# Patient Record
Sex: FEMALE | Race: WHITE | NOT HISPANIC OR LATINO | Employment: OTHER | URBAN - METROPOLITAN AREA
[De-identification: names, ages, dates, MRNs, and addresses within clinical notes are randomized per-mention and may not be internally consistent; named-entity substitution may affect disease eponyms.]

---

## 2017-01-03 DIAGNOSIS — E03.9 ACQUIRED HYPOTHYROIDISM: Primary | ICD-10-CM

## 2017-01-03 RX ORDER — LEVOTHYROXINE SODIUM 150 UG/1
150 TABLET ORAL EVERY OTHER DAY
Qty: 45 TABLET | Refills: 3 | Status: SHIPPED | OUTPATIENT
Start: 2017-01-03 | End: 2017-11-27

## 2017-01-03 RX ORDER — LEVOTHYROXINE SODIUM 175 UG/1
175 TABLET ORAL EVERY OTHER DAY
Qty: 45 TABLET | Refills: 3 | Status: SHIPPED | OUTPATIENT
Start: 2017-01-03 | End: 2017-11-27

## 2017-01-03 NOTE — TELEPHONE ENCOUNTER
Routing refill request to provider for review/approval because:  -recent dose change    Routing to PCP.    Dr. Collado-Please review and advise/sign if agree.    Thank you!  ERIBERTO GlynnN, RN

## 2017-01-03 NOTE — TELEPHONE ENCOUNTER
I checked her labs in December, no dose change then.  She alternates the 150mcg dose and the 175mcg dose.  I think we are ok to go ahead with refills.  I don't think her dose has been changed recently, let me know if you find otherwise.   Thanks.

## 2017-01-03 NOTE — TELEPHONE ENCOUNTER
LEVOTHYROXINE 150 MG     Last Written Prescription Date: 10-21-16  Last Quantity: 45, # refills: 0  Last Office Visit with [facility] or [facility] prescribing provider: 12-1-16   Next 5 appointments (look out 90 days)     Jan 04, 2017  4:00 PM   Return Visit with Roberto Carlos Mercado [facility] ([facility])    [address] [address]   [phone]            Jan 18, 2017  4:00 PM   Return Visit with Roberto Carlos Mercado [facility] ([facility])    [address] [address] [phone]            Jan 25, 2017  4:00 PM   Return Visit with Roberto Carlos Mercado [facility] ([facility])    [address] [address]   [phone]                   TSH   Date Value Ref Range Status   12/01/2016 3.72 0.40 - 4.00 mU/L Final

## 2017-01-04 ENCOUNTER — TELEPHONE (OUTPATIENT)
Dept: PALLIATIVE MEDICINE | Facility: CLINIC | Age: 57
End: 2017-01-04

## 2017-01-04 ENCOUNTER — OFFICE VISIT (OUTPATIENT)
Dept: PSYCHOLOGY | Facility: CLINIC | Age: 57
End: 2017-01-04
Payer: COMMERCIAL

## 2017-01-04 ENCOUNTER — OFFICE VISIT (OUTPATIENT)
Dept: PALLIATIVE MEDICINE | Facility: CLINIC | Age: 57
End: 2017-01-04
Payer: COMMERCIAL

## 2017-01-04 VITALS — DIASTOLIC BLOOD PRESSURE: 62 MMHG | SYSTOLIC BLOOD PRESSURE: 118 MMHG | HEART RATE: 72 BPM

## 2017-01-04 DIAGNOSIS — F33.1 MAJOR DEPRESSIVE DISORDER, RECURRENT EPISODE, MODERATE (H): Primary | ICD-10-CM

## 2017-01-04 DIAGNOSIS — M50.30 DDD (DEGENERATIVE DISC DISEASE), CERVICAL: ICD-10-CM

## 2017-01-04 DIAGNOSIS — F43.10 PTSD (POST-TRAUMATIC STRESS DISORDER): ICD-10-CM

## 2017-01-04 DIAGNOSIS — Z63.0 PARTNER RELATIONSHIP PROBLEMS: ICD-10-CM

## 2017-01-04 DIAGNOSIS — G89.4 CHRONIC PAIN SYNDROME: Primary | ICD-10-CM

## 2017-01-04 PROCEDURE — 90847 FAMILY PSYTX W/PT 50 MIN: CPT | Performed by: MARRIAGE & FAMILY THERAPIST

## 2017-01-04 PROCEDURE — 99205 OFFICE O/P NEW HI 60 MIN: CPT | Performed by: PHYSICAL MEDICINE & REHABILITATION

## 2017-01-04 PROCEDURE — 90785 PSYTX COMPLEX INTERACTIVE: CPT | Performed by: MARRIAGE & FAMILY THERAPIST

## 2017-01-04 RX ORDER — BUPRENORPHINE 5 UG/H
1 PATCH TRANSDERMAL
Qty: 4 PATCH | Refills: 0 | Status: SHIPPED | OUTPATIENT
Start: 2017-01-04 | End: 2017-04-05

## 2017-01-04 SDOH — SOCIAL STABILITY - SOCIAL INSECURITY: PROBLEMS IN RELATIONSHIP WITH SPOUSE OR PARTNER: Z63.0

## 2017-01-04 ASSESSMENT — PAIN SCALES - GENERAL: PAINLEVEL: SEVERE PAIN (6)
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
## 2017-01-04 NOTE — NURSING NOTE
"Chief Complaint   Patient presents with     Consult     pain management evaluation       Initial /62 mmHg  Pulse 72  Breastfeeding? No Estimated body mass index is 32.47 kg/(m^2) as calculated from the following:    Height as of 12/23/16: 1.753 m (5' 9\").    Weight as of 12/23/16: 99.791 kg (220 lb).  BP completed using cuff size: pauline Elizabeth CMA   [facility]    "

## 2017-01-04 NOTE — TELEPHONE ENCOUNTER
Received fax from [facility]  that states RX for Butrans 5mcg  Patch  is not covered under patient's insurance    Insurance Name: [facility]/ [employer]  Phone#- [phone]  ID#- [id number]  BIN: [id number]  PCN: [id number]  [facility]

## 2017-01-04 NOTE — PATIENT INSTRUCTIONS
1. Physical Therapy: Schedule an evaluation with Renee. Consider TENS unit.   2. Clinical Health Psychologist to address issues of relaxation, behavioral change, coping style, and other factors important to improvement: Schedule an evaluation with Dr. Humphries  3. Self Care Recommendations: continue guided imagery  4. Medication Management:   1. Butrans 5 mcg patch changed weekly.  2. Will consider lyrica.  3. Will get a UDS and opioid agreement at the next appointment.   5. Further procedures recommended: could consider cervical epidural steroid injection   6. Follow up: with Dr. Reyes in clinic in 1 month    Nurse Triage line:  [phone]   Call this number with any questions or concerns. You may leave a detailed message anytime. Calls are typically returned Monday through Friday between 8 AM and 4:30 PM. We usually get back to you within 2 business days depending on the issue/request.       Medication refills:    For non-narcotic medications, call your pharmacy directly to request a refill. The pharmacy will contact the [facility] Management Center for authorization. Please allow 3-4 days for these refills to be processed.     For narcotic refills, call the nurse triage line or send a [employer] message. Please contact us 7-10 days before your refill is due. The message MUST include the name of the specific medication(s) requested and how you would like to receive the prescription(s). The options are as follows:    Pain Clinic staff can mail the prescription to your pharmacy. Please tell us the name of the pharmacy.    You may pick the prescription up at the Pain Clinic (tell us the location) or during a clinic visit with your pain provider    Pain Clinic staff can deliver the prescription to the [facility] pharmacy in the clinic building. Please tell us the location.      Scheduling number: [phone].  Call this number to schedule or change appointments.    We believe regular attendance is key to your success in our  program.    Any time you are unable to keep your appointment we ask that you call us at least 24 hours in advance to let us know. This will allow us to offer the appointment time to another patient.

## 2017-01-04 NOTE — MR AVS SNAPSHOT
After Visit Summary   1/4/2017    Ilsa Yusuf    MRN: [id number]           Patient Information     Date Of Birth          1960        Visit Information        Provider Department      1/4/2017 9:00 AM Nahed Reyes DO [facility]        Today's Diagnoses     Chronic pain syndrome    -  1       Care Instructions    1. Physical Therapy: Schedule an evaluation with Renee. Consider TENS unit.   2. Clinical Health Psychologist to address issues of relaxation, behavioral change, coping style, and other factors important to improvement: Schedule an evaluation with Dr. Humphries  3. Self Care Recommendations: continue guided imagery  4. Medication Management:   1. Butrans 5 mcg patch changed weekly.  2. Will consider lyrica.  3. Will get a UDS and opioid agreement at the next appointment.   5. Further procedures recommended: could consider cervical epidural steroid injection   6. Follow up: with Dr. Reyes in clinic in 1 month    Nurse Triage line:  [phone]   Call this number with any questions or concerns. You may leave a detailed message anytime. Calls are typically returned Monday through Friday between 8 AM and 4:30 PM. We usually get back to you within 2 business days depending on the issue/request.       Medication refills:    For non-narcotic medications, call your pharmacy directly to request a refill. The pharmacy will contact the Pain Management Center for authorization. Please allow 3-4 days for these refills to be processed.     For narcotic refills, call the nurse triage line or send a [employer] message. Please contact us 7-10 days before your refill is due. The message MUST include the name of the specific medication(s) requested and how you would like to receive the prescription(s). The options are as follows:    Pain Clinic staff can mail the prescription to your pharmacy. Please tell us the name of the pharmacy.    You may pick the prescription up at the Pain  Clinic (tell us the location) or during a clinic visit with your pain provider    Pain Clinic staff can deliver the prescription to the [facility] pharmacy in the clinic building. Please tell us the location.      Scheduling number: [phone].  Call this number to schedule or change appointments.    We believe regular attendance is key to your success in our program.    Any time you are unable to keep your appointment we ask that you call us at least 24 hours in advance to let us know. This will allow us to offer the appointment time to another patient.             Follow-ups after your visit        Additional Services     PAIN PHD EVAL/TREAT/FOLLOW UP           PAIN PT EVAL AND TREAT                 Your next 10 appointments already scheduled     Jan 04, 2017  4:00 PM   Return Visit with Roberto Carlos Mercado [facility] ([facility]    [address] [address]   [phone]            Jan 18, 2017  4:00 PM   Return Visit with Roberto Carlos Mercado [facility] ([facility]    [address] [address]   [phone]            Jan 25, 2017  4:00 PM   Return Visit with Roberto Carlos Mercado [facility] ([facility]    [address] [address]   [phone]              Who to contact     If you have questions or need follow up information about today's clinic visit or your schedule please contact [facility] directly at [phone].  Normal or non-critical lab and imaging results will be communicated to you by MyChart, letter or phone within 4 business days after the clinic has received the results. If you do not hear from us within 7 days, please contact the clinic through MyChart or phone. If you have a critical or abnormal lab result, we will notify you by phone as soon as possible.  Submit refill requests through [employer] or call your pharmacy and they will  forward the refill request to us. Please allow 3 business days for your refill to be completed.          Additional Information About Your Visit        [employer] Information     [employer] gives you secure access to your electronic health record. If you see a primary care provider, you can also send messages to your care team and make appointments. If you have questions, please call your primary care clinic.  If you do not have a primary care provider, please call [phone] and they will assist you.        Care EveryWhere ID     This is your Care EveryWhere ID. This could be used by other organizations to access your [facility] medical records  [id number]        Your Vitals Were     Pulse Breastfeeding?                72 No           Blood Pressure from Last 3 Encounters:   01/04/17 118/62   12/21/16 130/77   12/01/16 101/68    Weight from Last 3 Encounters:   12/23/16 99.791 kg (220 lb)   12/21/16 99.338 kg (219 lb)   12/01/16 98.431 kg (217 lb)              We Performed the Following     PAIN PHD EVAL/TREAT/FOLLOW UP     PAIN PT EVAL AND TREAT          Today's Medication Changes          These changes are accurate as of: 1/4/17 10:06 AM.  If you have any questions, ask your nurse or doctor.               Start taking these medicines.        Dose/Directions    buprenorphine 5 MCG/HR WK patch   Commonly known as:  BUTRANS   Used for:  Chronic pain syndrome   Started by:  Nahed Reyes DO        Dose:  1 patch   Place 1 patch onto the skin every 7 days   Quantity:  4 patch   Refills:  0            Where to get your medicines      Some of these will need a paper prescription and others can be bought over the counter.  Ask your nurse if you have questions.     Bring a paper prescription for each of these medications    - buprenorphine 5 MCG/HR WK patch             Primary Care Provider Office Phone # Fax #    Catrachita Collado [phone]       [place] [address]  [place] [address]        Thank you!     Thank you for choosing [facility]  for your care. Our goal is always to provide you with excellent care. Hearing back from our patients is one way we can continue to improve our services. Please take a few minutes to complete the written survey that you may receive in the mail after your visit with us. Thank you!             Your Updated Medication List - Protect others around you: Learn how to safely use, store and throw away your medicines at www.disposemymeds.org.          This list is accurate as of: 1/4/17 10:06 AM.  Always use your most recent med list.                   Brand Name Dispense Instructions for use    buprenorphine 5 MCG/HR WK patch    BUTRANS    4 patch    Place 1 patch onto the skin every 7 days       cholecalciferol 5000 UNITS Caps capsule    vitamin D3    100 capsule    Take 1 capsule (5,000 Units) by mouth daily Take 1 per day       doxepin 10 MG capsule    SINEquan    60 capsule    Take 1-2 caps at bedtime       DULoxetine 60 MG EC capsule    CYMBALTA    60 capsule    Take 2 capsules (120 mg) by mouth daily       FIBER PO      Two capsules in the morning and two capsules at night       fish oil-omega-3 fatty acids 1000 MG capsule      Take 2 g by mouth daily Takes 1 in am and 1 at bedtime       fluticasone 50 MCG/ACT spray    FLONASE    16 g    Spray 1-2 sprays into both nostrils daily       * HYDROcodone-acetaminophen 7.5-325 MG per tablet    NORCO     TK 1 T PO Q 8 H       * HYDROcodone-acetaminophen 5-325 MG per tablet    NORCO    50 tablet    Take 1-2 tablets by mouth every 6 hours as needed for moderate to severe pain maximum 8 tablet(s) per day       * levothyroxine 150 MCG tablet    SYNTHROID/LEVOTHROID    45 tablet    Take 1 tablet (150 mcg) by mouth every other day       * levothyroxine 175 MCG tablet    SYNTHROID/LEVOTHROID    45 tablet    Take 1 tablet (175 mcg) by mouth every other day       * LORazepam 0.5 MG tablet    ATIVAN     30 tablet    Take 1 tablet (0.5 mg) by mouth nightly as needed for anxiety 30 tablets to last 30 days.       * LORazepam 0.5 MG tablet    ATIVAN    6 tablet    Take 2 po 90 minutes prior to MRI and may repeat x 1 30 minutes prior to MRI       lovastatin 20 MG tablet    MEVACOR    90 tablet    Take 1 tablet (20 mg) by mouth At Bedtime       Multi-vitamin Tabs tablet      Take 1 tablet by mouth daily       NONFORMULARY      Take 20 mg by mouth 2 times daily D-amphetamine Salt combo       OLANZapine 2.5 MG tablet    zyPREXA    30 tablet    Take 1 tablet (2.5 mg) by mouth At Bedtime       omeprazole 40 MG capsule    priLOSEC    90 capsule    Take 1 capsule (40 mg) by mouth daily Take 30-60 minutes before a meal.       ondansetron 4 MG tablet    ZOFRAN    18 tablet    Take 1 tablet (4 mg) by mouth every 6 hours as needed for nausea       prazosin 5 MG capsule    MINIPRESS    30 capsule    Take 1 capsule (5 mg) by mouth At Bedtime Take 1 capsule at bedtime       tiZANidine 4 MG tablet    ZANAFLEX    60 tablet    Take 1-2 tablets (4-8 mg) by mouth 3 times daily as needed for muscle spasms       traMADol 50 MG tablet    ULTRAM    20 tablet    Take 1-2 tablets ( mg) by mouth every 6 hours as needed for pain       zolpidem 5 MG tablet    AMBIEN    60 tablet    Take 1-2 tablets (5-10 mg) by mouth nightly as needed for sleep       * Notice:  This list has 6 medication(s) that are the same as other medications prescribed for you. Read the directions carefully, and ask your doctor or other care provider to review them with you.

## 2017-01-04 NOTE — PROGRESS NOTES
[facility] Consultation    Date of visit: 1/4/2017    Primary Care Provider: Dr. Catrachita Collado    Consulting Physician: Dr. Catrachita Collado       Reason for consultation: comprehensive evaluation and management  cervical radiculopathy  Red flag -depression    History of Present Illness: Ilsa Yusuf is a 56 year old female with history of neck pain, who presents for initial evaluation. The patient reports sudden onset in Aug 2016, when she just woke up with the pain. She describes herself as being active - walks and rides bike 5 miles 3 times per week. She has lost 12 pounds since August. She was taking ibuprofen, which kept her pain fairly well managed, but she can no longer take ibuprofen due to kidney issues (Cr. 1.06). She had left arm numbness but that has resolved after a couple months. She denies any weakness in that arm. Has associated headaches about 4 times per week, which also started in August. She does have a history of migraines during her 30's and 40's but they resolved. The headache she has now is different and it travels up the back of her head and creates spasms in her head. Saw neurosurgery at the [facility] and then saw Kim Crenshaw in neurosurgery. Underwent a cervical epidural steroid injection at [facility] and had a bad experience with this injection. She then did not pursue the other recommendation to have an EMG.   Pain location: (1) left-sided neck pain and now also on the right for the past 1 week, (2) bilateral knee pain, right worse than left. History of left mensicus surgery in 2012 and has a torn meniscus in her right knee.   Quality and timing of pain: sharp, stabbing, miserable, exhausing, penetrating, worse and at night  Pain rating: intensity ranges from 4/10 to 8/10, and Averages 6/10 on a 0-10 scale.  Aggravating factors include: housework -laundry, vacuuming, cooking, turning her head  Relieving factors include:  physical therapy, traction  Denies red flags including: bowel or bladder symptoms, fever, chills, saddle anesthesia, profound motor loss, history of cancer, history of immune compromise, weight loss.     Has a history of depression and PTSD diagnosed in 2011. Does individual therapy weekly. Sees Roberto Carlos for couples therapy.    Current pain treatments include:   norco 5-325 mg #1/day Pain medications are being prescribed by Dr. Herrera for her knee.  Tizanidine 8 mg qhs  Ativan 2-3 times per week  Doxepin 10-20 mg qhs  cymbalta 120 mg daily  zyprexa 2.5 mg qhs    Previous pain treatments included:  Ilsa Yusuf has not been seen at a pain clinic in the past.    Medications:    NSAIDs: ibuprofen -some relief, medrol dose юлия -helped while taking it.   Anticonvulsants: gabapentin -horrible nightmares    Opioids: tramadol -provided incomplete relief  PT: [facility] completed in Oct 2016 -helped  Psychology: guided imagery  Acupuncture: no  Chiropractic care: no  TENS Unit: no  Injections: cervical epidural steroid injection 9/20/16 -flared up her pain for about 3 days and did not get significant relief  Surgery: no cervical surgery, left knee meniscus surgery in 2012    Diagnostic Tests:  Cervical MRI completed on 8/27/16 showed:  Findings:  The cervical vertebrae appear normally aligned.  There is disc height  narrowing at C5-C6 with degeneration of the disc and sclerosis of  adjacent endplates.  There is normal signal within and normal contour  of the cervical spinal cord. No significant abnormal bone marrow  change. The findings on a level by level basis are as follows:     C2-3: No spinal canal or neural foraminal narrowing.     C3-4:  No spinal canal or neural foraminal narrowing.     C4-5:  No spinal canal or neural foraminal narrowing.     C5-6:  Posterior disc bulge with uncovertebral joint hypertrophy and  osteophyte formations. Superimposing left central disc herniation with  elevation of the  posterior longitudinal ligament Mild canal narrowing.  Herniated disc effaces the left anterolateral subarachnoid space and  possibly abutting the left C6 in the left portion of the spinal canal.  Mild canal narrowing. Bilateral mild to moderate foraminal narrowing  more significant on the left.     C6-7:  Disc bulge with mild uncovertebral hypertrophy. No significant  spinal canal or neural foraminal narrowing.     C7-T1:  No spinal canal or neural foraminal narrowing.      No abnormality of the paraspinous soft tissues.                                                                       Impression:       1. Disc bulge with uncovertebral joint hypertrophy and osteophyte  formations at C5-6. Superimposing left central disc herniation with  elevation of the posterior longitudinal ligament.Herniated disc  effaces the left anterolateral subarachnoid space and possibly  abutting the left C6 in the left portion of the spinal canal. Mild  canal narrowing. Bilateral mild to moderate foraminal narrowing more  significant on the left.  2. Disc bulge at C6-7 with no significant compression.    Review of [place] Prescription Monitoring Program (): Today I have also reviewed the patient's history of controlled substance use, as provided by [place] licensed pharmacies and prescriber dispensers.     Review of Pain Questionnaire: Please see the [facility] [place] health questionnaire, which the patient completed and reviewed with me in detail.    Review of Electronic Chart: Today I have also reviewed available medical information in the patient's medical record at [facility] including relevant provider notes, laboratory work, and imaging.     Past Medical History:  Past Medical History   Diagnosis Date     Seizure (H) 3/2011     One seizure, treated with keppra.     Hypothyroid      Moderate major depression (H)      abilify, cymbalta, seroquel, and Dr Antonio cowart     PTSD (post-traumatic  stress disorder)      Hyperlipidemia LDL goal < 130      mevacor     Depressive disorder, not elsewhere classified 08/28/12     DC 10/05/12-[facility]     Seizure disorder (H) 1/18/2012     Arthritis 2012     both knees; hands     Depressive disorder        Past Surgical History:  Past Surgical History   Procedure Laterality Date     C partial excision thyroid,unilat  1991     rt, negative path then, treated with synthroid.     Ureteral reimplantation       Renal artery surgery  child     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     Tonsillectomy       Cholecystectomy       Hernia repair       Abdomen surgery       Colonoscopy  2012     Orthopedic surgery  left knee       Medications:  Current Outpatient Prescriptions   Medication Sig Dispense Refill     levothyroxine (SYNTHROID/LEVOTHROID) 150 MCG tablet Take 1 tablet (150 mcg) by mouth every other day 45 tablet 3     levothyroxine (SYNTHROID/LEVOTHROID) 175 MCG tablet Take 1 tablet (175 mcg) by mouth every other day 45 tablet 3     HYDROcodone-acetaminophen (NORCO) 7.5-325 MG per tablet TK 1 T PO Q 8 H  0     HYDROcodone-acetaminophen (NORCO) 5-325 MG per tablet Take 1-2 tablets by mouth every 6 hours as needed for moderate to severe pain maximum 8 tablet(s) per day 50 tablet 0     LORazepam (ATIVAN) 0.5 MG tablet Take 2 po 90 minutes prior to MRI and may repeat x 1 30 minutes prior to MRI 6 tablet 0     zolpidem (AMBIEN) 5 MG tablet Take 1-2 tablets (5-10 mg) by mouth nightly as needed for sleep 60 tablet 0     lovastatin (MEVACOR) 20 MG tablet Take 1 tablet (20 mg) by mouth At Bedtime 90 tablet 3     doxepin (SINEQUAN) 10 MG capsule Take 1-2 caps at bedtime 60 capsule 11     DULoxetine (CYMBALTA) 60 MG capsule Take 2 capsules (120 mg) by mouth daily 60 capsule 5     prazosin (MINIPRESS) 5 MG capsule Take 1 capsule (5 mg) by mouth At Bedtime Take 1 capsule at bedtime 30 capsule 5     FIBER PO Two capsules in the morning and two capsules at night        "LORazepam (ATIVAN) 0.5 MG tablet Take 1 tablet (0.5 mg) by mouth nightly as needed for anxiety 30 tablets to last 30 days. 30 tablet 2     ondansetron (ZOFRAN) 4 MG tablet Take 1 tablet (4 mg) by mouth every 6 hours as needed for nausea 18 tablet 5     tiZANidine (ZANAFLEX) 4 MG tablet Take 1-2 tablets (4-8 mg) by mouth 3 times daily as needed for muscle spasms 60 tablet 5     OLANZapine (ZYPREXA) 2.5 MG tablet Take 1 tablet (2.5 mg) by mouth At Bedtime 30 tablet 5     fluticasone (FLONASE) 50 MCG/ACT nasal spray Spray 1-2 sprays into both nostrils daily 16 g 11     NONFORMULARY Take 20 mg by mouth 2 times daily D-amphetamine Salt combo       cholecalciferol (VITAMIN D3) 5000 UNITS CAPS capsule Take 1 capsule (5,000 Units) by mouth daily Take 1 per day 100 capsule 2     omeprazole (PRILOSEC) 40 MG capsule Take 1 capsule (40 mg) by mouth daily Take 30-60 minutes before a meal. 90 capsule 3     multivitamin, therapeutic with minerals (MULTI-VITAMIN) TABS Take 1 tablet by mouth daily       fish oil-omega-3 fatty acids (OMEGA 3) 1000 MG capsule Take 2 g by mouth daily Takes 1 in am and 1 at bedtime       traMADol (ULTRAM) 50 MG tablet Take 1-2 tablets ( mg) by mouth every 6 hours as needed for pain 20 tablet 0       Allergies:     Allergies   Allergen Reactions     Gabapentin Other (See Comments)     Patient states she gets \"horrific nightmares\" with this medication     Dilaudid [Hydromorphone Hcl]      Made her feel like her skin was crawling     Compazine [Prochlorperazine] Other (See Comments)     \"Makes my skin crawl, I was going nuts.\"       Social History:  Social History     Social History     Marital Status:      Spouse Name: N/A     Number of Children: N/A     Years of Education: N/A     Occupational History     Not on file.     Social History Main Topics     Smoking status: Former Smoker -- 0.30 packs/day     Quit date: 02/17/2015     Smokeless tobacco: Current User     Alcohol Use: 0.0 oz/week "      Comment: 1-2 glass of wine with dinner 1-2 times per week     Drug Use: No     Sexual Activity:     Partners: Male     Birth Control/ Protection: None     Other Topics Concern     Parent/Sibling W/ Cabg, Mi Or Angioplasty Before 65f 55m? No     Social History Narrative     History of chemical dependency treatment: no. Denies any illegal drugs. Drinks alcohol very sparingly. Occasionally will smoke a cigarette.   Home situation: lives with their family -signigicant other/domestic partner -good relationship.   Work situation: last worked in , worked as a nurse education and lactation consultant. On disability since  for depression.    Family history:  Family History   Problem Relation Age of Onset     C.A.D. Father 58      at 58, heart disease     MENTAL ILLNESS Father      Alzheimer Disease Mother      total care now     Depression Mother      Anxiety Disorder Mother      DIABETES Sister      adult onset     Thyroid Disease Sister      DIABETES Sister      Coronary Artery Disease Father      Hyperlipidemia Father      Thyroid Disease Sister        Review of Systems:   General: negative for fever or chills, weight loss  Eyes and Head: negative for headache, dizziness, vision changes  Ears/Nose/Throat: negative for nose bleeds, hearing loss, sinus infection, earache, and ringing in ears  Immune system: negative for immune deficiency, infections  Skin: negative for itching, rash  Hematologic: negative for anemia, bleeding problems  Respiratory: negative for cough, shortness of breath  Cardiovascular: negative for leg swelling, high blood pressure, chest pain  Gastrointestinal: negative for nausea or vomiting, constipation, diarrhea  Endocrine: negative for diabetes, osteoporosis  Musculoskeletal: negative except as discussed in the HPI above  Urologic: negative for urinary urgency, incontinence  Neurologic: negative for seizure, tremor  Mental health:Negative for: suicidal thoughts Positive for:  depression anxiety    Physical Exam:  Filed Vitals:    01/04/17 0907   BP: 118/62   Pulse: 72     Exam:  Constitutional: healthy, alert and no distress  Head: normocephalic. Atraumatic.   Eyes: no redness or jaundice noted   ENT: oropharnx normal.  MMM.  Neck supple.    Cardiovascular: RRR no m/g/r   Respiratory: clear   Gastrointestinal: soft, non-tender, normoactive bowel sounds   : deferred  Skin: no suspicious lesions or rashes  Psychiatric: mentation appears normal and affect normal/bright    Musculoskeletal exam:  Gait/Station/Posture: normal  Cervical spine: normal range of motion with some discomfort at end range    Neurologic exam:  Motor:  5/5 UE strength  Reflexes:     Biceps:     R:  2/4 L: 2/4   Brachioradialis   R:  2/4 L: 2/4  Sensory:  (upper and lower extremities):   Light touch: normal    Allodynia: absent    Hyperalgesia: absent    Screening tools:  DIRE Score for ongoing opioid management is calculated as follows:    Diagnosis = 1    Intractability = 2    Risk: Psych = 2  Chem Hlth = 2  Reliability = 2  Social = 2    Efficacy = 2    Total DIRE Score = 13 (14 or higher predicts good candidate for ongoing opioid management; 13 or lower predicts poor candidate for opioid management)     Assessment:  1. Neck pain, worse on the left, started in Aug 2016, with associated headaches. Disc bulge at C5-C6 and C6-C7. Bilateral foraminal narrowing at C5-C6.  2. Bilateral knee pain, right worse than left. History of left mensicus surgery in 2012 and has a torn meniscus in her right knee  3. Depression and PTSD -weekly counseling    Plan:  Diagnosis reviewed, treatment option addressed, and risk/benefits discussed.  Self-care instructions given.  I am recommending a multidisciplinary treatment plan to help this patient better manage her pain.      1. Physical Therapy: Schedule an evaluation with Renee. Consider TENS unit.   2. Clinical Health Psychologist to address issues of relaxation, behavioral change,  coping style, and other factors important to improvement: Schedule an evaluation with Dr. Humphries  3. Self Care Recommendations: continue guided imagery  4. Medication Management:   1. Butrans 5 mcg patch changed weekly.  2. Will consider lyrica.  3. Will get a UDS and opioid agreement at the next appointment.   5. Further procedures recommended: could consider cervical epidural steroid injection   6. Follow up: with Dr. Reyes in clinic in 1 month    Total time spent was 60 minutes, and more than 50% of face to face time was spent in counseling and/or coordination of care regarding principles of multidisciplinary care, medication management, and treatment options as discussed above.      Nahed Reyes DO  [facility]  [facility]

## 2017-01-05 NOTE — TELEPHONE ENCOUNTER
PA faxed via Cover My Meds. Waiting for approval.   Kira Elizabeth, [facility]

## 2017-01-06 ENCOUNTER — RADIANT APPOINTMENT (OUTPATIENT)
Dept: GENERAL RADIOLOGY | Facility: CLINIC | Age: 57
End: 2017-01-06
Attending: FAMILY MEDICINE
Payer: COMMERCIAL

## 2017-01-06 ENCOUNTER — OFFICE VISIT (OUTPATIENT)
Dept: FAMILY MEDICINE | Facility: CLINIC | Age: 57
End: 2017-01-06
Payer: COMMERCIAL

## 2017-01-06 ENCOUNTER — HOSPITAL ENCOUNTER (EMERGENCY)
Facility: CLINIC | Age: 57
Discharge: HOME | End: 2017-01-06
Attending: EMERGENCY MEDICINE | Admitting: EMERGENCY MEDICINE
Payer: MEDICARE

## 2017-01-06 VITALS
TEMPERATURE: 101.1 F | RESPIRATION RATE: 18 BRPM | SYSTOLIC BLOOD PRESSURE: 108 MMHG | HEART RATE: 96 BPM | OXYGEN SATURATION: 95 % | BODY MASS INDEX: 32.33 KG/M2 | DIASTOLIC BLOOD PRESSURE: 68 MMHG | WEIGHT: 219 LBS

## 2017-01-06 VITALS
SYSTOLIC BLOOD PRESSURE: 113 MMHG | OXYGEN SATURATION: 91 % | DIASTOLIC BLOOD PRESSURE: 56 MMHG | RESPIRATION RATE: 18 BRPM | TEMPERATURE: 100.3 F

## 2017-01-06 DIAGNOSIS — J18.9 COMMUNITY ACQUIRED PNEUMONIA: ICD-10-CM

## 2017-01-06 DIAGNOSIS — R50.9 FEVER, UNSPECIFIED: ICD-10-CM

## 2017-01-06 DIAGNOSIS — R65.10 SIRS (SYSTEMIC INFLAMMATORY RESPONSE SYNDROME) (H): ICD-10-CM

## 2017-01-06 DIAGNOSIS — R05.9 COUGH: ICD-10-CM

## 2017-01-06 DIAGNOSIS — R06.2 WHEEZING: ICD-10-CM

## 2017-01-06 DIAGNOSIS — J18.9 PNEUMONIA OF LEFT LOWER LOBE DUE TO INFECTIOUS ORGANISM: Primary | ICD-10-CM

## 2017-01-06 LAB
ANION GAP SERPL CALCULATED.3IONS-SCNC: 6 MMOL/L (ref 3–14)
BUN SERPL-MCNC: 9 MG/DL (ref 7–30)
CALCIUM SERPL-MCNC: 9.2 MG/DL (ref 8.5–10.1)
CHLORIDE SERPL-SCNC: 104 MMOL/L (ref 94–109)
CO2 BLDCOV-SCNC: 25 MMOL/L (ref 21–28)
CO2 SERPL-SCNC: 28 MMOL/L (ref 20–32)
CREAT SERPL-MCNC: 1.11 MG/DL (ref 0.52–1.04)
ERYTHROCYTE [DISTWIDTH] IN BLOOD BY AUTOMATED COUNT: 14.8 % (ref 10–15)
FLUAV+FLUBV AG SPEC QL: NORMAL
FLUAV+FLUBV AG SPEC QL: NORMAL
GFR SERPL CREATININE-BSD FRML MDRD: 50 ML/MIN/1.7M2
GLUCOSE SERPL-MCNC: 120 MG/DL (ref 70–99)
HCT VFR BLD AUTO: 42.1 % (ref 35–47)
HGB BLD-MCNC: 13.9 G/DL (ref 11.7–15.7)
INTERPRETATION ECG - MUSE: NORMAL
LACTATE BLD-SCNC: 0.6 MMOL/L (ref 0.7–2.1)
MCH RBC QN AUTO: 28.7 PG (ref 26.5–33)
MCHC RBC AUTO-ENTMCNC: 33 G/DL (ref 31.5–36.5)
MCV RBC AUTO: 87 FL (ref 78–100)
PCO2 BLDV: 39 MM HG (ref 40–50)
PH BLDV: 7.41 PH (ref 7.32–7.43)
PLATELET # BLD AUTO: 213 10E9/L (ref 150–450)
PO2 BLDV: 31 MM HG (ref 25–47)
POTASSIUM SERPL-SCNC: 4 MMOL/L (ref 3.4–5.3)
RBC # BLD AUTO: 4.85 10E12/L (ref 3.8–5.2)
SAO2 % BLDV FROM PO2: 61 %
SODIUM SERPL-SCNC: 138 MMOL/L (ref 133–144)
SPECIMEN SOURCE: NORMAL
WBC # BLD AUTO: 18.6 10E9/L (ref 4–11)

## 2017-01-06 PROCEDURE — 82803 BLOOD GASES ANY COMBINATION: CPT

## 2017-01-06 PROCEDURE — 87040 BLOOD CULTURE FOR BACTERIA: CPT | Mod: 90 | Performed by: FAMILY MEDICINE

## 2017-01-06 PROCEDURE — 96372 THER/PROPH/DIAG INJ SC/IM: CPT | Performed by: FAMILY MEDICINE

## 2017-01-06 PROCEDURE — 25000125 ZZHC RX 250: Performed by: EMERGENCY MEDICINE

## 2017-01-06 PROCEDURE — 40000275 ZZH STATISTIC RCP TIME EA 10 MIN

## 2017-01-06 PROCEDURE — 99000 SPECIMEN HANDLING OFFICE-LAB: CPT | Performed by: FAMILY MEDICINE

## 2017-01-06 PROCEDURE — 99215 OFFICE O/P EST HI 40 MIN: CPT | Mod: 25 | Performed by: FAMILY MEDICINE

## 2017-01-06 PROCEDURE — 25000132 ZZH RX MED GY IP 250 OP 250 PS 637: Mod: GY | Performed by: EMERGENCY MEDICINE

## 2017-01-06 PROCEDURE — 94640 AIRWAY INHALATION TREATMENT: CPT | Performed by: FAMILY MEDICINE

## 2017-01-06 PROCEDURE — 94640 AIRWAY INHALATION TREATMENT: CPT

## 2017-01-06 PROCEDURE — 36415 COLL VENOUS BLD VENIPUNCTURE: CPT | Performed by: FAMILY MEDICINE

## 2017-01-06 PROCEDURE — 93005 ELECTROCARDIOGRAM TRACING: CPT

## 2017-01-06 PROCEDURE — 99284 EMERGENCY DEPT VISIT MOD MDM: CPT | Mod: 25

## 2017-01-06 PROCEDURE — 80048 BASIC METABOLIC PNL TOTAL CA: CPT | Performed by: FAMILY MEDICINE

## 2017-01-06 PROCEDURE — 25000128 H RX IP 250 OP 636: Performed by: EMERGENCY MEDICINE

## 2017-01-06 PROCEDURE — 87804 INFLUENZA ASSAY W/OPTIC: CPT | Performed by: FAMILY MEDICINE

## 2017-01-06 PROCEDURE — 71020 XR CHEST 2 VW: CPT

## 2017-01-06 PROCEDURE — 96366 THER/PROPH/DIAG IV INF ADDON: CPT

## 2017-01-06 PROCEDURE — 85027 COMPLETE CBC AUTOMATED: CPT | Performed by: FAMILY MEDICINE

## 2017-01-06 PROCEDURE — A9270 NON-COVERED ITEM OR SERVICE: HCPCS | Mod: GY

## 2017-01-06 PROCEDURE — 25000132 ZZH RX MED GY IP 250 OP 250 PS 637: Mod: GY

## 2017-01-06 PROCEDURE — A9270 NON-COVERED ITEM OR SERVICE: HCPCS | Mod: GY | Performed by: EMERGENCY MEDICINE

## 2017-01-06 PROCEDURE — 83605 ASSAY OF LACTIC ACID: CPT

## 2017-01-06 PROCEDURE — 96365 THER/PROPH/DIAG IV INF INIT: CPT

## 2017-01-06 RX ORDER — IPRATROPIUM BROMIDE AND ALBUTEROL SULFATE 2.5; .5 MG/3ML; MG/3ML
1 SOLUTION RESPIRATORY (INHALATION) EVERY 6 HOURS PRN
Qty: 30 VIAL | Refills: 1 | Status: SHIPPED | OUTPATIENT
Start: 2017-01-06 | End: 2017-05-01

## 2017-01-06 RX ORDER — ACETAMINOPHEN 325 MG/1
650 TABLET ORAL EVERY 4 HOURS PRN
Status: DISCONTINUED | OUTPATIENT
Start: 2017-01-06 | End: 2017-01-06 | Stop reason: HOSPADM

## 2017-01-06 RX ORDER — CEFDINIR 300 MG/1
600 CAPSULE ORAL DAILY
Qty: 18 CAPSULE | Refills: 0 | Status: SHIPPED | OUTPATIENT
Start: 2017-01-06 | End: 2017-01-15

## 2017-01-06 RX ORDER — CEFTRIAXONE 1 G/1
1 INJECTION, POWDER, FOR SOLUTION INTRAMUSCULAR; INTRAVENOUS ONCE
Status: COMPLETED | OUTPATIENT
Start: 2017-01-06 | End: 2017-01-06

## 2017-01-06 RX ORDER — ALBUTEROL SULFATE 90 UG/1
2 AEROSOL, METERED RESPIRATORY (INHALATION) EVERY 6 HOURS PRN
Qty: 1 INHALER | Refills: 1 | Status: SHIPPED | OUTPATIENT
Start: 2017-01-06 | End: 2017-05-01

## 2017-01-06 RX ORDER — IPRATROPIUM BROMIDE AND ALBUTEROL SULFATE 2.5; .5 MG/3ML; MG/3ML
3 SOLUTION RESPIRATORY (INHALATION) ONCE
Status: COMPLETED | OUTPATIENT
Start: 2017-01-06 | End: 2017-01-06

## 2017-01-06 RX ORDER — GUAIFENESIN/DEXTROMETHORPHAN 100-10MG/5
5-10 SYRUP ORAL 4 TIMES DAILY PRN
Qty: 560 ML | Refills: 0 | Status: SHIPPED | OUTPATIENT
Start: 2017-01-06 | End: 2017-04-05

## 2017-01-06 RX ORDER — BENZONATATE 200 MG/1
200 CAPSULE ORAL 3 TIMES DAILY PRN
Qty: 21 CAPSULE | Refills: 0 | Status: SHIPPED | OUTPATIENT
Start: 2017-01-06 | End: 2017-01-09

## 2017-01-06 RX ORDER — DOXYCYCLINE 100 MG/1
100 CAPSULE ORAL 2 TIMES DAILY
Qty: 20 CAPSULE | Refills: 0 | Status: SHIPPED | OUTPATIENT
Start: 2017-01-06 | End: 2017-01-06

## 2017-01-06 RX ORDER — AZITHROMYCIN 250 MG/1
500 TABLET, FILM COATED ORAL ONCE
Status: COMPLETED | OUTPATIENT
Start: 2017-01-06 | End: 2017-01-06

## 2017-01-06 RX ORDER — CEFTRIAXONE 1 G/1
1000 INJECTION, POWDER, FOR SOLUTION INTRAMUSCULAR; INTRAVENOUS ONCE
Qty: 10 ML | Refills: 0 | OUTPATIENT
Start: 2017-01-06 | End: 2017-01-06

## 2017-01-06 RX ORDER — BENZONATATE 100 MG/1
200 CAPSULE ORAL ONCE
Status: COMPLETED | OUTPATIENT
Start: 2017-01-06 | End: 2017-01-06

## 2017-01-06 RX ORDER — KETOROLAC TROMETHAMINE 15 MG/ML
15 INJECTION, SOLUTION INTRAMUSCULAR; INTRAVENOUS ONCE
Status: DISCONTINUED | OUTPATIENT
Start: 2017-01-06 | End: 2017-01-06 | Stop reason: HOSPADM

## 2017-01-06 RX ORDER — ACETAMINOPHEN 325 MG/1
TABLET ORAL
Status: COMPLETED
Start: 2017-01-06 | End: 2017-01-06

## 2017-01-06 RX ORDER — AZITHROMYCIN 250 MG/1
250 TABLET, FILM COATED ORAL DAILY
Qty: 4 TABLET | Refills: 0 | Status: SHIPPED | OUTPATIENT
Start: 2017-01-07 | End: 2017-01-09

## 2017-01-06 RX ADMIN — CEFTRIAXONE 1 G: 1 INJECTION, POWDER, FOR SOLUTION INTRAMUSCULAR; INTRAVENOUS at 13:52

## 2017-01-06 RX ADMIN — SODIUM CHLORIDE 1000 ML: 9 INJECTION, SOLUTION INTRAVENOUS at 13:52

## 2017-01-06 RX ADMIN — ACETAMINOPHEN 650 MG: 325 TABLET ORAL at 14:09

## 2017-01-06 RX ADMIN — ACETAMINOPHEN 650 MG: 325 TABLET, FILM COATED ORAL at 14:09

## 2017-01-06 RX ADMIN — AZITHROMYCIN 500 MG: 250 TABLET, FILM COATED ORAL at 13:52

## 2017-01-06 RX ADMIN — BENZONATATE 200 MG: 100 CAPSULE ORAL at 14:02

## 2017-01-06 RX ADMIN — IPRATROPIUM BROMIDE AND ALBUTEROL SULFATE 3 ML: .5; 3 SOLUTION RESPIRATORY (INHALATION) at 13:56

## 2017-01-06 ASSESSMENT — ENCOUNTER SYMPTOMS
FEVER: 1
DIARRHEA: 0
VOMITING: 0
COUGH: 1
COUGH: 1
NAUSEA: 1
WHEEZING: 1
HEADACHES: 1
SHORTNESS OF BREATH: 1
SPUTUM PRODUCTION: 1
FEVER: 1
NAUSEA: 1
MYALGIAS: 1
CHILLS: 1
VOMITING: 1
SORE THROAT: 1
HEADACHES: 1
DIZZINESS: 1
HEMOPTYSIS: 0
ABDOMINAL PAIN: 0

## 2017-01-06 NOTE — PATIENT INSTRUCTIONS
-blood work today to check on kidney function, blood counts, and blood cultures  -we are giving you a dose of Rocephin to get you started on antibiotics  -start doxycycline today, take twice daily with food.    -if any worsening symptoms of cough, shortness of breath, dizziness, or any chest pain, go to the ER.   -I want you to come in to clinic for a recheck early next week  -albuterol helps with wheezing and chest tightness, take 2 puffs every 4-6 hours as needed  -cough syrup and tessalon perles as needed.  -stay really well hydrated, rest

## 2017-01-06 NOTE — DISCHARGE INSTRUCTIONS
Please make an appointment to follow up with your primary care provider in 2-3 days if not improving.    Discharge Instructions  Bronchitis, Pneumonia, Bronchospasm    You were seen today for a chest infection or inflammation. If your doctor decided this was due to a bacterial infection, you may need an antibiotic. Sometimes these are caused by a virus, and then an antibiotic will not help.     Return to the Emergency Department if:    Your breathing gets much worse.    You are very weak, or feel much more ill.    You develop new symptoms, such as chest pain.    You cough up blood.    You are vomiting enough that you can t keep fluids or your medicine down.    What can I do to help myself?    Fill any prescriptions the doctor gave you and take them right away--especially antibiotics. Be sure to finish the whole antibiotic prescription.    You may be given a prescription for an inhaler, which can help loosen tight air passages.  Use this as needed, but not more often than directed. Inhalers work much better when used with a spacer.     You may be given a prescription for a steroid to reduce inflammation. Used long-term, these can have many serious side effects, but for short courses these do not happen. You may notice restlessness or increased appetite.        You may use non-prescription cough or cold medicines. Cough medicines may help, but don t make the cough go away completely.     Avoid smoke, because this can make your symptoms worse. If you smoke, this may be a good time to quit! Consider using nicotine lozenges, gum, or patches to reduce cravings.     If you have a fever, Tylenol  (acetaminophen), Motrin  (ibuprofen), or Advil  (ibuprofen) may help bring fever down and may help you feel more comfortable. Be sure to read and follow the package directions, and ask your doctor if you have questions.    Be sure to get your flu shot each year.  The pneumonia shot can help prevent pneumonia.  Probiotics: If you  "have been given an antibiotic, you may want to also take a probiotic pill or eat yogurt with live cultures. Probiotics have \"good bacteria\" to help your intestines stay healthy. Studies have shown that probiotics help prevent diarrhea and other intestine problems (including C. diff infection) when you take antibiotics. You can buy these without a prescription in the pharmacy section of the store.     If your doctor has told you to follow-up at your clinic, be sure to call right away and go to your appointment.  If there is any problem with keeping your appointment, call your doctor or return to the Emergency Department.    If you were given a prescription for medicine here today, be sure to read all of the information (including the package insert) that comes with your prescription.  This will include important information about the medicine, its side effects, and any warnings that you need to know about.  The pharmacist who fills the prescription can provide more information and answer questions you may have about the medicine.  If you have questions or concerns that the pharmacist cannot address, please call or return to the Emergency Department.     Opioid Medication Information    Pain medications are among the most commonly prescribed medicines, so we are including this information for all our patients. If you did not receive pain medication or get a prescription for pain medicine, you can ignore it.     You may have been given a prescription for an opioid (narcotic) pain medicine and/or have received a pain medicine while here in the Emergency Department. These medicines can make you drowsy or impaired. You must not drive, operate dangerous equipment, or engage in any other dangerous activities while taking these medications. If you drive while taking these medications, you could be arrested for DUI, or driving under the influence. Do not drink any alcohol while you are taking these medications.     Opioid " pain medications can cause addiction. If you have a history of chemical dependency of any type, you are at a higher risk of becoming addicted to pain medications.  Only take these prescribed medications to treat your pain when all other options have been tried. Take it for as short a time and as few doses as possible. Store your pain pills in a secure place, as they are frequently stolen and provide a dangerous opportunity for children or visitors in your house to start abusing these powerful medications. We will not replace any lost or stolen medicine.  As soon as your pain is better, you should flush all your remaining medication.     Many prescription pain medications contain Tylenol  (acetaminophen), including Vicodin , Tylenol #3 , Norco , Lortab , and Percocet .  You should not take any extra pills of Tylenol  if you are using these prescription medications or you can get very sick.  Do not ever take more than 3000 mg of acetaminophen in any 24 hour period.    All opioids tend to cause constipation. Drink plenty of water and eat foods that have a lot of fiber, such as fruits, vegetables, prune juice, apple juice and high fiber cereal.  Take a laxative if you don t move your bowels at least every other day. Miralax , Milk of Magnesia, Colace , or Senna  can be used to keep you regular.      Remember that you can always come back to the Emergency Department if you are not able to see your regular doctor in the amount of time listed above, if you get any new symptoms, or if there is anything that worries you.

## 2017-01-06 NOTE — PROGRESS NOTES
"HPI CC:  57 yo F presents with acute fever and cough.    RESPIRATORY SYMPTOMS      Duration: x3 days     Description  Cough, wheezing, sore throat, headache, and fever 102    Severity: severe    Accompanying signs and symptoms: coughing out yellow phlegm      History (predisposing factors):  none    Precipitating or alleviating factors: None    Therapies tried and outcome:  None      Started three days ago, severe cough, productive of scant amts of yellow sputum, which is getting worse, with shortness of breath, feels like there is fluid in her chest.  No h/o asthma or other pulmonary disease.  She also has had some sore throat, ear pain last night, nasal congestion.  Some nausea this morning when she tried to eat, no vomiting or diarrhea.  No rash.  She is achy everywhere.  She has had persistent high fevers.  She gets dizzy with walking.      Review of Systems   Constitutional: Positive for fever, chills and malaise/fatigue.   HENT: Positive for congestion, ear pain and sore throat. Negative for ear discharge.    Respiratory: Positive for cough, sputum production, shortness of breath and wheezing. Negative for hemoptysis.    Cardiovascular: Negative for chest pain.   Gastrointestinal: Positive for nausea. Negative for vomiting, abdominal pain and diarrhea.   Musculoskeletal: Positive for myalgias and joint pain.   Skin: Negative for rash.   Neurological: Positive for dizziness and headaches.       Allergies   Allergen Reactions     Gabapentin Other (See Comments)     Patient states she gets \"horrific nightmares\" with this medication     Dilaudid [Hydromorphone Hcl]      Made her feel like her skin was crawling     Compazine [Prochlorperazine] Other (See Comments)     \"Makes my skin crawl, I was going nuts.\"     Current Outpatient Prescriptions   Medication     ipratropium - albuterol 0.5 mg/2.5 mg/3 mL (DUONEB) 0.5-2.5 (3) MG/3ML neb solution     cefTRIAXone (ROCEPHIN) 1 GM vial     doxycycline (VIBRAMYCIN) 100 MG " capsule     albuterol (PROAIR HFA/PROVENTIL HFA/VENTOLIN HFA) 108 (90 BASE) MCG/ACT Inhaler     guaiFENesin-dextromethorphan (ROBITUSSIN DM) 100-10 MG/5ML syrup     benzonatate (TESSALON) 200 MG capsule     levothyroxine (SYNTHROID/LEVOTHROID) 150 MCG tablet     levothyroxine (SYNTHROID/LEVOTHROID) 175 MCG tablet     HYDROcodone-acetaminophen (NORCO) 7.5-325 MG per tablet     HYDROcodone-acetaminophen (NORCO) 5-325 MG per tablet     LORazepam (ATIVAN) 0.5 MG tablet     traMADol (ULTRAM) 50 MG tablet     zolpidem (AMBIEN) 5 MG tablet     lovastatin (MEVACOR) 20 MG tablet     doxepin (SINEQUAN) 10 MG capsule     DULoxetine (CYMBALTA) 60 MG capsule     prazosin (MINIPRESS) 5 MG capsule     FIBER PO     LORazepam (ATIVAN) 0.5 MG tablet     ondansetron (ZOFRAN) 4 MG tablet     tiZANidine (ZANAFLEX) 4 MG tablet     OLANZapine (ZYPREXA) 2.5 MG tablet     fluticasone (FLONASE) 50 MCG/ACT nasal spray     NONFORMULARY     cholecalciferol (VITAMIN D3) 5000 UNITS CAPS capsule     omeprazole (PRILOSEC) 40 MG capsule     multivitamin, therapeutic with minerals (MULTI-VITAMIN) TABS     fish oil-omega-3 fatty acids (OMEGA 3) 1000 MG capsule     buprenorphine (BUTRANS) 5 MCG/HR WK patch     No current facility-administered medications for this visit.     Active Ambulatory Problems     Diagnosis Date Noted     Major depressive disorder, recurrent episode, moderate (H) 01/18/2012     PTSD (post-traumatic stress disorder) 01/18/2012     Acquired hypothyroidism 01/18/2012     Hyperlipidemia LDL goal <130 01/30/2012     CKD (chronic kidney disease) stage 3, GFR 30-59 ml/min 03/09/2015     Esophageal reflux 04/13/2015     Primary insomnia 06/02/2015     Obesity 06/21/2015     Attention-deficit hyperactivity disorder, predominantly hyperactive type 04/19/2016     Neck pain 09/29/2016     Cervical radiculopathy 10/28/2016     Partner relationship problems 12/05/2016     Primary osteoarthritis of right knee 12/26/2016     Peripheral tear of  medial meniscus of right knee, unspecified whether old or current tear, initial encounter 12/26/2016     Resolved Ambulatory Problems     Diagnosis Date Noted     Seizure disorder (H) 01/18/2012     Nausea, vomiting and diarrhea 04/13/2016     Norovirus 04/14/2016     Viral gastroenteritis 04/14/2016     Past Medical History   Diagnosis Date     Seizure (H) 3/2011     Hypothyroid      Moderate major depression (H)      Hyperlipidemia LDL goal < 130      Depressive disorder, not elsewhere classified 08/28/12     Arthritis 2012     Depressive disorder        Physical Exam   Constitutional:   Ill-appearing   HENT:   Head: Normocephalic and atraumatic.   Right Ear: External ear normal.   Left Ear: External ear normal.   Nose: Nose normal.   Mouth/Throat: No oropharyngeal exudate.   Clear rhinorrhea    Dry mucus membranes     Eyes: Conjunctivae are normal. Pupils are equal, round, and reactive to light. Right eye exhibits no discharge. Left eye exhibits no discharge. No scleral icterus.   Neck: Normal range of motion. Neck supple. No thyromegaly present.   Cardiovascular: Regular rhythm and normal heart sounds.  Exam reveals no gallop and no friction rub.    No murmur heard.  Mildly tachycardic   Pulmonary/Chest: Effort normal. No respiratory distress.   Persistent cough  Decreased breath sounds left lower and middle lung fields  Scant wheezing bilaterally  Poor air flow throughout   Abdominal: Soft. Bowel sounds are normal. There is no tenderness.   Musculoskeletal: She exhibits no edema.   Lymphadenopathy:     She has cervical adenopathy.   Neurological: She is alert.   Skin: Skin is warm and dry. She is not diaphoretic. There is pallor.   Vitals reviewed.    /68 mmHg  Pulse 96  Temp(Src) 101.1  F (38.4  C) (Tympanic)  Resp 18  Wt 219 lb (99.338 kg)  SpO2 95%  Breastfeeding? No    Rapid flu: neg  CXR: left PNA to my view    A/P  Pneumonia: Her initial O2 sats were 91% on RA, 90% with ambulation, and these  imprved to 95% after a duoneb.  I was initially going to try and treat her outpatient and gave rocephin x1 after obtaining blood cultures and other labs and prescribing doxycycline, albuterol and cough medications.  However, she was moderately to severely ill-appearing, and I am concerned for SIRS with fever, mild tachycardia, WBC 18.6 and left-sided pneumonia.  After discussing these findings with the patient, I did advise she go to the ER for IVF and further evaluation for possible admission.  I called [facility] ER where the patient plans to go and discussed the case with the attending provider.

## 2017-01-06 NOTE — NURSING NOTE
Filed Vitals:    01/06/17 0839 01/06/17 0916 01/06/17 0936   BP: 108/68     Pulse: 96     Temp: 101.1  F (38.4  C)     TempSrc: Tympanic     Resp: 18     Weight: 219 lb (99.338 kg)     SpO2: 91% 90% 95%         Ger Granado MA

## 2017-01-06 NOTE — NURSING NOTE
The following medication was given:     MEDICATION: Rocephin 1g and Lidocaine 2.1 mL  ROUTE: IM  SITE: RUQ - Gluteus  DOSE: 1g  LOT #: [id number]  :  [employer]   EXPIRATION DATE:  5/2017  NDC#: 31926-458-91  Verified by Yamilet SANDERS CMA    Lidocaine 1%:  NDC: 8583-3609-11  LOT: [id number]  EX: 6/1/2018    Ger Granado MA

## 2017-01-06 NOTE — MR AVS SNAPSHOT
After Visit Summary   1/6/2017    Ilsa Yusuf    MRN: [id number]           Patient Information     Date Of Birth          1960        Visit Information        Provider Department      1/6/2017 8:40 AM Catrachita Collado MD [facility]        Today's Diagnoses     Wheezing    -  1     Fever, unspecified         Cough         Pneumonia of left lower lobe due to infectious organism           Care Instructions    -blood work today to check on kidney function, blood counts, and blood cultures  -we are giving you a dose of Rocephin to get you started on antibiotics  -start doxycycline today, take twice daily with food.    -if any worsening symptoms of cough, shortness of breath, dizziness, or any chest pain, go to the ER.   -I want you to come in to clinic for a recheck early next week  -albuterol helps with wheezing and chest tightness, take 2 puffs every 4-6 hours as needed  -cough syrup and tessalon perles as needed.  -stay really well hydrated, rest        Follow-ups after your visit        Your next 10 appointments already scheduled     Jan 18, 2017  4:00 PM   Return Visit with Roberto Carlos Mercado [facility]    [id number] [place] [id number] [phone]            Jan 25, 2017  4:00 PM   [place] Visit [place] Merc[facility]    [id number] [place] [id number] [phone]              Future tests that were ordered for you today     Open Future Orders        Priority Expected Expires Ordered    INHALATION/NEBULIZER TREATMENT, INITIAL Routine  2/20/2017 1/6/2017            Who to contact     If you have questions or need follow up information about today's clinic visit or your schedule please contact [facility] directly at [phone].  Normal or non-critical lab and imaging results will be communicated to you by Sergio  letter or phone within 4 business days after the clinic has received the results. If you do not hear from us within 7 days, please contact the clinic through [employer] or phone. If you have a critical or abnormal lab result, we will notify you by phone as soon as possible.  Submit refill requests through [employer] or call your pharmacy and they will forward the refill request to us. Please allow 3 business days for your refill to be completed.          Additional Information About Your Visit        [employer] Information     [employer] gives you secure access to your electronic health record. If you see a primary care provider, you can also send messages to your care team and make appointments. If you have questions, please call your primary care clinic.  If you do not have a primary care provider, please call [phone] and they will assist you.        Care EveryWhere ID     This is your Care EveryWhere ID. This could be used by other organizations to access your [facility] medical records  [id number]        Your Vitals Were     Pulse Temperature Respirations Pulse Oximetry Breastfeeding?       96 101.1  F (38.4  C) (Tympanic) 18 95% No        Blood Pressure from Last 3 Encounters:   01/06/17 108/68   01/04/17 118/62   12/21/16 130/77    Weight from Last 3 Encounters:   01/06/17 219 lb (99.338 kg)   12/23/16 220 lb (99.791 kg)   12/21/16 219 lb (99.338 kg)              We Performed the Following     ALBUTEROL/IPRATROPIUM 3ML NEB     Basic metabolic panel  (Ca, Cl, CO2, Creat, Gluc, K, Na, BUN)     Blood culture     CBC with platelets     Influenza A/B antigen          Today's Medication Changes          These changes are accurate as of: 1/6/17 10:07 AM.  If you have any questions, ask your nurse or doctor.               Start taking these medicines.        Dose/Directions    albuterol 108 (90 BASE) MCG/ACT Inhaler   Commonly known as:  PROAIR HFA/PROVENTIL HFA/VENTOLIN HFA   Used for:  Wheezing, Pneumonia of left lower  lobe due to infectious organism   Started by:  Catrachita Collado MD        Dose:  2 puff   Inhale 2 puffs into the lungs every 6 hours as needed for shortness of breath / dyspnea or wheezing   Quantity:  1 Inhaler   Refills:  1       cefTRIAXone 1 GM vial   Commonly known as:  ROCEPHIN   Used for:  Pneumonia of left lower lobe due to infectious organism   Started by:  Catrachita Collado MD        Dose:  1000 mg   Inject 1 g (1,000 mg) into the muscle once for 1 dose   Quantity:  10 mL   Refills:  0       doxycycline 100 MG capsule   Commonly known as:  VIBRAMYCIN   Used for:  Pneumonia of left lower lobe due to infectious organism   Started by:  Catrachita Collado MD        Dose:  100 mg   Take 1 capsule (100 mg) by mouth 2 times daily   Quantity:  20 capsule   Refills:  0       ipratropium - albuterol 0.5 mg/2.5 mg/3 mL 0.5-2.5 (3) MG/3ML neb solution   Commonly known as:  DUONEB   Used for:  Wheezing, Fever, unspecified, Cough, Pneumonia of left lower lobe due to infectious organism   Started by:  Catrachita Collado MD        Dose:  1 vial   Take 1 vial (3 mLs) by nebulization every 6 hours as needed for shortness of breath / dyspnea or wheezing   Quantity:  30 vial   Refills:  1            Where to get your medicines      These medications were sent to [facility] - [place], [place] - [address]  [address], [place] [address]     Phone:  [phone]    - albuterol 108 (90 BASE) MCG/ACT Inhaler  - doxycycline 100 MG capsule  - ipratropium - albuterol 0.5 mg/2.5 mg/3 mL 0.5-2.5 (3) MG/3ML neb solution      Some of these will need a paper prescription and others can be bought over the counter.  Ask your nurse if you have questions.     You don't need a prescription for these medications    - cefTRIAXone 1 GM vial             Primary Care Provider Office Phone # Fax #    Catrachita Collado [phone]       [facility] [address] [place]  [address]        Thank you!     Thank you for choosing [facility]  for your care. Our goal is always to provide you with excellent care. Hearing back from our patients is one way we can continue to improve our services. Please take a few minutes to complete the written survey that you may receive in the mail after your visit with us. Thank you!             Your Updated Medication List - Protect others around you: Learn how to safely use, store and throw away your medicines at www.disposemymeds.org.          This list is accurate as of: 1/6/17 10:07 AM.  Always use your most recent med list.                   Brand Name Dispense Instructions for use    albuterol 108 (90 BASE) MCG/ACT Inhaler    PROAIR HFA/PROVENTIL HFA/VENTOLIN HFA    1 Inhaler    Inhale 2 puffs into the lungs every 6 hours as needed for shortness of breath / dyspnea or wheezing       buprenorphine 5 MCG/HR WK patch    BUTRANS    4 patch    Place 1 patch onto the skin every 7 days       cefTRIAXone 1 GM vial    ROCEPHIN    10 mL    Inject 1 g (1,000 mg) into the muscle once for 1 dose       cholecalciferol 5000 UNITS Caps capsule    vitamin D3    100 capsule    Take 1 capsule (5,000 Units) by mouth daily Take 1 per day       doxepin 10 MG capsule    SINEquan    60 capsule    Take 1-2 caps at bedtime       doxycycline 100 MG capsule    VIBRAMYCIN    20 capsule    Take 1 capsule (100 mg) by mouth 2 times daily       DULoxetine 60 MG EC capsule    CYMBALTA    60 capsule    Take 2 capsules (120 mg) by mouth daily       FIBER PO      Two capsules in the morning and two capsules at night       fish oil-omega-3 fatty acids 1000 MG capsule      Take 2 g by mouth daily Takes 1 in am and 1 at bedtime       fluticasone 50 MCG/ACT spray    FLONASE    16 g    Spray 1-2 sprays into both nostrils daily       * HYDROcodone-acetaminophen 7.5-325 MG per tablet    NORCO     TK 1 T PO Q 8 H       * HYDROcodone-acetaminophen 5-325 MG per tablet    NORCO     50 tablet    Take 1-2 tablets by mouth every 6 hours as needed for moderate to severe pain maximum 8 tablet(s) per day       ipratropium - albuterol 0.5 mg/2.5 mg/3 mL 0.5-2.5 (3) MG/3ML neb solution    DUONEB    30 vial    Take 1 vial (3 mLs) by nebulization every 6 hours as needed for shortness of breath / dyspnea or wheezing       * levothyroxine 150 MCG tablet    SYNTHROID/LEVOTHROID    45 tablet    Take 1 tablet (150 mcg) by mouth every other day       * levothyroxine 175 MCG tablet    SYNTHROID/LEVOTHROID    45 tablet    Take 1 tablet (175 mcg) by mouth every other day       * LORazepam 0.5 MG tablet    ATIVAN    30 tablet    Take 1 tablet (0.5 mg) by mouth nightly as needed for anxiety 30 tablets to last 30 days.       * LORazepam 0.5 MG tablet    ATIVAN    6 tablet    Take 2 po 90 minutes prior to MRI and may repeat x 1 30 minutes prior to MRI       lovastatin 20 MG tablet    MEVACOR    90 tablet    Take 1 tablet (20 mg) by mouth At Bedtime       Multi-vitamin Tabs tablet      Take 1 tablet by mouth daily       NONFORMULARY      Take 20 mg by mouth 2 times daily D-amphetamine Salt combo       OLANZapine 2.5 MG tablet    zyPREXA    30 tablet    Take 1 tablet (2.5 mg) by mouth At Bedtime       omeprazole 40 MG capsule    priLOSEC    90 capsule    Take 1 capsule (40 mg) by mouth daily Take 30-60 minutes before a meal.       ondansetron 4 MG tablet    ZOFRAN    18 tablet    Take 1 tablet (4 mg) by mouth every 6 hours as needed for nausea       prazosin 5 MG capsule    MINIPRESS    30 capsule    Take 1 capsule (5 mg) by mouth At Bedtime Take 1 capsule at bedtime       tiZANidine 4 MG tablet    ZANAFLEX    60 tablet    Take 1-2 tablets (4-8 mg) by mouth 3 times daily as needed for muscle spasms       traMADol 50 MG tablet    ULTRAM    20 tablet    Take 1-2 tablets ( mg) by mouth every 6 hours as needed for pain       zolpidem 5 MG tablet    AMBIEN    60 tablet    Take 1-2 tablets (5-10 mg) by mouth  nightly as needed for sleep       * Notice:  This list has 6 medication(s) that are the same as other medications prescribed for you. Read the directions carefully, and ask your doctor or other care provider to review them with you.

## 2017-01-06 NOTE — NURSING NOTE
"Chief Complaint   Patient presents with     URI       Initial /68 mmHg  Pulse 96  Temp(Src) 101.1  F (38.4  C) (Tympanic)  Resp 18  Wt 219 lb (99.338 kg)  SpO2 91%  Breastfeeding? No Estimated body mass index is 32.33 kg/(m^2) as calculated from the following:    Height as of 12/23/16: 5' 9\" (1.753 m).    Weight as of this encounter: 219 lb (99.338 kg).  BP completed using cuff size: emilee Granado MA    "

## 2017-01-06 NOTE — ED PROVIDER NOTES
History     Chief Complaint:  Fever and Cough    HPI   Ilsa Yusuf is a 56 year old female who presents to the emergency department today for evaluation of fever and cough. The patient presents from clinic where she had an x-ray showing pneumonia. The patient does not recall being around anyone with similar symptoms. She was given Rocephin at clinic earlier today but otherwise is not on any antibiotics. The patient has a headache, nausea, and vomiting. She has no recent travel.     Allergies:  Gabapentin  Dilaudid  Compazine     Medications:    Albuterol  Rocephin  Vibramycin  Tessalon  Robitussin  Butrans  Levothyroxine  Norco  Ativan  Ultram  Ambien  Mevacor  Sinequan  Cymbalta  Minipress  Ativan  Zofran  Zanaflex  Zyprexa  Flonase  Prilosec     Past Medical History:    Seizure (H)   Hypothyroid   Moderate major depression (H)   PTSD (post-traumatic stress disorder)   Hyperlipidemia LDL goal < 130   Depressive disorder, not elsewhere classified   Seizure disorder (H)   Arthritis   Depressive disorder     Past Surgical History:    Partial excision thyroid right  Ureteral reimplantation  Renal artery surgery  Tonsillectomy  Cholecystectomy  Hernia repair  Colonoscopy   Left Knee surgery    Family History:    Father-CAD, mental illness, hyperlipidemia  Mother-alzheimer disease, depression, anxiety disorder  Sister-diabetes, thyroid disease     Social History:  Smoking Status: former smoker  Alcohol Use: positive   Marital Status:   [4]    Review of Systems   Constitutional: Positive for fever.   Respiratory: Positive for cough.    Gastrointestinal: Positive for nausea and vomiting.   Neurological: Positive for headaches.   All other systems reviewed and are negative.    Physical Exam   Vitals:  Patient Vitals for the past 24 hrs:   BP Temp Temp src Heart Rate Resp SpO2   01/06/17 1540 - - - 76 - 96 %   01/06/17 1539 103/56 mmHg - - 81 - 94 %   01/06/17 1531 - - - 79 - 95 %   01/06/17 1530 - - - 73  - 93 %   01/06/17 1515 120/53 mmHg - - 85 - 99 %   01/06/17 1434 - - - 80 - 96 %   01/06/17 1433 - - - 79 - 97 %   01/06/17 1432 - - - 77 - 96 %   01/06/17 1431 - - - 78 - 95 %   01/06/17 1430 - - - 80 - 95 %   01/06/17 1402 - - - - - 95 %   01/06/17 1356 - - - - - 93 %   01/06/17 1355 - - - - - 94 %   01/06/17 1348 - - - - - 94 %   01/06/17 1341 - 100.3  F (37.9  C) Oral - - 92 %   01/06/17 1340 125/68 mmHg - - 87 18 93 %   01/06/17 1334 125/68 mmHg - - - - -         Physical Exam   HENT:   Right Ear: External ear normal.   Left Ear: External ear normal.   Nose: Nose normal.   Mouth/Throat: No oropharyngeal exudate.   Eyes: Conjunctivae and lids are normal.   Neck: Neck supple. No tracheal deviation present.   Cardiovascular: Regular rhythm and intact distal pulses.    Pulmonary/Chest:   Mild tachypnea, rhonchi LLL, frequent harsh cough.  Speaking full sentences, no significant accessory muscle use   Abdominal: Soft. There is no tenderness. There is no rebound and no guarding.   Musculoskeletal:   No peripheral edema   Neurological:   MAEE, no gross focal motor or sensory deficit   Skin: Skin is warm and dry. She is not diaphoretic.   Psychiatric: She has a normal mood and affect.   Nursing note and vitals reviewed.        Emergency Department Course     Laboratory:  Laboratory findings were communicated with the patient who voiced understanding of the findings.    ISTAT gases lactate natacha POCT: pH: 7.41, PCO2: 39 (L), PO2: 31, Bicarbonate: 25, Oxyhgb: 61, Lactic Acid: 0.6 (L)     Interventions:  1352 Zithromax 500 mg oral  1352 NS 1000 ml IV   1352 Rocephin 1 g IV  1356 Duoneb 3 ml nebulization  1402 Tessalon 200 mg oral  1409 Tylenol 650 mg oral      Emergency Department Course:  Nursing notes and vitals reviewed.  I performed an exam of the patient as documented above.   IV was inserted and blood was drawn for laboratory testing, results above.   I discussed the treatment plan with the patient. They expressed  understanding of this plan and consented to discharge. They will be discharged home with instructions for care and follow up. In addition, the patient will return to the emergency department if their symptoms persist, worsen, if new symptoms arise or if there is any concern.  All questions were answered.   I personally reviewed the laboratory results with the patient and answered all related questions prior to discharge.    Impression & Plan      Medical Decision Making:  Ilsa Yusuf is a 56 year old female here from clinic with respiratory tract infections symptoms. Found to have left lower lobe pneumonia. CURB 65 score is zero. PSI score puts her at risk class two which is low risk. I think she can be safely treated as an outpatient. She received one gram of Rocephin here, given her body weight here we will give her another gram of Rocephin and Azithromycin added to this. Continue antibiotics at home. No significant change with broncho dilator. She is comfortable and agreeable with the plan of discharge home. Understands with new or worsening symptoms she needs to return to the ED. There is no high risk for multiple resistant drug pneumonia including no recent mechanical ventilation, bronchoscopy,  MRSA, homeless shelter, travel, or chronic lung disease.     Diagnosis:    ICD-10-CM    1. Community acquired pneumonia J18.9          Disposition:   Discharge     Discharge Medications:  New Prescriptions    No medications on file     Scribe Disclosure:  I, Osito Reilly, am serving as a scribe at 1:38 PM on 1/6/2017 to document services personally performed by Herman Turpin, *, based on my observations and the provider's statements to me.   1/6/2017   [facility] EMERGENCY DEPARTMENT        Herman Turpin MD  01/06/17 2031

## 2017-01-06 NOTE — ED AVS SNAPSHOT
[facility] Emergency Department    [address] [place] [address]    Phone:  [phone]    Fax:  [phone]                                       Ilsa Yusuf   MRN: [id number]    Department:  [facility] Emergency Department   Date of Visit:  1/6/2017           Patient Information     Date Of Birth          1960        Your diagnoses for this visit were:     Community acquired pneumonia        You were seen by Herman Turpin MD.        Discharge Instructions       Please make an appointment to follow up with your primary care provider in 2-3 days if not improving.    Discharge Instructions  Bronchitis, Pneumonia, Bronchospasm    You were seen today for a chest infection or inflammation. If your doctor decided this was due to a bacterial infection, you may need an antibiotic. Sometimes these are caused by a virus, and then an antibiotic will not help.     Return to the Emergency Department if:    Your breathing gets much worse.    You are very weak, or feel much more ill.    You develop new symptoms, such as chest pain.    You cough up blood.    You are vomiting enough that you can t keep fluids or your medicine down.    What can I do to help myself?    Fill any prescriptions the doctor gave you and take them right away--especially antibiotics. Be sure to finish the whole antibiotic prescription.    You may be given a prescription for an inhaler, which can help loosen tight air passages.  Use this as needed, but not more often than directed. Inhalers work much better when used with a spacer.     You may be given a prescription for a steroid to reduce inflammation. Used long-term, these can have many serious side effects, but for short courses these do not happen. You may notice restlessness or increased appetite.        You may use non-prescription cough or cold medicines. Cough medicines may help, but don t make the cough go away completely.  "    Avoid smoke, because this can make your symptoms worse. If you smoke, this may be a good time to quit! Consider using nicotine lozenges, gum, or patches to reduce cravings.     If you have a fever, Tylenol  (acetaminophen), Motrin  (ibuprofen), or Advil  (ibuprofen) may help bring fever down and may help you feel more comfortable. Be sure to read and follow the package directions, and ask your doctor if you have questions.    Be sure to get your flu shot each year.  The pneumonia shot can help prevent pneumonia.  Probiotics: If you have been given an antibiotic, you may want to also take a probiotic pill or eat yogurt with live cultures. Probiotics have \"good bacteria\" to help your intestines stay healthy. Studies have shown that probiotics help prevent diarrhea and other intestine problems (including C. diff infection) when you take antibiotics. You can buy these without a prescription in the pharmacy section of the store.     If your doctor has told you to follow-up at your clinic, be sure to call right away and go to your appointment.  If there is any problem with keeping your appointment, call your doctor or return to the Emergency Department.    If you were given a prescription for medicine here today, be sure to read all of the information (including the package insert) that comes with your prescription.  This will include important information about the medicine, its side effects, and any warnings that you need to know about.  The pharmacist who fills the prescription can provide more information and answer questions you may have about the medicine.  If you have questions or concerns that the pharmacist cannot address, please call or return to the Emergency Department.     Opioid Medication Information    Pain medications are among the most commonly prescribed medicines, so we are including this information for all our patients. If you did not receive pain medication or get a prescription for pain " medicine, you can ignore it.     You may have been given a prescription for an opioid (narcotic) pain medicine and/or have received a pain medicine while here in the Emergency Department. These medicines can make you drowsy or impaired. You must not drive, operate dangerous equipment, or engage in any other dangerous activities while taking these medications. If you drive while taking these medications, you could be arrested for DUI, or driving under the influence. Do not drink any alcohol while you are taking these medications.     Opioid pain medications can cause addiction. If you have a history of chemical dependency of any type, you are at a higher risk of becoming addicted to pain medications.  Only take these prescribed medications to treat your pain when all other options have been tried. Take it for as short a time and as few doses as possible. Store your pain pills in a secure place, as they are frequently stolen and provide a dangerous opportunity for children or visitors in your house to start abusing these powerful medications. We will not replace any lost or stolen medicine.  As soon as your pain is better, you should flush all your remaining medication.     Many prescription pain medications contain Tylenol  (acetaminophen), including Vicodin , Tylenol #3 , Norco , Lortab , and Percocet .  You should not take any extra pills of Tylenol  if you are using these prescription medications or you can get very sick.  Do not ever take more than 3000 mg of acetaminophen in any 24 hour period.    All opioids tend to cause constipation. Drink plenty of water and eat foods that have a lot of fiber, such as fruits, vegetables, prune juice, apple juice and high fiber cereal.  Take a laxative if you don t move your bowels at least every other day. Miralax , Milk of Magnesia, Colace , or Senna  can be used to keep you regular.      Remember that you can always come back to the Emergency Department if you are not able  to see your regular doctor in the amount of time listed above, if you get any new symptoms, or if there is anything that worries you.            Future Appointments        Provider Department Dept Phone Center    1/18/2017 4:00 PM Roberto Carlos Mercado [facility] [phone] [facility]    1/25/2017 4:00 PM Roberto Carlos Mercado [facility] [phone] [facility]      24 Hour Appointment Hotline       To make an appointment at any [place] clinic, call [phone] ([phone]). If you don't have a family doctor or clinic, we will help you find one. [place] clinics are conveniently located to serve the needs of you and your family.             Review of your medicines      START taking        Dose / Directions Last dose taken    azithromycin 250 MG tablet   Commonly known as:  ZITHROMAX   Dose:  250 mg   Quantity:  4 tablet   Start taking on:  1/7/2017        Take 1 tablet (250 mg) by mouth daily for 4 days   Refills:  0        cefdinir 300 MG capsule   Commonly known as:  OMNICEF   Dose:  600 mg   Quantity:  18 capsule        Take 2 capsules (600 mg) by mouth daily for 9 days   Refills:  0          Our records show that you are taking the medicines listed below. If these are incorrect, please call your family doctor or clinic.        Dose / Directions Last dose taken    albuterol 108 (90 BASE) MCG/ACT Inhaler   Commonly known as:  PROAIR HFA/PROVENTIL HFA/VENTOLIN HFA   Dose:  2 puff   Quantity:  1 Inhaler        Inhale 2 puffs into the lungs every 6 hours as needed for shortness of breath / dyspnea or wheezing   Refills:  1        benzonatate 200 MG capsule   Commonly known as:  TESSALON   Dose:  200 mg   Quantity:  21 capsule        Take 1 capsule (200 mg) by mouth 3 times daily as needed for cough   Refills:  0        buprenorphine 5 MCG/HR WK patch   Commonly known as:  BUTRANS   Dose:  1 patch   Quantity:  4 patch        Place 1 patch onto the skin every  7 days   Refills:  0        cefTRIAXone 1 GM vial   Commonly known as:  ROCEPHIN   Dose:  1000 mg   Quantity:  10 mL        Inject 1 g (1,000 mg) into the muscle once for 1 dose   Refills:  0        cholecalciferol 5000 UNITS Caps capsule   Commonly known as:  vitamin D3   Dose:  5000 Units   Quantity:  100 capsule        Take 1 capsule (5,000 Units) by mouth daily Take 1 per day   Refills:  2        doxepin 10 MG capsule   Commonly known as:  SINEquan   Quantity:  60 capsule        Take 1-2 caps at bedtime   Refills:  11        DULoxetine 60 MG EC capsule   Commonly known as:  CYMBALTA   Dose:  120 mg   Quantity:  60 capsule        Take 2 capsules (120 mg) by mouth daily   Refills:  5        FIBER PO        Two capsules in the morning and two capsules at night   Refills:  0        fish oil-omega-3 fatty acids 1000 MG capsule   Dose:  2 g        Take 2 g by mouth daily Takes 1 in am and 1 at bedtime   Refills:  0        fluticasone 50 MCG/ACT spray   Commonly known as:  FLONASE   Dose:  1-2 spray   Quantity:  16 g        Spray 1-2 sprays into both nostrils daily   Refills:  11        guaiFENesin-dextromethorphan 100-10 MG/5ML syrup   Commonly known as:  ROBITUSSIN DM   Dose:  5-10 mL   Quantity:  560 mL        Take 5-10 mLs by mouth 4 times daily as needed for cough   Refills:  0        * HYDROcodone-acetaminophen 7.5-325 MG per tablet   Commonly known as:  NORCO        TK 1 T PO Q 8 H   Refills:  0        * HYDROcodone-acetaminophen 5-325 MG per tablet   Commonly known as:  NORCO   Dose:  1-2 tablet   Quantity:  50 tablet        Take 1-2 tablets by mouth every 6 hours as needed for moderate to severe pain maximum 8 tablet(s) per day   Refills:  0        ipratropium - albuterol 0.5 mg/2.5 mg/3 mL 0.5-2.5 (3) MG/3ML neb solution   Commonly known as:  DUONEB   Dose:  1 vial   Quantity:  30 vial        Take 1 vial (3 mLs) by nebulization every 6 hours as needed for shortness of breath / dyspnea or wheezing   Refills:   1        * levothyroxine 150 MCG tablet   Commonly known as:  SYNTHROID/LEVOTHROID   Dose:  150 mcg   Quantity:  45 tablet        Take 1 tablet (150 mcg) by mouth every other day   Refills:  3        * levothyroxine 175 MCG tablet   Commonly known as:  SYNTHROID/LEVOTHROID   Dose:  175 mcg   Quantity:  45 tablet        Take 1 tablet (175 mcg) by mouth every other day   Refills:  3        * LORazepam 0.5 MG tablet   Commonly known as:  ATIVAN   Dose:  0.5 mg   Quantity:  30 tablet        Take 1 tablet (0.5 mg) by mouth nightly as needed for anxiety 30 tablets to last 30 days.   Refills:  2        * LORazepam 0.5 MG tablet   Commonly known as:  ATIVAN   Quantity:  6 tablet        Take 2 po 90 minutes prior to MRI and may repeat x 1 30 minutes prior to MRI   Refills:  0        lovastatin 20 MG tablet   Commonly known as:  MEVACOR   Dose:  20 mg   Quantity:  90 tablet        Take 1 tablet (20 mg) by mouth At Bedtime   Refills:  3        Multi-vitamin Tabs tablet   Dose:  1 tablet        Take 1 tablet by mouth daily   Refills:  0        NONFORMULARY   Dose:  20 mg        Take 20 mg by mouth 2 times daily D-amphetamine Salt combo   Refills:  0        OLANZapine 2.5 MG tablet   Commonly known as:  zyPREXA   Dose:  2.5 mg   Quantity:  30 tablet        Take 1 tablet (2.5 mg) by mouth At Bedtime   Refills:  5        omeprazole 40 MG capsule   Commonly known as:  priLOSEC   Dose:  40 mg   Quantity:  90 capsule        Take 1 capsule (40 mg) by mouth daily Take 30-60 minutes before a meal.   Refills:  3        ondansetron 4 MG tablet   Commonly known as:  ZOFRAN   Dose:  4 mg   Quantity:  18 tablet        Take 1 tablet (4 mg) by mouth every 6 hours as needed for nausea   Refills:  5        prazosin 5 MG capsule   Commonly known as:  MINIPRESS   Dose:  5 mg   Quantity:  30 capsule        Take 1 capsule (5 mg) by mouth At Bedtime Take 1 capsule at bedtime   Refills:  5        tiZANidine 4 MG tablet   Commonly known as:  ZANAFLEX    Dose:  4-8 mg   Quantity:  60 tablet        Take 1-2 tablets (4-8 mg) by mouth 3 times daily as needed for muscle spasms   Refills:  5        traMADol 50 MG tablet   Commonly known as:  ULTRAM   Dose:   mg   Quantity:  20 tablet        Take 1-2 tablets ( mg) by mouth every 6 hours as needed for pain   Refills:  0        zolpidem 5 MG tablet   Commonly known as:  AMBIEN   Dose:  5-10 mg   Quantity:  60 tablet        Take 1-2 tablets (5-10 mg) by mouth nightly as needed for sleep   Refills:  0        * Notice:  This list has 6 medication(s) that are the same as other medications prescribed for you. Read the directions carefully, and ask your doctor or other care provider to review them with you.      STOP taking        Dose Reason for stopping Comments    doxycycline 100 MG capsule   Commonly known as:  VIBRAMYCIN                      Prescriptions were sent or printed at these locations (2 Prescriptions)                   Other Prescriptions                Printed at Department/Unit printer (2 of 2)         cefdinir (OMNICEF) 300 MG capsule               azithromycin (ZITHROMAX) 250 MG tablet                Procedures and tests performed during your visit     EKG 12 lead    ISTAT CG4 gases lactate natacha nursing POCT    ISTAT gases lactate natacha POCT      Orders Needing Specimen Collection     None      Pending Results     Date and Time Order Name Status Description    1/6/2017 0936 BLOOD CULTURE Preliminary             Pending Culture Results     Date and Time Order Name Status Description    1/6/2017 0936 BLOOD CULTURE Preliminary        Test Results from your hospital stay           1/6/2017  2:05 PM - Interface, [place] Results      Component Results     Component Value Ref Range & Units Status    Ph Venous 7.41 7.32 - 7.43 pH Final    PCO2 Venous 39 (L) 40 - 50 mm Hg Final    PO2 Venous 31 25 - 47 mm Hg Final    Bicarbonate Venous 25 21 - 28 mmol/L Final    O2 Sat Venous 61 % Final    Lactic Acid 0.6  (L) 0.7 - 2.1 mmol/L Final                Clinical Quality Measure: Blood Pressure Screening     Your blood pressure was checked while you were in the emergency department today. The last reading we obtained was  BP: 103/56 mmHg . Please read the guidelines below about what these numbers mean and what you should do about them.  If your systolic blood pressure (the top number) is less than 120 and your diastolic blood pressure (the bottom number) is less than 80, then your blood pressure is normal. There is nothing more that you need to do about it.  If your systolic blood pressure (the top number) is 120-139 or your diastolic blood pressure (the bottom number) is 80-89, your blood pressure may be higher than it should be. You should have your blood pressure rechecked within a year by a primary care provider.  If your systolic blood pressure (the top number) is 140 or greater or your diastolic blood pressure (the bottom number) is 90 or greater, you may have high blood pressure. High blood pressure is treatable, but if left untreated over time it can put you at risk for heart attack, stroke, or kidney failure. You should have your blood pressure rechecked by a primary care provider within the next 4 weeks.  If your provider in the emergency department today gave you specific instructions to follow-up with your doctor or provider even sooner than that, you should follow that instruction and not wait for up to 4 weeks for your follow-up visit.        Thank you for choosing [facility]       Thank you for choosing [facility] for your care. Our goal is always to provide you with excellent care. Hearing back from our patients is one way we can continue to improve our services. Please take a few minutes to complete the written survey that you may receive in the mail after you visit with us. Thank you!        [facility] Information     [facility] gives you secure access to your electronic health record. If you see a primary care  provider, you can also send messages to your care team and make appointments. If you have questions, please call your primary care clinic.  If you do not have a primary care provider, please call [phone] and they will assist you.        Care EveryWhere ID     This is your Care EveryWhere ID. This could be used by other organizations to access your [facility] medical records  [id number]        After Visit Summary       This is your record. Keep this with you and show to your community pharmacist(s) and doctor(s) at your next visit.

## 2017-01-06 NOTE — ED AVS SNAPSHOT
[facility] Emergency Department    [address]    [facility] [address]    Phone:  [phone]    Fax:  [phone]                                       Ilsa Yusuf   MRN: [id number]    Department:  [facility] Emergency Department   Date of Visit:  1/6/2017           After Visit Summary Signature Page     I have received my discharge instructions, and my questions have been answered. I have discussed any challenges I see with this plan with the nurse or doctor.    ..........................................................................................................................................  Patient/Patient Representative Signature      ..........................................................................................................................................  Patient Representative Print Name and Relationship to Patient    ..................................................               ................................................  Date                                            Time    ..........................................................................................................................................  Reviewed by Signature/Title    ...................................................              ..............................................  Date                                                            Time

## 2017-01-06 NOTE — NURSING NOTE
The following nebulizer treatment was given:     MEDICATION: Duoneb  : Vetr  LOT #: [id number]  EXPIRATION DATE:  7/1/2018  NDC # 1507-9474-94      Ger Granado MA

## 2017-01-06 NOTE — ED NOTES
Fever and cough for 3 days. Also c/o dizziness and weakness. Sent here from clinic. axb given at clinic  And blood drawn. Including one culture sent here for low o2

## 2017-01-08 ENCOUNTER — MYC MEDICAL ADVICE (OUTPATIENT)
Dept: FAMILY MEDICINE | Facility: CLINIC | Age: 57
End: 2017-01-08

## 2017-01-08 ENCOUNTER — TELEPHONE (OUTPATIENT)
Dept: PALLIATIVE MEDICINE | Facility: CLINIC | Age: 57
End: 2017-01-08

## 2017-01-08 ENCOUNTER — TELEPHONE (OUTPATIENT)
Dept: NURSING | Facility: CLINIC | Age: 57
End: 2017-01-08

## 2017-01-08 DIAGNOSIS — R05.9 COUGH: Primary | ICD-10-CM

## 2017-01-08 NOTE — TELEPHONE ENCOUNTER
"Patient paged on-call provider 01/08/17 ~09:00 am.    Reports that she was seen by her primary care provider and started on Antibiotics for presumed community acquired pneumonia. She had asked her provider about a cough medicine (for example, containing codeine), and she states that her provider would only allow her to take Robitussin DM. She had also reported use of Delsym over-the-counter. Patient states that her cough is particularly bad. She hasn't been able to sleep much. She states that her neck pain is made worse by her frequent cough. She is requesting prescription for different medicine to help with her cough. Chart review indicates that she was seen in the ED on 01/06/17. Per provider note: \"left lower lobe pneumonia. CURB 65 score is zero. PSI score puts her at risk class two which is low risk. I think she can be safely treated as an outpatient. She received one gram of Rocephin here, given her body weight here we will give her another gram of Rocephin and Azithromycin added to this. Continue antibiotics at home. No significant change with broncho dilator. She is comfortable and agreeable with the plan of discharge home. Understands with new or worsening symptoms she needs to return to the ED.\" Later nursing entry comments: \"Declined pain meds as pt can not take nsaids.\"    From a pain perspective, patient was seen for initial consultation by Dr. Nahed Reyes on 01/04/17. Dr. Reyes had submitted [employer] for prior-authorization. The patient has not been drug screened and is not currently contracted through the pain clinic for prescribing. Her last prescription for Norco 5-325 mg #50 tabs was filled by Dr. Alex Herrera on 12/23/16.     We discussed the fact that I would not be able to prescribe anything for cough, since this is not my area of expertise. It is ultimately at the discretion of her primary care clinic to choose to prescribe a cough medication that they feel is appropriate for her. I do not feel " "comfortable authorizing changes to her opioid regimen at this time (especially for treatment of pneumonia, and since Dr. Reyes is not currently prescribing her opioid medications). Of note, cough suppressants should be used cautiously with opioid medications, given risk of over-sedation/respiratory depression. She was encouraged to reach out to her primary care clinic for additional recommendations in treating her pneumonia symptoms. We explored other symptom support measures for her neck pain, including use of Tylenol, topical agents for the neck (such as menthol-containing product or Salonpas patch), and use of heat/ice. She notes that she is unable to take NSAIDs due to renal impairment. Per her ED visit on 01/06/17, she had otherwise been instructed to return to the ED if new/worsening symptoms.     The patient expressed understanding, but expressed frustration with on-call provider (and \"the system\"). Listened to patient's concerns and expressed sympathy for her current illness. Patient was assured that message would be forwarded on to her provider. Patient disconnected call.     CC: Dr. Nahed woo; please provide any additional input as appropriate  CC: PPC NURSE    Dominique Amaya MD  [facility]      "

## 2017-01-09 NOTE — TELEPHONE ENCOUNTER
PA denied.    Reason given:      Patient must try and fail both of these formulary medications: Embeda and Nucynta ER or specific medical reasons why the alternative medications are not appropriate to treat patient's medical conditions.    Please review and advise.    Kira Elizabeth, [facility]

## 2017-01-09 NOTE — TELEPHONE ENCOUNTER
Call Type: Triage Call    Presenting Problem: Patient calling with c/o that she was diagnosed  with pneumonia, is on antiobioticsx2, says she just realized that  her PCP may have wanted her to change to to a different oral  antibiotic, she just saw the my Chart message. Her temperature is  still around 102 degrees. She is wondering is that is unusual. I  explained that it can take that long for the amedications to be  effective, but she should definitely follow up with her PCP in the  clinic tomorrow and continue to take Tylenol for fever.  Triage Note:  Guideline Title: Information Only Call; No Symptom Triage (Adult)  Recommended Disposition: Call Provider When Office is Open  Original Inclination: Wanted to speak with a nurse  Override Disposition:  Intended Action: See /Make Appt  Physician Contacted: No  Requesting information and provider is best resource; no triage required. ?  YES  Requesting regular office appointment ? NO  Sign(s) or symptom(s) associated with a diagnosed condition or with a new illness  ? NO  Requesting information about provider, services or community resources ? NO  Call back to complete assessment/clarification of information from prior caller to  complete triage ? NO  Physician Instructions:  Care Advice:

## 2017-01-09 NOTE — TELEPHONE ENCOUNTER
Thank you, information noted.  I agree with the recs/plan provided by the on-call provider and have nothing to add.     Nahed Reyes,   [facility]

## 2017-01-09 NOTE — TELEPHONE ENCOUNTER
The patient was called multiple times.  I spoke with her this evening. She wants a cough syrup with codeine, which I had previously advised against due to risk of sedation and respiratory suppression.  Her last fever was this morning.  She is taking doxycycline and Omnicef for antibiotics, tolerating well.  Cough is exacerbating neck pain.        -would appreciate triage calling the patient in the morning (Tuesday) and checking in again as to whether she is having any further fevers.  Let me know (I will check in from home).  If she is still wanting a cough syrup, I can do this, though not sure if pharmacy will allow me to call it in or if it will need to wait till I get back Wednesday, or whether a DOD would be willing to write.  I would STRONGLY caution the patient that she should not use other narcotics at the same time, as codeine is a narcotic that is metabolized in the body into morphine.  It could also interact with benzodiazepines (ambien and ativan are on her list).  I reviewed that she has run out of norco (this was prescribed for her acute right knee pain), is not taking tramadol; butrans was not covered by insurance, and she has not yet started Nucynta.  Therefore, per the patient, she is not currently taking ANY narcotics.      Alternatively: insurance would not cover tessalon perles for her; would doing a PA work/be quick?

## 2017-01-09 NOTE — TELEPHONE ENCOUNTER
Morphine is not appropriate for this patient.   I will prescribe nucynta 50 mg every 6 hours as needed, max #3/day.     Nahed Reyes DO  [facility]

## 2017-01-10 DIAGNOSIS — F51.01 PRIMARY INSOMNIA: Primary | ICD-10-CM

## 2017-01-10 RX ORDER — CODEINE PHOSPHATE AND GUAIFENESIN 10; 100 MG/5ML; MG/5ML
1-2 SOLUTION ORAL EVERY 6 HOURS PRN
Qty: 120 ML | Refills: 0
Start: 2017-01-10 | End: 2017-04-05

## 2017-01-10 ASSESSMENT — PATIENT HEALTH QUESTIONNAIRE - PHQ9: SUM OF ALL RESPONSES TO PHQ QUESTIONS 1-9: 16

## 2017-01-10 NOTE — TELEPHONE ENCOUNTER
Yes, she is to be taking both omnicef and doxycycline.  Not sure why doxy went off the list.      I called in the cough syrup to her [facility].    Thanks.

## 2017-01-10 NOTE — TELEPHONE ENCOUNTER
Controlled Substance Refill Request for zolpidem (AMBIEN) 5 MG tablet  Problem List Complete:  No     PROVIDER TO CONSIDER COMPLETION OF PROBLEM LIST AND OVERVIEW/CONTROLLED SUBSTANCE AGREEMENT    Last Written Prescription Date:  12/12/2016  Last Fill Quantity: 60,   # refills: 0    [facility] with [facility] [place] provider: 1/6/2016    Future Office visit:   Next 5 appointments (look out 90 days)     Jan 18, 2017  4:00 PM   Return Visit with [facility] Carlos Mercado [facility]    [address] [facility] [address]   [phone]            Jan 25, 2017  4:00 PM   Return Visit with Roberto Carlos Mercado [facility]    [address] [facility] [address] [phone]                  Controlled substance agreement on file: No.     Processing:  may be called or faxed to pharmacy of choice   checked in past 6 months?  No, route to RN Chronic pain not on [place] list [place]  review not required    PLEASE BE AWARE PATIENT WAS JUST PRESCRIBED ROBITUSSIN AC - SEE TE 1/8/2016 - WRITER ADVISED NOT TO TAKE THIS MEDICATION WITH ROBITUSSIN AC - THIS WAS REVIEWED WITH PATIENT AND SHE VERBALIZED SHE WOULD NOT    Thank you,  Valeriy Mcginnis RN

## 2017-01-10 NOTE — TELEPHONE ENCOUNTER
Left message to call back and ask to speak with an available triage nurse.    MyChart response to patient as per below.  ERIBERTO GlynnN, RN

## 2017-01-10 NOTE — TELEPHONE ENCOUNTER
"Ilsa Yusuf is a 56 year old female who calls with     NURSING ASSESSMENT:  Description:  Patient given a return call per nurse left message - was diagnosed with pneumonia and is currently being treated with antibiotic doxycycline/omnicef - PCP requested nurse follow up with patient today on current status  Onset/duration:   1/3/2016  Precip. factors:  unknown  Associated symptoms:    1. Afebrile  2. Dizziness  3. Coughing    -having \"spasms\" -  When speaking   -somewhat productive cough - mucous grey/blood   -does feel more SOB with activity States \"at rest it seems OK - a little labored\"   -is able to sleep at night but does have some episodes of coughing  4. Overall:  Patient feels the same - denies worsening of symptoms  5. Tolerating antibiotics well - denies nausea, vomiting, diarrhea   -doxycycline   -omnicef    Last exam/Treatment:  1/6/2016  Allergies:   Allergies   Allergen Reactions     Gabapentin Other (See Comments)     Patient states she gets \"horrific nightmares\" with this medication     Dilaudid [Hydromorphone Hcl]      Made her feel like her skin was crawling     Compazine [Prochlorperazine] Other (See Comments)     \"Makes my skin crawl, I was going nuts.\"       MEDICATIONS:   Taking medication(s) as prescribed? Yes  Any medication side effects? No significant side effects    Any barriers to taking medication(s) as prescribed?  No  Medication(s) improving/managing symptoms?  Unknown per patient states symptoms persistent  Medication reconciliation completed: No      NURSING PLAN: Routed to provider Yes and Nursing advice to patient reviewed provider offering of medications - patient would like to try Robitussin AC - reviewed not to take any other narcotics with this medication due to sedation concerns - reviewed please continue to monitor symptoms return to clinic for evaluation for fever, SOB, or worsening symptoms overall - to ED for severe SOB/weakness  - patient verbalized " understanding - no further questions at this time   Reviewed home care fluids, steam, humidity, warm liquids, avoid dairy    RECOMMENDED DISPOSITION:  Home care advice - see above  Will comply with recommendation: Yes  If further questions/concerns or if symptoms do not improve, worsen or new symptoms develop, call your PCP or [facility] as soon as possible.      Guideline used:  Telephone Triage Protocols for Nurses, Fifth Edition, Edel Mcginnis RN

## 2017-01-10 NOTE — TELEPHONE ENCOUNTER
Gave return call to patient - discussed refill and to continue on current treatment plan - reviewed mixing narcotics or benzo's is not advised    Patient verbalized understanding - no further questions at this time    Closing encounter - no further actions needed at this time    Valeriy Mcginnis RN

## 2017-01-10 NOTE — TELEPHONE ENCOUNTER
Routing to provider - Tobias/LAUREN - please review and advise as appropriate  1. Writer notes patient states she is taking two antibiotics    -omnicef   -doxycycline  2. Doxycycline has been discontinued from patient medication list  3. Do you want patient to continue taking both of these antibiotics at this time?  4. No notes in ED visit 1/6/2016 documenting medications prescribed or antibiotic plan directions  5. Patient is requesting Robitussin AC      Thank you,  Valeriy Mcginnis RN

## 2017-01-11 ENCOUNTER — CARE COORDINATION (OUTPATIENT)
Dept: CARE COORDINATION | Facility: CLINIC | Age: 57
End: 2017-01-11

## 2017-01-11 RX ORDER — ZOLPIDEM TARTRATE 5 MG/1
5-10 TABLET ORAL
Qty: 60 TABLET | Refills: 0 | Status: SHIPPED | OUTPATIENT
Start: 2017-01-11 | End: 2017-04-05 | Stop reason: ALTCHOICE

## 2017-01-11 NOTE — PROGRESS NOTES
"Clinic Care Coordination Contact  OUTREACH    Referral Information:  Referral Source: Self-patient/Caregiver  Reason for Contact: insurance questions  Care Conference: No     Universal Utilization:   ED Visits in last year: 2  Hospital visits in last year: 1  Last PCP appointment: 01/06/16     Concerns: no  Multiple Providers or Specialists: yes    Clinical Concerns:  Current Medical Concerns: Patient currently has pneumonia.     Current Behavioral Concerns: Patient was exhibiting symptoms of anxiety about her insurance coverage.      Psychosocial:  Current living arrangement:: Not Assessed  Financial/Insurance: Patient called  stating she is concerned about her supplemental insurance coverage. Patient states she went online to pay her BCBS supplement, but that it showed that it was termed 1/1/7. Patient contacted [employer] and was told that she missed payments in May and October-pt verified in her bank account that she did indeed miss those payments. She was told by a representative that there is a 60 day sky period, but that has passed and that she would hear a determination within 72 hours. Patient states that the last representative she spoke to was \"gruff and rude\" and that she doesn't want \"to just wait for 72 hours and then be told it's terminated.\"  asked if pt received any communication when she missed her payment and pt states she did not.   Patient requesting contact information for an ombudsman or someone who can assist her.  recommended she contact the senior linkage line or that she can submit a consumer request to the  of [place].        Resources and Interventions:  Current Resources:  (not assessed);      Advanced Care Plans/Directives on file:: No  Referrals Placed: Senior Linkage Line, Community Resources     Patient/Caregiver understanding: Patient seemed willing and able to follow through on resources and will contact  if she is not " able to obtain supplementary insurance.   Frequency of Care Coordination: prn        Plan:   1. Pt to utilize resources given  2. Pt to continue to communicate with BCBS  3.  to f/u in 7-10 days    HARRIS Hamlin  Social Work Care Coordinator  [facility]  Ph: [phone]

## 2017-01-12 ENCOUNTER — TELEPHONE (OUTPATIENT)
Dept: PALLIATIVE MEDICINE | Facility: CLINIC | Age: 57
End: 2017-01-12

## 2017-01-12 DIAGNOSIS — G89.4 CHRONIC PAIN SYNDROME: Primary | ICD-10-CM

## 2017-01-12 LAB
BACTERIA SPEC CULT: NO GROWTH
MICRO REPORT STATUS: NORMAL
SPECIMEN SOURCE: NORMAL

## 2017-01-12 NOTE — TELEPHONE ENCOUNTER
Received fax from [facility]  that states RX for Nucynta is not covered under patient's insurance    Insurance Name: [employer]  NAME: Madelin  : 1960  Key: [id number]    Prior Authorization started via Cover My Meds.  Waiting for response.     Kira Elizabeth, [facility]

## 2017-01-12 NOTE — TELEPHONE ENCOUNTER
Ilsa calling Nurse line # at 11:55 am to say that it is going to be a process to get med filled again and she is asking if Dr. Reyes would prescribe some thing else in the meantime. She said Hydrocodone has worked for her in the past.  Audra selby rn

## 2017-01-13 RX ORDER — PREGABALIN 25 MG/1
25 CAPSULE ORAL 3 TIMES DAILY
Qty: 90 CAPSULE | Refills: 0 | Status: SHIPPED | OUTPATIENT
Start: 2017-01-13 | End: 2017-04-05 | Stop reason: ALTCHOICE

## 2017-01-13 NOTE — TELEPHONE ENCOUNTER
Called patient to confirm that she does not want to start Lyrica. Patient states that she would like to start the Nucynta as planned by Dr. Reyes. I have not received confirmation from patient's insurance for approval for Nucynta. I advised patient to keep receipts. Lyrica Rx was destroyed.   Kira Elizabeth, [facility]

## 2017-01-13 NOTE — TELEPHONE ENCOUNTER
1. Called to relay the message regarding lyrica and patient reported she'd been contacted by her supplemental insurance who has approved the Nucynta.    2. Deanne reports the out of pocket cost is 282$/month. She is going to go ahead and pick it up. Let her know I would relay cost to Dr. Reyes    3. Follow up appointment scheduled 2/6/17      Spoke with Dr. Reyes in person about above information so she is aware.    Kira Horton, MSN, RN-BC  Care Coordinator  [facility]

## 2017-01-13 NOTE — TELEPHONE ENCOUNTER
I do not recommend opioids for this type of pain syndrome. I will prescribe lyrica, which she should start at 25 mg bid for 1 week, then increase to 25 mg tid.    Nahde Reyes DO  [facility]

## 2017-01-18 ENCOUNTER — OFFICE VISIT (OUTPATIENT)
Dept: PSYCHOLOGY | Facility: CLINIC | Age: 57
End: 2017-01-18
Payer: COMMERCIAL

## 2017-01-18 ENCOUNTER — CARE COORDINATION (OUTPATIENT)
Dept: CARE COORDINATION | Facility: CLINIC | Age: 57
End: 2017-01-18

## 2017-01-18 DIAGNOSIS — F33.1 MAJOR DEPRESSIVE DISORDER, RECURRENT EPISODE, MODERATE (H): Primary | ICD-10-CM

## 2017-01-18 DIAGNOSIS — F43.10 PTSD (POST-TRAUMATIC STRESS DISORDER): ICD-10-CM

## 2017-01-18 DIAGNOSIS — Z63.0 PARTNER RELATIONSHIP PROBLEMS: ICD-10-CM

## 2017-01-18 PROCEDURE — 90834 PSYTX W PT 45 MINUTES: CPT | Performed by: MARRIAGE & FAMILY THERAPIST

## 2017-01-18 SDOH — SOCIAL STABILITY - SOCIAL INSECURITY: PROBLEMS IN RELATIONSHIP WITH SPOUSE OR PARTNER: Z63.0

## 2017-01-18 NOTE — PROGRESS NOTES
Clinic Care Coordination Contact  OUTREACH    Referral Information:  Referral Source: Self-patient/Caregiver  Reason for Contact: follow up on insurance issues  Care Conference: No     Universal Utilization:   ED Visits in last year: 2  Hospital visits in last year: 1  Last PCP appointment: 01/06/16  Missed Appointments: 1  Concerns: no  Multiple Providers or Specialists: yes    Medication Management:  Patient reports she is having difficulty affording her nucynta ($282 with insurance). She has [facility] Medicare Rx Plan. Gave information for  Rx assistance program and senior linkage line.      Psychosocial:  Current living arrangement:: Not Assessed  Financial/Insurance: Patient states that the 's office connected her with a  to assist with her issues with BCBS. She does not need further assistance with this at this time.     Resources and Interventions:  Referrals Placed: Senior Linkage Line, Community Resources, Prescription Assistance Programs         Plan:   1. Pt to work with   2. Pt to utilize medication resources  3.  to f/u in 2-3 weeks    HARRIS Hamlin  Social Work Care Coordinator  [facility]  Ph: [phone]
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
## 2017-01-19 NOTE — TELEPHONE ENCOUNTER
Received PA approval for Nucynta from [employer].     Authorization Number: none given    Called patient and informed of approval.     Will send approval letter to be scanned into patient's chart for future reference.     Kira Elizabeth [facility]

## 2017-01-24 DIAGNOSIS — K21.9 ESOPHAGEAL REFLUX: Primary | ICD-10-CM

## 2017-01-24 NOTE — TELEPHONE ENCOUNTER
OMEPRAZOLE 40 MG      Last Written Prescription Date: 1-15-16  Last Fill Quantity: 90,  # refills: 3   Last Office Visit with G, P or [facility] prescribing provider: 1-6-17                                         Next 5 appointments (look out 90 days)     Jan 25, 2017  4:00 PM   Return Visit with Roberto Carlos Mercado [facility]    [address] [facility] [address]   [phone]            Jan 30, 2017  4:00 PM   Return Visit with Roberto Carlos Mercado [facility] ([facility])    [address] [facility] [address]   [phone]            Feb 06, 2017 12:00 PM   Return Visit with [facility])    [address] [address] [address]   [phone]            Feb 15, 2017  4:00 PM   Return Visit with Roberto Carlos Mercado [facility] ([facility])    [address] [facility] [address]   [phone]

## 2017-01-25 ENCOUNTER — TELEPHONE (OUTPATIENT)
Dept: PSYCHOLOGY | Facility: CLINIC | Age: 57
End: 2017-01-25

## 2017-01-25 RX ORDER — OMEPRAZOLE 40 MG/1
40 CAPSULE, DELAYED RELEASE ORAL DAILY
Qty: 90 CAPSULE | Refills: 2 | Status: SHIPPED | OUTPATIENT
Start: 2017-01-25 | End: 2017-11-27

## 2017-01-29 DIAGNOSIS — M54.12 CERVICAL RADICULOPATHY: Primary | ICD-10-CM

## 2017-01-30 NOTE — TELEPHONE ENCOUNTER
TIZANIDINE 4 MG TABLETS     PT. IS REQUESTING A 90 DAY SUPPLY.         Last Written Prescription Date: 9/23/2016  Last Fill Quantity: 60,  # refills: 5   Last Office Visit with [facility], [facility] or [facility] prescribing provider: 1/6/2017                                         Next 5 appointments (look out 90 days)     Jan 30, 2017  4:00 PM   Return Visit with Roberto Carlos Mercado [facility] ([facility])    [address] [facility] [id number] [phone]            Feb 06, 2017 12:00 PM   Return Visit with Nahed Reyes DO   [facility] ([facility])    [address] [address] [address]   [phone]            Feb 15, 2017  4:00 PM   [facility]    [address] [facility] [id number] [phone]

## 2017-01-30 NOTE — TELEPHONE ENCOUNTER
Routing refill request to provider for review/approval because:  Drug not on the [facility] refill protocol     Thank you,  Valeriy Mcginnis RN

## 2017-01-31 DIAGNOSIS — F43.10 PTSD (POST-TRAUMATIC STRESS DISORDER): Primary | ICD-10-CM

## 2017-01-31 RX ORDER — LORAZEPAM 0.5 MG/1
0.5 TABLET ORAL
Qty: 30 TABLET | Refills: 2 | Status: CANCELLED | OUTPATIENT
Start: 2017-01-31

## 2017-01-31 NOTE — TELEPHONE ENCOUNTER
Controlled Substance Refill Request for LORAZEPAM 0.5 MG  Problem List Complete:  No     PROVIDER TO CONSIDER COMPLETION OF PROBLEM LIST AND OVERVIEW/CONTROLLED SUBSTANCE AGREEMENT    Last Written Prescription Date:  10-19-16  Last Fill Quantity: 30,   # refills: 2    Last Office Visit with [facility] primary care provider: 1-18-17    Future Office visit:   Next 5 appointments (look out 90 days)     Feb 06, 2017 12:00 PM   Return Visit with Nahed Reyes DO   [facility] ([facility])    [address] [address] [address] [phone]            Feb 15, 2017  4:00 PM   Return Visit with RAMU Fagan   [facility])    [address] [facility] [address] [phone]                  Controlled substance agreement on file: No.     Processing:  ?   checked in past 6 months?  No, route to RN

## 2017-02-01 NOTE — TELEPHONE ENCOUNTER
I have not prescribed this for her before.  It says to take it at night, but she has ambien.  I am not prescribing both.  Please clarify with patient how/when she uses it, and does she have a psychiatrist?    Thanks.

## 2017-02-01 NOTE — TELEPHONE ENCOUNTER
Routing refill request to provider for review/approval because:  Drug not on the [facility] refill protocol

## 2017-02-02 DIAGNOSIS — F43.10 PTSD (POST-TRAUMATIC STRESS DISORDER): Primary | ICD-10-CM

## 2017-02-02 RX ORDER — LORAZEPAM 0.5 MG/1
0.5 TABLET ORAL
Qty: 30 TABLET | Refills: 2 | Status: CANCELLED | OUTPATIENT
Start: 2017-02-02

## 2017-02-02 NOTE — TELEPHONE ENCOUNTER
Controlled Substance Refill Request for LORAZEPAM 0.5 MG  Problem List Complete:  No     PROVIDER TO CONSIDER COMPLETION OF PROBLEM LIST AND OVERVIEW/CONTROLLED SUBSTANCE AGREEMENT    Last Written Prescription Date:  10-19-16  Last Fill Quantity: 30,   # refills: 2    Last Office Visit with [facility] primary care provider: 1-6-17    Future Office visit:   Next 5 appointments (look out 90 days)     Feb 06, 2017 12:00 PM   Return Visit with Nahed Reyes DO   [facility] ([facility])    [address] [address] [address] [phone]            Feb 15, 2017  4:00 PM   Return Visit with RAMU Fagan   [facility] ([facility])    [address] [facility] [address] [phone]                  Controlled substance agreement on file: No.     Processing:  ?   checked in past 6 months?  No, route to RN

## 2017-02-03 NOTE — TELEPHONE ENCOUNTER
This request was sent to me earlier this week, and I sent it back with some questions for the patient.  Please let me know if you have questions.  Thanks

## 2017-02-06 ENCOUNTER — OFFICE VISIT (OUTPATIENT)
Dept: PALLIATIVE MEDICINE | Facility: CLINIC | Age: 57
End: 2017-02-06
Payer: COMMERCIAL

## 2017-02-06 VITALS
BODY MASS INDEX: 32.33 KG/M2 | WEIGHT: 219 LBS | HEART RATE: 95 BPM | OXYGEN SATURATION: 99 % | SYSTOLIC BLOOD PRESSURE: 122 MMHG | DIASTOLIC BLOOD PRESSURE: 75 MMHG

## 2017-02-06 DIAGNOSIS — Z79.891 LONG-TERM CURRENT USE OF OPIATE ANALGESIC: ICD-10-CM

## 2017-02-06 DIAGNOSIS — G89.4 CHRONIC PAIN SYNDROME: Primary | ICD-10-CM

## 2017-02-06 DIAGNOSIS — G89.4 CHRONIC PAIN SYNDROME: ICD-10-CM

## 2017-02-06 DIAGNOSIS — Z79.891 LONG-TERM CURRENT USE OF OPIATE ANALGESIC: Primary | ICD-10-CM

## 2017-02-06 PROCEDURE — 99214 OFFICE O/P EST MOD 30 MIN: CPT | Performed by: PHYSICAL MEDICINE & REHABILITATION

## 2017-02-06 PROCEDURE — 99000 SPECIMEN HANDLING OFFICE-LAB: CPT | Performed by: PHYSICAL MEDICINE & REHABILITATION

## 2017-02-06 PROCEDURE — 80307 DRUG TEST PRSMV CHEM ANLYZR: CPT | Mod: 90 | Performed by: PHYSICAL MEDICINE & REHABILITATION

## 2017-02-06 ASSESSMENT — PAIN SCALES - GENERAL: PAINLEVEL: MODERATE PAIN (5)

## 2017-02-06 NOTE — PROGRESS NOTES
[facility]    Date of visit: 2/6/2017    Chief complaint:   Chief Complaint   Patient presents with     Pain         Interval history:  Ilsa Yusuf is a 56 year old female last seen by me on 1/4/17.    Since her last visit, Ilsa Yusuf reports:    -Her Butrans patch will not to be approved by her insurance till she has failed both Nucynta and Embeda. Patient was prescribed Nucynta 50 mg, which was reportedly approved by her supplemental prescription plan and she is taking it. Patient reports that she feels 'great' since starting Nucynta about 1.5 to 2 weeks ago. She feels that her pain is significantly less, because of which she has been more active and has lost weight too due to excercise. She has not noticed any side-effects with the medication.    -She had an episode of pneumonia last month which was treated with antibiotics and has resolved now.    -Her name 'dropped out' of her insurance system in January 2017 which she is working on getting fixed. Because of the insurance problem patient states she hasn't scheduled appointments with Dr. Humphries or Renee but states that she will schedule as soon her insurance problem gets fixed. She has not been able to see her therapist since January due to the insurance fallout but has appointments scheduled every week and go back as soon as insurance coverage resumes.    -Lyrica is not covered by her insurance reportedly.    -She reports continuing home guided imagery therapy using a CD she has and home massage. She is also trying to pace her physical activities on her own to help with pain.    -She is not interested in cervical ANNIE at this point due to the significant benefit with Nucynta.      Pain scores:  Pain intensity on average is 6 on a scale of 0-10.     Current pain treatments:   Nucynta 50 mg, 2-3 tablets/day, max #3/day, beneficial  Tizanidine 8 mg qhs  Ativan 2-3 times per week  Doxepin 10-20 mg  qhs  cymbalta 120 mg daily  zyprexa 2.5 mg qhs    Side Effects: no side effect    Past pain treatments:  Ilsa Yusuf has not been seen at a pain clinic in the past.     Medications:                NSAIDs: ibuprofen -some relief, medrol dose юлия -helped while taking it.              Anticonvulsants: gabapentin -horrible nightmares                Opioids: tramadol -provided incomplete relief; norco 5-325 mg- beneficial  PT: [facility] completed in Oct 2016 -helped  Psychology: guided imagery  Acupuncture: no  Chiropractic care: no  TENS Unit: no  Injections: cervical epidural steroid injection 9/20/16 -flared up her pain for about 3 days and did not get significant relief  Surgery: no cervical surgery, left knee meniscus surgery in 2012    Medications:  Current Outpatient Prescriptions   Medication Sig Dispense Refill     tiZANidine (ZANAFLEX) 4 MG tablet Take 1-2 tablets (4-8 mg) by mouth 3 times daily as needed for muscle spasms 60 tablet 5     omeprazole (PRILOSEC) 40 MG capsule Take 1 capsule (40 mg) by mouth daily Take 30-60 minutes before a meal. 90 capsule 2     pregabalin (LYRICA) 25 MG capsule Take 1 capsule (25 mg) by mouth 3 times daily 90 capsule 0     zolpidem (AMBIEN) 5 MG tablet Take 1-2 tablets (5-10 mg) by mouth nightly as needed for sleep 60 tablet 0     guaiFENesin-codeine (ROBITUSSIN AC) 100-10 MG/5ML SOLN solution Take 5-10 mLs by mouth every 6 hours as needed for cough 120 mL 0     tapentadol (NUCYNTA) 50 MG TABS tablet Take 1 tablet (50 mg) by mouth every 6 hours as needed for moderate to severe pain maximum 3 tablet(s) per day 90 tablet 0     ipratropium - albuterol 0.5 mg/2.5 mg/3 mL (DUONEB) 0.5-2.5 (3) MG/3ML neb solution Take 1 vial (3 mLs) by nebulization every 6 hours as needed for shortness of breath / dyspnea or wheezing 30 vial 1     albuterol (PROAIR HFA/PROVENTIL HFA/VENTOLIN HFA) 108 (90 BASE) MCG/ACT Inhaler Inhale 2 puffs into the lungs every 6 hours as needed  for shortness of breath / dyspnea or wheezing 1 Inhaler 1     guaiFENesin-dextromethorphan (ROBITUSSIN DM) 100-10 MG/5ML syrup Take 5-10 mLs by mouth 4 times daily as needed for cough 560 mL 0     buprenorphine (BUTRANS) 5 MCG/HR WK patch Place 1 patch onto the skin every 7 days 4 patch 0     levothyroxine (SYNTHROID/LEVOTHROID) 150 MCG tablet Take 1 tablet (150 mcg) by mouth every other day 45 tablet 3     levothyroxine (SYNTHROID/LEVOTHROID) 175 MCG tablet Take 1 tablet (175 mcg) by mouth every other day 45 tablet 3     HYDROcodone-acetaminophen (NORCO) 7.5-325 MG per tablet TK 1 T PO Q 8 H  0     HYDROcodone-acetaminophen (NORCO) 5-325 MG per tablet Take 1-2 tablets by mouth every 6 hours as needed for moderate to severe pain maximum 8 tablet(s) per day 50 tablet 0     LORazepam (ATIVAN) 0.5 MG tablet Take 2 po 90 minutes prior to MRI and may repeat x 1 30 minutes prior to MRI 6 tablet 0     traMADol (ULTRAM) 50 MG tablet Take 1-2 tablets ( mg) by mouth every 6 hours as needed for pain 20 tablet 0     lovastatin (MEVACOR) 20 MG tablet Take 1 tablet (20 mg) by mouth At Bedtime 90 tablet 3     doxepin (SINEQUAN) 10 MG capsule Take 1-2 caps at bedtime 60 capsule 11     DULoxetine (CYMBALTA) 60 MG capsule Take 2 capsules (120 mg) by mouth daily 60 capsule 5     prazosin (MINIPRESS) 5 MG capsule Take 1 capsule (5 mg) by mouth At Bedtime Take 1 capsule at bedtime 30 capsule 5     FIBER PO Two capsules in the morning and two capsules at night       LORazepam (ATIVAN) 0.5 MG tablet Take 1 tablet (0.5 mg) by mouth nightly as needed for anxiety 30 tablets to last 30 days. 30 tablet 2     ondansetron (ZOFRAN) 4 MG tablet Take 1 tablet (4 mg) by mouth every 6 hours as needed for nausea 18 tablet 5     OLANZapine (ZYPREXA) 2.5 MG tablet Take 1 tablet (2.5 mg) by mouth At Bedtime 30 tablet 5     fluticasone (FLONASE) 50 MCG/ACT nasal spray Spray 1-2 sprays into both nostrils daily 16 g 11     NONFORMULARY Take 20 mg by  mouth 2 times daily D-amphetamine Salt combo       cholecalciferol (VITAMIN D3) 5000 UNITS CAPS capsule Take 1 capsule (5,000 Units) by mouth daily Take 1 per day 100 capsule 2     multivitamin, therapeutic with minerals (MULTI-VITAMIN) TABS Take 1 tablet by mouth daily       fish oil-omega-3 fatty acids (OMEGA 3) 1000 MG capsule Take 2 g by mouth daily Takes 1 in am and 1 at bedtime         Medical History: any changes in medical history since they were last seen? No    Review of Systems:  The 14 system ROS was reviewed from the intake questionnaire, and is positive for: headache, fatigue, cough, shortness of breath, thyroid disease, joint pain, arthritis, neck pain  Any bowel or bladder problems: no  Mood: stable    Physical Exam:  Blood pressure 122/75, pulse 95, weight 99.338 kg (219 lb), SpO2 99 %, not currently breastfeeding.  General: no acute distress  Gait: Normal  MSK exam: Cervical spine normal range of motion with some discomfort at end range    Imaging:  Cervical MRI completed on 8/27/16 showed:  Findings:  The cervical vertebrae appear normally aligned.  There is disc height  narrowing at C5-C6 with degeneration of the disc and sclerosis of  adjacent endplates.  There is normal signal within and normal contour  of the cervical spinal cord. No significant abnormal bone marrow  change. The findings on a level by level basis are as follows:     C2-3: No spinal canal or neural foraminal narrowing.     C3-4:  No spinal canal or neural foraminal narrowing.     C4-5:  No spinal canal or neural foraminal narrowing.     C5-6:  Posterior disc bulge with uncovertebral joint hypertrophy and  osteophyte formations. Superimposing left central disc herniation with  elevation of the posterior longitudinal ligament Mild canal narrowing.  Herniated disc effaces the left anterolateral subarachnoid space and  possibly abutting the left C6 in the left portion of the spinal canal.  Mild canal narrowing. Bilateral mild to  moderate foraminal narrowing  more significant on the left.     C6-7:  Disc bulge with mild uncovertebral hypertrophy. No significant  spinal canal or neural foraminal narrowing.     C7-T1:  No spinal canal or neural foraminal narrowing.      No abnormality of the paraspinous soft tissues.                                                                       Impression:       1. Disc bulge with uncovertebral joint hypertrophy and osteophyte  formations at C5-6. Superimposing left central disc herniation with  elevation of the posterior longitudinal ligament.Herniated disc  effaces the left anterolateral subarachnoid space and possibly  abutting the left C6 in the left portion of the spinal canal. Mild  canal narrowing. Bilateral mild to moderate foraminal narrowing more  significant on the left.  2. Disc bulge at C6-7 with no significant compression.    [place] Board of Pharmacy Data Base Reviewed: YES; as expected, no concern for abuse or misuse    Assessment:   1. Neck pain, worse on the left, started in Aug 2016, with associated headaches. Disc bulge at C5-C6 and C6-C7. Bilateral foraminal narrowing at C5-C6.  2. Bilateral knee pain, right worse than left. History of left mensicus surgery in 2012 and has a torn meniscus in her right knee  3. Depression and PTSD -weekly counseling    Plan:  1. Physical Therapy: Schedule an evaluation with Noe insurance issues have resolved   and consider TENS unit.   2. Clinical Health Psychologist to address issues of relaxation, behavioral change, coping style, and other factors important to improvement: Schedule an evaluation with Dr. Humphries after insurance issues resolved.  3. Self Care Recommendations: continue guided imagery  4. Medication Management:    1. Continue nucynta 50 mg max #3/day. Call one week prior to needing a refill.  2. Will consider/reappeal lyrica.  3. Will get a UDS and opioid agreement today.   5. Further procedures recommended: could consider  cervical epidural steroid injection    6. Resume follow-up with your psychotherapist when insurance issues resolve.  7. Follow up: with Dr. Reyes in clinic in 1 month      Kasi Waterman MD  Pain Fellow  [facility]    I was present with the resident during the history and exam.  I discussed the case with the resident and agree with the findings as documented in the assessment and plan.    Total time spent was 30 minutes, and more than 50% of face to face time was spent in counseling and/or coordination of care regarding the above assessment and plan.    Nahed Reyes DO  [facility]  [facility]

## 2017-02-06 NOTE — TELEPHONE ENCOUNTER
Pt LM at 1511:    States that she was in to see Dr. Reyes today. States that she has #18 tabs of Nucynta 50 mg tabs left. Pt would like to  the Rx on Friday 2/10. Pt states that she has 6.5 days left of medication. Please call with any questions.   -----  Will route to MA pool for assistance with gathering opioid refill information.    ERIBERTO AmezcuaN, RN-BC  Patient Care Supervisor/Care Coordinator  [facility]

## 2017-02-06 NOTE — NURSING NOTE
Obtained urine specimen.  Tracking number [id number], temp 96 degrees.  Specimen sent to the lab.    Kira Elizabeth [facility]

## 2017-02-06 NOTE — MR AVS SNAPSHOT
After Visit Summary   2/6/2017    lIsa Yusuf    MRN: [id number]           Patient Information     Date Of Birth          1960        Visit Information        Provider Department      2/6/2017 12:00 PM Nahed Reyes DO [facility]        Care Instructions    1. Physical Therapy: Schedule an evaluation with Renee. Consider TENS unit.   -once insurance issues have resolved  2. Clinical Health Psychologist to address issues of relaxation, behavioral change, coping style, and other factors important to improvement: Schedule an evaluation with Dr. Humphries  3. Self Care Recommendations: continue guided imagery  4. Medication Management:    1. Continue nucynta 50 mg max #3/day. Call one week prior to needing a refill.  2. Will consider lyrica.  3. Will get a UDS and opioid agreement today.   5. Further procedures recommended: could consider cervical epidural steroid injection    6. Follow up: with Dr. Reyes in clinic in 1 month    Nurse Triage line:  [phone]   Call this number with any questions or concerns. You may leave a detailed message anytime. Calls are typically returned Monday through Friday between 8 AM and 4:30 PM. We usually get back to you within 2 business days depending on the issue/request.       Medication refills:    For non-narcotic medications, call your pharmacy directly to request a refill. The pharmacy will contact the Pain Management Center for authorization. Please allow 3-4 days for these refills to be processed.     For narcotic refills, call the nurse triage line or send a [employer] message. Please contact us 7-10 days before your refill is due. The message MUST include the name of the specific medication(s) requested and how you would like to receive the prescription(s). The options are as follows:    Pain Clinic staff can mail the prescription to your pharmacy. Please tell us the name of the pharmacy.    You may pick the prescription up at the  Pain Clinic (tell us the location) or during a clinic visit with your pain provider    Pain Clinic staff can deliver the prescription to the [facility] pharmacy in the clinic building. Please tell us the location.      Scheduling number: [phone].  Call this number to schedule or change appointments.    We believe regular attendance is key to your success in our program.    Any time you are unable to keep your appointment we ask that you call us at least 24 hours in advance to let us know. This will allow us to offer the appointment time to another patient.           Follow-ups after your visit        Your next 10 appointments already scheduled     Feb 15, 2017  4:00 PM   Return Visit with RAMU Fagan   [facility] ([facility])    [address] [facility] [address] [phone]              Who to contact     If you have questions or need follow up information about today's clinic visit or your schedule please contact [facility] directly at [phone].  Normal or non-critical lab and imaging results will be communicated to you by VertiFlexhart, letter or phone within 4 business days after the clinic has received the results. If you do not hear from us within 7 days, please contact the clinic through zePASSt or phone. If you have a critical or abnormal lab result, we will notify you by phone as soon as possible.  Submit refill requests through [employer] or call your pharmacy and they will forward the refill request to us. Please allow 3 business days for your refill to be completed.          Additional Information About Your Visit        [employer] Information     [employer] gives you secure access to your electronic health record. If you see a primary care provider, you can also send messages to your care team and make appointments. If you have questions, please call your primary care clinic.  If you do not have a primary care provider, please call [phone]  and they will assist you.        Care EveryWhere ID     This is your Care EveryWhere ID. This could be used by other organizations to access your [place] medical records  [id number]        Your Vitals Were     Pulse Pulse Oximetry Breastfeeding?             95 99% No          Blood Pressure from Last 3 Encounters:   02/06/17 122/75   01/06/17 113/56   01/06/17 108/68    Weight from Last 3 Encounters:   02/06/17 99.338 kg (219 lb)   01/06/17 99.338 kg (219 lb)   12/23/16 99.791 kg (220 lb)              Today, you had the following     No orders found for display       Primary Care Provider Office Phone # Fax #    Catrachita Collado [phone]       [facility] [address]  [place] [address]        Thank you!     Thank you for choosing [facility]  for your care. Our goal is always to provide you with excellent care. Hearing back from our patients is one way we can continue to improve our services. Please take a few minutes to complete the written survey that you may receive in the mail after your visit with us. Thank you!             Your Updated Medication List - Protect others around you: Learn how to safely use, store and throw away your medicines at www.disposemymeds.org.          This list is accurate as of: 2/6/17 12:36 PM.  Always use your most recent med list.                   Brand Name Dispense Instructions for use    albuterol 108 (90 BASE) MCG/ACT Inhaler    PROAIR HFA/PROVENTIL HFA/VENTOLIN HFA    1 Inhaler    Inhale 2 puffs into the lungs every 6 hours as needed for shortness of breath / dyspnea or wheezing       buprenorphine 5 MCG/HR WK patch    BUTRANS    4 patch    Place 1 patch onto the skin every 7 days       cholecalciferol 5000 UNITS Caps capsule    vitamin D3    100 capsule    Take 1 capsule (5,000 Units) by mouth daily Take 1 per day       doxepin 10 MG capsule    SINEquan    60 capsule    Take 1-2 caps at bedtime       DULoxetine 60  MG EC capsule    CYMBALTA    60 capsule    Take 2 capsules (120 mg) by mouth daily       FIBER PO      Two capsules in the morning and two capsules at night       fish oil-omega-3 fatty acids 1000 MG capsule      Take 2 g by mouth daily Takes 1 in am and 1 at bedtime       fluticasone 50 MCG/ACT spray    FLONASE    16 g    Spray 1-2 sprays into both nostrils daily       guaiFENesin-codeine 100-10 MG/5ML Soln solution    ROBITUSSIN AC    120 mL    Take 5-10 mLs by mouth every 6 hours as needed for cough       guaiFENesin-dextromethorphan 100-10 MG/5ML syrup    ROBITUSSIN DM    560 mL    Take 5-10 mLs by mouth 4 times daily as needed for cough       * HYDROcodone-acetaminophen 7.5-325 MG per tablet    NORCO     TK 1 T PO Q 8 H       * HYDROcodone-acetaminophen 5-325 MG per tablet    NORCO    50 tablet    Take 1-2 tablets by mouth every 6 hours as needed for moderate to severe pain maximum 8 tablet(s) per day       ipratropium - albuterol 0.5 mg/2.5 mg/3 mL 0.5-2.5 (3) MG/3ML neb solution    DUONEB    30 vial    Take 1 vial (3 mLs) by nebulization every 6 hours as needed for shortness of breath / dyspnea or wheezing       * levothyroxine 150 MCG tablet    SYNTHROID/LEVOTHROID    45 tablet    Take 1 tablet (150 mcg) by mouth every other day       * levothyroxine 175 MCG tablet    SYNTHROID/LEVOTHROID    45 tablet    Take 1 tablet (175 mcg) by mouth every other day       * LORazepam 0.5 MG tablet    ATIVAN    30 tablet    Take 1 tablet (0.5 mg) by mouth nightly as needed for anxiety 30 tablets to last 30 days.       * LORazepam 0.5 MG tablet    ATIVAN    6 tablet    Take 2 po 90 minutes prior to MRI and may repeat x 1 30 minutes prior to MRI       lovastatin 20 MG tablet    MEVACOR    90 tablet    Take 1 tablet (20 mg) by mouth At Bedtime       Multi-vitamin Tabs tablet      Take 1 tablet by mouth daily       NONFORMULARY      Take 20 mg by mouth 2 times daily D-amphetamine Salt combo       OLANZapine 2.5 MG tablet     zyPREXA    30 tablet    Take 1 tablet (2.5 mg) by mouth At Bedtime       omeprazole 40 MG capsule    priLOSEC    90 capsule    Take 1 capsule (40 mg) by mouth daily Take 30-60 minutes before a meal.       ondansetron 4 MG tablet    ZOFRAN    18 tablet    Take 1 tablet (4 mg) by mouth every 6 hours as needed for nausea       prazosin 5 MG capsule    MINIPRESS    30 capsule    Take 1 capsule (5 mg) by mouth At Bedtime Take 1 capsule at bedtime       pregabalin 25 MG capsule    LYRICA    90 capsule    Take 1 capsule (25 mg) by mouth 3 times daily       tapentadol 50 MG Tabs tablet    NUCYNTA    90 tablet    Take 1 tablet (50 mg) by mouth every 6 hours as needed for moderate to severe pain maximum 3 tablet(s) per day       tiZANidine 4 MG tablet    ZANAFLEX    60 tablet    Take 1-2 tablets (4-8 mg) by mouth 3 times daily as needed for muscle spasms       traMADol 50 MG tablet    ULTRAM    20 tablet    Take 1-2 tablets ( mg) by mouth every 6 hours as needed for pain       zolpidem 5 MG tablet    AMBIEN    60 tablet    Take 1-2 tablets (5-10 mg) by mouth nightly as needed for sleep       * Notice:  This list has 6 medication(s) that are the same as other medications prescribed for you. Read the directions carefully, and ask your doctor or other care provider to review them with you.

## 2017-02-06 NOTE — NURSING NOTE
Patient here to complete opioid contract. Contract read and reviewed with patient. Sje expressed understanding. Contract signed and witnessed and placed on Dr. Reyes' desk for final signature. Copy given to patient.    Kira Horton, MSN, RN-BC  Care Coordinator  [facility] [place]

## 2017-02-06 NOTE — TELEPHONE ENCOUNTER
My chart message sent to pt last week. Attempted to call pt today, but phone went to busy signal.   Sent another my cht msg.  Solange Kelley RN

## 2017-02-06 NOTE — Clinical Note
[facility] [place]    02/06/2017    Patient: Ilsa Yusuf  YOB: 1960  Medical Record Number: [id number]                                                                  Controlled Substance Agreement  I understand that my care provider has prescribed controlled substances (narcotics, tranquilizers, and/or stimulants) to help manage my condition(s).  I am taking this medicine to help me function or work.  I know that this is strong medicine.  It could have serious side effects and even cause a dependency on the drug.  If I stop these medicines suddenly, I could have severe withdrawal symptoms.    The risks, benefits, and side effects of these medication(s) were explained to me.  I agree that:  1. I will take part in other treatments as advised by my provider.  This may be psychiatry or counseling, physical therapy, behavioral therapy, group treatment, or a referral to a pain clinic.  I will reduce or stop my medicine when my provider tells me to do so.   2. I will take my medicines as prescribed.  I will not change the dose or schedule unless my provider tells me to.  There will be no refills if I  run out early.   I may be contacted at any time without warning and asked to complete a drug test or pill count.   3. I will keep all my appointments at the clinic.  If I miss appointments or fail to follow instructions, my provider may stop my medicine.  4. I will not ask other providers to prescribe controlled substances. And I will not accept controlled substances from other people. If I need another prescribed controlled substance for a new reason, I will notify my provider within one business day.  5. If I enroll in the [place] Medical Marijuana program, I will tell my provider.  I will also sign an agreement to share my medical records with my provider.  6. I will use one pharmacy to fill all of my controlled substance prescriptions.  If my prescription is mailed to my  pharmacy, it may take 5 to 7 days for my medicine to be ready.  7. I understand that my provider, clinic care team, and pharmacy can track controlled substance prescriptions from other providers through a central database (prescription monitoring program).  8. I will bring in my list of medications (or my medicine bottles) each time I come to the clinic.  REV- 04/2016                                                                                                                                            Page 1 of 2      [facility]    02/06/2017    Patient: Ilsa Yusuf  YOB: 1960  Medical Record Number: [id number]    9. Refills of controlled substances will be made only during office hours.  It is up to me to make sure that I do not run out of my medicines on weekends or holidays.    10. I am responsible for my prescriptions.  If the medicine is lost or stolen, it will not be replaced.   I also agree not to share these medicines with anyone.  11. I agree to not use ANY illegal or recreational drugs.  This includes marijuana, cocaine, bath salts or other drugs.  I agree not to use alcohol unless my provider says I may.  I agree to give urine samples whenever asked.  If I fail to give a urine sample, the provider may stop my medicine.     12. I will tell my nurse or provider right away if I become pregnant or have a new medical problem treated outside of [facility].  13. I understand that this medicine can affect my thinking and judgment.  It may be unsafe for me to drive, use machinery and do dangerous tasks.  I will not do any of these things until I know how the medicine affects me.  If my dose changes, I will wait to see how it affects me.  I will contact my provider if I have concerns about medicine side effects.  I understand that if I do not follow any of the conditions above, my prescriptions or treatment may be stopped.    I agree that my provider,  clinic care team, and pharmacy may work with any city, state or federal law enforcement agency that investigates the misuse, sale, or other diversion of my controlled medicine. I will allow my provider to discuss my care with or share a copy of this agreement with any other treating provider, pharmacy or emergency room where I receive care.  I agree to give up (waive) any right of privacy or confidentiality with respect to these authorizations.   I have read this agreement and have asked questions about anything I did not understand.   ___________________________________    ___________________________  Patient Signature                                                           Date and Time  ___________________________________     ____________________________  Witness                                                                            Date and Time  ___________________________________  Nahed Reyes DO  REV-  04/2016                                                                                                                                                                 Page 2 of 2  Opioid Pain Medicines (also known as Narcotics)  What You Need to Know      What are opioids?   Opioids are pain medicines that must be prescribed by a doctor. Examples are:     morphine (MS Contin, Cecelia)    oxycodone (Oxycontin)    oxycodone and acetaminophen (Percocet)    hydrocodone and acetaminophen (Vicodin, Norco)     fentanyl patch (Duragesic)     hydromorphone (Dilaudid)     methadone     What do opioids do well?   Opioids are best for short-term pain after a surgery or injury. They also work well for cancer pain. Unlike other pain medicines, they do not cause liver or kidney failure or ulcers. They may help some people with long-lasting (chronic) pain.     What do opioids NOT do well?   Opioids never get rid of pain entirely, and they do not work well for most patients with chronic pain. Opioids do not reduce  swelling, one of the causes of pain. They also don t work well for nerve pain.     Side effects  Talk to your doctor before you start or decide to keep taking one of these medicines. Side effects include:    Lowers your breathing rate enough that it could cause death    Death due to taking more than the prescribed dose    Serious lifelong opioid use      Dependence is not the same as addiction. Addiction is when people keep using a substance that harms their body, their mind or their relations with others. If you have a history of drug or alcohol abuse, taking opioids can cause a relapse.  Over time, opioids don t work as well. Most people will need higher and higher doses. The higher the dose, the more serious the side effects. We don t know the long-term effects of opioids.   People who have used opioids for a long time have a lower quality of life, worse depression, higher levels of pain and more visits to doctors.  Overdose from prescription drugs is the second leading cause of death in the [place] The risk of overdose rises when opioids are taken with other drugs such as:    Medicines used for anxiety and panic attacks (such as lorazepam, alprazolam, clonazepam    Other sedatives    Alcohol    Illegal drugs such as heroin  Never share your opioids with others. Be sure to store opioids in a secure place, locked if possible.Young children can easily swallow them and overdose.     Are there other ways to manage pain?  Ways to help reduce pain:    Exercise every day.    Treat health problems that may be causing pain.    Treat mental health problems like depression and anxiety.     Worse depression symptoms; Less pleasure in things you usually enjoy    Feeling tired or sluggish    Slower thoughts or cloudy thinking    Being more sensitive to pain over time; Pain is harder to control.    Trouble sleeping or restless sleep    Changes in hormone levels (for example, less testosterone).     Changes in sex drive or ability  to have sex    Long lasting nausea and constipation    Trouble breathing while asleep; This is worse with lung problems like COPD or sleep apnea.    Unsafe driving    Getting sick more often    Itching    Feeling dizzy    Dry mouth    Sweating    Trouble emptying the bladder (peeing). This is worse if you have an enlarged prostate or get urinary tract infections (UTIs).    What else should I know about opioids?  When someone takes opioids for too long or too often, they become dependent. This means that if you stop or reduce the medicine too quickly, you will have withdrawal symptoms.          Practice good sleep habits.  Try to go to bed and get up at the same time every day.    Stop smoking.  Tobacco use can make pain worse.    Do things that you enjoy.    Find a way to work through pain without drugs.  Try deep breathing, meditation, visual imagery and aromatherapy.    Ask your doctor to help you create a plan to manage your pain.

## 2017-02-06 NOTE — PATIENT INSTRUCTIONS
1. Physical Therapy: Schedule an evaluation with Renee. Consider TENS unit.   -once insurance issues have resolved  2. Clinical Health Psychologist to address issues of relaxation, behavioral change, coping style, and other factors important to improvement: Schedule an evaluation with Dr. Humphries  3. Self Care Recommendations: continue guided imagery  4. Medication Management:    1. Continue nucynta 50 mg max #3/day. Call one week prior to needing a refill.  2. Will consider lyrica.  3. Will get a UDS and opioid agreement today.   5. Further procedures recommended: could consider cervical epidural steroid injection    6. Follow up: with Dr. Reyes in clinic in 1 month    Nurse Triage line:  [phone]   Call this number with any questions or concerns. You may leave a detailed message anytime. Calls are typically returned Monday through Friday between 8 AM and 4:30 PM. We usually get back to you within 2 business days depending on the issue/request.       Medication refills:    For non-narcotic medications, call your pharmacy directly to request a refill. The pharmacy will contact the [facility] for authorization. Please allow 3-4 days for these refills to be processed.     For narcotic refills, call the nurse triage line or send a [employer] message. Please contact us 7-10 days before your refill is due. The message MUST include the name of the specific medication(s) requested and how you would like to receive the prescription(s). The options are as follows:    Pain Clinic staff can mail the prescription to your pharmacy. Please tell us the name of the pharmacy.    You may pick the prescription up at the Pain Clinic (tell us the location) or during a clinic visit with your pain provider    Pain Clinic staff can deliver the prescription to the [facility] pharmacy in the clinic building. Please tell us the location.      Scheduling number: [phone].  Call this number to schedule or change appointments.    We  believe regular attendance is key to your success in our program.    Any time you are unable to keep your appointment we ask that you call us at least 24 hours in advance to let us know. This will allow us to offer the appointment time to another patient.

## 2017-02-07 DIAGNOSIS — F51.01 PRIMARY INSOMNIA: Primary | ICD-10-CM

## 2017-02-07 NOTE — TELEPHONE ENCOUNTER
Rx last written on 1/11/17 for #60 zolpidem so pt should have enough until 2/11 even if using two daily . Also see refill request for lorazepam dated 1/31.    Attempted to reach, unable. Left message  to call back. Need to ask her how much she is using of this medication  .  Elodia Pleitez RN

## 2017-02-07 NOTE — TELEPHONE ENCOUNTER
Spoke with Ilsa and let her know the RX was complete. OK to fill on/after 2/10/17. She is picking up this Friday and I let her know it would be at our 3rd floor  and to bring a photo ID.    Kira Horton, MSN, RN-BC  Care Coordinator  [facility] [place]

## 2017-02-07 NOTE — TELEPHONE ENCOUNTER
Medication refill information reviewed.     Due date for Nucynta is 2/12/17     Prescriptions prepped for review.     Will route to provider.

## 2017-02-07 NOTE — TELEPHONE ENCOUNTER
Controlled Substance Refill Request for ZOLPIDEM 5 MG  Problem List Complete:  No     PROVIDER TO CONSIDER COMPLETION OF PROBLEM LIST AND OVERVIEW/CONTROLLED SUBSTANCE AGREEMENT    Last Written Prescription Date:  1-11-17  Last Fill Quantity: 60,   # refills: 0    Last Office Visit with [facility] primary care provider: 1-13-17    Future Office visit:   Next 5 appointments (look out 90 days)     Feb 15, 2017  4:00 PM   Return Visit with RAMU Fagan   [facility] ([facility]    [address] [address] [address] [phone]            Mar 08, 2017  1:00 PM   Return Visit with Nahed Reyes DO   [facility] ([facility])    [address] [address] [address] [phone]                  Controlled substance agreement on file: No.     Processing:  ?   checked in past 6 months?  No, route to RN

## 2017-02-07 NOTE — TELEPHONE ENCOUNTER
Received call from patient requesting refill(s) of tapentadol (NUCYNTA) 50 MG TABS tablet    Last picked up from pharmacy on 1/13/17    Pt last seen by prescribing provider on 2/6/16  Next appt scheduled for 3/8/16    Last urine drug screen date 2/6/16  Current opioid agreement on file (completed within the last year) No Date of opioid agreement: none on file    Processing (pick one and delete the others):    Consuelo Jason MA  [facility]    Will route to nursing pool for review and preparation of prescription(s).

## 2017-02-08 ENCOUNTER — CARE COORDINATION (OUTPATIENT)
Dept: CARE COORDINATION | Facility: CLINIC | Age: 57
End: 2017-02-08

## 2017-02-08 RX ORDER — ZOLPIDEM TARTRATE 5 MG/1
5-10 TABLET ORAL
Qty: 60 TABLET | Refills: 0 | OUTPATIENT
Start: 2017-02-08

## 2017-02-08 NOTE — PROGRESS NOTES
Clinic Care Coordination Contact  [facility]/Voicemail    Referral Source: Self-patient/Caregiver  Clinical Data: Care Coordinator Outreach  Outreach attempted x 1.  Left message on voicemail with call back information and requested return call.  Plan:  Care Coordinator will try to reach patient again in 3-5 business days.    [facility]  Social Work Care Coordinator  [facility]  Ph: [phone]

## 2017-02-08 NOTE — TELEPHONE ENCOUNTER
Please see previous requests for ambien and lorazepam. I am not comfortable prescribing both.  Along with opiates.

## 2017-02-09 LAB — PAIN DRUG SCR UR W RPTD MEDS: NORMAL

## 2017-02-09 NOTE — TELEPHONE ENCOUNTER
PT is going to pain clinic now and being prescribed Nucynta  I did reach pt by phone. She does not look at MyChart regularly .   Pt notified that Dr Collado will not be refilling ambien at this time    Elodia Pleitez, RN, BSN

## 2017-02-13 ENCOUNTER — TELEPHONE (OUTPATIENT)
Dept: PALLIATIVE MEDICINE | Facility: CLINIC | Age: 57
End: 2017-02-13

## 2017-02-13 NOTE — TELEPHONE ENCOUNTER
Patient contacted by Kira Horton RN. Advised to  #84 per below.   Britney WEIN-RN Care Coordinator  [facility]

## 2017-02-13 NOTE — TELEPHONE ENCOUNTER
Pt LM at 1425:    Pt states that the [facility] pharmacy only has #84 tabs of Nucynta. She is wondering if we would provide a partial supply or what she should do. States that she is out of the medication. Rx ran out on 2/11. She will wait until she hears from us before filling the medication. [phone].   -----  Will route to nurse [place].     ERIBERTO AmezcuaN, RN-BC  Patient Care Supervisor/Care Coordinator  [facility] [place]

## 2017-02-13 NOTE — TELEPHONE ENCOUNTER
Called patient and LM to advise to  the 84 tablets and have the pharmacy contact her once the remaining of her supply arrives. Pharmacy will have to order remaining for her. Called pharmacy they will fill the #84, thus making this a 28 day supply. Med note added to reflect actually quantity available to patient, they will contact patient to advise that she can  medication.     Britney WEIN-RN Care Coordinator  [facility]

## 2017-02-21 NOTE — TELEPHONE ENCOUNTER
Detailed message left on pt's personally identified phone that she must make appt for refills. Please call back and do so , if questions ask for triage nurse    This encounter will be closed    .Elodia Pleitez RN, BSN     .

## 2017-03-08 ENCOUNTER — OFFICE VISIT (OUTPATIENT)
Dept: PALLIATIVE MEDICINE | Facility: CLINIC | Age: 57
End: 2017-03-08
Payer: COMMERCIAL

## 2017-03-08 VITALS
DIASTOLIC BLOOD PRESSURE: 87 MMHG | OXYGEN SATURATION: 98 % | SYSTOLIC BLOOD PRESSURE: 136 MMHG | BODY MASS INDEX: 32.34 KG/M2 | WEIGHT: 219 LBS | HEART RATE: 86 BPM

## 2017-03-08 DIAGNOSIS — G44.229 CHRONIC TENSION-TYPE HEADACHE, NOT INTRACTABLE: Primary | ICD-10-CM

## 2017-03-08 DIAGNOSIS — M54.12 CERVICAL RADICULOPATHY: ICD-10-CM

## 2017-03-08 DIAGNOSIS — G89.4 CHRONIC PAIN SYNDROME: Primary | ICD-10-CM

## 2017-03-08 DIAGNOSIS — G89.4 CHRONIC PAIN SYNDROME: ICD-10-CM

## 2017-03-08 PROCEDURE — 97530 THERAPEUTIC ACTIVITIES: CPT | Mod: GP | Performed by: PHYSICAL THERAPIST

## 2017-03-08 PROCEDURE — G8979 MOBILITY GOAL STATUS: HCPCS | Mod: CJ | Performed by: PHYSICAL THERAPIST

## 2017-03-08 PROCEDURE — 99214 OFFICE O/P EST MOD 30 MIN: CPT | Performed by: PHYSICAL MEDICINE & REHABILITATION

## 2017-03-08 PROCEDURE — G8978 MOBILITY CURRENT STATUS: HCPCS | Mod: CL | Performed by: PHYSICAL THERAPIST

## 2017-03-08 PROCEDURE — 97162 PT EVAL MOD COMPLEX 30 MIN: CPT | Mod: GP | Performed by: PHYSICAL THERAPIST

## 2017-03-08 RX ORDER — TOPIRAMATE 25 MG/1
TABLET, FILM COATED ORAL
Qty: 70 TABLET | Refills: 0 | Status: SHIPPED
Start: 2017-03-08 | End: 2017-04-11 | Stop reason: DRUGHIGH

## 2017-03-08 ASSESSMENT — PAIN SCALES - GENERAL: PAINLEVEL: SEVERE PAIN (7)

## 2017-03-08 NOTE — PROGRESS NOTES
[facility]    Date of visit: 3/8/2017    Chief complaint:   Chief Complaint   Patient presents with     Pain     follow up       Interval history:  Ilsa Yusuf is a 56 year old female last seen by me on 2/6/17.    Since her last visit, Ilsa Yusuf reports:  -Having pain in both arms in her biceps and on the right side into her right thumb. This new pain started about a week ago after lifting 10 lbs of ceramic tile awkwardly. She noticed burning pain immediately after this. Now the pain feels more sharp. She denies any new weakness, numbness, or tingling.   -Aggravated by laying on her right side and staying in any one position for too long. Relieved by changing positions, guided imagery, and pain medication.    -Taking nucynta and tizanidine, which brings the pain down to about 2-3/10. The pain gets up to about 7-8/10.  -She has PT today with Renee. She is also interested in scheduling with Dr. Humphries.   -She reports continuing home guided imagery therapy using a CD she has and home massage. She is also trying to pace her physical activities on her own to help with pain.    THE 4 A's OF OPIOID MAINTENANCE ANALGESIA    Analgesia: bring pain down 4-5 points    Activity: volunteering for [employer]    Adverse effects: none    Adherence to Rx protocol: good      Pain scores:  Pain intensity on average is 6 on a scale of 0-10.     Current pain treatments:   Nucynta 50 mg, 2-3 tablets/day, max #3/day, beneficial  Tizanidine 8 mg qhs  Ativan 2-3 times per week  Doxepin 10-20 mg qhs  cymbalta 120 mg daily  zyprexa 2.5 mg qhs    Side Effects: no side effect    Past pain treatments:  Ilsa Yusuf has not been seen at a pain clinic in the past.     Medications:                NSAIDs: ibuprofen -some relief, medrol dose юлия -helped while taking it.              Anticonvulsants: gabapentin -horrible nightmares                Opioids: tramadol  -provided incomplete relief; norco 5-325 mg- beneficial  PT: [facility] completed in Oct 2016 -helped  Psychology: guided imagery  Acupuncture: no  Chiropractic care: no  TENS Unit: no  Injections: cervical epidural steroid injection 9/20/16 -flared up her pain for about 3 days and did not get significant relief  Surgery: no cervical surgery, left knee meniscus surgery in 2012    Medications:  Current Outpatient Prescriptions   Medication Sig Dispense Refill     tapentadol (NUCYNTA) 50 MG TABS tablet Take 1 tablet (50 mg) by mouth every 6 hours as needed for moderate to severe pain maximum 3 tablet(s) per day. OK to dispense on/after 2/10/17 to start 2/12/17. 90 tablet 0     tiZANidine (ZANAFLEX) 4 MG tablet Take 1-2 tablets (4-8 mg) by mouth 3 times daily as needed for muscle spasms 60 tablet 5     omeprazole (PRILOSEC) 40 MG capsule Take 1 capsule (40 mg) by mouth daily Take 30-60 minutes before a meal. 90 capsule 2     pregabalin (LYRICA) 25 MG capsule Take 1 capsule (25 mg) by mouth 3 times daily 90 capsule 0     zolpidem (AMBIEN) 5 MG tablet Take 1-2 tablets (5-10 mg) by mouth nightly as needed for sleep 60 tablet 0     guaiFENesin-codeine (ROBITUSSIN AC) 100-10 MG/5ML SOLN solution Take 5-10 mLs by mouth every 6 hours as needed for cough 120 mL 0     ipratropium - albuterol 0.5 mg/2.5 mg/3 mL (DUONEB) 0.5-2.5 (3) MG/3ML neb solution Take 1 vial (3 mLs) by nebulization every 6 hours as needed for shortness of breath / dyspnea or wheezing 30 vial 1     albuterol (PROAIR HFA/PROVENTIL HFA/VENTOLIN HFA) 108 (90 BASE) MCG/ACT Inhaler Inhale 2 puffs into the lungs every 6 hours as needed for shortness of breath / dyspnea or wheezing 1 Inhaler 1     guaiFENesin-dextromethorphan (ROBITUSSIN DM) 100-10 MG/5ML syrup Take 5-10 mLs by mouth 4 times daily as needed for cough 560 mL 0     buprenorphine (BUTRANS) 5 MCG/HR WK patch Place 1 patch onto the skin every 7 days 4 patch 0     levothyroxine  (SYNTHROID/LEVOTHROID) 150 MCG tablet Take 1 tablet (150 mcg) by mouth every other day 45 tablet 3     levothyroxine (SYNTHROID/LEVOTHROID) 175 MCG tablet Take 1 tablet (175 mcg) by mouth every other day 45 tablet 3     HYDROcodone-acetaminophen (NORCO) 7.5-325 MG per tablet TK 1 T PO Q 8 H  0     HYDROcodone-acetaminophen (NORCO) 5-325 MG per tablet Take 1-2 tablets by mouth every 6 hours as needed for moderate to severe pain maximum 8 tablet(s) per day 50 tablet 0     LORazepam (ATIVAN) 0.5 MG tablet Take 2 po 90 minutes prior to MRI and may repeat x 1 30 minutes prior to MRI 6 tablet 0     traMADol (ULTRAM) 50 MG tablet Take 1-2 tablets ( mg) by mouth every 6 hours as needed for pain 20 tablet 0     lovastatin (MEVACOR) 20 MG tablet Take 1 tablet (20 mg) by mouth At Bedtime 90 tablet 3     doxepin (SINEQUAN) 10 MG capsule Take 1-2 caps at bedtime 60 capsule 11     DULoxetine (CYMBALTA) 60 MG capsule Take 2 capsules (120 mg) by mouth daily 60 capsule 5     prazosin (MINIPRESS) 5 MG capsule Take 1 capsule (5 mg) by mouth At Bedtime Take 1 capsule at bedtime 30 capsule 5     FIBER PO Two capsules in the morning and two capsules at night       LORazepam (ATIVAN) 0.5 MG tablet Take 1 tablet (0.5 mg) by mouth nightly as needed for anxiety 30 tablets to last 30 days. 30 tablet 2     ondansetron (ZOFRAN) 4 MG tablet Take 1 tablet (4 mg) by mouth every 6 hours as needed for nausea 18 tablet 5     OLANZapine (ZYPREXA) 2.5 MG tablet Take 1 tablet (2.5 mg) by mouth At Bedtime 30 tablet 5     fluticasone (FLONASE) 50 MCG/ACT nasal spray Spray 1-2 sprays into both nostrils daily 16 g 11     NONFORMULARY Take 20 mg by mouth 2 times daily D-amphetamine Salt combo       cholecalciferol (VITAMIN D3) 5000 UNITS CAPS capsule Take 1 capsule (5,000 Units) by mouth daily Take 1 per day 100 capsule 2     multivitamin, therapeutic with minerals (MULTI-VITAMIN) TABS Take 1 tablet by mouth daily       fish oil-omega-3 fatty acids  (OMEGA 3) 1000 MG capsule Take 2 g by mouth daily Takes 1 in am and 1 at bedtime         Medical History: any changes in medical history since they were last seen? No    Review of Systems:  The 14 system ROS was reviewed from the intake questionnaire, and is positive for: headache, thyroid disease, joint pain, arthritis, neck pain  Any bowel or bladder problems: no  Mood: stable    Physical Exam:  Weight 99.3 kg (219 lb), not currently breastfeeding.  General: no acute distress  Gait: Normal  MSK exam: Cervical spine normal range of motion with discomfort at end range, bilateral upper extremity strength and sensation intact    Imaging:  Cervical MRI completed on 8/27/16 showed:  Findings:  The cervical vertebrae appear normally aligned.  There is disc height  narrowing at C5-C6 with degeneration of the disc and sclerosis of  adjacent endplates.  There is normal signal within and normal contour  of the cervical spinal cord. No significant abnormal bone marrow  change. The findings on a level by level basis are as follows:     C2-3: No spinal canal or neural foraminal narrowing.     C3-4:  No spinal canal or neural foraminal narrowing.     C4-5:  No spinal canal or neural foraminal narrowing.     C5-6:  Posterior disc bulge with uncovertebral joint hypertrophy and  osteophyte formations. Superimposing left central disc herniation with  elevation of the posterior longitudinal ligament Mild canal narrowing.  Herniated disc effaces the left anterolateral subarachnoid space and  possibly abutting the left C6 in the left portion of the spinal canal.  Mild canal narrowing. Bilateral mild to moderate foraminal narrowing  more significant on the left.     C6-7:  Disc bulge with mild uncovertebral hypertrophy. No significant  spinal canal or neural foraminal narrowing.     C7-T1:  No spinal canal or neural foraminal narrowing.      No abnormality of the paraspinous soft  tissues.                                                                       Impression:       1. Disc bulge with uncovertebral joint hypertrophy and osteophyte  formations at C5-6. Superimposing left central disc herniation with  elevation of the posterior longitudinal ligament.Herniated disc  effaces the left anterolateral subarachnoid space and possibly  abutting the left C6 in the left portion of the spinal canal. Mild  canal narrowing. Bilateral mild to moderate foraminal narrowing more  significant on the left.  2. Disc bulge at C6-7 with no significant compression.    [place] Board of Pharmacy Data Base Reviewed: YES; as expected, no concern for abuse or misuse    Assessment:   1. Neck pain, worse on the left, started in Aug 2016, with associated headaches. Disc bulge at C5-C6 and C6-C7. Bilateral foraminal narrowing at C5-C6. -flared up after lifting incident  2. Bilateral knee pain, right worse than left. History of left mensicus surgery in 2012 and has a torn meniscus in her right knee  3. Depression and PTSD -weekly counseling    Plan:  1. Physical Therapy: follow up with Renee.    2. Clinical Health Psychologist to address issues of relaxation, behavioral change, coping style, and other factors important to improvement: Schedule an evaluation with Dr. Humphries.  3. Self Care Recommendations: continue guided imagery  4. Medication Management:  UDS and opioid agreement on 2/6/17  1. Continue nucynta 50 mg max #3/day. Call one week prior to needing a refill.  2. Start topiramate 25 mg at bedtime for 1 week, then increase to 25 mg twice a day for a week, then increase to 25 mg in the morning and 50 mg at bedtime, then increase to 50 mg twice a day.   5. Further procedures recommended: could consider cervical epidural steroid injection    6. Follow up: with Dr. Reyes in clinic in 1 month.    Total time spent was 30 minutes, and more than 50% of face to face time was spent in counseling and/or coordination of  care regarding the above assessment and plan.    Nahed Reyes DO  [facility]  [facility]

## 2017-03-08 NOTE — PROGRESS NOTES
PHYSICAL THERAPY MEDICARE INITIAL EVALUATION and PLAN OF CARE    Patient Name: Ilsa Yusuf     : 1960    MRN: [id number]   Pain Management Provider:  Nahed Reyes DO  Diagnosis: Chronic pain syndrome    SUBJECTIVE:    PRESENTATION AND ETIOLOGY    Chief Complaint: Pt presents to PT with primary complaints of neck pain and radiating right arm pain.  Pt reports sudden onset of neck pain in Aug 2016 when she just woke up with pain.  She also reports daily headache pain if not taking medication.  Symptoms have worsened this past week after lifting a heavy box of ceramic tile at work.    Onset / Etiology: Aug 2016 sudden onset    Pattern Since Onset: Worsened this past week, pain spreading to bilateral arms, burning pain in right biceps    Pain is described as sharp, stabbing, miserable, exhausting, penetrating, worse and at night      Frequency: Constant      Intensity: Best 4/10, Worst 8/10;  Current 7/10 ; Average 6/10    Fatigue Level: (scale 0-10)  5-6/10 average    LEVEL OF FUNCTION AT START OF CARE    Walking tolerance: 10 minutes   Sitting tolerance: 15 minutes   Standing tolerance: 15 minutes  Housework tolerance: 15 minutes; pushes through for one hour  Sleep: delayed onset; wakes 2-3x/night  Current Aggrevating Activities / Functional Limitations: housework, laundry, vacuuming, cooking, turing her head      CURRENT / PREVIOUS INTERVENTION(S):     Relieving Activities / Self Care: Physical therapy, traction, guided imagery, changing positions    Previous / Current therapies for current chief complaint: PT: [facility] completed in Oct 2016 helpful    Prior Functional Level: No functional limitations prior to onset of chief complaint.       DEMOGRAPHICS  Employment Status: volunteers at [employer]    Social Support: lives with partner    Pertinent Medical  / Surgical History: Epic Snapshot Reviewed, See provider's note. PMH including depression and PTSD diagnosed in , left  knee meniscus surgery 2012    Patient's goals for physical therapy: next    ===============================================================  OBJECTIVE:  POSTURE:  Observation: Pt appears anxious and guarded although pleasant.  Posture is rigid with limited mobility head and neck    GAIT, LOCOMOTION, and BALANCE:  Gait and Locomotion: slow, antalgic due to knee pain  Balance: deferred    RANGE OF MOTION:   Cervical: AROM WFL all directions; increased right arm pain with left rotation  Lumbar: next  Shoulders: unable to reach right arm overhead due to increased neck and radiating right biceps pain  Hips: next    MUSCLE PERFORMANCE:   Strength: Demonstrates core instability    Flexibility:  next    FUNCTIONAL TESTING/OBSERVATION: demonstrates independent transitional movements with guarding of head and neck    Pain behaviors: None    SPECIAL TEST(S): positive dural tension right upper extremity    ===============================================================  Today's Treatment:  Initial evaluation  Therapeutic Activity:   For 30 minutes including Pt educated on the concept of the nervous system as a hypersensitive and hyperactive alarm system and the role of physical therapy in desensitizing the nerves and reducing pain.  Review of HEP which patient had difficulty remembering all of the exercises.  Instructed in body mechanics to avoid reaching with arms while vacuuming.  Focus next session:  Instruction in TENS, Self cares, HEP  ===============================================================  ASSESSMENT:  Physical Therapy Diagnosis:Impaired Posture and Impaired Muscle Performance    Patient requires PT intervention for the following impairments: Pain, ROM limitations and Deconditioning    Anticipated Goals and Expected Outcomes:   8 weeks  Patient will report the use of 2 self care practices during their day.  Patient will report the participation in 20 minutes of aerobic activity daily and practicing stretching  daily.  Patient will demonstrate the ability to find core strength in neutral posture.  Patient will demonstrate the ability to relax muscle group before stretching.    16 weeks  Patient will be independent with a home exercise program.  Patient will be independent with posture correction.  Patient will report independence with a self care/flare management program.  Patient will demonstrate improved functional strength and endurance as reports by increased tolerance for IADLs and more consistent participation in daily activity.       Rehab potential for achieving goals: good.    ===============================================================  PLAN:   Patient will benefit from skilled physical therapy consisting of: neuromuscular reeducation of: kinesthetic sense and posture for sitting and/or standing activities, education in self care / home management training to include instruction in: symptom control techniques, therapeutic activities to achieve improved functional performance in: daily actvities and therapeutic exercise to develop: strength and endurance, flexibility and core stability    Assessment will be ongoing with changes in treatment as indicated.  Benefits/risks/alternatives to treatment have been reviewed and the patient has been instructed to contact this office if they have any questions or concerns.  This plan of care has been discussed with the patient and the patient is in agreement.     Frequency / Duration:  Patient will be seen for a total of 13 visits; 16 weeks    Total Visit Time: 45  minutes            Renee Cox, PT                                      Date:  3/8/2017    G Code 1/10  Mobility Status  G 8978 Current Status CL 60-7 9% impaired  G 8979 Goal Status CJ 20-3 9% impaired    G Codes established based on subjective and objective measures.    =====================================================  **  Referring Provider Certification: Referring Provider reviewing certifies that  the above treatment / plan of care is required and authorized, and that the patient's plan will be reviewed every thirty (30) days **.   ======================================================     PRESENT:  NA    MULTIDISCIPLINARY PATIENT / FAMILY EDUCATION RECORD  Department:  Physical Therapy    Readiness to Learn: Ability to understand verbal instructions, Ability to understand written instructions, Knowledge of educational needs / treatment plan  Specific Barriers to Learning: None  Referrals: None  Learning Needs: Rehabilitation techniques to improve functional independence Pain management education to improve daily activity tolerance.  Who: Patient  How: Demonstration, Verbal instructions, Written instructions  Response: Appropriate verbal response, Asked questions, Demonstrated ability, Verbalized recall / understanding

## 2017-03-08 NOTE — PATIENT INSTRUCTIONS
1. Physical Therapy: [facility] issues have resolved   and consider TENS unit.   2. Clinical Health Psychologist to address issues of relaxation, behavioral change, coping style, and other factors important to improvement: Schedule an evaluation with Dr. Humphries.  3. Self Care Recommendations: continue guided imagery  4. Medication Management:  UDS and opioid agreement on 2/6/17  1. Continue nucynta 50 mg max #3/day. Call one week prior to needing a refill.  2. Start topiramate 25 mg at bedtime for 1 week, then increase to 25 mg twice a day for a week, then increase to 25 mg in the morning and 50 mg at bedtime, then increase to 50 mg twice a day.   5. Further procedures recommended: could consider cervical epidural steroid injection    6. Follow up: with Dr. Reyes in clinic in 1 month.    ----------------------------------------------------------------  Nurse Triage line:  [phone]   Call this number with any questions or concerns. You may leave a detailed message anytime. Calls are typically returned Monday through Friday between 8 AM and 4:30 PM. We usually get back to you within 2 business days depending on the issue/request.       Medication refills:    For non-narcotic medications, call your pharmacy directly to request a refill. The pharmacy will contact the [facility] for authorization. Please allow 3-4 days for these refills to be processed.     For narcotic refills, call the nurse triage line or send a [employer] message. Please contact us 7-10 days before your refill is due. The message MUST include the name of the specific medication(s) requested and how you would like to receive the prescription(s). The options are as follows:    Pain Clinic staff can mail the prescription to your pharmacy. Please tell us the name of the pharmacy.    You may pick the prescription up at the Pain Clinic (tell us the location) or during a clinic visit with your pain  provider    Pain Clinic staff can deliver the prescription to the [facility] pharmacy in the clinic building. Please tell us the location.      Scheduling number: [phone].  Call this number to schedule or change appointments.    We believe regular attendance is key to your success in our program.    Any time you are unable to keep your appointment we ask that you call us at least 24 hours in advance to let us know. This will allow us to offer the appointment time to another patient.     '

## 2017-03-08 NOTE — MR AVS SNAPSHOT
After Visit Summary   3/8/2017    Ilsa Yusuf    MRN: [id number]           Patient Information     Date Of Birth          1960        Visit Information        Provider Department      3/8/2017 1:45 PM Renee Cox PT [facility]        Today's Diagnoses     Chronic pain syndrome    -  1       Follow-ups after your visit        Your next 10 appointments already scheduled     Apr 05, 2017 11:00 AM CDT   Return Visit with Nahed Reyes DO   [facility] ([facility])    [address] [address] [address]   [phone]              Who to contact     If you have questions or need follow up information about today's clinic visit or your schedule please contact [facility] directly at [phone].  Normal or non-critical lab and imaging results will be communicated to you by MyChart, letter or phone within 4 business days after the clinic has received the results. If you do not hear from us within 7 days, please contact the clinic through MyChart or phone. If you have a critical or abnormal lab result, we will notify you by phone as soon as possible.  Submit refill requests through [employer] or call your pharmacy and they will forward the refill request to us. Please allow 3 business days for your refill to be completed.          Additional Information About Your Visit        MyChart Information     [employer] gives you secure access to your electronic health record. If you see a primary care provider, you can also send messages to your care team and make appointments. If you have questions, please call your primary care clinic.  If you do not have a primary care provider, please call [phone] and they will assist you.        Care EveryWhere ID     This is your Care EveryWhere ID. This could be used by other organizations to access your [facility] medical records  [id number]         Blood Pressure  from Last 3 Encounters:   03/08/17 136/87   02/06/17 122/75   01/06/17 113/56    Weight from Last 3 Encounters:   03/08/17 99.3 kg (219 lb)   02/06/17 99.3 kg (219 lb)   01/06/17 99.3 kg (219 lb)              We Performed the Following     C MOBILITY CURRENT STATUS     C MOBILITY GOAL STATUS     HC PT EVAL, MODERATE COMPLEXITY     THERAPEUTIC ACTIVITIES          Today's Medication Changes          These changes are accurate as of: 3/8/17  3:28 PM.  If you have any questions, ask your nurse or doctor.               Start taking these medicines.        Dose/Directions    topiramate 25 MG tablet   Commonly known as:  TOPAMAX   Used for:  Chronic tension-type headache, not intractable, Chronic pain syndrome   Started by:  Nahed Reyes DO        Take 1 tablet (25 mg) at bedtime for 1 week, then 1 tablet twice daily for 1 week, then 1 tablet in AM and 2 in PM for 1 week, then 2 tablets twice daily.   Quantity:  70 tablet   Refills:  0         These medicines have changed or have updated prescriptions.        Dose/Directions    tapentadol 50 MG Tabs tablet   Commonly known as:  NUCYNTA   This may have changed:  additional instructions   Used for:  Chronic pain syndrome   Changed by:  Nahed Reyes DO        Dose:  50 mg   Start taking on:  3/14/2017   Take 1 tablet (50 mg) by mouth every 6 hours as needed for moderate to severe pain maximum 3 tablet(s) per day. Ok to fill on or after 3/12/17 to start on or after 3/14/17.   Quantity:  90 tablet   Refills:  0            Where to get your medicines      These medications were sent to [facility] [address] [address] [address]     Phone:  [phone]     tiZANidine 4 MG tablet    topiramate 25 MG tablet         Some of these will need a paper prescription and others can be bought over the counter.  Ask your nurse if you have questions.     Bring a paper prescription for each of these medications      tapentadol 50 MG Tabs tablet                Primary Care Provider Office Phone # Fax #    Catrachita Collado [phone]       [facility] [address]  [place] [address]        Thank you!     Thank you for choosing [facility]  for your care. Our goal is always to provide you with excellent care. Hearing back from our patients is one way we can continue to improve our services. Please take a few minutes to complete the written survey that you may receive in the mail after your visit with us. Thank you!             Your Updated Medication List - Protect others around you: Learn how to safely use, store and throw away your medicines at www.disposemymeds.org.          This list is accurate as of: 3/8/17  3:28 PM.  Always use your most recent med list.                   Brand Name Dispense Instructions for use    albuterol 108 (90 BASE) MCG/ACT Inhaler    PROAIR HFA/PROVENTIL HFA/VENTOLIN HFA    1 Inhaler    Inhale 2 puffs into the lungs every 6 hours as needed for shortness of breath / dyspnea or wheezing       buprenorphine 5 MCG/HR WK patch    BUTRANS    4 patch    Place 1 patch onto the skin every 7 days       cholecalciferol 5000 UNITS Caps capsule    vitamin D3    100 capsule    Take 1 capsule (5,000 Units) by mouth daily Take 1 per day       doxepin 10 MG capsule    SINEquan    60 capsule    Take 1-2 caps at bedtime       DULoxetine 60 MG EC capsule    CYMBALTA    60 capsule    Take 2 capsules (120 mg) by mouth daily       FIBER PO      Two capsules in the morning and two capsules at night       fish oil-omega-3 fatty acids 1000 MG capsule      Take 2 g by mouth daily Takes 1 in am and 1 at bedtime       fluticasone 50 MCG/ACT spray    FLONASE    16 g    Spray 1-2 sprays into both nostrils daily       guaiFENesin-codeine 100-10 MG/5ML Soln solution    ROBITUSSIN AC    120 mL    Take 5-10 mLs by mouth every 6 hours as needed for cough        guaiFENesin-dextromethorphan 100-10 MG/5ML syrup    ROBITUSSIN DM    560 mL    Take 5-10 mLs by mouth 4 times daily as needed for cough       * HYDROcodone-acetaminophen 7.5-325 MG per tablet    NORCO     Reported on 3/8/2017       * HYDROcodone-acetaminophen 5-325 MG per tablet    NORCO    50 tablet    Take 1-2 tablets by mouth every 6 hours as needed for moderate to severe pain maximum 8 tablet(s) per day       ipratropium - albuterol 0.5 mg/2.5 mg/3 mL 0.5-2.5 (3) MG/3ML neb solution    DUONEB    30 vial    Take 1 vial (3 mLs) by nebulization every 6 hours as needed for shortness of breath / dyspnea or wheezing       * levothyroxine 150 MCG tablet    SYNTHROID/LEVOTHROID    45 tablet    Take 1 tablet (150 mcg) by mouth every other day       * levothyroxine 175 MCG tablet    SYNTHROID/LEVOTHROID    45 tablet    Take 1 tablet (175 mcg) by mouth every other day       * LORazepam 0.5 MG tablet    ATIVAN    30 tablet    Take 1 tablet (0.5 mg) by mouth nightly as needed for anxiety 30 tablets to last 30 days.       * LORazepam 0.5 MG tablet    ATIVAN    6 tablet    Take 2 po 90 minutes prior to MRI and may repeat x 1 30 minutes prior to MRI       lovastatin 20 MG tablet    MEVACOR    90 tablet    Take 1 tablet (20 mg) by mouth At Bedtime       Multi-vitamin Tabs tablet      Take 1 tablet by mouth daily       NONFORMULARY      Take 20 mg by mouth 2 times daily D-amphetamine Salt combo       OLANZapine 2.5 MG tablet    zyPREXA    30 tablet    Take 1 tablet (2.5 mg) by mouth At Bedtime       omeprazole 40 MG capsule    priLOSEC    90 capsule    Take 1 capsule (40 mg) by mouth daily Take 30-60 minutes before a meal.       ondansetron 4 MG tablet    ZOFRAN    18 tablet    Take 1 tablet (4 mg) by mouth every 6 hours as needed for nausea       prazosin 5 MG capsule    MINIPRESS    30 capsule    Take 1 capsule (5 mg) by mouth At Bedtime Take 1 capsule at bedtime       pregabalin 25 MG capsule    LYRICA    90 capsule    Take 1  capsule (25 mg) by mouth 3 times daily       tapentadol 50 MG Tabs tablet   Start taking on:  3/14/2017    NUCYNTA    90 tablet    Take 1 tablet (50 mg) by mouth every 6 hours as needed for moderate to severe pain maximum 3 tablet(s) per day. Ok to fill on or after 3/12/17 to start on or after 3/14/17.       tiZANidine 4 MG tablet    ZANAFLEX    180 tablet    Take 1-2 tablets (4-8 mg) by mouth 3 times daily as needed for muscle spasms       topiramate 25 MG tablet    TOPAMAX    70 tablet    Take 1 tablet (25 mg) at bedtime for 1 week, then 1 tablet twice daily for 1 week, then 1 tablet in AM and 2 in PM for 1 week, then 2 tablets twice daily.       traMADol 50 MG tablet    ULTRAM    20 tablet    Take 1-2 tablets ( mg) by mouth every 6 hours as needed for pain       zolpidem 5 MG tablet    AMBIEN    60 tablet    Take 1-2 tablets (5-10 mg) by mouth nightly as needed for sleep       * Notice:  This list has 6 medication(s) that are the same as other medications prescribed for you. Read the directions carefully, and ask your doctor or other care provider to review them with you.

## 2017-03-08 NOTE — MR AVS SNAPSHOT
After Visit Summary   3/8/2017    Ilsa Yusuf    MRN: [id number]           Patient Information     Date Of Birth          1960        Visit Information        Provider Department      3/8/2017 1:00 PM Nahed Reyes DO [facility]        Today's Diagnoses     Chronic tension-type headache, not intractable    -  1    Chronic pain syndrome          Care Instructions    1. Physical Therapy: [facility]   and consider TENS unit.   2. Clinical Health Psychologist to address issues of relaxation, behavioral change, coping style, and other factors important to improvement: Schedule an evaluation with Dr. Humphries.  3. Self Care Recommendations: continue guided imagery  4. Medication Management:  UDS and opioid agreement on 2/6/17  1. Continue nucynta 50 mg max #3/day. Call one week prior to needing a refill.  2. Start topiramate 25 mg at bedtime for 1 week, then increase to 25 mg twice a day for a week, then increase to 25 mg in the morning and 50 mg at bedtime, then increase to 50 mg twice a day.   5. Further procedures recommended: could consider cervical epidural steroid injection    6. Follow up: with Dr. Reyes in clinic in 1 month.    ----------------------------------------------------------------  Nurse Triage line:  [phone]   Call this number with any questions or concerns. You may leave a detailed message anytime. Calls are typically returned Monday through Friday between 8 AM and 4:30 PM. We usually get back to you within 2 business days depending on the issue/request.       Medication refills:    For non-narcotic medications, call your pharmacy directly to request a refill. The pharmacy will contact the [facility] for authorization. Please allow 3-4 days for these refills to be processed.     For narcotic refills, call the nurse triage line or send a [employer] message. Please contact us 7-10 days  before your refill is due. The message MUST include the name of the specific medication(s) requested and how you would like to receive the prescription(s). The options are as follows:    Pain Clinic staff can mail the prescription to your pharmacy. Please tell us the name of the pharmacy.    You may pick the prescription up at the Pain Clinic (tell us the location) or during a clinic visit with your pain provider    Pain Clinic staff can deliver the prescription to the [facility] pharmacy in the clinic building. Please tell us the location.      Scheduling number: [phone].  Call this number to schedule or change appointments.    We believe regular attendance is key to your success in our program.    Any time you are unable to keep your appointment we ask that you call us at least 24 hours in advance to let us know. This will allow us to offer the appointment time to another patient.     '        Follow-ups after your visit        Your next 10 appointments already scheduled     Mar 08, 2017  1:45 PM CST   New Visit with Renee Cox PT   [facility] ([facility])    [address] [address]   [phone]              Who to contact     If you have questions or need follow up information about today's clinic visit or your schedule please contact [facility] directly at [phone].  Normal or non-critical lab and imaging results will be communicated to you by MyChart, letter or phone within 4 business days after the clinic has received the results. If you do not hear from us within 7 days, please contact the clinic through MyChart or phone. If you have a critical or abnormal lab result, we will notify you by phone as soon as possible.  Submit refill requests through [employer] or call your pharmacy and they will forward the refill request to us. Please allow 3 business days for your refill to be completed.          Additional  Information About Your Visit        [facility] Information     [employer] gives you secure access to your electronic health record. If you see a primary care provider, you can also send messages to your care team and make appointments. If you have questions, please call your primary care clinic.  If you do not have a primary care provider, please call [phone] and they will assist you.        Care EveryWhere ID     This is your Care EveryWhere ID. This could be used by other organizations to access your [facility] medical records  [id number]        Your Vitals Were     Pulse Pulse Oximetry BMI (Body Mass Index)             86 98% 32.34 kg/m2          Blood Pressure from Last 3 Encounters:   03/08/17 136/87   02/06/17 122/75   01/06/17 113/56    Weight from Last 3 Encounters:   03/08/17 99.3 kg (219 lb)   02/06/17 99.3 kg (219 lb)   01/06/17 99.3 kg (219 lb)              Today, you had the following     No orders found for display         Today's Medication Changes          These changes are accurate as of: 3/8/17  1:31 PM.  If you have any questions, ask your nurse or doctor.               Start taking these medicines.        Dose/Directions    topiramate 25 MG tablet   Commonly known as:  TOPAMAX   Used for:  Chronic tension-type headache, not intractable, Chronic pain syndrome   Started by:  Nahed Reyes, DO        Take 1 tablet (25 mg) at bedtime for 1 week, then 1 tablet twice daily for 1 week, then 1 tablet in AM and 2 in PM for 1 week, then 2 tablets twice daily.   Quantity:  70 tablet   Refills:  0            Where to get your medicines      These medications were sent to [facility] [address] [id number] [facility] [address]     Phone:  [phone]     topiramate 25 MG tablet                Primary Care Provider Office Phone # Fax #    Catrachita Collado [phone]       [facility] [address] [place]  [place] [address]        Thank you!     Thank you for choosing [facility]  for your care. Our goal is always to provide you with excellent care. Hearing back from our patients is one way we can continue to improve our services. Please take a few minutes to complete the written survey that you may receive in the mail after your visit with us. Thank you!             Your Updated Medication List - Protect others around you: Learn how to safely use, store and throw away your medicines at www.disposemymeds.org.          This list is accurate as of: 3/8/17  1:31 PM.  Always use your most recent med list.                   Brand Name Dispense Instructions for use    albuterol 108 (90 BASE) MCG/ACT Inhaler    PROAIR HFA/PROVENTIL HFA/VENTOLIN HFA    1 Inhaler    Inhale 2 puffs into the lungs every 6 hours as needed for shortness of breath / dyspnea or wheezing       buprenorphine 5 MCG/HR WK patch    BUTRANS    4 patch    Place 1 patch onto the skin every 7 days       cholecalciferol 5000 UNITS Caps capsule    vitamin D3    100 capsule    Take 1 capsule (5,000 Units) by mouth daily Take 1 per day       doxepin 10 MG capsule    SINEquan    60 capsule    Take 1-2 caps at bedtime       DULoxetine 60 MG EC capsule    CYMBALTA    60 capsule    Take 2 capsules (120 mg) by mouth daily       FIBER PO      Two capsules in the morning and two capsules at night       fish oil-omega-3 fatty acids 1000 MG capsule      Take 2 g by mouth daily Takes 1 in am and 1 at bedtime       fluticasone 50 MCG/ACT spray    FLONASE    16 g    Spray 1-2 sprays into both nostrils daily       guaiFENesin-codeine 100-10 MG/5ML Soln solution    ROBITUSSIN AC    120 mL    Take 5-10 mLs by mouth every 6 hours as needed for cough       guaiFENesin-dextromethorphan 100-10 MG/5ML syrup    ROBITUSSIN DM    560 mL    Take 5-10 mLs by mouth 4 times daily as needed for cough       * HYDROcodone-acetaminophen 7.5-325 MG per tablet    JEANNE     Reported on  3/8/2017       * HYDROcodone-acetaminophen 5-325 MG per tablet    NORCO    50 tablet    Take 1-2 tablets by mouth every 6 hours as needed for moderate to severe pain maximum 8 tablet(s) per day       ipratropium - albuterol 0.5 mg/2.5 mg/3 mL 0.5-2.5 (3) MG/3ML neb solution    DUONEB    30 vial    Take 1 vial (3 mLs) by nebulization every 6 hours as needed for shortness of breath / dyspnea or wheezing       * levothyroxine 150 MCG tablet    SYNTHROID/LEVOTHROID    45 tablet    Take 1 tablet (150 mcg) by mouth every other day       * levothyroxine 175 MCG tablet    SYNTHROID/LEVOTHROID    45 tablet    Take 1 tablet (175 mcg) by mouth every other day       * LORazepam 0.5 MG tablet    ATIVAN    30 tablet    Take 1 tablet (0.5 mg) by mouth nightly as needed for anxiety 30 tablets to last 30 days.       * LORazepam 0.5 MG tablet    ATIVAN    6 tablet    Take 2 po 90 minutes prior to MRI and may repeat x 1 30 minutes prior to MRI       lovastatin 20 MG tablet    MEVACOR    90 tablet    Take 1 tablet (20 mg) by mouth At Bedtime       Multi-vitamin Tabs tablet      Take 1 tablet by mouth daily       NONFORMULARY      Take 20 mg by mouth 2 times daily D-amphetamine Salt combo       OLANZapine 2.5 MG tablet    zyPREXA    30 tablet    Take 1 tablet (2.5 mg) by mouth At Bedtime       omeprazole 40 MG capsule    priLOSEC    90 capsule    Take 1 capsule (40 mg) by mouth daily Take 30-60 minutes before a meal.       ondansetron 4 MG tablet    ZOFRAN    18 tablet    Take 1 tablet (4 mg) by mouth every 6 hours as needed for nausea       prazosin 5 MG capsule    MINIPRESS    30 capsule    Take 1 capsule (5 mg) by mouth At Bedtime Take 1 capsule at bedtime       pregabalin 25 MG capsule    LYRICA    90 capsule    Take 1 capsule (25 mg) by mouth 3 times daily       tapentadol 50 MG Tabs tablet    NUCYNTA    90 tablet    Take 1 tablet (50 mg) by mouth every 6 hours as needed for moderate to severe pain maximum 3 tablet(s) per day. OK  to dispense on/after 2/10/17 to start 2/12/17.       tiZANidine 4 MG tablet    ZANAFLEX    60 tablet    Take 1-2 tablets (4-8 mg) by mouth 3 times daily as needed for muscle spasms       topiramate 25 MG tablet    TOPAMAX    70 tablet    Take 1 tablet (25 mg) at bedtime for 1 week, then 1 tablet twice daily for 1 week, then 1 tablet in AM and 2 in PM for 1 week, then 2 tablets twice daily.       traMADol 50 MG tablet    ULTRAM    20 tablet    Take 1-2 tablets ( mg) by mouth every 6 hours as needed for pain       zolpidem 5 MG tablet    AMBIEN    60 tablet    Take 1-2 tablets (5-10 mg) by mouth nightly as needed for sleep       * Notice:  This list has 6 medication(s) that are the same as other medications prescribed for you. Read the directions carefully, and ask your doctor or other care provider to review them with you.

## 2017-03-10 ENCOUNTER — APPOINTMENT (OUTPATIENT)
Dept: MRI IMAGING | Facility: CLINIC | Age: 57
End: 2017-03-10
Attending: EMERGENCY MEDICINE
Payer: MEDICARE

## 2017-03-10 ENCOUNTER — HOSPITAL ENCOUNTER (EMERGENCY)
Facility: CLINIC | Age: 57
Discharge: HOME | End: 2017-03-10
Attending: EMERGENCY MEDICINE | Admitting: EMERGENCY MEDICINE
Payer: MEDICARE

## 2017-03-10 ENCOUNTER — TELEPHONE (OUTPATIENT)
Dept: PALLIATIVE MEDICINE | Facility: CLINIC | Age: 57
End: 2017-03-10

## 2017-03-10 VITALS
TEMPERATURE: 98.5 F | OXYGEN SATURATION: 96 % | SYSTOLIC BLOOD PRESSURE: 121 MMHG | DIASTOLIC BLOOD PRESSURE: 83 MMHG | RESPIRATION RATE: 16 BRPM | HEART RATE: 71 BPM

## 2017-03-10 DIAGNOSIS — M54.12 CERVICAL RADICULOPATHY: ICD-10-CM

## 2017-03-10 PROCEDURE — 99284 EMERGENCY DEPT VISIT MOD MDM: CPT | Mod: Z6 | Performed by: EMERGENCY MEDICINE

## 2017-03-10 PROCEDURE — 25000128 H RX IP 250 OP 636: Performed by: EMERGENCY MEDICINE

## 2017-03-10 PROCEDURE — 96375 TX/PRO/DX INJ NEW DRUG ADDON: CPT | Performed by: EMERGENCY MEDICINE

## 2017-03-10 PROCEDURE — 96374 THER/PROPH/DIAG INJ IV PUSH: CPT | Performed by: EMERGENCY MEDICINE

## 2017-03-10 PROCEDURE — 72141 MRI NECK SPINE W/O DYE: CPT

## 2017-03-10 PROCEDURE — 25000125 ZZHC RX 250: Performed by: EMERGENCY MEDICINE

## 2017-03-10 PROCEDURE — 25000132 ZZH RX MED GY IP 250 OP 250 PS 637: Mod: GY | Performed by: EMERGENCY MEDICINE

## 2017-03-10 PROCEDURE — 72146 MRI CHEST SPINE W/O DYE: CPT

## 2017-03-10 PROCEDURE — 99285 EMERGENCY DEPT VISIT HI MDM: CPT | Mod: 25 | Performed by: EMERGENCY MEDICINE

## 2017-03-10 PROCEDURE — 72148 MRI LUMBAR SPINE W/O DYE: CPT

## 2017-03-10 PROCEDURE — A9270 NON-COVERED ITEM OR SERVICE: HCPCS | Mod: GY | Performed by: EMERGENCY MEDICINE

## 2017-03-10 RX ORDER — OXYCODONE HYDROCHLORIDE 5 MG/1
5 TABLET ORAL EVERY 6 HOURS PRN
Qty: 8 TABLET | Refills: 0 | Status: SHIPPED | OUTPATIENT
Start: 2017-03-10 | End: 2017-04-05 | Stop reason: ALTCHOICE

## 2017-03-10 RX ORDER — KETAMINE HYDROCHLORIDE 10 MG/ML
0.2 INJECTION, SOLUTION INTRAMUSCULAR; INTRAVENOUS ONCE
Status: COMPLETED | OUTPATIENT
Start: 2017-03-10 | End: 2017-03-10

## 2017-03-10 RX ORDER — KETOROLAC TROMETHAMINE 30 MG/ML
30 INJECTION, SOLUTION INTRAMUSCULAR; INTRAVENOUS ONCE
Status: DISCONTINUED | OUTPATIENT
Start: 2017-03-10 | End: 2017-03-10

## 2017-03-10 RX ORDER — LORAZEPAM 2 MG/ML
1 INJECTION INTRAMUSCULAR
Status: COMPLETED | OUTPATIENT
Start: 2017-03-10 | End: 2017-03-10

## 2017-03-10 RX ORDER — OXYCODONE HYDROCHLORIDE 5 MG/1
5 TABLET ORAL ONCE
Status: COMPLETED | OUTPATIENT
Start: 2017-03-10 | End: 2017-03-10

## 2017-03-10 RX ORDER — CYCLOBENZAPRINE HCL 10 MG
10 TABLET ORAL ONCE
Status: COMPLETED | OUTPATIENT
Start: 2017-03-10 | End: 2017-03-10

## 2017-03-10 RX ADMIN — Medication 20 MG: at 17:57

## 2017-03-10 RX ADMIN — OXYCODONE HYDROCHLORIDE 5 MG: 5 TABLET ORAL at 21:11

## 2017-03-10 RX ADMIN — LORAZEPAM 1 MG: 2 INJECTION INTRAMUSCULAR; INTRAVENOUS at 18:24

## 2017-03-10 RX ADMIN — CYCLOBENZAPRINE HYDROCHLORIDE 10 MG: 10 TABLET, FILM COATED ORAL at 19:24

## 2017-03-10 ASSESSMENT — ENCOUNTER SYMPTOMS
SHORTNESS OF BREATH: 0
DIFFICULTY URINATING: 0
HEADACHES: 0
VOMITING: 0
NECK STIFFNESS: 0
BACK PAIN: 0
ADENOPATHY: 0
BRUISES/BLEEDS EASILY: 0
LIGHT-HEADEDNESS: 0
AGITATION: 0
FEVER: 0
POLYDIPSIA: 0
NUMBNESS: 0
COLOR CHANGE: 0
CHILLS: 0
NAUSEA: 0
ABDOMINAL PAIN: 0
NECK PAIN: 1
WEAKNESS: 0

## 2017-03-10 NOTE — TELEPHONE ENCOUNTER
"She needs to be seen urgently regarding these symptoms -\"asleep and weaker\". They may order an MRI at that time. She needs to be evaluated given these neurologic symptoms.     Nahed Reyes DO  [facility] [place]    "

## 2017-03-10 NOTE — ED AVS SNAPSHOT
[facility], Emergency Department    [address] [address]    Phone:  [phone]                                       Ilsa Yusuf   MRN: [id number]    Department:  [facility], Emergency Department   Date of Visit:  3/10/2017           Patient Information     Date Of Birth          1960        Your diagnoses for this visit were:     Cervical radiculopathy        You were seen by Soraya Dash MD.        Discharge Instructions           Nonsurgical Treatment of Cervical Spine Problems  Cervical spine problems can often be treated without surgery. Choices include rest, medicines, or injections. Your healthcare provider may also suggest physical therapy or certain exercises. These may all help to relieve your symptoms. These treatments are often successful. But if your symptoms don t subside, talk to your healthcare provider. He or she may tell you that surgery is your best choice.  Relieving your symptoms  Your healthcare provider may recommend:    Medicines. These help to reduce pain and inflammation.    Epidural steroid injections. These are injections into the spinal canal near the spinal cord. They may relieve severe pain and reduce inflammation.    Restricting aggravating activities. This can help to give your cervical spine time to heal.    A soft cervical collar. This can help to support your head while keeping your cervical spine aligned.    Traction. This may help relieve pressure on the irritated nerves.  Restoring mobility and strength  Your healthcare provider may recommend that you work with a physical therapist. This can help you regain mobility and strength in your neck. Physical therapy may last for 4 to 8 weeks. It may include:    Exercises to improve your neck s range of motion and strength.    Evaluation and correction of posture and body movements. This can correct problems that affect your cervical spine.    Heat, massage, and traction to help relieve your  symptoms.  Self-care  You ll take an active role in your own therapy. To protect your neck from further injury:    Follow any exercise program given to you by your healthcare provider or physical therapist.    Practice good posture while sitting, standing, or moving.    Have your workspace evaluated. Rearrange it if needed.    When lying down, support your neck. You can use a special cervical pillow or rolled-up towel.     7650-4830 [employer]. [address] [place] [address], [place] [address]. All rights reserved. This information is not intended as a substitute for professional medical care. Always follow your healthcare professional's instructions.      Please make an appointment to follow up with Your Primary Care Provider or Your Pain Specialist in 3 days if you have any concerns. If your symptoms significantly worsen please come back to the emergency department for evaluation.      Discharge References/Attachments     RADICULOPATHY, CERVICAL (ENGLISH)      Future Appointments        Provider Department Dept Phone Center    4/5/2017 11:00 AM Nahed Reyes DO [facility] [phone]  PAIN MGMT      24 Hour Appointment Hotline       To make an appointment at any [facility], call [phone] ([phone]). If you don't have a family doctor or clinic, we will help you find one. [facility] are conveniently located to serve the needs of you and your family.             Review of your medicines      START taking        Dose / Directions Last dose taken    oxyCODONE 5 MG IR tablet   Commonly known as:  ROXICODONE   Dose:  5 mg   Quantity:  8 tablet        Take 1 tablet (5 mg) by mouth every 6 hours as needed for pain   Refills:  0          Our records show that you are taking the medicines listed below. If these are incorrect, please call your family doctor or clinic.        Dose / Directions Last dose taken    albuterol 108 (90 BASE) MCG/ACT Inhaler   Commonly known as:   PROAIR HFA/PROVENTIL HFA/VENTOLIN HFA   Dose:  2 puff   Quantity:  1 Inhaler        Inhale 2 puffs into the lungs every 6 hours as needed for shortness of breath / dyspnea or wheezing   Refills:  1        buprenorphine 5 MCG/HR WK patch   Commonly known as:  BUTRANS   Dose:  1 patch   Quantity:  4 patch        Place 1 patch onto the skin every 7 days   Refills:  0        cholecalciferol 5000 UNITS Caps capsule   Commonly known as:  vitamin D3   Dose:  5000 Units   Quantity:  100 capsule        Take 1 capsule (5,000 Units) by mouth daily Take 1 per day   Refills:  2        doxepin 10 MG capsule   Commonly known as:  SINEquan   Quantity:  60 capsule        Take 1-2 caps at bedtime   Refills:  11        DULoxetine 60 MG EC capsule   Commonly known as:  CYMBALTA   Dose:  120 mg   Quantity:  60 capsule        Take 2 capsules (120 mg) by mouth daily   Refills:  5        FIBER PO        Two capsules in the morning and two capsules at night   Refills:  0        fish oil-omega-3 fatty acids 1000 MG capsule   Dose:  2 g        Take 2 g by mouth daily Takes 1 in am and 1 at bedtime   Refills:  0        fluticasone 50 MCG/ACT spray   Commonly known as:  FLONASE   Dose:  1-2 spray   Quantity:  16 g        Spray 1-2 sprays into both nostrils daily   Refills:  11        guaiFENesin-codeine 100-10 MG/5ML Soln solution   Commonly known as:  ROBITUSSIN AC   Dose:  1-2 tsp.   Quantity:  120 mL        Take 5-10 mLs by mouth every 6 hours as needed for cough   Refills:  0        guaiFENesin-dextromethorphan 100-10 MG/5ML syrup   Commonly known as:  ROBITUSSIN DM   Dose:  5-10 mL   Quantity:  560 mL        Take 5-10 mLs by mouth 4 times daily as needed for cough   Refills:  0        * HYDROcodone-acetaminophen 7.5-325 MG per tablet   Commonly known as:  NORCO        Reported on 3/8/2017   Refills:  0        * HYDROcodone-acetaminophen 5-325 MG per tablet   Commonly known as:  NORCO   Dose:  1-2 tablet   Quantity:  50 tablet        Take  1-2 tablets by mouth every 6 hours as needed for moderate to severe pain maximum 8 tablet(s) per day   Refills:  0        ipratropium - albuterol 0.5 mg/2.5 mg/3 mL 0.5-2.5 (3) MG/3ML neb solution   Commonly known as:  DUONEB   Dose:  1 vial   Quantity:  30 vial        Take 1 vial (3 mLs) by nebulization every 6 hours as needed for shortness of breath / dyspnea or wheezing   Refills:  1        * levothyroxine 150 MCG tablet   Commonly known as:  SYNTHROID/LEVOTHROID   Dose:  150 mcg   Quantity:  45 tablet        Take 1 tablet (150 mcg) by mouth every other day   Refills:  3        * levothyroxine 175 MCG tablet   Commonly known as:  SYNTHROID/LEVOTHROID   Dose:  175 mcg   Quantity:  45 tablet        Take 1 tablet (175 mcg) by mouth every other day   Refills:  3        * LORazepam 0.5 MG tablet   Commonly known as:  ATIVAN   Dose:  0.5 mg   Quantity:  30 tablet        Take 1 tablet (0.5 mg) by mouth nightly as needed for anxiety 30 tablets to last 30 days.   Refills:  2        * LORazepam 0.5 MG tablet   Commonly known as:  ATIVAN   Quantity:  6 tablet        Take 2 po 90 minutes prior to MRI and may repeat x 1 30 minutes prior to MRI   Refills:  0        lovastatin 20 MG tablet   Commonly known as:  MEVACOR   Dose:  20 mg   Quantity:  90 tablet        Take 1 tablet (20 mg) by mouth At Bedtime   Refills:  3        Multi-vitamin Tabs tablet   Dose:  1 tablet        Take 1 tablet by mouth daily   Refills:  0        NONFORMULARY   Dose:  20 mg        Take 20 mg by mouth 2 times daily D-amphetamine Salt combo   Refills:  0        OLANZapine 2.5 MG tablet   Commonly known as:  zyPREXA   Dose:  2.5 mg   Quantity:  30 tablet        Take 1 tablet (2.5 mg) by mouth At Bedtime   Refills:  5        omeprazole 40 MG capsule   Commonly known as:  priLOSEC   Dose:  40 mg   Quantity:  90 capsule        Take 1 capsule (40 mg) by mouth daily Take 30-60 minutes before a meal.   Refills:  2        ondansetron 4 MG tablet   Commonly  known as:  ZOFRAN   Dose:  4 mg   Quantity:  18 tablet        Take 1 tablet (4 mg) by mouth every 6 hours as needed for nausea   Refills:  5        prazosin 5 MG capsule   Commonly known as:  MINIPRESS   Dose:  5 mg   Quantity:  30 capsule        Take 1 capsule (5 mg) by mouth At Bedtime Take 1 capsule at bedtime   Refills:  5        pregabalin 25 MG capsule   Commonly known as:  LYRICA   Dose:  25 mg   Quantity:  90 capsule        Take 1 capsule (25 mg) by mouth 3 times daily   Refills:  0        tapentadol 50 MG Tabs tablet   Commonly known as:  NUCYNTA   Dose:  50 mg   Quantity:  90 tablet   Start taking on:  3/14/2017        Take 1 tablet (50 mg) by mouth every 6 hours as needed for moderate to severe pain maximum 3 tablet(s) per day. Ok to fill on or after 3/12/17 to start on or after 3/14/17.   Refills:  0        tiZANidine 4 MG tablet   Commonly known as:  ZANAFLEX   Dose:  4-8 mg   Quantity:  180 tablet        Take 1-2 tablets (4-8 mg) by mouth 3 times daily as needed for muscle spasms   Refills:  3        topiramate 25 MG tablet   Commonly known as:  TOPAMAX   Quantity:  70 tablet        Take 1 tablet (25 mg) at bedtime for 1 week, then 1 tablet twice daily for 1 week, then 1 tablet in AM and 2 in PM for 1 week, then 2 tablets twice daily.   Refills:  0        traMADol 50 MG tablet   Commonly known as:  ULTRAM   Dose:   mg   Quantity:  20 tablet        Take 1-2 tablets ( mg) by mouth every 6 hours as needed for pain   Refills:  0        zolpidem 5 MG tablet   Commonly known as:  AMBIEN   Dose:  5-10 mg   Quantity:  60 tablet        Take 1-2 tablets (5-10 mg) by mouth nightly as needed for sleep   Refills:  0        * Notice:  This list has 6 medication(s) that are the same as other medications prescribed for you. Read the directions carefully, and ask your doctor or other care provider to review them with you.            Prescriptions were sent or printed at these locations (1 Prescription)                    Other Prescriptions                Printed at Department/Unit printer (1 of 1)         oxyCODONE (ROXICODONE) 5 MG IR tablet                Procedures and tests performed during your visit     Cervical spine MRI w/o contrast    Lumbar spine MRI w/o contrast    Peripheral IV catheter    Thoracic spine MRI w/o contrast      Orders Needing Specimen Collection     None      Pending Results     No orders found from 3/8/2017 to 3/11/2017.            Pending Culture Results     No orders found from 3/8/2017 to 3/11/2017.            Thank you for choosing [facility]       Thank you for choosing [facility] for your care. Our goal is always to provide you with excellent care. Hearing back from our patients is one way we can continue to improve our services. Please take a few minutes to complete the written survey that you may receive in the mail after you visit with us. Thank you!        [employer] Information     [employer] gives you secure access to your electronic health record. If you see a primary care provider, you can also send messages to your care team and make appointments. If you have questions, please call your primary care clinic.  If you do not have a primary care provider, please call [phone] and they will assist you.        Care EveryWhere ID     This is your Care EveryWhere ID. This could be used by other organizations to access your [facility] medical records  [id number]        After Visit Summary       This is your record. Keep this with you and show to your community pharmacist(s) and doctor(s) at your next visit.

## 2017-03-10 NOTE — ED AVS SNAPSHOT
[facility] [place], Emergency Department    [address] [place] [address]    Phone:  [phone]                                       Ilsa Yusuf   MRN: [id number]    Department:  [facility], Emergency Department   Date of Visit:  3/10/2017           After Visit Summary Signature Page     I have received my discharge instructions, and my questions have been answered. I have discussed any challenges I see with this plan with the nurse or doctor.    ..........................................................................................................................................  Patient/Patient Representative Signature      ..........................................................................................................................................  Patient Representative Print Name and Relationship to Patient    ..................................................               ................................................  Date                                            Time    ..........................................................................................................................................  Reviewed by Signature/Title    ...................................................              ..............................................  Date                                                            Time

## 2017-03-10 NOTE — TELEPHONE ENCOUNTER
"Call came in at 11:29 am today.    \"Having pain throughout the entire right arm and left arm also. Having heaviness in my right leg. I would appreciate a call back as soon as possible. \" Audra selby rn    "

## 2017-03-10 NOTE — TELEPHONE ENCOUNTER
Spoke with Ilsa and relayed Dr. Reyes' message below. She agrees to urgent evaluation at an emergency room. She said she's been using the [facility] and I told her that would be great. Dr. Reyes would then be able to see the results of her visit as well.    Will keep encounter open for review of emergency visit.    Kira Horton, MSN, RN-BC  Care Coordinator  [facility]

## 2017-03-10 NOTE — ED NOTES
"Pt with a H/O Cervical Radiculopathy arrives with an increase in pain in both arms and that they feel like they are \"asleep\" Right leg feels heavy, right hand feels swollen. Right arm grasp is weaker than the left. Right leg is weaker than the left. Pt is A&O x 4    Saw Dr. Reyes on Wednesday and the symptoms were not this bad.  "

## 2017-03-10 NOTE — TELEPHONE ENCOUNTER
"Pt called the  at 1:40 pm very upset that no one had called her back. Transferred to this nurse.  Pt states that she saw Dr. Reyes on Wednesday and was told to call right away if any of her symptoms changed. States that she had right bicep and left arm symptoms on Wednesday and now both arms are more painful and '\"asleep.\"  States that she has pain in front of her neck as well. Pain goes from her neck to her hands.  Right leg feels heavy and her right hand is swollen.State that her right arm feel weaker. Pt states that she has not been doing much activity since Wednesday. Right shoulder pain woke her up in the night which is not typical. Pt states that this is \"very disconcerting\" especially since she hadn't heard back from the clinic.  Pt states that she was told that she has a herniated disc at C5; severely bulging one above or below. Pt stated that Dr. Reyes said that she may need to get an MRI.  Pt states that she thinks Dr. Reyes will be \"livid\" that she was not informed of the changes in her symptoms right away. Pt reported that she is taking Nucynta 50 mg 3/day and just started the topiramate 25 mg at bedtime last night.     Pt asked what she had to do to get this information to Dr. Reyes. I explained that a call to the nurse line or a [employer] message would initiate that communication.  Pt demanded to know how this nurse was going to communicate with Dr. Reyes and wanted assurance that she would be informed ASAP.  Let her know that I would be communicating in whatever means available to get this message to her as soon as possible. Instructed the patient to seek medical care if she was concerned about the changes she was experiencing. Pt became upset and said that she wanted Dr. 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  Let " her know that I would send this information to Dr. Reyes and that someone would get back to her as soon as possible.     Call routed to provider and nurse pool, and messages sent via [employer] as well.     ERIBERTO AmezcuaN, RN-BC  Patient Care Supervisor/Care Coordinator  [facility]

## 2017-03-11 NOTE — DISCHARGE INSTRUCTIONS
Nonsurgical Treatment of Cervical Spine Problems  Cervical spine problems can often be treated without surgery. Choices include rest, medicines, or injections. Your healthcare provider may also suggest physical therapy or certain exercises. These may all help to relieve your symptoms. These treatments are often successful. But if your symptoms don t subside, talk to your healthcare provider. He or she may tell you that surgery is your best choice.  Relieving your symptoms  Your healthcare provider may recommend:    Medicines. These help to reduce pain and inflammation.    Epidural steroid injections. These are injections into the spinal canal near the spinal cord. They may relieve severe pain and reduce inflammation.    Restricting aggravating activities. This can help to give your cervical spine time to heal.    A soft cervical collar. This can help to support your head while keeping your cervical spine aligned.    Traction. This may help relieve pressure on the irritated nerves.  Restoring mobility and strength  Your healthcare provider may recommend that you work with a physical therapist. This can help you regain mobility and strength in your neck. Physical therapy may last for 4 to 8 weeks. It may include:    Exercises to improve your neck s range of motion and strength.    Evaluation and correction of posture and body movements. This can correct problems that affect your cervical spine.    Heat, massage, and traction to help relieve your symptoms.  Self-care  You ll take an active role in your own therapy. To protect your neck from further injury:    Follow any exercise program given to you by your healthcare provider or physical therapist.    Practice good posture while sitting, standing, or moving.    Have your workspace evaluated. Rearrange it if needed.    When lying down, support your neck. You can use a special cervical pillow or rolled-up towel.     [id number] [employer]. [address] [place] [address]  [facility], [place] [address]. All rights reserved. This information is not intended as a substitute for professional medical care. Always follow your healthcare professional's instructions.      Please make an appointment to follow up with Your Primary Care Provider or Your Pain Specialist in 3 days if you have any concerns. If your symptoms significantly worsen please come back to the emergency department for evaluation.

## 2017-03-11 NOTE — ED PROVIDER NOTES
History     Chief Complaint   Patient presents with     Arm Pain     Leg Pain     HPI  Ilsa Yusuf is a 56-year-old woman with a history of a cervical radiculopathy and herniated disc in the cervical spine presenting with sudden onset of midline neck pain radiating down her arm that occurred Iate last night/early this morning while she was sleeping. No trauma. No fevers/chills. She reports heaviness and subjective weakness in the right arm. She also feels that occasionally her right leg might feel slightly heavy. No speech difficulty, visual disturbance, nausea, vomiting, chest pain, shortness of breath, abdominal pain, headache. No history of IV drug use. No urinary retention or bowel incontinence. No difficulty with ambulation. Patient called her pain specialist who advised her to come here for further evaluation.     I have reviewed the Medications, Allergies, Past Medical and Surgical History, and Social History in the Epic system and with the patient.    Review of Systems   Constitutional: Negative for chills and fever.   HENT: Negative for congestion.    Eyes: Negative for visual disturbance.   Respiratory: Negative for shortness of breath.    Cardiovascular: Negative for chest pain.   Gastrointestinal: Negative for abdominal pain, nausea and vomiting.   Endocrine: Negative for polydipsia and polyuria.   Genitourinary: Negative for difficulty urinating.   Musculoskeletal: Positive for neck pain. Negative for back pain and neck stiffness.   Skin: Negative for color change.   Allergic/Immunologic: Negative for immunocompromised state.   Neurological: Negative for weakness, light-headedness, numbness and headaches.        Heaviness in right arm   Hematological: Negative for adenopathy. Does not bruise/bleed easily.   Psychiatric/Behavioral: Negative for agitation and behavioral problems.       Physical Exam   BP: 142/77  Heart Rate: 82  Temp: 98.5  F (36.9  C)  Resp: 16  SpO2: 98 %  Physical Exam    Constitutional: She is oriented to person, place, and time. She appears well-developed and well-nourished. No distress.   HENT:   Head: Normocephalic and atraumatic.   Mouth/Throat: Oropharynx is clear and moist. No oropharyngeal exudate.   Eyes: Conjunctivae and EOM are normal. Pupils are equal, round, and reactive to light. No scleral icterus.   Neck: Normal range of motion and full passive range of motion without pain. Neck supple. Spinous process tenderness and muscular tenderness present. No rigidity. No edema, no erythema and normal range of motion present.   Cardiovascular: Normal rate, normal heart sounds and intact distal pulses.    Pulmonary/Chest: Effort normal and breath sounds normal. No respiratory distress. She has no wheezes. She has no rales.   Abdominal: Soft. Bowel sounds are normal. There is no tenderness.   Musculoskeletal: Normal range of motion. She exhibits no edema or tenderness.   Lymphadenopathy:     She has no cervical adenopathy.   Neurological: She is alert and oriented to person, place, and time. She has normal strength and normal reflexes. She displays normal reflexes. No cranial nerve deficit or sensory deficit. She exhibits normal muscle tone. Coordination and gait normal. GCS eye subscore is 4. GCS verbal subscore is 5. GCS motor subscore is 6.   Reflex Scores:       Patellar reflexes are 2+ on the right side and 2+ on the left side.       Achilles reflexes are 2+ on the right side and 2+ on the left side.  No dysarthria, dysmetria, diplopia, disdiadochokinesia. Strength 5/5 in b/l UEs with  and b/l LEs with dorsi- and plantar-flexion against resistance. Sensation to light touch and 2-point discrimination intact in b/l distal UEs and LEs. CNs II-XII intact. Negative Romberg. Normal gait.   Skin: Skin is warm. No rash noted. She is not diaphoretic.   Psychiatric: She has a normal mood and affect. Her behavior is normal. Judgment and thought content normal.   Nursing note and  vitals reviewed.      ED Course     ED Course     Procedures             Critical Care time:  none               Labs Ordered and Resulted from Time of ED Arrival Up to the Time of Departure from the ED - No data to display    Assessments & Plan (with Medical Decision Making)   56-year-old presenting with neck pain radiating down her right arm. Differential diagnosis: cervical radiculopathy, nerve impingement, herniated disc, spinal cord compression.    After thorough history and physical exam patient appears to be in no acute distress. There are no objective signs of neurologic deficits on the examination. I did review patient s clinical records, specifically her MRI of the cervical spine from August 27th, 2016 and she does have evidence of disc herniation. At this point I will obtain intravenous access and treat her pain with intravenous ketamine. I will also obtain a full spine MRI for further diagnostic evaluation. Patient will be premedicated with intravenous lorazepam prior to MRI due to her history of claustrophobia and anxiety in tight spaces.     I reviewed the patient's MRIs and I read the radiology reports and I discussed them with the radiologist, Dr. Prakash Bales; she does have degenerative changes in the cervical spine with disc bulging and likely compression of C6 nerve root. However, these findings are unchanged in comparison to her last MRI obtained in August 2016. Patient's pain has resolved in the Emergency Department after intravenous ketamine, oral flexeril, and oral oxycodone. She has no neurologic deficits. At this point she is stable for discharge. I will give her a 2 day prescription of oxycodone  And I will have her follow-up with her pain specialist after the weekend. She agrees with this plan and she also agrees to return if her symptoms worsen.     I have reviewed the nursing notes.    I have reviewed the findings, diagnosis, plan and need for follow up with the patient.    Discharge  Medication List as of 3/10/2017  9:47 PM      START taking these medications    Details   oxyCODONE (ROXICODONE) 5 MG IR tablet Take 1 tablet (5 mg) by mouth every 6 hours as needed for pain, Disp-8 tablet, R-0, Local Print             Final diagnoses:   Cervical radiculopathy       3/10/2017   [facility], [place], EMERGENCY DEPARTMENT     Soraya Dash MD  03/11/17 0031

## 2017-03-13 NOTE — TELEPHONE ENCOUNTER
Routing to provider to review as FYI. Patient seen in ED 03/10/17    Excerpt of ED note:  I reviewed the patient's MRIs and I read the radiology reports and I discussed them with the radiologist, Dr. Prakash Bales; she does have degenerative changes in the cervical spine with disc bulging and likely compression of C6 nerve root. However, these findings are unchanged in comparison to her last MRI obtained in August 2016. Patient's pain has resolved in the Emergency Department after intravenous ketamine, oral flexeril, and oral oxycodone. She has no neurologic deficits. At this point she is stable for discharge. I will give her a 2 day prescription of oxycodone And I will have her follow-up with her pain specialist after the weekend. She agrees with this plan and she also agrees to return if her symptoms worsen.     Called patient for update. LM to call triage with update and timeframe to call her back at if there were any further issues.     Britney Gomez  BSN-RN Care Coordinator  [facility]

## 2017-03-14 NOTE — TELEPHONE ENCOUNTER
Call came in at 4:15pm  On 3/13/17.     Pt calling to report that everything is going okay now and thanks for the call.     Pt called back at 4:18 pm to say that she took the RX to [facility] on [address] and they only had #70 caps. Please let me know if you got this message.  Audra selby rn

## 2017-03-14 NOTE — TELEPHONE ENCOUNTER
Called patient. LM, no pt identifiers, but that we got her message are happy she is doing better, advised that she was written a script for #70 (Topamax) and the quantity is #70 do to the titration schedule to walk her up to therapeutic dosing, if tolerates at 2 BID then next script would be for higher quantity. Advised if she has questions regarding message or further needs to call back on triage with time to reach her at.     Britney Gomez  BSN-RN Care Coordinator  [facility]

## 2017-03-14 NOTE — TELEPHONE ENCOUNTER
Thank you, information noted.  Please call back to let her know we received her message.    Nahed Reyes DO  [facility]

## 2017-03-16 NOTE — TELEPHONE ENCOUNTER
Called patient. LM with no patient identifiers to call trachip if she did not receive message below and if she had any questions. Advised that if she does not have questions about message she di dnot need to call.  Will leave call open a few days in case patient does call back with questions.     Britney Gomez  BSN-RN Care Coordinator  [facility]

## 2017-03-17 NOTE — TELEPHONE ENCOUNTER
No further contact from patient, closing.    Kira Horton, MSN, RN-BC  Care Coordinator  [facility]

## 2017-03-20 ENCOUNTER — CARE COORDINATION (OUTPATIENT)
Dept: CARE COORDINATION | Facility: CLINIC | Age: 57
End: 2017-03-20

## 2017-03-20 NOTE — PROGRESS NOTES
spoke with  Billing office. Representative states that balances have already been resubmitted to [facility].    Bell Etienne [facility]  Social Work Care Coordinator  [facility]  Ph: [phone]

## 2017-03-20 NOTE — PROGRESS NOTES
"Clinic Care Coordination Contact  OUTREACH    Referral Information:  Referral Source: Self-patient/Caregiver  Reason for Contact: follow up  Care Conference: No     Clinical Concerns:  Current Medical Concerns: none discussed.    Current Behavioral Concerns:  called patient to follow up on insurance and bills. Patient states that [employer] was reinstated but became tearful on the phone. Upon more conversation patient disclosed she is having a \"life crisis\" and then expanded to say that she thinks her \"domestic partner\" and she are breaking up. Patient states she has been very tearful.  asked if patient has a counselor- states she used to see Teri Rose at the clinic, but that she left for private practice and patient has not seen her for over a month. Patient is unsure if her insurance covers her being seen there and has been unable to call to see and has been unable to call to request [facility] billing resubmit charges to [employer].  offered to assist as patient's depression and stress currently impacting her ability to do it herself.           Psychosocial:  Financial/Insurance: BCBS is reinstated.      Resources and Interventions:  Advanced Care Plans/Directives on file:: No  Referrals Placed: Financial Services     Plan:   1.  to contact billing office  2.  to contact Teri Rose's new practice ([phone]) to clarify if patient's insurance is accepted  3.  to follow up with patient    HARRIS Hamlin  Social Work Care Coordinator  [facility]  Ph: [phone]      "

## 2017-03-21 DIAGNOSIS — F33.1 MAJOR DEPRESSIVE DISORDER, RECURRENT EPISODE, MODERATE (H): ICD-10-CM

## 2017-03-22 DIAGNOSIS — F33.1 MAJOR DEPRESSIVE DISORDER, RECURRENT EPISODE, MODERATE (H): ICD-10-CM

## 2017-03-22 NOTE — PROGRESS NOTES
Clinic Care Coordination Contact  Care Team Conversations     spoke with patient's former counselor, Teri Rose. Did not disclose patient's name or information, just requested information on insurance. States she accepts both medicare and [employer]. Stated patient will call to schedule appointment.    Bell Etienne [facility]  Social Work Care Coordinator  [facility]  Ph: [phone]

## 2017-03-22 NOTE — TELEPHONE ENCOUNTER
**Patient is requesting a 90 day supply.**    Medication Detail      Disp Refills Start End VELVET   OLANZapine (ZYPREXA) 2.5 MG tablet 30 tablet 5 9/13/2016  No   Sig: Take 1 tablet (2.5 mg) by mouth At Bedtime       Last Office Visit with [facility], [facility] or [facility] prescribing provider: 1-13-17    Next 5 appointments (look out 90 days)     Apr 05, 2017 11:00 AM CDT   Return Visit with Nahed Reyes DO   [facility] ([facility])    [address]  [address]  [place] [address]   [phone]                   BP Readings from Last 3 Encounters:   03/10/17 121/83   03/08/17 136/87   02/06/17 122/75     Pulse Readings from Last 2 Encounters:   03/10/17 71   03/08/17 86     Lab Results   Component Value Date     01/06/2017     Lab Results   Component Value Date    WBC 18.6 01/06/2017     Lab Results   Component Value Date    RBC 4.85 01/06/2017     Lab Results   Component Value Date    HGB 13.9 01/06/2017     Lab Results   Component Value Date    HCT 42.1 01/06/2017     No components found for: MCT  Lab Results   Component Value Date    MCV 87 01/06/2017     Lab Results   Component Value Date    MCH 28.7 01/06/2017     Lab Results   Component Value Date    MCHC 33.0 01/06/2017     Lab Results   Component Value Date    RDW 14.8 01/06/2017     Lab Results   Component Value Date     01/06/2017     Lab Results   Component Value Date    CHOL 189 10/19/2016     Lab Results   Component Value Date    HDL 42 10/19/2016     Lab Results   Component Value Date     10/19/2016     Lab Results   Component Value Date    TRIG 163 10/19/2016     Lab Results   Component Value Date    CHOLHDLRATIO 3.4 09/30/2015

## 2017-03-22 NOTE — PROGRESS NOTES
Clinic Care Coordination Contact  [facility]/Voicemail    Referral Source: Self-patient/Caregiver  Clinical Data: Care Coordinator Outreach  Outreach attempted x 1.  Left message on voicemail with call back information and requested return call.  Plan: Care Coordinator sent [employer] message with information as well. Care Coordinator will try to reach patient again in 3-5 business days if [employer] message is not read.    Bell Etienne [facility]  Social Work Care Coordinator  [facility]  Ph: [phone]

## 2017-03-23 RX ORDER — DOXEPIN HYDROCHLORIDE 10 MG/1
CAPSULE ORAL
Qty: 180 CAPSULE | Refills: 8 | Status: SHIPPED | OUTPATIENT
Start: 2017-03-23 | End: 2018-04-02

## 2017-03-23 RX ORDER — DULOXETIN HYDROCHLORIDE 60 MG/1
CAPSULE, DELAYED RELEASE ORAL
Qty: 180 CAPSULE | Refills: 1 | Status: SHIPPED | OUTPATIENT
Start: 2017-03-23 | End: 2018-02-28

## 2017-03-23 NOTE — TELEPHONE ENCOUNTER
Routing refill request to provider for review/approval because:  Labs out of range:  WBC and glucose    Routing to PCP.    Dr. Collado-Please sign if agree.    Thank you!  CHANTE Mclain, [place], RN

## 2017-03-23 NOTE — TELEPHONE ENCOUNTER
Writer unable to authorize 90 day supplies for this medication, defer to provider.      Routing to PCP.    Dr. Collado-Please sign if agree.    Thank you!  ERIBERTO GlynnN, RN          Next 5 appointments (look out 90 days)     Apr 05, 2017 11:00 AM CDT   Return Visit with Nahed Reyes DO   [facility] ([facility])    [address] [address] [address] [phone]                   Last PHQ-9 score on record=   PHQ-9 SCORE 1/9/2017   Total Score MyChart -   Total Score 16

## 2017-03-24 RX ORDER — OLANZAPINE 2.5 MG/1
2.5 TABLET, FILM COATED ORAL AT BEDTIME
Qty: 90 TABLET | Refills: 1 | Status: SHIPPED | OUTPATIENT
Start: 2017-03-24 | End: 2017-11-01

## 2017-03-27 DIAGNOSIS — G89.4 CHRONIC PAIN SYNDROME: ICD-10-CM

## 2017-03-27 NOTE — TELEPHONE ENCOUNTER
"Called patient. She states that she only received #70 of Nucynta when she last picked up. She states that she was told that she would need a new script to  remaining.     Called pharmacy-verified she received 03/13/17, #70. Making this 23 day supply not 30. Per pharmacy they generally carry the amount in stock but that by chance they did not have enough her last 2 fills. They advised that she could drop of script earlier to them and that way they may be able to make sure it is in stock, they get the orders in typically on Thursdays.     Called patient back to advise that we would route the script to provider to review but she could pick these up at her next appointment on 04/05/17. Advised to drop both scripts off that way pharmacy would be able to order the #90 due 04/12 and have it in stock when she was able to  it. Patient asking about SE of dry mouth and medications, advised that some medications do have SE of dry mouth. Suggested that she use a biotin mouth wash or \"magic mouth wash\".     Prepping script for remaining #20 to complete last month script and prepping script for the next 30 days.     Nucynta 50mg, #20, Refill:NO  SIG:maximum 3 tablet(s) per day. OK to fill 04/05/17 and START 04/06/17  Last picked up 03/13/17 for #70 with 03/14/17 start date  Due: 04/06/17 start for 6.5 day supply    Nucynta 50mg, #90, Refill:NO  SIG:Maximum 3 tablet(s) per day. Ok to fill on or after 04/10/17 to start on or after 04/12/17.  Due date 04/12/17    Pt last seen on 03/08/17  Next appt scheduled for 04/05/17    Last urine drug screen 02/06/17  Current opioid agreement on file Yes Date: 02/06/17    Processing (pick one):     at follow up next week 04/05/17    Will facilitate refill.    Britney WEIN-RN Care Coordinator  [facility]                "

## 2017-03-27 NOTE — TELEPHONE ENCOUNTER
Call came in at 11:50 am today.     I would like to talk to someone about Nucynta.  Please call me.  Audra selby rn

## 2017-03-27 NOTE — TELEPHONE ENCOUNTER
Rx x2 placed in lock box  Pain clinic to be given to patient on  4/5/17 appointment with Dr. Reyes. Appointment note added.  Kira Elizabeth, [facility]

## 2017-03-29 ENCOUNTER — CARE COORDINATION (OUTPATIENT)
Dept: CARE COORDINATION | Facility: CLINIC | Age: 57
End: 2017-03-29

## 2017-03-31 NOTE — PROGRESS NOTES
Clinic Care Coordination Contact  [facility]/Voicemail    Referral Source: Self-patient/Caregiver  Clinical Data: Care Coordinator Outreach  Outreach attempted x 2.  Left message on voicemail with call back information and requested return call.  Plan: Care Coordinator sent mychart with info on 3/22. Care Coordinator will try to reach patient again in 3-5 business days.    Bell Etienne [facility]  Social Work Care Coordinator  [facility]  Ph: [phone]

## 2017-04-03 ENCOUNTER — TELEPHONE (OUTPATIENT)
Dept: PSYCHIATRY | Facility: CLINIC | Age: 57
End: 2017-04-03

## 2017-04-03 NOTE — TELEPHONE ENCOUNTER
Reason for call: Order   Order or referral being requested: n/a  Reason for request: t  Date needed: as soon as possible  Has the patient been seen by the PCP for this problem? Not Applicable    Additional comments: Client requesting like a copy of Dr. Hill's vitamin D information and recommendations.  Information can be mailed to her home address on file.    Phone Number Pt can be reached at: Home number on file [phone] (home)  Best Time: anytime   Can we leave a detailed message on this number? YES

## 2017-04-04 NOTE — TELEPHONE ENCOUNTER
Left VM for Ilsa Yusuf explaining RN would send a copy of Dr. Hill's Vitamin D. Handout. Explained Pt should call back if she requires any other information. Pt has not been seen since 2/19/16 and was referred back to PCP at that time.

## 2017-04-05 ENCOUNTER — OFFICE VISIT (OUTPATIENT)
Dept: PALLIATIVE MEDICINE | Facility: CLINIC | Age: 57
End: 2017-04-05
Payer: COMMERCIAL

## 2017-04-05 VITALS — DIASTOLIC BLOOD PRESSURE: 62 MMHG | HEART RATE: 90 BPM | SYSTOLIC BLOOD PRESSURE: 122 MMHG | OXYGEN SATURATION: 98 %

## 2017-04-05 DIAGNOSIS — M54.12 CERVICAL RADICULOPATHY: ICD-10-CM

## 2017-04-05 DIAGNOSIS — G89.4 CHRONIC PAIN SYNDROME: ICD-10-CM

## 2017-04-05 DIAGNOSIS — M54.2 CERVICALGIA: Primary | ICD-10-CM

## 2017-04-05 PROCEDURE — 99214 OFFICE O/P EST MOD 30 MIN: CPT | Performed by: PHYSICAL MEDICINE & REHABILITATION

## 2017-04-05 ASSESSMENT — PAIN SCALES - GENERAL: PAINLEVEL: SEVERE PAIN (6)

## 2017-04-05 NOTE — PROGRESS NOTES
"                          [facility]    Date of visit: 4/5/2017    Chief complaint:   Chief Complaint   Patient presents with     Pain     neck, arms,  legs and feet     Interval history:  Ilsa Yusuf is a 56 year old female last seen by me on 3/8/17.    Since her last visit, Ilsa Yusuf reports:  -Since she increased topiramate to 50 mg twice a day, the arm symptoms have completely resolved. She also continues to take nucynta. She did not take it this morning, because she has to drive to [place] and she notices increased pain. She has noticed floaters for about the last week and wonders if it is a medication side effect. Has not had her eyes checked in a long time.   -Was not able to schedule with Dr. Humphries, because the last month was \"crazy\". She had a death in the family and had an crisis with her partner.   -Saw Renee in pain PT once. The therapy session went very well.   -Will wake up at night with pain in her neck and shoulders. The pain feels like spasms. She wonders if there is something she can take at night to help with this.   -Continues to have neck pain that radiates toward her shoulders but no longer down her arms. The pain is described as burning, tiring, exhausting, penetrating, sharp, stabbing, gnawing, and miserable. The pain is rated 4-8/10. Overall, her pain is better. Aggravated by staying in any one position for too long. Relieved by changing positions, guided imagery, physical therapy and pain medication -nucynta and tizanidine, which brings the pain down to about 2-3/10.   -She reports continuing home guided imagery therapy using a CD she has and home massage. She is also trying to pace her physical activities on her own to help with pain.    THE 4 A's OF OPIOID MAINTENANCE ANALGESIA    Analgesia: bring pain down 4-5 points    Activity: volunteering for [employer]    Adverse effects: none    Adherence to Rx protocol: good      Pain " scores:  Pain intensity on average is 5 (down from 6) on a scale of 0-10.     Current pain treatments:   Nucynta 50 mg, 2-3 tablets/day, max #3/day, beneficial  Tizanidine 4 mg 2 tabs qhs    Ativan -none for 1 month  Doxepin 10-20 mg qhs  cymbalta 120 mg daily  olanzapine 2.5 mg qhs    Side Effects: no side effect    Past pain treatments:  Ilsa Yusuf has not been seen at a pain clinic in the past.     Medications:                NSAIDs: ibuprofen -some relief, medrol dose юлия -helped while taking it.              Anticonvulsants: gabapentin -horrible nightmares                Opioids: tramadol -provided incomplete relief; norco 5-325 mg- beneficial  PT: [facility] completed in Oct 2016 -helped  Psychology: guided imagery  Acupuncture: no  Chiropractic care: no  TENS Unit: no  Injections: cervical epidural steroid injection 9/20/16 -flared up her pain for about 3 days and did not get significant relief  Surgery: no cervical surgery, left knee meniscus surgery in 2012    Medications:  Current Outpatient Prescriptions   Medication Sig Dispense Refill     [START ON 4/12/2017] tapentadol (NUCYNTA) 50 MG TABS tablet Take 1 tablet (50 mg) by mouth every 6 hours as needed for moderate to severe pain Maximum 3 tablet(s) per day. Ok to fill on or after 04/10/17 to start on or after 04/12/17. 90 tablet 0     [START ON 4/6/2017] tapentadol (NUCYNTA) 50 MG TABS tablet Take 1 tablet (50 mg) by mouth every 6 hours as needed for moderate to severe pain maximum 3 tablet(s) per day. OK to fill 04/05/17 and START 04/06/17 20 tablet 0     OLANZapine (ZYPREXA) 2.5 MG tablet Take 1 tablet (2.5 mg) by mouth At Bedtime 90 tablet 1     doxepin (SINEQUAN) 10 MG capsule TAKE ONE TO TWO CAPSULES BY MOUTH AT BEDTIME 180 capsule 8     DULoxetine (CYMBALTA) 60 MG EC capsule TAKE 2 CAPSULES BY MOUTH EVERY  capsule 1     oxyCODONE (ROXICODONE) 5 MG IR tablet Take 1 tablet (5 mg) by mouth every 6 hours as needed for pain  8 tablet 0     topiramate (TOPAMAX) 25 MG tablet Take 1 tablet (25 mg) at bedtime for 1 week, then 1 tablet twice daily for 1 week, then 1 tablet in AM and 2 in PM for 1 week, then 2 tablets twice daily. 70 tablet 0     tiZANidine (ZANAFLEX) 4 MG tablet Take 1-2 tablets (4-8 mg) by mouth 3 times daily as needed for muscle spasms 180 tablet 3     omeprazole (PRILOSEC) 40 MG capsule Take 1 capsule (40 mg) by mouth daily Take 30-60 minutes before a meal. 90 capsule 2     pregabalin (LYRICA) 25 MG capsule Take 1 capsule (25 mg) by mouth 3 times daily 90 capsule 0     zolpidem (AMBIEN) 5 MG tablet Take 1-2 tablets (5-10 mg) by mouth nightly as needed for sleep 60 tablet 0     ipratropium - albuterol 0.5 mg/2.5 mg/3 mL (DUONEB) 0.5-2.5 (3) MG/3ML neb solution Take 1 vial (3 mLs) by nebulization every 6 hours as needed for shortness of breath / dyspnea or wheezing 30 vial 1     albuterol (PROAIR HFA/PROVENTIL HFA/VENTOLIN HFA) 108 (90 BASE) MCG/ACT Inhaler Inhale 2 puffs into the lungs every 6 hours as needed for shortness of breath / dyspnea or wheezing 1 Inhaler 1     levothyroxine (SYNTHROID/LEVOTHROID) 150 MCG tablet Take 1 tablet (150 mcg) by mouth every other day 45 tablet 3     levothyroxine (SYNTHROID/LEVOTHROID) 175 MCG tablet Take 1 tablet (175 mcg) by mouth every other day 45 tablet 3     HYDROcodone-acetaminophen (NORCO) 7.5-325 MG per tablet Reported on 3/8/2017  0     HYDROcodone-acetaminophen (NORCO) 5-325 MG per tablet Take 1-2 tablets by mouth every 6 hours as needed for moderate to severe pain maximum 8 tablet(s) per day 50 tablet 0     LORazepam (ATIVAN) 0.5 MG tablet Take 2 po 90 minutes prior to MRI and may repeat x 1 30 minutes prior to MRI 6 tablet 0     traMADol (ULTRAM) 50 MG tablet Take 1-2 tablets ( mg) by mouth every 6 hours as needed for pain 20 tablet 0     lovastatin (MEVACOR) 20 MG tablet Take 1 tablet (20 mg) by mouth At Bedtime 90 tablet 3     prazosin (MINIPRESS) 5 MG capsule Take 1  capsule (5 mg) by mouth At Bedtime Take 1 capsule at bedtime 30 capsule 5     FIBER PO Two capsules in the morning and two capsules at night       LORazepam (ATIVAN) 0.5 MG tablet Take 1 tablet (0.5 mg) by mouth nightly as needed for anxiety 30 tablets to last 30 days. 30 tablet 2     ondansetron (ZOFRAN) 4 MG tablet Take 1 tablet (4 mg) by mouth every 6 hours as needed for nausea 18 tablet 5     fluticasone (FLONASE) 50 MCG/ACT nasal spray Spray 1-2 sprays into both nostrils daily 16 g 11     NONFORMULARY Take 20 mg by mouth 2 times daily D-amphetamine Salt combo       cholecalciferol (VITAMIN D3) 5000 UNITS CAPS capsule Take 1 capsule (5,000 Units) by mouth daily Take 1 per day 100 capsule 2     multivitamin, therapeutic with minerals (MULTI-VITAMIN) TABS Take 1 tablet by mouth daily       fish oil-omega-3 fatty acids (OMEGA 3) 1000 MG capsule Take 2 g by mouth daily Takes 1 in am and 1 at bedtime         Medical History: any changes in medical history since they were last seen? No    Review of Systems:  The 14 system ROS was reviewed from the intake questionnaire, and is positive for: headache, thyroid disease, joint pain, arthritis, neck pain  Any bowel or bladder problems: no  Mood: stable    Physical Exam:  not currently breastfeeding.  General: no acute distress  Gait: Normal  MSK exam: Cervical spine normal range of motion with discomfort at end range, bilateral upper extremity strength and sensation intact    Imaging:  Cervical MRI completed on 8/27/16 showed:  Findings:  The cervical vertebrae appear normally aligned.  There is disc height  narrowing at C5-C6 with degeneration of the disc and sclerosis of  adjacent endplates.  There is normal signal within and normal contour  of the cervical spinal cord. No significant abnormal bone marrow  change. The findings on a level by level basis are as follows:     C2-3: No spinal canal or neural foraminal narrowing.     C3-4:  No spinal canal or neural foraminal  narrowing.     C4-5:  No spinal canal or neural foraminal narrowing.     C5-6:  Posterior disc bulge with uncovertebral joint hypertrophy and  osteophyte formations. Superimposing left central disc herniation with  elevation of the posterior longitudinal ligament Mild canal narrowing.  Herniated disc effaces the left anterolateral subarachnoid space and  possibly abutting the left C6 in the left portion of the spinal canal.  Mild canal narrowing. Bilateral mild to moderate foraminal narrowing  more significant on the left.     C6-7:  Disc bulge with mild uncovertebral hypertrophy. No significant  spinal canal or neural foraminal narrowing.     C7-T1:  No spinal canal or neural foraminal narrowing.      No abnormality of the paraspinous soft tissues.                                                                       Impression:       1. Disc bulge with uncovertebral joint hypertrophy and osteophyte  formations at C5-6. Superimposing left central disc herniation with  elevation of the posterior longitudinal ligament.Herniated disc  effaces the left anterolateral subarachnoid space and possibly  abutting the left C6 in the left portion of the spinal canal. Mild  canal narrowing. Bilateral mild to moderate foraminal narrowing more  significant on the left.  2. Disc bulge at C6-7 with no significant compression.    [place] Board of Pharmacy Data Base Reviewed: YES; as expected, no concern for abuse or misuse    Assessment:   1. Neck pain, worse on the left, started in Aug 2016, with associated headaches. Disc bulge at C5-C6 and C6-C7. Bilateral foraminal narrowing at C5-C6. -flared up after lifting incident  2. Bilateral knee pain, right worse than left. History of left mensicus surgery in 2012 and has a torn meniscus in her right knee  3. Depression and PTSD -weekly counseling    Plan:  1. Physical Therapy: follow up with Renee.    2. Clinical Health Psychologist to address issues of relaxation, behavioral change,  coping style, and other factors important to improvement: Schedule an evaluation with Dr. Humphries.  3. Self Care Recommendations: continue guided imagery  4. Medication Management:  UDS and opioid agreement on 2/6/17  1. Continue nucynta 50 mg max #3/day. Call one week prior to needing a refill.  2. Continue topiramate 50 mg twice a day. May consider increasing.  3. Increase tizanidine from 2 tabs (8mg) to 3 tabs (12 mg) at bedtime.  5. Further procedures recommended: could consider cervical epidural steroid injection    6. Follow up: with Dr. Reyes in clinic in 1 month.  7. Follow up with eye doctor regarding floaters.    Total time spent was 30 minutes, and more than 50% of face to face time was spent in counseling and/or coordination of care regarding the above assessment and plan.    Nahed Reyes DO  [facility]  [facility]

## 2017-04-05 NOTE — PATIENT INSTRUCTIONS
1. Physical Therapy: follow up with Renee.    2. Clinical Health Psychologist to address issues of relaxation, behavioral change, coping style, and other factors important to improvement: Schedule an evaluation with Dr. Humphries.  3. Self Care Recommendations: continue guided imagery  4. Medication Management:  UDS and opioid agreement on 2/6/17  1. Continue nucynta 50 mg max #3/day. Call one week prior to needing a refill.  2. Continue topiramate 50 mg twice a day. May consider increasing.  3. Increase tizanidine from 2 tabs (8mg) to 3 tabs (12 mg) at bedtime.  5. Further procedures recommended: could consider cervical epidural steroid injection    6. Follow up: with Dr. Reyes in clinic in 1 month.  7. Follow up with eye doctor regarding floaters.    ----------------------------------------------------------------  Nurse Triage line:  [phone]   Call this number with any questions or concerns. You may leave a detailed message anytime. Calls are typically returned Monday through Friday between 8 AM and 4:30 PM. We usually get back to you within 2 business days depending on the issue/request.       Medication refills:    For non-narcotic medications, call your pharmacy directly to request a refill. The pharmacy will contact the [facility] for authorization. Please allow 3-4 days for these refills to be processed.     For narcotic refills, call the nurse triage line or send a [employer] message. Please contact us 7-10 days before your refill is due. The message MUST include the name of the specific medication(s) requested and how you would like to receive the prescription(s). The options are as follows:    Pain Clinic staff can mail the prescription to your pharmacy. Please tell us the name of the pharmacy.    You may pick the prescription up at the Pain Clinic (tell us the location) or during a clinic visit with your pain provider    Pain Clinic staff can deliver the prescription to the [place] pharmacy in  the clinic building. Please tell us the location.      Scheduling number: [phone].  Call this number to schedule or change appointments.    We believe regular attendance is key to your success in our program.    Any time you are unable to keep your appointment we ask that you call us at least 24 hours in advance to let us know. This will allow us to offer the appointment time to another patient.       ----------------------------------------------------------------  Nurse Triage line:  [phone]   Call this number with any questions or concerns. You may leave a detailed message anytime. Calls are typically returned Monday through Friday between 8 AM and 4:30 PM. We usually get back to you within 2 business days depending on the issue/request.       Medication refills:    For non-narcotic medications, call your pharmacy directly to request a refill. The pharmacy will contact the [facility] for authorization. Please allow 3-4 days for these refills to be processed.     For narcotic refills, call the nurse triage line or send a [employer] message. Please contact us 7-10 days before your refill is due. The message MUST include the name of the specific medication(s) requested and how you would like to receive the prescription(s). The options are as follows:    Pain Clinic staff can mail the prescription to your pharmacy. Please tell us the name of the pharmacy.    You may pick the prescription up at the Pain Clinic (tell us the location) or during a clinic visit with your pain provider    Pain Clinic staff can deliver the prescription to the [place] pharmacy in the clinic building. Please tell us the location.      Scheduling number: [phone].  Call this number to schedule or change appointments.    We believe regular attendance is key to your success in our program.    Any time you are unable to keep your appointment we ask that you call us at least 24 hours in advance to let us know. This will  allow us to offer the appointment time to another patient.

## 2017-04-05 NOTE — MR AVS SNAPSHOT
After Visit Summary   4/5/2017    Ilsa Yusuf    MRN: [id number]           Patient Information     Date Of Birth          1960        Visit Information        Provider Department      4/5/2017 11:00 AM Nahed Reyes DO [facility]        Care Instructions    1. Physical Therapy: follow up with Renee.    2. Clinical Health Psychologist to address issues of relaxation, behavioral change, coping style, and other factors important to improvement: Schedule an evaluation with Dr. Humphries.  3. Self Care Recommendations: continue guided imagery  4. Medication Management:  UDS and opioid agreement on 2/6/17  1. Continue nucynta 50 mg max #3/day. Call one week prior to needing a refill.  2. Continue topiramate 50 mg twice a day. May consider increasing.  3. Increase tizanidine from 2 tabs (8mg) to 3 tabs (12 mg) at bedtime.  5. Further procedures recommended: could consider cervical epidural steroid injection    6. Follow up: with Dr. Reyes in clinic in 1 month.  7. Follow up with eye doctor regarding floaters.    ----------------------------------------------------------------  Nurse Triage line:  [phone]   Call this number with any questions or concerns. You may leave a detailed message anytime. Calls are typically returned Monday through Friday between 8 AM and 4:30 PM. We usually get back to you within 2 business days depending on the issue/request.       Medication refills:    For non-narcotic medications, call your pharmacy directly to request a refill. The pharmacy will contact the [facility] for authorization. Please allow 3-4 days for these refills to be processed.     For narcotic refills, call the nurse triage line or send a Raise message. Please contact us 7-10 days before your refill is due. The message MUST include the name of the specific medication(s) requested and how you would like to receive the prescription(s). The options are as  follows:    Pain Clinic staff can mail the prescription to your pharmacy. Please tell us the name of the pharmacy.    You may pick the prescription up at the Pain Clinic (tell us the location) or during a clinic visit with your pain provider    Pain Clinic staff can deliver the prescription to the [facility] pharmacy in the clinic building. Please tell us the location.      Scheduling number: [phone].  Call this number to schedule or change appointments.    We believe regular attendance is key to your success in our program.    Any time you are unable to keep your appointment we ask that you call us at least 24 hours in advance to let us know. This will allow us to offer the appointment time to another patient.       ----------------------------------------------------------------  Nurse Triage line:  [phone]   Call this number with any questions or concerns. You may leave a detailed message anytime. Calls are typically returned Monday through Friday between 8 AM and 4:30 PM. We usually get back to you within 2 business days depending on the issue/request.       Medication refills:    For non-narcotic medications, call your pharmacy directly to request a refill. The pharmacy will contact the [facility] for authorization. Please allow 3-4 days for these refills to be processed.     For narcotic refills, call the nurse triage line or send a Insticator message. Please contact us 7-10 days before your refill is due. The message MUST include the name of the specific medication(s) requested and how you would like to receive the prescription(s). The options are as follows:    Pain Clinic staff can mail the prescription to your pharmacy. Please tell us the name of the pharmacy.    You may pick the prescription up at the Pain Clinic (tell us the location) or during a clinic visit with your pain provider    Pain Clinic staff can deliver the prescription to the [facility] pharmacy in the clinic building. Please  tell us the location.      Scheduling number: [phone].  Call this number to schedule or change appointments.    We believe regular attendance is key to your success in our program.    Any time you are unable to keep your appointment we ask that you call us at least 24 hours in advance to let us know. This will allow us to offer the appointment time to another patient.           Follow-ups after your visit        Who to contact     If you have questions or need follow up information about today's clinic visit or your schedule please contact [facility] directly at [phone].  Normal or non-critical lab and imaging results will be communicated to you by AtlanteTrekhart, letter or phone within 4 business days after the clinic has received the results. If you do not hear from us within 7 days, please contact the clinic through [employer] or phone. If you have a critical or abnormal lab result, we will notify you by phone as soon as possible.  Submit refill requests through Oxynade or call your pharmacy and they will forward the refill request to us. Please allow 3 business days for your refill to be completed.          Additional Information About Your Visit        Atlante[employer]rek[employer]Medbox Information     Oxynade gives you secure access to your electronic health record. If you see a primary care provider, you can also send messages to your care team and make appointments. If you have questions, please call your primary care clinic.  If you do not have a primary care provider, please call [phone] and they will assist you.        Care EveryWhere ID     This is your Care EveryWhere ID. This could be used by other organizations to access your [facility] medical records  [id number]        Your Vitals Were     Pulse Pulse Oximetry                90 98%           Blood Pressure from Last 3 Encounters:   04/05/17 122/62   03/10/17 121/83   03/08/17 136/87    Weight from Last 3 Encounters:   03/08/17 99.3 kg (219 lb)   02/06/17  99.3 kg (219 lb)   01/06/17 99.3 kg (219 lb)              Today, you had the following     No orders found for display         Today's Medication Changes          These changes are accurate as of: 4/5/17 11:30 AM.  If you have any questions, ask your nurse or doctor.               Stop taking these medicines if you haven't already. Please contact your care team if you have questions.     buprenorphine 5 MCG/HR WK patch   Commonly known as:  BUTRANS   Stopped by:  Nahed Reyes DO           HYDROcodone-acetaminophen 5-325 MG per tablet   Commonly known as:  NORCO   Stopped by:  Nahed Reyes, DO           HYDROcodone-acetaminophen 7.5-325 MG per tablet   Commonly known as:  NORCO   Stopped by:  Nahed Reyes DO           oxyCODONE 5 MG IR tablet   Commonly known as:  ROXICODONE   Stopped by:  Nahed Reyes DO           pregabalin 25 MG capsule   Commonly known as:  LYRICA   Stopped by:  Nahed Reyes DO           traMADol 50 MG tablet   Commonly known as:  ULTRAM   Stopped by:  Nahed Reyes DO           zolpidem 5 MG tablet   Commonly known as:  AMBIEN   Stopped by:  Nahed Reyes DO                    Primary Care Provider Office Phone # Fax #    Catrachita Collado [phone]       [address] [place] [address]        Thank you!     Thank you for choosing [facility]  for your care. Our goal is always to provide you with excellent care. Hearing back from our patients is one way we can continue to improve our services. Please take a few minutes to complete the written survey that you may receive in the mail after your visit with us. Thank you!             Your Updated Medication List - Protect others around you: Learn how to safely use, store and throw away your medicines at www.disposemymeds.org.          This list is accurate as of: 4/5/17 11:30 AM.  Always use your most recent med list.                   Brand Name  Dispense Instructions for use    albuterol 108 (90 BASE) MCG/ACT Inhaler    PROAIR HFA/PROVENTIL HFA/VENTOLIN HFA    1 Inhaler    Inhale 2 puffs into the lungs every 6 hours as needed for shortness of breath / dyspnea or wheezing       cholecalciferol 5000 UNITS Caps capsule    vitamin D3    100 capsule    Take 1 capsule (5,000 Units) by mouth daily Take 1 per day       doxepin 10 MG capsule    SINEquan    180 capsule    TAKE ONE TO TWO CAPSULES BY MOUTH AT BEDTIME       DULoxetine 60 MG EC capsule    CYMBALTA    180 capsule    TAKE 2 CAPSULES BY MOUTH EVERY DAY       FIBER PO      Two capsules in the morning and two capsules at night       fish oil-omega-3 fatty acids 1000 MG capsule      Take 2 g by mouth daily Takes 1 in am and 1 at bedtime       fluticasone 50 MCG/ACT spray    FLONASE    16 g    Spray 1-2 sprays into both nostrils daily       ipratropium - albuterol 0.5 mg/2.5 mg/3 mL 0.5-2.5 (3) MG/3ML neb solution    DUONEB    30 vial    Take 1 vial (3 mLs) by nebulization every 6 hours as needed for shortness of breath / dyspnea or wheezing       * levothyroxine 150 MCG tablet    SYNTHROID/LEVOTHROID    45 tablet    Take 1 tablet (150 mcg) by mouth every other day       * levothyroxine 175 MCG tablet    SYNTHROID/LEVOTHROID    45 tablet    Take 1 tablet (175 mcg) by mouth every other day       * LORazepam 0.5 MG tablet    ATIVAN    30 tablet    Take 1 tablet (0.5 mg) by mouth nightly as needed for anxiety 30 tablets to last 30 days.       * LORazepam 0.5 MG tablet    ATIVAN    6 tablet    Take 2 po 90 minutes prior to MRI and may repeat x 1 30 minutes prior to MRI       lovastatin 20 MG tablet    MEVACOR    90 tablet    Take 1 tablet (20 mg) by mouth At Bedtime       Multi-vitamin Tabs tablet      Take 1 tablet by mouth daily       NONFORMULARY      Take 20 mg by mouth 2 times daily D-amphetamine Salt combo       OLANZapine 2.5 MG tablet    zyPREXA    90 tablet    Take 1 tablet (2.5 mg) by mouth At Bedtime        omeprazole 40 MG capsule    priLOSEC    90 capsule    Take 1 capsule (40 mg) by mouth daily Take 30-60 minutes before a meal.       ondansetron 4 MG tablet    ZOFRAN    18 tablet    Take 1 tablet (4 mg) by mouth every 6 hours as needed for nausea       prazosin 5 MG capsule    MINIPRESS    30 capsule    Take 1 capsule (5 mg) by mouth At Bedtime Take 1 capsule at bedtime       * tapentadol 50 MG Tabs tablet   Start taking on:  4/6/2017    NUCYNTA    20 tablet    Take 1 tablet (50 mg) by mouth every 6 hours as needed for moderate to severe pain maximum 3 tablet(s) per day. OK to fill 04/05/17 and START 04/06/17       * tapentadol 50 MG Tabs tablet   Start taking on:  4/12/2017    NUCYNTA    90 tablet    Take 1 tablet (50 mg) by mouth every 6 hours as needed for moderate to severe pain Maximum 3 tablet(s) per day. Ok to fill on or after 04/10/17 to start on or after 04/12/17.       tiZANidine 4 MG tablet    ZANAFLEX    180 tablet    Take 1-2 tablets (4-8 mg) by mouth 3 times daily as needed for muscle spasms       topiramate 25 MG tablet    TOPAMAX    70 tablet    Take 1 tablet (25 mg) at bedtime for 1 week, then 1 tablet twice daily for 1 week, then 1 tablet in AM and 2 in PM for 1 week, then 2 tablets twice daily.       * Notice:  This list has 6 medication(s) that are the same as other medications prescribed for you. Read the directions carefully, and ask your doctor or other care provider to review them with you.

## 2017-04-05 NOTE — NURSING NOTE
"Chief Complaint   Patient presents with     Pain     neck, arms,  legs and feet       Initial /62  Pulse 90  SpO2 98% Estimated body mass index is 32.34 kg/(m^2) as calculated from the following:    Height as of 12/23/16: 1.753 m (5' 9\").    Weight as of 3/8/17: 99.3 kg (219 lb).  Medication Reconciliation: nitza Elizabeth CMA   [facility]    "

## 2017-04-06 NOTE — TELEPHONE ENCOUNTER
Is this just an automated refill from the pharmacy?  This was just refilled for 180 tabs on 3/8 by Dr. Reyes.

## 2017-04-07 ENCOUNTER — CARE COORDINATION (OUTPATIENT)
Dept: CARE COORDINATION | Facility: CLINIC | Age: 57
End: 2017-04-07

## 2017-04-07 NOTE — PROGRESS NOTES
Clinic Care Coordination Contact  [facility]/Voicemail    Referral Source: Self-patient/Caregiver  Clinical Data: Care Coordinator Outreach  Outreach attempted x 3.  Left message on voicemail with call back information and requested return call.  Plan: Care Coordinator will mail out care coordination unable to contact letter with care coordinator contact information. Care Coordinator will try to reach patient again in 7-10 business days.    Bell Etienne [facility]  Social Work Care Coordinator  [facility]  Ph: [phone]

## 2017-04-07 NOTE — LETTER
[facility] [address] [place] [address]    [phone]      April 7, 2017      Ilsa Yusuf  [address] [address]    Dear Ilsa,  I am the Clinic Care Coordinator that works with your primary care provider's clinic. I recently tried to call and was unable to reach you to follow up on our last conversation.    Please contact me at [phone].      Sincerely,     Bell Etienne [facility]  Social Work Care Coordinator  [facility]  Ph: [phone]

## 2017-04-10 ENCOUNTER — TELEPHONE (OUTPATIENT)
Dept: FAMILY MEDICINE | Facility: CLINIC | Age: 57
End: 2017-04-10

## 2017-04-10 DIAGNOSIS — H43.399 FLOATERS IN VISUAL FIELD: Primary | ICD-10-CM

## 2017-04-10 NOTE — TELEPHONE ENCOUNTER
Pt calling requesting a call back from Faby Barajas specifically. Pt states she will want to speak with them. Informed pt of message below, but pt only wanted a call back from Faby.    Gabby Chandler  Patient Representative

## 2017-04-10 NOTE — TELEPHONE ENCOUNTER
She got in with [facility] eye care per the back of her insurance card.  Faby Barajas RN

## 2017-04-10 NOTE — TELEPHONE ENCOUNTER
For about 5 days has been having floaters and 1-2 days ago started for flashes is visual field. 5 days of left temple pressure. I gave her a referral to opthalmology. I called Dr. Montiel office and spoke to him about the symptoms and he said he would get her in today.  Faby Barajas RN

## 2017-04-11 DIAGNOSIS — G89.4 CHRONIC PAIN SYNDROME: Primary | ICD-10-CM

## 2017-04-11 RX ORDER — TOPIRAMATE 50 MG/1
50 TABLET, FILM COATED ORAL 2 TIMES DAILY
Qty: 60 TABLET | Refills: 0 | Status: SHIPPED | OUTPATIENT
Start: 2017-04-11 | End: 2017-05-10

## 2017-04-11 NOTE — TELEPHONE ENCOUNTER
Please check with patient regarding the dose she has tapered to.    Nahed Reyes DO  [facility]

## 2017-04-11 NOTE — TELEPHONE ENCOUNTER
Routing to MA pool to collect refill data,     Britney Gomez  BSN-RN Care Coordinator  [facility]

## 2017-04-11 NOTE — TELEPHONE ENCOUNTER
Called patient. She states that she was up to 2 tablets BID and tolerating this just fine. She is requesting 50mg tablets instead of 25mg.  I advised that if available in this strength that I would have script changed, but she would also want to check her strength as she would only take one tablet BID.  She states that she would like this and Tizanidine filled at [facility] in [facility].     Topiramate 50mg, #60, Refill:No  Due: Now    Tizanadine-03/09/17- refill 3 left at pharmacy, Called [facility] to have this transferred from  to [facility].     Britney Gomez  BSN-RN Care Coordinator  [facility]

## 2017-04-11 NOTE — TELEPHONE ENCOUNTER
4/10 1132pm    Refill for [facility] Topiramate. Send to Pharmacy. [facility]. [phone].     Suzanne Thomas    [facility]

## 2017-04-11 NOTE — TELEPHONE ENCOUNTER
Received fax request from [facility]'s pharmacy requesting refill(s) for Topiramate    Last refilled on 03/08/2017    Pt last seen on 04/05/2017  Next appt scheduled for none    Will facilitate refill.

## 2017-04-12 ENCOUNTER — TELEPHONE (OUTPATIENT)
Dept: PALLIATIVE MEDICINE | Facility: CLINIC | Age: 57
End: 2017-04-12

## 2017-04-12 DIAGNOSIS — M54.12 CERVICAL RADICULOPATHY: ICD-10-CM

## 2017-04-12 DIAGNOSIS — G89.4 CHRONIC PAIN SYNDROME: ICD-10-CM

## 2017-04-21 ENCOUNTER — CARE COORDINATION (OUTPATIENT)
Dept: CARE COORDINATION | Facility: CLINIC | Age: 57
End: 2017-04-21

## 2017-04-21 NOTE — PROGRESS NOTES
Clinic Care Coordination Contact  [facility]/Voicemail    Referral Source: Self-patient/Caregiver  Clinical Data: Care Coordinator Outreach  Outreach attempted x 1.  Left message on voicemail with call back information and requested return call.  Plan: . Care Coordinator will try to reach patient again in 7-10 business days. If no contact at that time, will close to care coordination.     Bell Etienne [facility]  Social Work Care Coordinator  [facility]  Ph: [phone]

## 2017-05-01 ENCOUNTER — OFFICE VISIT (OUTPATIENT)
Dept: PALLIATIVE MEDICINE | Facility: CLINIC | Age: 57
End: 2017-05-01
Payer: MEDICARE

## 2017-05-01 ENCOUNTER — OFFICE VISIT (OUTPATIENT)
Dept: FAMILY MEDICINE | Facility: CLINIC | Age: 57
End: 2017-05-01
Payer: COMMERCIAL

## 2017-05-01 VITALS
HEART RATE: 68 BPM | TEMPERATURE: 98.2 F | OXYGEN SATURATION: 98 % | SYSTOLIC BLOOD PRESSURE: 105 MMHG | BODY MASS INDEX: 31.45 KG/M2 | DIASTOLIC BLOOD PRESSURE: 66 MMHG | WEIGHT: 213 LBS

## 2017-05-01 DIAGNOSIS — G40.909 NONINTRACTABLE EPILEPSY WITHOUT STATUS EPILEPTICUS, UNSPECIFIED EPILEPSY TYPE (H): ICD-10-CM

## 2017-05-01 DIAGNOSIS — F90.0 ATTENTION DEFICIT HYPERACTIVITY DISORDER (ADHD), PREDOMINANTLY INATTENTIVE TYPE: Primary | ICD-10-CM

## 2017-05-01 DIAGNOSIS — F51.01 PRIMARY INSOMNIA: ICD-10-CM

## 2017-05-01 DIAGNOSIS — G89.4 CHRONIC PAIN SYNDROME: Primary | ICD-10-CM

## 2017-05-01 DIAGNOSIS — F33.1 MAJOR DEPRESSIVE DISORDER, RECURRENT EPISODE, MODERATE (H): ICD-10-CM

## 2017-05-01 PROCEDURE — 99213 OFFICE O/P EST LOW 20 MIN: CPT | Performed by: FAMILY MEDICINE

## 2017-05-01 PROCEDURE — 97112 NEUROMUSCULAR REEDUCATION: CPT | Mod: GP | Performed by: PHYSICAL THERAPIST

## 2017-05-01 RX ORDER — DEXTROAMPHETAMINE SACCHARATE, AMPHETAMINE ASPARTATE, DEXTROAMPHETAMINE SULFATE AND AMPHETAMINE SULFATE 5; 5; 5; 5 MG/1; MG/1; MG/1; MG/1
20 TABLET ORAL DAILY
Qty: 30 TABLET | Refills: 0 | Status: SHIPPED | OUTPATIENT
Start: 2017-06-30 | End: 2017-07-30

## 2017-05-01 RX ORDER — DEXTROAMPHETAMINE SACCHARATE, AMPHETAMINE ASPARTATE, DEXTROAMPHETAMINE SULFATE AND AMPHETAMINE SULFATE 5; 5; 5; 5 MG/1; MG/1; MG/1; MG/1
20 TABLET ORAL DAILY
Qty: 30 TABLET | Refills: 0 | Status: SHIPPED | OUTPATIENT
Start: 2017-05-01 | End: 2017-05-31

## 2017-05-01 RX ORDER — DEXTROAMPHETAMINE SACCHARATE, AMPHETAMINE ASPARTATE, DEXTROAMPHETAMINE SULFATE AND AMPHETAMINE SULFATE 5; 5; 5; 5 MG/1; MG/1; MG/1; MG/1
20 TABLET ORAL DAILY
Qty: 30 TABLET | Refills: 0 | Status: SHIPPED | OUTPATIENT
Start: 2017-05-31 | End: 2017-06-30

## 2017-05-01 RX ORDER — PRAZOSIN HYDROCHLORIDE 5 MG/1
5 CAPSULE ORAL AT BEDTIME
Qty: 90 CAPSULE | Refills: 3 | Status: SHIPPED | OUTPATIENT
Start: 2017-05-01 | End: 2018-06-04

## 2017-05-01 ASSESSMENT — ENCOUNTER SYMPTOMS
PALPITATIONS: 0
INSOMNIA: 0
SEIZURES: 0

## 2017-05-01 NOTE — NURSING NOTE
"Chief Complaint   Patient presents with     A.D.H.D       Initial /66 (BP Location: Left arm, Patient Position: Chair, Cuff Size: Adult Regular)  Pulse 68  Temp 98.2  F (36.8  C) (Oral)  Wt 213 lb (96.6 kg)  SpO2 98%  BMI 31.45 kg/m2 Estimated body mass index is 31.45 kg/(m^2) as calculated from the following:    Height as of 12/23/16: 5' 9\" (1.753 m).    Weight as of this encounter: 213 lb (96.6 kg).  Medication Reconciliation: complete       Ger Granado MA      "

## 2017-05-01 NOTE — LETTER
My Depression Action Plan  Name: Ilsa Yusuf   Date of Birth 1960  Date: 5/1/2017    My doctor: Catrachita Collado   My clinic: [address] [address]  [phone]          GREEN    ZONE   Good Control    What it looks like:     Things are going generally well. You have normal up s and down s. You may even feel depressed from time to time, but bad moods usually last less than a day.   What you need to do:  1. Continue to care for yourself (see self care plan)  2. Check your depression survival kit and update it as needed  3. Follow your physician s recommendations including any medication.  4. Do not stop taking medication unless you consult with your physician first.           YELLOW         ZONE Getting Worse    What it looks like:     Depression is starting to interfere with your life.     It may be hard to get out of bed; you may be starting to isolate yourself from others.    Symptoms of depression are starting to last most all day and this has happened for several days.     You may have suicidal thoughts but they are not constant.   What you need to do:     1. Call your care team, your response to treatment will improve if you keep your care team informed of your progress. Yellow periods are signs an adjustment may need to be made.     2. Continue your self-care, even if you have to fake it!    3. Talk to someone in your support network    4. Open up your depression survival kit           RED    ZONE Medical Alert - Get Help    What it looks like:     Depression is seriously interfering with your life.     You may experience these or other symptoms: You can t get out of bed most days, can t work or engage in other necessary activities, you have trouble taking care of basic hygiene, or basic responsibilities, thoughts of suicide or death that will not go away, self-injurious behavior.     What you need to do:  1. Call your care  team and request a same-day appointment. If they are not available (weekends or after hours) call your local crisis line, emergency room or 911.      Electronically signed by: Ger Granado, May 1, 2017    Depression Self Care Plan / Survival Kit    Self-Care for Depression  Here s the deal. Your body and mind are really not as separate as most people think.  What you do and think affects how you feel and how you feel influences what you do and think. This means if you do things that people who feel good do, it will help you feel better.  Sometimes this is all it takes.  There is also a place for medication and therapy depending on how severe your depression is, so be sure to consult with your medical provider and/ or Behavioral Health Consultant if your symptoms are worsening or not improving.     In order to better manage my stress, I will:    Exercise  Get some form of exercise, every day. This will help reduce pain and release endorphins, the  feel good  chemicals in your brain. This is almost as good as taking antidepressants!  This is not the same as joining a gym and then never going! (they count on that by the way ) It can be as simple as just going for a walk or doing some gardening, anything that will get you moving.      Hygiene   Maintain good hygiene (Get out of bed in the morning, Make your bed, Brush your teeth, Take a shower, and Get dressed like you were going to work, even if you are unemployed).  If your clothes don't fit try to get ones that do.    Diet  I will strive to eat foods that are good for me, drink plenty of water, and avoid excessive sugar, caffeine, alcohol, and other mood-altering substances.  Some foods that are helpful in depression are: complex carbohydrates, B vitamins, flaxseed, fish or fish oil, fresh fruits and vegetables.    Psychotherapy  I agree to participate in Individual Therapy (if recommended).    Medication  If prescribed medications, I agree to take them.  Missing doses  can result in serious side effects.  I understand that drinking alcohol, or other illicit drug use, may cause potential side effects.  I will not stop my medication abruptly without first discussing it with my provider.    Staying Connected With Others  I will stay in touch with my friends, family members, and my primary care provider/team.    Use your imagination  Be creative.  We all have a creative side; it doesn t matter if it s oil painting, sand castles, or mud pies! This will also kick up the endorphins.    Witness Beauty  (AKA stop and smell the roses) Take a look outside, even in mid-winter. Notice colors, textures. Watch the squirrels and birds.     Service to others  Be of service to others.  There is always someone else in need.  By helping others we can  get out of ourselves  and remember the really important things.  This also provides opportunities for practicing all the other parts of the program.    Humor  Laugh and be silly!  Adjust your TV habits for less news and crime-drama and more comedy.    Control your stress  Try breathing deep, massage therapy, biofeedback, and meditation. Find time to relax each day.     My support system    Clinic Contact:  Phone number:    Contact 1:  Phone number:    Contact 2:  Phone number:    [employer]/:  Phone number:    Therapist:  Phone number:    Local crisis center:    Phone number:    Other community support:  Phone number:

## 2017-05-01 NOTE — PROGRESS NOTES
Pain Physical Therapy Progress Note    Patient Name: Ilsa Yusuf     YOB: 1960     Medical Record Number: [id number]    Visits: 2/13    Diagnosis: Chronic pain syndrome    Subjective: Patient was las seen for initial evaluation 3/08.  Since then patient reports 95% pain reduction due to medication changes.  Pt reports current pain level 2-3/10 and fatigue level 7-8/10.  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  Reports work tolerance at 3-4 hours.  Pt notes improved tolerance with functional activities since start of care including walking 20', sitting 30', standing 15', house work 40' with pacing, sleep delayed 3-4 nights/week and able to sleep without waking.  Pt states she would like to learn an exercise program to help decrease tightness in her shoulders and neck.  .    Self Care  HEP: next  Walking/Aerobic Activity: next  Posture: next  Breathing/Relaxation: next  Heat/Ice: next  Mini Breaks: next  Pacing: next      Objective Findings:  OBSERVATION: pt appears less anxious and guarded .  Noted muscle tension and impaired mobility throughout head and neck .  Gait is mildly antalgic due to knee pain.  Noted impaired arm swing bilaterally with walking.  Instructed patient in beginning supine kinesthetic body scan and side lying right shoulder glides.  Noted significant myofacial restrictions and muscle guarding right scapular/neck area.      Treatment Interventions:  Neuromuscular Reeducation:   For 45 minutes including instruction in kinesthetic body scan and side lying shoulder glides, 4 cardinal planes  __________________________________________________________________  Instruction on home program: next    Assessment:  Ongoing Functional Limitations Include:  Patient tolerated/responded well to treatment    Intensity Level: 3 (1=low intensity; 5=high intensity)  Demonstrates/Verbalizes Technique: 3 (1= poor technique-difficulty  performing exercises,significant cues required; 5= good technique-performs exercises without cues)  Body Awareness: 3 (1=low awareness; 5=high awareness)  Posture/Stability: 3 (1= poor posture, stability; 5= good posture, stability)  Motivational Level: Cooperative  Response to Teaching: cooperative  Factors that affect learning: None  _______________________________________________________________________  Plan of Care   Continue PT to support reactivation and integration of self regulation pain management skills;  Focus of next session will be on: progress shoulder glides, consider reach and roll, add pacing, mini breaks and body scan     Present:  CLAUDETTE     Total Visit Time:  45 minutes    Therapist: Renee Cox PT             Date: 5/1/2017  G Code 2/10

## 2017-05-01 NOTE — PROGRESS NOTES
"HPI CC: 57 yo F presents for refill of Adderall.     ADHD    Onset: ongoing      Description:   Easily distracted: YES  Short attention span: YES  Trouble following directions: YES   Impulsive behavior: YES   Trouble completing tasks: YES    Accompanying Signs & Symptoms:        Change in sleep pattern: no  Irritability at certain times of the day: no  Socially withdrawn: no  Depression symptoms: YES  Anxiety symptoms: no    History:  Caffeine intake: Small  Loss of appetite: no  Healthy diet: YES  Did you have problems in school or with previous employment: YES  Family history of ADHD: YES- pt's sister   Have you had an evaluation for ADHD in the past: YES  Do you use alcohol or drugs: no    Therapies tried: Adderall (concerta) with total relief        Deanne states she has been taking Adderall for the past 4 years or so.  She states that her psychiatrist in [facility] started her on this, in the context of severe depression.  She states she did also have \"cognitive testing.\"  She had great improvement of her mood as well as ability to concentrate, to read and retain information and to put her thoughts together.  She was prescribed BID dosing, but notes she does not think she needs the afternoon dose usually, which is why she hasn't needed a refill for so long.  She has been without it for about 3 weeks now, and she is struggling with focus, ability to read, etc.  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  She denies any s/e such as anxiety, palpitations, or insomnia in the past with this medication.      Radiculopathy: she started on topiramate-took away the buzzing in her arms.  She is really doing well with her team at the Pain management clinic.      Possible seizure: some years ago, she was shopping in [place] when she started to feel \"ooky,\" then saw spots in her her vision and cuts in vision and lost consciousness; " "follow up EEG normal.    She blames hypoglycemia, though she says that when medics arrived, they checked her blood sugar and it was normal.  However, she has checked on her own and found her blood sugars to be 40-50s in the past (not recently).     Review of Systems   Cardiovascular: Negative for chest pain and palpitations.   Neurological: Negative for seizures.   Psychiatric/Behavioral: The patient does not have insomnia.        Allergies   Allergen Reactions     Gabapentin Other (See Comments)     Patient states she gets \"horrific nightmares\" with this medication     Dilaudid [Hydromorphone Hcl]      Made her feel like her skin was crawling     Compazine [Prochlorperazine] Other (See Comments)     \"Makes my skin crawl, I was going nuts.\"     Current Outpatient Prescriptions   Medication     prazosin (MINIPRESS) 5 MG capsule     amphetamine-dextroamphetamine (ADDERALL) 20 MG per tablet     [START ON 5/31/2017] amphetamine-dextroamphetamine (ADDERALL) 20 MG per tablet     [START ON 6/30/2017] amphetamine-dextroamphetamine (ADDERALL) 20 MG per tablet     topiramate (TOPAMAX) 50 MG tablet     tapentadol (NUCYNTA) 50 MG TABS tablet     tapentadol (NUCYNTA) 50 MG TABS tablet     OLANZapine (ZYPREXA) 2.5 MG tablet     doxepin (SINEQUAN) 10 MG capsule     DULoxetine (CYMBALTA) 60 MG EC capsule     tiZANidine (ZANAFLEX) 4 MG tablet     omeprazole (PRILOSEC) 40 MG capsule     levothyroxine (SYNTHROID/LEVOTHROID) 150 MCG tablet     levothyroxine (SYNTHROID/LEVOTHROID) 175 MCG tablet     LORazepam (ATIVAN) 0.5 MG tablet     lovastatin (MEVACOR) 20 MG tablet     FIBER PO     LORazepam (ATIVAN) 0.5 MG tablet     ondansetron (ZOFRAN) 4 MG tablet     fluticasone (FLONASE) 50 MCG/ACT nasal spray     NONFORMULARY     cholecalciferol (VITAMIN D3) 5000 UNITS CAPS capsule     multivitamin, therapeutic with minerals (MULTI-VITAMIN) TABS     fish oil-omega-3 fatty acids (OMEGA 3) 1000 MG capsule     [DISCONTINUED] prazosin (MINIPRESS) " 5 MG capsule     No current facility-administered medications for this visit.      Active Ambulatory Problems     Diagnosis Date Noted     Major depressive disorder, recurrent episode, moderate (H) 01/18/2012     PTSD (post-traumatic stress disorder) 01/18/2012     Acquired hypothyroidism 01/18/2012     Hyperlipidemia LDL goal <130 01/30/2012     CKD (chronic kidney disease) stage 3, GFR 30-59 ml/min 03/09/2015     Esophageal reflux 04/13/2015     Primary insomnia 06/02/2015     Obesity 06/21/2015     Attention-deficit hyperactivity disorder, predominantly hyperactive type 04/19/2016     Neck pain 09/29/2016     Cervical radiculopathy 10/28/2016     Partner relationship problems 12/05/2016     Primary osteoarthritis of right knee 12/26/2016     Peripheral tear of medial meniscus of right knee, unspecified whether old or current tear, initial encounter 12/26/2016     Resolved Ambulatory Problems     Diagnosis Date Noted     Seizure disorder (H) 01/18/2012     Nausea, vomiting and diarrhea 04/13/2016     Norovirus 04/14/2016     Viral gastroenteritis 04/14/2016     Past Medical History:   Diagnosis Date     Arthritis 2012     Depressive disorder      Depressive disorder, not elsewhere classified 08/28/12     Hyperlipidemia LDL goal < 130      Hypothyroid      Moderate major depression (H)      PTSD (post-traumatic stress disorder)      Seizure (H) 3/2011     Seizure disorder (H) 1/18/2012       Physical Exam   Constitutional: She is well-developed, well-nourished, and in no distress.   Eyes: Conjunctivae and EOM are normal. Pupils are equal, round, and reactive to light. Right eye exhibits no discharge. Left eye exhibits no discharge. No scleral icterus.   Cardiovascular: Normal rate, regular rhythm and normal heart sounds.  Exam reveals no gallop and no friction rub.    No murmur heard.  Pulmonary/Chest: Effort normal and breath sounds normal. No respiratory distress. She has no wheezes. She has no rales.    Neurological: She is alert.   Skin: She is not diaphoretic.   Psychiatric: Affect normal.   Vitals reviewed.      /66 (BP Location: Left arm, Patient Position: Chair, Cuff Size: Adult Regular)  Pulse 68  Temp 98.2  F (36.8  C) (Oral)  Wt 213 lb (96.6 kg)  SpO2 98%  BMI 31.45 kg/m2    A/P  I am not completely clear on whether a diagnosis of ADHD was made, or whether the intent was more to use it for depression and low energy.  She cannot function well without it, and I will refill it for her.  It looks like she was on Nuvigil in the past as well.  She states she is planning for f/u with Dr. Hill for psychiatry.  It sounds like she is doing well with her team at the pain management clinic.  I appreciate the good care she receives with her specialists on the pain management and mental health teams.     ?seizure: evaluated by a neurologist in [place], EEG negative per chart review, but was on keppra as of 2012.  She has not been on keppra and has had no further episodes.  I will resolve this from her problem list.

## 2017-05-01 NOTE — MR AVS SNAPSHOT
After Visit Summary   5/1/2017    Ilsa Yusuf    MRN: [id number]           Patient Information     Date Of Birth          1960        Visit Information        Provider Department      5/1/2017 11:00 AM Catrachita Collado MD [facility]        Today's Diagnoses     Major depressive disorder, recurrent episode, moderate (H)    -  1    Primary insomnia        Attention deficit hyperactivity disorder (ADHD), predominantly inattentive type          Care Instructions    Restarting med, let me know if any side effects          Follow-ups after your visit        Your next 10 appointments already scheduled     May 01, 2017  1:00 PM CDT   New Visit with Renee Cox PT   [facility])    [address]  [address]  [facility] [address]   [phone]            May 03, 2017  1:30 PM CDT   Return Visit with Nahed Reyes DO   [facility] ([facility])    [address]  [address]  [facility] [address]   [phone]              Who to contact     If you have questions or need follow up information about today's clinic visit or your schedule please contact [facility] directly at [phone].  Normal or non-critical lab and imaging results will be communicated to you by MyChart, letter or phone within 4 business days after the clinic has received the results. If you do not hear from us within 7 days, please contact the clinic through [employer] or phone. If you have a critical or abnormal lab result, we will notify you by phone as soon as possible.  Submit refill requests through [employer] or call your pharmacy and they will forward the refill request to us. Please allow 3 business days for your refill to be completed.          Additional Information About Your Visit        [employer] Information     [employer] gives you secure access to your electronic health  record. If you see a primary care provider, you can also send messages to your care team and make appointments. If you have questions, please call your primary care clinic.  If you do not have a primary care provider, please call [phone] and they will assist you.        Care EveryWhere ID     This is your Care EveryWhere ID. This could be used by other organizations to access your [place] medical records  [id number]        Your Vitals Were     Pulse Temperature Pulse Oximetry BMI (Body Mass Index)          68 98.2  F (36.8  C) (Oral) 98% 31.45 kg/m2         Blood Pressure from Last 3 Encounters:   05/01/17 105/66   04/05/17 122/62   03/10/17 121/83    Weight from Last 3 Encounters:   05/01/17 213 lb (96.6 kg)   03/08/17 219 lb (99.3 kg)   02/06/17 219 lb (99.3 kg)              We Performed the Following     DEPRESSION ACTION PLAN (DAP)          Today's Medication Changes          These changes are accurate as of: 5/1/17 11:56 AM.  If you have any questions, ask your nurse or doctor.               Start taking these medicines.        Dose/Directions    * amphetamine-dextroamphetamine 20 MG per tablet   Commonly known as:  ADDERALL   Used for:  Attention deficit hyperactivity disorder (ADHD), predominantly inattentive type   Started by:  Catrachita Collado MD        Dose:  20 mg   Take 1 tablet (20 mg) by mouth daily   Quantity:  30 tablet   Refills:  0       * amphetamine-dextroamphetamine 20 MG per tablet   Commonly known as:  ADDERALL   Used for:  Attention deficit hyperactivity disorder (ADHD), predominantly inattentive type   Started by:  Catrachita Collado MD        Dose:  20 mg   Start taking on:  5/31/2017   Take 1 tablet (20 mg) by mouth daily   Quantity:  30 tablet   Refills:  0       * amphetamine-dextroamphetamine 20 MG per tablet   Commonly known as:  ADDERALL   Used for:  Attention deficit hyperactivity disorder (ADHD), predominantly inattentive type   Started by:  Catrachita Collado MD         Dose:  20 mg   Start taking on:  6/30/2017   Take 1 tablet (20 mg) by mouth daily   Quantity:  30 tablet   Refills:  0       * Notice:  This list has 3 medication(s) that are the same as other medications prescribed for you. Read the directions carefully, and ask your doctor or other care provider to review them with you.      These medicines have changed or have updated prescriptions.        Dose/Directions    * levothyroxine 150 MCG tablet   Commonly known as:  SYNTHROID/LEVOTHROID   This may have changed:  additional instructions   Used for:  Acquired hypothyroidism        Dose:  150 mcg   Take 1 tablet (150 mcg) by mouth every other day   Quantity:  45 tablet   Refills:  3       * levothyroxine 175 MCG tablet   Commonly known as:  SYNTHROID/LEVOTHROID   This may have changed:  additional instructions   Used for:  Acquired hypothyroidism        Dose:  175 mcg   Take 1 tablet (175 mcg) by mouth every other day   Quantity:  45 tablet   Refills:  3       * Notice:  This list has 2 medication(s) that are the same as other medications prescribed for you. Read the directions carefully, and ask your doctor or other care provider to review them with you.      Stop taking these medicines if you haven't already. Please contact your care team if you have questions.     albuterol 108 (90 BASE) MCG/ACT Inhaler   Commonly known as:  PROAIR HFA/PROVENTIL HFA/VENTOLIN HFA   Stopped by:  Catrachita Collado MD           ipratropium - albuterol 0.5 mg/2.5 mg/3 mL 0.5-2.5 (3) MG/3ML neb solution   Commonly known as:  DUONEB   Stopped by:  Catrachita Collado MD                Where to get your medicines      These medications were sent to [facility] [address] [place] [address] & [address] [address]    Hours:  24-hours Phone:  [phone]     prazosin 5 MG capsule         Some of these will need a paper prescription and others can be bought over the  counter.  Ask your nurse if you have questions.     Bring a paper prescription for each of these medications     amphetamine-dextroamphetamine 20 MG per tablet    amphetamine-dextroamphetamine 20 MG per tablet    amphetamine-dextroamphetamine 20 MG per tablet                Primary Care Provider Office Phone # Fax #    Catrachita Collado [phone]       [facility] [address] [place] [address]        Thank you!     Thank you for choosing [facility]  for your care. Our goal is always to provide you with excellent care. Hearing back from our patients is one way we can continue to improve our services. Please take a few minutes to complete the written survey that you may receive in the mail after your visit with us. Thank you!             Your Updated Medication List - Protect others around you: Learn how to safely use, store and throw away your medicines at www.disposemymeds.org.          This list is accurate as of: 5/1/17 11:56 AM.  Always use your most recent med list.                   Brand Name Dispense Instructions for use    * amphetamine-dextroamphetamine 20 MG per tablet    ADDERALL    30 tablet    Take 1 tablet (20 mg) by mouth daily       * amphetamine-dextroamphetamine 20 MG per tablet   Start taking on:  5/31/2017    ADDERALL    30 tablet    Take 1 tablet (20 mg) by mouth daily       * amphetamine-dextroamphetamine 20 MG per tablet   Start taking on:  6/30/2017    ADDERALL    30 tablet    Take 1 tablet (20 mg) by mouth daily       cholecalciferol 5000 UNITS Caps capsule    vitamin D3    100 capsule    Take 1 capsule (5,000 Units) by mouth daily Take 1 per day       doxepin 10 MG capsule    SINEquan    180 capsule    TAKE ONE TO TWO CAPSULES BY MOUTH AT BEDTIME       DULoxetine 60 MG EC capsule    CYMBALTA    180 capsule    TAKE 2 CAPSULES BY MOUTH EVERY DAY       FIBER PO      Two capsules in the morning and two capsules at night       fish  oil-omega-3 fatty acids 1000 MG capsule      Take 2 g by mouth daily Takes 1 in am and 1 at bedtime       fluticasone 50 MCG/ACT spray    FLONASE    16 g    Spray 1-2 sprays into both nostrils daily       * levothyroxine 150 MCG tablet    SYNTHROID/LEVOTHROID    45 tablet    Take 1 tablet (150 mcg) by mouth every other day       * levothyroxine 175 MCG tablet    SYNTHROID/LEVOTHROID    45 tablet    Take 1 tablet (175 mcg) by mouth every other day       * LORazepam 0.5 MG tablet    ATIVAN    30 tablet    Take 1 tablet (0.5 mg) by mouth nightly as needed for anxiety 30 tablets to last 30 days.       * LORazepam 0.5 MG tablet    ATIVAN    6 tablet    Take 2 po 90 minutes prior to MRI and may repeat x 1 30 minutes prior to MRI       lovastatin 20 MG tablet    MEVACOR    90 tablet    Take 1 tablet (20 mg) by mouth At Bedtime       Multi-vitamin Tabs tablet      Take 1 tablet by mouth daily       NONFORMULARY      Take 20 mg by mouth 2 times daily D-amphetamine Salt combo       OLANZapine 2.5 MG tablet    zyPREXA    90 tablet    Take 1 tablet (2.5 mg) by mouth At Bedtime       omeprazole 40 MG capsule    priLOSEC    90 capsule    Take 1 capsule (40 mg) by mouth daily Take 30-60 minutes before a meal.       ondansetron 4 MG tablet    ZOFRAN    18 tablet    Take 1 tablet (4 mg) by mouth every 6 hours as needed for nausea       prazosin 5 MG capsule    MINIPRESS    90 capsule    Take 1 capsule (5 mg) by mouth At Bedtime Take 1 capsule at bedtime       * tapentadol 50 MG Tabs tablet    NUCYNTA    20 tablet    Take 1 tablet (50 mg) by mouth every 6 hours as needed for moderate to severe pain maximum 3 tablet(s) per day. OK to fill 04/05/17 and START 04/06/17       * tapentadol 50 MG Tabs tablet    NUCYNTA    90 tablet    Take 1 tablet (50 mg) by mouth every 6 hours as needed for moderate to severe pain Maximum 3 tablet(s) per day. Ok to fill on or after 04/10/17 to start on or after 04/12/17.       tiZANidine 4 MG tablet     ZANAFLEX    180 tablet    Take 1-2 tablets (4-8 mg) by mouth 3 times daily as needed for muscle spasms       topiramate 50 MG tablet    TOPAMAX    60 tablet    Take 1 tablet (50 mg) by mouth 2 times daily       * Notice:  This list has 9 medication(s) that are the same as other medications prescribed for you. Read the directions carefully, and ask your doctor or other care provider to review them with you.

## 2017-05-01 NOTE — MR AVS SNAPSHOT
After Visit Summary   5/1/2017    Ilsa Yusuf    MRN: [id number]           Patient Information     Date Of Birth          1960        Visit Information        Provider Department      5/1/2017 1:00 PM Renee Cox PT [facility]        Today's Diagnoses     Chronic pain syndrome    -  1       Follow-ups after your visit        Your next 10 appointments already scheduled     May 03, 2017  1:30 PM CDT   Return Visit with Nahed Reyes DO   [facility] ([facility])    [id number] [place] [id number]   [phone]            May 10, 2017  1:45 PM CDT   Return Visit with Renee Cox PT   [facility] ([facility])    [address] [address] [id number] [phone]              Who to contact     If you have questions or need follow up information about today's clinic visit or your schedule please contact [facility] directly at [phone].  Normal or non-critical lab and imaging results will be communicated to you by Ringhart, letter or phone within 4 business days after the clinic has received the results. If you do not hear from us within 7 days, please contact the clinic through SocialGlimpzt or phone. If you have a critical or abnormal lab result, we will notify you by phone as soon as possible.  Submit refill requests through Winning Pitch or call your pharmacy and they will forward the refill request to us. Please allow 3 business days for your refill to be completed.          Additional Information About Your Visit        [employer] Information     Winning Pitch gives you secure access to your electronic health record. If you see a primary care provider, you can also send messages to your care team and make appointments. If you have questions, please call your primary care clinic.  If you do not have a primary care provider, please call  [phone] and they will assist you.        Care EveryWhere ID     This is your Care EveryWhere ID. This could be used by other organizations to access your [facility] medical records  [id number]         Blood Pressure from Last 3 Encounters:   05/01/17 105/66   04/05/17 122/62   03/10/17 121/83    Weight from Last 3 Encounters:   05/01/17 96.6 kg (213 lb)   03/08/17 99.3 kg (219 lb)   02/06/17 99.3 kg (219 lb)              We Performed the Following     NEUROMUSCULAR RE-EDUCATION          Today's Medication Changes          These changes are accurate as of: 5/1/17 11:59 PM.  If you have any questions, ask your nurse or doctor.               Start taking these medicines.        Dose/Directions    * amphetamine-dextroamphetamine 20 MG per tablet   Commonly known as:  ADDERALL   Used for:  Attention deficit hyperactivity disorder (ADHD), predominantly inattentive type   Started by:  Catrachita Collado MD        Dose:  20 mg   Take 1 tablet (20 mg) by mouth daily   Quantity:  30 tablet   Refills:  0       * amphetamine-dextroamphetamine 20 MG per tablet   Commonly known as:  ADDERALL   Used for:  Attention deficit hyperactivity disorder (ADHD), predominantly inattentive type   Started by:  Catrachita Collado MD        Dose:  20 mg   Start taking on:  5/31/2017   Take 1 tablet (20 mg) by mouth daily   Quantity:  30 tablet   Refills:  0       * amphetamine-dextroamphetamine 20 MG per tablet   Commonly known as:  ADDERALL   Used for:  Attention deficit hyperactivity disorder (ADHD), predominantly inattentive type   Started by:  Catrachita Collado MD        Dose:  20 mg   Start taking on:  6/30/2017   Take 1 tablet (20 mg) by mouth daily   Quantity:  30 tablet   Refills:  0       * Notice:  This list has 3 medication(s) that are the same as other medications prescribed for you. Read the directions carefully, and ask your doctor or other care provider to review them with you.      These medicines have changed or have  updated prescriptions.        Dose/Directions    * levothyroxine 150 MCG tablet   Commonly known as:  SYNTHROID/LEVOTHROID   This may have changed:  additional instructions   Used for:  Acquired hypothyroidism        Dose:  150 mcg   Take 1 tablet (150 mcg) by mouth every other day   Quantity:  45 tablet   Refills:  3       * levothyroxine 175 MCG tablet   Commonly known as:  SYNTHROID/LEVOTHROID   This may have changed:  additional instructions   Used for:  Acquired hypothyroidism        Dose:  175 mcg   Take 1 tablet (175 mcg) by mouth every other day   Quantity:  45 tablet   Refills:  3       * Notice:  This list has 2 medication(s) that are the same as other medications prescribed for you. Read the directions carefully, and ask your doctor or other care provider to review them with you.      Stop taking these medicines if you haven't already. Please contact your care team if you have questions.     albuterol 108 (90 BASE) MCG/ACT Inhaler   Commonly known as:  PROAIR HFA/PROVENTIL HFA/VENTOLIN HFA   Stopped by:  Catrachita Collado MD           ipratropium - albuterol 0.5 mg/2.5 mg/3 mL 0.5-2.5 (3) MG/3ML neb solution   Commonly known as:  DUONEB   Stopped by:  Catrachita Collado MD                Where to get your medicines      [facility] [address] [place] [address] & [address] [address]    Hours:  24-hours Phone:  [phone]     prazosin 5 MG capsule         Some of these will need a paper prescription and others can be bought over the counter.  Ask your nurse if you have questions.     Bring a paper prescription for each of these medications     amphetamine-dextroamphetamine 20 MG per tablet    amphetamine-dextroamphetamine 20 MG per tablet    amphetamine-dextroamphetamine 20 MG per tablet                Primary Care Provider Office Phone # Fax #    Catrachita Collado [phone]  [phone]       [facility] [address]  [place] [address]        Thank you!     Thank you for choosing [facility]  for your care. Our goal is always to provide you with excellent care. Hearing back from our patients is one way we can continue to improve our services. Please take a few minutes to complete the written survey that you may receive in the mail after your visit with us. Thank you!             Your Updated Medication List - Protect others around you: Learn how to safely use, store and throw away your medicines at www.disposemymeds.org.          This list is accurate as of: 5/1/17 11:59 PM.  Always use your most recent med list.                   Brand Name Dispense Instructions for use    * amphetamine-dextroamphetamine 20 MG per tablet    ADDERALL    30 tablet    Take 1 tablet (20 mg) by mouth daily       * amphetamine-dextroamphetamine 20 MG per tablet   Start taking on:  5/31/2017    ADDERALL    30 tablet    Take 1 tablet (20 mg) by mouth daily       * amphetamine-dextroamphetamine 20 MG per tablet   Start taking on:  6/30/2017    ADDERALL    30 tablet    Take 1 tablet (20 mg) by mouth daily       cholecalciferol 5000 UNITS Caps capsule    vitamin D3    100 capsule    Take 1 capsule (5,000 Units) by mouth daily Take 1 per day       doxepin 10 MG capsule    SINEquan    180 capsule    TAKE ONE TO TWO CAPSULES BY MOUTH AT BEDTIME       DULoxetine 60 MG EC capsule    CYMBALTA    180 capsule    TAKE 2 CAPSULES BY MOUTH EVERY DAY       FIBER PO      Two capsules in the morning and two capsules at night       fish oil-omega-3 fatty acids 1000 MG capsule      Take 2 g by mouth daily Takes 1 in am and 1 at bedtime       fluticasone 50 MCG/ACT spray    FLONASE    16 g    Spray 1-2 sprays into both nostrils daily       * levothyroxine 150 MCG tablet    SYNTHROID/LEVOTHROID    45 tablet    Take 1 tablet (150 mcg) by mouth every other day       * levothyroxine 175 MCG  tablet    SYNTHROID/LEVOTHROID    45 tablet    Take 1 tablet (175 mcg) by mouth every other day       * LORazepam 0.5 MG tablet    ATIVAN    30 tablet    Take 1 tablet (0.5 mg) by mouth nightly as needed for anxiety 30 tablets to last 30 days.       * LORazepam 0.5 MG tablet    ATIVAN    6 tablet    Take 2 po 90 minutes prior to MRI and may repeat x 1 30 minutes prior to MRI       lovastatin 20 MG tablet    MEVACOR    90 tablet    Take 1 tablet (20 mg) by mouth At Bedtime       Multi-vitamin Tabs tablet      Take 1 tablet by mouth daily       NONFORMULARY      Take 20 mg by mouth 2 times daily D-amphetamine Salt combo       OLANZapine 2.5 MG tablet    zyPREXA    90 tablet    Take 1 tablet (2.5 mg) by mouth At Bedtime       omeprazole 40 MG capsule    priLOSEC    90 capsule    Take 1 capsule (40 mg) by mouth daily Take 30-60 minutes before a meal.       ondansetron 4 MG tablet    ZOFRAN    18 tablet    Take 1 tablet (4 mg) by mouth every 6 hours as needed for nausea       prazosin 5 MG capsule    MINIPRESS    90 capsule    Take 1 capsule (5 mg) by mouth At Bedtime Take 1 capsule at bedtime       * tapentadol 50 MG Tabs tablet    NUCYNTA    20 tablet    Take 1 tablet (50 mg) by mouth every 6 hours as needed for moderate to severe pain maximum 3 tablet(s) per day. OK to fill 04/05/17 and START 04/06/17       * tapentadol 50 MG Tabs tablet    NUCYNTA    90 tablet    Take 1 tablet (50 mg) by mouth every 6 hours as needed for moderate to severe pain Maximum 3 tablet(s) per day. Ok to fill on or after 04/10/17 to start on or after 04/12/17.       tiZANidine 4 MG tablet    ZANAFLEX    180 tablet    Take 1-2 tablets (4-8 mg) by mouth 3 times daily as needed for muscle spasms       topiramate 50 MG tablet    TOPAMAX    60 tablet    Take 1 tablet (50 mg) by mouth 2 times daily       * Notice:  This list has 9 medication(s) that are the same as other medications prescribed for you. Read the directions carefully, and ask your  doctor or other care provider to review them with you.

## 2017-05-03 RX ORDER — PRAZOSIN HYDROCHLORIDE 5 MG/1
CAPSULE ORAL
Qty: 0.1 CAPSULE | Refills: 0 | OUTPATIENT
Start: 2017-05-03

## 2017-05-10 ENCOUNTER — TELEPHONE (OUTPATIENT)
Dept: PALLIATIVE MEDICINE | Facility: CLINIC | Age: 57
End: 2017-05-10

## 2017-05-10 ENCOUNTER — OFFICE VISIT (OUTPATIENT)
Dept: PALLIATIVE MEDICINE | Facility: CLINIC | Age: 57
End: 2017-05-10
Payer: MEDICARE

## 2017-05-10 ENCOUNTER — OFFICE VISIT (OUTPATIENT)
Dept: PALLIATIVE MEDICINE | Facility: CLINIC | Age: 57
End: 2017-05-10
Payer: COMMERCIAL

## 2017-05-10 VITALS
DIASTOLIC BLOOD PRESSURE: 68 MMHG | OXYGEN SATURATION: 95 % | SYSTOLIC BLOOD PRESSURE: 110 MMHG | HEART RATE: 79 BPM | BODY MASS INDEX: 31.6 KG/M2 | WEIGHT: 214 LBS

## 2017-05-10 DIAGNOSIS — G89.4 CHRONIC PAIN SYNDROME: ICD-10-CM

## 2017-05-10 DIAGNOSIS — G89.29 CHRONIC PAIN: Primary | ICD-10-CM

## 2017-05-10 DIAGNOSIS — M54.12 CERVICAL RADICULOPATHY: ICD-10-CM

## 2017-05-10 DIAGNOSIS — M79.18 MYOFASCIAL PAIN SYNDROME: Primary | ICD-10-CM

## 2017-05-10 PROCEDURE — 97530 THERAPEUTIC ACTIVITIES: CPT | Mod: GP | Performed by: PHYSICAL THERAPIST

## 2017-05-10 PROCEDURE — 99214 OFFICE O/P EST MOD 30 MIN: CPT | Performed by: PHYSICAL MEDICINE & REHABILITATION

## 2017-05-10 PROCEDURE — 97032 APPL MODALITY 1+ESTIM EA 15: CPT | Mod: GP | Performed by: PHYSICAL THERAPIST

## 2017-05-10 RX ORDER — TOPIRAMATE 50 MG/1
50-100 TABLET, FILM COATED ORAL 2 TIMES DAILY
Qty: 120 TABLET | Refills: 3 | Status: SHIPPED | OUTPATIENT
Start: 2017-05-10 | End: 2017-08-25

## 2017-05-10 ASSESSMENT — PAIN SCALES - GENERAL: PAINLEVEL: MODERATE PAIN (5)

## 2017-05-10 NOTE — PROGRESS NOTES
[facility]    Date of visit: 5/10/2017    Chief complaint:   Chief Complaint   Patient presents with     Pain     Follow up       Interval history:  Ilsa Yusuf is a 56 year old female last seen by me on 4/5/17.    Since her last visit, Ilsa Yusuf reports:  -Over the past 2 weeks, her pain is gradually getting worse specifically in her neck and upper back. She is also feeling achiness in her joints including hands, feet, and ankles. She is having more frequent headaches about 5 per week. Non-pulsating. Worsened with activity. No aura. No nausea. No photophobia or phonophobia. No new injury. She continues to pace herself with activities. She learned how to vacuum in a new way at PT.   -Saw Renee twice for PT and will see her again today.  -Since she increased topiramate to 50 mg twice a day, the arm symptoms have completely resolved. She also continues to take nucynta. Tizanidine provides significant relief of spasms.  -Saw Dr. Humphries today for an initial visit and is planning to continue to follow up.   -Saw the ophthalmologist regarding floaters and arching lights and will follow up next week.   -Continues to have neck pain that radiates toward her shoulders but no longer down her arms. The pain is described as burning, tiring, exhausting, penetrating, sharp, stabbing, gnawing, and miserable. The pain is rated 4-8/10. Overall, her pain is a little worse. Aggravated by staying in any one position for too long or not pacing. Relieved by changing positions, guided imagery, physical therapy and pain medication -nucynta and tizanidine, which brings the pain down to about 4/10.   -She reports continuing home guided imagery therapy using a CD she has and home massage. She is also trying to pace her physical activities on her own to help with pain.    THE 4 A's OF OPIOID MAINTENANCE ANALGESIA    Analgesia: bring pain down 4 points    Activity: volunteering  for [employer]    Adverse effects: none    Adherence to Rx protocol: good      Pain scores:  Pain intensity on average is 5 (down from 6) on a scale of 0-10.     Current pain treatments:   Nucynta 50 mg, 2-3 tablets/day, max #3/day, beneficial  Tizanidine 8-8-12 mg  topiramate 50 mg bid  Ativan -none for several months  Doxepin 10-20 mg qhs  cymbalta 120 mg daily  olanzapine 2.5 mg qhs    Side Effects: no side effect    Past pain treatments:  Ilsa Yusuf has not been seen at a pain clinic in the past.     Medications:                NSAIDs: ibuprofen -some relief, medrol dose юлия -helped while taking it.              Anticonvulsants: gabapentin -horrible nightmares                Opioids: tramadol -provided incomplete relief; norco 5-325 mg- beneficial  PT: [facility] completed in Oct 2016 -helped  Psychology: guided imagery  Acupuncture: no  Chiropractic care: no  TENS Unit: no  Injections: cervical epidural steroid injection 9/20/16 -flared up her pain for about 3 days and did not get significant relief  Surgery: no cervical surgery, left knee meniscus surgery in 2012    Medications:  Current Outpatient Prescriptions   Medication Sig Dispense Refill     prazosin (MINIPRESS) 5 MG capsule Take 1 capsule (5 mg) by mouth At Bedtime Take 1 capsule at bedtime 90 capsule 3     amphetamine-dextroamphetamine (ADDERALL) 20 MG per tablet Take 1 tablet (20 mg) by mouth daily 30 tablet 0     topiramate (TOPAMAX) 50 MG tablet Take 1 tablet (50 mg) by mouth 2 times daily 60 tablet 0     tapentadol (NUCYNTA) 50 MG TABS tablet Take 1 tablet (50 mg) by mouth every 6 hours as needed for moderate to severe pain Maximum 3 tablet(s) per day. Ok to fill on or after 04/10/17 to start on or after 04/12/17. 90 tablet 0     tapentadol (NUCYNTA) 50 MG TABS tablet Take 1 tablet (50 mg) by mouth every 6 hours as needed for moderate to severe pain maximum 3 tablet(s) per day. OK to fill 04/05/17 and START 04/06/17  20 tablet 0     OLANZapine (ZYPREXA) 2.5 MG tablet Take 1 tablet (2.5 mg) by mouth At Bedtime 90 tablet 1     doxepin (SINEQUAN) 10 MG capsule TAKE ONE TO TWO CAPSULES BY MOUTH AT BEDTIME 180 capsule 8     DULoxetine (CYMBALTA) 60 MG EC capsule TAKE 2 CAPSULES BY MOUTH EVERY  capsule 1     tiZANidine (ZANAFLEX) 4 MG tablet Take 1-2 tablets (4-8 mg) by mouth 3 times daily as needed for muscle spasms 180 tablet 3     omeprazole (PRILOSEC) 40 MG capsule Take 1 capsule (40 mg) by mouth daily Take 30-60 minutes before a meal. 90 capsule 2     levothyroxine (SYNTHROID/LEVOTHROID) 150 MCG tablet Take 1 tablet (150 mcg) by mouth every other day (Patient taking differently: Take 150 mcg by mouth every other day Pt take med on Sunday, Tuesday and Thursday) 45 tablet 3     levothyroxine (SYNTHROID/LEVOTHROID) 175 MCG tablet Take 1 tablet (175 mcg) by mouth every other day (Patient taking differently: Take 175 mcg by mouth every other day Pt take med on Monday, Wednesday and Friday) 45 tablet 3     lovastatin (MEVACOR) 20 MG tablet Take 1 tablet (20 mg) by mouth At Bedtime 90 tablet 3     FIBER PO Two capsules in the morning and two capsules at night       LORazepam (ATIVAN) 0.5 MG tablet Take 1 tablet (0.5 mg) by mouth nightly as needed for anxiety 30 tablets to last 30 days. 30 tablet 2     ondansetron (ZOFRAN) 4 MG tablet Take 1 tablet (4 mg) by mouth every 6 hours as needed for nausea 18 tablet 5     fluticasone (FLONASE) 50 MCG/ACT nasal spray Spray 1-2 sprays into both nostrils daily 16 g 11     NONFORMULARY Take 20 mg by mouth 2 times daily D-amphetamine Salt combo       cholecalciferol (VITAMIN D3) 5000 UNITS CAPS capsule Take 1 capsule (5,000 Units) by mouth daily Take 1 per day 100 capsule 2     multivitamin, therapeutic with minerals (MULTI-VITAMIN) TABS Take 1 tablet by mouth daily       fish oil-omega-3 fatty acids (OMEGA 3) 1000 MG capsule Take 2 g by mouth daily Takes 1 in am and 1 at bedtime       [START  ON 5/31/2017] amphetamine-dextroamphetamine (ADDERALL) 20 MG per tablet Take 1 tablet (20 mg) by mouth daily (Patient not taking: Reported on 5/10/2017) 30 tablet 0     [START ON 6/30/2017] amphetamine-dextroamphetamine (ADDERALL) 20 MG per tablet Take 1 tablet (20 mg) by mouth daily (Patient not taking: Reported on 5/10/2017) 30 tablet 0     LORazepam (ATIVAN) 0.5 MG tablet Take 2 po 90 minutes prior to MRI and may repeat x 1 30 minutes prior to MRI (Patient not taking: Reported on 5/10/2017) 6 tablet 0       Medical History: any changes in medical history since they were last seen? No    Review of Systems:  The 14 system ROS was reviewed from the intake questionnaire, and is positive for: headache, thyroid disease, joint pain, arthritis, neck pain  Any bowel or bladder problems: no  Mood: stable    Physical Exam:  Blood pressure 110/68, pulse 79, weight 97.1 kg (214 lb), SpO2 95 %, not currently breastfeeding.  General: no acute distress  Gait: Normal  MSK exam: Cervical spine normal range of motion with discomfort at end range, bilateral upper extremity strength and sensation intact    Imaging:  Cervical MRI completed on 8/27/16 showed:  Findings:  The cervical vertebrae appear normally aligned.  There is disc height  narrowing at C5-C6 with degeneration of the disc and sclerosis of  adjacent endplates.  There is normal signal within and normal contour  of the cervical spinal cord. No significant abnormal bone marrow  change. The findings on a level by level basis are as follows:     C2-3: No spinal canal or neural foraminal narrowing.     C3-4:  No spinal canal or neural foraminal narrowing.     C4-5:  No spinal canal or neural foraminal narrowing.     C5-6:  Posterior disc bulge with uncovertebral joint hypertrophy and  osteophyte formations. Superimposing left central disc herniation with  elevation of the posterior longitudinal ligament Mild canal narrowing.  Herniated disc effaces the left anterolateral  subarachnoid space and  possibly abutting the left C6 in the left portion of the spinal canal.  Mild canal narrowing. Bilateral mild to moderate foraminal narrowing  more significant on the left.     C6-7:  Disc bulge with mild uncovertebral hypertrophy. No significant  spinal canal or neural foraminal narrowing.     C7-T1:  No spinal canal or neural foraminal narrowing.      No abnormality of the paraspinous soft tissues.                                                                       Impression:       1. Disc bulge with uncovertebral joint hypertrophy and osteophyte  formations at C5-6. Superimposing left central disc herniation with  elevation of the posterior longitudinal ligament.Herniated disc  effaces the left anterolateral subarachnoid space and possibly  abutting the left C6 in the left portion of the spinal canal. Mild  canal narrowing. Bilateral mild to moderate foraminal narrowing more  significant on the left.  2. Disc bulge at C6-7 with no significant compression.    [place] Board of Pharmacy Data Base Reviewed: YES; as expected, no concern for abuse or misuse    Assessment:   1. Neck pain, worse on the left, started in Aug 2016, with associated headaches. Disc bulge at C5-C6 and C6-C7. Bilateral foraminal narrowing at C5-C6.  2. Bilateral knee pain, right worse than left. History of left mensicus surgery in 2012 and has a torn meniscus in her right knee  3. Depression and PTSD -weekly counseling    Plan:  1. Physical Therapy: follow up with Renee.    2. Clinical Health Psychologist to address issues of relaxation, behavioral change, coping style, and other factors important to improvement: Follow up with Dr. Humphries.  3. Self Care Recommendations: continue guided imagery  4. Medication Management:  UDS and opioid agreement on 2/6/17   --Continue nucynta 50 mg max #3/day. Call one week prior to needing a refill.  --Increase topiramate to 50 mg in the morning and 100 mg at bedtime for 1 week,  then increase to 100 mg twice a day.  --Continue tizanidine 8 mg in the morning and afternoon and 12 mg at bedtime.  5. Further procedures recommended: could consider cervical epidural steroid injection    6. Follow up: with Dr. Reyes in clinic in 1 month.    Total time spent was 30 minutes, and more than 50% of face to face time was spent in counseling and/or coordination of care regarding the above assessment and plan.    Nahed Reyes DO  [facility]  [facility]

## 2017-05-10 NOTE — MR AVS SNAPSHOT
After Visit Summary   5/10/2017    Ilsa Yusuf    MRN: [id number]           Patient Information     Date Of Birth          1960        Visit Information        Provider Department      5/10/2017 1:00 PM Nahed Reyes DO [facility]        Today's Diagnoses     Chronic pain syndrome        Cervical radiculopathy          Care Instructions    1. Physical Therapy: follow up with Renee.    2. Clinical Health Psychologist to address issues of relaxation, behavioral change, coping style, and other factors important to improvement: Follow up with Dr. Humphries.  3. Self Care Recommendations: continue guided imagery  4. Medication Management:  UDS and opioid agreement on 2/6/17   --Continue nucynta 50 mg max #3/day. Call one week prior to needing a refill.  --Increase topiramate to 50 mg in the morning and 100 mg at bedtime for 1 week, then increase to 100 mg twice a day.  --Continue tizanidine 8 mg in the morning and afternoon and 12 mg at bedtime.  5. Further procedures recommended: could consider cervical epidural steroid injection    6. Follow up: with Dr. Reyes in clinic in 1 month.    ----------------------------------------------------------------  Nurse Triage line:  [phone]   Call this number with any questions or concerns. You may leave a detailed message anytime. Calls are typically returned Monday through Friday between 8 AM and 4:30 PM. We usually get back to you within 2 business days depending on the issue/request.       Medication refills:    For non-narcotic medications, call your pharmacy directly to request a refill. The pharmacy will contact the [facility] for authorization. Please allow 3-4 days for these refills to be processed.     For narcotic refills, call the nurse triage line or send a [employer] message. Please contact us 7-10 days before your refill is due. The message MUST include the name of the specific medication(s) requested and how  you would like to receive the prescription(s). The options are as follows:    Pain Clinic staff can mail the prescription to your pharmacy. Please tell us the name of the pharmacy.    You may pick the prescription up at the Pain Clinic (tell us the location) or during a clinic visit with your pain provider    Pain Clinic staff can deliver the prescription to the [facility] pharmacy in the clinic building. Please tell us the location.      Scheduling number: [phone].  Call this number to schedule or change appointments.    We believe regular attendance is key to your success in our program.    Any time you are unable to keep your appointment we ask that you call us at least 24 hours in advance to let us know. This will allow us to offer the appointment time to another patient.             Follow-ups after your visit        Who to contact     If you have questions or need follow up information about today's clinic visit or your schedule please contact [facility] directly at [phone].  Normal or non-critical lab and imaging results will be communicated to you by groSolarhart, letter or phone within 4 business days after the clinic has received the results. If you do not hear from us within 7 days, please contact the clinic through groSolarhart or phone. If you have a critical or abnormal lab result, we will notify you by phone as soon as possible.  Submit refill requests through [employer] or call your pharmacy and they will forward the refill request to us. Please allow 3 business days for your refill to be completed.          Additional Information About Your Visit        [employer] Information     [employer] gives you secure access to your electronic health record. If you see a primary care provider, you can also send messages to your care team and make appointments. If you have questions, please call your primary care clinic.  If you do not have a primary care provider, please call [phone] and they will  assist you.        Care EveryWhere ID     This is your Care EveryWhere ID. This could be used by other organizations to access your [facility] medical records  [id number]        Your Vitals Were     Pulse Pulse Oximetry BMI (Body Mass Index)             79 95% 31.6 kg/m2          Blood Pressure from Last 3 Encounters:   05/10/17 110/68   05/01/17 105/66   04/05/17 122/62    Weight from Last 3 Encounters:   05/10/17 97.1 kg (214 lb)   05/01/17 96.6 kg (213 lb)   03/08/17 99.3 kg (219 lb)              Today, you had the following     No orders found for display         Today's Medication Changes          These changes are accurate as of: 5/10/17  1:49 PM.  If you have any questions, ask your nurse or doctor.               These medicines have changed or have updated prescriptions.        Dose/Directions    * levothyroxine 150 MCG tablet   Commonly known as:  SYNTHROID/LEVOTHROID   This may have changed:  additional instructions   Used for:  Acquired hypothyroidism        Dose:  150 mcg   Take 1 tablet (150 mcg) by mouth every other day   Quantity:  45 tablet   Refills:  3       * levothyroxine 175 MCG tablet   Commonly known as:  SYNTHROID/LEVOTHROID   This may have changed:  additional instructions   Used for:  Acquired hypothyroidism        Dose:  175 mcg   Take 1 tablet (175 mcg) by mouth every other day   Quantity:  45 tablet   Refills:  3       tapentadol 50 MG Tabs tablet   Commonly known as:  NUCYNTA   This may have changed:  additional instructions   Used for:  Chronic pain syndrome   Changed by:  Nahed Reyes DO        Dose:  50 mg   Take 1 tablet (50 mg) by mouth every 6 hours as needed for moderate to severe pain Max #3/day.   Quantity:  90 tablet   Refills:  0       tiZANidine 4 MG tablet   Commonly known as:  ZANAFLEX   This may have changed:  how much to take   Used for:  Cervical radiculopathy   Changed by:  Nahed Reyes DO        Dose:  4-12 mg   Take 1-3 tablets (4-12 mg) by mouth 3 times  daily as needed for muscle spasms   Quantity:  210 tablet   Refills:  3       topiramate 50 MG tablet   Commonly known as:  TOPAMAX   This may have changed:  how much to take   Used for:  Chronic pain syndrome   Changed by:  Nahed Reyes DO        Dose:   mg   Take 1-2 tablets ( mg) by mouth 2 times daily   Quantity:  120 tablet   Refills:  3       * Notice:  This list has 2 medication(s) that are the same as other medications prescribed for you. Read the directions carefully, and ask your doctor or other care provider to review them with you.         Where to get your medicines      These medications were sent to [facility] [address] [place] [address] & [address] [address]    Hours:  24-hours Phone:  [phone]     tiZANidine 4 MG tablet    topiramate 50 MG tablet         Some of these will need a paper prescription and others can be bought over the counter.  Ask your nurse if you have questions.     Bring a paper prescription for each of these medications     tapentadol 50 MG Tabs tablet                Primary Care Provider Office Phone # Fax #    Catrachita Collado [phone]       [address] [place] [address] [address]  [place] [address]        Thank you!     Thank you for choosing [facility]  for your care. Our goal is always to provide you with excellent care. Hearing back from our patients is one way we can continue to improve our services. Please take a few minutes to complete the written survey that you may receive in the mail after your visit with us. Thank you!             Your Updated Medication List - Protect others around you: Learn how to safely use, store and throw away your medicines at www.disposemymeds.org.          This list is accurate as of: 5/10/17  1:49 PM.  Always use your most recent med list.                   Brand Name Dispense Instructions for  use    * amphetamine-dextroamphetamine 20 MG per tablet    ADDERALL    30 tablet    Take 1 tablet (20 mg) by mouth daily       * amphetamine-dextroamphetamine 20 MG per tablet   Start taking on:  5/31/2017    ADDERALL    30 tablet    Take 1 tablet (20 mg) by mouth daily       * amphetamine-dextroamphetamine 20 MG per tablet   Start taking on:  6/30/2017    ADDERALL    30 tablet    Take 1 tablet (20 mg) by mouth daily       cholecalciferol 5000 UNITS Caps capsule    vitamin D3    100 capsule    Take 1 capsule (5,000 Units) by mouth daily Take 1 per day       doxepin 10 MG capsule    SINEquan    180 capsule    TAKE ONE TO TWO CAPSULES BY MOUTH AT BEDTIME       DULoxetine 60 MG EC capsule    CYMBALTA    180 capsule    TAKE 2 CAPSULES BY MOUTH EVERY DAY       FIBER PO      Two capsules in the morning and two capsules at night       fish oil-omega-3 fatty acids 1000 MG capsule      Take 2 g by mouth daily Takes 1 in am and 1 at bedtime       fluticasone 50 MCG/ACT spray    FLONASE    16 g    Spray 1-2 sprays into both nostrils daily       * levothyroxine 150 MCG tablet    SYNTHROID/LEVOTHROID    45 tablet    Take 1 tablet (150 mcg) by mouth every other day       * levothyroxine 175 MCG tablet    SYNTHROID/LEVOTHROID    45 tablet    Take 1 tablet (175 mcg) by mouth every other day       * LORazepam 0.5 MG tablet    ATIVAN    30 tablet    Take 1 tablet (0.5 mg) by mouth nightly as needed for anxiety 30 tablets to last 30 days.       * LORazepam 0.5 MG tablet    ATIVAN    6 tablet    Take 2 po 90 minutes prior to MRI and may repeat x 1 30 minutes prior to MRI       lovastatin 20 MG tablet    MEVACOR    90 tablet    Take 1 tablet (20 mg) by mouth At Bedtime       Multi-vitamin Tabs tablet      Take 1 tablet by mouth daily       NONFORMULARY      Take 20 mg by mouth 2 times daily D-amphetamine Salt combo       OLANZapine 2.5 MG tablet    zyPREXA    90 tablet    Take 1 tablet (2.5 mg) by mouth At Bedtime       omeprazole 40 MG  capsule    priLOSEC    90 capsule    Take 1 capsule (40 mg) by mouth daily Take 30-60 minutes before a meal.       ondansetron 4 MG tablet    ZOFRAN    18 tablet    Take 1 tablet (4 mg) by mouth every 6 hours as needed for nausea       prazosin 5 MG capsule    MINIPRESS    90 capsule    Take 1 capsule (5 mg) by mouth At Bedtime Take 1 capsule at bedtime       tapentadol 50 MG Tabs tablet    NUCYNTA    90 tablet    Take 1 tablet (50 mg) by mouth every 6 hours as needed for moderate to severe pain Max #3/day.       tiZANidine 4 MG tablet    ZANAFLEX    210 tablet    Take 1-3 tablets (4-12 mg) by mouth 3 times daily as needed for muscle spasms       topiramate 50 MG tablet    TOPAMAX    120 tablet    Take 1-2 tablets ( mg) by mouth 2 times daily       * Notice:  This list has 7 medication(s) that are the same as other medications prescribed for you. Read the directions carefully, and ask your doctor or other care provider to review them with you.

## 2017-05-10 NOTE — TELEPHONE ENCOUNTER
VM left at 3:36pm;    Pt stated her new prescription quantity is different than her old prescription. Pt doesn't believe she'll have enough for 90 days worth and would a call back. Pt also didn't specify which med she was inquiring about.

## 2017-05-10 NOTE — NURSING NOTE
"Chief Complaint   Patient presents with     Pain     Follow up       Initial /68  Pulse 79  Wt 97.1 kg (214 lb)  SpO2 95%  BMI 31.6 kg/m2 Estimated body mass index is 31.6 kg/(m^2) as calculated from the following:    Height as of 12/23/16: 1.753 m (5' 9\").    Weight as of this encounter: 97.1 kg (214 lb).  Medication Reconciliation: nitza Vazquez [facility]        "

## 2017-05-10 NOTE — PATIENT INSTRUCTIONS
1. Physical Therapy: follow up with Renee.    2. Clinical Health Psychologist to address issues of relaxation, behavioral change, coping style, and other factors important to improvement: Follow up with Dr. Humphries.  3. Self Care Recommendations: continue guided imagery  4. Medication Management:  UDS and opioid agreement on 2/6/17   --Continue nucynta 50 mg max #3/day. Call one week prior to needing a refill.  --Increase topiramate to 50 mg in the morning and 100 mg at bedtime for 1 week, then increase to 100 mg twice a day.  --Continue tizanidine 8 mg in the morning and afternoon and 12 mg at bedtime.  5. Further procedures recommended: could consider cervical epidural steroid injection    6. Follow up: with Dr. Reyes in clinic in 1 month.    ----------------------------------------------------------------  Nurse Triage line:  [phone]   Call this number with any questions or concerns. You may leave a detailed message anytime. Calls are typically returned Monday through Friday between 8 AM and 4:30 PM. We usually get back to you within 2 business days depending on the issue/request.       Medication refills:    For non-narcotic medications, call your pharmacy directly to request a refill. The pharmacy will contact the [facility] Management Center for authorization. Please allow 3-4 days for these refills to be processed.     For narcotic refills, call the nurse triage line or send a [employer] message. Please contact us 7-10 days before your refill is due. The message MUST include the name of the specific medication(s) requested and how you would like to receive the prescription(s). The options are as follows:    Pain Clinic staff can mail the prescription to your pharmacy. Please tell us the name of the pharmacy.    You may pick the prescription up at the Pain Clinic (tell us the location) or during a clinic visit with your pain provider    Pain Clinic staff can deliver the prescription to the [facility] pharmacy in the  clinic building. Please tell us the location.      Scheduling number: [phone].  Call this number to schedule or change appointments.    We believe regular attendance is key to your success in our program.    Any time you are unable to keep your appointment we ask that you call us at least 24 hours in advance to let us know. This will allow us to offer the appointment time to another patient.

## 2017-05-10 NOTE — MR AVS SNAPSHOT
After Visit Summary   5/10/2017    Ilsa Yusuf    MRN: [id number]           Patient Information     Date Of Birth          1960        Visit Information        Provider Department      5/10/2017 1:45 PM Renee Cox PT [facility]        Today's Diagnoses     Chronic pain    -  1       Follow-ups after your visit        Your next 10 appointments already scheduled     May 15, 2017  1:00 PM CDT   Return Visit with Renee Cox PT   [facility] ([facility])    [address] [address] [address]   [phone]            Jun 14, 2017  1:00 PM CDT   Return Visit with Nahed Reyes DO   [facility] ([facility])    [address] [address] [address] [phone]              Who to contact     If you have questions or need follow up information about today's clinic visit or your schedule please contact [facility] directly at [phone].  Normal or non-critical lab and imaging results will be communicated to you by Bellcohart, letter or phone within 4 business days after the clinic has received the results. If you do not hear from us within 7 days, please contact the clinic through [employer] or phone. If you have a critical or abnormal lab result, we will notify you by phone as soon as possible.  Submit refill requests through [employer] or call your pharmacy and they will forward the refill request to us. Please allow 3 business days for your refill to be completed.          Additional Information About Your Visit        [employer] Information     [employer] gives you secure access to your electronic health record. If you see a primary care provider, you can also send messages to your care team and make appointments. If you have questions, please call your primary care clinic.  If you do not have a primary care provider, please call [phone]  and they will assist you.        Care EveryWhere ID     This is your Care EveryWhere ID. This could be used by other organizations to access your [facility] medical records  [id number]         Blood Pressure from Last 3 Encounters:   05/10/17 110/68   05/01/17 105/66   04/05/17 122/62    Weight from Last 3 Encounters:   05/10/17 97.1 kg (214 lb)   05/01/17 96.6 kg (213 lb)   03/08/17 99.3 kg (219 lb)              We Performed the Following     ELECTRICAL STIMULATION     THERAPEUTIC ACTIVITIES          Today's Medication Changes          These changes are accurate as of: 5/10/17 11:59 PM.  If you have any questions, ask your nurse or doctor.               Start taking these medicines.        Dose/Directions    order for DME   Used for:  Myofascial pain syndrome   Started by:  Nahed Reyes DO        TENS unit and supplies   Quantity:  1 Device   Refills:  0         These medicines have changed or have updated prescriptions.        Dose/Directions    * levothyroxine 150 MCG tablet   Commonly known as:  SYNTHROID/LEVOTHROID   This may have changed:  additional instructions   Used for:  Acquired hypothyroidism        Dose:  150 mcg   Take 1 tablet (150 mcg) by mouth every other day   Quantity:  45 tablet   Refills:  3       * levothyroxine 175 MCG tablet   Commonly known as:  SYNTHROID/LEVOTHROID   This may have changed:  additional instructions   Used for:  Acquired hypothyroidism        Dose:  175 mcg   Take 1 tablet (175 mcg) by mouth every other day   Quantity:  45 tablet   Refills:  3       tapentadol 50 MG Tabs tablet   Commonly known as:  NUCYNTA   This may have changed:  additional instructions   Used for:  Chronic pain syndrome   Changed by:  Nahed Reyes DO        Dose:  50 mg   Take 1 tablet (50 mg) by mouth every 6 hours as needed for moderate to severe pain Max #3/day.   Quantity:  90 tablet   Refills:  0       tiZANidine 4 MG tablet   Commonly known as:  ZANAFLEX   This may have changed:  how much  to take   Used for:  Cervical radiculopathy   Changed by:  Nahed Reyes DO        Dose:  4-12 mg   Take 1-3 tablets (4-12 mg) by mouth 3 times daily as needed for muscle spasms   Quantity:  210 tablet   Refills:  3       topiramate 50 MG tablet   Commonly known as:  TOPAMAX   This may have changed:  how much to take   Used for:  Chronic pain syndrome   Changed by:  Nahed Reyes DO        Dose:   mg   Take 1-2 tablets ( mg) by mouth 2 times daily   Quantity:  120 tablet   Refills:  3       * Notice:  This list has 2 medication(s) that are the same as other medications prescribed for you. Read the directions carefully, and ask your doctor or other care provider to review them with you.         Where to get your medicines      These [facility] [address] [place] [address] & [address] [address]    Hours:  24-hours Phone:  [phone]     tiZANidine 4 MG tablet    topiramate 50 MG tablet         Some of these will need a paper prescription and others can be bought over the counter.  Ask your nurse if you have questions.     Bring a paper prescription for each of these medications     order for DME    tapentadol 50 MG Tabs tablet                Primary Care Provider Office Phone # Fax #    Catrachita Collado [phone]       [address] [place] [address]        Thank you!     Thank you for choosing [facility]  for your care. Our goal is always to provide you with excellent care. Hearing back from our patients is one way we can continue to improve our services. Please take a few minutes to complete the written survey that you may receive in the mail after your visit with us. Thank you!             Your Updated Medication List - Protect others around you: Learn how to safely use, store and throw away your medicines at  www.disposemymeds.org.          This list is accurate as of: 5/10/17 11:59 PM.  Always use your most recent med list.                   Brand Name Dispense Instructions for use    * amphetamine-dextroamphetamine 20 MG per tablet    ADDERALL    30 tablet    Take 1 tablet (20 mg) by mouth daily       * amphetamine-dextroamphetamine 20 MG per tablet   Start taking on:  5/31/2017    ADDERALL    30 tablet    Take 1 tablet (20 mg) by mouth daily       * amphetamine-dextroamphetamine 20 MG per tablet   Start taking on:  6/30/2017    ADDERALL    30 tablet    Take 1 tablet (20 mg) by mouth daily       cholecalciferol 5000 UNITS Caps capsule    vitamin D3    100 capsule    Take 1 capsule (5,000 Units) by mouth daily Take 1 per day       doxepin 10 MG capsule    SINEquan    180 capsule    TAKE ONE TO TWO CAPSULES BY MOUTH AT BEDTIME       DULoxetine 60 MG EC capsule    CYMBALTA    180 capsule    TAKE 2 CAPSULES BY MOUTH EVERY DAY       FIBER PO      Two capsules in the morning and two capsules at night       fish oil-omega-3 fatty acids 1000 MG capsule      Take 2 g by mouth daily Takes 1 in am and 1 at bedtime       fluticasone 50 MCG/ACT spray    FLONASE    16 g    Spray 1-2 sprays into both nostrils daily       * levothyroxine 150 MCG tablet    SYNTHROID/LEVOTHROID    45 tablet    Take 1 tablet (150 mcg) by mouth every other day       * levothyroxine 175 MCG tablet    SYNTHROID/LEVOTHROID    45 tablet    Take 1 tablet (175 mcg) by mouth every other day       * LORazepam 0.5 MG tablet    ATIVAN    30 tablet    Take 1 tablet (0.5 mg) by mouth nightly as needed for anxiety 30 tablets to last 30 days.       * LORazepam 0.5 MG tablet    ATIVAN    6 tablet    Take 2 po 90 minutes prior to MRI and may repeat x 1 30 minutes prior to MRI       lovastatin 20 MG tablet    MEVACOR    90 tablet    Take 1 tablet (20 mg) by mouth At Bedtime       Multi-vitamin Tabs tablet      Take 1 tablet by mouth daily       NONFORMULARY      Take 20  mg by mouth 2 times daily D-amphetamine Salt combo       OLANZapine 2.5 MG tablet    zyPREXA    90 tablet    Take 1 tablet (2.5 mg) by mouth At Bedtime       omeprazole 40 MG capsule    priLOSEC    90 capsule    Take 1 capsule (40 mg) by mouth daily Take 30-60 minutes before a meal.       ondansetron 4 MG tablet    ZOFRAN    18 tablet    Take 1 tablet (4 mg) by mouth every 6 hours as needed for nausea       [place] [facility]     [place] Device    TENS unit and supplies       prazosin 5 MG capsule    MINIPRESS    90 capsule    Take 1 capsule (5 mg) by mouth At Bedtime Take 1 capsule at bedtime       tapentadol 50 MG Tabs tablet    NUCYNTA    90 tablet    Take 1 tablet (50 mg) by mouth every 6 hours as needed for moderate to severe pain Max #3/day.       tiZANidine 4 MG tablet    ZANAFLEX    210 tablet    Take 1-3 tablets (4-12 mg) by mouth 3 times daily as needed for muscle spasms       topiramate 50 MG tablet    TOPAMAX    120 tablet    Take 1-2 tablets ( mg) by mouth 2 times daily       * Notice:  This list has 7 medication(s) that are the same as other medications prescribed for you. Read the directions carefully, and ask your doctor or other care provider to review them with you.

## 2017-05-10 NOTE — NURSING NOTE
Rx for TENS unit placed in outgoing US Mail to patient's address.   Kira Elizabeth, [facility]

## 2017-05-10 NOTE — TELEPHONE ENCOUNTER
Patient was here for an appointment and was given a prescription. After patients other appointment she stopped me and stated that she had called her pharmacy and that they only had enough of her medication to give her 60 tabs. She stated that she wanted us to know so that when she called for a early refill we would know what had happened. I will forward to the provider as a FYI.       Anya Vazquez [facility] [place]

## 2017-05-10 NOTE — PROGRESS NOTES
"Pain Physical Therapy Progress Note    Patient Name: Ilsa Yusuf     YOB: 1960     Medical Record Number: [id number]    Visits: 3/13    Diagnosis: Chronic pain    Subjective: Patient reports over the past week her pain is gradually getting worse in neck, upper back joints of hands, feet and ankles.  She is having more frequent headaches.  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  Pt reports significant feeling of grief/sadness since learning about 2 y.o nephew's aggressive liver cancer follow remission last year. She has been reading \"Healing Power of Breath\" and using skills learned in DBT to cope with stressful situations.  She met with Dr. Humphries and felt this was very beneficial.  Rated pain level 5-6/10 and fatigue level 8-9/10 which is significant increase since last visit.  Pt is interested in trial of TENS unit today.    Self Care  HEP: next  Walking/Aerobic Activity: next  Posture: next  Breathing/Relaxation: next  Heat/Ice: issued rice sock  Mini Breaks: next  Pacing: next      Objective Findings:  OBSERVATION: pt appears more fatigued, teary and pain behaviors with movement/walking.    Pt educated on the relationship between emotions and pain resulting in activating the bodies alarm system resulting in hypersensitive nerves.  Instructed patient in use of TENS unit. Discussed indications and contraindications of its use.  Instructed patient in electrode placement in linear fashion.   Patient tried the different modes and was able to practice changing intensity up and down, how to attach leads/electrodes; and care of the unit.  Reviewed TENS supplies kit.  Patient with no further questions at this time. Issued information regarding medicare vendors, [facility] and  contact information should patient decide to purchase TENS unit.    Treatment Interventions:  Therapeutic Activity:   For 15 minutes including therapeutic neuroscience " education regarding emotions and pain, issue rice sock  E-Stim Attended: for 30 minutes including patient instructed in use of TENS unit.   __________________________________________________________________  Instruction on home program: next    Assessment:  Ongoing Functional Limitations Include:  Patient tolerated/responded well to treatment    Intensity Level: 3 (1=low intensity; 5=high intensity)  Demonstrates/Verbalizes Technique: 4 (1= poor technique-difficulty performing exercises,significant cues required; 5= good technique-performs exercises without cues)  Body Awareness: 3 (1=low awareness; 5=high awareness)  Posture/Stability: 2 (1= poor posture, stability; 5= good posture, stability)  Motivational Level: Cooperative, Distracted, anxious and Distracted, in pain  Response to Teaching: cooperative  Factors that affect learning: None  _______________________________________________________________________  Plan of Care   Continue PT to support reactivation and integration of self regulation pain management skills;  Focus of next session will be on: progress shoulder glides, consider reach and roll, add pacing, mini breaks and body scan     Present:  CLAUDETTE     Total Visit Time:  45 minutes    Therapist: Renee Cox PT             Date: 5/10/2017  G Code 3/10

## 2017-05-11 NOTE — TELEPHONE ENCOUNTER
Left detailed VM (ok per demographics) reviewing what medications she was given yesterday and asking her to leave details on what medication she is referring to and what the problem is.    Kira Horton, MSN, RN-BC  Care Coordinator  [facility]

## 2017-05-15 ENCOUNTER — OFFICE VISIT (OUTPATIENT)
Dept: PALLIATIVE MEDICINE | Facility: CLINIC | Age: 57
End: 2017-05-15
Payer: MEDICARE

## 2017-05-15 DIAGNOSIS — G89.29 CHRONIC PAIN: Primary | ICD-10-CM

## 2017-05-15 PROCEDURE — 97112 NEUROMUSCULAR REEDUCATION: CPT | Mod: GP | Performed by: PHYSICAL THERAPIST

## 2017-05-15 NOTE — PROGRESS NOTES
"Pain Physical Therapy Progress Note    Patient Name: Ilsa Yusuf     YOB: 1960     Medical Record Number: [id number]    Visits: 4/13    Diagnosis: Chronic pain    Subjective: Patient reports complaints of pain mostly in neck, between shoulder blades and both arms.  Rates pain level 5/10 and fatigue 4-5/10.    Self Care  HEP: next  Walking/Aerobic Activity: next  Posture: next  Breathing/Relaxation: next  Heat/Ice: issued rice sock  Mini Breaks: next  Pacing: next      Objective Findings:  OBSERVATION: pt appears less fatigued and tearful.  Demonstrates less pain behavior with walking and transitional movements.    Instructed in supine kinesthetic body scan and beginning rotation functional movement exercise on the right including global and differentiated patterns.    Pt reported feeling more relaxed after session and expressed concerns that she may have had a \"mini stroke\" due to changes in feeling her right side for the past 3-4 weeks.   Encouraged patient to follow up with her primary physician regarding her concerns.      Treatment Interventions:  Neuromuscular Reeducation:   For 45 minutes including instruction in supine kinesthetic body scan and beginning rotation functional movement exercise.  Issued handout for use.  __________________________________________________________________  Instruction on home program: next    Assessment:  Ongoing Functional Limitations Include:  Patient tolerated/responded well to treatment    Intensity Level: 3 (1=low intensity; 5=high intensity)  Demonstrates/Verbalizes Technique: 2 (1= poor technique-difficulty performing exercises,significant cues required; 5= good technique-performs exercises without cues)  Body Awareness: 2 (1=low awareness; 5=high awareness)  Posture/Stability: 3 (1= poor posture, stability; 5= good posture, stability)  Motivational Level: Cooperative  Response to Teaching: cooperative  Factors that affect learning: " None  _______________________________________________________________________  Plan of Care   Continue PT to support reactivation and integration of self regulation pain management skills;  Focus of next session will be on: self cares, progress to side lying reach and roll     Present:  CLAUDETTE     Total Visit Time:  45 minutes    Therapist: Renee Cox PT             Date: 5/15/2017  G Code 4/10

## 2017-05-15 NOTE — TELEPHONE ENCOUNTER
Called patient. LM to call triage and provide timeframe when nursing can reach her if she still has questions. Advised in message if we di dnot hear back in few days we would assume she no longer had question.     Britney Gomez  BSN-RN Care Coordinator  [facility]

## 2017-05-15 NOTE — MR AVS SNAPSHOT
After Visit Summary   5/15/2017    Ilsa Yusuf    MRN: [id number]           Patient Information     Date Of Birth          1960        Visit Information        Provider Department      5/15/2017 1:00 PM Renee Cxo PT [facility]        Today's Diagnoses     Chronic pain    -  1       Follow-ups after your visit        Your next 10 appointments already scheduled     May 22, 2017  1:45 PM CDT   Return Visit with Renee Cox PT   [facility] ([facility])    [address] [address] [address]   [phone]            Jun 14, 2017  1:00 PM CDT   Return Visit with Nahed Reyes DO   [facility] ([facility])    [address] [address] [address] [phone]              Who to contact     If you have questions or need follow up information about today's clinic visit or your schedule please contact [facility] directly at [phone].  Normal or non-critical lab and imaging results will be communicated to you by SideStephart, letter or phone within 4 business days after the clinic has received the results. If you do not hear from us within 7 days, please contact the clinic through TrekCafet or phone. If you have a critical or abnormal lab result, we will notify you by phone as soon as possible.  Submit refill requests through UmbaBox or call your pharmacy and they will forward the refill request to us. Please allow 3 business days for your refill to be completed.          Additional Information About Your Visit        SideStephart Information     UmbaBox gives you secure access to your electronic health record. If you see a primary care provider, you can also send messages to your care team and make appointments. If you have questions, please call your primary care clinic.  If you do not have a primary care provider, please call [phone]  and they will assist you.        Care EveryWhere ID     This is your Care EveryWhere ID. This could be used by other organizations to access your [place] medical records  [id number]         Blood Pressure from Last 3 Encounters:   05/10/17 110/68   05/01/17 105/66   04/05/17 122/62    Weight from Last 3 Encounters:   05/10/17 97.1 kg (214 lb)   05/01/17 96.6 kg (213 lb)   03/08/17 99.3 kg (219 lb)              We Performed the Following     NEUROMUSCULAR RE-EDUCATION          Today's Medication Changes          These changes are accurate as of: 5/15/17  2:08 PM.  If you have any questions, ask your nurse or doctor.               These medicines have changed or have updated prescriptions.        Dose/Directions    * levothyroxine 150 MCG tablet   Commonly known as:  SYNTHROID/LEVOTHROID   This may have changed:  additional instructions   Used for:  Acquired hypothyroidism        Dose:  150 mcg   Take 1 tablet (150 mcg) by mouth every other day   Quantity:  45 tablet   Refills:  3       * levothyroxine 175 MCG tablet   Commonly known as:  SYNTHROID/LEVOTHROID   This may have changed:  additional instructions   Used for:  Acquired hypothyroidism        Dose:  175 mcg   Take 1 tablet (175 mcg) by mouth every other day   Quantity:  45 tablet   Refills:  3       * Notice:  This list has 2 medication(s) that are the same as other medications prescribed for you. Read the directions carefully, and ask your doctor or other care provider to review them with you.             Primary Care Provider Office Phone # Fax #    Catrachita Collado [phone]       [address] [place] [address]        Thank you!     Thank you for choosing [facility]  for your care. Our goal is always to provide you with excellent care. Hearing back from our patients is one way we can continue to improve our services. Please take a few minutes to complete the written survey that you  may receive in the mail after your visit with us. Thank you!             Your Updated Medication List - Protect others around you: Learn how to safely use, store and throw away your medicines at www.disposemymeds.org.          This list is accurate as of: 5/15/17  2:08 PM.  Always use your most recent med list.                   Brand Name Dispense Instructions for use    * amphetamine-dextroamphetamine 20 MG per tablet    ADDERALL    30 tablet    Take 1 tablet (20 mg) by mouth daily       * amphetamine-dextroamphetamine 20 MG per tablet   Start taking on:  5/31/2017    ADDERALL    30 tablet    Take 1 tablet (20 mg) by mouth daily       * amphetamine-dextroamphetamine 20 MG per tablet   Start taking on:  6/30/2017    ADDERALL    30 tablet    Take 1 tablet (20 mg) by mouth daily       cholecalciferol 5000 UNITS Caps capsule    vitamin D3    100 capsule    Take 1 capsule (5,000 Units) by mouth daily Take 1 per day       doxepin 10 MG capsule    SINEquan    180 capsule    TAKE ONE TO TWO CAPSULES BY MOUTH AT BEDTIME       DULoxetine 60 MG EC capsule    CYMBALTA    180 capsule    TAKE 2 CAPSULES BY MOUTH EVERY DAY       FIBER PO      Two capsules in the morning and two capsules at night       fish oil-omega-3 fatty acids 1000 MG capsule      Take 2 g by mouth daily Takes 1 in am and 1 at bedtime       fluticasone 50 MCG/ACT spray    FLONASE    16 g    Spray 1-2 sprays into both nostrils daily       * levothyroxine 150 MCG tablet    SYNTHROID/LEVOTHROID    45 tablet    Take 1 tablet (150 mcg) by mouth every other day       * levothyroxine 175 MCG tablet    SYNTHROID/LEVOTHROID    45 tablet    Take 1 tablet (175 mcg) by mouth every other day       * LORazepam 0.5 MG tablet    ATIVAN    30 tablet    Take 1 tablet (0.5 mg) by mouth nightly as needed for anxiety 30 tablets to last 30 days.       * LORazepam 0.5 MG tablet    ATIVAN    6 tablet    Take 2 po 90 minutes prior to MRI and may repeat x 1 30 minutes prior to MRI        lovastatin 20 MG tablet    MEVACOR    90 tablet    Take 1 tablet (20 mg) by mouth At Bedtime       Multi-vitamin Tabs tablet      Take 1 tablet by mouth daily       NONFORMULARY      Take 20 mg by mouth 2 times daily D-amphetamine Salt combo       OLANZapine 2.5 MG tablet    zyPREXA    90 tablet    Take 1 tablet (2.5 mg) by mouth At Bedtime       omeprazole 40 MG capsule    priLOSEC    90 capsule    Take 1 capsule (40 mg) by mouth daily Take 30-60 minutes before a meal.       ondansetron 4 MG tablet    ZOFRAN    18 tablet    Take 1 tablet (4 mg) by mouth every 6 hours as needed for nausea       [place] [facility]     [place]    TENS unit and supplies       prazosin 5 MG capsule    MINIPRESS    90 capsule    Take 1 capsule (5 mg) by mouth At Bedtime Take 1 capsule at bedtime       tapentadol 50 MG Tabs tablet    NUCYNTA    90 tablet    Take 1 tablet (50 mg) by mouth every 6 hours as needed for moderate to severe pain Max #3/day.       tiZANidine 4 MG tablet    ZANAFLEX    210 tablet    Take 1-3 tablets (4-12 mg) by mouth 3 times daily as needed for muscle spasms       topiramate 50 MG tablet    TOPAMAX    120 tablet    Take 1-2 tablets ( mg) by mouth 2 times daily       * Notice:  This list has 7 medication(s) that are the same as other medications prescribed for you. Read the directions carefully, and ask your doctor or other care provider to review them with you.

## 2017-05-22 ENCOUNTER — OFFICE VISIT (OUTPATIENT)
Dept: PALLIATIVE MEDICINE | Facility: CLINIC | Age: 57
End: 2017-05-22

## 2017-05-22 DIAGNOSIS — G89.29 CHRONIC PAIN: Primary | ICD-10-CM

## 2017-05-22 PROCEDURE — 97112 NEUROMUSCULAR REEDUCATION: CPT | Mod: GP | Performed by: PHYSICAL THERAPIST

## 2017-05-22 NOTE — MR AVS SNAPSHOT
After Visit Summary   5/22/2017    Ilsa Yusuf    MRN: [id number]           Patient Information     Date Of Birth          1960        Visit Information        Provider Department      5/22/2017 1:45 PM Renee Cox PT [facility]        Today's Diagnoses     Chronic pain    -  1       Follow-ups after your visit        Your next 10 appointments already scheduled     May 31, 2017  1:45 PM CDT   Return Visit with Renee Cox PT   [facility] ([facility])    [address] [address] [address]   [phone]            Jun 14, 2017  1:00 PM CDT   Return Visit with Nahed Reyes DO   [facility] ([facility])    [address] [address] [address]   [phone]              Who to contact     If you have questions or need follow up information about today's clinic visit or your schedule please contact [facility] directly at [phone].  Normal or non-critical lab and imaging results will be communicated to you by Genecurehart, letter or phone within 4 business days after the clinic has received the results. If you do not hear from us within 7 days, please contact the clinic through LinkoTect or phone. If you have a critical or abnormal lab result, we will notify you by phone as soon as possible.  Submit refill requests through [employer]s or call your pharmacy and they will forward the refill request to us. Please allow 3 business days for your refill to be completed.          Additional Information About Your Visit        Genecurehart Information     [employer] gives you secure access to your electronic health record. If you see a primary care provider, you can also send messages to your care team and make appointments. If you have questions, please call your primary care clinic.  If you do not have a primary care provider, please call [phone]  and they will assist you.        Care EveryWhere ID     This is your Care EveryWhere ID. This could be used by other organizations to access your [place] medical records  [id number]         Blood Pressure from Last 3 Encounters:   05/10/17 110/68   05/01/17 105/66   04/05/17 122/62    Weight from Last 3 Encounters:   05/10/17 97.1 kg (214 lb)   05/01/17 96.6 kg (213 lb)   03/08/17 99.3 kg (219 lb)              We Performed the Following     NEUROMUSCULAR RE-EDUCATION          Today's Medication Changes          These changes are accurate as of: 5/22/17  4:14 PM.  If you have any questions, ask your nurse or doctor.               These medicines have changed or have updated prescriptions.        Dose/Directions    * levothyroxine 150 MCG tablet   Commonly known as:  SYNTHROID/LEVOTHROID   This may have changed:  additional instructions   Used for:  Acquired hypothyroidism        Dose:  150 mcg   Take 1 tablet (150 mcg) by mouth every other day   Quantity:  45 tablet   Refills:  3       * levothyroxine 175 MCG tablet   Commonly known as:  SYNTHROID/LEVOTHROID   This may have changed:  additional instructions   Used for:  Acquired hypothyroidism        Dose:  175 mcg   Take 1 tablet (175 mcg) by mouth every other day   Quantity:  45 tablet   Refills:  3       * Notice:  This list has 2 medication(s) that are the same as other medications prescribed for you. Read the directions carefully, and ask your doctor or other care provider to review them with you.             Primary Care Provider Office Phone # Fax #    Catrachita Collado [phone]       [address] [place] [address]        Thank you!     Thank you for choosing [facility]  for your care. Our goal is always to provide you with excellent care. Hearing back from our patients is one way we can continue to improve our services. Please take a few minutes to complete the written survey that you  may receive in the mail after your visit with us. Thank you!             Your Updated Medication List - Protect others around you: Learn how to safely use, store and throw away your medicines at www.disposemymeds.org.          This list is accurate as of: 5/22/17  4:14 PM.  Always use your most recent med list.                   Brand Name Dispense Instructions for use    * amphetamine-dextroamphetamine 20 MG per tablet    ADDERALL    30 tablet    Take 1 tablet (20 mg) by mouth daily       * amphetamine-dextroamphetamine 20 MG per tablet   Start taking on:  5/31/2017    ADDERALL    30 tablet    Take 1 tablet (20 mg) by mouth daily       * amphetamine-dextroamphetamine 20 MG per tablet   Start taking on:  6/30/2017    ADDERALL    30 tablet    Take 1 tablet (20 mg) by mouth daily       cholecalciferol 5000 UNITS Caps capsule    vitamin D3    100 capsule    Take 1 capsule (5,000 Units) by mouth daily Take 1 per day       doxepin 10 MG capsule    SINEquan    180 capsule    TAKE ONE TO TWO CAPSULES BY MOUTH AT BEDTIME       DULoxetine 60 MG EC capsule    CYMBALTA    180 capsule    TAKE 2 CAPSULES BY MOUTH EVERY DAY       FIBER PO      Two capsules in the morning and two capsules at night       fish oil-omega-3 fatty acids 1000 MG capsule      Take 2 g by mouth daily Takes 1 in am and 1 at bedtime       fluticasone 50 MCG/ACT spray    FLONASE    16 g    Spray 1-2 sprays into both nostrils daily       * levothyroxine 150 MCG tablet    SYNTHROID/LEVOTHROID    45 tablet    Take 1 tablet (150 mcg) by mouth every other day       * levothyroxine 175 MCG tablet    SYNTHROID/LEVOTHROID    45 tablet    Take 1 tablet (175 mcg) by mouth every other day       * LORazepam 0.5 MG tablet    ATIVAN    30 tablet    Take 1 tablet (0.5 mg) by mouth nightly as needed for anxiety 30 tablets to last 30 days.       * LORazepam 0.5 MG tablet    ATIVAN    6 tablet    Take 2 po 90 minutes prior to MRI and may repeat x 1 30 minutes prior to MRI        lovastatin 20 MG tablet    MEVACOR    90 tablet    Take 1 tablet (20 mg) by mouth At Bedtime       Multi-vitamin Tabs tablet      Take 1 tablet by mouth daily       NONFORMULARY      Take 20 mg by mouth 2 times daily D-amphetamine Salt combo       OLANZapine 2.5 MG tablet    zyPREXA    90 tablet    Take 1 tablet (2.5 mg) by mouth At Bedtime       omeprazole 40 MG capsule    priLOSEC    90 capsule    Take 1 capsule (40 mg) by mouth daily Take 30-60 minutes before a meal.       ondansetron 4 MG tablet    ZOFRAN    18 tablet    Take 1 tablet (4 mg) by mouth every 6 hours as needed for nausea       order for [facility]     1 Device    TENS unit and supplies       prazosin 5 MG capsule    MINIPRESS    90 capsule    Take 1 capsule (5 mg) by mouth At Bedtime Take 1 capsule at bedtime       tapentadol 50 MG Tabs tablet    NUCYNTA    90 tablet    Take 1 tablet (50 mg) by mouth every 6 hours as needed for moderate to severe pain Max #3/day.       tiZANidine 4 MG tablet    ZANAFLEX    210 tablet    Take 1-3 tablets (4-12 mg) by mouth 3 times daily as needed for muscle spasms       topiramate 50 MG tablet    TOPAMAX    120 tablet    Take 1-2 tablets ( mg) by mouth 2 times daily       * Notice:  This list has 7 medication(s) that are the same as other medications prescribed for you. Read the directions carefully, and ask your doctor or other care provider to review them with you.

## 2017-05-22 NOTE — PROGRESS NOTES
PAIN PHYSICAL THERAPY PROGRESS NOTE  Patient Name: Ilsa Yusuf      YOB: 1960     Medical Record Number: [id number]  Diagnosis: Chronic pain    Visit: 5/13    Subjective: Patient reports she is feeling better today due to increasing the dosage of her medication per Dr. Reyes.  Pt reports pain located in neck, between shoulder blades and both arms.    Self Care  HEP: issued handout  Walking/Aerobic Activity: next  Posture: next  Breathing/Relaxation: next  Heat/Ice: issued rice sock  Mini Breaks: next  Pacing: next      Objective Findings:  OBSERVATION: Pt demonstrates restricted upper torso mobility in walking.  Instructed in supine kinesthetic body scan.  Instructed in side lying right UE reaching and rolling with focused awareness on upper back, rib cage, sternum and head.  Pt reported feeling more relaxed at end of session with reduced tension/pain in upper back area.  Reviewed functional use of arms while gardening.      Treatment Interventions:  Neuromuscular Reeducation:   For 45 minutes including instruction in body scan and side lying functional R UE reach and roll  __________________________________________________________________  Instruction on home program: SL shoulder/hip glides, SL reach/roll    Assessment:  Ongoing Functional Limitations Include:  Patient tolerated/responded well to treatment    Intensity Level: 3 (1=low intensity; 5=high intensity)  Demonstrates/Verbalizes Technique: 4 (1= poor technique-difficulty performing exercises,significant cues required; 5= good technique-performs exercises without cues)  Body Awareness: 3 (1=low awareness; 5=high awareness)  Posture/Stability: 2, 3 (1= poor posture, stability; 5= good posture, stability)  Motivational Level: Cooperative  Response to Teaching: cooperative  Factors that affect learning: None    _______________________________________________________________________  Plan of Care  Continue PT to support reactivation and  integration of self regulation pain management skills;  Continue with prescribed plan of care - progress as tolerated.  Focus next session will be on: progress SL reach/roll with pronation/supination, self cares, walking program     Present:  NA     Total Visit Time:  45 minutes    Therapist: Renee Cox PT             Date: 5/22/2017

## 2017-05-24 ENCOUNTER — CARE COORDINATION (OUTPATIENT)
Dept: CARE COORDINATION | Facility: CLINIC | Age: 57
End: 2017-05-24

## 2017-05-24 NOTE — PROGRESS NOTES
Clinic Care Coordination Contact    Situation: Patient chart reviewed by care coordinator.    Background: Patient was engaged with care coordination regarding support services.    Assessment: No contact from patient.     Plan/Recommendations: Closing to care coordination at this time. Please re-refer with future needs.    [facility]  Social Work Care Coordinator  [facility]  Ph: [phone]

## 2017-05-25 ENCOUNTER — MYC MEDICAL ADVICE (OUTPATIENT)
Dept: FAMILY MEDICINE | Facility: CLINIC | Age: 57
End: 2017-05-25

## 2017-05-25 DIAGNOSIS — M25.50 MULTIPLE JOINT PAIN: ICD-10-CM

## 2017-05-25 LAB
BASOPHILS # BLD AUTO: 0.1 10E9/L (ref 0–0.2)
BASOPHILS NFR BLD AUTO: 0.9 %
DIFFERENTIAL METHOD BLD: ABNORMAL
EOSINOPHIL # BLD AUTO: 0.2 10E9/L (ref 0–0.7)
EOSINOPHIL NFR BLD AUTO: 2.1 %
ERYTHROCYTE [DISTWIDTH] IN BLOOD BY AUTOMATED COUNT: 15.3 % (ref 10–15)
ERYTHROCYTE [SEDIMENTATION RATE] IN BLOOD BY WESTERGREN METHOD: 6 MM/H (ref 0–30)
HCT VFR BLD AUTO: 45.9 % (ref 35–47)
HGB BLD-MCNC: 15.4 G/DL (ref 11.7–15.7)
LYMPHOCYTES # BLD AUTO: 3.1 10E9/L (ref 0.8–5.3)
LYMPHOCYTES NFR BLD AUTO: 35 %
MCH RBC QN AUTO: 29.1 PG (ref 26.5–33)
MCHC RBC AUTO-ENTMCNC: 33.6 G/DL (ref 31.5–36.5)
MCV RBC AUTO: 87 FL (ref 78–100)
MONOCYTES # BLD AUTO: 0.4 10E9/L (ref 0–1.3)
MONOCYTES NFR BLD AUTO: 4.9 %
NEUTROPHILS # BLD AUTO: 5.1 10E9/L (ref 1.6–8.3)
NEUTROPHILS NFR BLD AUTO: 57.1 %
PLATELET # BLD AUTO: 214 10E9/L (ref 150–450)
RBC # BLD AUTO: 5.3 10E12/L (ref 3.8–5.2)
WBC # BLD AUTO: 8.9 10E9/L (ref 4–11)

## 2017-05-25 PROCEDURE — 86431 RHEUMATOID FACTOR QUANT: CPT | Performed by: FAMILY MEDICINE

## 2017-05-25 PROCEDURE — 85025 COMPLETE CBC W/AUTO DIFF WBC: CPT | Performed by: FAMILY MEDICINE

## 2017-05-25 PROCEDURE — 36415 COLL VENOUS BLD VENIPUNCTURE: CPT | Performed by: FAMILY MEDICINE

## 2017-05-25 PROCEDURE — 86225 DNA ANTIBODY NATIVE: CPT | Performed by: FAMILY MEDICINE

## 2017-05-25 PROCEDURE — 85652 RBC SED RATE AUTOMATED: CPT | Performed by: FAMILY MEDICINE

## 2017-05-25 PROCEDURE — 86200 CCP ANTIBODY: CPT | Performed by: FAMILY MEDICINE

## 2017-05-25 PROCEDURE — 86038 ANTINUCLEAR ANTIBODIES: CPT | Performed by: FAMILY MEDICINE

## 2017-05-26 LAB
ANA SER QL IA: NORMAL
CCP AB SER IA-ACNC: 1 U/ML
DSDNA AB SER-ACNC: 1 IU/ML
RHEUMATOID FACT SER NEPH-ACNC: <20 IU/ML (ref 0–20)

## 2017-05-30 DIAGNOSIS — G89.4 CHRONIC PAIN SYNDROME: ICD-10-CM

## 2017-05-30 NOTE — TELEPHONE ENCOUNTER
5/29 1025am    Patient LM requesting refill of Nucynta, she has 6 days left. [phone].    Suzanne Thomas    [facility]

## 2017-05-30 NOTE — TELEPHONE ENCOUNTER
Received call from patient requesting refill(s) of Nucynta     Last picked up from pharmacy on 05/11/2017    Pt last seen by prescribing provider on 05/10/2017  Next appt scheduled for 06/14/2017    Last urine drug screen date 02/06/2017  Current opioid agreement on file (completed within the last year) Yes Date of opioid agreement: 02/06/2017    Processing (pick one and delete the others):         Pt will  in clinic at  location      [facility]          Will route to nursing pool for review and preparation of prescription(s).

## 2017-05-30 NOTE — TELEPHONE ENCOUNTER
Medication refill information reviewed.     Due date for Nucynta is 6/10/17. Last OV indicates no change in dosing.     Prescriptions prepped for review.     Will route to provider.

## 2017-05-30 NOTE — TELEPHONE ENCOUNTER
Routed to MA pool to gather required information for opioid refill.    Kira Horton, MSN, RN-BC  Care Coordinator  [facility] [place]

## 2017-05-30 NOTE — TELEPHONE ENCOUNTER
I prepped patients prescription and put it at the  for her to . I also called her to let her know that this was done.      [facility]ya Vaz[facility] [place]

## 2017-05-31 ENCOUNTER — OFFICE VISIT (OUTPATIENT)
Dept: PALLIATIVE MEDICINE | Facility: CLINIC | Age: 57
End: 2017-05-31
Payer: MEDICARE

## 2017-05-31 DIAGNOSIS — G89.29 CHRONIC PAIN: Primary | ICD-10-CM

## 2017-05-31 PROCEDURE — 97112 NEUROMUSCULAR REEDUCATION: CPT | Mod: GP | Performed by: PHYSICAL THERAPIST

## 2017-05-31 NOTE — MR AVS SNAPSHOT
After Visit Summary   5/31/2017    Ilsa Yusuf    MRN: [id number]           Patient Information     Date Of Birth          1960        Visit Information        Provider Department      5/31/2017 1:45 PM Renee Cox PT [facility]        Today's Diagnoses     Chronic pain    -  1       Follow-ups after your visit        Your next 10 appointments already scheduled     Jun 14, 2017  1:00 PM CDT   Return Visit with Nahed Reyes DO   [facility] ([facility])    [id number] [place] [address]   [phone]            Jun 14, 2017  1:45 PM CDT   Return Visit with Renee Cox PT   [facility] ([facility])    [address] [address] [address]   [phone]              Who to contact     If you have questions or need follow up information about today's clinic visit or your schedule please contact [facility] directly at [phone].  Normal or non-critical lab and imaging results will be communicated to you by [employer], letter or phone within 4 business days after the clinic has received the results. If you do not hear from us within 7 days, please contact the clinic through [employer] or phone. If you have a critical or abnormal lab result, we will notify you by phone as soon as possible.  Submit refill requests through [employer] or call your pharmacy and they will forward the refill request to us. Please allow 3 business days for your refill to be completed.          Additional Information About Your Visit        [employer] Information     [employer] gives you secure access to your electronic health record. If you see a primary care provider, you can also send messages to your care team and make appointments. If you have questions, please call your primary care clinic.  If you do not have a primary care provider, please call [phone]  and they will assist you.        Care EveryWhere ID     This is your Care EveryWhere ID. This could be used by other organizations to access your [facility] medical records  [id number]         Blood Pressure from Last 3 Encounters:   05/10/17 110/68   05/01/17 105/66   04/05/17 122/62    Weight from Last 3 Encounters:   05/10/17 97.1 kg (214 lb)   05/01/17 96.6 kg (213 lb)   03/08/17 99.3 kg (219 lb)              We Performed the Following     NEUROMUSCULAR RE-EDUCATION          Today's Medication Changes          These changes are accurate as of: 5/31/17 11:59 PM.  If you have any questions, ask your nurse or doctor.               These medicines have changed or have updated prescriptions.        Dose/Directions    * levothyroxine 150 MCG tablet   Commonly known as:  SYNTHROID/LEVOTHROID   This may have changed:  additional instructions   Used for:  Acquired hypothyroidism        Dose:  150 mcg   Take 1 tablet (150 mcg) by mouth every other day   Quantity:  45 tablet   Refills:  3       * levothyroxine 175 MCG tablet   Commonly known as:  SYNTHROID/LEVOTHROID   This may have changed:  additional instructions   Used for:  Acquired hypothyroidism        Dose:  175 mcg   Take 1 tablet (175 mcg) by mouth every other day   Quantity:  45 tablet   Refills:  3       * Notice:  This list has 2 medication(s) that are the same as other medications prescribed for you. Read the directions carefully, and ask your doctor or other care provider to review them with you.             Primary Care Provider Office Phone # Fax #    Catrachita Collado [phone]       [address] [place] [address]        Thank you!     Thank you for choosing [facility]  for your care. Our goal is always to provide you with excellent care. Hearing back from our patients is one way we can continue to improve our services. Please take a few minutes to complete the written survey that you  may receive in the mail after your visit with us. Thank you!             Your Updated Medication List - Protect others around you: Learn how to safely use, store and throw away your medicines at www.disposemymeds.org.          This list is accurate as of: 5/31/17 11:59 PM.  Always use your most recent med list.                   Brand Name Dispense Instructions for use    * amphetamine-dextroamphetamine 20 MG per tablet    ADDERALL    30 tablet    Take 1 tablet (20 mg) by mouth daily       * amphetamine-dextroamphetamine 20 MG per tablet    ADDERALL    30 tablet    Take 1 tablet (20 mg) by mouth daily       * amphetamine-dextroamphetamine 20 MG per tablet   Start taking on:  6/30/2017    ADDERALL    30 tablet    Take 1 tablet (20 mg) by mouth daily       cholecalciferol 5000 UNITS Caps capsule    vitamin D3    100 capsule    Take 1 capsule (5,000 Units) by mouth daily Take 1 per day       doxepin 10 MG capsule    SINEquan    180 capsule    TAKE ONE TO TWO CAPSULES BY MOUTH AT BEDTIME       DULoxetine 60 MG EC capsule    CYMBALTA    180 capsule    TAKE 2 CAPSULES BY MOUTH EVERY DAY       FIBER PO      Two capsules in the morning and two capsules at night       fish oil-omega-3 fatty acids 1000 MG capsule      Take 2 g by mouth daily Takes 1 in am and 1 at bedtime       fluticasone 50 MCG/ACT spray    FLONASE    16 g    Spray 1-2 sprays into both nostrils daily       * levothyroxine 150 MCG tablet    SYNTHROID/LEVOTHROID    45 tablet    Take 1 tablet (150 mcg) by mouth every other day       * levothyroxine 175 MCG tablet    SYNTHROID/LEVOTHROID    45 tablet    Take 1 tablet (175 mcg) by mouth every other day       * LORazepam 0.5 MG tablet    ATIVAN    30 tablet    Take 1 tablet (0.5 mg) by mouth nightly as needed for anxiety 30 tablets to last 30 days.       * LORazepam 0.5 MG tablet    ATIVAN    6 tablet    Take 2 po 90 minutes prior to MRI and may repeat x 1 30 minutes prior to MRI       lovastatin 20 MG tablet     MEVACOR    90 tablet    Take 1 tablet (20 mg) by mouth At Bedtime       Multi-vitamin Tabs tablet      Take 1 tablet by mouth daily       NONFORMULARY      Take 20 mg by mouth 2 times daily D-amphetamine Salt combo       OLANZapine 2.5 MG tablet    zyPREXA    90 tablet    Take 1 tablet (2.5 mg) by mouth At Bedtime       omeprazole 40 MG capsule    priLOSEC    90 capsule    Take 1 capsule (40 mg) by mouth daily Take 30-60 minutes before a meal.       ondansetron 4 MG tablet    ZOFRAN    18 tablet    Take 1 tablet (4 mg) by mouth every 6 hours as needed for nausea       order for DME     1 Device    TENS unit and supplies       prazosin 5 MG capsule    MINIPRESS    90 capsule    Take 1 capsule (5 mg) by mouth At Bedtime Take 1 capsule at bedtime       tapentadol 50 MG Tabs tablet   Start taking on:  6/10/2017    NUCYNTA    90 tablet    Take 1 tablet (50 mg) by mouth every 6 hours as needed for moderate to severe pain Max #3/day. OK to dispense on/after June 8th and start on/after June 10th.       tiZANidine 4 MG tablet    ZANAFLEX    210 tablet    Take 1-3 tablets (4-12 mg) by mouth 3 times daily as needed for muscle spasms       topiramate 50 MG tablet    TOPAMAX    120 tablet    Take 1-2 tablets ( mg) by mouth 2 times daily       * Notice:  This list has 7 medication(s) that are the same as other medications prescribed for you. Read the directions carefully, and ask your doctor or other care provider to review them with you.

## 2017-05-31 NOTE — PROGRESS NOTES
Pain Physical Therapy Progress Note    Patient Name: Ilsa Yusuf     YOB: 1960     Medical Record Number: [id number]    Visits: 6/13    Diagnosis: Chronic pain    Subjective: Patient reports she continues to feel better due to increasing dosage of her medications per Dr. Reyes.  Pt reports most bothersome pain today is located between her shoulder blades.    Self Care  HEP: indep  Walking/Aerobic Activity: issued handout, begin daily walking 5' x 2  Posture: next  Breathing/Relaxation: indep with guided imagery 25' daily provided by Dr. Humphries  Heat/Ice: issued rice sock  Mini Breaks: issued handout, next  Pacing: next      Objective Findings:  OBSERVATION: Pt demonstrates restricted upper torso and thoracic spine mobility in walking.  Instructed in supine kinesthetic body scan.  Instructed in side lying right UE reaching and rolling.  Required verbal and manual cuing to coordinate movement rib cage, thoracic spine and head.  Pt has tendency to overuse and initiate motion from shoulder.  Progressed SL reach/roll to include neutral, internal rotation and external rotation of humerus.  Instructed in side lying, hand to forehead, thoracic rotation with spinal twist.  Required verbal and manual cuing to reduce effort and focus on kinesthetic sensation to avoid over doing spinal rotation.  Pt reporte feeling more relaxed at end of session with reduced tension and pain between shoulder blades.      Treatment Interventions:  Neuromuscular Reeducation:   For 45 minutes including instruction in body scan, SL reach and roll, and SL thoracic rotation  __________________________________________________________________  Instruction on home program: walking 5 x 2, SL shoulder/hip glides, SL reach/roll, SL thoracic rotation    Assessment:  Ongoing Functional Limitations Include:  Patient tolerated/responded well to treatment    Intensity Level: 4 (1=low intensity; 5=high intensity)  Demonstrates/Verbalizes  Technique: 4 (1= poor technique-difficulty performing exercises,significant cues required; 5= good technique-performs exercises without cues)  Body Awareness: 3 (1=low awareness; 5=high awareness)  Posture/Stability: 3 (1= poor posture, stability; 5= good posture, stability)  Motivational Level: Eager to learn and Cooperative  Response to Teaching: cooperative  Factors that affect learning: None  _______________________________________________________________________  Plan of Care   Continue PT to support reactivation and integration of self regulation pain management skills;  Focus of next session will be on: instruction in mini breaks, pacing, monitor walking program, add diagonal twist with scapular protraction/retraction, consider SL shoulder circles      Present:  NA     Total Visit Time:  45 minutes    Therapist: Renee Cox PT             Date: 5/31/2017  G Code 6/10

## 2017-06-05 ENCOUNTER — TELEPHONE (OUTPATIENT)
Dept: PALLIATIVE MEDICINE | Facility: CLINIC | Age: 57
End: 2017-06-05

## 2017-06-05 NOTE — TELEPHONE ENCOUNTER
Message left for Deanne that Dr. Reyes did approve staying at Nucynta max #4/day. If she has already filled the new RX, will be easiest to just use the 4/day and we will know she will be calling early for next refill. She has been on 4/day since 5/22/17.    Triage number given with request to call back and confirm if she already has picked up the medication.    Kira Horton, MSN, RN-BC  Care Coordinator  [facility] [place]

## 2017-06-05 NOTE — TELEPHONE ENCOUNTER
Patient left  6/2 at 5:16 pm    She states that her new script has only a quantity of 90 and Dr. Reyes has a new dose of 4 per day, she states she will be short and needs a quantity of 120.      Marlena JURAES    [facility]

## 2017-06-05 NOTE — TELEPHONE ENCOUNTER
"Dr. Reyes - was the increase of Nucynta from Max 3/day to 4/day shown in encounter below meant to be temporary or more extended? Ilsa is calling since refill due to start 610/17 only allows for max 3/day and she is requesting new RX with increased quantity. Spoke with Ilsa - Rheumatology question - she talked to her PCP about it who ordered some lab work. It was normal but PCP thought she should see rheumatology anyway and gave her scheduling information on several options. She has not followed up and made this appointment yet but stated \"I plan on it today\". Reported seeing Dr. Humphries every Wednesday. Let her know I would ask Dr. Reyes about keeping the increased Nucynta dosing and we will call her back.      Encounter of 5/19/17 shows:    Dr. Eric Jacinto reviewed your message and here is the response:     The pain is being reported as much more widespread than a couple months ago. Have you ever been seen by rheumatology?       For now, it would be ok to increase the nucynta from max #3/day to #4/day. I would like you to continue to work with comprehensive services including pain psychology with Dr. Humphries and pain physical therapy withRenee.      Nahed Reyes, DO  [facility]  "

## 2017-06-06 ENCOUNTER — MYC MEDICAL ADVICE (OUTPATIENT)
Dept: PALLIATIVE MEDICINE | Facility: CLINIC | Age: 57
End: 2017-06-06

## 2017-06-07 NOTE — TELEPHONE ENCOUNTER
See the 6/6/17 mychart encounter for more information.    This has been resolved.    Oralia Prescott RN-BSN  [facility]

## 2017-06-07 NOTE — TELEPHONE ENCOUNTER
Dear Dr Reyes,    I called the nurse line and left a message yesterday June 5 at about 4:15pm  that I wasn't able to  my rx of nucynta. Previously,I picked up the prescription at my physical therapy visit on Wednesday May 31st.  I brought it to the pharmacy on Friday June 2nd.  I didn't need it right away so I didn't go to pick it up until Monday(June5) only to find out that I couldn't take it with me.  Did the prescription not reflect the increase to 4 nucynta per day?  I would like an explanation please.  I am experiencing pain that varies between a 6-8.  I am out of nucynta and have been for almost a day and a half.  I have not picked up a supply of nucynta since May 10th.  I did get a call back from the person who fields calls for the nurses.  It was that person who told me there was confusion about whether or not I had filled the prescription picked up on May 31st. I have not.  I would like resolution of this matter today.  There is thirty minutes left of this clinic business day. I would  like attention.   I have also left a message on the nurse line at 4 pm.  Ilsa Yusuf    -------------------------------------  This patient was given permission to increase her Nucynta 50 MG from 3 to 4 tabs daily in a 5/19/17 telephone encounter. The prescription she was provided on 5/31/17 is for 3 tabs daily #90 ok to dispense on 6/8 to start on 6/10.     Sophia Kruger, ERIBERTON, RN  Care Coordinator  [facility] [place]

## 2017-06-07 NOTE — TELEPHONE ENCOUNTER
Reviewed chart.    nucynta was increased from 3 tabs/day to 4 tabs/day on 5/22/17    Med notes for the 5/10/17 nucynta script:  Britney Gomez RN   2/13/2017  4:17 PM :        Pharmacy only had #84 in supply, this will now be 28 day supply and not 30.               With this med change the pt would've been due for a refill on 6/4/17 so she is past due.  (28 day supply included in count).    Called [facility].    Informed them to fill the nucynta today relaying the above reason why and that the current script is for a 22 day supply b/c pt was increased to 4 tabs/day.    Called pt and  Left message relaying the plan.      Oralia Prescott RN-BSN  [facility]

## 2017-06-08 ENCOUNTER — MYC MEDICAL ADVICE (OUTPATIENT)
Dept: PALLIATIVE MEDICINE | Facility: CLINIC | Age: 57
End: 2017-06-08

## 2017-06-08 NOTE — TELEPHONE ENCOUNTER
My chart message below sent to patient.     Mattie Rosenbaum,      In general, all of the nurses that work within the pain clinic assist with phone triage and Mychart messages.  I have forwarded your message to the nursing supervisor to review. Please note she is out of the office until next week.  If there is something I can help you with in the mean time please let me know. Thank you.     Britney HENRIQUEZ-RN Care Coordinator  [facility]

## 2017-06-13 ENCOUNTER — TELEPHONE (OUTPATIENT)
Dept: FAMILY MEDICINE | Facility: CLINIC | Age: 57
End: 2017-06-13

## 2017-06-13 NOTE — TELEPHONE ENCOUNTER
Evaluate in appt her 2 nighttime episodes of being incontinent of stool in the last 4 weeks. No pain and no blood.  Told her to cut back on fiber until she gets in as she seems to have a very high fiber diet.  Evaluate meds as cause. Nothing is new.  Normal colonoscopy test in 2012.   Lisa Momin RN

## 2017-06-14 ENCOUNTER — OFFICE VISIT (OUTPATIENT)
Dept: PALLIATIVE MEDICINE | Facility: CLINIC | Age: 57
End: 2017-06-14
Payer: COMMERCIAL

## 2017-06-14 ENCOUNTER — OFFICE VISIT (OUTPATIENT)
Dept: PALLIATIVE MEDICINE | Facility: CLINIC | Age: 57
End: 2017-06-14
Payer: MEDICARE

## 2017-06-14 VITALS — HEART RATE: 71 BPM | DIASTOLIC BLOOD PRESSURE: 67 MMHG | SYSTOLIC BLOOD PRESSURE: 114 MMHG | OXYGEN SATURATION: 98 %

## 2017-06-14 DIAGNOSIS — G89.4 CHRONIC PAIN SYNDROME: ICD-10-CM

## 2017-06-14 DIAGNOSIS — G89.29 CHRONIC PAIN: Primary | ICD-10-CM

## 2017-06-14 PROCEDURE — 97112 NEUROMUSCULAR REEDUCATION: CPT | Mod: GP | Performed by: PHYSICAL THERAPIST

## 2017-06-14 PROCEDURE — 99214 OFFICE O/P EST MOD 30 MIN: CPT | Performed by: PHYSICAL MEDICINE & REHABILITATION

## 2017-06-14 ASSESSMENT — PAIN SCALES - GENERAL: PAINLEVEL: MILD PAIN (3)

## 2017-06-14 NOTE — MR AVS SNAPSHOT
After Visit Summary   6/14/2017    Ilsa Yusuf    MRN: [id number]           Patient Information     Date Of Birth          1960        Visit Information        Provider Department      6/14/2017 1:45 PM Renee Cox PT [facility]        Today's Diagnoses     Chronic pain    -  1       Follow-ups after your visit        Your next 10 appointments already scheduled     Jun 16, 2017 10:40 AM CDT   [facility] with [facility]ado [facility]   [facility]    [address] [place] [id number] [phone]           Bring a current list of meds and any records pertaining to this visit.  For Physicals, please bring immunization records and any forms needing to be filled out.  Please arrive 10 minutes early to complete paperwork.              Who to contact     If you have questions or need follow up information about today's clinic visit or your schedule please contact [facility] directly at [phone].  Normal or non-critical lab and imaging results will be communicated to you by AdNectarhart, letter or phone within 4 business days after the clinic has received the results. If you do not hear from us within 7 days, please contact the clinic through Tyrogenex or phone. If you have a critical or abnormal lab result, we will notify you by phone as soon as possible.  Submit refill requests through [employer] or call your pharmacy and they will forward the refill request to us. Please allow 3 business days for your refill to be completed.          Additional Information About Your Visit        [employer] Information     [employer] gives you secure access to your electronic health record. If you see a primary care provider, you can also send messages to your care team and make appointments. If you have questions, please call your primary care clinic.  If you do not have a primary care provider, please call [phone]  and they will assist you.        Care EveryWhere ID     This is your Care EveryWhere ID. This could be used by other organizations to access your [facility] medical records  [id number]         Blood Pressure from Last 3 Encounters:   06/14/17 114/67   05/10/17 110/68   05/01/17 105/66    Weight from Last 3 Encounters:   05/10/17 97.1 kg (214 lb)   05/01/17 96.6 kg (213 lb)   03/08/17 99.3 kg (219 lb)              We Performed the Following     NEUROMUSCULAR RE-EDUCATION          Today's Medication Changes          These changes are accurate as of: 6/14/17  3:10 PM.  If you have any questions, ask your nurse or doctor.               These medicines have changed or have updated prescriptions.        Dose/Directions    * levothyroxine 150 MCG tablet   Commonly known as:  SYNTHROID/LEVOTHROID   This may have changed:  additional instructions   Used for:  Acquired hypothyroidism        Dose:  150 mcg   Take 1 tablet (150 mcg) by mouth every other day   Quantity:  45 tablet   Refills:  3       * levothyroxine 175 MCG tablet   Commonly known as:  SYNTHROID/LEVOTHROID   This may have changed:  additional instructions   Used for:  Acquired hypothyroidism        Dose:  175 mcg   Take 1 tablet (175 mcg) by mouth every other day   Quantity:  45 tablet   Refills:  3       tapentadol 50 MG Tabs tablet   Commonly known as:  NUCYNTA   This may have changed:  additional instructions   Used for:  Chronic pain syndrome   Changed by:  Nahed Reyes DO        Dose:  50 mg   Take 1 tablet (50 mg) by mouth every 6 hours as needed for moderate to severe pain Max #4/day.   Quantity:  120 tablet   Refills:  0       * Notice:  This list has 2 medication(s) that are the same as other medications prescribed for you. Read the directions carefully, and ask your doctor or other care provider to review them with you.         Where to get your medicines      Some of these will need a paper prescription and others can be bought over the counter.   Ask your nurse if you have questions.     Bring a paper prescription for each of these medications     tapentadol 50 MG Tabs tablet                Primary Care Provider Office Phone # Fax #    Catrachita Collado [phone]       [facility] [address] [place] [address]        Thank you!     Thank you for choosing [facility]  for your care. Our goal is always to provide you with excellent care. Hearing back from our patients is one way we can continue to improve our services. Please take a few minutes to complete the written survey that you may receive in the mail after your visit with us. Thank you!             Your Updated Medication List - Protect others around you: Learn how to safely use, store and throw away your medicines at www.disposemymeds.org.          This list is accurate as of: 6/14/17  3:10 PM.  Always use your most recent med list.                   Brand Name Dispense Instructions for use    * amphetamine-dextroamphetamine 20 MG per tablet    ADDERALL    30 tablet    Take 1 tablet (20 mg) by mouth daily       * amphetamine-dextroamphetamine 20 MG per tablet   Start taking on:  6/30/2017    ADDERALL    30 tablet    Take 1 tablet (20 mg) by mouth daily       cholecalciferol 5000 UNITS Caps capsule    vitamin D3    100 capsule    Take 1 capsule (5,000 Units) by mouth daily Take 1 per day       doxepin 10 MG capsule    SINEquan    180 capsule    TAKE ONE TO TWO CAPSULES BY MOUTH AT BEDTIME       DULoxetine 60 MG EC capsule    CYMBALTA    180 capsule    TAKE 2 CAPSULES BY MOUTH EVERY DAY       FIBER PO      Two capsules in the morning and two capsules at night       fish oil-omega-3 fatty acids 1000 MG capsule      Take 2 g by mouth daily Takes 1 in am and 1 at bedtime       fluticasone 50 MCG/ACT spray    FLONASE    16 g    Spray 1-2 sprays into both nostrils daily       * levothyroxine 150 MCG tablet    SYNTHROID/LEVOTHROID    45 tablet    Take  1 tablet (150 mcg) by mouth every other day       * levothyroxine 175 MCG tablet    SYNTHROID/LEVOTHROID    45 tablet    Take 1 tablet (175 mcg) by mouth every other day       * LORazepam 0.5 MG tablet    ATIVAN    30 tablet    Take 1 tablet (0.5 mg) by mouth nightly as needed for anxiety 30 tablets to last 30 days.       * LORazepam 0.5 MG tablet    ATIVAN    6 tablet    Take 2 po 90 minutes prior to MRI and may repeat x 1 30 minutes prior to MRI       lovastatin 20 MG tablet    MEVACOR    90 tablet    Take 1 tablet (20 mg) by mouth At Bedtime       Multi-vitamin Tabs tablet      Take 1 tablet by mouth daily       NONFORMULARY      Take 20 mg by mouth 2 times daily D-amphetamine Salt combo       OLANZapine 2.5 MG tablet    zyPREXA    90 tablet    Take 1 tablet (2.5 mg) by mouth At Bedtime       omeprazole 40 MG capsule    priLOSEC    90 capsule    Take 1 capsule (40 mg) by mouth daily Take 30-60 minutes before a meal.       ondansetron 4 MG tablet    ZOFRAN    18 tablet    Take 1 tablet (4 mg) by mouth every 6 hours as needed for nausea       order for DME     1 Device    TENS unit and supplies       prazosin 5 MG capsule    MINIPRESS    90 capsule    Take 1 capsule (5 mg) by mouth At Bedtime Take 1 capsule at bedtime       tapentadol 50 MG Tabs tablet    NUCYNTA    120 tablet    Take 1 tablet (50 mg) by mouth every 6 hours as needed for moderate to severe pain Max #4/day.       tiZANidine 4 MG tablet    ZANAFLEX    210 tablet    Take 1-3 tablets (4-12 mg) by mouth 3 times daily as needed for muscle spasms       topiramate 50 MG tablet    TOPAMAX    120 tablet    Take 1-2 tablets ( mg) by mouth 2 times daily       * Notice:  This list has 6 medication(s) that are the same as other medications prescribed for you. Read the directions carefully, and ask your doctor or other care provider to review them with you.

## 2017-06-14 NOTE — PROGRESS NOTES
"Pain Physical Therapy Progress Note    Patient Name: Ilsa Yusuf     YOB: 1960     Medical Record Number: [id number]    Visits: 7/13    Diagnosis: Chronic pain    Subjective: Patient reports primary complaints of pain located in upper back between her shoulder blades.  She has been able to increase her walking 10' x 2 daily; standing tolerance increased from 15' to 30' and she is able to sleep without waking or delayed onset.    Self Care  HEP: indep  Walking/Aerobic Activity: 10' x 2 daily  Posture: instructed in neutral standing posture using navel/xyphoid manual cuing  Breathing/Relaxation: indep with guided imagery 25' daily provided by Dr. Humphries  Heat/Ice: rice sock  Mini Breaks: indep  Pacing: indep      Objective Findings:  OBSERVATION: Pt tends to stand with accentuated thoracic extension and weight shifted posteriorly.  Overuse of cervical extension to roll from supine to side lying.  PALPATION: Increased muscle tension cervical paraspinals, right > left. Cervical rotation limited to 40% right rotation due to restricted segmental mobility at C-5, C-6.  Instructed patient in manual functional movement including supine thoracic extension, differentiate scapula and rib cage, lifting shoulders with self hug thoracic rotation pattern.  Pt required frequent verbal and manual cuing to avoid overdoing movements and reduce muscular effort in neck.  Pt reported feeling improved ease with standing posture, decreased tension between shoulder blades and more connection of upper and lower body while walking.          Treatment Interventions:  Neuromuscular Reeducation:   For 45 minutes including instruction in postural alignment, supine thoracic rotation \"self hug\" pattern   __________________________________________________________________  Instruction on home program: walking, SL shoulder/hip glides, SL reach/roll, SL thoracic rotation, self hug    Assessment:  Ongoing Functional Limitations " Include:  Patient tolerated/responded well to treatment    Intensity Level: 4 (1=low intensity; 5=high intensity)  Demonstrates/Verbalizes Technique: 3 (1= poor technique-difficulty performing exercises,significant cues required; 5= good technique-performs exercises without cues)  Body Awareness: 3 (1=low awareness; 5=high awareness)  Posture/Stability: 3 (1= poor posture, stability; 5= good posture, stability)  Motivational Level: Ask questions, Eager to learn, Cooperative and Distracted, anxious  Response to Teaching: cooperative  Factors that affect learning: None  _______________________________________________________________________  Plan of Care   Continue PT to support reactivation and integration of self regulation pain management skills;  Focus of next session will be on: see for one more session, monitor self cares, record body scan, progress self hug or consider diagonal twist with scapular protraction/retraction     Present:  NA     Total Visit Time:  45 minutes    Therapist: Renee Cox PT             Date: 6/14/2017  G Code 7/10

## 2017-06-14 NOTE — MR AVS SNAPSHOT
After Visit Summary   6/14/2017    Ilsa Yusuf    MRN: [id number]           Patient Information     Date Of Birth          1960        Visit Information        Provider Department      6/14/2017 1:00 PM Nahed Reyes DO [facility]        Care Instructions    1. Physical Therapy: follow up with Renee.    2. Clinical Health Psychologist to address issues of relaxation, behavioral change, coping style, and other factors important to improvement: Follow up with Dr. Humphries.  3. Self Care Recommendations: continue guided imagery  4. Medication Management:  UDS and opioid agreement on 2/6/17. Discuss with PCP taking over prescribing the pain medications.   --Continue nucynta 50 mg max #4/day.   --Continue topiramate 100 mg twice a day.  --Continue tizanidine 8 mg in the morning and afternoon and 12 mg at bedtime.  5. Further procedures recommended: could consider cervical epidural steroid injection    6. Follow up: with Dr. Reyes in clinic in 1 month.  7. Requested handicap parking permit due to having difficulty with memory when she case -encouraged her to discuss this with her PCP.    ----------------------------------------------------------------  Nurse Triage line:  [phone]   Call this number with any questions or concerns. You may leave a detailed message anytime. Calls are typically returned Monday through Friday between 8 AM and 4:30 PM. We usually get back to you within 2 business days depending on the issue/request.       Medication refills:    For non-narcotic medications, call your pharmacy directly to request a refill. The pharmacy will contact the [facility] for authorization. Please allow 3-4 days for these refills to be processed.     For narcotic refills, call the nurse triage line or send a [employer] message. Please contact us 7-10 days before your refill is due. The message MUST include the name of the specific medication(s) requested and  how you would like to receive the prescription(s). The options are as follows:    Pain Clinic staff can mail the prescription to your pharmacy. Please tell us the name of the pharmacy.    You may pick the prescription up at the Pain Clinic (tell us the location) or during a clinic visit with your pain provider    Pain Clinic staff can deliver the prescription to the [facility] pharmacy in the clinic building. Please tell us the location.      Scheduling number: [phone].  Call this number to schedule or change appointments.    We believe regular attendance is key to your success in our program.    Any time you are unable to keep your appointment we ask that you call us at least 24 hours in advance to let us know. This will allow us to offer the appointment time to another patient.               Follow-ups after your visit        Your next 10 appointments already scheduled     Jun 16, 2017 10:40 AM CDT   Office Visit with Catrachita Collado MD   [facility]    [address] [place] [id number] [phone]           Bring a current list of meds and any records pertaining to this visit.  For Physicals, please bring immunization records and any forms needing to be filled out.  Please arrive 10 minutes early to complete paperwork.              Who to contact     If you have questions or need follow up information about today's clinic visit or your schedule please contact [facility] directly at [phone].  Normal or non-critical lab and imaging results will be communicated to you by MyChart, letter or phone within 4 business days after the clinic has received the results. If you do not hear from us within 7 days, please contact the clinic through MyChart or phone. If you have a critical or abnormal lab result, we will notify you by phone as soon as possible.  Submit refill requests through JumpTheClubhart or call your pharmacy and they will  forward the refill request to us. Please allow 3 business days for your refill to be completed.          Additional Information About Your Visit        [employer] Information     [employer] gives you secure access to your electronic health record. If you see a primary care provider, you can also send messages to your care team and make appointments. If you have questions, please call your primary care clinic.  If you do not have a primary care provider, please call [phone] and they will assist you.        Care EveryWhere ID     This is your Care EveryWhere ID. This could be used by other organizations to access your [facility] medical records  [id number]        Your Vitals Were     Pulse Pulse Oximetry                71 98%           Blood Pressure from Last 3 Encounters:   06/14/17 114/67   05/10/17 110/68   05/01/17 105/66    Weight from Last 3 Encounters:   05/10/17 97.1 kg (214 lb)   05/01/17 96.6 kg (213 lb)   03/08/17 99.3 kg (219 lb)              Today, you had the following     No orders found for display         Today's Medication Changes          These changes are accurate as of: 6/14/17  1:46 PM.  If you have any questions, ask your nurse or doctor.               These medicines have changed or have updated prescriptions.        Dose/Directions    * levothyroxine 150 MCG tablet   Commonly known as:  SYNTHROID/LEVOTHROID   This may have changed:  additional instructions   Used for:  Acquired hypothyroidism        Dose:  150 mcg   Take 1 tablet (150 mcg) by mouth every other day   Quantity:  45 tablet   Refills:  3       * levothyroxine 175 MCG tablet   Commonly known as:  SYNTHROID/LEVOTHROID   This may have changed:  additional instructions   Used for:  Acquired hypothyroidism        Dose:  175 mcg   Take 1 tablet (175 mcg) by mouth every other day   Quantity:  45 tablet   Refills:  3       * Notice:  This list has 2 medication(s) that are the same as other medications prescribed for you. Read the  directions carefully, and ask your doctor or other care provider to review them with you.             Primary Care Provider Office Phone # Fax #    Catrachita Collado [phone]       [facility] [address] [place] [address]        Thank you!     Thank you for choosing [facility]  for your care. Our goal is always to provide you with excellent care. Hearing back from our patients is one way we can continue to improve our services. Please take a few minutes to complete the written survey that you may receive in the mail after your visit with us. Thank you!             Your Updated Medication List - Protect others around you: Learn how to safely use, store and throw away your medicines at www.disposemymeds.org.          This list is accurate as of: 6/14/17  1:46 PM.  Always use your most recent med list.                   Brand Name Dispense Instructions for use    * amphetamine-dextroamphetamine 20 MG per tablet    ADDERALL    30 tablet    Take 1 tablet (20 mg) by mouth daily       * amphetamine-dextroamphetamine 20 MG per tablet   Start taking on:  6/30/2017    ADDERALL    30 tablet    Take 1 tablet (20 mg) by mouth daily       cholecalciferol 5000 UNITS Caps capsule    vitamin D3    100 capsule    Take 1 capsule (5,000 Units) by mouth daily Take 1 per day       doxepin 10 MG capsule    SINEquan    180 capsule    TAKE ONE TO TWO CAPSULES BY MOUTH AT BEDTIME       DULoxetine 60 MG EC capsule    CYMBALTA    180 capsule    TAKE 2 CAPSULES BY MOUTH EVERY DAY       FIBER PO      Two capsules in the morning and two capsules at night       fish oil-omega-3 fatty acids 1000 MG capsule      Take 2 g by mouth daily Takes 1 in am and 1 at bedtime       fluticasone 50 MCG/ACT spray    FLONASE    16 g    Spray 1-2 sprays into both nostrils daily       * levothyroxine 150 MCG tablet    SYNTHROID/LEVOTHROID    45 tablet    Take 1 tablet (150 mcg) by mouth every other day        * levothyroxine 175 MCG tablet    SYNTHROID/LEVOTHROID    45 tablet    Take 1 tablet (175 mcg) by mouth every other day       * LORazepam 0.5 MG tablet    ATIVAN    30 tablet    Take 1 tablet (0.5 mg) by mouth nightly as needed for anxiety 30 tablets to last 30 days.       * LORazepam 0.5 MG tablet    ATIVAN    6 tablet    Take 2 po 90 minutes prior to MRI and may repeat x 1 30 minutes prior to MRI       lovastatin 20 MG tablet    MEVACOR    90 tablet    Take 1 tablet (20 mg) by mouth At Bedtime       Multi-vitamin Tabs tablet      Take 1 tablet by mouth daily       NONFORMULARY      Take 20 mg by mouth 2 times daily D-amphetamine Salt combo       OLANZapine 2.5 MG tablet    zyPREXA    90 tablet    Take 1 tablet (2.5 mg) by mouth At Bedtime       omeprazole 40 MG capsule    priLOSEC    90 capsule    Take 1 capsule (40 mg) by mouth daily Take 30-60 minutes before a meal.       ondansetron 4 MG tablet    ZOFRAN    18 tablet    Take 1 tablet (4 mg) by mouth every 6 hours as needed for nausea       order for [facility]     1 Device    TENS unit and supplies       prazosin 5 MG capsule    MINIPRESS    90 capsule    Take 1 capsule (5 mg) by mouth At Bedtime Take 1 capsule at bedtime       tapentadol 50 MG Tabs tablet    NUCYNTA    90 tablet    Take 1 tablet (50 mg) by mouth every 6 hours as needed for moderate to severe pain Max #3/day. OK to dispense on/after June 8th and start on/after June 10th.       tiZANidine 4 MG tablet    ZANAFLEX    210 tablet    Take 1-3 tablets (4-12 mg) by mouth 3 times daily as needed for muscle spasms       topiramate 50 MG tablet    TOPAMAX    120 tablet    Take 1-2 tablets ( mg) by mouth 2 times daily       * Notice:  This list has 6 medication(s) that are the same as other medications prescribed for you. Read the directions carefully, and ask your doctor or other care provider to review them with you.

## 2017-06-14 NOTE — PROGRESS NOTES
[facility]    Date of visit: 6/14/2017    Chief complaint:   Chief Complaint   Patient presents with     Pain     Follow up       Interval history:  Ilsa Yusuf is a 56 year old female last seen by me on 5/10/17.    Since her last visit, Ilsa Yusuf reports:  -She is upset regarding the miscommunications around her most recent nucynta refill. She is requesting to have the names of the nurses who handled this. She is taking nucynta #4/day. The increase from #3 to #4 has made a big difference in her pain control. She has lost 20 lbs since starting the nucynta and attributes this to being more active.  -She is now taking topiramate 100 mg bid. This has helped somewhat with her headaches and has completely taken away her arm symptoms.   -Will be seeing her PCP on Friday because she is having some new symptoms including stool incontinence x 2 episodes 1 week apart and eye achiness. She has had vision changes several weeks now and went to her eye doctor. The floaters and arching lights are happening less often, but continue primarily if she is in the dark. She is also reporting forgetfulness that remains since her episode of severe depression. She is requesting a handicap parking permit because of the forgetfulness and I recommended she discuss this with her PCP.   -Her pain is staying about the same in her neck and upper back. The pain is described as aching, throbbing, tiring, stabbing, and miserable. The pain is constant. She rates the pain 3-8/10, averages 5/10. The pain is aggravated by not having her medication and by pushing herself too hard. The pain is better with taking breaks, guided imagery, psychology and PT sessions, and medication.   -She is also feeling achiness in her joints including hands, feet, and ankles.   -Headaches occur about 5 per week. Non-pulsating. Worsened with activity. No aura. No nausea. No photophobia or phonophobia.  -She  continues to pace herself with activities. Continues to see Renee for PT, which is going great. The exercises are going very well.  -Is working with Dr. Humphries for pain psychology which is also going well.  -She reports continuing home guided imagery therapy using a CD she has and home massage. She is also trying to pace her physical activities on her own to help with pain.    THE 4 A's OF OPIOID MAINTENANCE ANALGESIA    Analgesia: bring pain down 5 points    Activity: volunteering for [employer]    Adverse effects: none    Adherence to Rx protocol: good      Pain scores:  Pain intensity on average is 5 (down from 6) on a scale of 0-10.     Current pain treatments:   Nucynta 50 mg max #4/day  Tizanidine 8-8-12 mg  topiramate 100 mg bid  Ativan -none for several months  Doxepin 10-20 mg qhs  cymbalta 120 mg daily  olanzapine 2.5 mg qhs    Side Effects: no side effect    Past pain treatments:  Ilsa Yusuf has not been seen at a pain clinic in the past.     Medications:                NSAIDs: ibuprofen -some relief, medrol dose юлия -helped while taking it.              Anticonvulsants: gabapentin -horrible nightmares                Opioids: tramadol -provided incomplete relief; norco 5-325 mg- beneficial  PT: [facility] completed in Oct 2016 -helped  Psychology: guided imagery  Acupuncture: no  Chiropractic care: no  TENS Unit: no  Injections: cervical epidural steroid injection 9/20/16 -flared up her pain for about 3 days and did not get significant relief  Surgery: no cervical surgery, left knee meniscus surgery in 2012    Medications:  Current Outpatient Prescriptions   Medication Sig Dispense Refill     tapentadol (NUCYNTA) 50 MG TABS tablet Take 1 tablet (50 mg) by mouth every 6 hours as needed for moderate to severe pain Max #3/day. OK to dispense on/after June 8th and start on/after June 10th. 90 tablet 0     topiramate (TOPAMAX) 50 MG tablet Take 1-2 tablets ( mg) by mouth 2  times daily 120 tablet 3     tiZANidine (ZANAFLEX) 4 MG tablet Take 1-3 tablets (4-12 mg) by mouth 3 times daily as needed for muscle spasms 210 tablet 3     prazosin (MINIPRESS) 5 MG capsule Take 1 capsule (5 mg) by mouth At Bedtime Take 1 capsule at bedtime 90 capsule 3     amphetamine-dextroamphetamine (ADDERALL) 20 MG per tablet Take 1 tablet (20 mg) by mouth daily 30 tablet 0     [START ON 6/30/2017] amphetamine-dextroamphetamine (ADDERALL) 20 MG per tablet Take 1 tablet (20 mg) by mouth daily 30 tablet 0     OLANZapine (ZYPREXA) 2.5 MG tablet Take 1 tablet (2.5 mg) by mouth At Bedtime 90 tablet 1     doxepin (SINEQUAN) 10 MG capsule TAKE ONE TO TWO CAPSULES BY MOUTH AT BEDTIME 180 capsule 8     DULoxetine (CYMBALTA) 60 MG EC capsule TAKE 2 CAPSULES BY MOUTH EVERY  capsule 1     omeprazole (PRILOSEC) 40 MG capsule Take 1 capsule (40 mg) by mouth daily Take 30-60 minutes before a meal. 90 capsule 2     levothyroxine (SYNTHROID/LEVOTHROID) 150 MCG tablet Take 1 tablet (150 mcg) by mouth every other day (Patient taking differently: Take 150 mcg by mouth every other day Pt take med on Sunday, Tuesday and Thursday) 45 tablet 3     levothyroxine (SYNTHROID/LEVOTHROID) 175 MCG tablet Take 1 tablet (175 mcg) by mouth every other day (Patient taking differently: Take 175 mcg by mouth every other day Pt take med on Monday, Wednesday and Friday) 45 tablet 3     lovastatin (MEVACOR) 20 MG tablet Take 1 tablet (20 mg) by mouth At Bedtime 90 tablet 3     FIBER PO Two capsules in the morning and two capsules at night       ondansetron (ZOFRAN) 4 MG tablet Take 1 tablet (4 mg) by mouth every 6 hours as needed for nausea 18 tablet 5     fluticasone (FLONASE) 50 MCG/ACT nasal spray Spray 1-2 sprays into both nostrils daily 16 g 11     NONFORMULARY Take 20 mg by mouth 2 times daily D-amphetamine Salt combo       cholecalciferol (VITAMIN D3) 5000 UNITS CAPS capsule Take 1 capsule (5,000 Units) by mouth daily Take 1 per day  100 capsule 2     multivitamin, therapeutic with minerals (MULTI-VITAMIN) TABS Take 1 tablet by mouth daily       fish oil-omega-3 fatty acids (OMEGA 3) 1000 MG capsule Take 2 g by mouth daily Takes 1 in am and 1 at bedtime       order for DME TENS unit and supplies 1 Device 0     LORazepam (ATIVAN) 0.5 MG tablet Take 2 po 90 minutes prior to MRI and may repeat x 1 30 minutes prior to MRI (Patient not taking: Reported on 5/10/2017) 6 tablet 0     LORazepam (ATIVAN) 0.5 MG tablet Take 1 tablet (0.5 mg) by mouth nightly as needed for anxiety 30 tablets to last 30 days. (Patient not taking: Reported on 6/14/2017) 30 tablet 2       Medical History: any changes in medical history since they were last seen? No    Review of Systems:  The 14 system ROS was reviewed from the intake questionnaire, and is positive for: headache, thyroid disease, joint pain, arthritis, neck pain  Any bowel or bladder problems: no  Mood: stable    Physical Exam:  Blood pressure 114/67, pulse 71, SpO2 98 %, not currently breastfeeding.  General: no acute distress  Gait: Normal  MSK exam: Cervical spine normal range of motion with discomfort at end range, bilateral upper extremity strength and sensation intact    Imaging:  Cervical MRI completed on 8/27/16 showed:  Findings:  The cervical vertebrae appear normally aligned.  There is disc height  narrowing at C5-C6 with degeneration of the disc and sclerosis of  adjacent endplates.  There is normal signal within and normal contour  of the cervical spinal cord. No significant abnormal bone marrow  change. The findings on a level by level basis are as follows:     C2-3: No spinal canal or neural foraminal narrowing.     C3-4:  No spinal canal or neural foraminal narrowing.     C4-5:  No spinal canal or neural foraminal narrowing.     C5-6:  Posterior disc bulge with uncovertebral joint hypertrophy and  osteophyte formations. Superimposing left central disc herniation with  elevation of the posterior  longitudinal ligament Mild canal narrowing.  Herniated disc effaces the left anterolateral subarachnoid space and  possibly abutting the left C6 in the left portion of the spinal canal.  Mild canal narrowing. Bilateral mild to moderate foraminal narrowing  more significant on the left.     C6-7:  Disc bulge with mild uncovertebral hypertrophy. No significant  spinal canal or neural foraminal narrowing.     C7-T1:  No spinal canal or neural foraminal narrowing.      No abnormality of the paraspinous soft tissues.                                                                       Impression:       1. Disc bulge with uncovertebral joint hypertrophy and osteophyte  formations at C5-6. Superimposing left central disc herniation with  elevation of the posterior longitudinal ligament.Herniated disc  effaces the left anterolateral subarachnoid space and possibly  abutting the left C6 in the left portion of the spinal canal. Mild  canal narrowing. Bilateral mild to moderate foraminal narrowing more  significant on the left.  2. Disc bulge at C6-7 with no significant compression.    [place] Board of Pharmacy Data Base Reviewed: YES; as expected, no concern for abuse or misuse    Assessment:   1. Neck pain, worse on the left, started in Aug 2016, with associated headaches. Disc bulge at C5-C6 and C6-C7. Bilateral foraminal narrowing at C5-C6.  2. Bilateral knee pain, right worse than left. History of left mensicus surgery in 2012 and has a torn meniscus in her right knee  3. Depression and PTSD -weekly counseling    Plan:  1. Physical Therapy: follow up with Renee.    2. Clinical Health Psychologist to address issues of relaxation, behavioral change, coping style, and other factors important to improvement: Follow up with Dr. Humphries.  3. Self Care Recommendations: continue guided imagery  4. Medication Management:  UDS and opioid agreement on 2/6/17. Discuss with PCP taking over prescribing the pain  medications.   --Continue nucynta 50 mg max #4/day.   --Continue topiramate 100 mg twice a day.  --Continue tizanidine 8 mg in the morning and afternoon and 12 mg at bedtime.  5. Further procedures recommended: could consider cervical epidural steroid injection    6. Follow up: as needed in the pain clinic.   7. Requested handicap parking permit due to having difficulty with memory when she case -encouraged her to discuss this with her PCP.    Total time spent was 30 minutes, and more than 50% of face to face time was spent in counseling and/or coordination of care regarding the above assessment and plan.    Nahed Reyes DO  [facility]  [facility]

## 2017-06-14 NOTE — PATIENT INSTRUCTIONS
1. Physical Therapy: follow up with Renee.    2. Clinical Health Psychologist to address issues of relaxation, behavioral change, coping style, and other factors important to improvement: Follow up with Dr. Humphries.  3. Self Care Recommendations: continue guided imagery  4. Medication Management:  UDS and opioid agreement on 2/6/17. Discuss with PCP taking over prescribing the pain medications.   --Continue nucynta 50 mg max #4/day.   --Continue topiramate 100 mg twice a day.  --Continue tizanidine 8 mg in the morning and afternoon and 12 mg at bedtime.  5. Further procedures recommended: could consider cervical epidural steroid injection    6. Follow up: with Dr. Reyes in clinic in 1 month.  7. Requested handicap parking permit due to having difficulty with memory when she case -encouraged her to discuss this with her PCP.    ----------------------------------------------------------------  Nurse Triage line:  [phone]   Call this number with any questions or concerns. You may leave a detailed message anytime. Calls are typically returned Monday through Friday between 8 AM and 4:30 PM. We usually get back to you within 2 business days depending on the issue/request.       Medication refills:    For non-narcotic medications, call your pharmacy directly to request a refill. The pharmacy will contact the [facility] for authorization. Please allow 3-4 days for these refills to be processed.     For narcotic refills, call the nurse triage line or send a [employer] message. Please contact us 7-10 days before your refill is due. The message MUST include the name of the specific medication(s) requested and how you would like to receive the prescription(s). The options are as follows:    Pain Clinic staff can mail the prescription to your pharmacy. Please tell us the name of the pharmacy.    You may pick the prescription up at the Pain Clinic (tell us the location) or during a clinic visit with your pain  provider    Pain Clinic staff can deliver the prescription to the [place] pharmacy in the clinic building. Please tell us the location.      Scheduling number: [phone].  Call this number to schedule or change appointments.    We believe regular attendance is key to your success in our program.    Any time you are unable to keep your appointment we ask that you call us at least 24 hours in advance to let us know. This will allow us to offer the appointment time to another patient.

## 2017-06-14 NOTE — Clinical Note
Mauricio Collado, I will be leaving [place]. June 23rd is my last day. Are you willing to take over prescribing this patient's routine pain medications? She will be seeing you on Friday.  Thanks, Nahed Reyes, DO

## 2017-06-14 NOTE — NURSING NOTE
"Chief Complaint   Patient presents with     Pain     Follow up       Initial /67  Pulse 71  SpO2 98% Estimated body mass index is 31.6 kg/(m^2) as calculated from the following:    Height as of 12/23/16: 1.753 m (5' 9\").    Weight as of 5/10/17: 97.1 kg (214 lb).  Medication Reconciliation: nitza [facility]        "

## 2017-06-16 ENCOUNTER — OFFICE VISIT (OUTPATIENT)
Dept: FAMILY MEDICINE | Facility: CLINIC | Age: 57
End: 2017-06-16
Payer: COMMERCIAL

## 2017-06-16 VITALS
HEART RATE: 65 BPM | OXYGEN SATURATION: 97 % | SYSTOLIC BLOOD PRESSURE: 102 MMHG | RESPIRATION RATE: 18 BRPM | TEMPERATURE: 96.2 F | DIASTOLIC BLOOD PRESSURE: 67 MMHG | BODY MASS INDEX: 31.1 KG/M2 | WEIGHT: 210.6 LBS

## 2017-06-16 DIAGNOSIS — F90.0 ATTENTION DEFICIT HYPERACTIVITY DISORDER (ADHD), PREDOMINANTLY INATTENTIVE TYPE: ICD-10-CM

## 2017-06-16 DIAGNOSIS — R15.9 FECAL INCONTINENCE: Primary | ICD-10-CM

## 2017-06-16 DIAGNOSIS — R41.3 MEMORY PROBLEM: ICD-10-CM

## 2017-06-16 PROCEDURE — 99213 OFFICE O/P EST LOW 20 MIN: CPT | Performed by: FAMILY MEDICINE

## 2017-06-16 RX ORDER — DEXTROAMPHETAMINE SACCHARATE, AMPHETAMINE ASPARTATE, DEXTROAMPHETAMINE SULFATE AND AMPHETAMINE SULFATE 5; 5; 5; 5 MG/1; MG/1; MG/1; MG/1
20 TABLET ORAL DAILY
Qty: 30 TABLET | Refills: 0 | Status: CANCELLED | OUTPATIENT
Start: 2017-06-16

## 2017-06-16 ASSESSMENT — ENCOUNTER SYMPTOMS
MEMORY LOSS: 1
BLOOD IN STOOL: 0
ABDOMINAL PAIN: 0
NERVOUS/ANXIOUS: 1
DIARRHEA: 0
CONSTIPATION: 0
FEVER: 0
CHILLS: 0
DEPRESSION: 1

## 2017-06-16 NOTE — NURSING NOTE
"Chief Complaint   Patient presents with     Bowel Problems       Initial /67 (BP Location: Right arm, Patient Position: Chair, Cuff Size: Adult Regular)  Pulse 65  Temp 96.2  F (35.7  C) (Tympanic)  Resp 18  Wt 210 lb 9.6 oz (95.5 kg)  SpO2 97%  BMI 31.1 kg/m2 Estimated body mass index is 31.1 kg/(m^2) as calculated from the following:    Height as of 12/23/16: 5' 9\" (1.753 m).    Weight as of this encounter: 210 lb 9.6 oz (95.5 kg).  Medication Reconciliation: unable or not appropriate to perform         Ger Granado MA      "

## 2017-06-16 NOTE — PROGRESS NOTES
"HPI CC: 55 yo F presents for fecal incontinence.    Bowel Movement Problem       Duration: x3.5 week    Description (location/character/radiation): 2 episode of incontinence stool, watery and loose. No diarrhea, no blood in stool. Pt state she have a normal bowl pattern       Intensity:  mild    Accompanying signs and symptoms: Leg pain and both eyes hurt     History (similar episodes/previous evaluation): None    Precipitating or alleviating factors: None    Therapies tried and outcome: None   She has had two episodes of incontinence of stool; both occurred at night.  They happened two weeks apart.  She has had normal stools otherwise, formed, tubular, no black or bloody, she feels she empties when she has a BM, she does not feel constipated at all. She has no numbness or tingling of the saddle area or in her legs.  She has not had a change in her urinary function.  She has not had rectal surgery or trauma in the past. S he had a normal colonoscopy in 2012 (one hyperplastic polyp).  Her newer medications include topamax, tizanidine and nucynta, but no new meds since January.      She also notes difficulty with concentration which improves with using adderall but is still quite bothersome and worse over the past few months.  She has to re-read things.  She also notes that her eyeballs hurt.  She has seen her eye doctor about this. She is also having some leg pain.    Review of Systems   Constitutional: Negative for chills and fever.   Gastrointestinal: Negative for abdominal pain, blood in stool, constipation, diarrhea and melena.   Psychiatric/Behavioral: Positive for depression and memory loss. The patient is nervous/anxious.        Allergies   Allergen Reactions     Gabapentin Other (See Comments)     Patient states she gets \"horrific nightmares\" with this medication     Dilaudid [Hydromorphone Hcl]      Made her feel like her skin was crawling     Compazine [Prochlorperazine] Other (See Comments)     \"Makes my " "skin crawl, I was going nuts.\"     Current Outpatient Prescriptions   Medication     tapentadol (NUCYNTA) 50 MG TABS tablet     topiramate (TOPAMAX) 50 MG tablet     tiZANidine (ZANAFLEX) 4 MG tablet     order for DME     prazosin (MINIPRESS) 5 MG capsule     amphetamine-dextroamphetamine (ADDERALL) 20 MG per tablet     [START ON 6/30/2017] amphetamine-dextroamphetamine (ADDERALL) 20 MG per tablet     OLANZapine (ZYPREXA) 2.5 MG tablet     doxepin (SINEQUAN) 10 MG capsule     DULoxetine (CYMBALTA) 60 MG EC capsule     omeprazole (PRILOSEC) 40 MG capsule     levothyroxine (SYNTHROID/LEVOTHROID) 150 MCG tablet     levothyroxine (SYNTHROID/LEVOTHROID) 175 MCG tablet     LORazepam (ATIVAN) 0.5 MG tablet     lovastatin (MEVACOR) 20 MG tablet     FIBER PO     LORazepam (ATIVAN) 0.5 MG tablet     ondansetron (ZOFRAN) 4 MG tablet     fluticasone (FLONASE) 50 MCG/ACT nasal spray     NONFORMULARY     cholecalciferol (VITAMIN D3) 5000 UNITS CAPS capsule     multivitamin, therapeutic with minerals (MULTI-VITAMIN) TABS     fish oil-omega-3 fatty acids (OMEGA 3) 1000 MG capsule     No current facility-administered medications for this visit.      Active Ambulatory Problems     Diagnosis Date Noted     Major depressive disorder, recurrent episode, moderate (H) 01/18/2012     PTSD (post-traumatic stress disorder) 01/18/2012     Acquired hypothyroidism 01/18/2012     Hyperlipidemia LDL goal <130 01/30/2012     CKD (chronic kidney disease) stage 3, GFR 30-59 ml/min 03/09/2015     Esophageal reflux 04/13/2015     Primary insomnia 06/02/2015     Obesity 06/21/2015     Attention-deficit hyperactivity disorder, predominantly hyperactive type 04/19/2016     Neck pain 09/29/2016     Cervical radiculopathy 10/28/2016     Partner relationship problems 12/05/2016     Primary osteoarthritis of right knee 12/26/2016     Peripheral tear of medial meniscus of right knee, unspecified whether old or current tear, initial encounter 12/26/2016 "     Resolved Ambulatory Problems     Diagnosis Date Noted     Seizure disorder (H) 01/18/2012     Nausea, vomiting and diarrhea 04/13/2016     Norovirus 04/14/2016     Viral gastroenteritis 04/14/2016     Past Medical History:   Diagnosis Date     Arthritis 2012     Depressive disorder      Depressive disorder, not elsewhere classified 08/28/12     Hyperlipidemia LDL goal < 130      Hypothyroid      Moderate major depression (H)      PTSD (post-traumatic stress disorder)      Seizure (H) 03/2011       Physical Exam   Constitutional: She is well-developed, well-nourished, and in no distress.   Cardiovascular: Normal rate, regular rhythm and normal heart sounds.  Exam reveals no gallop and no friction rub.    No murmur heard.  Pulmonary/Chest: Effort normal and breath sounds normal. No respiratory distress. She has no wheezes. She has no rales.   Abdominal: Soft. Bowel sounds are normal. There is no tenderness.   Genitourinary:   Genitourinary Comments: Fairly large external hemorrhoid. Anal wink a little hard to assess with this present.  Normal tone on rectal exam.     Skin: She is not diaphoretic.   Vitals reviewed.    /67 (BP Location: Right arm, Patient Position: Chair, Cuff Size: Adult Regular)  Pulse 65  Temp 96.2  F (35.7  C) (Tympanic)  Resp 18  Wt 210 lb 9.6 oz (95.5 kg)  SpO2 97%  BMI 31.1 kg/m2      A/P  Fecal incontinence: two episodes at night, otherwise normal bowel function.  I discussed that it is possibly related to her medications, as muscle relaxants and antidepressants may have this side effect.  Referral is done for colorectal specialists.      Memory and attention difficulty: she was late to this appointment, and we did not have time to fully address this, but the onset of her symptoms seems to correlate with starting topamax, and I discussed that this is a common side effect of this medication.  If desired, neuropsychiatric testing could be pursued .    Eye pain: she has seen her  eye doctor about this; I do not think I have anything else to add  Leg pain: I advised that if she had time to wait I could return to the room after seeing my next patient to review her other concerns including leg pain ;the patient was gone when I returned.      She also wants me to take over her prescriptions for topamax, nucynta and tizanidine as she has been stable on her doses.  She continues with pain management clinic. I discussed that I can do this as long as she is stable on her doses.

## 2017-06-16 NOTE — TELEPHONE ENCOUNTER
Pt was in clinic to see Dr. Reyes on 6/14/17 and stated that she would not be returning to the Pain Clinic.     ERIBERTO AmezcuaN, RN-BC  Patient Care Supervisor/Care Coordinator  [facility]

## 2017-06-16 NOTE — MR AVS SNAPSHOT
After Visit Summary   6/16/2017    Ilsa Yusuf    MRN: [id number]           Patient Information     Date Of Birth          1960        Visit Information        Provider Department      6/16/2017 10:40 AM Catrachita Collado MD [facility]        Today's Diagnoses     Fecal incontinence    -  1    Attention deficit hyperactivity disorder (ADHD), predominantly inattentive type        Memory problem           Follow-ups after your visit        Additional Services     COLORECTAL SURGERY REFERRAL       Your provider has referred you to: [facility]: Colon and Rectal Surgery Clinic [facility] [phone]   [email or web address]    Referral Reason(s): fecal incontinence    Special Concerns:   This referral is: Elective (week +)  It is OK to leave a message on patient's voicemail.    Please be aware that coverage of these services is subject to the terms and limitations of your health insurance plan.  Call member services at your health plan with any benefit or coverage questions.      Please bring the following with you to your appointment:    (1) Any X-Rays, CTs or MRIs which have been performed.  Contact the facility where they were done to arrange for  prior to your scheduled appointment.    (2) List of current medications  (3) This referral request   (4) Any documents/labs given to you for this referral                  Who to contact     If you have questions or need follow up information about today's clinic visit or your schedule please contact [facility] directly at [phone].  Normal or non-critical lab and imaging results will be communicated to you by MyChart, letter or phone within 4 business days after the clinic has received the results. If you do not hear from us within 7 days, please contact the clinic through MyChart or phone. If you have a critical or abnormal lab result, we will  notify you by phone as soon as possible.  Submit refill requests through [employer] or call your pharmacy and they will forward the refill request to us. Please allow 3 business days for your refill to be completed.          Additional Information About Your Visit        [employer] Information     [employer] gives you secure access to your electronic health record. If you see a primary care provider, you can also send messages to your care team and make appointments. If you have questions, please call your primary care clinic.  If you do not have a primary care provider, please call [phone] and they will assist you.        Care EveryWhere ID     This is your Care EveryWhere ID. This could be used by other organizations to access your [place] medical records  [id number]        Your Vitals Were     Pulse Temperature Respirations Pulse Oximetry BMI (Body Mass Index)       65 96.2  F (35.7  C) (Tympanic) 18 97% 31.1 kg/m2        Blood Pressure from Last 3 Encounters:   06/16/17 102/67   06/14/17 114/67   05/10/17 110/68    Weight from Last 3 Encounters:   06/16/17 210 lb 9.6 oz (95.5 kg)   05/10/17 214 lb (97.1 kg)   05/01/17 213 lb (96.6 kg)              We Performed the Following     COLORECTAL SURGERY REFERRAL          Today's Medication Changes          These changes are accurate as of: 6/16/17  6:35 PM.  If you have any questions, ask your nurse or doctor.               These medicines have changed or have updated prescriptions.        Dose/Directions    * levothyroxine 150 MCG tablet   Commonly known as:  SYNTHROID/LEVOTHROID   This may have changed:  additional instructions   Used for:  Acquired hypothyroidism        Dose:  150 mcg   Take 1 tablet (150 mcg) by mouth every other day   Quantity:  45 tablet   Refills:  3       * levothyroxine 175 MCG tablet   Commonly known as:  SYNTHROID/LEVOTHROID   This may have changed:  additional instructions   Used for:  Acquired hypothyroidism        Dose:  175 mcg   Take 1  tablet (175 mcg) by mouth every other day   Quantity:  45 tablet   Refills:  3       * Notice:  This list has 2 medication(s) that are the same as other medications prescribed for you. Read the directions carefully, and ask your doctor or other care provider to review them with you.             Primary Care Provider Office Phone # Fax #    Catrachita Collado [phone]       [place] [id number]  [place] [address]        Thank you!     Thank you for choosing [facility]  for your care. Our goal is always to provide you with excellent care. Hearing back from our patients is one way we can continue to improve our services. Please take a few minutes to complete the written survey that you may receive in the mail after your visit with us. Thank you!             Your Updated Medication List - Protect others around you: Learn how to safely use, store and throw away your medicines at www.disposemymeds.org.          This list is accurate as of: 6/16/17  6:35 PM.  Always use your most recent med list.                   Brand Name Dispense Instructions for use    * amphetamine-dextroamphetamine 20 MG per tablet    ADDERALL    30 tablet    Take 1 tablet (20 mg) by mouth daily       * amphetamine-dextroamphetamine 20 MG per tablet   Start taking on:  6/30/2017    ADDERALL    30 tablet    Take 1 tablet (20 mg) by mouth daily       cholecalciferol 5000 UNITS Caps capsule    vitamin D3    100 capsule    Take 1 capsule (5,000 Units) by mouth daily Take 1 per day       doxepin 10 MG capsule    SINEquan    180 capsule    TAKE ONE TO TWO CAPSULES BY MOUTH AT BEDTIME       DULoxetine 60 MG EC capsule    CYMBALTA    180 capsule    TAKE 2 CAPSULES BY MOUTH EVERY DAY       FIBER PO      Two capsules in the morning and two capsules at night       fish oil-omega-3 fatty acids 1000 MG capsule      Take 2 g by mouth daily Takes 1 in am and 1 at bedtime       fluticasone 50 MCG/ACT  spray    FLONASE    16 g    Spray 1-2 sprays into both nostrils daily       * levothyroxine 150 MCG tablet    SYNTHROID/LEVOTHROID    45 tablet    Take 1 tablet (150 mcg) by mouth every other day       * levothyroxine 175 MCG tablet    SYNTHROID/LEVOTHROID    45 tablet    Take 1 tablet (175 mcg) by mouth every other day       * LORazepam 0.5 MG tablet    ATIVAN    30 tablet    Take 1 tablet (0.5 mg) by mouth nightly as needed for anxiety 30 tablets to last 30 days.       * LORazepam 0.5 MG tablet    ATIVAN    6 tablet    Take 2 po 90 minutes prior to MRI and may repeat x 1 30 minutes prior to MRI       lovastatin 20 MG tablet    MEVACOR    90 tablet    Take 1 tablet (20 mg) by mouth At Bedtime       Multi-vitamin Tabs tablet      Take 1 tablet by mouth daily       NONFORMULARY      Take 20 mg by mouth 2 times daily D-amphetamine Salt combo       OLANZapine 2.5 MG tablet    zyPREXA    90 tablet    Take 1 tablet (2.5 mg) by mouth At Bedtime       omeprazole 40 MG capsule    priLOSEC    90 capsule    Take 1 capsule (40 mg) by mouth daily Take 30-60 minutes before a meal.       ondansetron 4 MG tablet    ZOFRAN    18 tablet    Take 1 tablet (4 mg) by mouth every 6 hours as needed for nausea       order for DME     1 Device    TENS unit and supplies       prazosin 5 MG capsule    MINIPRESS    90 capsule    Take 1 capsule (5 mg) by mouth At Bedtime Take 1 capsule at bedtime       tapentadol 50 MG Tabs tablet    NUCYNTA    120 tablet    Take 1 tablet (50 mg) by mouth every 6 hours as needed for moderate to severe pain Max #4/day.       tiZANidine 4 MG tablet    ZANAFLEX    210 tablet    Take 1-3 tablets (4-12 mg) by mouth 3 times daily as needed for muscle spasms       topiramate 50 MG tablet    TOPAMAX    120 tablet    Take 1-2 tablets ( mg) by mouth 2 times daily       * Notice:  This list has 6 medication(s) that are the same as other medications prescribed for you. Read the directions carefully, and ask your  doctor or other care provider to review them with you.

## 2017-06-21 ENCOUNTER — TELEPHONE (OUTPATIENT)
Dept: PSYCHOLOGY | Facility: CLINIC | Age: 57
End: 2017-06-21

## 2017-07-17 ENCOUNTER — APPOINTMENT (OUTPATIENT)
Dept: CT IMAGING | Facility: CLINIC | Age: 57
End: 2017-07-17
Attending: EMERGENCY MEDICINE
Payer: MEDICARE

## 2017-07-17 ENCOUNTER — HOSPITAL ENCOUNTER (EMERGENCY)
Facility: CLINIC | Age: 57
Discharge: HOME | End: 2017-07-17
Attending: EMERGENCY MEDICINE | Admitting: EMERGENCY MEDICINE
Payer: MEDICARE

## 2017-07-17 ENCOUNTER — APPOINTMENT (OUTPATIENT)
Dept: GENERAL RADIOLOGY | Facility: CLINIC | Age: 57
End: 2017-07-17
Attending: EMERGENCY MEDICINE
Payer: MEDICARE

## 2017-07-17 ENCOUNTER — TELEPHONE (OUTPATIENT)
Dept: FAMILY MEDICINE | Facility: CLINIC | Age: 57
End: 2017-07-17

## 2017-07-17 VITALS
HEART RATE: 78 BPM | RESPIRATION RATE: 14 BRPM | WEIGHT: 205 LBS | TEMPERATURE: 97.9 F | OXYGEN SATURATION: 98 % | SYSTOLIC BLOOD PRESSURE: 131 MMHG | DIASTOLIC BLOOD PRESSURE: 76 MMHG | BODY MASS INDEX: 30.36 KG/M2 | HEIGHT: 69 IN

## 2017-07-17 DIAGNOSIS — Z59.48 ACCIDENT DUE TO LACK OF FOOD: ICD-10-CM

## 2017-07-17 DIAGNOSIS — T14.8XXA MUSCLE STRAIN: ICD-10-CM

## 2017-07-17 DIAGNOSIS — S39.012A STRAIN OF BACK: ICD-10-CM

## 2017-07-17 DIAGNOSIS — R10.9 FLANK PAIN: ICD-10-CM

## 2017-07-17 LAB
ALBUMIN UR-MCNC: NEGATIVE MG/DL
ANION GAP SERPL CALCULATED.3IONS-SCNC: 5 MMOL/L (ref 3–14)
APPEARANCE UR: CLEAR
BASOPHILS # BLD AUTO: 0.1 10E9/L (ref 0–0.2)
BASOPHILS NFR BLD AUTO: 0.5 %
BILIRUB UR QL STRIP: NEGATIVE
BUN SERPL-MCNC: 16 MG/DL (ref 7–30)
CALCIUM SERPL-MCNC: 10.1 MG/DL (ref 8.5–10.1)
CHLORIDE SERPL-SCNC: 104 MMOL/L (ref 94–109)
CO2 SERPL-SCNC: 30 MMOL/L (ref 20–32)
COLOR UR AUTO: ABNORMAL
CREAT SERPL-MCNC: 1.06 MG/DL (ref 0.52–1.04)
DIFFERENTIAL METHOD BLD: ABNORMAL
EOSINOPHIL # BLD AUTO: 0.3 10E9/L (ref 0–0.7)
EOSINOPHIL NFR BLD AUTO: 2.5 %
ERYTHROCYTE [DISTWIDTH] IN BLOOD BY AUTOMATED COUNT: 16 % (ref 10–15)
GFR SERPL CREATININE-BSD FRML MDRD: 53 ML/MIN/1.7M2
GLUCOSE SERPL-MCNC: 97 MG/DL (ref 70–99)
GLUCOSE UR STRIP-MCNC: NEGATIVE MG/DL
HCT VFR BLD AUTO: 44.7 % (ref 35–47)
HGB BLD-MCNC: 14.9 G/DL (ref 11.7–15.7)
HGB UR QL STRIP: NEGATIVE
IMM GRANULOCYTES # BLD: 0 10E9/L (ref 0–0.4)
IMM GRANULOCYTES NFR BLD: 0.2 %
KETONES UR STRIP-MCNC: NEGATIVE MG/DL
LEUKOCYTE ESTERASE UR QL STRIP: NEGATIVE
LYMPHOCYTES # BLD AUTO: 4.8 10E9/L (ref 0.8–5.3)
LYMPHOCYTES NFR BLD AUTO: 47.1 %
MCH RBC QN AUTO: 29.6 PG (ref 26.5–33)
MCHC RBC AUTO-ENTMCNC: 33.3 G/DL (ref 31.5–36.5)
MCV RBC AUTO: 89 FL (ref 78–100)
MONOCYTES # BLD AUTO: 0.7 10E9/L (ref 0–1.3)
MONOCYTES NFR BLD AUTO: 6.4 %
MUCOUS THREADS #/AREA URNS LPF: PRESENT /LPF
NEUTROPHILS # BLD AUTO: 4.4 10E9/L (ref 1.6–8.3)
NEUTROPHILS NFR BLD AUTO: 43.3 %
NITRATE UR QL: NEGATIVE
NRBC # BLD AUTO: 0 10*3/UL
NRBC BLD AUTO-RTO: 0 /100
PH UR STRIP: 5.5 PH (ref 5–7)
PLATELET # BLD AUTO: 211 10E9/L (ref 150–450)
POTASSIUM SERPL-SCNC: 4.1 MMOL/L (ref 3.4–5.3)
RBC # BLD AUTO: 5.03 10E12/L (ref 3.8–5.2)
RBC #/AREA URNS AUTO: <1 /HPF (ref 0–2)
SODIUM SERPL-SCNC: 139 MMOL/L (ref 133–144)
SP GR UR STRIP: 1 (ref 1–1.03)
URN SPEC COLLECT METH UR: ABNORMAL
UROBILINOGEN UR STRIP-MCNC: NORMAL MG/DL (ref 0–2)
WBC # BLD AUTO: 10.2 10E9/L (ref 4–11)
WBC #/AREA URNS AUTO: <1 /HPF (ref 0–2)

## 2017-07-17 PROCEDURE — 71020 XR CHEST 2 VW: CPT

## 2017-07-17 PROCEDURE — 99285 EMERGENCY DEPT VISIT HI MDM: CPT | Mod: Z6 | Performed by: EMERGENCY MEDICINE

## 2017-07-17 PROCEDURE — 25000128 H RX IP 250 OP 636: Performed by: EMERGENCY MEDICINE

## 2017-07-17 PROCEDURE — 85025 COMPLETE CBC W/AUTO DIFF WBC: CPT | Performed by: EMERGENCY MEDICINE

## 2017-07-17 PROCEDURE — 96374 THER/PROPH/DIAG INJ IV PUSH: CPT

## 2017-07-17 PROCEDURE — 80048 BASIC METABOLIC PNL TOTAL CA: CPT | Performed by: EMERGENCY MEDICINE

## 2017-07-17 PROCEDURE — 81001 URINALYSIS AUTO W/SCOPE: CPT | Performed by: EMERGENCY MEDICINE

## 2017-07-17 PROCEDURE — 99285 EMERGENCY DEPT VISIT HI MDM: CPT | Mod: 25

## 2017-07-17 PROCEDURE — 74176 CT ABD & PELVIS W/O CONTRAST: CPT

## 2017-07-17 RX ORDER — CYCLOBENZAPRINE HCL 10 MG
10 TABLET ORAL 3 TIMES DAILY PRN
Qty: 20 TABLET | Refills: 0 | Status: SHIPPED | OUTPATIENT
Start: 2017-07-17 | End: 2017-07-23

## 2017-07-17 RX ORDER — HYDROCODONE BITARTRATE AND ACETAMINOPHEN 10; 325 MG/1; MG/1
1 TABLET ORAL EVERY 6 HOURS PRN
Qty: 15 TABLET | Refills: 0 | Status: SHIPPED | OUTPATIENT
Start: 2017-07-17 | End: 2017-08-25

## 2017-07-17 RX ORDER — MORPHINE SULFATE 4 MG/ML
4 INJECTION, SOLUTION INTRAMUSCULAR; INTRAVENOUS ONCE
Status: COMPLETED | OUTPATIENT
Start: 2017-07-17 | End: 2017-07-17

## 2017-07-17 RX ORDER — HYDROCODONE BITARTRATE AND ACETAMINOPHEN 5; 325 MG/1; MG/1
2 TABLET ORAL ONCE
Status: DISCONTINUED | OUTPATIENT
Start: 2017-07-17 | End: 2017-07-17

## 2017-07-17 RX ORDER — KETOROLAC TROMETHAMINE 30 MG/ML
30 INJECTION, SOLUTION INTRAMUSCULAR; INTRAVENOUS ONCE
Status: DISCONTINUED | OUTPATIENT
Start: 2017-07-17 | End: 2017-07-17

## 2017-07-17 RX ADMIN — MORPHINE SULFATE 4 MG: 4 INJECTION, SOLUTION INTRAMUSCULAR; INTRAVENOUS at 06:43

## 2017-07-17 SDOH — ECONOMIC STABILITY - FOOD INSECURITY: OTHER SPECIFIED LACK OF ADEQUATE FOOD: Z59.48

## 2017-07-17 NOTE — TELEPHONE ENCOUNTER
Reason for Call:  Other call back    Detailed comments: Pt called in and stated she needs to speak to a nurse about her Nucynta before her appointment with Dr. Fritz tomorrow 7/18. Please follow up with pt ASAP to answer any questions she may have. Thanks!    Phone Number Patient can be reached at: Home number on file [phone] (home)    Best Time: anytime    Can we leave a detailed message on this number? YES    Call taken on 7/17/2017 at 2:04 PM by Mariana Reyes

## 2017-07-17 NOTE — ED PROVIDER NOTES
History     Chief Complaint   Patient presents with     Flank Pain     Back Pain     HPI  Ilsa Yusuf is a 56 year old female who presents with 4-5 day history of back pain located on the left side from thoracic area to lumbar. Pain is aggravated by movement and touch. No fever or chills. No dysuria. No hematuria. No chest pain or dyspnea. No abdominal pain. No nausea or vomiting. No history of trauma or fall. Thinks she may have twisted her back. No rashes. No known vascular disease or neoplasm. No pain in extremities. No neurologic symptoms. No incontinence or retention. No saddle area anesthesia. No IV drug use.    I have reviewed the Medications, Allergies, Past Medical and Surgical History, and Social History in the [employer] system.  Past Medical History:   Diagnosis Date     Arthritis 2012    both knees; hands     Depressive disorder      Depressive disorder, not elsewhere classified 08/28/12    DC 10/05/12-[facility]     Hyperlipidemia LDL goal < 130     mevacor     Hypothyroid      Moderate major depression (H)     abilify, cymbalta, seroquel, and sofya, Dr Antonio Perales     PTSD (post-traumatic stress disorder)      Seizure (H) 03/2011    one episode, was on Keppra, EEG negative       Review of Systems   Constitutional: Negative.  Negative for appetite change, chills, diaphoresis, fatigue and fever.   HENT: Negative for congestion, rhinorrhea and sore throat.    Respiratory: Negative for cough, chest tightness and shortness of breath.    Cardiovascular: Negative for chest pain, palpitations and leg swelling.   Gastrointestinal: Negative for abdominal pain, constipation, diarrhea, nausea and vomiting.   Genitourinary: Positive for flank pain. Negative for difficulty urinating, dysuria, frequency, hematuria and pelvic pain.   Musculoskeletal: Positive for back pain. Negative for arthralgias, gait problem, joint swelling, myalgias and neck pain.   Skin: Negative for rash.   Allergic/Immunologic:  "Negative for immunocompromised state.   Neurological: Negative for dizziness, syncope, weakness, light-headedness, numbness and headaches.   Hematological: Does not bruise/bleed easily.       Physical Exam   BP: 134/86  Pulse: 78  Temp: 97.9  F (36.6  C)  Resp: 14  Height: 174 cm (5' 8.5\")  Weight: 93 kg (205 lb)  SpO2: 96 %  Physical Exam   Constitutional: She is oriented to person, place, and time. She appears well-developed and well-nourished. No distress.   HENT:   Head: Normocephalic and atraumatic.   Mouth/Throat: Oropharynx is clear and moist.   Eyes: Conjunctivae and EOM are normal.   Neck: Normal range of motion. Neck supple.   No tenderness   Cardiovascular: Normal rate, regular rhythm, normal heart sounds and intact distal pulses.    Pulmonary/Chest: Effort normal and breath sounds normal. No respiratory distress.   Abdominal: Soft. Normal appearance and bowel sounds are normal. She exhibits no distension, no pulsatile midline mass and no mass. There is no tenderness. There is no CVA tenderness.   Musculoskeletal: Normal range of motion. She exhibits tenderness. She exhibits no edema.        Thoracic back: She exhibits tenderness and pain. She exhibits normal range of motion, no bony tenderness, no swelling, no deformity and normal pulse.        Lumbar back: She exhibits tenderness and pain. She exhibits normal range of motion, no bony tenderness, no swelling and normal pulse.   Neurological: She is alert and oriented to person, place, and time. She has normal strength and normal reflexes. No cranial nerve deficit or sensory deficit. Coordination and gait normal. She displays no Babinski's sign on the right side. She displays no Babinski's sign on the left side.   Skin: Skin is dry. No rash noted.   Psychiatric: She has a normal mood and affect. Her behavior is normal.       ED Course     ED Course     Procedures             Critical Care time:  none               Labs Ordered and Resulted from Time of ED " Arrival Up to the Time of Departure from the ED   ROUTINE UA WITH MICROSCOPIC - Abnormal; Notable for the following:        Result Value    Mucous Urine Present (*)     All other components within normal limits   CBC WITH PLATELETS DIFFERENTIAL - Abnormal; Notable for the following:     RDW 16.0 (*)     All other components within normal limits   BASIC METABOLIC PANEL - Abnormal; Notable for the following:     Creatinine 1.06 (*)     GFR Estimate 53 (*)     All other components within normal limits            Assessments & Plan (with Medical Decision Making)   No new findings on CT or chest XR. Atelectasis present on prior study. No change in chronic kidney findings on CT. Will treat with pain medications and muscle relaxant. Clinic follow up this week. Return if persistent symptoms. Differential diagnosis includes musculoskeletal pain, pre-eruptive herpes zoster. No acute findings on CT. No evidence of stone or pyelonephritis. Instructed not to drive with pain medications and muscle relaxant.     I have reviewed the nursing notes.    I have reviewed the findings, diagnosis, plan and need for follow up with the patient.    Discharge Medication List as of 7/17/2017  8:17 AM      START taking these medications    Details   HYDROcodone-acetaminophen (NORCO)  MG per tablet Take 1 tablet by mouth every 6 hours as needed for pain, Disp-15 tablet, R-0, Local Print      cyclobenzaprine (FLEXERIL) 10 MG tablet Take 1 tablet (10 mg) by mouth 3 times daily as needed for muscle spasms, Disp-20 tablet, R-0, Local Print             Final diagnoses:   Flank pain   Muscle strain       7/17/2017   [facility], EMERGENCY DEPARTMENT     Nigel Patrick MD  08/07/17 0153

## 2017-07-17 NOTE — ED AVS SNAPSHOT
[facility], [place], Emergency Department    [address] [place] [id number]    Phone:  [phone]                                       Ilsa Yusuf   MRN: [id number]    Department:  [facility], Emergency Department   Date of Visit:  7/17/2017           After Visit Summary Signature Page     I have received my discharge instructions, and my questions have been answered. I have discussed any challenges I see with this plan with the nurse or doctor.    ..........................................................................................................................................  Patient/Patient Representative Signature      ..........................................................................................................................................  Patient Representative Print Name and Relationship to Patient    ..................................................               ................................................  Date                                            Time    ..........................................................................................................................................  Reviewed by Signature/Title    ...................................................              ..............................................  Date                                                            Time

## 2017-07-17 NOTE — ED AVS SNAPSHOT
[facility], Emergency Department    [address] [address]    Phone:  [phone]                                       Ilsa Yusuf   MRN: [id number]    Department:  [facility], Emergency Department   Date of Visit:  7/17/2017           Patient Information     Date Of Birth          1960        Your diagnoses for this visit were:     Flank pain     Muscle strain        You were seen by Nigel Patrick MD and Jessica Ho MD.      Follow-up Information     Follow up with Catrachita Colaldo MD.    Specialty:  Family Practice    Contact information:    [facility]  [address]  [place] [id number] [phone]          Discharge Instructions       Rest.  Take pain medication and muscle relaxant as directed.  Do not drive with medications.  Follow up this week with your primary care provider.  Return if persistent symptoms.  Do not use Nucynta and hydrocodone at the same time.    24 Hour Appointment Hotline       To make an appointment at any [facility], call [phone] ([phone]). If you don't have a family doctor or clinic, we will help you find one. [facility] are conveniently located to serve the needs of you and your family.             Review of your medicines      START taking        Dose / Directions Last dose taken    cyclobenzaprine 10 MG tablet   Commonly known as:  FLEXERIL   Dose:  10 mg   Quantity:  20 tablet        Take 1 tablet (10 mg) by mouth 3 times daily as needed for muscle spasms   Refills:  0        HYDROcodone-acetaminophen  MG per tablet   Commonly known as:  NORCO   Dose:  1 tablet   Quantity:  15 tablet        Take 1 tablet by mouth every 6 hours as needed for pain   Refills:  0          Our records show that you are taking the medicines listed below. If these are incorrect, please call your family doctor or clinic.        Dose / Directions Last dose taken    amphetamine-dextroamphetamine 20 MG  per tablet   Commonly known as:  ADDERALL   Dose:  20 mg   Quantity:  30 tablet        Take 1 tablet (20 mg) by mouth daily   Refills:  0        cholecalciferol 5000 UNITS Caps capsule   Commonly known as:  vitamin D3   Dose:  5000 Units   Quantity:  100 capsule        Take 1 capsule (5,000 Units) by mouth daily Take 1 per day   Refills:  2        doxepin 10 MG capsule   Commonly known as:  SINEquan   Quantity:  180 capsule        TAKE ONE TO TWO CAPSULES BY MOUTH AT BEDTIME   Refills:  8        DULoxetine 60 MG EC capsule   Commonly known as:  CYMBALTA   Quantity:  180 capsule        TAKE 2 CAPSULES BY MOUTH EVERY DAY   Refills:  1        FIBER PO        Two capsules in the morning and two capsules at night   Refills:  0        fish oil-omega-3 fatty acids 1000 MG capsule   Dose:  2 g        Take 2 g by mouth daily Takes 1 in am and 1 at bedtime   Refills:  0        fluticasone 50 MCG/ACT spray   Commonly known as:  FLONASE   Dose:  1-2 spray   Quantity:  16 g        Spray 1-2 sprays into both nostrils daily   Refills:  11        * levothyroxine 150 MCG tablet   Commonly known as:  SYNTHROID/LEVOTHROID   Dose:  150 mcg   Quantity:  45 tablet        Take 1 tablet (150 mcg) by mouth every other day   Refills:  3        * levothyroxine 175 MCG tablet   Commonly known as:  SYNTHROID/LEVOTHROID   Dose:  175 mcg   Quantity:  45 tablet        Take 1 tablet (175 mcg) by mouth every other day   Refills:  3        * LORazepam 0.5 MG tablet   Commonly known as:  ATIVAN   Dose:  0.5 mg   Quantity:  30 tablet        Take 1 tablet (0.5 mg) by mouth nightly as needed for anxiety 30 tablets to last 30 days.   Refills:  2        * LORazepam 0.5 MG tablet   Commonly known as:  ATIVAN   Quantity:  6 tablet        Take 2 po 90 minutes prior to MRI and may repeat x 1 30 minutes prior to MRI   Refills:  0        lovastatin 20 MG tablet   Commonly known as:  MEVACOR   Dose:  20 mg   Quantity:  90 tablet        Take 1 tablet (20 mg) by  mouth At Bedtime   Refills:  3        Multi-vitamin Tabs tablet   Dose:  1 tablet        Take 1 tablet by mouth daily   Refills:  0        NONFORMULARY   Dose:  20 mg        Take 20 mg by mouth 2 times daily D-amphetamine Salt combo   Refills:  0        OLANZapine 2.5 MG tablet   Commonly known as:  zyPREXA   Dose:  2.5 mg   Quantity:  90 tablet        Take 1 tablet (2.5 mg) by mouth At Bedtime   Refills:  1        omeprazole 40 MG capsule   Commonly known as:  priLOSEC   Dose:  40 mg   Quantity:  90 capsule        Take 1 capsule (40 mg) by mouth daily Take 30-60 minutes before a meal.   Refills:  2        ondansetron 4 MG tablet   Commonly known as:  ZOFRAN   Dose:  4 mg   Quantity:  18 tablet        Take 1 tablet (4 mg) by mouth every 6 hours as needed for nausea   Refills:  5        order for DME   Quantity:  1 Device        TENS unit and supplies   Refills:  0        prazosin 5 MG capsule   Commonly known as:  MINIPRESS   Dose:  5 mg   Quantity:  90 capsule        Take 1 capsule (5 mg) by mouth At Bedtime Take 1 capsule at bedtime   Refills:  3        tapentadol 50 MG Tabs tablet   Commonly known as:  NUCYNTA   Dose:  50 mg   Quantity:  120 tablet        Take 1 tablet (50 mg) by mouth every 6 hours as needed for moderate to severe pain Max #4/day.   Refills:  0        tiZANidine 4 MG tablet   Commonly known as:  ZANAFLEX   Dose:  4-12 mg   Quantity:  210 tablet        Take 1-3 tablets (4-12 mg) by mouth 3 times daily as needed for muscle spasms   Refills:  3        topiramate 50 MG tablet   Commonly known as:  TOPAMAX   Dose:   mg   Quantity:  120 tablet        Take 1-2 tablets ( mg) by mouth 2 times daily   Refills:  3        * Notice:  This list has 4 medication(s) that are the same as other medications prescribed for you. Read the directions carefully, and ask your doctor or other care provider to review them with you.            Prescriptions were sent or printed at these locations (2  Prescriptions)                   Other Prescriptions                Printed at Department/Unit printer (2 of 2)         HYDROcodone-acetaminophen (NORCO)  MG per tablet               cyclobenzaprine (FLEXERIL) 10 MG tablet                Procedures and tests performed during your visit     Abd/pelvis CT no contrast - Stone Protocol    Basic metabolic panel    CBC with platelets differential    Chest XR,  PA & LAT    UA with Microscopic      Orders Needing Specimen Collection     None      Pending Results     Date and Time Order Name Status Description    7/17/2017 0628 Chest XR,  PA & LAT In process             Pending Culture Results     No orders found from 7/15/2017 to 7/18/2017.            Pending Results Instructions     If you had any lab results that were not finalized at the time of your Discharge, you can call the ED Lab Result RN at [phone]. You will be contacted by this team for any positive Lab results or changes in treatment. The nurses are available 7 days a week from 10A to 6:30P.  You can leave a message 24 hours per day and they will return your call.        Thank you for choosing [facility]       Thank you for choosing [facility] for your care. Our goal is always to provide you with excellent care. Hearing back from our patients is one way we can continue to improve our services. Please take a few minutes to complete the written survey that you may receive in the mail after you visit with us. Thank you!        [employer] Information     [employer] gives you secure access to your electronic health record. If you see a primary care provider, you can also send messages to your care team and make appointments. If you have questions, please call your primary care clinic.  If you do not have a primary care provider, please call [phone] and they will assist you.        Care EveryWhere ID     This is your Care EveryWhere ID. This could be used by other organizations to access your [facility]  records  [id number]        Equal Access to Services     KRISTINA MILLER : Faye Heck, yaritza roth, jane reid. So [facility] [phone].    ATENCIÓN: Si habla español, tiene a castle disposición servicios gratuitos de asistencia lingüística. Llame al [phone].    We comply with applicable federal civil rights laws and [place] laws. We do not discriminate on the basis of race, color, national origin, age, disability sex, sexual orientation or gender identity.            After Visit Summary       This is your record. Keep this with you and show to your community pharmacist(s) and doctor(s) at your next visit.

## 2017-07-17 NOTE — DISCHARGE INSTRUCTIONS
Rest.  Take pain medication and muscle relaxant as directed.  Do not drive with medications.  Follow up this week with your primary care provider.  Return if persistent symptoms.  Do not use Nucynta and hydrocodone at the same time.

## 2017-07-17 NOTE — TELEPHONE ENCOUNTER
Pt called in and stated she got everything figured out and no longer needs to speak to a nurse. She called back in to cancel her appointment with Dr. Fritz as well.   No further action needed, closing encounter.  Thanks.

## 2017-07-17 NOTE — ED NOTES
Pt presents ambulatory to triage with c/o left sided flank/back pain that woke her up from sleep around 4 am. States it started 5 days ago and worse with a deep breath.

## 2017-07-18 ENCOUNTER — TRANSFERRED RECORDS (OUTPATIENT)
Dept: HEALTH INFORMATION MANAGEMENT | Facility: CLINIC | Age: 57
End: 2017-07-18

## 2017-08-02 ENCOUNTER — TRANSFERRED RECORDS (OUTPATIENT)
Dept: HEALTH INFORMATION MANAGEMENT | Facility: CLINIC | Age: 57
End: 2017-08-02

## 2017-08-04 ENCOUNTER — TELEPHONE (OUTPATIENT)
Dept: FAMILY MEDICINE | Facility: CLINIC | Age: 57
End: 2017-08-04

## 2017-08-04 ENCOUNTER — OFFICE VISIT (OUTPATIENT)
Dept: FAMILY MEDICINE | Facility: CLINIC | Age: 57
End: 2017-08-04
Payer: COMMERCIAL

## 2017-08-04 VITALS
HEART RATE: 91 BPM | SYSTOLIC BLOOD PRESSURE: 105 MMHG | WEIGHT: 210.5 LBS | OXYGEN SATURATION: 96 % | DIASTOLIC BLOOD PRESSURE: 57 MMHG | HEIGHT: 69 IN | BODY MASS INDEX: 31.18 KG/M2 | TEMPERATURE: 95.8 F

## 2017-08-04 DIAGNOSIS — R10.32 LLQ ABDOMINAL PAIN: Primary | ICD-10-CM

## 2017-08-04 LAB
ALBUMIN UR-MCNC: NEGATIVE MG/DL
AMORPH CRY #/AREA URNS HPF: ABNORMAL /HPF
APPEARANCE UR: CLEAR
BACTERIA #/AREA URNS HPF: ABNORMAL /HPF
BASOPHILS # BLD AUTO: 0.1 10E9/L (ref 0–0.2)
BASOPHILS NFR BLD AUTO: 1.2 %
BILIRUB UR QL STRIP: NEGATIVE
COLOR UR AUTO: YELLOW
CRP SERPL-MCNC: 6.5 MG/L (ref 0–8)
DIFFERENTIAL METHOD BLD: ABNORMAL
EOSINOPHIL # BLD AUTO: 0.2 10E9/L (ref 0–0.7)
EOSINOPHIL NFR BLD AUTO: 2.7 %
ERYTHROCYTE [DISTWIDTH] IN BLOOD BY AUTOMATED COUNT: 15.3 % (ref 10–15)
ERYTHROCYTE [SEDIMENTATION RATE] IN BLOOD BY WESTERGREN METHOD: 7 MM/H (ref 0–30)
GLUCOSE UR STRIP-MCNC: NEGATIVE MG/DL
HCT VFR BLD AUTO: 44 % (ref 35–47)
HGB BLD-MCNC: 15 G/DL (ref 11.7–15.7)
HGB UR QL STRIP: NEGATIVE
KETONES UR STRIP-MCNC: NEGATIVE MG/DL
LEUKOCYTE ESTERASE UR QL STRIP: NEGATIVE
LIPASE SERPL-CCNC: 225 U/L (ref 73–393)
LYMPHOCYTES # BLD AUTO: 2.5 10E9/L (ref 0.8–5.3)
LYMPHOCYTES NFR BLD AUTO: 36.5 %
MCH RBC QN AUTO: 30.1 PG (ref 26.5–33)
MCHC RBC AUTO-ENTMCNC: 34.1 G/DL (ref 31.5–36.5)
MCV RBC AUTO: 88 FL (ref 78–100)
MONOCYTES # BLD AUTO: 0.4 10E9/L (ref 0–1.3)
MONOCYTES NFR BLD AUTO: 6 %
NEUTROPHILS # BLD AUTO: 3.6 10E9/L (ref 1.6–8.3)
NEUTROPHILS NFR BLD AUTO: 53.6 %
NITRATE UR QL: NEGATIVE
NON-SQ EPI CELLS #/AREA URNS LPF: ABNORMAL /LPF
PH UR STRIP: 7 PH (ref 5–7)
PLATELET # BLD AUTO: 209 10E9/L (ref 150–450)
RBC # BLD AUTO: 4.98 10E12/L (ref 3.8–5.2)
RBC #/AREA URNS AUTO: ABNORMAL /HPF (ref 0–2)
SP GR UR STRIP: 1.01 (ref 1–1.03)
URN SPEC COLLECT METH UR: ABNORMAL
UROBILINOGEN UR STRIP-ACNC: 0.2 EU/DL (ref 0.2–1)
WBC # BLD AUTO: 6.8 10E9/L (ref 4–11)
WBC #/AREA URNS AUTO: ABNORMAL /HPF (ref 0–2)

## 2017-08-04 PROCEDURE — 85025 COMPLETE CBC W/AUTO DIFF WBC: CPT | Performed by: FAMILY MEDICINE

## 2017-08-04 PROCEDURE — 99213 OFFICE O/P EST LOW 20 MIN: CPT | Performed by: FAMILY MEDICINE

## 2017-08-04 PROCEDURE — 36415 COLL VENOUS BLD VENIPUNCTURE: CPT | Performed by: FAMILY MEDICINE

## 2017-08-04 PROCEDURE — 86140 C-REACTIVE PROTEIN: CPT | Performed by: FAMILY MEDICINE

## 2017-08-04 PROCEDURE — 87086 URINE CULTURE/COLONY COUNT: CPT | Performed by: FAMILY MEDICINE

## 2017-08-04 PROCEDURE — 80053 COMPREHEN METABOLIC PANEL: CPT | Performed by: FAMILY MEDICINE

## 2017-08-04 PROCEDURE — 83690 ASSAY OF LIPASE: CPT | Performed by: FAMILY MEDICINE

## 2017-08-04 PROCEDURE — 81001 URINALYSIS AUTO W/SCOPE: CPT | Performed by: FAMILY MEDICINE

## 2017-08-04 PROCEDURE — 85652 RBC SED RATE AUTOMATED: CPT | Performed by: FAMILY MEDICINE

## 2017-08-04 NOTE — MR AVS SNAPSHOT
"              After Visit Summary   8/4/2017    Ilsa Yusuf    MRN: [id number]           Patient Information     Date Of Birth          1960        Visit Information        Provider Department      8/4/2017 10:00 AM German Willard MD [facility]        Today's Diagnoses     LLQ abdominal pain    -  1       Follow-ups after your visit        Who to contact     If you have questions or need follow up information about today's clinic visit or your schedule please contact [facility] directly at [phone].  Normal or non-critical lab and imaging results will be communicated to you by [employer], letter or phone within 4 business days after the clinic has received the results. If you do not hear from us within 7 days, please contact the clinic through [employer] or phone. If you have a critical or abnormal lab result, we will notify you by phone as soon as possible.  Submit refill requests through [employer] or call your pharmacy and they will forward the refill request to us. Please allow 3 business days for your refill to be completed.          Additional Information About Your Visit        MyChart Information     [employer] gives you secure access to your electronic health record. If you see a primary care provider, you can also send messages to your care team and make appointments. If you have questions, please call your primary care clinic.  If you do not have a primary care provider, please call [phone] and they will assist you.        Care EveryWhere ID     This is your Care EveryWhere ID. This could be used by other organizations to access your [facility] medical records  [id number]        Your Vitals Were     Pulse Temperature Height Pulse Oximetry BMI (Body Mass Index)       91 95.8  F (35.4  C) (Tympanic) 5' 8.5\" (1.74 m) 96% 31.54 kg/m2        Blood Pressure from Last 3 Encounters:   09/03/17 134/88   08/25/17 109/69   08/16/17 108/66    Weight from Last 3 " Encounters:   08/25/17 212 lb (96.2 kg)   08/16/17 214 lb (97.1 kg)   08/15/17 210 lb (95.3 kg)              We Performed the Following     CBC with platelets and differential     Comprehensive metabolic panel (BMP + Alb, Alk Phos, ALT, AST, Total. Bili, TP)     CRP, inflammation     ESR: Erythrocyte sedimentation rate     Lipase     UA with Microscopic     Urine Culture Aerobic Bacterial          Today's Medication Changes          These changes are accurate as of: 8/4/17 11:59 PM.  If you have any questions, ask your nurse or doctor.               These medicines have changed or have updated prescriptions.        Dose/Directions    * levothyroxine 150 MCG tablet   Commonly known as:  SYNTHROID/LEVOTHROID   This may have changed:  additional instructions   Used for:  Acquired hypothyroidism        Dose:  150 mcg   Take 1 tablet (150 mcg) by mouth every other day   Quantity:  45 tablet   Refills:  3       * levothyroxine 175 MCG tablet   Commonly known as:  SYNTHROID/LEVOTHROID   This may have changed:  additional instructions   Used for:  Acquired hypothyroidism        Dose:  175 mcg   Take 1 tablet (175 mcg) by mouth every other day   Quantity:  45 tablet   Refills:  3       * Notice:  This list has 2 medication(s) that are the same as other medications prescribed for you. Read the directions carefully, and ask your doctor or other care provider to review them with you.             Primary Care Provider Office Phone # Fax #    Catrachita Collado [phone] [address] [place] [address]        Equal Access to Services     [facility] UCLAFIFI AH: Hadii vladislav byrnes hadasho Somartha, waaxda luqadaha, qaybta kaalmada adeegyada, jane bonilla . So [facility] [phone].    ATENCIÓN: Si habla español, tiene a castle disposición servicios gratuitos de asistencia lingüística. Llame al [phone].    We comply with applicable federal civil rights laws and [place] laws. We do not  discriminate on the basis of race, color, national origin, age, disability sex, sexual orientation or gender identity.            Thank you!     Thank you for choosing [facility]  for your care. Our goal is always to provide you with excellent care. Hearing back from our patients is one way we can continue to improve our services. Please take a few minutes to complete the written survey that you may receive in the mail after your visit with us. Thank you!             Your Updated Medication List - Protect others around you: Learn how to safely use, store and throw away your medicines at www.disposemymeds.org.          This list is accurate as of: 8/4/17 11:59 PM.  Always use your most recent med list.                   Brand Name Dispense Instructions for use Diagnosis    cholecalciferol 5000 UNITS Caps capsule    vitamin D3    100 capsule    Take 1 capsule (5,000 Units) by mouth daily Take 1 per day    Major depressive disorder, recurrent episode, moderate (H)       doxepin 10 MG capsule    SINEquan    180 capsule    TAKE ONE TO TWO CAPSULES BY MOUTH AT BEDTIME    Major depressive disorder, recurrent episode, moderate (H)       DULoxetine 60 MG EC capsule    CYMBALTA    180 capsule    TAKE 2 CAPSULES BY MOUTH EVERY DAY    Major depressive disorder, recurrent episode, moderate (H)       FIBER PO      Two capsules in the morning and two capsules at night        fish oil-omega-3 fatty acids 1000 MG capsule      Take 2 g by mouth daily Takes 1 in am and 1 at bedtime        fluticasone 50 MCG/ACT spray    FLONASE    16 g    Spray 1-2 sprays into both nostrils daily    Other chronic sinusitis       HYDROcodone-acetaminophen  MG per tablet    NORCO    15 tablet    Take 1 tablet by mouth every 6 hours as needed for pain        * levothyroxine 150 MCG tablet    SYNTHROID/LEVOTHROID    45 tablet    Take 1 tablet (150 mcg) by mouth every other day    Acquired hypothyroidism       * levothyroxine 175 MCG  tablet    SYNTHROID/LEVOTHROID    45 tablet    Take 1 tablet (175 mcg) by mouth every other day    Acquired hypothyroidism       * LORazepam 0.5 MG tablet    ATIVAN    30 tablet    Take 1 tablet (0.5 mg) by mouth nightly as needed for anxiety 30 tablets to last 30 days.    PTSD (post-traumatic stress disorder)       * LORazepam 0.5 MG tablet    ATIVAN    6 tablet    Take 2 po 90 minutes prior to MRI and may repeat x 1 30 minutes prior to MRI    Effusion of lower leg joint       lovastatin 20 MG tablet    MEVACOR    90 tablet    Take 1 tablet (20 mg) by mouth At Bedtime    Hyperlipidemia LDL goal <130       Multi-vitamin Tabs tablet      Take 1 tablet by mouth daily        NONFORMULARY      Take 20 mg by mouth 2 times daily D-amphetamine Salt combo        OLANZapine 2.5 MG tablet    zyPREXA    90 tablet    Take 1 tablet (2.5 mg) by mouth At Bedtime    Major depressive disorder, recurrent episode, moderate (H)       omeprazole 40 MG capsule    priLOSEC    90 capsule    Take 1 capsule (40 mg) by mouth daily Take 30-60 minutes before a meal.    Esophageal reflux       ondansetron 4 MG tablet    ZOFRAN    18 tablet    Take 1 tablet (4 mg) by mouth every 6 hours as needed for nausea    Norovirus       order for DME     1 Device    TENS unit and supplies    Myofascial pain syndrome       prazosin 5 MG capsule    MINIPRESS    90 capsule    Take 1 capsule (5 mg) by mouth At Bedtime Take 1 capsule at bedtime    Primary insomnia       tapentadol 50 MG Tabs tablet    NUCYNTA    120 tablet    Take 1 tablet (50 mg) by mouth every 6 hours as needed for moderate to severe pain Max #4/day.    Chronic pain syndrome       tiZANidine 4 MG tablet    ZANAFLEX    210 tablet    Take 1-3 tablets (4-12 mg) by mouth 3 times daily as needed for muscle spasms    Cervical radiculopathy       * Notice:  This list has 4 medication(s) that are the same as other medications prescribed for you. Read the directions carefully, and ask your doctor or  other care provider to review them with you.

## 2017-08-04 NOTE — PROGRESS NOTES
SUBJECTIVE:                                                    Ilsa Yusuf is a 57 year old female who presents to clinic today for the following health issues:      Abdominal Pain      Duration: intermittent for three weeks, progressively getting worse especially at night     Description (location/character/radiation): LLQ radiating to left lower back    Last night pain was worse. She had to use labor breathing. Pain radiated to hip, pt felt like someone was using  on her hip.     Last nights pain was severe, sharp, no aggravating factors and mildly relieved with oxycodone.     She has a h/o chronic pain and yesterday was seen by pain specialist. She was given Oxycodone 5 mg.     Chronic pain medication include - Nucynta, Zanaflex and topamax.     Accompanying signs and symptoms:        Fever/Chills: no        Gas/Bloating: no        Nausea/vomitting: no        Diarrhea/constipation: no        Dysuria or Hematuria: no     History (previous similar pain/trauma/previous testing): yes, UA, CBC, CMP and CT were unremarkable. CT did show colonic stool and diverticulosis.     Precipitating or alleviating factors:       Pain worse with eating/BM/urination: none       Pain relieved by BM: none     Therapies tried and outcome: see above     LMP:  One year ago    Pt has daily BM.   Normal appetite and fluid intake.   No recent travel.  No sick contacts.         Problem list and histories reviewed & adjusted, as indicated.  Additional history: as documented    Labs reviewed in EPIC    Reviewed and updated as needed this visit by clinical staffTobacco  Allergies  Meds  Med Hx  Surg Hx  Fam Hx  Soc Hx      Reviewed and updated as needed this visit by Provider         ROS:  Constitutional, HEENT, cardiovascular, pulmonary, gi and gu systems are negative, except as otherwise noted.      OBJECTIVE:   /57 (BP Location: Left arm, Patient Position: Chair, Cuff Size: Adult Regular)  Pulse 91  Temp 95.8  " F (35.4  C) (Tympanic)  Ht 5' 8.5\" (1.74 m)  Wt 210 lb 8 oz (95.5 kg)  SpO2 96%  BMI 31.54 kg/m2  Body mass index is 31.54 kg/(m^2).  GENERAL: healthy, alert and no distress  EYES: Eyes grossly normal to inspection  HENT: nose and mouth without ulcers or lesions  ABDOMEN: soft, + LLQ tender, no hepatosplenomegaly, no masses and bowel sounds normal  BACK: + left paralumbar tenderness    Diagnostic Test Results:  none     ASSESSMENT/PLAN:     1. LLQ abdominal pain  - symptoms not c/w diverticulitis, will await CBC and CRP, will obtain below and tx as indicated  - continue symptomatic tx  - CBC with platelets and differential  - Comprehensive metabolic panel (BMP + Alb, Alk Phos, ALT, AST, Total. Bili, TP)  - Lipase  - CRP, inflammation  - ESR: Erythrocyte sedimentation rate  - UA with Microscopic; Future  - UA with Microscopic  - Urine Culture Aerobic Bacterial  - Follow if symptoms worsen or fail to improve.      German Willard MD  [facility]    "

## 2017-08-04 NOTE — TELEPHONE ENCOUNTER
"Patient complaining of abdominal ain  States pain woke her last night.  At that time would have rated 7/10, currently down to a 4/10  Had to walk around and \"use my labor breathing\". To get pain to decrease and then was able to sleep again  States pain seems worse at night  Has had the past 2 nights with last night worse than the night before.  Pain is in the left side and wraps around to the back  Was seen in the ER for same pain on 7/17/17 and was told they thought it was a muscle strain  Denies Nausea, vomiting or diarrhea  Denies change in bowel or bladder, no hematuria  LMP was one year ago in August.  Initial thought was should go to ER for eval but has already been to ER for same.  Appointment scheduled for this morning with Dr Willard.  Debbie Albright, RN    "

## 2017-08-05 LAB
ALBUMIN SERPL-MCNC: 4.1 G/DL (ref 3.4–5)
ALP SERPL-CCNC: 101 U/L (ref 40–150)
ALT SERPL W P-5'-P-CCNC: 20 U/L (ref 0–50)
ANION GAP SERPL CALCULATED.3IONS-SCNC: 9 MMOL/L (ref 3–14)
AST SERPL W P-5'-P-CCNC: 11 U/L (ref 0–45)
BILIRUB SERPL-MCNC: 0.3 MG/DL (ref 0.2–1.3)
BUN SERPL-MCNC: 16 MG/DL (ref 7–30)
CALCIUM SERPL-MCNC: 9.3 MG/DL (ref 8.5–10.1)
CHLORIDE SERPL-SCNC: 110 MMOL/L (ref 94–109)
CO2 SERPL-SCNC: 22 MMOL/L (ref 20–32)
CREAT SERPL-MCNC: 1.07 MG/DL (ref 0.52–1.04)
GFR SERPL CREATININE-BSD FRML MDRD: 52 ML/MIN/1.7M2
GLUCOSE SERPL-MCNC: 111 MG/DL (ref 70–99)
POTASSIUM SERPL-SCNC: 3.7 MMOL/L (ref 3.4–5.3)
PROT SERPL-MCNC: 7.8 G/DL (ref 6.8–8.8)
SODIUM SERPL-SCNC: 141 MMOL/L (ref 133–144)

## 2017-08-06 LAB
BACTERIA SPEC CULT: NORMAL
MICRO REPORT STATUS: NORMAL
SPECIMEN SOURCE: NORMAL

## 2017-08-07 ASSESSMENT — ENCOUNTER SYMPTOMS
JOINT SWELLING: 0
CHEST TIGHTNESS: 0
LIGHT-HEADEDNESS: 0
FEVER: 0
PALPITATIONS: 0
CHILLS: 0
BRUISES/BLEEDS EASILY: 0
DIARRHEA: 0
CONSTITUTIONAL NEGATIVE: 1
COUGH: 0
RHINORRHEA: 0
APPETITE CHANGE: 0
DIAPHORESIS: 0
SORE THROAT: 0
WEAKNESS: 0
HEMATURIA: 0
CONSTIPATION: 0
ARTHRALGIAS: 0
DYSURIA: 0
NUMBNESS: 0
FREQUENCY: 0
DIFFICULTY URINATING: 0
NAUSEA: 0
BACK PAIN: 1
SHORTNESS OF BREATH: 0
ABDOMINAL PAIN: 0
DIZZINESS: 0
NECK PAIN: 0
VOMITING: 0
FLANK PAIN: 1
MYALGIAS: 0
HEADACHES: 0
FATIGUE: 0

## 2017-08-09 ENCOUNTER — HOSPITAL ENCOUNTER (EMERGENCY)
Facility: CLINIC | Age: 57
Discharge: HOME | End: 2017-08-09
Attending: EMERGENCY MEDICINE | Admitting: EMERGENCY MEDICINE
Payer: MEDICARE

## 2017-08-09 ENCOUNTER — NURSE TRIAGE (OUTPATIENT)
Dept: NURSING | Facility: CLINIC | Age: 57
End: 2017-08-09

## 2017-08-09 ENCOUNTER — APPOINTMENT (OUTPATIENT)
Dept: CT IMAGING | Facility: CLINIC | Age: 57
End: 2017-08-09
Attending: EMERGENCY MEDICINE
Payer: MEDICARE

## 2017-08-09 VITALS
OXYGEN SATURATION: 98 % | RESPIRATION RATE: 16 BRPM | DIASTOLIC BLOOD PRESSURE: 55 MMHG | TEMPERATURE: 97.5 F | HEIGHT: 68 IN | SYSTOLIC BLOOD PRESSURE: 103 MMHG | HEART RATE: 67 BPM

## 2017-08-09 DIAGNOSIS — R10.9 FLANK PAIN: ICD-10-CM

## 2017-08-09 LAB
ALBUMIN SERPL-MCNC: 3.9 G/DL (ref 3.4–5)
ALBUMIN UR-MCNC: NEGATIVE MG/DL
ALP SERPL-CCNC: 118 U/L (ref 40–150)
ALT SERPL W P-5'-P-CCNC: 25 U/L (ref 0–50)
ANION GAP SERPL CALCULATED.3IONS-SCNC: 6 MMOL/L (ref 3–14)
APPEARANCE UR: CLEAR
AST SERPL W P-5'-P-CCNC: 14 U/L (ref 0–45)
BASOPHILS # BLD AUTO: 0.1 10E9/L (ref 0–0.2)
BASOPHILS NFR BLD AUTO: 0.6 %
BILIRUB SERPL-MCNC: 0.3 MG/DL (ref 0.2–1.3)
BILIRUB UR QL STRIP: NEGATIVE
BUN SERPL-MCNC: 22 MG/DL (ref 7–30)
CALCIUM SERPL-MCNC: 9.6 MG/DL (ref 8.5–10.1)
CHLORIDE SERPL-SCNC: 111 MMOL/L (ref 94–109)
CO2 SERPL-SCNC: 24 MMOL/L (ref 20–32)
COLOR UR AUTO: ABNORMAL
CREAT SERPL-MCNC: 0.99 MG/DL (ref 0.52–1.04)
DIFFERENTIAL METHOD BLD: ABNORMAL
EOSINOPHIL # BLD AUTO: 0.2 10E9/L (ref 0–0.7)
EOSINOPHIL NFR BLD AUTO: 1.6 %
ERYTHROCYTE [DISTWIDTH] IN BLOOD BY AUTOMATED COUNT: 15 % (ref 10–15)
GFR SERPL CREATININE-BSD FRML MDRD: 57 ML/MIN/1.7M2
GLUCOSE SERPL-MCNC: 99 MG/DL (ref 70–99)
GLUCOSE UR STRIP-MCNC: NEGATIVE MG/DL
HCG UR QL: NEGATIVE
HCT VFR BLD AUTO: 45.6 % (ref 35–47)
HGB BLD-MCNC: 15.6 G/DL (ref 11.7–15.7)
HGB UR QL STRIP: NEGATIVE
IMM GRANULOCYTES # BLD: 0 10E9/L (ref 0–0.4)
IMM GRANULOCYTES NFR BLD: 0.2 %
INTERNAL QC OK POCT: YES
KETONES UR STRIP-MCNC: NEGATIVE MG/DL
LACTATE BLD-SCNC: 1.1 MMOL/L (ref 0.7–2.1)
LEUKOCYTE ESTERASE UR QL STRIP: NEGATIVE
LIPASE SERPL-CCNC: 190 U/L (ref 73–393)
LYMPHOCYTES # BLD AUTO: 5.1 10E9/L (ref 0.8–5.3)
LYMPHOCYTES NFR BLD AUTO: 43.3 %
MCH RBC QN AUTO: 30.8 PG (ref 26.5–33)
MCHC RBC AUTO-ENTMCNC: 34.2 G/DL (ref 31.5–36.5)
MCV RBC AUTO: 90 FL (ref 78–100)
MONOCYTES # BLD AUTO: 0.7 10E9/L (ref 0–1.3)
MONOCYTES NFR BLD AUTO: 6.1 %
MUCOUS THREADS #/AREA URNS LPF: PRESENT /LPF
NEUTROPHILS # BLD AUTO: 5.6 10E9/L (ref 1.6–8.3)
NEUTROPHILS NFR BLD AUTO: 48.2 %
NITRATE UR QL: NEGATIVE
NRBC # BLD AUTO: 0 10*3/UL
NRBC BLD AUTO-RTO: 0 /100
PH UR STRIP: 6.5 PH (ref 5–7)
PLATELET # BLD AUTO: 202 10E9/L (ref 150–450)
POTASSIUM SERPL-SCNC: 4 MMOL/L (ref 3.4–5.3)
PROT SERPL-MCNC: 8.1 G/DL (ref 6.8–8.8)
RBC # BLD AUTO: 5.07 10E12/L (ref 3.8–5.2)
RBC #/AREA URNS AUTO: <1 /HPF (ref 0–2)
SODIUM SERPL-SCNC: 141 MMOL/L (ref 133–144)
SP GR UR STRIP: 1.01 (ref 1–1.03)
SPERM #/AREA URNS HPF: PRESENT /HPF
SQUAMOUS #/AREA URNS AUTO: <1 /HPF (ref 0–1)
TRANS CELLS #/AREA URNS HPF: <1 /HPF (ref 0–1)
URN SPEC COLLECT METH UR: ABNORMAL
UROBILINOGEN UR STRIP-MCNC: NORMAL MG/DL (ref 0–2)
WBC # BLD AUTO: 11.7 10E9/L (ref 4–11)
WBC #/AREA URNS AUTO: <1 /HPF (ref 0–2)

## 2017-08-09 PROCEDURE — 83690 ASSAY OF LIPASE: CPT | Performed by: EMERGENCY MEDICINE

## 2017-08-09 PROCEDURE — 81025 URINE PREGNANCY TEST: CPT | Performed by: EMERGENCY MEDICINE

## 2017-08-09 PROCEDURE — 80053 COMPREHEN METABOLIC PANEL: CPT | Performed by: EMERGENCY MEDICINE

## 2017-08-09 PROCEDURE — 85025 COMPLETE CBC W/AUTO DIFF WBC: CPT | Performed by: EMERGENCY MEDICINE

## 2017-08-09 PROCEDURE — 25000132 ZZH RX MED GY IP 250 OP 250 PS 637: Performed by: EMERGENCY MEDICINE

## 2017-08-09 PROCEDURE — 25000128 H RX IP 250 OP 636

## 2017-08-09 PROCEDURE — 96374 THER/PROPH/DIAG INJ IV PUSH: CPT | Performed by: EMERGENCY MEDICINE

## 2017-08-09 PROCEDURE — 83605 ASSAY OF LACTIC ACID: CPT | Performed by: EMERGENCY MEDICINE

## 2017-08-09 PROCEDURE — 25000128 H RX IP 250 OP 636: Performed by: EMERGENCY MEDICINE

## 2017-08-09 PROCEDURE — 81001 URINALYSIS AUTO W/SCOPE: CPT | Performed by: EMERGENCY MEDICINE

## 2017-08-09 PROCEDURE — 74176 CT ABD & PELVIS W/O CONTRAST: CPT

## 2017-08-09 PROCEDURE — 96361 HYDRATE IV INFUSION ADD-ON: CPT | Performed by: EMERGENCY MEDICINE

## 2017-08-09 PROCEDURE — 99285 EMERGENCY DEPT VISIT HI MDM: CPT | Mod: 25 | Performed by: EMERGENCY MEDICINE

## 2017-08-09 PROCEDURE — 99285 EMERGENCY DEPT VISIT HI MDM: CPT | Mod: Z6 | Performed by: EMERGENCY MEDICINE

## 2017-08-09 RX ORDER — FENTANYL CITRATE 50 UG/ML
50 INJECTION, SOLUTION INTRAMUSCULAR; INTRAVENOUS ONCE
Status: COMPLETED | OUTPATIENT
Start: 2017-08-09 | End: 2017-08-09

## 2017-08-09 RX ORDER — OXYCODONE HYDROCHLORIDE 5 MG/1
5 TABLET ORAL ONCE
Status: COMPLETED | OUTPATIENT
Start: 2017-08-09 | End: 2017-08-09

## 2017-08-09 RX ORDER — ONDANSETRON 2 MG/ML
4 INJECTION INTRAMUSCULAR; INTRAVENOUS EVERY 30 MIN PRN
Status: DISCONTINUED | OUTPATIENT
Start: 2017-08-09 | End: 2017-08-09 | Stop reason: HOSPADM

## 2017-08-09 RX ORDER — LIDOCAINE 40 MG/G
CREAM TOPICAL
Status: DISCONTINUED | OUTPATIENT
Start: 2017-08-09 | End: 2017-08-09 | Stop reason: HOSPADM

## 2017-08-09 RX ORDER — SODIUM CHLORIDE 9 MG/ML
1000 INJECTION, SOLUTION INTRAVENOUS CONTINUOUS
Status: DISCONTINUED | OUTPATIENT
Start: 2017-08-09 | End: 2017-08-09 | Stop reason: HOSPADM

## 2017-08-09 RX ORDER — FENTANYL CITRATE 50 UG/ML
INJECTION, SOLUTION INTRAMUSCULAR; INTRAVENOUS
Status: COMPLETED
Start: 2017-08-09 | End: 2017-08-09

## 2017-08-09 RX ADMIN — SODIUM CHLORIDE 1000 ML: 9 INJECTION, SOLUTION INTRAVENOUS at 04:17

## 2017-08-09 RX ADMIN — OXYCODONE HYDROCHLORIDE 5 MG: 5 TABLET ORAL at 03:21

## 2017-08-09 RX ADMIN — FENTANYL CITRATE 50 MCG: 50 INJECTION INTRAMUSCULAR; INTRAVENOUS at 04:13

## 2017-08-09 RX ADMIN — SODIUM CHLORIDE 1000 ML: 9 INJECTION, SOLUTION INTRAVENOUS at 03:22

## 2017-08-09 RX ADMIN — FENTANYL CITRATE 50 MCG: 50 INJECTION, SOLUTION INTRAMUSCULAR; INTRAVENOUS at 04:13

## 2017-08-09 ASSESSMENT — ENCOUNTER SYMPTOMS
DIARRHEA: 0
ABDOMINAL PAIN: 1
FLANK PAIN: 1
NAUSEA: 1
FEVER: 0
SHORTNESS OF BREATH: 0
VOMITING: 0

## 2017-08-09 NOTE — ED AVS SNAPSHOT
[facility], Emergency Department    [address] [address]    Phone:  [phone]                                       Ilsa Yusuf   MRN: [id number]    Department:  [facility], Emergency Department   Date of Visit:  8/9/2017           Patient Information     Date Of Birth          1960        Your diagnoses for this visit were:     Flank pain        You were seen by Mason Piña MD.        Discharge Instructions       Please make an appointment to follow up with Your Primary Care Provider in 2-3 days     Flank Pain, Uncertain Cause  The flank is the area between your upper abdomen and your back. Pain there is often caused by a problem with your kidneys. It might be a kidney infection or a kidney stone. Other causes of flank pain include spinal arthritis, a pinched nerve from a back injury, or a back muscle strain or spasm.  The cause of your flank pain is not certain. You may need other tests.  Home care  Follow these tips when caring for yourself at home:    You may use acetaminophen or ibuprofen to control pain, unless your health care provider prescribed another medicine. If you have chronic liver or kidney disease, talk with your provider before taking these medicines. Also talk with your provider first if you ve ever had a stomach ulcer or GI bleeding.    If the pain is coming from your muscles, you may get relief with ice or heat. During the first 2 days after the injury, put an ice pack on the painful area for 20 minutes every 2 to 4 hours. This will reduce swelling and pain. A hot shower, hot bath, or heating pad works well for a muscle spasm. You can start with ice, then switch to heat after 2 days. You might find that alternating ice and heat works well. Use the method that feels the best to you.  Follow-up care  Follow up with your healthcare provider if your symptoms don t get better over the next few days.  When to seek medical advice  Call your healthcare  provider right away if any of these happen:    Repeated vomiting    Fever of 100.4 F (38 C) or higher, or as directed by your health care provider    Flank pain that gets worse    Pain that spreads to the front of your belly (abdomen)    Dizziness, weakness, or fainting    Blood in your urine    Burning feeling when you urinate or the need to urinate often    Pain in one of your legs that gets worse    Numbness or weakness in a leg  Date Last Reviewed: 10/1/2016    4516-3590 [employer]. [address] [place] [address]. All rights reserved. This information is not intended as a substitute for professional medical care. Always follow your healthcare professional's instructions.          24 Hour Appointment Hotline       To make an appointment at any [facility], call [phone] ([phone]). If you don't have a family doctor or clinic, we will help you find one. [facility] are conveniently located to serve the needs of you and your family.             Review of your medicines      Our records show that you are taking the medicines listed below. If these are incorrect, please call your family doctor or clinic.        Dose / Directions Last dose taken    cholecalciferol 5000 UNITS Caps capsule   Commonly known as:  vitamin D3   Dose:  5000 Units   Quantity:  100 capsule        Take 1 capsule (5,000 Units) by mouth daily Take 1 per day   Refills:  2        doxepin 10 MG capsule   Commonly known as:  SINEquan   Quantity:  180 capsule        TAKE ONE TO TWO CAPSULES BY MOUTH AT BEDTIME   Refills:  8        DULoxetine 60 MG EC capsule   Commonly known as:  CYMBALTA   Quantity:  180 capsule        TAKE 2 CAPSULES BY MOUTH EVERY DAY   Refills:  1        FIBER PO        Two capsules in the morning and two capsules at night   Refills:  0        fish oil-omega-3 fatty acids 1000 MG capsule   Dose:  2 g        Take 2 g by mouth daily Takes 1 in am and 1 at bedtime   Refills:  0         fluticasone 50 MCG/ACT spray   Commonly known as:  FLONASE   Dose:  1-2 spray   Quantity:  16 g        Spray 1-2 sprays into both nostrils daily   Refills:  11        HYDROcodone-acetaminophen  MG per tablet   Commonly known as:  NORCO   Dose:  1 tablet   Quantity:  15 tablet        Take 1 tablet by mouth every 6 hours as needed for pain   Refills:  0        * levothyroxine 150 MCG tablet   Commonly known as:  SYNTHROID/LEVOTHROID   Dose:  150 mcg   Quantity:  45 tablet        Take 1 tablet (150 mcg) by mouth every other day   Refills:  3        * levothyroxine 175 MCG tablet   Commonly known as:  SYNTHROID/LEVOTHROID   Dose:  175 mcg   Quantity:  45 tablet        Take 1 tablet (175 mcg) by mouth every other day   Refills:  3        * LORazepam 0.5 MG tablet   Commonly known as:  ATIVAN   Dose:  0.5 mg   Quantity:  30 tablet        Take 1 tablet (0.5 mg) by mouth nightly as needed for anxiety 30 tablets to last 30 days.   Refills:  2        * LORazepam 0.5 MG tablet   Commonly known as:  ATIVAN   Quantity:  6 tablet        Take 2 po 90 minutes prior to MRI and may repeat x 1 30 minutes prior to MRI   Refills:  0        lovastatin 20 MG tablet   Commonly known as:  MEVACOR   Dose:  20 mg   Quantity:  90 tablet        Take 1 tablet (20 mg) by mouth At Bedtime   Refills:  3        Multi-vitamin Tabs tablet   Dose:  1 tablet        Take 1 tablet by mouth daily   Refills:  0        NONFORMULARY   Dose:  20 mg        Take 20 mg by mouth 2 times daily D-amphetamine Salt combo   Refills:  0        OLANZapine 2.5 MG tablet   Commonly known as:  zyPREXA   Dose:  2.5 mg   Quantity:  90 tablet        Take 1 tablet (2.5 mg) by mouth At Bedtime   Refills:  1        omeprazole 40 MG capsule   Commonly known as:  priLOSEC   Dose:  40 mg   Quantity:  90 capsule        Take 1 capsule (40 mg) by mouth daily Take 30-60 minutes before a meal.   Refills:  2        ondansetron 4 MG tablet   Commonly known as:  ZOFRAN   Dose:  4 mg    Quantity:  18 tablet        Take 1 tablet (4 mg) by mouth every 6 hours as needed for nausea   Refills:  5        order for DME   Quantity:  1 Device        TENS unit and supplies   Refills:  0        prazosin 5 MG capsule   Commonly known as:  MINIPRESS   Dose:  5 mg   Quantity:  90 capsule        Take 1 capsule (5 mg) by mouth At Bedtime Take 1 capsule at bedtime   Refills:  3        tapentadol 50 MG Tabs tablet   Commonly known as:  NUCYNTA   Dose:  50 mg   Quantity:  120 tablet        Take 1 tablet (50 mg) by mouth every 6 hours as needed for moderate to severe pain Max #4/day.   Refills:  0        tiZANidine 4 MG tablet   Commonly known as:  ZANAFLEX   Dose:  4-12 mg   Quantity:  210 tablet        Take 1-3 tablets (4-12 mg) by mouth 3 times daily as needed for muscle spasms   Refills:  3        topiramate 50 MG tablet   Commonly known as:  TOPAMAX   Dose:   mg   Quantity:  120 tablet        Take 1-2 tablets ( mg) by mouth 2 times daily   Refills:  3        * Notice:  This list has 4 medication(s) that are the same as other medications prescribed for you. Read the directions carefully, and ask your doctor or other care provider to review them with you.            Procedures and tests performed during your visit     CBC with platelets differential    CT Abdomen Pelvis w/o Contrast (stone protocol)    Comprehensive metabolic panel    Lactic acid whole blood    Lipase    Peripheral IV catheter    UA with Microscopic    hCG qual urine POCT      Orders Needing Specimen Collection     None      Pending Results     Date and Time Order Name Status Description    8/9/2017 0316 CT Abdomen Pelvis w/o Contrast (stone protocol) Preliminary             Pending Culture Results     No orders found from 8/7/2017 to 8/10/2017.            Pending Results Instructions     If you had any lab results that were not finalized at the time of your Discharge, you can call the ED Lab Result RN at [phone]. You will be  contacted by this team for any positive Lab results or changes in treatment. The nurses are available 7 days a week from 10A to 6:30P.  You can leave a message 24 hours per day and they will return your call.        Thank you for choosing [facility]       Thank you for choosing [facility] for your care. Our goal is always to provide you with excellent care. Hearing back from our patients is one way we can continue to improve our services. Please take a few minutes to complete the written survey that you may receive in the mail after you visit with us. Thank you!        [employer] Information     [employer] gives you secure access to your electronic health record. If you see a primary care provider, you can also send messages to your care team and make appointments. If you have questions, please call your primary care clinic.  If you do not have a primary care provider, please call [phone] and they will assist you.        Care EveryWhere ID     This is your Care EveryWhere ID. This could be used by other organizations to access your [facility] medical records  [id number]        Equal Access to Services     KRISTINA MILLER AH: Hadii vladislav newbyo Somartha, waaxda luqadaha, qaybta kaalmamiquel may, jane bonilla . So [facility] [phone].    ATENCIÓN: Si habla español, tiene a castle disposición servicios gratuitos de asistencia lingüística. Llame al [phone].    We comply with applicable federal civil rights laws and [place] laws. We do not discriminate on the basis of race, color, national origin, age, disability sex, sexual orientation or gender identity.            After Visit Summary       This is your record. Keep this with you and show to your community pharmacist(s) and doctor(s) at your next visit.

## 2017-08-09 NOTE — TELEPHONE ENCOUNTER
Ilsa is having low flank pain.  Per discharge instructions, Ilsa was advised to schedule with provider within 3 days.

## 2017-08-09 NOTE — ED PROVIDER NOTES
History     Chief Complaint   Patient presents with     Abdominal Pain     HPI  Ilsa Yusuf is a 57 year old female who presents with left-sided flank pain.  This started last night.  Somewhat sudden in onset.  States she felt well and then suddenly felt worse last night.  Took her pain medicine at home and did not help.  He is seen by the pain clinic.  Denies any injuries.  Has some nausea but no vomiting.  No diarrhea.  Denies any dysuria urgency or frequency.  No hematuria.  No fevers or chills.  Has had kidney stones in the past.  States this does not feel the same.    I have reviewed the Medications, Allergies, Past Medical and Surgical History, and Social History in the [facility] system.  Past Medical History:   Diagnosis Date     Arthritis     both knees; hands     Depressive disorder      Depressive disorder, not elsewhere classified 12    DC 10/05/12-[facility]     Hyperlipidemia LDL goal < 130     mevacor     Hypothyroid      Moderate major depression (H)     abilify, cymbalta, seroquel, and sofya, Dr Antonio Perales     PTSD (post-traumatic stress disorder)      Seizure (H) 2011    one episode, was on Keppra, EEG negative       Past Surgical History:   Procedure Laterality Date     ABDOMEN SURGERY       C PARTIAL EXCISION THYROID,UNILAT      rt, negative path then, treated with synthroid.     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     TONSILLECTOMY       ureteral reimplantation         Family History   Problem Relation Age of Onset     C.A.D. Father 58      at 58, heart disease     MENTAL ILLNESS Father      Coronary Artery Disease Father      Hyperlipidemia Father      Alzheimer Disease Mother      total care now     Depression Mother      Anxiety Disorder Mother      DIABETES Sister      adult onset     Thyroid Disease Sister        Social  "History   Substance Use Topics     Smoking status: Current Some Day Smoker     Packs/day: 0.30     Last attempt to quit: 2/17/2015     Smokeless tobacco: Current User      Comment: recreational     Alcohol use 0.0 oz/week      Comment: 2 a night on weekends       Review of Systems   Constitutional: Negative for fever.   Respiratory: Negative for shortness of breath.    Cardiovascular: Negative for chest pain.   Gastrointestinal: Positive for abdominal pain and nausea. Negative for diarrhea and vomiting.   Genitourinary: Positive for flank pain.   All other systems reviewed and are negative.      Physical Exam   BP: 130/82  Pulse: 73  Temp: 97.6  F (36.4  C)  Resp: 16  Height: 172.7 cm (5' 8\")  SpO2: 97 %  Physical Exam  Vital Signs and Nursing Notes Reviewed.  General:  NAD  HEENT: Oropharynx clear.  No obvious signs of trauma.  PERRL.  EOMI  Neck: Supple.  No lymphadenopathy.  Cardiac: RRR.  No murmurs, rubs or gallops  Lungs: Clear bilaterally.  Normal respiratory rate.    Abdomen:  Soft, Nontender, no rebound or guarding.  Back: Tender over left flankl No CVA tenderness.  Skin:  No rash.  No diaphoresis  Extremities:  No cyanosis, clubbing, or edema.  Neurological:  CN II-XII intact, Strength intact, Sensation intact, No pronator drift, Normal gait.  Pulse:  Symmetric in bilateral upper and lower extremities.    ED Course     ED Course     Procedures             Critical Care time:  none        Results for orders placed or performed during the hospital encounter of 08/09/17   CT Abdomen Pelvis w/o Contrast (stone protocol)    Narrative    Exam: CT abdomen pelvis without contrast 8/9/2017 3:52 AM.    History: Flank pain.    Comparison: 7/17/2017 CT.    Technique: CT images from the lung bases through the symphysis pubis  without contrast    Findings:   Unchanged nonobstructive stone in the inferior pole of the left kidney  measuring up to 2 mm. Unchanged linear area of increased attenuation  at the inferior pole, " may represent several adjacent stones. Unchanged  atrophy of the right kidney, particularly at the lower pole. Chronic  scarring at the inferior pole of the left kidney. No stones in the  ureters or in the bladder. Cholecystectomy.  The liver, pancreas, spleen, and adrenal glands are unremarkable.  Appendix unremarkable. No abnormally dilated loops of small bowel or  colon. No free air or free fluid. No abdominal or pelvic  lymphadenopathy. Aortoiliac calcification.    Bibasilar atelectasis. No suspicious bony lesions. Degenerative  findings of the spine.      Impression    Impression:   1. Stable nonobstructive small stone of the inferior pole of the left  kidney. No stones in the ureter or bladder.  2. Stable atrophy at the inferior pole of the right kidney and  scarring at the inferior pole of the left kidney.   CBC with platelets differential   Result Value Ref Range    WBC 11.7 (H) 4.0 - 11.0 10e9/L    RBC Count 5.07 3.8 - 5.2 10e12/L    Hemoglobin 15.6 11.7 - 15.7 g/dL    Hematocrit 45.6 35.0 - 47.0 %    MCV 90 78 - 100 fl    MCH 30.8 26.5 - 33.0 pg    MCHC 34.2 31.5 - 36.5 g/dL    RDW 15.0 10.0 - 15.0 %    Platelet Count 202 150 - 450 10e9/L    Diff Method Automated Method     % Neutrophils 48.2 %    % Lymphocytes 43.3 %    % Monocytes 6.1 %    % Eosinophils 1.6 %    % Basophils 0.6 %    % Immature Granulocytes 0.2 %    Nucleated RBCs 0 0 /100    Absolute Neutrophil 5.6 1.6 - 8.3 10e9/L    Absolute Lymphocytes 5.1 0.8 - 5.3 10e9/L    Absolute Monocytes 0.7 0.0 - 1.3 10e9/L    Absolute Eosinophils 0.2 0.0 - 0.7 10e9/L    Absolute Basophils 0.1 0.0 - 0.2 10e9/L    Abs Immature Granulocytes 0.0 0 - 0.4 10e9/L    Absolute Nucleated RBC 0.0    Comprehensive metabolic panel   Result Value Ref Range    Sodium 141 133 - 144 mmol/L    Potassium 4.0 3.4 - 5.3 mmol/L    Chloride 111 (H) 94 - 109 mmol/L    Carbon Dioxide 24 20 - 32 mmol/L    Anion Gap 6 3 - 14 mmol/L    Glucose 99 70 - 99 mg/dL    Urea Nitrogen 22 7 - 30  mg/dL    Creatinine 0.99 0.52 - 1.04 mg/dL    GFR Estimate 57 (L) >60 mL/min/1.7m2    GFR Estimate If Black 69 >60 mL/min/1.7m2    Calcium 9.6 8.5 - 10.1 mg/dL    Bilirubin Total 0.3 0.2 - 1.3 mg/dL    Albumin 3.9 3.4 - 5.0 g/dL    Protein Total 8.1 6.8 - 8.8 g/dL    Alkaline Phosphatase 118 40 - 150 U/L    ALT 25 0 - 50 U/L    AST 14 0 - 45 U/L   Lipase   Result Value Ref Range    Lipase 190 73 - 393 U/L   Lactic acid whole blood   Result Value Ref Range    Lactic Acid 1.1 0.7 - 2.1 mmol/L   UA with Microscopic   Result Value Ref Range    Color Urine Light Yellow     Appearance Urine Clear     Glucose Urine Negative NEG mg/dL    Bilirubin Urine Negative NEG    Ketones Urine Negative NEG mg/dL    Specific Gravity Urine 1.007 1.003 - 1.035    Blood Urine Negative NEG    pH Urine 6.5 5.0 - 7.0 pH    Protein Albumin Urine Negative NEG mg/dL    Urobilinogen mg/dL Normal 0.0 - 2.0 mg/dL    Nitrite Urine Negative NEG    Leukocyte Esterase Urine Negative NEG    Source Midstream Urine     WBC Urine <1 0 - 2 /HPF    RBC Urine <1 0 - 2 /HPF    Squamous Epithelial /HPF Urine <1 0 - 1 /HPF    Transitional Epi <1 0 - 1 /HPF    Mucous Urine Present (A) NEG /LPF    sperm Present (A) NEG /HPF   hCG qual urine POCT   Result Value Ref Range    HCG Qual Urine Negative neg    Internal QC OK Yes            Labs Ordered and Resulted from Time of ED Arrival Up to the Time of Departure from the ED   CBC WITH PLATELETS DIFFERENTIAL - Abnormal; Notable for the following:        Result Value    WBC 11.7 (*)     All other components within normal limits   COMPREHENSIVE METABOLIC PANEL - Abnormal; Notable for the following:     Chloride 111 (*)     GFR Estimate 57 (*)     All other components within normal limits   ROUTINE UA WITH MICROSCOPIC - Abnormal; Notable for the following:     Mucous Urine Present (*)     sperm Present (*)     All other components within normal limits   HCG QUAL URINE POCT - Normal   LIPASE   LACTIC ACID WHOLE BLOOD    PERIPHERAL IV CATHETER            Assessments & Plan (with Medical Decision Making)   57 year old who presents with flank pain.  Initial concern for possible kidney stone given some onset.  CT scan does not show kidney stone or other cause.  She is tender over the musculature.  This is also worse with movement.  This likely is due to musculoskeletal pain.  Labs otherwise unremarkable.  Urinalysis is negative.  Discussed findings with the patient.  She will follow-up with her pain clinic.  She will rest.  She will return for any worsening symptoms.    I have reviewed the nursing notes.    I have reviewed the findings, diagnosis, plan and need for follow up with the patient.    Discharge Medication List as of 8/9/2017  4:53 AM          Final diagnoses:   Flank pain       8/9/2017   [facility], [place], EMERGENCY DEPARTMENT     Mason Piña MD  08/09/17 0729

## 2017-08-09 NOTE — ED NOTES
Pt presents to ED with sudden onset left sided abdominal pain that radiates to her back and down her left leg. Pt denies nausea, vomiting, or diarrhea. Pt states her left abdomen appears to have a bulge and is firm to touch.

## 2017-08-09 NOTE — ED AVS SNAPSHOT
[facility], [place], Emergency Department    [address] [place] [address]    Phone:  [phone]                                       Ilsa Yusuf   MRN: [id number]    Department:  [facility], Emergency Department   Date of Visit:  8/9/2017           After Visit Summary Signature Page     I have received my discharge instructions, and my questions have been answered. I have discussed any challenges I see with this plan with the nurse or doctor.    ..........................................................................................................................................  Patient/Patient Representative Signature      ..........................................................................................................................................  Patient Representative Print Name and Relationship to Patient    ..................................................               ................................................  Date                                            Time    ..........................................................................................................................................  Reviewed by Signature/Title    ...................................................              ..............................................  Date                                                            Time

## 2017-08-10 ENCOUNTER — OFFICE VISIT (OUTPATIENT)
Dept: FAMILY MEDICINE | Facility: CLINIC | Age: 57
End: 2017-08-10
Payer: COMMERCIAL

## 2017-08-10 ENCOUNTER — HOSPITAL ENCOUNTER (OUTPATIENT)
Dept: ULTRASOUND IMAGING | Facility: CLINIC | Age: 57
Discharge: HOME | End: 2017-08-10
Attending: NURSE PRACTITIONER | Admitting: NURSE PRACTITIONER
Payer: MEDICARE

## 2017-08-10 VITALS
SYSTOLIC BLOOD PRESSURE: 93 MMHG | TEMPERATURE: 98 F | HEART RATE: 81 BPM | WEIGHT: 208 LBS | DIASTOLIC BLOOD PRESSURE: 50 MMHG | BODY MASS INDEX: 31.63 KG/M2 | OXYGEN SATURATION: 97 %

## 2017-08-10 DIAGNOSIS — R10.12 LUQ ABDOMINAL PAIN: Primary | ICD-10-CM

## 2017-08-10 PROCEDURE — 99214 OFFICE O/P EST MOD 30 MIN: CPT | Performed by: NURSE PRACTITIONER

## 2017-08-10 PROCEDURE — 76705 ECHO EXAM OF ABDOMEN: CPT

## 2017-08-10 NOTE — NURSING NOTE
"Chief Complaint   Patient presents with     RECHECK     Urgent care       Initial BP 93/50  Pulse 81  Temp 98  F (36.7  C) (Oral)  Wt 208 lb (94.3 kg)  SpO2 97%  BMI 31.63 kg/m2 Estimated body mass index is 31.63 kg/(m^2) as calculated from the following:    Height as of 8/9/17: 5' 8\" (1.727 m).    Weight as of this encounter: 208 lb (94.3 kg).  Medication Reconciliation: complete   Crista Landeros MA      "

## 2017-08-10 NOTE — MR AVS SNAPSHOT
After Visit Summary   8/10/2017    Ilsa Yusuf    MRN: [id number]           Patient Information     Date Of Birth          1960        Visit Information        Provider Department      8/10/2017 1:20 PM Maryuri Kang APRN CNP [facility]        Today's Diagnoses     LUQ abdominal pain    -  1       Follow-ups after your visit        Who to contact     If you have questions or need follow up information about today's clinic visit or your schedule please contact [facility] directly at [phone].  Normal or non-critical lab and imaging results will be communicated to you by ciValuehart, letter or phone within 4 business days after the clinic has received the results. If you do not hear from us within 7 days, please contact the clinic through [employer] or phone. If you have a critical or abnormal lab result, we will notify you by phone as soon as possible.  Submit refill requests through Lotame or call your pharmacy and they will forward the refill request to us. Please allow 3 business days for your refill to be completed.          Additional Information About Your Visit        MyChart Information     Lotame gives you secure access to your electronic health record. If you see a primary care provider, you can also send messages to your care team and make appointments. If you have questions, please call your primary care clinic.  If you do not have a primary care provider, please call [phone] and they will assist you.        Care EveryWhere ID     This is your Care EveryWhere ID. This could be used by other organizations to access your [facility] medical records  [id number]        Your Vitals Were     Pulse Temperature Pulse Oximetry BMI (Body Mass Index)          81 98  F (36.7  C) (Oral) 97% 31.63 kg/m2         Blood Pressure from Last 3 Encounters:   08/16/17 108/66   08/15/17 121/69   08/10/17 93/50    Weight from Last 3 Encounters:    08/16/17 214 lb (97.1 kg)   08/15/17 210 lb (95.3 kg)   08/10/17 208 lb (94.3 kg)              We Performed the Following     US Abdomen Limited          Today's Medication Changes          These changes are accurate as of: 8/10/17 11:59 PM.  If you have any questions, ask your nurse or doctor.               These medicines have changed or have updated prescriptions.        Dose/Directions    * levothyroxine 150 MCG tablet   Commonly known as:  SYNTHROID/LEVOTHROID   This may have changed:  additional instructions   Used for:  Acquired hypothyroidism   Changed by:  Catrachita Collado MD        Dose:  150 mcg   Take 1 tablet (150 mcg) by mouth every other day   Quantity:  45 tablet   Refills:  3       * levothyroxine 175 MCG tablet   Commonly known as:  SYNTHROID/LEVOTHROID   This may have changed:  Another medication with the same name was changed. Make sure you understand how and when to take each.   Used for:  Acquired hypothyroidism   Changed by:  Catrachita Collado MD        Dose:  175 mcg   Take 1 tablet (175 mcg) by mouth every other day   Quantity:  45 tablet   Refills:  3       * Notice:  This list has 2 medication(s) that are the same as other medications prescribed for you. Read the directions carefully, and ask your doctor or other care provider to review them with you.             Primary Care Provider Office Phone # Fax #    Catrachita Collado [phone] [address] [place] [address]        Equal Access to Services     [facility]: Hadii vladislav leach Somartha, waaxda luqadaha, qaybta kaalmada adepatelyada, jane bonilla . So [facility] [phone].    ATENCIÓN: Si habla español, tiene a castle disposición servicios gratuitos de asistencia lingüística. Llame al [phone].    We comply with applicable federal civil rights laws and [place] laws. We do not discriminate on the basis of race, color, national origin, age, disability sex, sexual  orientation or gender identity.            Thank you!     Thank you for choosing [facility]  for your care. Our goal is always to provide you with excellent care. Hearing back from our patients is one way we can continue to improve our services. Please take a few minutes to complete the written survey that you may receive in the mail after your visit with us. Thank you!             Your Updated Medication List - Protect others around you: Learn how to safely use, store and throw away your medicines at www.disposemymeds.org.          This list is accurate as of: 8/10/17 11:59 PM.  Always use your most recent med list.                   Brand Name Dispense Instructions for use Diagnosis    cholecalciferol 5000 UNITS Caps capsule    vitamin D3    100 capsule    Take 1 capsule (5,000 Units) by mouth daily Take 1 per day    Major depressive disorder, recurrent episode, moderate (H)       doxepin 10 MG capsule    SINEquan    180 capsule    TAKE ONE TO TWO CAPSULES BY MOUTH AT BEDTIME    Major depressive disorder, recurrent episode, moderate (H)       DULoxetine 60 MG EC capsule    CYMBALTA    180 capsule    TAKE 2 CAPSULES BY MOUTH EVERY DAY    Major depressive disorder, recurrent episode, moderate (H)       FIBER PO      Two capsules in the morning and two capsules at night        fish oil-omega-3 fatty acids 1000 MG capsule      Take 2 g by mouth daily Takes 1 in am and 1 at bedtime        fluticasone 50 MCG/ACT spray    FLONASE    16 g    Spray 1-2 sprays into both nostrils daily    Other chronic sinusitis       HYDROcodone-acetaminophen  MG per tablet    NORCO    15 tablet    Take 1 tablet by mouth every 6 hours as needed for pain        * levothyroxine 150 MCG tablet    SYNTHROID/LEVOTHROID    45 tablet    Take 1 tablet (150 mcg) by mouth every other day    Acquired hypothyroidism       * levothyroxine 175 MCG tablet    SYNTHROID/LEVOTHROID    45 tablet    Take 1 tablet (175 mcg) by mouth  every other day    Acquired hypothyroidism       * LORazepam 0.5 MG tablet    ATIVAN    30 tablet    Take 1 tablet (0.5 mg) by mouth nightly as needed for anxiety 30 tablets to last 30 days.    PTSD (post-traumatic stress disorder)       * LORazepam 0.5 MG tablet    ATIVAN    6 tablet    Take 2 po 90 minutes prior to MRI and may repeat x 1 30 minutes prior to MRI    Effusion of lower leg joint       lovastatin 20 MG tablet    MEVACOR    90 tablet    Take 1 tablet (20 mg) by mouth At Bedtime    Hyperlipidemia LDL goal <130       Multi-vitamin Tabs tablet      Take 1 tablet by mouth daily        NONFORMULARY      Take 20 mg by mouth 2 times daily D-amphetamine Salt combo        OLANZapine 2.5 MG tablet    zyPREXA    90 tablet    Take 1 tablet (2.5 mg) by mouth At Bedtime    Major depressive disorder, recurrent episode, moderate (H)       omeprazole 40 MG capsule    priLOSEC    90 capsule    Take 1 capsule (40 mg) by mouth daily Take 30-60 minutes before a meal.    Esophageal reflux       ondansetron 4 MG tablet    ZOFRAN    18 tablet    Take 1 tablet (4 mg) by mouth every 6 hours as needed for nausea    Norovirus       order for DME     1 Device    TENS unit and supplies    Myofascial pain syndrome       prazosin 5 MG capsule    MINIPRESS    90 capsule    Take 1 capsule (5 mg) by mouth At Bedtime Take 1 capsule at bedtime    Primary insomnia       tapentadol 50 MG Tabs tablet    NUCYNTA    120 tablet    Take 1 tablet (50 mg) by mouth every 6 hours as needed for moderate to severe pain Max #4/day.    Chronic pain syndrome       tiZANidine 4 MG tablet    ZANAFLEX    210 tablet    Take 1-3 tablets (4-12 mg) by mouth 3 times daily as needed for muscle spasms    Cervical radiculopathy       topiramate 50 MG tablet    TOPAMAX    120 tablet    Take 1-2 tablets ( mg) by mouth 2 times daily    Chronic pain syndrome       * Notice:  This list has 4 medication(s) that are the same as other medications prescribed for you.  Read the directions carefully, and ask your doctor or other care provider to review them with you.

## 2017-08-10 NOTE — PROGRESS NOTES
SUBJECTIVE:                                                    Ilsa Yusuf is a 57 year old female who presents to clinic today for the following health issues:    ED/UC Followup:    Facility:  [facility]  Date of visit: 8/9/17  Reason for visit: Flank pain  Current Status: Still in pain     Left side and flank pain.  Getting a bit better since ER visit.  No fevers.  Denies dysuria or hematuria.  Rating pain +6-8/10.    Has been using her hydrocodone for the breakthrough pain.  On Nucynta for baseline pain which is managed in the pain clinic.        Problem list and histories reviewed & adjusted, as indicated.  Additional history: as documented    Reviewed and updated as needed this visit by clinical staff  Tobacco  Allergies  Meds  Problems  Med Hx  Surg Hx  Fam Hx  Soc Hx        Reviewed and updated as needed this visit by Provider  Tobacco  Allergies  Meds  Problems  Med Hx  Surg Hx  Fam Hx  Soc Hx          ROS:  Constitutional, HEENT, cardiovascular, pulmonary, GI, , musculoskeletal, neuro, skin, endocrine and psych systems are negative, except as otherwise noted.      OBJECTIVE:   BP 93/50  Pulse 81  Temp 98  F (36.7  C) (Oral)  Wt 208 lb (94.3 kg)  SpO2 97%  BMI 31.63 kg/m2  Body mass index is 31.63 kg/(m^2).  GENERAL: healthy, alert and no distress  RESP: lungs clear to auscultation - no rales, rhonchi or wheezes  CV: regular rate and rhythm, normal S1 S2, no S3 or S4, no murmur, click or rub, no peripheral edema and peripheral pulses strong  ABDOMEN: soft, nontender, no hepatosplenomegaly, no masses and bowel sounds normal  MS: no gross musculoskeletal defects noted, no edema  SKIN: no suspicious lesions or rashes  BACK: minimal CVA tenderness, no paralumbar tenderness      ASSESSMENT/PLAN:       ICD-10-CM    1. LUQ abdominal pain R10.12 US Abdomen Limited     Will order abd US paul to eval LUQ and left flank pain.  Defer pain medications to pain clinic.      ABD US showed  non obstructive renal calculi.  vicodin given, pt notified.  See TE/ nurses notes dated 8/11/17.  Switched to percocet.  F/u in 3 days.      ALONDRA Post CNP  [facility]

## 2017-08-11 ENCOUNTER — TELEPHONE (OUTPATIENT)
Dept: FAMILY MEDICINE | Facility: CLINIC | Age: 57
End: 2017-08-11

## 2017-08-11 DIAGNOSIS — N20.0 KIDNEY STONE: ICD-10-CM

## 2017-08-11 DIAGNOSIS — N20.0 KIDNEY STONE: Primary | ICD-10-CM

## 2017-08-11 RX ORDER — HYDROCODONE BITARTRATE AND ACETAMINOPHEN 5; 325 MG/1; MG/1
1-2 TABLET ORAL EVERY 4 HOURS PRN
Qty: 20 TABLET | Refills: 0 | Status: SHIPPED | OUTPATIENT
Start: 2017-08-11 | End: 2017-08-25

## 2017-08-11 RX ORDER — TAMSULOSIN HYDROCHLORIDE 0.4 MG/1
0.4 CAPSULE ORAL DAILY
Qty: 30 CAPSULE | Refills: 0 | Status: SHIPPED | OUTPATIENT
Start: 2017-08-11 | End: 2017-08-11

## 2017-08-11 RX ORDER — OXYCODONE AND ACETAMINOPHEN 5; 325 MG/1; MG/1
1 TABLET ORAL 4 TIMES DAILY PRN
Qty: 12 TABLET | Refills: 0 | Status: SHIPPED | OUTPATIENT
Start: 2017-08-11 | End: 2017-08-16

## 2017-08-11 NOTE — TELEPHONE ENCOUNTER
"Spoke with pt, she said that norco doesn't work for her pain- and she would like us to personalize her pain plan.  Pt also advised that flomax had been called in for her and that she should take this for 14 days. The rationale for this was explained to her. Pt advised to push fluids as well. Pt was upset about the norco rx.  Placed call to [facility] at the pain clinic and spoke with Dr Reyes over the speaker phone in regards to pt's resistance to taking the Norco d/t she is \"allergic to nsaids\" as well as \"norco doesn't work\" and she would like a \"personalized pain plan\"  Because pt is already taking Nucynta 50mg qid prn and max of 2 oxycodone 5mg per day for breakthrough pain  Per Dr Reyes, would be reasonable for acute kidney stone pain to rx percocet 5mg 4 qid prn acute pain x 5 days.  Follow up with pcp  Forward to Maryuri Kang CNP for review and sign.  Thanks!     Solange Kelley RN    "

## 2017-08-11 NOTE — TELEPHONE ENCOUNTER
Call placed to pt.  Spoke with pt and she was notified an rx for percocet was written for her to take four times daily for 3 days and then follow up with her pcp.  Pt was upset that we wrote a combo rx for her, pt was advised that this is what Dr Reyes recommended for her and that this is what would be left for her at the .   asked pt if she had any further questions and she did not. She stated that she had already contacted pt relations and asked for my name. Writers name given to pt.  Solange Kelley RN

## 2017-08-11 NOTE — TELEPHONE ENCOUNTER
"SB patient is calling again asking for results of US done yesterday.   \"I am in so much pain and it is a scale of 8\"      Patient asking:      \"What is flomax for ?  I looked it up and it is for men\"  Lisa Momin RN    "

## 2017-08-11 NOTE — TELEPHONE ENCOUNTER
Reason for call:  Results   Name of test or procedure: ultra sound  Date of test or procedure: 8-10-17  Location of test or procedure: [facility]    Additional comments: patient in pain wants her results      Phone number to reach patient:  Home number on file [phone] (home)    Best Time:  ASAP    Can we leave a detailed message on this number?  YES

## 2017-08-14 ENCOUNTER — TRANSFERRED RECORDS (OUTPATIENT)
Dept: HEALTH INFORMATION MANAGEMENT | Facility: CLINIC | Age: 57
End: 2017-08-14

## 2017-08-14 RX ORDER — TAMSULOSIN HYDROCHLORIDE 0.4 MG/1
CAPSULE ORAL
Qty: 90 CAPSULE | Refills: 0 | Status: SHIPPED | OUTPATIENT
Start: 2017-08-14 | End: 2017-08-25

## 2017-08-14 NOTE — TELEPHONE ENCOUNTER
Flomax        Last Written Prescription Date: 8/11/17  Last Fill Quantity: 30, # refills: 0    Last Office Visit with [facility], [facility] or [facility] prescribing provider:  8/10/17   Future Office Visit:    Next 5 appointments (look out 90 days)     Aug 15, 2017  3:40 PM CDT   SHORT with Sanjay Fritz MD   [facility] ([facility]    [address] [place] [id number] [phone]                  BP Readings from Last 3 Encounters:   08/10/17 93/50   08/09/17 103/55   08/04/17 105/57

## 2017-08-15 ENCOUNTER — OFFICE VISIT (OUTPATIENT)
Dept: FAMILY MEDICINE | Facility: CLINIC | Age: 57
End: 2017-08-15
Payer: COMMERCIAL

## 2017-08-15 VITALS
RESPIRATION RATE: 14 BRPM | SYSTOLIC BLOOD PRESSURE: 121 MMHG | OXYGEN SATURATION: 96 % | HEART RATE: 69 BPM | DIASTOLIC BLOOD PRESSURE: 69 MMHG | BODY MASS INDEX: 31.83 KG/M2 | HEIGHT: 68 IN | TEMPERATURE: 98.1 F | WEIGHT: 210 LBS

## 2017-08-15 DIAGNOSIS — R10.9 LEFT FLANK PAIN: ICD-10-CM

## 2017-08-15 DIAGNOSIS — R10.12 LUQ ABDOMINAL PAIN: Primary | ICD-10-CM

## 2017-08-15 PROCEDURE — 99214 OFFICE O/P EST MOD 30 MIN: CPT | Performed by: FAMILY MEDICINE

## 2017-08-15 NOTE — PROGRESS NOTES
"  SUBJECTIVE:                                                    Ilsa Yusuf is a 57 year old female who presents to clinic today for the following health issues:      Kidney Stones F/U      Duration: patient says she has been experiences symptoms since 07/17, discovered kidney stones at last US    Description  back pain, kidney stone and stabbing sharp pain flank that radiates to back, large lump on left side about the size of a hamburger bun    Intensity:  9/10    Accompanying signs and symptoms:  Fever/chills: no   Flank pain YES- left side, says left leg is hurting as well  Nausea and vomiting: no   Abdominal/Pelvic Pain: no     History    History of kidney stones: No  Previous Evaluation: Ultra sound, CT       Precipitating or alleviating factors: pain medication makes it better, laying on left side makes it worse, raing left leg    Therapies tried and outcome: pain mediction   Outcome: makes it beter      She reports this pain to have been ocnstant since about a month ago with varying intensity. soemtime very sharp and cramping. Some relief with percocet. Worse when she is lying down. She reports sense of fullness on the left side.     She has noted no asssociated urinary, nor fever, nor gi symptoms. No stool changes. No costipation reported despite being on nucynta through the [facility] pain clinic. She has been taking in a lot of water and juice to prevent constipation.     Seen in the ER twice and in clinic 3+ times. 2 CT showed a very small nonobstructing stone on the left. Labs have been normal except for stable stage 3 CKD and once her white count was mildly elevated.     She reports no skin changes. No trauma.     OBJECTIVE: /69 (BP Location: Left arm)  Pulse 69  Temp 98.1  F (36.7  C) (Oral)  Resp 14  Ht 5' 8\" (1.727 m)  Wt 210 lb (95.3 kg)  SpO2 96%  BMI 31.93 kg/m2   She is sitting upon entering bent forward holding her left side due to pain.   Exam:  EYES: Eyes grossly " normal to inspection  NECK: no adenopathy, no asymmetry, masses, or scars and thyroid normal to palpation  RESP: lungs clear to auscultation - no rales, rhonchi or wheezes  CV: regular rates and rhythm, normal S1 S2, no S3 or S4 and no murmur, click or rub -  ABDOMEN: tender left upper quadrant. No masses. No asymmetry. No peritoneal signs. No rebound nor guarding. bs are normal.   MS: extremities normal- no gross deformities noted and tender to palpation over the left ribs posteriorly. No spine tenderness. No muscular tenderness.   SKIN: no suspicious lesions or rashes  PSYCH: mentation appears normal and affect normal/bright       ICD-10-CM    1. LUQ abdominal pain R10.12 UROLOGY ADULT REFERRAL   ? Etiology of pain. I doubt this is kidney stone pain given no obstruction seen on multiple scans.    I discussed we could consider Xray of the spine as further evaluation or a CT with constrast to evaluate the bowels. Both of these are less lkley given no bony tenderness of the spine and no bowel symptoms nor fever. Labs were done recently and these were unremarkable. Decided not to repeat these today. IN the end she would like to consult with urology on the stone seen on the CT scan. I did not add more narcotics to her regimen at present but did say I would discuss with her pain clinic in the morning. (their clinic is closed by our visit time)

## 2017-08-15 NOTE — MR AVS SNAPSHOT
After Visit Summary   8/15/2017    Ilsa Yusuf    MRN: [id number]           Patient Information     Date Of Birth          1960        Visit Information        Provider Department      8/15/2017 3:40 PM Sanjay Fritz MD [facility]        Today's Diagnoses     LUQ abdominal pain    -  1       Follow-ups after your visit        Additional Services     UROLOGY ADULT REFERRAL       Your provider has referred you to: [facility] [place] [phone]   [email or web address]  [place] [phone]   [email or web address]  [facility] [phone]   [email or web address]  [facility]: [facility] - [place] [phone]   [email or web address]    Please be aware that coverage of these services is subject to the terms and limitations of your health insurance plan.  Call member services at your health plan with any benefit or coverage questions.      Please bring the following with you to your appointment:    (1) Any X-Rays, CTs or MRIs which have been performed.  Contact the facility where they were done to arrange for  prior to your scheduled appointment.    (2) List of current medications  (3) This referral request   (4) Any documents/labs given to you for this referral                  Your next 10 appointments already scheduled     Aug 18, 2017 11:30 AM CDT   New Patient Visit with Breanne Ellis PA-C   [facility] ([facility])    [address]  [address]  [facility] [address] [phone]              Who to contact     If you have questions or need follow up information about today's clinic visit or your schedule please contact [facility] directly at [phone].  Normal or non-critical lab and imaging results  "will be communicated to you by [employer], letter or phone within 4 business days after the clinic has received the results. If you do not hear from us within 7 days, please contact the clinic through [employer] or phone. If you have a critical or abnormal lab result, we will notify you by phone as soon as possible.  Submit refill requests through [employer] or call your pharmacy and they will forward the refill request to us. Please allow 3 business days for your refill to be completed.          Additional Information About Your Visit        [employer] Information     [employer] gives you secure access to your electronic health record. If you see a primary care provider, you can also send messages to your care team and make appointments. If you have questions, please call your primary care clinic.  If you do not have a primary care provider, please call [phone] and they will assist you.        Care EveryWhere ID     This is your Care EveryWhere ID. This could be used by other organizations to access your [facility] medical records  [id number]        Your Vitals Were     Pulse Temperature Respirations Height Pulse Oximetry BMI (Body Mass Index)    69 98.1  F (36.7  C) (Oral) 14 5' 8\" (1.727 m) 96% 31.93 kg/m2       Blood Pressure from Last 3 Encounters:   08/15/17 121/69   08/10/17 93/50   08/09/17 103/55    Weight from Last 3 Encounters:   08/15/17 210 lb (95.3 kg)   08/10/17 208 lb (94.3 kg)   08/04/17 210 lb 8 oz (95.5 kg)              We Performed the Following     UROLOGY ADULT REFERRAL          Today's Medication Changes          These changes are accurate as of: 8/15/17  4:48 PM.  If you have any questions, ask your nurse or doctor.               These medicines have changed or have updated prescriptions.        Dose/Directions    * levothyroxine 150 MCG tablet   Commonly known as:  SYNTHROID/LEVOTHROID   This may have changed:  additional instructions   Used for:  Acquired hypothyroidism   Changed by:  Tobias, " MD Catrachita        Dose:  150 mcg   Take 1 tablet (150 mcg) by mouth every other day   Quantity:  45 tablet   Refills:  3       * levothyroxine 175 MCG tablet   Commonly known as:  SYNTHROID/LEVOTHROID   This may have changed:  Another medication with the same name was changed. Make sure you understand how and when to take each.   Used for:  Acquired hypothyroidism   Changed by:  Catrachita Collado MD        Dose:  175 mcg   Take 1 tablet (175 mcg) by mouth every other day   Quantity:  45 tablet   Refills:  3       * Notice:  This list has 2 medication(s) that are the same as other medications prescribed for you. Read the directions carefully, and ask your doctor or other care provider to review them with you.             Primary Care Provider Office Phone # Fax #    Catrachita Collado [phone] [address] [place] [address]        Equal Access to Services     [facility]: Hadii vladislav newbyo Somartha, waaxda luqtay, qaybta kaalmada adecam, jane bonilla . So [facility] [phone].    ATENCIÓN: Si habla español, tiene a castle disposición servicios gratuitos de asistencia lingüística. Llame al [phone].    We comply with applicable federal civil rights laws and [place] laws. We do not discriminate on the basis of race, color, national origin, age, disability sex, sexual orientation or gender identity.            Thank you!     Thank you for choosing [facility]  for your care. Our goal is always to provide you with excellent care. Hearing back from our patients is one way we can continue to improve our services. Please take a few minutes to complete the written survey that you may receive in the mail after your visit with us. Thank you!             Your Updated Medication List - Protect others around you: Learn how to safely use, store and throw away your medicines at www.disposemymeds.org.          This list is accurate as of:  8/15/17  4:48 PM.  Always use your most recent med list.                   Brand Name Dispense Instructions for use Diagnosis    cholecalciferol 5000 UNITS Caps capsule    vitamin D3    100 capsule    Take 1 capsule (5,000 Units) by mouth daily Take 1 per day    Major depressive disorder, recurrent episode, moderate (H)       doxepin 10 MG capsule    SINEquan    180 capsule    TAKE ONE TO TWO CAPSULES BY MOUTH AT BEDTIME    Major depressive disorder, recurrent episode, moderate (H)       DULoxetine 60 MG EC capsule    CYMBALTA    180 capsule    TAKE 2 CAPSULES BY MOUTH EVERY DAY    Major depressive disorder, recurrent episode, moderate (H)       FIBER PO      Two capsules in the morning and two capsules at night        fish oil-omega-3 fatty acids 1000 MG capsule      Take 2 g by mouth daily Takes 1 in am and 1 at bedtime        fluticasone 50 MCG/ACT spray    FLONASE    16 g    Spray 1-2 sprays into both nostrils daily    Other chronic sinusitis       * HYDROcodone-acetaminophen  MG per tablet    NORCO    15 tablet    Take 1 tablet by mouth every 6 hours as needed for pain        * HYDROcodone-acetaminophen 5-325 MG per tablet    NORCO    20 tablet    Take 1-2 tablets by mouth every 4 hours as needed for moderate to severe pain maximum 4 tablet(s) per day    Kidney stone       * levothyroxine 150 MCG tablet    SYNTHROID/LEVOTHROID    45 tablet    Take 1 tablet (150 mcg) by mouth every other day    Acquired hypothyroidism       * levothyroxine 175 MCG tablet    SYNTHROID/LEVOTHROID    45 tablet    Take 1 tablet (175 mcg) by mouth every other day    Acquired hypothyroidism       * LORazepam 0.5 MG tablet    ATIVAN    30 tablet    Take 1 tablet (0.5 mg) by mouth nightly as needed for anxiety 30 tablets to last 30 days.    PTSD (post-traumatic stress disorder)       * LORazepam 0.5 MG tablet    ATIVAN    6 tablet    Take 2 po 90 minutes prior to MRI and may repeat x 1 30 minutes prior to MRI    Effusion of lower  leg joint       lovastatin 20 MG tablet    MEVACOR    90 tablet    Take 1 tablet (20 mg) by mouth At Bedtime    Hyperlipidemia LDL goal <130       Multi-vitamin Tabs tablet      Take 1 tablet by mouth daily        NONFORMULARY      Take 20 mg by mouth 2 times daily D-amphetamine Salt combo        OLANZapine 2.5 MG tablet    zyPREXA    90 tablet    Take 1 tablet (2.5 mg) by mouth At Bedtime    Major depressive disorder, recurrent episode, moderate (H)       omeprazole 40 MG capsule    priLOSEC    90 capsule    Take 1 capsule (40 mg) by mouth daily Take 30-60 minutes before a meal.    Esophageal reflux       ondansetron 4 MG tablet    ZOFRAN    18 tablet    Take 1 tablet (4 mg) by mouth every 6 hours as needed for nausea    Norovirus       order for DME     1 Device    TENS unit and supplies    Myofascial pain syndrome       oxyCODONE-acetaminophen 5-325 MG per tablet    PERCOCET    12 tablet    Take 1 tablet by mouth 4 times daily as needed maximum 4 tablet(s) per day- for breakthrough pain from kidney stone x 3 days    Kidney stone       prazosin 5 MG capsule    MINIPRESS    90 capsule    Take 1 capsule (5 mg) by mouth At Bedtime Take 1 capsule at bedtime    Primary insomnia       tamsulosin 0.4 MG capsule    FLOMAX    90 capsule    TAKE 1 CAPSULE(0.4 MG) BY MOUTH DAILY    Kidney stone       tapentadol 50 MG Tabs tablet    NUCYNTA    120 tablet    Take 1 tablet (50 mg) by mouth every 6 hours as needed for moderate to severe pain Max #4/day.    Chronic pain syndrome       tiZANidine 4 MG tablet    ZANAFLEX    210 tablet    Take 1-3 tablets (4-12 mg) by mouth 3 times daily as needed for muscle spasms    Cervical radiculopathy       topiramate 50 MG tablet    TOPAMAX    120 tablet    Take 1-2 tablets ( mg) by mouth 2 times daily    Chronic pain syndrome       * Notice:  This list has 6 medication(s) that are the same as other medications prescribed for you. Read the directions carefully, and ask your doctor or  other care provider to review them with you.

## 2017-08-15 NOTE — NURSING NOTE
"Chief Complaint   Patient presents with     RECHECK     Follow up Kidney stone       Initial /69 (BP Location: Left arm)  Pulse 69  Temp 98.1  F (36.7  C) (Oral)  Resp 14  Ht 5' 8\" (1.727 m)  Wt 210 lb (95.3 kg)  SpO2 96%  BMI 31.93 kg/m2 Estimated body mass index is 31.93 kg/(m^2) as calculated from the following:    Height as of this encounter: 5' 8\" (1.727 m).    Weight as of this encounter: 210 lb (95.3 kg).  Medication Reconciliation: complete     Tabitha Rausch, TANG      "

## 2017-08-16 ENCOUNTER — HOSPITAL ENCOUNTER (EMERGENCY)
Facility: CLINIC | Age: 57
Discharge: HOME | End: 2017-08-16
Attending: EMERGENCY MEDICINE | Admitting: EMERGENCY MEDICINE
Payer: MEDICARE

## 2017-08-16 ENCOUNTER — APPOINTMENT (OUTPATIENT)
Dept: CT IMAGING | Facility: CLINIC | Age: 57
End: 2017-08-16
Attending: EMERGENCY MEDICINE
Payer: MEDICARE

## 2017-08-16 ENCOUNTER — APPOINTMENT (OUTPATIENT)
Dept: MRI IMAGING | Facility: CLINIC | Age: 57
End: 2017-08-16
Attending: EMERGENCY MEDICINE
Payer: MEDICARE

## 2017-08-16 VITALS
DIASTOLIC BLOOD PRESSURE: 66 MMHG | TEMPERATURE: 98.3 F | RESPIRATION RATE: 16 BRPM | HEART RATE: 68 BPM | WEIGHT: 214 LBS | SYSTOLIC BLOOD PRESSURE: 108 MMHG | OXYGEN SATURATION: 97 % | BODY MASS INDEX: 31.7 KG/M2 | HEIGHT: 69 IN

## 2017-08-16 DIAGNOSIS — M51.26 LUMBAR DISC HERNIATION: ICD-10-CM

## 2017-08-16 DIAGNOSIS — M54.10 RADICULAR LOW BACK PAIN: ICD-10-CM

## 2017-08-16 LAB
ALBUMIN SERPL-MCNC: 3.8 G/DL (ref 3.4–5)
ALBUMIN UR-MCNC: NEGATIVE MG/DL
ALP SERPL-CCNC: 126 U/L (ref 40–150)
ALT SERPL W P-5'-P-CCNC: 50 U/L (ref 0–50)
ANION GAP SERPL CALCULATED.3IONS-SCNC: 8 MMOL/L (ref 3–14)
APPEARANCE UR: NORMAL
AST SERPL W P-5'-P-CCNC: 25 U/L (ref 0–45)
BASOPHILS # BLD AUTO: 0.1 10E9/L (ref 0–0.2)
BASOPHILS NFR BLD AUTO: 0.5 %
BILIRUB SERPL-MCNC: 0.3 MG/DL (ref 0.2–1.3)
BILIRUB UR QL STRIP: NEGATIVE
BUN SERPL-MCNC: 13 MG/DL (ref 7–30)
CALCIUM SERPL-MCNC: 9.5 MG/DL (ref 8.5–10.1)
CHLORIDE SERPL-SCNC: 108 MMOL/L (ref 94–109)
CO2 SERPL-SCNC: 23 MMOL/L (ref 20–32)
COLOR UR AUTO: YELLOW
CREAT SERPL-MCNC: 0.89 MG/DL (ref 0.52–1.04)
DIFFERENTIAL METHOD BLD: NORMAL
EOSINOPHIL # BLD AUTO: 0.2 10E9/L (ref 0–0.7)
EOSINOPHIL NFR BLD AUTO: 1.7 %
ERYTHROCYTE [DISTWIDTH] IN BLOOD BY AUTOMATED COUNT: 14.5 % (ref 10–15)
GFR SERPL CREATININE-BSD FRML MDRD: 65 ML/MIN/1.7M2
GLUCOSE SERPL-MCNC: 93 MG/DL (ref 70–99)
GLUCOSE UR STRIP-MCNC: NEGATIVE MG/DL
HCT VFR BLD AUTO: 44.1 % (ref 35–47)
HGB BLD-MCNC: 14.9 G/DL (ref 11.7–15.7)
HGB UR QL STRIP: NEGATIVE
IMM GRANULOCYTES # BLD: 0 10E9/L (ref 0–0.4)
IMM GRANULOCYTES NFR BLD: 0.2 %
KETONES UR STRIP-MCNC: NEGATIVE MG/DL
LEUKOCYTE ESTERASE UR QL STRIP: NEGATIVE
LYMPHOCYTES # BLD AUTO: 3.6 10E9/L (ref 0.8–5.3)
LYMPHOCYTES NFR BLD AUTO: 38.3 %
MCH RBC QN AUTO: 30.1 PG (ref 26.5–33)
MCHC RBC AUTO-ENTMCNC: 33.8 G/DL (ref 31.5–36.5)
MCV RBC AUTO: 89 FL (ref 78–100)
MONOCYTES # BLD AUTO: 0.7 10E9/L (ref 0–1.3)
MONOCYTES NFR BLD AUTO: 7.4 %
NEUTROPHILS # BLD AUTO: 4.8 10E9/L (ref 1.6–8.3)
NEUTROPHILS NFR BLD AUTO: 51.9 %
NITRATE UR QL: NEGATIVE
NRBC # BLD AUTO: 0 10*3/UL
NRBC BLD AUTO-RTO: 0 /100
PH UR STRIP: 5.5 PH (ref 5–7)
PLATELET # BLD AUTO: 233 10E9/L (ref 150–450)
POTASSIUM SERPL-SCNC: 3.8 MMOL/L (ref 3.4–5.3)
PROT SERPL-MCNC: 7.7 G/DL (ref 6.8–8.8)
RBC # BLD AUTO: 4.95 10E12/L (ref 3.8–5.2)
RBC #/AREA URNS AUTO: <1 /HPF (ref 0–2)
SODIUM SERPL-SCNC: 139 MMOL/L (ref 133–144)
SOURCE: NORMAL
SP GR UR STRIP: 1.01 (ref 1–1.03)
SQUAMOUS #/AREA URNS AUTO: 1 /HPF (ref 0–1)
UROBILINOGEN UR STRIP-MCNC: NORMAL MG/DL (ref 0–2)
WBC # BLD AUTO: 9.3 10E9/L (ref 4–11)
WBC #/AREA URNS AUTO: 1 /HPF (ref 0–2)

## 2017-08-16 PROCEDURE — 99285 EMERGENCY DEPT VISIT HI MDM: CPT | Mod: 25 | Performed by: EMERGENCY MEDICINE

## 2017-08-16 PROCEDURE — 85025 COMPLETE CBC W/AUTO DIFF WBC: CPT | Performed by: EMERGENCY MEDICINE

## 2017-08-16 PROCEDURE — A9585 GADOBUTROL INJECTION: HCPCS | Performed by: EMERGENCY MEDICINE

## 2017-08-16 PROCEDURE — 25000128 H RX IP 250 OP 636: Performed by: EMERGENCY MEDICINE

## 2017-08-16 PROCEDURE — 74177 CT ABD & PELVIS W/CONTRAST: CPT

## 2017-08-16 PROCEDURE — 25000128 H RX IP 250 OP 636: Performed by: RADIOLOGY

## 2017-08-16 PROCEDURE — 72158 MRI LUMBAR SPINE W/O & W/DYE: CPT

## 2017-08-16 PROCEDURE — 72131 CT LUMBAR SPINE W/O DYE: CPT

## 2017-08-16 PROCEDURE — 96376 TX/PRO/DX INJ SAME DRUG ADON: CPT | Performed by: EMERGENCY MEDICINE

## 2017-08-16 PROCEDURE — 99284 EMERGENCY DEPT VISIT MOD MDM: CPT | Mod: Z6 | Performed by: EMERGENCY MEDICINE

## 2017-08-16 PROCEDURE — 80053 COMPREHEN METABOLIC PANEL: CPT | Performed by: EMERGENCY MEDICINE

## 2017-08-16 PROCEDURE — 96374 THER/PROPH/DIAG INJ IV PUSH: CPT | Mod: 59 | Performed by: EMERGENCY MEDICINE

## 2017-08-16 PROCEDURE — 81001 URINALYSIS AUTO W/SCOPE: CPT | Performed by: EMERGENCY MEDICINE

## 2017-08-16 RX ORDER — CYCLOBENZAPRINE HCL 5 MG
5 TABLET ORAL 3 TIMES DAILY PRN
Qty: 42 TABLET | Refills: 0 | Status: SHIPPED | OUTPATIENT
Start: 2017-08-16 | End: 2017-11-27

## 2017-08-16 RX ORDER — MORPHINE SULFATE 2 MG/ML
4 INJECTION, SOLUTION INTRAMUSCULAR; INTRAVENOUS ONCE
Status: COMPLETED | OUTPATIENT
Start: 2017-08-16 | End: 2017-08-16

## 2017-08-16 RX ORDER — GADOBUTROL 604.72 MG/ML
10 INJECTION INTRAVENOUS ONCE
Status: COMPLETED | OUTPATIENT
Start: 2017-08-16 | End: 2017-08-16

## 2017-08-16 RX ORDER — METHYLPREDNISOLONE 4 MG
TABLET, DOSE PACK ORAL
Qty: 21 TABLET | Refills: 0 | Status: SHIPPED | OUTPATIENT
Start: 2017-08-16 | End: 2017-08-25

## 2017-08-16 RX ORDER — OXYCODONE AND ACETAMINOPHEN 5; 325 MG/1; MG/1
1-2 TABLET ORAL EVERY 6 HOURS PRN
Qty: 15 TABLET | Refills: 0 | Status: SHIPPED | OUTPATIENT
Start: 2017-08-16 | End: 2020-07-21

## 2017-08-16 RX ORDER — IOPAMIDOL 755 MG/ML
131 INJECTION, SOLUTION INTRAVASCULAR ONCE
Status: COMPLETED | OUTPATIENT
Start: 2017-08-16 | End: 2017-08-16

## 2017-08-16 RX ADMIN — MORPHINE SULFATE 4 MG: 2 INJECTION, SOLUTION INTRAMUSCULAR; INTRAVENOUS at 09:59

## 2017-08-16 RX ADMIN — IOPAMIDOL 131 ML: 755 INJECTION, SOLUTION INTRAVENOUS at 12:06

## 2017-08-16 RX ADMIN — GADOBUTROL 10 ML: 604.72 INJECTION INTRAVENOUS at 15:19

## 2017-08-16 RX ADMIN — MORPHINE SULFATE 4 MG: 2 INJECTION, SOLUTION INTRAMUSCULAR; INTRAVENOUS at 12:31

## 2017-08-16 ASSESSMENT — ENCOUNTER SYMPTOMS
VOMITING: 0
ABDOMINAL PAIN: 1
NAUSEA: 0
NUMBNESS: 0
DIARRHEA: 0
SHORTNESS OF BREATH: 0
DYSURIA: 0
FLANK PAIN: 1
BACK PAIN: 1

## 2017-08-16 NOTE — ED PROVIDER NOTES
"  History     Chief Complaint   Patient presents with     Flank Pain     left     HPI  Ilsa Yusuf is a 57 year old female who presents with a 2 week history of left flank pain radiating to her left back and inferiorly to her left leg. Patient notes that she is experiencing weakness with her left leg and notes her sensation is \"muted\" but denies numbness and/or any trauma to the leg. Per review of patient's charts, the patient had an ultrasound on 8/10/17 where they found nonobstructive 4 mm left kidney stones and the patient had a concern that that was the cause of her pain. Patient denies dysuria, vomiting, diarrhea, nausea, shortness of breath, fever and any chest pain. No trauma. No recent loss of bowel/bladder control, but she reports fecal incontinence x 2 about a month ago.    I have reviewed the Medications, Allergies, Past Medical and Surgical History, and Social History in the [employer] system.    Past Medical History:   Diagnosis Date     Arthritis     both knees; hands     Depressive disorder      Depressive disorder, not elsewhere classified 12    DC 10/05/12-[facility]     Hyperlipidemia LDL goal < 130     mevacor     Hypothyroid      Moderate major depression (H)     abilify, cymbalta, seroquel, and sofya, Dr Antonio Perales     PTSD (post-traumatic stress disorder)      Seizure (H) 2011    one episode, was on Keppra, EEG negative       Past Surgical History:   Procedure Laterality Date     ABDOMEN SURGERY       C PARTIAL EXCISION THYROID,UNILAT      rt, negative path then, treated with synthroid.     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     TONSILLECTOMY       ureteral reimplantation         Family History   Problem Relation Age of Onset     C.A.D. Father 58      at 58, heart disease     MENTAL ILLNESS Father      Coronary Artery Disease " "Father      Hyperlipidemia Father      Alzheimer Disease Mother      total care now     Depression Mother      Anxiety Disorder Mother      DIABETES Sister      adult onset     Thyroid Disease Sister        Social History   Substance Use Topics     Smoking status: Current Some Day Smoker     Packs/day: 0.30     Last attempt to quit: 2/17/2015     Smokeless tobacco: Never Used      Comment: recreational     Alcohol use 0.0 oz/week      Comment: 2 a night on weekends       No current facility-administered medications for this encounter.      Current Outpatient Prescriptions   Medication     methylPREDNISolone (MEDROL DOSEPAK) 4 MG tablet     oxyCODONE-acetaminophen (PERCOCET) 5-325 MG per tablet     cyclobenzaprine (FLEXERIL) 5 MG tablet     oxyCODONE-acetaminophen (PERCOCET) 5-325 MG per tablet     tamsulosin (FLOMAX) 0.4 MG capsule     HYDROcodone-acetaminophen (NORCO) 5-325 MG per tablet     HYDROcodone-acetaminophen (NORCO)  MG per tablet     tapentadol (NUCYNTA) 50 MG TABS tablet     topiramate (TOPAMAX) 50 MG tablet     tiZANidine (ZANAFLEX) 4 MG tablet     order for DME     prazosin (MINIPRESS) 5 MG capsule     OLANZapine (ZYPREXA) 2.5 MG tablet     doxepin (SINEQUAN) 10 MG capsule     DULoxetine (CYMBALTA) 60 MG EC capsule     omeprazole (PRILOSEC) 40 MG capsule     levothyroxine (SYNTHROID/LEVOTHROID) 150 MCG tablet     levothyroxine (SYNTHROID/LEVOTHROID) 175 MCG tablet     LORazepam (ATIVAN) 0.5 MG tablet     lovastatin (MEVACOR) 20 MG tablet     FIBER PO     LORazepam (ATIVAN) 0.5 MG tablet     ondansetron (ZOFRAN) 4 MG tablet     fluticasone (FLONASE) 50 MCG/ACT nasal spray     NONFORMULARY     cholecalciferol (VITAMIN D3) 5000 UNITS CAPS capsule     multivitamin, therapeutic with minerals (MULTI-VITAMIN) TABS     fish oil-omega-3 fatty acids (OMEGA 3) 1000 MG capsule          Allergies   Allergen Reactions     Gabapentin Other (See Comments)     Patient states she gets \"horrific nightmares\" with " "this medication     Dilaudid [Hydromorphone Hcl]      Made her feel like her skin was crawling     Nsaids Other (See Comments)     Kidney failure     Compazine [Prochlorperazine] Other (See Comments)     \"Makes my skin crawl, I was going nuts.\"       Review of Systems   Respiratory: Negative for shortness of breath.    Cardiovascular: Negative for chest pain.   Gastrointestinal: Positive for abdominal pain. Negative for diarrhea, nausea and vomiting.   Genitourinary: Positive for flank pain (left). Negative for dysuria.   Musculoskeletal: Positive for back pain.   Neurological: Negative for numbness.       Physical Exam   BP: 108/64  Pulse: 68  Temp: 98.4  F (36.9  C)  Resp: 16  Height: 174 cm (5' 8.5\")  Weight: 97.1 kg (214 lb)  SpO2: 94 %  Physical Exam   Constitutional: She appears distressed.   HENT:   Head: Atraumatic.   Mouth/Throat: Oropharynx is clear and moist. No oropharyngeal exudate.   Eyes: Pupils are equal, round, and reactive to light. No scleral icterus.   Cardiovascular: Normal heart sounds and intact distal pulses.    Pulmonary/Chest: Breath sounds normal. No respiratory distress.   Abdominal: Soft. Bowel sounds are normal. There is tenderness (LUQ tenderness just inferior to well healed surgical scar, + guarding).   Musculoskeletal: She exhibits tenderness. She exhibits no edema.        Back:    Decreased light touch sensation over entire left leg. Decreased DTRs both legs. Nl distal pulses. Strength testing in legs significantly limited by pain.   Skin: Skin is warm. No rash noted. She is not diaphoretic.       ED Course     ED Course     Procedures             Critical Care time:  none             Labs Ordered and Resulted from Time of ED Arrival Up to the Time of Departure from the ED   ROUTINE UA WITH MICROSCOPIC   CBC WITH PLATELETS DIFFERENTIAL   COMPREHENSIVE METABOLIC PANEL   PERIPHERAL IV CATHETER            Assessments & Plan (with Medical Decision Making)   The patient had an intrarenal " stone on previous CT, not a ureteral stone.  She was under the mistaken impression that the kidney stone was causing her pain.  I did clarify this for her.  To me her symptoms clearly were radicular in nature.  She reported decreased sensation throughout her entire left leg.  Strength testing was very difficult due to pain.  Thus, accurate strength testing was not possible.  She did report 2 episodes of fecal incontinence but stated that was about a month ago.  Given all of this I did opt to do an MRI.  1st, she did complain of some left upper quadrant abdominal pain was quite tender with palpation over this area, near previous incision.  I did do a CT which was negative.  Then this was followed by a lumbar MRI.  She indeed does have mild degenerative findings in the lower lumbar spine, greatest at  L5-S1, where there is moderate left neural foraminal narrowing and a disc protrusion that contacts the descending left S1 nerve root.  I do think this is causing her symptoms.  I will give her Medrol dose pack, a few tablets of oxycodone, as well as some Flexeril.  She was given the phone number for the neurosurgery clinic.  She is very happy, she feels that she finally has an answer to what was causing her symptoms.  The signs and symptoms of cauda equina syndrome including indications for urgent return discussed.  Patient verbalized understanding and agreement with plan.     I have reviewed the nursing notes.    I have reviewed the findings, diagnosis, plan and need for follow up with the patient.    Discharge Medication List as of 8/16/2017  5:19 PM      START taking these medications    Details   methylPREDNISolone (MEDROL DOSEPAK) 4 MG tablet Follow package instructions, Disp-21 tablet, R-0, Local Print      !! oxyCODONE-acetaminophen (PERCOCET) 5-325 MG per tablet Take 1-2 tablets by mouth every 6 hours as needed for pain, Disp-15 tablet, R-0, Local Print      cyclobenzaprine (FLEXERIL) 5 MG tablet Take 1 tablet (5  mg) by mouth 3 times daily as needed for muscle spasms, Disp-42 tablet, R-0, Local Print       !! - Potential duplicate medications found. Please discuss with provider.          Final diagnoses:   Radicular low back pain   Lumbar disc herniation   Tanesha FIERRO, am serving as a trained medical scribe to document services personally performed by Britney Zafar MD, based on the provider's statements to me.    Britney FIERRO MD, was physically present and have reviewed and verified the accuracy of this note documented by Tanesha Menezes.     8/16/2017   [facility], [place], EMERGENCY DEPARTMENT     Britney Zafar MD  08/16/17 [id number]

## 2017-08-16 NOTE — ED AVS SNAPSHOT
[facility], Emergency Department    [address] [address]    Phone:  [phone]                                       Ilsa Yusuf   MRN: [id number]    Department:  [facility], Emergency Department   Date of Visit:  8/16/2017           Patient Information     Date Of Birth          1960        Your diagnoses for this visit were:     Radicular low back pain     Lumbar disc herniation        You were seen by Britney Zafar MD.        Discharge Instructions       Please make an appointment to follow up with Your Primary Care Provider in 1-2 weeks.      Discharge References/Attachments     BACK PAIN (LOW) OR LEG PAIN: POSSIBLE CAUSES (ENGLISH)    BACK CARE TIPS (ENGLISH)    BACK PAIN (ACUTE OR CHRONIC) (ENGLISH)      Future Appointments        Provider Department Dept Phone Center    8/18/2017 11:30 AM Breanne Ellis PA-C, DONA [facility] [phone] [facility]      24 Hour Appointment Hotline       To make an appointment at any [facility], call [phone]). If you don't have a family doctor or clinic, we will help you find one. [facility] are conveniently located to serve the needs of you and your family.             Review of your medicines      START taking        Dose / Directions Last dose taken    cyclobenzaprine 5 MG tablet   Commonly known as:  FLEXERIL   Dose:  5 mg   Quantity:  42 tablet        Take 1 tablet (5 mg) by mouth 3 times daily as needed for muscle spasms   Refills:  0        methylPREDNISolone 4 MG tablet   Commonly known as:  MEDROL DOSEPAK   Quantity:  21 tablet        Follow package instructions   Refills:  0          CONTINUE these medicines which may have CHANGED, or have new prescriptions. If we are uncertain of the size of tablets/capsules you have at home, strength may be listed as something that might have changed.        Dose / Directions Last dose taken    *  oxyCODONE-acetaminophen 5-325 MG per tablet   Commonly known as:  PERCOCET   Dose:  1 tablet   What changed:  Another medication with the same name was added. Make sure you understand how and when to take each.   Quantity:  12 tablet        Take 1 tablet by mouth 4 times daily as needed maximum 4 tablet(s) per day- for breakthrough pain from kidney stone x 3 days   Refills:  0        * oxyCODONE-acetaminophen 5-325 MG per tablet   Commonly known as:  PERCOCET   Dose:  1-2 tablet   What changed:  You were already taking a medication with the same name, and this prescription was added. Make sure you understand how and when to take each.   Quantity:  15 tablet        Take 1-2 tablets by mouth every 6 hours as needed for pain   Refills:  0        * Notice:  This list has 2 medication(s) that are the same as other medications prescribed for you. Read the directions carefully, and ask your doctor or other care provider to review them with you.      Our records show that you are taking the medicines listed below. If these are incorrect, please call your family doctor or clinic.        Dose / Directions Last dose taken    cholecalciferol 5000 UNITS Caps capsule   Commonly known as:  vitamin D3   Dose:  5000 Units   Quantity:  100 capsule        Take 1 capsule (5,000 Units) by mouth daily Take 1 per day   Refills:  2        doxepin 10 MG capsule   Commonly known as:  SINEquan   Quantity:  180 capsule        TAKE ONE TO TWO CAPSULES BY MOUTH AT BEDTIME   Refills:  8        DULoxetine 60 MG EC capsule   Commonly known as:  CYMBALTA   Quantity:  180 capsule        TAKE 2 CAPSULES BY MOUTH EVERY DAY   Refills:  1        FIBER PO        Two capsules in the morning and two capsules at night   Refills:  0        fish oil-omega-3 fatty acids 1000 MG capsule   Dose:  2 g        Take 2 g by mouth daily Takes 1 in am and 1 at bedtime   Refills:  0        fluticasone 50 MCG/ACT spray   Commonly known as:  FLONASE   Dose:  1-2 spray    Quantity:  16 g        Spray 1-2 sprays into both nostrils daily   Refills:  11        * HYDROcodone-acetaminophen  MG per tablet   Commonly known as:  NORCO   Dose:  1 tablet   Quantity:  15 tablet        Take 1 tablet by mouth every 6 hours as needed for pain   Refills:  0        * HYDROcodone-acetaminophen 5-325 MG per tablet   Commonly known as:  NORCO   Dose:  1-2 tablet   Quantity:  20 tablet        Take 1-2 tablets by mouth every 4 hours as needed for moderate to severe pain maximum 4 tablet(s) per day   Refills:  0        * levothyroxine 150 MCG tablet   Commonly known as:  SYNTHROID/LEVOTHROID   Dose:  150 mcg   Quantity:  45 tablet        Take 1 tablet (150 mcg) by mouth every other day   Refills:  3        * levothyroxine 175 MCG tablet   Commonly known as:  SYNTHROID/LEVOTHROID   Dose:  175 mcg   Quantity:  45 tablet        Take 1 tablet (175 mcg) by mouth every other day   Refills:  3        * LORazepam 0.5 MG tablet   Commonly known as:  ATIVAN   Dose:  0.5 mg   Quantity:  30 tablet        Take 1 tablet (0.5 mg) by mouth nightly as needed for anxiety 30 tablets to last 30 days.   Refills:  2        * LORazepam 0.5 MG tablet   Commonly known as:  ATIVAN   Quantity:  6 tablet        Take 2 po 90 minutes prior to MRI and may repeat x 1 30 minutes prior to MRI   Refills:  0        lovastatin 20 MG tablet   Commonly known as:  MEVACOR   Dose:  20 mg   Quantity:  90 tablet        Take 1 tablet (20 mg) by mouth At Bedtime   Refills:  3        Multi-vitamin Tabs tablet   Dose:  1 tablet        Take 1 tablet by mouth daily   Refills:  0        NONFORMULARY   Dose:  20 mg        Take 20 mg by mouth 2 times daily D-amphetamine Salt combo   Refills:  0        OLANZapine 2.5 MG tablet   Commonly known as:  zyPREXA   Dose:  2.5 mg   Quantity:  90 tablet        Take 1 tablet (2.5 mg) by mouth At Bedtime   Refills:  1        omeprazole 40 MG capsule   Commonly known as:  priLOSEC   Dose:  40 mg   Quantity:  90  capsule        Take 1 capsule (40 mg) by mouth daily Take 30-60 minutes before a meal.   Refills:  2        ondansetron 4 MG tablet   Commonly known as:  ZOFRAN   Dose:  4 mg   Quantity:  18 tablet        Take 1 tablet (4 mg) by mouth every 6 hours as needed for nausea   Refills:  5        order for DME   Quantity:  1 Device        TENS unit and supplies   Refills:  0        prazosin 5 MG capsule   Commonly known as:  MINIPRESS   Dose:  5 mg   Quantity:  90 capsule        Take 1 capsule (5 mg) by mouth At Bedtime Take 1 capsule at bedtime   Refills:  3        tamsulosin 0.4 MG capsule   Commonly known as:  FLOMAX   Quantity:  90 capsule        TAKE 1 CAPSULE(0.4 MG) BY MOUTH DAILY   Refills:  0        tapentadol 50 MG Tabs tablet   Commonly known as:  NUCYNTA   Dose:  50 mg   Quantity:  120 tablet        Take 1 tablet (50 mg) by mouth every 6 hours as needed for moderate to severe pain Max #4/day.   Refills:  0        tiZANidine 4 MG tablet   Commonly known as:  ZANAFLEX   Dose:  4-12 mg   Quantity:  210 tablet        Take 1-3 tablets (4-12 mg) by mouth 3 times daily as needed for muscle spasms   Refills:  3        topiramate 50 MG tablet   Commonly known as:  TOPAMAX   Dose:   mg   Quantity:  120 tablet        Take 1-2 tablets ( mg) by mouth 2 times daily   Refills:  3        * Notice:  This list has 6 medication(s) that are the same as other medications prescribed for you. Read the directions carefully, and ask your doctor or other care provider to review them with you.            Prescriptions were sent or printed at these locations (3 Prescriptions)                   Other Prescriptions                Printed at Department/Unit printer (3 of 3)         methylPREDNISolone (MEDROL DOSEPAK) 4 MG tablet               oxyCODONE-acetaminophen (PERCOCET) 5-325 MG per tablet               cyclobenzaprine (FLEXERIL) 5 MG tablet                Procedures and tests performed during your visit     CBC with  platelets differential    CT Abdomen Pelvis w Contrast    Comprehensive metabolic panel    Lumbar spine CT w/o contrast    Lumbar spine MRI w & w/o contrast - surgery <10yrs    Peripheral IV: Standard    Routine UA with microscopic      Orders Needing Specimen Collection     None      Pending Results     No orders found from 8/14/2017 to 8/17/2017.            Pending Culture Results     No orders found from 8/14/2017 to 8/17/2017.            Pending Results Instructions     If you had any lab results that were not finalized at the time of your Discharge, you can call the ED Lab Result RN at [phone]. You will be contacted by this team for any positive Lab results or changes in treatment. The nurses are available 7 days a week from 10A to 6:30P.  You can leave a message 24 hours per day and they will return your call.        Thank you for choosing [facility]       Thank you for choosing [facility] for your care. Our goal is always to provide you with excellent care. Hearing back from our patients is one way we can continue to improve our services. Please take a few minutes to complete the written survey that you may receive in the mail after you visit with us. Thank you!        [employer] Information     [employer] gives you secure access to your electronic health record. If you see a primary care provider, you can also send messages to your care team and make appointments. If you have questions, please call your primary care clinic.  If you do not have a primary care provider, please call [phone] and they will assist you.        Care EveryWhere ID     This is your Care EveryWhere ID. This could be used by other organizations to access your [facility] medical records  [id number]        Equal Access to Services     KRISTINA MILLER : Hadii vladislav newbyo Maria R, waaxda ludelfinadaha, qaybta kaalmajane greco. So [facility] [phone].    ATENCIÓN: tunde Nash  disposición servicios gratuitos de asistencia lingüística. Llryann al [phone].    We comply with applicable federal civil rights laws and [place] laws. We do not discriminate on the basis of race, color, national origin, age, disability sex, sexual orientation or gender identity.            After Visit Summary       This is your record. Keep this with you and show to your community pharmacist(s) and doctor(s) at your next visit.

## 2017-08-16 NOTE — ED NOTES
Pt with LUQ, left flank, left side of back and left leg pain x 2 weeks. She had and abdominal US last week. Hx of kidney stones.

## 2017-08-16 NOTE — ED AVS SNAPSHOT
[facility], [place], Emergency Department    [address] [place] [address]    Phone:  [phone]                                       Ilsa Yusuf   MRN: [id number]    Department:  [facility], Emergency Department   Date of Visit:  8/16/2017           After Visit Summary Signature Page     I have received my discharge instructions, and my questions have been answered. I have discussed any challenges I see with this plan with the nurse or doctor.    ..........................................................................................................................................  Patient/Patient Representative Signature      ..........................................................................................................................................  Patient Representative Print Name and Relationship to Patient    ..................................................               ................................................  Date                                            Time    ..........................................................................................................................................  Reviewed by Signature/Title    ...................................................              ..............................................  Date                                                            Time

## 2017-08-22 ENCOUNTER — TRANSFERRED RECORDS (OUTPATIENT)
Dept: HEALTH INFORMATION MANAGEMENT | Facility: CLINIC | Age: 57
End: 2017-08-22

## 2017-08-24 ENCOUNTER — TELEPHONE (OUTPATIENT)
Dept: FAMILY MEDICINE | Facility: CLINIC | Age: 57
End: 2017-08-24

## 2017-08-24 NOTE — TELEPHONE ENCOUNTER
Based on MRI results from ER   Pt looking for a neurosurgeon referral  Advised pt that she should follow up with her pcp for discussion about mri results and plan  ER's donot make referrals.  Appointment made for tomorrow to discuss with Catrachita Collado MD  Pt in agreement with plan and verbalized understanding.  Thanks!  SELIN Narvaez  Triage Nurse

## 2017-08-25 ENCOUNTER — OFFICE VISIT (OUTPATIENT)
Dept: FAMILY MEDICINE | Facility: CLINIC | Age: 57
End: 2017-08-25
Payer: COMMERCIAL

## 2017-08-25 VITALS
TEMPERATURE: 95.8 F | BODY MASS INDEX: 31.77 KG/M2 | HEART RATE: 71 BPM | WEIGHT: 212 LBS | SYSTOLIC BLOOD PRESSURE: 109 MMHG | DIASTOLIC BLOOD PRESSURE: 69 MMHG | RESPIRATION RATE: 18 BRPM | OXYGEN SATURATION: 95 %

## 2017-08-25 DIAGNOSIS — G89.4 CHRONIC PAIN SYNDROME: ICD-10-CM

## 2017-08-25 DIAGNOSIS — M54.16 LUMBAR RADICULOPATHY: Primary | ICD-10-CM

## 2017-08-25 DIAGNOSIS — F90.9 ATTENTION DEFICIT HYPERACTIVITY DISORDER (ADHD), UNSPECIFIED ADHD TYPE: ICD-10-CM

## 2017-08-25 DIAGNOSIS — K59.03 DRUG-INDUCED CONSTIPATION: ICD-10-CM

## 2017-08-25 DIAGNOSIS — R10.9 LEFT FLANK PAIN: ICD-10-CM

## 2017-08-25 DIAGNOSIS — N20.0 CALCULUS OF LEFT KIDNEY: ICD-10-CM

## 2017-08-25 PROCEDURE — 99214 OFFICE O/P EST MOD 30 MIN: CPT | Performed by: FAMILY MEDICINE

## 2017-08-25 RX ORDER — TOPIRAMATE 50 MG/1
50-100 TABLET, FILM COATED ORAL 2 TIMES DAILY
Qty: 120 TABLET | Refills: 3 | Status: SHIPPED | OUTPATIENT
Start: 2017-08-25 | End: 2018-05-09

## 2017-08-25 RX ORDER — PREDNISONE 20 MG/1
40 TABLET ORAL DAILY
Qty: 10 TABLET | Refills: 0 | Status: SHIPPED | OUTPATIENT
Start: 2017-08-25 | End: 2017-08-30

## 2017-08-25 RX ORDER — DEXTROAMPHETAMINE SACCHARATE, AMPHETAMINE ASPARTATE, DEXTROAMPHETAMINE SULFATE AND AMPHETAMINE SULFATE 5; 5; 5; 5 MG/1; MG/1; MG/1; MG/1
20 TABLET ORAL DAILY
Qty: 30 TABLET | Refills: 0 | Status: SHIPPED | OUTPATIENT
Start: 2017-09-24 | End: 2017-10-24

## 2017-08-25 RX ORDER — OXYCODONE AND ACETAMINOPHEN 5; 325 MG/1; MG/1
1-2 TABLET ORAL EVERY 8 HOURS PRN
Qty: 42 TABLET | Refills: 0 | Status: SHIPPED | OUTPATIENT
Start: 2017-08-25 | End: 2017-09-01

## 2017-08-25 RX ORDER — DEXTROAMPHETAMINE SACCHARATE, AMPHETAMINE ASPARTATE, DEXTROAMPHETAMINE SULFATE AND AMPHETAMINE SULFATE 5; 5; 5; 5 MG/1; MG/1; MG/1; MG/1
20 TABLET ORAL DAILY
Qty: 30 TABLET | Refills: 0 | Status: SHIPPED | OUTPATIENT
Start: 2017-08-25 | End: 2017-09-24

## 2017-08-25 RX ORDER — DEXTROAMPHETAMINE SACCHARATE, AMPHETAMINE ASPARTATE, DEXTROAMPHETAMINE SULFATE AND AMPHETAMINE SULFATE 5; 5; 5; 5 MG/1; MG/1; MG/1; MG/1
20 TABLET ORAL DAILY
Qty: 30 TABLET | Refills: 0 | Status: SHIPPED | OUTPATIENT
Start: 2017-10-24 | End: 2017-11-23

## 2017-08-25 ASSESSMENT — PATIENT HEALTH QUESTIONNAIRE - PHQ9: SUM OF ALL RESPONSES TO PHQ QUESTIONS 1-9: 6

## 2017-08-25 NOTE — MR AVS SNAPSHOT
After Visit Summary   8/25/2017    Ilsa Yusuf    MRN: [id number]           Patient Information     Date Of Birth          1960        Visit Information        Provider Department      8/25/2017 2:00 PM Catrachita Collado MD [facility]        Today's Diagnoses     Lumbar radiculopathy    -  1    Chronic pain syndrome        Attention deficit hyperactivity disorder (ADHD), unspecified ADHD type        Left flank pain        Calculus of left kidney        Drug-induced constipation           Follow-ups after your visit        Who to contact     If you have questions or need follow up information about today's clinic visit or your schedule please contact [facility] directly at [phone].  Normal or non-critical lab and imaging results will be communicated to you by MyChart, letter or phone within 4 business days after the clinic has received the results. If you do not hear from us within 7 days, please contact the clinic through [employer] or phone. If you have a critical or abnormal lab result, we will notify you by phone as soon as possible.  Submit refill requests through [employer] or call your pharmacy and they will forward the refill request to us. Please allow 3 business days for your refill to be completed.          Additional Information About Your Visit        MyChart Information     [employer] gives you secure access to your electronic health record. If you see a primary care provider, you can also send messages to your care team and make appointments. If you have questions, please call your primary care clinic.  If you do not have a primary care provider, please call [phone] and they will assist you.        Care EveryWhere ID     This is your Care EveryWhere ID. This could be used by other organizations to access your [place] medical records  [id number]        Your Vitals Were     Pulse Temperature Respirations Pulse Oximetry BMI (Body  Mass Index)       71 95.8  F (35.4  C) (Tympanic) 18 95% 31.77 kg/m2        Blood Pressure from Last 3 Encounters:   08/25/17 109/69   08/16/17 108/66   08/15/17 121/69    Weight from Last 3 Encounters:   08/25/17 212 lb (96.2 kg)   08/16/17 214 lb (97.1 kg)   08/15/17 210 lb (95.3 kg)              Today, you had the following     No orders found for display         Today's Medication Changes          These changes are accurate as of: 8/25/17 11:59 PM.  If you have any questions, ask your nurse or doctor.               Start taking these medicines.        Dose/Directions    * amphetamine-dextroamphetamine 20 MG per tablet   Commonly known as:  ADDERALL   Used for:  Attention deficit hyperactivity disorder (ADHD), unspecified ADHD type   Started by:  Catrachita Collado MD        Dose:  20 mg   Take 1 tablet (20 mg) by mouth daily   Quantity:  30 tablet   Refills:  0       * amphetamine-dextroamphetamine 20 MG per tablet   Commonly known as:  ADDERALL   Used for:  Attention deficit hyperactivity disorder (ADHD), unspecified ADHD type   Started by:  Catrachita Collado MD        Dose:  20 mg   Start taking on:  9/24/2017   Take 1 tablet (20 mg) by mouth daily   Quantity:  30 tablet   Refills:  0       * amphetamine-dextroamphetamine 20 MG per tablet   Commonly known as:  ADDERALL   Used for:  Attention deficit hyperactivity disorder (ADHD), unspecified ADHD type   Started by:  Catrachita Collado MD        Dose:  20 mg   Start taking on:  10/24/2017   Take 1 tablet (20 mg) by mouth daily   Quantity:  30 tablet   Refills:  0       predniSONE 20 MG tablet   Commonly known as:  DELTASONE   Used for:  Lumbar radiculopathy   Started by:  Catrachita Collado MD        Dose:  40 mg   Take 2 tablets (40 mg) by mouth daily for 5 days   Quantity:  10 tablet   Refills:  0       * Notice:  This list has 3 medication(s) that are the same as other medications prescribed for you. Read the directions carefully, and ask your doctor or  other care provider to review them with you.      These medicines have changed or have updated prescriptions.        Dose/Directions    * levothyroxine 150 MCG tablet   Commonly known as:  SYNTHROID/LEVOTHROID   This may have changed:  additional instructions   Used for:  Acquired hypothyroidism   Changed by:  Catrachita Collado MD        Dose:  150 mcg   Take 1 tablet (150 mcg) by mouth every other day   Quantity:  45 tablet   Refills:  3       * levothyroxine 175 MCG tablet   Commonly known as:  SYNTHROID/LEVOTHROID   This may have changed:  Another medication with the same name was changed. Make sure you understand how and when to take each.   Used for:  Acquired hypothyroidism   Changed by:  Catrachita Collado MD        Dose:  175 mcg   Take 1 tablet (175 mcg) by mouth every other day   Quantity:  45 tablet   Refills:  3       * oxyCODONE-acetaminophen 5-325 MG per tablet   Commonly known as:  PERCOCET   This may have changed:  Another medication with the same name was added. Make sure you understand how and when to take each.        Dose:  1-2 tablet   Take 1-2 tablets by mouth every 6 hours as needed for pain   Quantity:  15 tablet   Refills:  0       * oxyCODONE-acetaminophen 5-325 MG per tablet   Commonly known as:  PERCOCET   This may have changed:  You were already taking a medication with the same name, and this prescription was added. Make sure you understand how and when to take each.   Used for:  Lumbar radiculopathy   Changed by:  Catrachita Collado MD        Dose:  1-2 tablet   Take 1-2 tablets by mouth every 8 hours as needed for pain maximum 6 tablet(s) per day   Quantity:  42 tablet   Refills:  0       * Notice:  This list has 4 medication(s) that are the same as other medications prescribed for you. Read the directions carefully, and ask your doctor or other care provider to review them with you.      Stop taking these medicines if you haven't already. Please contact your care team if you have  questions.     HYDROcodone-acetaminophen  MG per tablet   Commonly known as:  NORCO   Stopped by:  Catrachita Collado MD           HYDROcodone-acetaminophen 5-325 MG per tablet   Commonly known as:  NORCO   Stopped by:  Catrachita Collado MD           LORazepam 0.5 MG tablet   Commonly known as:  ATIVAN   Stopped by:  Catrachita Collado MD           methylPREDNISolone 4 MG tablet   Commonly known as:  MEDROL DOSEPAK   Stopped by:  Catrachita Collado MD           order for DME   Stopped by:  Catrachita Collado MD           tamsulosin 0.4 MG capsule   Commonly known as:  FLOMAX   Stopped by:  Catrachita Collado MD                Where to get your medicines      These medications were sent to [facility] [address] [place] [address] & [address] [address]    Hours:  24-hours Phone:  [phone]     predniSONE 20 MG tablet    topiramate 50 MG tablet         Some of these will need a paper prescription and others can be bought over the counter.  Ask your nurse if you have questions.     Bring a paper prescription for each of these medications     amphetamine-dextroamphetamine 20 MG per tablet    amphetamine-dextroamphetamine 20 MG per tablet    amphetamine-dextroamphetamine 20 MG per tablet    oxyCODONE-acetaminophen 5-325 MG per tablet                Primary Care Provider Office Phone # Fax #    Catrachita Collado [phone] [address] [place] [address]        Equal Access to Services     ANDRA MILLER : Hadii aad ku hadasho Soomaali, waaxda luqadaha, qaybta kaalmada adeegyada, waxay marcella hayifrahn anthony bonilla . So [facility] [phone].    ATENCIÓN: Si habla español, tiene a castle disposición servicios gratuitos de asistencia lingüística. Llame al [phone].    We comply with applicable federal civil rights laws and [place] laws. We do not discriminate on the basis of race, color,  national origin, age, disability sex, sexual orientation or gender identity.            Thank you!     Thank you for choosing [facility]  for your care. Our goal is always to provide you with excellent care. Hearing back from our patients is one way we can continue to improve our services. Please take a few minutes to complete the written survey that you may receive in the mail after your visit with us. Thank you!             Your Updated Medication List - Protect others around you: Learn how to safely use, store and throw away your medicines at www.disposemymeds.org.          This list is accurate as of: 8/25/17 11:59 PM.  Always use your most recent med list.                   Brand Name Dispense Instructions for use Diagnosis    * amphetamine-dextroamphetamine 20 MG per tablet    ADDERALL    30 tablet    Take 1 tablet (20 mg) by mouth daily    Attention deficit hyperactivity disorder (ADHD), unspecified ADHD type       * amphetamine-dextroamphetamine 20 MG per tablet   Start taking on:  9/24/2017    ADDERALL    30 tablet    Take 1 tablet (20 mg) by mouth daily    Attention deficit hyperactivity disorder (ADHD), unspecified ADHD type       * amphetamine-dextroamphetamine 20 MG per tablet   Start taking on:  10/24/2017    ADDERALL    30 tablet    Take 1 tablet (20 mg) by mouth daily    Attention deficit hyperactivity disorder (ADHD), unspecified ADHD type       cholecalciferol 5000 UNITS Caps capsule    vitamin D3    100 capsule    Take 1 capsule (5,000 Units) by mouth daily Take 1 per day    Major depressive disorder, recurrent episode, moderate (H)       cyclobenzaprine 5 MG tablet    FLEXERIL    42 tablet    Take 1 tablet (5 mg) by mouth 3 times daily as needed for muscle spasms        doxepin 10 MG capsule    SINEquan    180 capsule    TAKE ONE TO TWO CAPSULES BY MOUTH AT BEDTIME    Major depressive disorder, recurrent episode, moderate (H)       DULoxetine 60 MG EC capsule    CYMBALTA     180 capsule    TAKE 2 CAPSULES BY MOUTH EVERY DAY    Major depressive disorder, recurrent episode, moderate (H)       FIBER PO      Two capsules in the morning and two capsules at night        fish oil-omega-3 fatty acids 1000 MG capsule      Take 2 g by mouth daily Takes 1 in am and 1 at bedtime        fluticasone 50 MCG/ACT spray    FLONASE    16 g    Spray 1-2 sprays into both nostrils daily    Other chronic sinusitis       * levothyroxine 150 MCG tablet    SYNTHROID/LEVOTHROID    45 tablet    Take 1 tablet (150 mcg) by mouth every other day    Acquired hypothyroidism       * levothyroxine 175 MCG tablet    SYNTHROID/LEVOTHROID    45 tablet    Take 1 tablet (175 mcg) by mouth every other day    Acquired hypothyroidism       lovastatin 20 MG tablet    MEVACOR    90 tablet    Take 1 tablet (20 mg) by mouth At Bedtime    Hyperlipidemia LDL goal <130       Multi-vitamin Tabs tablet      Take 1 tablet by mouth daily        NONFORMULARY      Take 20 mg by mouth 2 times daily D-amphetamine Salt combo        OLANZapine 2.5 MG tablet    zyPREXA    90 tablet    Take 1 tablet (2.5 mg) by mouth At Bedtime    Major depressive disorder, recurrent episode, moderate (H)       omeprazole 40 MG capsule    priLOSEC    90 capsule    Take 1 capsule (40 mg) by mouth daily Take 30-60 minutes before a meal.    Esophageal reflux       ondansetron 4 MG tablet    ZOFRAN    18 tablet    Take 1 tablet (4 mg) by mouth every 6 hours as needed for nausea    Norovirus       * oxyCODONE-acetaminophen 5-325 MG per tablet    PERCOCET    15 tablet    Take 1-2 tablets by mouth every 6 hours as needed for pain        * oxyCODONE-acetaminophen 5-325 MG per tablet    PERCOCET    42 tablet    Take 1-2 tablets by mouth every 8 hours as needed for pain maximum 6 tablet(s) per day    Lumbar radiculopathy       prazosin 5 MG capsule    MINIPRESS    90 capsule    Take 1 capsule (5 mg) by mouth At Bedtime Take 1 capsule at bedtime    Primary insomnia        predniSONE 20 MG tablet    DELTASONE    10 tablet    Take 2 tablets (40 mg) by mouth daily for 5 days    Lumbar radiculopathy       tapentadol 50 MG Tabs tablet    NUCYNTA    120 tablet    Take 1 tablet (50 mg) by mouth every 6 hours as needed for moderate to severe pain Max #4/day.    Chronic pain syndrome       tiZANidine 4 MG tablet    ZANAFLEX    210 tablet    Take 1-3 tablets (4-12 mg) by mouth 3 times daily as needed for muscle spasms    Cervical radiculopathy       topiramate 50 MG tablet    TOPAMAX    120 tablet    Take 1-2 tablets ( mg) by mouth 2 times daily    Chronic pain syndrome       * Notice:  This list has 7 medication(s) that are the same as other medications prescribed for you. Read the directions carefully, and ask your doctor or other care provider to review them with you.

## 2017-08-25 NOTE — PROGRESS NOTES
"HPI  CC: 56yo F presents for f/u of left back/flank pain radiating down her left leg.      ED/UC Followup:    [facility]   Date of visit: 8/16/2017  Reason for visit: Flank pain   Current Status: Pt state she feels ok but still in pain  Additional: want refills on generic ADDERALL       This started a month ago, with left lower quad abdominal pain a lump in the left upper abdomen/flank and left flank pain.  She was seen in the ER 7/17, at which time a CT scan was done; she was advised it was negative and was sent home.  The scan showed a stable nonobstructive left kidney stone at the inferior pole of the kidney.  She was seen by her chronic pain specialist (taking Nucynta and was given oxycodone for break through).  Her pain continued and was waking her up at night; she was seen in clinic on 8/4 for this.  The patient reports that she was advised she may have diverticulosis.  No further treatment was recommended at the time, per the patient.  She returned to the ER on 8/9, where her UA, lactic acid, CMP, lipase, and CBC were unremarkable (WBC mildly elevated 11.7).  CT scan was repeated, with no concerning findings.  She was advised to f/u with her pain clinic, as all the evidence pointed toward musculoskeletal back pain.  She was seen here in clinic again on 8/10; an ultrasound was obtained of the abdomen, which showed again the nonobstructive kidney stone on the left.  She was given percocet and flomax.  Around this time she started to have left leg pain.  Symptoms involved the entire leg.  She returned to the clinic on 8/15; it seemed that kidney stone was less likely an etiology for her pain than musculoskeletal etiology.  She was offered imaging of the spine.  She then went back to the ER the next day on 8/16, where she reported ongoing left flank, abdomen and back pain, as well as let leg pain, \"muted\" sensation and weakness.  She had a CT scan of the abdomen again which showed no new findings and she " "had an MRI of her lumbar spine which showed mild degenerative findings in the lumbar spine, greatest at L5-S1, where there is moderate left neural foraminal narrowing and a disc protrusion that contacts the descending left S1 nerve root.  This was thought to be the most likely cause of her pain.  She was given a medrol dose pack and a few tabs of oxycodone.  This did provide some good relief; the course ended earlier this week and she is in pain again.  She cannot sleep due to pain, which is worse with lying down.  She can only stand/walk for about 5 minutes.  Now both legs are painful.  She denies incontinence.  She does endorse constipation,and she has started to take MOM.  She denies numbness.   Can only stand/walk for 5 minutes.  Hurts worse with lying down.   She is very fatigued.     Review of Systems   Constitutional: Positive for malaise/fatigue. Negative for chills and fever.   Respiratory: Negative for cough.    Gastrointestinal: Positive for abdominal pain and constipation. Negative for diarrhea, nausea and vomiting.   Genitourinary: Positive for flank pain. Negative for dysuria, frequency, hematuria and urgency.   Musculoskeletal: Positive for back pain, myalgias and neck pain. Negative for falls.   Skin: Negative for rash.   Neurological: Positive for sensory change and focal weakness (maybe a little weak in the left leg). Negative for tingling.     Allergies   Allergen Reactions     Gabapentin Other (See Comments)     Patient states she gets \"horrific nightmares\" with this medication     Dilaudid [Hydromorphone Hcl]      Made her feel like her skin was crawling     Nsaids Other (See Comments)     Kidney failure     Compazine [Prochlorperazine] Other (See Comments)     \"Makes my skin crawl, I was going nuts.\"     Current Outpatient Prescriptions   Medication     topiramate (TOPAMAX) 50 MG tablet     oxyCODONE-acetaminophen (PERCOCET) 5-325 MG per tablet     predniSONE (DELTASONE) 20 MG tablet     " amphetamine-dextroamphetamine (ADDERALL) 20 MG per tablet     [START ON 9/24/2017] amphetamine-dextroamphetamine (ADDERALL) 20 MG per tablet     [START ON 10/24/2017] amphetamine-dextroamphetamine (ADDERALL) 20 MG per tablet     oxyCODONE-acetaminophen (PERCOCET) 5-325 MG per tablet     cyclobenzaprine (FLEXERIL) 5 MG tablet     tapentadol (NUCYNTA) 50 MG TABS tablet     tiZANidine (ZANAFLEX) 4 MG tablet     prazosin (MINIPRESS) 5 MG capsule     OLANZapine (ZYPREXA) 2.5 MG tablet     doxepin (SINEQUAN) 10 MG capsule     DULoxetine (CYMBALTA) 60 MG EC capsule     omeprazole (PRILOSEC) 40 MG capsule     levothyroxine (SYNTHROID/LEVOTHROID) 150 MCG tablet     levothyroxine (SYNTHROID/LEVOTHROID) 175 MCG tablet     lovastatin (MEVACOR) 20 MG tablet     FIBER PO     ondansetron (ZOFRAN) 4 MG tablet     fluticasone (FLONASE) 50 MCG/ACT nasal spray     NONFORMULARY     cholecalciferol (VITAMIN D3) 5000 UNITS CAPS capsule     multivitamin, therapeutic with minerals (MULTI-VITAMIN) TABS     fish oil-omega-3 fatty acids (OMEGA 3) 1000 MG capsule     No current facility-administered medications for this visit.      Active Ambulatory Problems     Diagnosis Date Noted     Major depressive disorder, recurrent episode, moderate (H) 01/18/2012     PTSD (post-traumatic stress disorder) 01/18/2012     Acquired hypothyroidism 01/18/2012     Hyperlipidemia LDL goal <130 01/30/2012     CKD (chronic kidney disease) stage 3, GFR 30-59 ml/min 03/09/2015     Esophageal reflux 04/13/2015     Primary insomnia 06/02/2015     Obesity 06/21/2015     Attention-deficit hyperactivity disorder, predominantly hyperactive type 04/19/2016     Neck pain 09/29/2016     Cervical radiculopathy 10/28/2016     Partner relationship problems 12/05/2016     Primary osteoarthritis of right knee 12/26/2016     Peripheral tear of medial meniscus of right knee, unspecified whether old or current tear, initial encounter 12/26/2016     Resolved Ambulatory Problems      Diagnosis Date Noted     Seizure disorder (H) 01/18/2012     Nausea, vomiting and diarrhea 04/13/2016     Norovirus 04/14/2016     Viral gastroenteritis 04/14/2016     Past Medical History:   Diagnosis Date     Arthritis 2012     Depressive disorder      Depressive disorder, not elsewhere classified 08/28/12     Hyperlipidemia LDL goal < 130      Hypothyroid      Moderate major depression (H)      PTSD (post-traumatic stress disorder)      Seizure (H) 03/2011         Physical Exam   Constitutional: She is well-developed, well-nourished, and in no distress.   HENT:   Nose: Nose normal.   Mouth/Throat: Oropharynx is clear and moist. No oropharyngeal exudate.   Eyes: Conjunctivae are normal. Pupils are equal, round, and reactive to light. Right eye exhibits no discharge. Left eye exhibits no discharge. No scleral icterus.   Cardiovascular: Normal rate, regular rhythm and normal heart sounds.  Exam reveals no gallop and no friction rub.    No murmur heard.  Pulmonary/Chest: Effort normal and breath sounds normal. No respiratory distress. She has no wheezes. She has no rales.   Abdominal: Soft.   Decreased but present bowel sounds.     With standing there is an obvious asymmetry with a bulge of her left upper abdomen/side.  It is firm and tender.  It does not reduce.  It is near a large surgical scar.     Musculoskeletal: She exhibits no edema.   ROM of the spine is extremely limited in all directions due to pain.  She is tender to palpation over the spinous processes of the entire lumbar spine.  She is tender over the left paraspinous mm.  Bilateral SLR is positive.  Her DTRs are trace and seem symmetric in the lower extremities.  Strength in knee extensors is normal; strength testing of knee flexors was limited by pain.  Ankle dorsiflexion was normal bilaterally; plantar flexion was minimally decreased on the left.     Neurological: She is alert.   Skin: Skin is warm and dry. She is not diaphoretic. No erythema.    Vitals reviewed.    /69 (BP Location: Right arm, Patient Position: Sitting, Cuff Size: Adult Regular)  Pulse 71  Temp 95.8  F (35.4  C) (Tympanic)  Resp 18  Wt 212 lb (96.2 kg)  SpO2 95%  BMI 31.77 kg/m2    A/P    This is a 58 yo F with chronic back and neck pain, h/o cervical radiculopathy, who now has left back, flank, abdominal and leg pain.  I agree that lumbar radiculopathy is the likely cause.  I do not think that the left kidney stone (4mm, intrarenal) is likely to be causing her pain as it has been stable, and there is no blood in her urine; she was referred to urology and can consult with the specialist regarding this.  She is scheduled next week for ANNIE with her pain specialist.  I am giving her another steroid burst and percocet to get her through until this appointment.  She agreed that, after this, her pain management specialist would guide further pain treatment.  I reviewed that she does have a pain contract with her specialist.    I am not sure what the bulge is in her left abdomen/flank.  Possibly related to constipation.  CT scans did not show any hernia, which was my concern, given that this is near a surgical scar.      Constipation: advised stool softener with MOM and dulcolax suppository.     ADHD: medication refilled.

## 2017-08-26 PROBLEM — N20.0 CALCULUS OF LEFT KIDNEY: Status: ACTIVE | Noted: 2017-08-26

## 2017-08-26 ASSESSMENT — ENCOUNTER SYMPTOMS
DIARRHEA: 0
FOCAL WEAKNESS: 1
ABDOMINAL PAIN: 1
SENSORY CHANGE: 1
HEMATURIA: 0
VOMITING: 0
NAUSEA: 0
FREQUENCY: 0
FALLS: 0
FLANK PAIN: 1
DYSURIA: 0
BACK PAIN: 1
COUGH: 0
CHILLS: 0
CONSTIPATION: 1
MYALGIAS: 1
NECK PAIN: 1
TINGLING: 0
FEVER: 0

## 2017-09-03 ENCOUNTER — HOSPITAL ENCOUNTER (EMERGENCY)
Facility: CLINIC | Age: 57
Discharge: HOME | End: 2017-09-03
Attending: EMERGENCY MEDICINE | Admitting: EMERGENCY MEDICINE
Payer: MEDICARE

## 2017-09-03 ENCOUNTER — APPOINTMENT (OUTPATIENT)
Dept: MRI IMAGING | Facility: CLINIC | Age: 57
End: 2017-09-03
Attending: EMERGENCY MEDICINE
Payer: MEDICARE

## 2017-09-03 VITALS
DIASTOLIC BLOOD PRESSURE: 88 MMHG | HEIGHT: 69 IN | TEMPERATURE: 99.4 F | SYSTOLIC BLOOD PRESSURE: 134 MMHG | OXYGEN SATURATION: 96 % | RESPIRATION RATE: 19 BRPM

## 2017-09-03 DIAGNOSIS — R20.2 PARESTHESIA: ICD-10-CM

## 2017-09-03 DIAGNOSIS — M79.605 PAIN OF LEFT LOWER EXTREMITY: ICD-10-CM

## 2017-09-03 LAB
ALBUMIN SERPL-MCNC: 3.7 G/DL (ref 3.4–5)
ALBUMIN UR-MCNC: NEGATIVE MG/DL
ALP SERPL-CCNC: 144 U/L (ref 40–150)
ALT SERPL W P-5'-P-CCNC: 46 U/L (ref 0–50)
ANION GAP SERPL CALCULATED.3IONS-SCNC: 6 MMOL/L (ref 3–14)
APPEARANCE UR: ABNORMAL
AST SERPL W P-5'-P-CCNC: 28 U/L (ref 0–45)
BACTERIA #/AREA URNS HPF: ABNORMAL /HPF
BASOPHILS # BLD AUTO: 0 10E9/L (ref 0–0.2)
BASOPHILS NFR BLD AUTO: 0.3 %
BILIRUB SERPL-MCNC: 0.4 MG/DL (ref 0.2–1.3)
BILIRUB UR QL STRIP: NEGATIVE
BUN SERPL-MCNC: 18 MG/DL (ref 7–30)
CALCIUM SERPL-MCNC: 8.8 MG/DL (ref 8.5–10.1)
CHLORIDE SERPL-SCNC: 109 MMOL/L (ref 94–109)
CO2 SERPL-SCNC: 25 MMOL/L (ref 20–32)
COLOR UR AUTO: ABNORMAL
CREAT SERPL-MCNC: 1 MG/DL (ref 0.52–1.04)
CRP SERPL-MCNC: 38 MG/L (ref 0–8)
DIFFERENTIAL METHOD BLD: NORMAL
EOSINOPHIL # BLD AUTO: 0.1 10E9/L (ref 0–0.7)
EOSINOPHIL NFR BLD AUTO: 1.3 %
ERYTHROCYTE [DISTWIDTH] IN BLOOD BY AUTOMATED COUNT: 14.4 % (ref 10–15)
ERYTHROCYTE [SEDIMENTATION RATE] IN BLOOD BY WESTERGREN METHOD: 10 MM/H (ref 0–30)
GFR SERPL CREATININE-BSD FRML MDRD: 57 ML/MIN/1.7M2
GLUCOSE SERPL-MCNC: 113 MG/DL (ref 70–99)
GLUCOSE UR STRIP-MCNC: NEGATIVE MG/DL
HCT VFR BLD AUTO: 45.7 % (ref 35–47)
HGB BLD-MCNC: 15.6 G/DL (ref 11.7–15.7)
HGB UR QL STRIP: NEGATIVE
IMM GRANULOCYTES # BLD: 0 10E9/L (ref 0–0.4)
IMM GRANULOCYTES NFR BLD: 0.1 %
INR PPP: 0.92 (ref 0.86–1.14)
KETONES UR STRIP-MCNC: NEGATIVE MG/DL
LACTATE BLD-SCNC: 1.2 MMOL/L (ref 0.7–2)
LEUKOCYTE ESTERASE UR QL STRIP: NEGATIVE
LYMPHOCYTES # BLD AUTO: 2.7 10E9/L (ref 0.8–5.3)
LYMPHOCYTES NFR BLD AUTO: 29.7 %
MCH RBC QN AUTO: 31.1 PG (ref 26.5–33)
MCHC RBC AUTO-ENTMCNC: 34.1 G/DL (ref 31.5–36.5)
MCV RBC AUTO: 91 FL (ref 78–100)
MONOCYTES # BLD AUTO: 0.4 10E9/L (ref 0–1.3)
MONOCYTES NFR BLD AUTO: 4.7 %
NEUTROPHILS # BLD AUTO: 5.8 10E9/L (ref 1.6–8.3)
NEUTROPHILS NFR BLD AUTO: 63.9 %
NITRATE UR QL: NEGATIVE
NRBC # BLD AUTO: 0 10*3/UL
NRBC BLD AUTO-RTO: 0 /100
PH UR STRIP: 6.5 PH (ref 5–7)
PLATELET # BLD AUTO: 251 10E9/L (ref 150–450)
POTASSIUM SERPL-SCNC: 3.6 MMOL/L (ref 3.4–5.3)
PROCALCITONIN SERPL-MCNC: <0.05 NG/ML
PROT SERPL-MCNC: 7.9 G/DL (ref 6.8–8.8)
RBC # BLD AUTO: 5.02 10E12/L (ref 3.8–5.2)
RBC #/AREA URNS AUTO: <1 /HPF (ref 0–2)
SODIUM SERPL-SCNC: 140 MMOL/L (ref 133–144)
SOURCE: ABNORMAL
SP GR UR STRIP: 1.01 (ref 1–1.03)
UROBILINOGEN UR STRIP-MCNC: NORMAL MG/DL (ref 0–2)
WBC # BLD AUTO: 9 10E9/L (ref 4–11)
WBC #/AREA URNS AUTO: <1 /HPF (ref 0–2)

## 2017-09-03 PROCEDURE — 80053 COMPREHEN METABOLIC PANEL: CPT | Performed by: EMERGENCY MEDICINE

## 2017-09-03 PROCEDURE — 85652 RBC SED RATE AUTOMATED: CPT | Performed by: EMERGENCY MEDICINE

## 2017-09-03 PROCEDURE — 72148 MRI LUMBAR SPINE W/O DYE: CPT

## 2017-09-03 PROCEDURE — 85025 COMPLETE CBC W/AUTO DIFF WBC: CPT | Performed by: EMERGENCY MEDICINE

## 2017-09-03 PROCEDURE — 99284 EMERGENCY DEPT VISIT MOD MDM: CPT | Mod: 25

## 2017-09-03 PROCEDURE — 81001 URINALYSIS AUTO W/SCOPE: CPT | Performed by: EMERGENCY MEDICINE

## 2017-09-03 PROCEDURE — 84145 PROCALCITONIN (PCT): CPT | Performed by: EMERGENCY MEDICINE

## 2017-09-03 PROCEDURE — 25000132 ZZH RX MED GY IP 250 OP 250 PS 637: Mod: GY | Performed by: EMERGENCY MEDICINE

## 2017-09-03 PROCEDURE — 85610 PROTHROMBIN TIME: CPT | Performed by: EMERGENCY MEDICINE

## 2017-09-03 PROCEDURE — 51798 US URINE CAPACITY MEASURE: CPT

## 2017-09-03 PROCEDURE — 99284 EMERGENCY DEPT VISIT MOD MDM: CPT | Mod: Z6 | Performed by: EMERGENCY MEDICINE

## 2017-09-03 PROCEDURE — 86140 C-REACTIVE PROTEIN: CPT | Performed by: EMERGENCY MEDICINE

## 2017-09-03 PROCEDURE — 83605 ASSAY OF LACTIC ACID: CPT | Performed by: EMERGENCY MEDICINE

## 2017-09-03 PROCEDURE — A9270 NON-COVERED ITEM OR SERVICE: HCPCS | Mod: GY | Performed by: EMERGENCY MEDICINE

## 2017-09-03 RX ORDER — OXYCODONE AND ACETAMINOPHEN 5; 325 MG/1; MG/1
2 TABLET ORAL ONCE
Status: COMPLETED | OUTPATIENT
Start: 2017-09-03 | End: 2017-09-03

## 2017-09-03 RX ORDER — PREGABALIN 100 MG/1
100 CAPSULE ORAL ONCE
Status: DISCONTINUED | OUTPATIENT
Start: 2017-09-03 | End: 2017-09-03

## 2017-09-03 RX ORDER — PREGABALIN 50 MG/1
50 CAPSULE ORAL 3 TIMES DAILY
Qty: 30 CAPSULE | Refills: 0 | Status: SHIPPED | OUTPATIENT
Start: 2017-09-03 | End: 2017-11-27

## 2017-09-03 RX ORDER — PREGABALIN 50 MG/1
50 CAPSULE ORAL ONCE
Status: DISCONTINUED | OUTPATIENT
Start: 2017-09-03 | End: 2017-09-04 | Stop reason: HOSPADM

## 2017-09-03 RX ADMIN — OXYCODONE HYDROCHLORIDE AND ACETAMINOPHEN 2 TABLET: 5; 325 TABLET ORAL at 17:41

## 2017-09-03 ASSESSMENT — ENCOUNTER SYMPTOMS
BRUISES/BLEEDS EASILY: 0
DIZZINESS: 0
TROUBLE SWALLOWING: 0
NERVOUS/ANXIOUS: 0
CHILLS: 0
POLYDIPSIA: 0
SORE THROAT: 0
NAUSEA: 0
ABDOMINAL PAIN: 0
NUMBNESS: 1
HEADACHES: 0
DYSPHORIC MOOD: 0
COLOR CHANGE: 0
DIARRHEA: 0
PALPITATIONS: 0
COUGH: 0
LIGHT-HEADEDNESS: 0
DIAPHORESIS: 0
VOMITING: 0
HEMATURIA: 0
FREQUENCY: 0
BACK PAIN: 1
FEVER: 0
VOICE CHANGE: 0
EYE PAIN: 0
WEAKNESS: 1
DYSURIA: 0
SHORTNESS OF BREATH: 0
CONSTIPATION: 0
BLOOD IN STOOL: 0
NECK PAIN: 0
ADENOPATHY: 0

## 2017-09-03 NOTE — ED PROVIDER NOTES
"  History     Chief Complaint   Patient presents with     Leg Pain     left     The history is provided by the patient.     Ilsa Yusuf is a 57 year old female who presents for evaluation of left lower extremity pain and lower back pain. Patient reports she has had issues with low back pain radiating down her left lower extremity since July of this year for which she has been followed at [facility]. She received an epidural spine injection at [facility] on Wednesday, 08/30/17, four days ago. She states immediately following this procedure she had significant improvement in her back and left leg pain, though had some mild intermittent back pain on Thursday evening which resolved after she took prescribed Percocet.     Tonight, she complains of left lower extremity pain described as \"it feels like a semi is parked on my left leg\" and \"like someone is drilling in my left hip\" with pain that radiates down the leg with associated numbness of her left leg, worst in her great left toe that woke her from sleep at 4 AM this morning. She does get \"hot flushes\" when her pain is most severe, but otherwise denies fever, chills, or diaphoresis associated with her symptoms. She last took two tablets of Percocet and one tablet of Flexeril this morning at 11 AM for her pain with no significant relief.     She denies fall, trauma, or other recent injury to her left leg or back. She did have two episodes of stool incontinence (while sleeping) prior to receiving her injection last week, but denies any urinary or stool incontinence since Wednesday. No urinary incontinence. She denies saddle anesthesia. No abdominal pain, nausea, vomiting, dysuria, hematuria, melena, rectal bleeding. No other changes in bowel or bladder habits. She reports doing exercises for her chronic lumbar back pain and associated left lower extremity pain. She reportedly tried gabapentin but it gave her terrible nightmares so she " stopped.    Per review of the [place] Prescription Monitoring Database patient received:  17: oxycodone-acetaminophen 5-325 42 tablets  17: oxycodone-acetaminophen 5-325 12 tablets    No other new symptoms or complaints at this time. See ROS for further details.       I have reviewed the Medications, Allergies, Past Medical and Surgical History, and Social History in the [facility] system.  Past Medical History:   Diagnosis Date     Arthritis     both knees; hands     Depressive disorder      Depressive disorder, not elsewhere classified 12    DC 10/05/12-[facility]     Hyperlipidemia LDL goal < 130     mevacor     Hypothyroid      Moderate major depression (H)     abilify, cymbalta, seroquel, and sofya, Dr Antonio Perales     PTSD (post-traumatic stress disorder)      Seizure (H) 2011    one episode, was on Keppra, EEG negative       Past Surgical History:   Procedure Laterality Date     ABDOMEN SURGERY       C PARTIAL EXCISION THYROID,UNILAT      rt, negative path then, treated with synthroid.     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     TONSILLECTOMY       ureteral reimplantation         Family History   Problem Relation Age of Onset     C.A.D. Father 58      at 58, heart disease     MENTAL ILLNESS Father      Coronary Artery Disease Father      Hyperlipidemia Father      Alzheimer Disease Mother      total care now     Depression Mother      Anxiety Disorder Mother      DIABETES Sister      adult onset     Thyroid Disease Sister        Social History   Substance Use Topics     Smoking status: Current Some Day Smoker     Packs/day: 0.30     Last attempt to quit: 2015     Smokeless tobacco: Never Used      Comment: recreational     Alcohol use 0.0 oz/week      Comment: 2 a night on weekends       Current Facility-Administered Medications  "  Medication     pregabalin (LYRICA) capsule 50 mg     Current Outpatient Prescriptions   Medication     pregabalin (LYRICA) 50 MG capsule     topiramate (TOPAMAX) 50 MG tablet     amphetamine-dextroamphetamine (ADDERALL) 20 MG per tablet     [START ON 9/24/2017] amphetamine-dextroamphetamine (ADDERALL) 20 MG per tablet     [START ON 10/24/2017] amphetamine-dextroamphetamine (ADDERALL) 20 MG per tablet     oxyCODONE-acetaminophen (PERCOCET) 5-325 MG per tablet     cyclobenzaprine (FLEXERIL) 5 MG tablet     tapentadol (NUCYNTA) 50 MG TABS tablet     tiZANidine (ZANAFLEX) 4 MG tablet     prazosin (MINIPRESS) 5 MG capsule     OLANZapine (ZYPREXA) 2.5 MG tablet     doxepin (SINEQUAN) 10 MG capsule     DULoxetine (CYMBALTA) 60 MG EC capsule     omeprazole (PRILOSEC) 40 MG capsule     levothyroxine (SYNTHROID/LEVOTHROID) 150 MCG tablet     levothyroxine (SYNTHROID/LEVOTHROID) 175 MCG tablet     lovastatin (MEVACOR) 20 MG tablet     FIBER PO     ondansetron (ZOFRAN) 4 MG tablet     fluticasone (FLONASE) 50 MCG/ACT nasal spray     NONFORMULARY     cholecalciferol (VITAMIN D3) 5000 UNITS CAPS capsule     multivitamin, therapeutic with minerals (MULTI-VITAMIN) TABS     fish oil-omega-3 fatty acids (OMEGA 3) 1000 MG capsule        Allergies   Allergen Reactions     Gabapentin Other (See Comments)     Patient states she gets \"horrific nightmares\" with this medication     Dilaudid [Hydromorphone Hcl]      Made her feel like her skin was crawling     Nsaids Other (See Comments)     Kidney failure     Compazine [Prochlorperazine] Other (See Comments)     \"Makes my skin crawl, I was going nuts.\"       Review of Systems   Constitutional: Negative for chills, diaphoresis and fever.   HENT: Negative for ear pain, sore throat, tinnitus, trouble swallowing and voice change.    Eyes: Negative for pain and visual disturbance.   Respiratory: Negative for cough and shortness of breath.    Cardiovascular: Negative for chest pain, " "palpitations and leg swelling.   Gastrointestinal: Negative for abdominal pain, blood in stool, constipation, diarrhea, nausea and vomiting.   Endocrine: Negative for polydipsia and polyuria.   Genitourinary: Negative for dysuria, frequency, hematuria and urgency.   Musculoskeletal: Positive for back pain (lumbar). Negative for neck pain.        Positive for left lower extremity pain   Skin: Negative for color change and rash.   Allergic/Immunologic: Negative for immunocompromised state.   Neurological: Positive for weakness (LLE) and numbness (LLE). Negative for dizziness, light-headedness and headaches.   Hematological: Negative for adenopathy. Does not bruise/bleed easily.   Psychiatric/Behavioral: Negative for dysphoric mood. The patient is not nervous/anxious.        Physical Exam   BP: 136/74  Heart Rate: 88  Temp: 99.4  F (37.4  C)  Resp: 19  Height: 175.3 cm (5' 9\")  SpO2: 98 %  Physical Exam  CONSTITUTIONAL: Well-developed and well-nourished. Awake and alert. Non-toxic appearance. No acute distress.   HENT:   - Head: Normocephalic and atraumatic.   - Ears: Hearing and external ear grossly normal.   - Nose: Nose normal. No rhinorrhea. No epistaxis.   - Mouth/Throat: Oropharynx is clear and MMM  EYES: Conjunctivae and lids are normal. No scleral icterus.   NECK: Normal range of motion and phonation normal. Neck supple.  No tracheal deviation, no stridor. No edema or erythema noted.  CARDIOVASCULAR: Normal rate, regular rhythm and no appreciable abnormal heart sounds.  PULMONARY/CHEST: Effort normal. No accessory muscle usage or stridor. No respiratory distress.  No appreciable abnormal breath sounds.  ABDOMEN: Soft, non-distended. No tenderness. No rigidity, rebound or guarding.   MUSCULOSKELETAL: Extremities warm and seemingly well perfused, normal distal pulses and cap refill.. No edema or calf tenderness. Decreased sensation on the LLE, mostly around the great t oe. No apparent joint effusions or swelling. " No asymmetry. Back appears normal. No erythema, no fullness or fluctuance. Normal temperature.   NEUROLOGIC: Awake, alert. Not disoriented. She displays no atrophy and no tremor. Normal tone. No seizure activity. Coordination normal. GCS 15. Strength intact. Reports altered sensation, particularly on the left foot near the great toe/S1 distribution.   SKIN: Skin is warm and dry. No rash noted. No diaphoresis. No pallor.   PSYCHIATRIC: Normal mood and affect. Speech and behavior normal. Thought processes linear. Cognition and memory are normal.     ED Course     ED Course   Value Comment By Time   CRP Inflammation: (!) 38.0 But ESR normal Akua Coleman MD 09/03 1941   Sed Rate: 10 (Reviewed) Akua Coleman MD 09/03 1941   Lactic Acid: 1.2 (Reviewed) Akua Coleman MD 09/03 1941   WBC Urine: <1 (Reviewed) Akua Coleman MD 09/03 1941   RBC Urine: <1 (Reviewed) Akua Coleman MD 09/03 1941   WBC: 9.0 (Reviewed) Akua Coleman MD 09/03 1941   Hemoglobin: 15.6 (Reviewed) Akua Coleman MD 09/03 1941    Discussed case with Neurosurgery.  They'll come see/evaluate the patient here in the ED. Akua Coleman MD 1960     Procedures       5:23 PM  The patient was seen and examined by Dr. Coleman in Room 21.       Labs Ordered and Resulted from Time of ED Arrival Up to the Time of Departure from the ED   COMPREHENSIVE METABOLIC PANEL - Abnormal; Notable for the following:        Result Value    Glucose 113 (*)     GFR Estimate 57 (*)     All other components within normal limits   CRP INFLAMMATION - Abnormal; Notable for the following:     CRP Inflammation 38.0 (*)     All other components within normal limits   ROUTINE UA WITH MICROSCOPIC REFLEX TO CULTURE - Abnormal; Notable for the following:     Bacteria Urine Few (*)     All other components within normal limits   CBC WITH PLATELETS DIFFERENTIAL   INR   ERYTHROCYTE SEDIMENTATION RATE AUTO   LACTIC ACID WHOLE BLOOD   PROCALCITONIN   BLADDER  SCAN            Assessments & Plan (with Medical Decision Making)   IMPRESSION: 57-year-old female, PMH notable for chronic neck/back pain, seizure disorder, acquired hypothyroidism, CKD, migraine/headaches, sleep apnea, depression, anxiety, PTSD and ADHD, et al.  On further EMR review we see that she was seen on 8/25 in the clinic setting and was reporting left back/flank, abdominal and leg pain at that time and they were thinking this was most likely related to lumbar radiculopathy.  Workup at that time showed no blood in her urine and they recommended following up with Urology with regards to ongoing flank pain.  The prescribed a steroid burst along with Percocet and will discuss further with the pt's Pain management team. She follows / [facility] for a herniated disc surgery in July who recently underwent epidural spinal injection this past Wednesday (8/30/17, 4 days ago) with good relief for the first day, but subsequently with intermittent pain not being able to be controlled by her as needed Percocet/Flexeril, referred in from [facility] for evaluation for possible complication from the spinal injection, especially as pt reports she was told the injection itself was technically difficult.    Clinically, patient appears nontoxic.  Patient appears mildly uncomfortable at rest, reporting pain in the LLE but actually tolerates movement of the head of the bed up and down fairly well without issue.  No obvious hip, knee, ankle or other joint abnormalities.  The overlying skin where she had her previous epidural injection appears normal, no findings for cellulitis, no fullness or fluctuance.  DDX includes but not limited to simply failure of the ongoing pain relief, as she sounds as though she may have returned to baseline (prior to injection) though today sounds as though maybe a bit worse and so could consider complications such as a delayed bleed which I think fairly unlikely or even an  infectious process which again I think to be generally rather unlikely.    PLAN: We'll start even with laboratory studies, urine studies, imaging, and symptom management.  With regards to the imaging given the report of some previous bowel incontinence and the procedure I do think it prudent to get the more advanced imaging modality of MRI at this time, to ensure no immediate complication or worsening process, but again this may all be related to her already diagnosed herniated disc/radiculopathy.     RESULTS:  - Labs: ESR 10, Procal negative. No leukocytosis. Negative lactate  --- See ED Course section above for particular pertinent findings and comments  - Urine: No UTI, no blood  - Imaging: Images and written preliminary reports reviewed by myself and revealed:  --- MRI L spine: No significant change from previous. Left L5/S1 disc bulge, touching left S1 nerve route (which would correlate w/ patients sx's of the great toes    INTERVENTIONS:   - PO percocet  - Pt refused the Lyrica that we had ordered.     RE-EVALUATION:  See ED Course section above for particular pertinent findings and comments  - The patient's symptoms were somewhat improved with the above medications    DISCUSSIONS:  - w/ Neurosurgery: They have seen and evaluated the pt here in the ED as well as reviewed her images. They do not have an immediate/emergent concerns based on current presentation and do think sx's likely related to radiculopathy, but nothing that would need immediate intervention.   --- They recommend that the patient do go get further EMG type of testing w/ Neuro, that the patient undergo formal PT and not just doing exercises at home, etc., that she see her Pain team again and that she try a course of something like gabapentin (pt has adverse reaction (terrible nightmares), and so they recommend Lyrica), and if all of that is either inconclusive or unsuccessful then they would consider her for foraminotomy.  - w/ Patient: I have  "reviewed the available findings, plan, need for follow up, and strict return instructions sith the patient. Spoke with the patient about pain control such as Lyrica until she is able to follow up with her primary pain clinic and patient stated \"I am way beyond Lyrica\", \"that is why I've had oxycodone, oxycodone, oxycodone.\" She reports then that she has not tried Lyrica before, saying it was not covered by her insurance. Pt refused to take the prescription I had written for her to try until she could be seen in clinic. Given she's already getting narcotic prescription from elsewhere, I will have her contact the Pain team before changing that prescription.     DISPOSITION/PLANNING:  - FINAL IMPRESSION: LLE pain/altered sensation  - DISPOSITION: D/C to home  --- Follow-up: w/ PCP, Pain, Neuro (referral made), PT, Neurosurgery  --- Recommendations: Conservative symptom management, strict return instructions      ______________________________________________________________________________    - I have reviewed the available nursing notes.      New Prescriptions    PREGABALIN (LYRICA) 50 MG CAPSULE    Take 1 capsule (50 mg) by mouth 3 times daily for 10 days       Final diagnoses:   Pain of left lower extremity   IStefani, am serving as a trained medical scribe to document services personally performed by Akua Coleman MD, based on the provider's statements to me.   IAkua MD, was physically present and have reviewed and verified the accuracy of this note documented by Stefani Gonzalez.      9/3/2017   [facility], [place], EMERGENCY DEPARTMENT     Akua Coleman MD  09/05/17 5540    "

## 2017-09-03 NOTE — ED AVS SNAPSHOT
[facility], [place], Emergency Department    [address] [place] [id number]    Phone:  [phone]                                       Ilsa Yusuf   MRN: [id number]    Department:  [facility], Emergency Department   Date of Visit:  9/3/2017           After Visit Summary Signature Page     I have received my discharge instructions, and my questions have been answered. I have discussed any challenges I see with this plan with the nurse or doctor.    ..........................................................................................................................................  Patient/Patient Representative Signature      ..........................................................................................................................................  Patient Representative Print Name and Relationship to Patient    ..................................................               ................................................  Date                                            Time    ..........................................................................................................................................  Reviewed by Signature/Title    ...................................................              ..............................................  Date                                                            Time

## 2017-09-03 NOTE — ED AVS SNAPSHOT
[facility], Emergency Department    [address] [address]    Phone:  [phone]                                       Ilsa Yusuf   MRN: [id number]    Department:  [facility], Emergency Department   Date of Visit:  9/3/2017           Patient Information     Date Of Birth          1960        Your diagnoses for this visit were:     Pain of left lower extremity        You were seen by Akua Coleman MD.        Discharge Instructions       TODAY'S VISIT:  You were seen today for leg pain after an epidural injection.  - We have discussed her results with the Neurosurgery team and they do not think that there is any emergent/dangerous condition here identified today and that he can be discharged for further workup as an outpatient.  --- Their recommendations also include that he be referred to Neurology for further evaluation given the numbness sensation in more than just the distribution of your known herniated disc, that you follow up with your Pain team for ongoing pain assistance but that we can try something like gabapentin or Lyrica for you.  Because of your previous adverse reaction to gabapentin we will try the Lyrica after our discussion with the Pharmacy team. Neurosurgery also recommends that you undergo a course of formal regular Physical Therapy.   ---  If you're still having symptoms that is not responsive to the new Lyrica medication or any additional recommendation from your Pain team after seeing them, not responsive to any recommendations by Neurology, and not changed after a formal course of Physical Therapy then they report that they can potentially evaluate you for foraminotomy/surgical intervention for your symptoms.    FOLLOW-UP:  Please make an appointment to follow up with:  - Neurology Clinic (phone: [phone])  - Your usual Pain Team (Call them this week to see if your upcoming appointment can be moved up).   - Physical Therapy  - Neurosurgery  Clinic (phone: [phone])    PRESCRIPTIONS / MEDICATIONS:  - You can try the prescribed Lyrica, three times a day as prescribed.   --- If you developed any adverse reactions to this medication, please stop the medication and notify a medical provider.  --- Always pay close attention when starting a new medication for anything like sedation, etc., and do not engage in any potentially dangerous activities in these cases.   - You has previously been prescribed a narcotic pain medication. You should not take this medication and use alcohol or other sedating substances or medications. Do not drive, operate heavy machinery, or engage in potentially dangerous activities while on this medication. This medication can also cause constipation and other adverse reactions. If you develop abnormal symptoms stop taking this medication and contact your medical provider.     RETURN TO THE EMERGENCY DEPARTMENT  Return to the Emergency Department at any time for new/worsening symptoms.       Understanding Lumbar Radiculopathy    Lumbar radiculopathy is irritation or inflammation of a nerve root in the low back. It causes symptoms that spread out from the back down one or both legs. To understand this condition, it helps to understand the parts of the spine:    Vertebrae. These are bones that stack to form the spine. The lumbar spine contains the 5 bottom vertebrae.    Disks. These are soft pads of tissue between the vertebrae. They act as shock absorbers for the spine.    Spinal canal. This is a tunnel formed within the stacked vertebrae. In the lumbar spine, nerves run through this canal.    Nerves. These branch off and leave the spinal canal, traveling out to parts of the body. As they leave the spinal canal, nerves pass through openings between the vertebrae. The nerve root is the part of the nerve that is closest to the spinal canal.    Sciatic nerve. This is a large nerve formed from several nerve roots in the low back. This  nerve extends down the back of the leg to the foot.  With lumbar radiculopathy, nerve roots in the low back become irritated. This leads to pain and symptoms. The sciatic nerve is commonly involved, so the condition is often called sciatica.  What causes lumbar radiculopathy?  Aging, injury, poor posture, extra body weight, and other issues can lead to problems in the low back. These problems may then irritate nerve roots. They include:    Damage to a disk in the lumbar spine. The damaged disk may then press on nearby nerve roots.    Degeneration from wear and tear, and aging. This can lead to narrowing (stenosis) of the openings between the vertebrae. The narrowed openings press on nerve roots as they leave the spinal canal.    Unstable spine. This is when a vertebra slips forward. It can then press on a nerve root.  Other, less common things can put pressure on nerves in the low back. These include diabetes, infection, or a tumor.  Symptoms of lumbar radiculopathy  These include:    Pain in the low back    Pain, numbness, tingling, or weakness that travels into the buttocks, hip, groin, or leg    Muscle spasms  Treatment for lumbar radiculopathy  In most cases, your healthcare provider will first try treatments that help relieve symptoms. These may include:    Prescription and over-the-counter pain medicines. These help relieve pain, swelling, and irritation.    Limits on positions and activities that increase pain. But lying in bed or avoiding all movement is only recommended for a short period of time.    Physical therapy, including exercises and stretches. This helps decrease pain and increase movement and function.    Steroid shots into the lower back. This may help relieve symptoms for a time.    Weight-loss program. If you are overweight, losing extra pounds may help relieve symptoms.  In some cases, you may need surgery to fix the underlying problem. This depends on the cause, the symptoms, and how long the  pain has lasted.  Possible complications  Over time, an irritated and inflamed nerve may become damaged. This may lead to long-lasting (permanent) numbness or weakness in your legs and feet. If symptoms change suddenly or get worse, be sure to let your healthcare provider know.  When to call your healthcare provider  Call your healthcare provider right away if you have any of these:    New pain or pain that gets worse    New or increasing weakness, tingling, or numbness in your leg or foot    Problems controlling your bladder or bowel   Date Last Reviewed: 3/10/2016    3953-7336 [employer]. [address] [place] [address]. All rights reserved. This information is not intended as a substitute for professional medical care. Always follow your healthcare professional's instructions.        Possible Causes of Low Back or Leg Pain    The symptoms in your back or leg may be due to pressure on a nerve. This pressure may be caused by a damaged disk or by abnormal bone growth. Either way, you may feel pain, burning, tingling, or numbness. If you have pressure on a nerve that connects to the sciatic nerve, pain may shoot down your leg.    Pressure from the disk  Constant wear and tear can weaken a disk over time and cause back pain. The disk can then be damaged by a sudden movement or injury. If its soft center begins to bulge, the disk may press on a nerve. Or the outside of the disk may tear, and the soft center may squeeze through and pinch a nerve.    Pressure from bone  As a disk wears out, the vertebrae right above and below the disk begin to touch. This can put pressure on a nerve. Often, abnormal bone (called bone spurs) grows where the vertebrae rub against each other. This can cause the foramen or the spinal canal to narrow (called stenosis) and press against a nerve.  Date Last Reviewed: 10/4/2015    1125-5073 [employer]. [address] [place] [address]. All rights  reserved. This information is not intended as a substitute for professional medical care. Always follow your healthcare professional's instructions.            24 Hour Appointment Hotline       To make an appointment at any [place] clinic, call [phone] ([phone]). If you don't have a family doctor or clinic, we will help you find one. [place] clinics are conveniently located to serve the needs of you and your family.             Review of your medicines      START taking        Dose / Directions Last dose taken    pregabalin 50 MG capsule   Commonly known as:  LYRICA   Dose:  50 mg   Quantity:  30 capsule        Take 1 capsule (50 mg) by mouth 3 times daily for 10 days   Refills:  0          Our records show that you are taking the medicines listed below. If these are incorrect, please call your family doctor or clinic.        Dose / Directions Last dose taken    * amphetamine-dextroamphetamine 20 MG per tablet   Commonly known as:  ADDERALL   Dose:  20 mg   Quantity:  30 tablet        Take 1 tablet (20 mg) by mouth daily   Refills:  0        * amphetamine-dextroamphetamine 20 MG per tablet   Commonly known as:  ADDERALL   Dose:  20 mg   Quantity:  30 tablet   Start taking on:  9/24/2017        Take 1 tablet (20 mg) by mouth daily   Refills:  0        * amphetamine-dextroamphetamine 20 MG per tablet   Commonly known as:  ADDERALL   Dose:  20 mg   Quantity:  30 tablet   Start taking on:  10/24/2017        Take 1 tablet (20 mg) by mouth daily   Refills:  0        cholecalciferol 5000 UNITS Caps capsule   Commonly known as:  vitamin D3   Dose:  5000 Units   Quantity:  100 capsule        Take 1 capsule (5,000 Units) by mouth daily Take 1 per day   Refills:  2        cyclobenzaprine 5 MG tablet   Commonly known as:  FLEXERIL   Dose:  5 mg   Quantity:  42 tablet        Take 1 tablet (5 mg) by mouth 3 times daily as needed for muscle spasms   Refills:  0        doxepin 10 MG capsule   Commonly known as:   SINEquan   Quantity:  180 capsule        TAKE ONE TO TWO CAPSULES BY MOUTH AT BEDTIME   Refills:  8        DULoxetine 60 MG EC capsule   Commonly known as:  CYMBALTA   Quantity:  180 capsule        TAKE 2 CAPSULES BY MOUTH EVERY DAY   Refills:  1        FIBER PO        Two capsules in the morning and two capsules at night   Refills:  0        fish oil-omega-3 fatty acids 1000 MG capsule   Dose:  2 g        Take 2 g by mouth daily Takes 1 in am and 1 at bedtime   Refills:  0        fluticasone 50 MCG/ACT spray   Commonly known as:  FLONASE   Dose:  1-2 spray   Quantity:  16 g        Spray 1-2 sprays into both nostrils daily   Refills:  11        * levothyroxine 150 MCG tablet   Commonly known as:  SYNTHROID/LEVOTHROID   Dose:  150 mcg   Quantity:  45 tablet        Take 1 tablet (150 mcg) by mouth every other day   Refills:  3        * levothyroxine 175 MCG tablet   Commonly known as:  SYNTHROID/LEVOTHROID   Dose:  175 mcg   Quantity:  45 tablet        Take 1 tablet (175 mcg) by mouth every other day   Refills:  3        lovastatin 20 MG tablet   Commonly known as:  MEVACOR   Dose:  20 mg   Quantity:  90 tablet        Take 1 tablet (20 mg) by mouth At Bedtime   Refills:  3        Multi-vitamin Tabs tablet   Dose:  1 tablet        Take 1 tablet by mouth daily   Refills:  0        NONFORMULARY   Dose:  20 mg        Take 20 mg by mouth 2 times daily D-amphetamine Salt combo   Refills:  0        OLANZapine 2.5 MG tablet   Commonly known as:  zyPREXA   Dose:  2.5 mg   Quantity:  90 tablet        Take 1 tablet (2.5 mg) by mouth At Bedtime   Refills:  1        omeprazole 40 MG capsule   Commonly known as:  priLOSEC   Dose:  40 mg   Quantity:  90 capsule        Take 1 capsule (40 mg) by mouth daily Take 30-60 minutes before a meal.   Refills:  2        ondansetron 4 MG tablet   Commonly known as:  ZOFRAN   Dose:  4 mg   Quantity:  18 tablet        Take 1 tablet (4 mg) by mouth every 6 hours as needed for nausea   Refills:  5         oxyCODONE-acetaminophen 5-325 MG per tablet   Commonly known as:  PERCOCET   Dose:  1-2 tablet   Quantity:  15 tablet        Take 1-2 tablets by mouth every 6 hours as needed for pain   Refills:  0        prazosin 5 MG capsule   Commonly known as:  MINIPRESS   Dose:  5 mg   Quantity:  90 capsule        Take 1 capsule (5 mg) by mouth At Bedtime Take 1 capsule at bedtime   Refills:  3        tapentadol 50 MG Tabs tablet   Commonly known as:  NUCYNTA   Dose:  50 mg   Quantity:  120 tablet        Take 1 tablet (50 mg) by mouth every 6 hours as needed for moderate to severe pain Max #4/day.   Refills:  0        tiZANidine 4 MG tablet   Commonly known as:  ZANAFLEX   Dose:  4-12 mg   Quantity:  210 tablet        Take 1-3 tablets (4-12 mg) by mouth 3 times daily as needed for muscle spasms   Refills:  3        topiramate 50 MG tablet   Commonly known as:  TOPAMAX   Dose:   mg   Quantity:  120 tablet        Take 1-2 tablets ( mg) by mouth 2 times daily   Refills:  3        * Notice:  This list has 5 medication(s) that are the same as other medications prescribed for you. Read the directions carefully, and ask your doctor or other care provider to review them with you.            Prescriptions were sent or printed at these locations (1 Prescription)                   Other Prescriptions                Printed at Department/Unit printer (1 of 1)         pregabalin (LYRICA) 50 MG capsule                Procedures and tests performed during your visit     Bladder scan    CBC with platelets differential    CRP inflammation    Comprehensive metabolic panel    Erythrocyte sedimentation rate auto    INR    Lactic acid whole blood    Lumbar spine MRI w/o contrast    Procalcitonin    UA with Microscopic reflex to Culture      Orders Needing Specimen Collection     None      Pending Results     No orders found from 9/1/2017 to 9/4/2017.            Pending Culture Results     No orders found from 9/1/2017 to  9/4/2017.            Pending Results Instructions     If you had any lab results that were not finalized at the time of your Discharge, you can call the ED Lab Result RN at [phone]. You will be contacted by this team for any positive Lab results or changes in treatment. The nurses are available 7 days a week from 10A to 6:30P.  You can leave a message 24 hours per day and they will return your call.        Thank you for choosing [facility]       Thank you for choosing [facility] for your care. Our goal is always to provide you with excellent care. Hearing back from our patients is one way we can continue to improve our services. Please take a few minutes to complete the written survey that you may receive in the mail after you visit with us. Thank you!        [facility] Information     [facility] gives you secure access to your electronic health record. If you see a primary care provider, you can also send messages to your care team and make appointments. If you have questions, please call your primary care clinic.  If you do not have a primary care provider, please call [phone] and they will assist you.        Care EveryWhere ID     This is your Care EveryWhere ID. This could be used by other organizations to access your [facility] medical records  [id number]        Equal Access to Services     KRISTINA MLILER : Faye Heck, yaritza roth, claudia may, jane ma. So [facility] [phone].    ATENCIÓN: Si habla español, tiene a castle disposición servicios gratuitos de asistencia lingüística. Llame al [phone].    We comply with applicable federal civil rights laws and [place] laws. We do not discriminate on the basis of race, color, national origin, age, disability sex, sexual orientation or gender identity.            After Visit Summary       This is your record. Keep this with you and show to your community pharmacist(s) and doctor(s) at your next  visit.

## 2017-09-03 NOTE — ED NOTES
Left leg pain, numbness, and weakness. Patient first started having low back problems from a herniated disc at the beginning of July. Denies accident or injury. Has been seen [facility] for this issue. Had an epidural spinal injection on Wednesday at noon. States the pain was gone until Thrusday evening. Between Thursday evening and this morning intermittent bouts of pain that were responsive to PRN percocet and flexeril. Woke up with intolerable pain at 0400 today. Took 2 percocets and a flexeril and pain decreased. Pain got worse again around 1045 and patient took another percocet around 1130. She then called [facility] and was told to come to ER for possible bleed following the spinal injection.

## 2017-09-04 NOTE — ED NOTES
"Pt reports she is \"furious\" that she is not receiving a prescription for narcotics. Refuses to take Lyrica because, \"it doesn't work\"  States her insurance doesn't pay for lyrica and she cannot afford to pay for it. ED MD aware. Dr. Coleman is offering lyrica because she refuses to take gabapentin due to side effect of nightmares. Explained that ED does not prescribe additional narcotics for chronic pain, which pt sees a pain clinic for management.  Pt writes on discharge paperwork, \"Recieved no pain meds. My drug plan does not pay for Lyrica and I was not offered an alternative\"  Pt leaves ED ambulatory and independent   "

## 2017-09-04 NOTE — DISCHARGE INSTRUCTIONS
TODAY'S VISIT:  You were seen today for leg pain after an epidural injection.  - We have discussed her results with the Neurosurgery team and they do not think that there is any emergent/dangerous condition here identified today and that he can be discharged for further workup as an outpatient.  --- Their recommendations also include that he be referred to Neurology for further evaluation given the numbness sensation in more than just the distribution of your known herniated disc, that you follow up with your Pain team for ongoing pain assistance but that we can try something like gabapentin or Lyrica for you.  Because of your previous adverse reaction to gabapentin we will try the Lyrica after our discussion with the Pharmacy team. Neurosurgery also recommends that you undergo a course of formal regular Physical Therapy.   ---  If you're still having symptoms that is not responsive to the new Lyrica medication or any additional recommendation from your Pain team after seeing them, not responsive to any recommendations by Neurology, and not changed after a formal course of Physical Therapy then they report that they can potentially evaluate you for foraminotomy/surgical intervention for your symptoms.    FOLLOW-UP:  Please make an appointment to follow up with:  - Neurology Clinic (phone: [phone])  - Your usual Pain Team (Call them this week to see if your upcoming appointment can be moved up).   - Physical Therapy  - Neurosurgery Clinic (phone: [phone])    PRESCRIPTIONS / MEDICATIONS:  - You can try the prescribed Lyrica, three times a day as prescribed.   --- If you developed any adverse reactions to this medication, please stop the medication and notify a medical provider.  --- Always pay close attention when starting a new medication for anything like sedation, etc., and do not engage in any potentially dangerous activities in these cases.   - You has previously been prescribed a narcotic pain  medication. You should not take this medication and use alcohol or other sedating substances or medications. Do not drive, operate heavy machinery, or engage in potentially dangerous activities while on this medication. This medication can also cause constipation and other adverse reactions. If you develop abnormal symptoms stop taking this medication and contact your medical provider.     RETURN TO THE EMERGENCY DEPARTMENT  Return to the Emergency Department at any time for new/worsening symptoms.       Understanding Lumbar Radiculopathy    Lumbar radiculopathy is irritation or inflammation of a nerve root in the low back. It causes symptoms that spread out from the back down one or both legs. To understand this condition, it helps to understand the parts of the spine:    Vertebrae. These are bones that stack to form the spine. The lumbar spine contains the 5 bottom vertebrae.    Disks. These are soft pads of tissue between the vertebrae. They act as shock absorbers for the spine.    Spinal canal. This is a tunnel formed within the stacked vertebrae. In the lumbar spine, nerves run through this canal.    Nerves. These branch off and leave the spinal canal, traveling out to parts of the body. As they leave the spinal canal, nerves pass through openings between the vertebrae. The nerve root is the part of the nerve that is closest to the spinal canal.    Sciatic nerve. This is a large nerve formed from several nerve roots in the low back. This nerve extends down the back of the leg to the foot.  With lumbar radiculopathy, nerve roots in the low back become irritated. This leads to pain and symptoms. The sciatic nerve is commonly involved, so the condition is often called sciatica.  What causes lumbar radiculopathy?  Aging, injury, poor posture, extra body weight, and other issues can lead to problems in the low back. These problems may then irritate nerve roots. They include:    Damage to a disk in the lumbar spine.  The damaged disk may then press on nearby nerve roots.    Degeneration from wear and tear, and aging. This can lead to narrowing (stenosis) of the openings between the vertebrae. The narrowed openings press on nerve roots as they leave the spinal canal.    Unstable spine. This is when a vertebra slips forward. It can then press on a nerve root.  Other, less common things can put pressure on nerves in the low back. These include diabetes, infection, or a tumor.  Symptoms of lumbar radiculopathy  These include:    Pain in the low back    Pain, numbness, tingling, or weakness that travels into the buttocks, hip, groin, or leg    Muscle spasms  Treatment for lumbar radiculopathy  In most cases, your healthcare provider will first try treatments that help relieve symptoms. These may include:    Prescription and over-the-counter pain medicines. These help relieve pain, swelling, and irritation.    Limits on positions and activities that increase pain. But lying in bed or avoiding all movement is only recommended for a short period of time.    Physical therapy, including exercises and stretches. This helps decrease pain and increase movement and function.    Steroid shots into the lower back. This may help relieve symptoms for a time.    Weight-loss program. If you are overweight, losing extra pounds may help relieve symptoms.  In some cases, you may need surgery to fix the underlying problem. This depends on the cause, the symptoms, and how long the pain has lasted.  Possible complications  Over time, an irritated and inflamed nerve may become damaged. This may lead to long-lasting (permanent) numbness or weakness in your legs and feet. If symptoms change suddenly or get worse, be sure to let your healthcare provider know.  When to call your healthcare provider  Call your healthcare provider right away if you have any of these:    New pain or pain that gets worse    New or increasing weakness, tingling, or numbness in  your leg or foot    Problems controlling your bladder or bowel   Date Last Reviewed: 3/10/2016    [id number] [employer]. [address] [place] [id number]. All rights reserved. This information is not intended as a substitute for professional medical care. Always follow your healthcare professional's instructions.        Possible Causes of Low Back or Leg Pain    The symptoms in your back or leg may be due to pressure on a nerve. This pressure may be caused by a damaged disk or by abnormal bone growth. Either way, you may feel pain, burning, tingling, or numbness. If you have pressure on a nerve that connects to the sciatic nerve, pain may shoot down your leg.    Pressure from the disk  Constant wear and tear can weaken a disk over time and cause back pain. The disk can then be damaged by a sudden movement or injury. If its soft center begins to bulge, the disk may press on a nerve. Or the outside of the disk may tear, and the soft center may squeeze through and pinch a nerve.    Pressure from bone  As a disk wears out, the vertebrae right above and below the disk begin to touch. This can put pressure on a nerve. Often, abnormal bone (called bone spurs) grows where the vertebrae rub against each other. This can cause the foramen or the spinal canal to narrow (called stenosis) and press against a nerve.  Date Last Reviewed: 10/4/2015    [id number] [employer] [address] [place] [id number]. All rights reserved. This information is not intended as a substitute for professional medical care. Always follow your healthcare professional's instructions.

## 2017-09-04 NOTE — CONSULTS
"DATE OF SERVICE:  09/03/2017.        REASON FOR CONSULTATION:  Low back pain and left lower extremity pain following epidural steroid injection.      HISTORY OF PRESENT ILLNESS:  Ms. Ilsa Yusuf, is a 57-year-old female with a history of PTSD, major depressive disorder, hypothyroidism and arthritis, who presents with acute on chronic left lower extremity pain and numbness.   She reports that she began experiencing low back pain radiating down her left lower extremity in 07/2017, for which she has been evaluated at [facility].  She underwent epidural steroid injection through the [facility] 08/30/2017, for these symptoms.  She experienced immediate relief from the pain until she had an episode on the evening of 08/31/2017, where she had an increase in pain, which responded to Percocet.      On this morning of 09/03/2017, at 4:00 a.m. she awoke with acute pain in her left hip \"like someone was drilling it\" with whole leg numbness of the left lower extremity.  She took Flexeril this morning, which did not provide any pain relief.  Therefore, she came to the emergency room for further evaluation.      She has not had any recent trauma and no changes in bowel or bladder function.  Other than the episode of whole leg numbness, she has not had any decrease in sensation or iona weakness, though she does have some pain and limited weakness in her left lower extremity.      She reports performing physical therapy on her own she learned from a friend; however, she has not undertaken a formal physical therapy program.  She is not currently on any anticoagulation, including aspirin, Plavix or Coumadin.      PAST MEDICAL HISTORY:   1.  Seizure 03/2011.   2.  PTSD.   3.  Major depressive disorder.   4.  Hypothyroidism.   5.  Hyperlipidemia.   6.  Arthritis.      PAST SURGICAL HISTORY:   1.  Ureteral reimplantation.   2.  Tonsillectomy.   3.  Renal artery surgery   4.  Orthopedic surgery of the " left knee.   5.  Hernia repair.   6.  Colonoscopy in 2012.   7.  Cholecystectomy.   8.  Partial excision of thyroid   9.  Abdominal surgery.     MEDICATIONS  1. Adderall  2. Percocet  3. Flexeril  4. Tapentadol  5. Zanaflex  6. Prazosin  7. Zyprexa  8. Doxepin  9. Duloxetine  10. Prilosec  11. Levothyroxine  12. Lovastatin  13. Zofran     SOCIAL HISTORY:  Current cigarette smoker, a third of a pack a day, drinks a couple of times a week socially.   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      PHYSICAL EXAMINATION:   GENERAL:  This is a middle-aged female sitting in exam chair in no acute distress.   MENTAL STATUS:  She is alert and oriented x3 and responds appropriately to questioning.   NEUROLOGIC:  Cranial nerves II-XII are intact.   MUSCULOSKELETAL:  5/5 and symmetric in the upper and lower extremities, though there are some hesitancy with performing the exam in particular with the left lower extremity due to the associated shooting pain after exertion.   SENSATION:  Intact to light touch throughout.  Reflexes are 2/4 and symmetric.  There is no clonus in the bilateral lower extremities.   GAIT:  Slow and antalgic, favoring of the left leg.  She is able to stand on her tippy toes.      IMAGING:  MRI of the lumbar spine acquired 09/03/2017m was reviewed and demonstrates no central canal stenosis, there is a broad-based disc somewhat eccentric to the left at the level of L5-S1, which may impinge on the left S1 nerve root.  This is unchanged from previous MRI of the lumbar spine acquired 08/16/2017.      ASSESSMENT:  Ms. Yusuf is a 57-year-old female with left S1 radicular symptoms with unclear associated neurological symptoms of pain in the left lower extremity.  The shooting pain down the posterior aspect of her leg into her ankle that she reports intermittently is characteristic S1 radiculopathy.  However, entire leg numbness and stabbing pain is not  explained based on the MRI of her lumbar spine.  Therefore, further evaluation would be necessary to determine the etiology of these other neurological symptoms.      RECOMMENDATIONS:   - No neurosurgical intervention  - Lyrica, Medrol dospak for pain  - Neurology referral for nerve conduction studies  - Keep pain clinic apt , discuss PT referral   - If symptoms persist despite above, call to Neurosurgery clinic to schedule an appointment with Shreya Wong, consider left L5/S1 foraminotomy, possible diskectomy      Plan was discussed with Neurosurgery chief, Dr. Lucio, who agrees.         BAIRON BIGGS MD       As dictated by SARAH NÚÑEZ MD, PHD            D: 2017 21:05   T: 2017 22:04   MT: JUD      Name:     JAMIE DE LA GARZA   MRN:      [id number]        Account:       [id number]   :      1960           Consult Date:  2017      Document: [id number]

## 2017-09-04 NOTE — PROGRESS NOTES
Brief Neurosurgery Progress Note (#[id number]):  - No neurosurgical intervention  - Lyrica, Medrol dospak for pain  - Neurology referral for nerve conduction studies  - Keep pain clinic apt 9/11, discuss PT referral   - If symptoms persist despite above, call to Neurosurgery clinic to schedule left L5/S1 foraminotomy, possible diskectomy     The patient was discussed with Dr. See, neurosurgery chief resident, and he agrees with the above.    Oswald Waldrop MD,PhD  Neurosurgery PGY-2      Please contact neurosurgery resident on call with questions.    Dial * * [phone], enter [phone] when prompted.

## 2017-09-07 ENCOUNTER — TRANSFERRED RECORDS (OUTPATIENT)
Dept: HEALTH INFORMATION MANAGEMENT | Facility: CLINIC | Age: 57
End: 2017-09-07

## 2017-09-12 ENCOUNTER — OFFICE VISIT (OUTPATIENT)
Dept: FAMILY MEDICINE | Facility: CLINIC | Age: 57
End: 2017-09-12
Payer: COMMERCIAL

## 2017-09-12 VITALS
HEART RATE: 73 BPM | WEIGHT: 208 LBS | BODY MASS INDEX: 30.72 KG/M2 | TEMPERATURE: 98.2 F | DIASTOLIC BLOOD PRESSURE: 77 MMHG | OXYGEN SATURATION: 97 % | SYSTOLIC BLOOD PRESSURE: 132 MMHG | RESPIRATION RATE: 20 BRPM

## 2017-09-12 DIAGNOSIS — J18.9 PNEUMONIA OF BOTH LUNGS DUE TO INFECTIOUS ORGANISM, UNSPECIFIED PART OF LUNG: Primary | ICD-10-CM

## 2017-09-12 PROCEDURE — 99214 OFFICE O/P EST MOD 30 MIN: CPT | Performed by: FAMILY MEDICINE

## 2017-09-12 RX ORDER — LEVOFLOXACIN 750 MG/1
750 TABLET, FILM COATED ORAL DAILY
Qty: 7 TABLET | Refills: 0 | Status: SHIPPED | OUTPATIENT
Start: 2017-09-12 | End: 2017-11-27

## 2017-09-12 NOTE — PATIENT INSTRUCTIONS
Take levofloxacin 750mg tonight with dinner, tomorrow with lunch, and Thursday with breakfast. Then complete remainder of days (totalling 7 days) with dose at breakfast.  Fluids  Soup  Rest  Tylenol up to 1000mg every 8 hours or Ibuprofen up to 800mg every 8 hrs as needed for fever and aches.    Follow-up in 2 days.

## 2017-09-12 NOTE — NURSING NOTE
"Chief Complaint   Patient presents with     URI       Initial /77  Pulse 73  Temp 98.2  F (36.8  C) (Oral)  Resp 20  Wt 208 lb (94.3 kg)  SpO2 97%  BMI 30.72 kg/m2 Estimated body mass index is 30.72 kg/(m^2) as calculated from the following:    Height as of 9/3/17: 5' 9\" (1.753 m).    Weight as of this encounter: 208 lb (94.3 kg).  Medication Reconciliation: complete       Jeffery Westfall MA       "

## 2017-09-12 NOTE — MR AVS SNAPSHOT
After Visit Summary   9/12/2017    Ilsa Yusuf    MRN: [id number]           Patient Information     Date Of Birth          1960        Visit Information        Provider Department      9/12/2017 5:30 PM Oswald Harris MD [facility]        Today's Diagnoses     Pneumonia of both lungs due to infectious organism, unspecified part of lung    -  1      Care Instructions    Take levofloxacin 750mg tonight with dinner, tomorrow with lunch, and Thursday with breakfast. Then complete remainder of days (totalling 7 days) with dose at breakfast.  Fluids  Soup  Rest  Tylenol up to 1000mg every 8 hours or Ibuprofen up to 800mg every 8 hrs as needed for fever and aches.    Follow-up in 2 days.          Follow-ups after your visit        Who to contact     If you have questions or need follow up information about today's clinic visit or your schedule please contact [facility] directly at [phone].  Normal or non-critical lab and imaging results will be communicated to you by [employer], letter or phone within 4 business days after the clinic has received the results. If you do not hear from us within 7 days, please contact the clinic through [employer] or phone. If you have a critical or abnormal lab result, we will notify you by phone as soon as possible.  Submit refill requests through [employer] or call your pharmacy and they will forward the refill request to us. Please allow 3 business days for your refill to be completed.          Additional Information About Your Visit        Green[employer] Information     [employer] gives you secure access to your electronic health record. If you see a primary care provider, you can also send messages to your care team and make appointments. If you have questions, please call your primary care clinic.  If you do not have a primary care provider, please call [phone] and they will assist you.        Care EveryWhere  ID     This is your Care EveryWhere ID. This could be used by other organizations to access your [facility] medical records  [id number]        Your Vitals Were     Pulse Temperature Respirations Pulse Oximetry BMI (Body Mass Index)       73 98.2  F (36.8  C) (Oral) 20 97% 30.72 kg/m2        Blood Pressure from Last 3 Encounters:   09/12/17 132/77   09/03/17 134/88   08/25/17 109/69    Weight from Last 3 Encounters:   09/12/17 208 lb (94.3 kg)   08/25/17 212 lb (96.2 kg)   08/16/17 214 lb (97.1 kg)              Today, you had the following     No orders found for display         Today's Medication Changes          These changes are accurate as of: 9/12/17  6:02 PM.  If you have any questions, ask your nurse or doctor.               Start taking these medicines.        Dose/Directions    levofloxacin 750 MG tablet   Commonly known as:  LEVAQUIN   Used for:  Pneumonia of both lungs due to infectious organism, unspecified part of lung        Dose:  750 mg   Take 1 tablet (750 mg) by mouth daily   Quantity:  7 tablet   Refills:  0         These medicines have changed or have updated prescriptions.        Dose/Directions    * levothyroxine 150 MCG tablet   Commonly known as:  SYNTHROID/LEVOTHROID   This may have changed:  additional instructions   Used for:  Acquired hypothyroidism        Dose:  150 mcg   Take 1 tablet (150 mcg) by mouth every other day   Quantity:  45 tablet   Refills:  3       * levothyroxine 175 MCG tablet   Commonly known as:  SYNTHROID/LEVOTHROID   This may have changed:  Another medication with the same name was changed. Make sure you understand how and when to take each.   Used for:  Acquired hypothyroidism        Dose:  175 mcg   Take 1 tablet (175 mcg) by mouth every other day   Quantity:  45 tablet   Refills:  3       * Notice:  This list has 2 medication(s) that are the same as other medications prescribed for you. Read the directions carefully, and ask your doctor or other care provider to  review them with you.         Where to get your medicines      These medications were sent to [facility] - [place], [place] - [address]  [address], [place] [address]     Phone:  [phone]     levofloxacin 750 MG tablet                Primary Care Provider Office Phone # Fax #    Catrachita Collado [phone]       [address]  [place] [address]        Equal Access to Services     KRISTINA MILLER : Hadii aad ku hadasho Soomaali, waaxda luqadaha, qaybta kaalmada adeegyada, waxay idiin hayaan adeeg kharash lashena ma. So [facility] [phone].    ATENCIÓN: Si habla español, tiene a castle disposición servicios gratuitos de asistencia lingüística. [facility] [phone].    We comply with applicable federal civil rights laws and [place] laws. We do not discriminate on the basis of race, color, national origin, age, disability sex, sexual orientation or gender identity.            Thank you!     Thank you for choosing [facility]  for your care. Our goal is always to provide you with excellent care. Hearing back from our patients is one way we can continue to improve our services. Please take a few minutes to complete the written survey that you may receive in the mail after your visit with us. Thank you!             Your Updated Medication List - Protect others around you: Learn how to safely use, store and throw away your medicines at www.disposemymeds.org.          This list is accurate as of: 9/12/17  6:02 PM.  Always use your most recent med list.                   Brand Name Dispense Instructions for use Diagnosis    * amphetamine-dextroamphetamine 20 MG per tablet    ADDERALL    30 tablet    Take 1 tablet (20 mg) by mouth daily    Attention deficit hyperactivity disorder (ADHD), unspecified ADHD type       * amphetamine-dextroamphetamine 20 MG per tablet   Start taking on:  9/24/2017    ADDERALL    30 tablet    Take 1 tablet (20 mg) by mouth  daily    Attention deficit hyperactivity disorder (ADHD), unspecified ADHD type       * amphetamine-dextroamphetamine 20 MG per tablet   Start taking on:  10/24/2017    ADDERALL    30 tablet    Take 1 tablet (20 mg) by mouth daily    Attention deficit hyperactivity disorder (ADHD), unspecified ADHD type       cholecalciferol 5000 UNITS Caps capsule    vitamin D3    100 capsule    Take 1 capsule (5,000 Units) by mouth daily Take 1 per day    Major depressive disorder, recurrent episode, moderate (H)       cyclobenzaprine 5 MG tablet    FLEXERIL    42 tablet    Take 1 tablet (5 mg) by mouth 3 times daily as needed for muscle spasms        doxepin 10 MG capsule    SINEquan    180 capsule    TAKE ONE TO TWO CAPSULES BY MOUTH AT BEDTIME    Major depressive disorder, recurrent episode, moderate (H)       DULoxetine 60 MG EC capsule    CYMBALTA    180 capsule    TAKE 2 CAPSULES BY MOUTH EVERY DAY    Major depressive disorder, recurrent episode, moderate (H)       FIBER PO      Two capsules in the morning and two capsules at night        fish oil-omega-3 fatty acids 1000 MG capsule      Take 2 g by mouth daily Takes 1 in am and 1 at bedtime        fluticasone 50 MCG/ACT spray    FLONASE    16 g    Spray 1-2 sprays into both nostrils daily    Other chronic sinusitis       levofloxacin 750 MG tablet    LEVAQUIN    7 tablet    Take 1 tablet (750 mg) by mouth daily    Pneumonia of both lungs due to infectious organism, unspecified part of lung       * levothyroxine 150 MCG tablet    SYNTHROID/LEVOTHROID    45 tablet    Take 1 tablet (150 mcg) by mouth every other day    Acquired hypothyroidism       * levothyroxine 175 MCG tablet    SYNTHROID/LEVOTHROID    45 tablet    Take 1 tablet (175 mcg) by mouth every other day    Acquired hypothyroidism       lovastatin 20 MG tablet    MEVACOR    90 tablet    Take 1 tablet (20 mg) by mouth At Bedtime    Hyperlipidemia LDL goal <130       Multi-vitamin Tabs tablet      Take 1 tablet by  mouth daily        NONFORMULARY      Take 20 mg by mouth 2 times daily D-amphetamine Salt combo        OLANZapine 2.5 MG tablet    zyPREXA    90 tablet    Take 1 tablet (2.5 mg) by mouth At Bedtime    Major depressive disorder, recurrent episode, moderate (H)       omeprazole 40 MG capsule    priLOSEC    90 capsule    Take 1 capsule (40 mg) by mouth daily Take 30-60 minutes before a meal.    Esophageal reflux       ondansetron 4 MG tablet    ZOFRAN    18 tablet    Take 1 tablet (4 mg) by mouth every 6 hours as needed for nausea    Norovirus       oxyCODONE-acetaminophen 5-325 MG per tablet    PERCOCET    15 tablet    Take 1-2 tablets by mouth every 6 hours as needed for pain        prazosin 5 MG capsule    MINIPRESS    90 capsule    Take 1 capsule (5 mg) by mouth At Bedtime Take 1 capsule at bedtime    Primary insomnia       pregabalin 50 MG capsule    LYRICA    30 capsule    Take 1 capsule (50 mg) by mouth 3 times daily for 10 days        tapentadol 50 MG Tabs tablet    NUCYNTA    120 tablet    Take 1 tablet (50 mg) by mouth every 6 hours as needed for moderate to severe pain Max #4/day.    Chronic pain syndrome       tiZANidine 4 MG tablet    ZANAFLEX    210 tablet    Take 1-3 tablets (4-12 mg) by mouth 3 times daily as needed for muscle spasms    Cervical radiculopathy       topiramate 50 MG tablet    TOPAMAX    120 tablet    Take 1-2 tablets ( mg) by mouth 2 times daily    Chronic pain syndrome       * Notice:  This list has 5 medication(s) that are the same as other medications prescribed for you. Read the directions carefully, and ask your doctor or other care provider to review them with you.

## 2017-09-12 NOTE — PROGRESS NOTES
SUBJECTIVE:   Ilsa Yusuf is a 57 year old female who presents to clinic today for the following health issues:      RESPIRATORY SYMPTOMS      Duration: three weeks     Description  nasal congestion, rhinorrhea, cough and burning in chest     Severity: severe    Accompanying signs and symptoms: None    History (predisposing factors):  none    Precipitating or alleviating factors: None    Therapies tried and outcome:  Increased fluids     Doesn't smoke.    She feels short of breath.    Does feel feverish.    Feels pretty lousy.    ROS  Able to eat  No abdominal pain  No chest pain    EXAM:  /77  Pulse 73  Temp 98.2  F (36.8  C) (Oral)  Resp 20  Wt 208 lb (94.3 kg)  SpO2 97%  BMI 30.72 kg/m2  Constitutional: Cough  EENT: PERRL, TM's clear bilaterally, oropharynx without erythema, no anterior cervical lymphadenopathy   Cardiovascular: RRR. No murmurs   Respiratory: Coarse breath sounds, wheeze and crackles bilaterally  Gastrointestinal: Bowel sounds active, no masses, rebound, guarding or organomegally     ASSESSMENT    ICD-10-CM    1. Pneumonia of both lungs due to infectious organism, unspecified part of lung J18.9 levofloxacin (LEVAQUIN) 750 MG tablet      Plan:  Patient Instructions   Take levofloxacin 750mg tonight with dinner, tomorrow with lunch, and Thursday with breakfast. Then complete remainder of days (totalling 7 days) with dose at breakfast.  Fluids  Soup  Rest  Tylenol up to 1000mg every 8 hours or Ibuprofen up to 800mg every 8 hrs as needed for fever and aches.    Follow-up in 2 days.  If not resolving may require IV antibiotics, but reasonable to treat as outpatient initially.     Oswald Leong MD  Family Medicine Physician

## 2017-09-14 ENCOUNTER — OFFICE VISIT (OUTPATIENT)
Dept: FAMILY MEDICINE | Facility: CLINIC | Age: 57
End: 2017-09-14
Payer: COMMERCIAL

## 2017-09-14 VITALS
TEMPERATURE: 98.9 F | OXYGEN SATURATION: 96 % | RESPIRATION RATE: 20 BRPM | BODY MASS INDEX: 30.78 KG/M2 | WEIGHT: 208.4 LBS | HEART RATE: 68 BPM | SYSTOLIC BLOOD PRESSURE: 97 MMHG | DIASTOLIC BLOOD PRESSURE: 65 MMHG

## 2017-09-14 DIAGNOSIS — J18.9 PNEUMONIA OF RIGHT LOWER LOBE DUE TO INFECTIOUS ORGANISM: Primary | ICD-10-CM

## 2017-09-14 PROCEDURE — 99213 OFFICE O/P EST LOW 20 MIN: CPT | Performed by: FAMILY MEDICINE

## 2017-09-14 NOTE — PROGRESS NOTES
"  SUBJECTIVE:   Ilsa Yusuf is a 57 year old female who presents to clinic today for a follow up after being diagnosed on 09/12/17 with CAP. Pt stated that she is not feeling any better, but does not feel worse. She explained that she feels that there is \"less liquid\" in her chest, but continues to have a productive cough with green sputum. Pt explained that she still gets SOB with exertion and feels \"hot\" at times. She said she is taking in plenty of fluids, eating well, and getting rest at home.        EXAM:  BP 97/65  Pulse 68  Temp 98.9  F (37.2  C) (Oral)  Resp 20  Wt 208 lb 6.4 oz (94.5 kg)  SpO2 96%  BMI 30.78 kg/m2    Exam:  Constitutional: looks tired, but alert  Head: Normocephalic. No masses, lesions, tenderness or abnormalities  Neck: Neck supple. No adenopathy. Thyroid symmetric, normal size, Carotids without bruits.  ENT: ENT exam normal, no neck nodes or sinus tenderness and TM normal without fluid or infection  Cardiovascular:  No murmurs, clicks gallops or rub  Respiratory: rhonchi and wheezing are located in the R lower Lobe. Lungs improved from prior visit.  Hematologic/Lymphatic/Immunologic: Normal cervical lymph nodes      ASSESSMENT    ICD-10-CM    1. Pneumonia of right lower lobe due to infectious organism (H) J18.1       Plan:  Will continue initial course of abx, and will follow up in clinic on Monday. Explained to pt that if sxs did not improve over the weekend that she would need to be hospitalized for IV therapy of abx.    Infection has localized to the R lower lobe.       Note scribed by DEON Sotomayor      Reviewed agree with documation above. Patient interviewed and examined by DEON Sotomayor and myself.    Oswald Leong MD  Family Medicine Physician       "

## 2017-09-14 NOTE — MR AVS SNAPSHOT
After Visit Summary   9/14/2017    Ilsa Yusuf    MRN: [id number]           Patient Information     Date Of Birth          1960        Visit Information        Provider Department      9/14/2017 11:15 AM Oswald Harris MD [facility]        Today's Diagnoses     Pneumonia of right lower lobe due to infectious organism (H)    -  1       Follow-ups after your visit        Your next 10 appointments already scheduled     Sep 18, 2017 10:45 AM CDT   SHORT with Oswald Leong MD   [facility] ([facility]    [address] [address] [phone]              Who to contact     If you have questions or need follow up information about today's clinic visit or your schedule please contact [facility] directly at [phone].  Normal or non-critical lab and imaging results will be communicated to you by MyChart, letter or phone within 4 business days after the clinic has received the results. If you do not hear from us within 7 days, please contact the clinic through [facility] or phone. If you have a critical or abnormal lab result, we will notify you by phone as soon as possible.  Submit refill requests through [employer] or call your pharmacy and they will forward the refill request to us. Please allow 3 business days for your refill to be completed.          Additional Information About Your Visit        MyChart Information     [facility] [facility] gives you secure access to your electronic health record. If you see a primary care provider, you can also send messages to your care team and make appointments. If you have questions, please call your primary care clinic.  If you do not have a primary care provider, please call [phone] and they will assist you.        Care EveryWhere ID     This is your Care EveryWhere ID. This could be used by other organizations to access your  [place] medical records  [id number]        Your Vitals Were     Pulse Temperature Respirations Pulse Oximetry BMI (Body Mass Index)       68 98.9  F (37.2  C) (Oral) 20 96% 30.78 kg/m2        Blood Pressure from Last 3 Encounters:   09/14/17 97/65   09/12/17 132/77   09/03/17 134/88    Weight from Last 3 Encounters:   09/14/17 208 lb 6.4 oz (94.5 kg)   09/12/17 208 lb (94.3 kg)   08/25/17 212 lb (96.2 kg)              Today, you had the following     No orders found for display         Today's Medication Changes          These changes are accurate as of: 9/14/17  5:48 PM.  If you have any questions, ask your nurse or doctor.               These medicines have changed or have updated prescriptions.        Dose/Directions    * levothyroxine 150 MCG tablet   Commonly known as:  SYNTHROID/LEVOTHROID   This may have changed:  additional instructions   Used for:  Acquired hypothyroidism   Changed by:  Catrachita Collado MD        Dose:  150 mcg   Take 1 tablet (150 mcg) by mouth every other day   Quantity:  45 tablet   Refills:  3       * levothyroxine 175 MCG tablet   Commonly known as:  SYNTHROID/LEVOTHROID   This may have changed:  Another medication with the same name was changed. Make sure you understand how and when to take each.   Used for:  Acquired hypothyroidism   Changed by:  Catrachita Collado MD        Dose:  175 mcg   Take 1 tablet (175 mcg) by mouth every other day   Quantity:  45 tablet   Refills:  3       * Notice:  This list has 2 medication(s) that are the same as other medications prescribed for you. Read the directions carefully, and ask your doctor or other care provider to review them with you.             Primary Care Provider Office Phone # Fax #    Catrachita Collado [phone] [address]  [place] [address]        Equal Access to Services     [facility] [place] AH: Faye Heck, yaritza roth, claudia may, jane cruz  celestine bonilla ah. So [facility] [phone].    ATENCIÓN: Si ruddy srivastava, tiene a castle disposición servicios gratuitos de asistencia lingüística. Chaparro al [phone].    We comply with applicable federal civil rights laws and [place] laws. We do not discriminate on the basis of race, color, national origin, age, disability sex, sexual orientation or gender identity.            Thank you!     Thank you for choosing [facility]  for your care. Our goal is always to provide you with excellent care. Hearing back from our patients is one way we can continue to improve our services. Please take a few minutes to complete the written survey that you may receive in the mail after your visit with us. Thank you!             Your Updated Medication List - Protect others around you: Learn how to safely use, store and throw away your medicines at www.disposemymeds.org.          This list is accurate as of: 9/14/17  5:48 PM.  Always use your most recent med list.                   Brand Name Dispense Instructions for use Diagnosis    * amphetamine-dextroamphetamine 20 MG per tablet    ADDERALL    30 tablet    Take 1 tablet (20 mg) by mouth daily    Attention deficit hyperactivity disorder (ADHD), unspecified ADHD type       * amphetamine-dextroamphetamine 20 MG per tablet   Start taking on:  9/24/2017    ADDERALL    30 tablet    Take 1 tablet (20 mg) by mouth daily    Attention deficit hyperactivity disorder (ADHD), unspecified ADHD type       * amphetamine-dextroamphetamine 20 MG per tablet   Start taking on:  10/24/2017    ADDERALL    30 tablet    Take 1 tablet (20 mg) by mouth daily    Attention deficit hyperactivity disorder (ADHD), unspecified ADHD type       cholecalciferol 5000 UNITS Caps capsule    vitamin D3    100 capsule    Take 1 capsule (5,000 Units) by mouth daily Take 1 per day    Major depressive disorder, recurrent episode, moderate (H)       cyclobenzaprine 5 MG tablet    FLEXERIL    42 tablet     Take 1 tablet (5 mg) by mouth 3 times daily as needed for muscle spasms        doxepin 10 MG capsule    SINEquan    180 capsule    TAKE ONE TO TWO CAPSULES BY MOUTH AT BEDTIME    Major depressive disorder, recurrent episode, moderate (H)       DULoxetine 60 MG EC capsule    CYMBALTA    180 capsule    TAKE 2 CAPSULES BY MOUTH EVERY DAY    Major depressive disorder, recurrent episode, moderate (H)       FIBER PO      Two capsules in the morning and two capsules at night        fish oil-omega-3 fatty acids 1000 MG capsule      Take 2 g by mouth daily Takes 1 in am and 1 at bedtime        fluticasone 50 MCG/ACT spray    FLONASE    16 g    Spray 1-2 sprays into both nostrils daily    Other chronic sinusitis       levofloxacin 750 MG tablet    LEVAQUIN    7 tablet    Take 1 tablet (750 mg) by mouth daily    Pneumonia of both lungs due to infectious organism, unspecified part of lung       * levothyroxine 150 MCG tablet    SYNTHROID/LEVOTHROID    45 tablet    Take 1 tablet (150 mcg) by mouth every other day    Acquired hypothyroidism       * levothyroxine 175 MCG tablet    SYNTHROID/LEVOTHROID    45 tablet    Take 1 tablet (175 mcg) by mouth every other day    Acquired hypothyroidism       lovastatin 20 MG tablet    MEVACOR    90 tablet    Take 1 tablet (20 mg) by mouth At Bedtime    Hyperlipidemia LDL goal <130       Multi-vitamin Tabs tablet      Take 1 tablet by mouth daily        NONFORMULARY      Take 20 mg by mouth 2 times daily D-amphetamine Salt combo        OLANZapine 2.5 MG tablet    zyPREXA    90 tablet    Take 1 tablet (2.5 mg) by mouth At Bedtime    Major depressive disorder, recurrent episode, moderate (H)       omeprazole 40 MG capsule    priLOSEC    90 capsule    Take 1 capsule (40 mg) by mouth daily Take 30-60 minutes before a meal.    Esophageal reflux       ondansetron 4 MG tablet    ZOFRAN    18 tablet    Take 1 tablet (4 mg) by mouth every 6 hours as needed for nausea    Norovirus        oxyCODONE-acetaminophen 5-325 MG per tablet    PERCOCET    15 tablet    Take 1-2 tablets by mouth every 6 hours as needed for pain        prazosin 5 MG capsule    MINIPRESS    90 capsule    Take 1 capsule (5 mg) by mouth At Bedtime Take 1 capsule at bedtime    Primary insomnia       pregabalin 50 MG capsule    LYRICA    30 capsule    Take 1 capsule (50 mg) by mouth 3 times daily for 10 days        tapentadol 50 MG Tabs tablet    NUCYNTA    120 tablet    Take 1 tablet (50 mg) by mouth every 6 hours as needed for moderate to severe pain Max #4/day.    Chronic pain syndrome       tiZANidine 4 MG tablet    ZANAFLEX    210 tablet    Take 1-3 tablets (4-12 mg) by mouth 3 times daily as needed for muscle spasms    Cervical radiculopathy       topiramate 50 MG tablet    TOPAMAX    120 tablet    Take 1-2 tablets ( mg) by mouth 2 times daily    Chronic pain syndrome       * Notice:  This list has 5 medication(s) that are the same as other medications prescribed for you. Read the directions carefully, and ask your doctor or other care provider to review them with you.

## 2017-09-14 NOTE — NURSING NOTE
"Chief Complaint   Patient presents with     RECHECK       Initial BP 97/65  Pulse 68  Temp 98.9  F (37.2  C) (Oral)  Resp 20  Wt 208 lb 6.4 oz (94.5 kg)  SpO2 96%  BMI 30.78 kg/m2 Estimated body mass index is 30.78 kg/(m^2) as calculated from the following:    Height as of 9/3/17: 5' 9\" (1.753 m).    Weight as of this encounter: 208 lb 6.4 oz (94.5 kg).  Medication Reconciliation: complete     Ju Mccarthy MA    "

## 2017-09-18 ENCOUNTER — OFFICE VISIT (OUTPATIENT)
Dept: FAMILY MEDICINE | Facility: CLINIC | Age: 57
End: 2017-09-18
Payer: COMMERCIAL

## 2017-09-18 VITALS
SYSTOLIC BLOOD PRESSURE: 106 MMHG | TEMPERATURE: 94.8 F | BODY MASS INDEX: 30.98 KG/M2 | HEART RATE: 75 BPM | RESPIRATION RATE: 20 BRPM | DIASTOLIC BLOOD PRESSURE: 69 MMHG | WEIGHT: 209.8 LBS | OXYGEN SATURATION: 96 %

## 2017-09-18 DIAGNOSIS — J18.9 PNEUMONIA OF RIGHT LOWER LOBE DUE TO INFECTIOUS ORGANISM: Primary | ICD-10-CM

## 2017-09-18 PROCEDURE — 99213 OFFICE O/P EST LOW 20 MIN: CPT | Performed by: FAMILY MEDICINE

## 2017-09-18 NOTE — PROGRESS NOTES
SUBJECTIVE:   Ilsa Yusuf is a 57 year old female who presents to clinic today for the following health issues:    Pt is here for a pneumonia follow up. Pt state she gets shortness of breath when walking.     EXAM:  /69 (BP Location: Left arm, Patient Position: Sitting, Cuff Size: Adult Regular)  Pulse 75  Temp 94.8  F (34.9  C) (Tympanic)  Resp 20  Wt 209 lb 12.8 oz (95.2 kg)  SpO2 96%  BMI 30.98 kg/m2  Constitutional: Healthy, alert, no distress, a bit tired  Cardiovascular: RRR. No murmurs   Respiratory: Clear to auscultation, no wheeze or crackles.      ASSESSMENT    ICD-10-CM    1. Pneumonia of right lower lobe due to infectious organism (H) J18.1       Plan:  No further treatment required.  Doing well.  Discussed care plan going forward.  Given severity of pneumonia being treated outpatient I feel today's exam was essential to be sure patient had received sufficient treatment and guide her to not have relapse. Doing much better today.  Return at first sign of symptoms returning in near future.    Oswald Leong MD  Family Medicine Physician

## 2017-09-18 NOTE — NURSING NOTE
"Chief Complaint   Patient presents with     Follow Up For     pnuemonia       Initial /69 (BP Location: Left arm, Patient Position: Sitting, Cuff Size: Adult Regular)  Pulse 75  Temp 94.8  F (34.9  C) (Tympanic)  Resp 20  Wt 209 lb 12.8 oz (95.2 kg)  SpO2 96%  BMI 30.98 kg/m2 Estimated body mass index is 30.98 kg/(m^2) as calculated from the following:    Height as of 9/3/17: 5' 9\" (1.753 m).    Weight as of this encounter: 209 lb 12.8 oz (95.2 kg).  Medication Reconciliation: unable or not appropriate to perform       Ger Granado MA      "

## 2017-09-18 NOTE — MR AVS SNAPSHOT
After Visit Summary   9/18/2017    Ilsa Yusuf    MRN: [id number]           Patient Information     Date Of Birth          1960        Visit Information        Provider Department      9/18/2017 10:45 AM Oswald Harris MD [facility]        Today's Diagnoses     Pneumonia of right lower lobe due to infectious organism (H)    -  1       Follow-ups after your visit        Who to contact     If you have questions or need follow up information about today's clinic visit or your schedule please contact [facility] directly at [phone].  Normal or non-critical lab and imaging results will be communicated to you by MyChart, letter or phone within 4 business days after the clinic has received the results. If you do not hear from us within 7 days, please contact the clinic through [facility] or phone. If you have a critical or abnormal lab result, we will notify you by phone as soon as possible.  Submit refill requests through [employer] or call your pharmacy and they will forward the refill request to us. Please allow 3 business days for your refill to be completed.          Additional Information About Your Visit        MyChart Information     [facility] gives you secure access to your electronic health record. If you see a primary care provider, you can also send messages to your care team and make appointments. If you have questions, please call your primary care clinic.  If you do not have a primary care provider, please call [phone] and they will assist you.        Care EveryWhere ID     This is your Care EveryWhere ID. This could be used by other organizations to access your [place] medical records  [id number]        Your Vitals Were     Pulse Temperature Respirations Pulse Oximetry BMI (Body Mass Index)       75 94.8  F (34.9  C) (Tympanic) 20 96% 30.98 kg/m2        Blood Pressure from Last 3 Encounters:   09/18/17 106/69   09/14/17  97/65   09/12/17 132/77    Weight from Last 3 Encounters:   09/18/17 209 lb 12.8 oz (95.2 kg)   09/14/17 208 lb 6.4 oz (94.5 kg)   09/12/17 208 lb (94.3 kg)              Today, you had the following     No orders found for display         Today's Medication Changes          These changes are accurate as of: 9/18/17 12:54 PM.  If you have any questions, ask your nurse or doctor.               These medicines have changed or have updated prescriptions.        Dose/Directions    * levothyroxine 150 MCG tablet   Commonly known as:  SYNTHROID/LEVOTHROID   This may have changed:  additional instructions   Used for:  Acquired hypothyroidism   Changed by:  Catrachita Collado MD        Dose:  150 mcg   Take 1 tablet (150 mcg) by mouth every other day   Quantity:  45 tablet   Refills:  3       * levothyroxine 175 MCG tablet   Commonly known as:  SYNTHROID/LEVOTHROID   This may have changed:  Another medication with the same name was changed. Make sure you understand how and when to take each.   Used for:  Acquired hypothyroidism   Changed by:  Catrachita Collado MD        Dose:  175 mcg   Take 1 tablet (175 mcg) by mouth every other day   Quantity:  45 tablet   Refills:  3       * Notice:  This list has 2 medication(s) that are the same as other medications prescribed for you. Read the directions carefully, and ask your doctor or other care provider to review them with you.             Primary Care Provider Office Phone # Fax #    Catrachita Collado [phone] [address] [place] [address]        Equal Access to Services     [facility] AH: Hadii vladislav byrnes hadasho Sojericaali, waaxda luqadaha, qaybta kaalmada adeegyada, jane bonilla . So [facility] [phone].    ATENCIÓN: Si habla español, tiene a castle disposición servicios gratuitos de asistencia lingüística. Llame al [phone].    We comply with applicable federal civil rights laws and [place] laws. We do not  discriminate on the basis of race, color, national origin, age, disability sex, sexual orientation or gender identity.            Thank you!     Thank you for choosing [facility]  for your care. Our goal is always to provide you with excellent care. Hearing back from our patients is one way we can continue to improve our services. Please take a few minutes to complete the written survey that you may receive in the mail after your visit with us. Thank you!             Your Updated Medication List - Protect others around you: Learn how to safely use, store and throw away your medicines at www.disposemymeds.org.          This list is accurate as of: 9/18/17 12:54 PM.  Always use your most recent med list.                   Brand Name Dispense Instructions for use Diagnosis    * amphetamine-dextroamphetamine 20 MG per tablet    ADDERALL    30 tablet    Take 1 tablet (20 mg) by mouth daily    Attention deficit hyperactivity disorder (ADHD), unspecified ADHD type       * amphetamine-dextroamphetamine 20 MG per tablet   Start taking on:  9/24/2017    ADDERALL    30 tablet    Take 1 tablet (20 mg) by mouth daily    Attention deficit hyperactivity disorder (ADHD), unspecified ADHD type       * amphetamine-dextroamphetamine 20 MG per tablet   Start taking on:  10/24/2017    ADDERALL    30 tablet    Take 1 tablet (20 mg) by mouth daily    Attention deficit hyperactivity disorder (ADHD), unspecified ADHD type       cholecalciferol 5000 UNITS Caps capsule    vitamin D3    100 capsule    Take 1 capsule (5,000 Units) by mouth daily Take 1 per day    Major depressive disorder, recurrent episode, moderate (H)       cyclobenzaprine 5 MG tablet    FLEXERIL    42 tablet    Take 1 tablet (5 mg) by mouth 3 times daily as needed for muscle spasms        doxepin 10 MG capsule    SINEquan    180 capsule    TAKE ONE TO TWO CAPSULES BY MOUTH AT BEDTIME    Major depressive disorder, recurrent episode, moderate (H)        DULoxetine 60 MG EC capsule    CYMBALTA    180 capsule    TAKE 2 CAPSULES BY MOUTH EVERY DAY    Major depressive disorder, recurrent episode, moderate (H)       FIBER PO      Two capsules in the morning and two capsules at night        fish oil-omega-3 fatty acids 1000 MG capsule      Take 2 g by mouth daily Takes 1 in am and 1 at bedtime        fluticasone 50 MCG/ACT spray    FLONASE    16 g    Spray 1-2 sprays into both nostrils daily    Other chronic sinusitis       levofloxacin 750 MG tablet    LEVAQUIN    7 tablet    Take 1 tablet (750 mg) by mouth daily    Pneumonia of both lungs due to infectious organism, unspecified part of lung       * levothyroxine 150 MCG tablet    SYNTHROID/LEVOTHROID    45 tablet    Take 1 tablet (150 mcg) by mouth every other day    Acquired hypothyroidism       * levothyroxine 175 MCG tablet    SYNTHROID/LEVOTHROID    45 tablet    Take 1 tablet (175 mcg) by mouth every other day    Acquired hypothyroidism       lovastatin 20 MG tablet    MEVACOR    90 tablet    Take 1 tablet (20 mg) by mouth At Bedtime    Hyperlipidemia LDL goal <130       Multi-vitamin Tabs tablet      Take 1 tablet by mouth daily        NONFORMULARY      Take 20 mg by mouth 2 times daily D-amphetamine Salt combo        OLANZapine 2.5 MG tablet    zyPREXA    90 tablet    Take 1 tablet (2.5 mg) by mouth At Bedtime    Major depressive disorder, recurrent episode, moderate (H)       omeprazole 40 MG capsule    priLOSEC    90 capsule    Take 1 capsule (40 mg) by mouth daily Take 30-60 minutes before a meal.    Esophageal reflux       ondansetron 4 MG tablet    ZOFRAN    18 tablet    Take 1 tablet (4 mg) by mouth every 6 hours as needed for nausea    Norovirus       oxyCODONE-acetaminophen 5-325 MG per tablet    PERCOCET    15 tablet    Take 1-2 tablets by mouth every 6 hours as needed for pain        prazosin 5 MG capsule    MINIPRESS    90 capsule    Take 1 capsule (5 mg) by mouth At Bedtime Take 1 capsule at bedtime     Primary insomnia       pregabalin 50 MG capsule    LYRICA    30 capsule    Take 1 capsule (50 mg) by mouth 3 times daily for 10 days        tapentadol 50 MG Tabs tablet    NUCYNTA    120 tablet    Take 1 tablet (50 mg) by mouth every 6 hours as needed for moderate to severe pain Max #4/day.    Chronic pain syndrome       tiZANidine 4 MG tablet    ZANAFLEX    210 tablet    Take 1-3 tablets (4-12 mg) by mouth 3 times daily as needed for muscle spasms    Cervical radiculopathy       topiramate 50 MG tablet    TOPAMAX    120 tablet    Take 1-2 tablets ( mg) by mouth 2 times daily    Chronic pain syndrome       * Notice:  This list has 5 medication(s) that are the same as other medications prescribed for you. Read the directions carefully, and ask your doctor or other care provider to review them with you.

## 2017-10-03 ENCOUNTER — TRANSFERRED RECORDS (OUTPATIENT)
Dept: HEALTH INFORMATION MANAGEMENT | Facility: CLINIC | Age: 57
End: 2017-10-03

## 2017-10-25 ENCOUNTER — TRANSFERRED RECORDS (OUTPATIENT)
Dept: HEALTH INFORMATION MANAGEMENT | Facility: CLINIC | Age: 57
End: 2017-10-25

## 2017-11-01 DIAGNOSIS — Z13.1 SCREENING FOR DIABETES MELLITUS: Primary | ICD-10-CM

## 2017-11-01 DIAGNOSIS — Z51.81 ENCOUNTER FOR THERAPEUTIC DRUG MONITORING: ICD-10-CM

## 2017-11-01 DIAGNOSIS — E78.5 HYPERLIPIDEMIA LDL GOAL <130: ICD-10-CM

## 2017-11-01 DIAGNOSIS — E03.9 ACQUIRED HYPOTHYROIDISM: ICD-10-CM

## 2017-11-01 DIAGNOSIS — F33.1 MAJOR DEPRESSIVE DISORDER, RECURRENT EPISODE, MODERATE (H): ICD-10-CM

## 2017-11-03 RX ORDER — OLANZAPINE 2.5 MG/1
TABLET, FILM COATED ORAL
Qty: 90 TABLET | Refills: 3 | Status: SHIPPED | OUTPATIENT
Start: 2017-11-03 | End: 2018-10-15

## 2017-11-03 NOTE — TELEPHONE ENCOUNTER
Thank you, please ask her to schedule fasting lab appointment, will be checking fasting labs plus her annual thyroid.  Thanks.

## 2017-11-03 NOTE — TELEPHONE ENCOUNTER
Routing refill request to provider for review/approval because:  Labs not current:  flp not done in over a year and BS was elevated 9/3/2017

## 2017-11-06 NOTE — TELEPHONE ENCOUNTER
Nurse informed patient to make a lab only appt fasting.  She agrees to do this within the month.  Lisa Momin RN

## 2017-11-16 ENCOUNTER — TRANSFERRED RECORDS (OUTPATIENT)
Dept: HEALTH INFORMATION MANAGEMENT | Facility: CLINIC | Age: 57
End: 2017-11-16

## 2017-11-27 ENCOUNTER — OFFICE VISIT (OUTPATIENT)
Dept: FAMILY MEDICINE | Facility: CLINIC | Age: 57
End: 2017-11-27
Payer: COMMERCIAL

## 2017-11-27 VITALS
RESPIRATION RATE: 18 BRPM | WEIGHT: 206.38 LBS | DIASTOLIC BLOOD PRESSURE: 69 MMHG | OXYGEN SATURATION: 99 % | HEIGHT: 69 IN | BODY MASS INDEX: 30.57 KG/M2 | SYSTOLIC BLOOD PRESSURE: 107 MMHG | TEMPERATURE: 98.8 F | HEART RATE: 60 BPM

## 2017-11-27 DIAGNOSIS — K21.9 GASTROESOPHAGEAL REFLUX DISEASE, ESOPHAGITIS PRESENCE NOT SPECIFIED: ICD-10-CM

## 2017-11-27 DIAGNOSIS — Z01.818 PREOP GENERAL PHYSICAL EXAM: Primary | ICD-10-CM

## 2017-11-27 DIAGNOSIS — Z01.818 PRE-OP EXAM: ICD-10-CM

## 2017-11-27 DIAGNOSIS — M54.12 CERVICAL RADICULOPATHY: ICD-10-CM

## 2017-11-27 DIAGNOSIS — E78.5 HYPERLIPIDEMIA LDL GOAL <160: ICD-10-CM

## 2017-11-27 DIAGNOSIS — R10.12 ABDOMINAL PAIN, LEFT UPPER QUADRANT: ICD-10-CM

## 2017-11-27 DIAGNOSIS — Z13.1 SCREENING FOR DIABETES MELLITUS: ICD-10-CM

## 2017-11-27 DIAGNOSIS — E03.9 HYPOTHYROIDISM, UNSPECIFIED TYPE: ICD-10-CM

## 2017-11-27 DIAGNOSIS — E03.9 ACQUIRED HYPOTHYROIDISM: ICD-10-CM

## 2017-11-27 LAB
ERYTHROCYTE [DISTWIDTH] IN BLOOD BY AUTOMATED COUNT: 14.6 % (ref 10–15)
HCT VFR BLD AUTO: 45.6 % (ref 35–47)
HGB BLD-MCNC: 15.1 G/DL (ref 11.7–15.7)
MCH RBC QN AUTO: 30.8 PG (ref 26.5–33)
MCHC RBC AUTO-ENTMCNC: 33.1 G/DL (ref 31.5–36.5)
MCV RBC AUTO: 93 FL (ref 78–100)
PLATELET # BLD AUTO: 207 10E9/L (ref 150–450)
RBC # BLD AUTO: 4.91 10E12/L (ref 3.8–5.2)
WBC # BLD AUTO: 6.6 10E9/L (ref 4–11)

## 2017-11-27 PROCEDURE — 99215 OFFICE O/P EST HI 40 MIN: CPT | Performed by: FAMILY MEDICINE

## 2017-11-27 PROCEDURE — 85027 COMPLETE CBC AUTOMATED: CPT | Performed by: FAMILY MEDICINE

## 2017-11-27 PROCEDURE — 84443 ASSAY THYROID STIM HORMONE: CPT | Performed by: FAMILY MEDICINE

## 2017-11-27 PROCEDURE — 93000 ELECTROCARDIOGRAM COMPLETE: CPT | Performed by: FAMILY MEDICINE

## 2017-11-27 PROCEDURE — 84439 ASSAY OF FREE THYROXINE: CPT | Performed by: FAMILY MEDICINE

## 2017-11-27 PROCEDURE — 80061 LIPID PANEL: CPT | Performed by: FAMILY MEDICINE

## 2017-11-27 PROCEDURE — 36415 COLL VENOUS BLD VENIPUNCTURE: CPT | Performed by: FAMILY MEDICINE

## 2017-11-27 PROCEDURE — 82947 ASSAY GLUCOSE BLOOD QUANT: CPT | Performed by: FAMILY MEDICINE

## 2017-11-27 RX ORDER — CYCLOBENZAPRINE HCL 5 MG
5 TABLET ORAL 3 TIMES DAILY PRN
Qty: 42 TABLET | Refills: 0 | Status: SHIPPED | OUTPATIENT
Start: 2017-11-27 | End: 2018-05-02

## 2017-11-27 RX ORDER — LEVOTHYROXINE SODIUM 175 UG/1
TABLET ORAL
Qty: 45 TABLET | Refills: 3
Start: 2017-11-27 | End: 2018-05-04

## 2017-11-27 RX ORDER — OMEPRAZOLE 40 MG/1
40 CAPSULE, DELAYED RELEASE ORAL DAILY
Qty: 90 CAPSULE | Refills: 2 | Status: SHIPPED | OUTPATIENT
Start: 2017-11-27 | End: 2018-10-20

## 2017-11-27 RX ORDER — LEVOTHYROXINE SODIUM 150 UG/1
TABLET ORAL
Qty: 45 TABLET | Refills: 3
Start: 2017-11-27 | End: 2018-05-04

## 2017-11-27 NOTE — MR AVS SNAPSHOT
After Visit Summary   11/27/2017    Ilsa Yusuf    MRN: [id number]           Patient Information     Date Of Birth          1960        Visit Information        Provider Department      11/27/2017 1:40 PM Catrachita Collado MD [facility]        Today's Diagnoses     Preop general physical exam    -  1    Cervical radiculopathy        Hyperlipidemia LDL goal <160        Hypothyroidism, unspecified type        Pre-op exam        Abdominal pain, left upper quadrant        Gastroesophageal reflux disease, esophagitis presence not specified        Acquired hypothyroidism        Screening for diabetes mellitus          Care Instructions      Preventive Health Recommendations  Female Ages 50 - 64    Yearly exam: See your health care provider every year in order to  o Review health changes.   o Discuss preventive care.    o Review your medicines if your doctor has prescribed any.      Get a Pap test every three years (unless you have an abnormal result and your provider advises testing more often).    If you get Pap tests with HPV test, you only need to test every 5 years, unless you have an abnormal result.     You do not need a Pap test if your uterus was removed (hysterectomy) and you have not had cancer.    You should be tested each year for STDs (sexually transmitted diseases) if you're at risk.     Have a mammogram every 1 to 2 years.    Have a colonoscopy at age 50, or have a yearly FIT test (stool test). These exams screen for colon cancer.      Have a cholesterol test every 5 years, or more often if advised.    Have a diabetes test (fasting glucose) every three years. If you are at risk for diabetes, you should have this test more often.     If you are at risk for osteoporosis (brittle bone disease), think about having a bone density scan (DEXA).    Shots: Get a flu shot each year. Get a tetanus shot every 10 years.    Nutrition:     Eat at least 5 servings of  fruits and vegetables each day.    Eat whole-grain bread, whole-wheat pasta and brown rice instead of white grains and rice.    Talk to your provider about Calcium and Vitamin D.     Lifestyle    Exercise at least 150 minutes a week (30 minutes a day, 5 days a week). This will help you control your weight and prevent disease.    Limit alcohol to one drink per day.    No smoking.     Wear sunscreen to prevent skin cancer.     See your dentist every six months for an exam and cleaning.    See your eye doctor every 1 to 2 years.    Preventive Health Recommendations  Female Ages 50 - 64    Yearly exam: See your health care provider every year in order to  o Review health changes.   o Discuss preventive care.    o Review your medicines if your doctor has prescribed any.      Get a Pap test every three years (unless you have an abnormal result and your provider advises testing more often).    If you get Pap tests with HPV test, you only need to test every 5 years, unless you have an abnormal result.     You do not need a Pap test if your uterus was removed (hysterectomy) and you have not had cancer.    You should be tested each year for STDs (sexually transmitted diseases) if you're at risk.     Have a mammogram every 1 to 2 years.    Have a colonoscopy at age 50, or have a yearly FIT test (stool test). These exams screen for colon cancer.      Have a cholesterol test every 5 years, or more often if advised.    Have a diabetes test (fasting glucose) every three years. If you are at risk for diabetes, you should have this test more often.     If you are at risk for osteoporosis (brittle bone disease), think about having a bone density scan (DEXA).    Shots: Get a flu shot each year. Get a tetanus shot every 10 years.    Nutrition:     Eat at least 5 servings of fruits and vegetables each day.    Eat whole-grain bread, whole-wheat pasta and brown rice instead of white grains and rice.    Talk to your provider about Calcium  and Vitamin D.     Lifestyle    Exercise at least 150 minutes a week (30 minutes a day, 5 days a week). This will help you control your weight and prevent disease.    Limit alcohol to one drink per day.    No smoking.     Wear sunscreen to prevent skin cancer.     See your dentist every six months for an exam and cleaning.    See your eye doctor every 1 to 2 years.    Before Your Surgery      Call your surgeon if there is any change in your health. This includes signs of a cold or flu (such as a sore throat, runny nose, cough, rash or fever).    Do not smoke, drink alcohol or take over the counter medicine (unless your surgeon or primary care doctor tells you to) for the 24 hours before and after surgery.    If you take prescribed drugs: Follow your doctor s orders about which medicines to take and which to stop until after surgery.    Eating and drinking prior to surgery: follow the instructions from your surgeon    Take a shower or bath the night before surgery. Use the soap your surgeon gave you to gently clean your skin. If you do not have soap from your surgeon, use your regular soap. Do not shave or scrub the surgery site.  Wear clean pajamas and have clean sheets on your bed.           Follow-ups after your visit        Your next 10 appointments already scheduled     Nov 29, 2017 11:00 AM CST   US ABDOMEN LIMITED with SHUS2   [facility] ([facility])    [address] [place] [id number] [phone]           Please bring a list of your medicines (including vitamins, minerals and over-the-counter drugs). Also, tell your doctor about any allergies you may have. Wear comfortable clothes and leave your valuables at home.  Adults: No eating or drinking for 8 hours before the exam. You may take medicine with a small sip of water.  Children: - Children 6+ years: No food or drink for 6 hours before exam. - Children 1-5 years: No food or drink for 4 hours before exam.  "- Infants, breast-fed: may have breast milk up to 2 hours before exam. - Infants, formula: may have bottle until 4 hours before exam.  Please call the Imaging Department at your exam site with any questions.              Future tests that were ordered for you today     Open Future Orders        Priority Expected Expires Ordered    US Abdomen Limited Routine  11/27/2018 11/27/2017            Who to contact     If you have questions or need follow up information about today's clinic visit or your schedule please contact [facility] directly at [phone].  Normal or non-critical lab and imaging results will be communicated to you by [employer], letter or phone within 4 business days after the clinic has received the results. If you do not hear from us within 7 days, please contact the clinic through [employer] or phone. If you have a critical or abnormal lab result, we will notify you by phone as soon as possible.  Submit refill requests through [employer] or call your pharmacy and they will forward the refill request to us. Please allow 3 business days for your refill to be completed.          Additional Information About Your Visit        [employer] Information     [employer] gives you secure access to your electronic health record. If you see a primary care provider, you can also send messages to your care team and make appointments. If you have questions, please call your primary care clinic.  If you do not have a primary care provider, please call [phone] and they will assist you.        Care EveryWhere ID     This is your Care EveryWhere ID. This could be used by other organizations to access your [place] medical records  [id number]        Your Vitals Were     Pulse Temperature Respirations Height Pulse Oximetry BMI (Body Mass Index)    60 98.8  F (37.1  C) (Oral) 18 5' 8.5\" (1.74 m) 99% 30.92 kg/m2       Blood Pressure from Last 3 Encounters:   11/27/17 107/69   09/18/17 106/69   09/14/17 97/65    " Weight from Last 3 Encounters:   11/27/17 206 lb 6 oz (93.6 kg)   09/18/17 209 lb 12.8 oz (95.2 kg)   09/14/17 208 lb 6.4 oz (94.5 kg)              We Performed the Following     CBC with platelets     EKG 12-lead complete w/read - Clinics     Glucose     Lipid Profile     TSH with free T4 reflex          Today's Medication Changes          These changes are accurate as of: 11/27/17  7:56 PM.  If you have any questions, ask your nurse or doctor.               These medicines have changed or have updated prescriptions.        Dose/Directions    * levothyroxine 150 MCG tablet   Commonly known as:  SYNTHROID/LEVOTHROID   This may have changed:    - how much to take  - how to take this  - when to take this  - additional instructions   Used for:  Acquired hypothyroidism        Take 1 tab by mouth on Tuesday, Thursday, Saturday and Sunday   Quantity:  45 tablet   Refills:  3       * levothyroxine 175 MCG tablet   Commonly known as:  SYNTHROID/LEVOTHROID   This may have changed:    - how much to take  - how to take this  - when to take this  - additional instructions   Used for:  Acquired hypothyroidism        Take 1 tab by mouth Monday, Wednesday, Friday   Quantity:  45 tablet   Refills:  3       * Notice:  This list has 2 medication(s) that are the same as other medications prescribed for you. Read the directions carefully, and ask your doctor or other care provider to review them with you.         Where to get your medicines      These medications were sent to [facility] [address] [place] [address] [place] [facility] [place] [address] [facility] [address]     Phone:  [phone]     cyclobenzaprine 5 MG tablet    omeprazole 40 MG capsule         Some of these will need a paper prescription and others can be bought over the counter.  Ask your nurse if you have questions.     You don't need a prescription for these medications     levothyroxine 150 MCG tablet    levothyroxine 175  MCG tablet                Primary Care Provider Office Phone # Fax #    Catrachita Collado [phone] [address] [place]  [place] [address]        Equal Access to Services     KRISTINA MILLER : Faye byrnes keyonnao Sojericaali, waaxda luqadaha, qaybta kaalmada adeegyada, jane avery hyacinthpatel sprague irene ma. So [facility] [phone].    ATENCIÓN: Si habla español, tiene a castle disposición servicios gratuitos de asistencia lingüística. Llame al [phone].    We comply with applicable federal civil rights laws and [place] laws. We do not discriminate on the basis of race, color, national origin, age, disability, sex, sexual orientation, or gender identity.            Thank you!     Thank you for choosing [facility]  for your care. Our goal is always to provide you with excellent care. Hearing back from our patients is one way we can continue to improve our services. Please take a few minutes to complete the written survey that you may receive in the mail after your visit with us. Thank you!             Your Updated Medication List - Protect others around you: Learn how to safely use, store and throw away your medicines at www.disposemymeds.org.          This list is accurate as of: 11/27/17  7:56 PM.  Always use your most recent med list.                   Brand Name Dispense Instructions for use Diagnosis    cholecalciferol 5000 UNITS Caps capsule    vitamin D3    100 capsule    Take 1 capsule (5,000 Units) by mouth daily Take 1 per day    Major depressive disorder, recurrent episode, moderate (H)       cyclobenzaprine 5 MG tablet    FLEXERIL    42 tablet    Take 1 tablet (5 mg) by mouth 3 times daily as needed for muscle spasms    Cervical radiculopathy       doxepin 10 MG capsule    SINEquan    180 capsule    TAKE ONE TO TWO CAPSULES BY MOUTH AT BEDTIME    Major depressive disorder, recurrent episode, moderate (H)       DULoxetine 60 MG EC capsule    CYMBALTA    180  capsule    TAKE 2 CAPSULES BY MOUTH EVERY DAY    Major depressive disorder, recurrent episode, moderate (H)       FIBER PO      Two capsules in the morning and two capsules at night        fish oil-omega-3 fatty acids 1000 MG capsule      Take 2 g by mouth daily Takes 1 in am and 1 at bedtime        fluticasone 50 MCG/ACT spray    FLONASE    16 g    Spray 1-2 sprays into both nostrils daily    Other chronic sinusitis       * levothyroxine 150 MCG tablet    SYNTHROID/LEVOTHROID    45 tablet    Take 1 tab by mouth on Tuesday, Thursday, Saturday and Sunday    Acquired hypothyroidism       * levothyroxine 175 MCG tablet    SYNTHROID/LEVOTHROID    45 tablet    Take 1 tab by mouth Monday, Wednesday, Friday    Acquired hypothyroidism       lovastatin 20 MG tablet    MEVACOR    90 tablet    Take 1 tablet (20 mg) by mouth At Bedtime    Hyperlipidemia LDL goal <130       Multi-vitamin Tabs tablet      Take 1 tablet by mouth daily        NONFORMULARY      Take 20 mg by mouth 2 times daily D-amphetamine Salt combo        OLANZapine 2.5 MG tablet    zyPREXA    90 tablet    TAKE 1 TABLET(2.5 MG) BY MOUTH AT BEDTIME    Major depressive disorder, recurrent episode, moderate (H)       omeprazole 40 MG capsule    priLOSEC    90 capsule    Take 1 capsule (40 mg) by mouth daily Take 30-60 minutes before a meal.    Gastroesophageal reflux disease, esophagitis presence not specified       ondansetron 4 MG tablet    ZOFRAN    18 tablet    Take 1 tablet (4 mg) by mouth every 6 hours as needed for nausea    Norovirus       oxyCODONE-acetaminophen 5-325 MG per tablet    PERCOCET    15 tablet    Take 1-2 tablets by mouth every 6 hours as needed for pain        prazosin 5 MG capsule    MINIPRESS    90 capsule    Take 1 capsule (5 mg) by mouth At Bedtime Take 1 capsule at bedtime    Primary insomnia       tapentadol 50 MG Tabs tablet    NUCYNTA    120 tablet    Take 1 tablet (50 mg) by mouth every 6 hours as needed for moderate to severe pain  Max #4/day.    Chronic pain syndrome       tiZANidine 4 MG tablet    ZANAFLEX    210 tablet    Take 1-3 tablets (4-12 mg) by mouth 3 times daily as needed for muscle spasms    Cervical radiculopathy       topiramate 50 MG tablet    TOPAMAX    120 tablet    Take 1-2 tablets ( mg) by mouth 2 times daily    Chronic pain syndrome       * Notice:  This list has 2 medication(s) that are the same as other medications prescribed for you. Read the directions carefully, and ask your doctor or other care provider to review them with you.

## 2017-11-27 NOTE — PATIENT INSTRUCTIONS
Preventive Health Recommendations  Female Ages 50 - 64    Yearly exam: See your health care provider every year in order to  o Review health changes.   o Discuss preventive care.    o Review your medicines if your doctor has prescribed any.      Get a Pap test every three years (unless you have an abnormal result and your provider advises testing more often).    If you get Pap tests with HPV test, you only need to test every 5 years, unless you have an abnormal result.     You do not need a Pap test if your uterus was removed (hysterectomy) and you have not had cancer.    You should be tested each year for STDs (sexually transmitted diseases) if you're at risk.     Have a mammogram every 1 to 2 years.    Have a colonoscopy at age 50, or have a yearly FIT test (stool test). These exams screen for colon cancer.      Have a cholesterol test every 5 years, or more often if advised.    Have a diabetes test (fasting glucose) every three years. If you are at risk for diabetes, you should have this test more often.     If you are at risk for osteoporosis (brittle bone disease), think about having a bone density scan (DEXA).    Shots: Get a flu shot each year. Get a tetanus shot every 10 years.    Nutrition:     Eat at least 5 servings of fruits and vegetables each day.    Eat whole-grain bread, whole-wheat pasta and brown rice instead of white grains and rice.    Talk to your provider about Calcium and Vitamin D.     Lifestyle    Exercise at least 150 minutes a week (30 minutes a day, 5 days a week). This will help you control your weight and prevent disease.    Limit alcohol to one drink per day.    No smoking.     Wear sunscreen to prevent skin cancer.     See your dentist every six months for an exam and cleaning.    See your eye doctor every 1 to 2 years.    Preventive Health Recommendations  Female Ages 50 - 64    Yearly exam: See your health care provider every year in order to  o Review health changes.   o Discuss  preventive care.    o Review your medicines if your doctor has prescribed any.      Get a Pap test every three years (unless you have an abnormal result and your provider advises testing more often).    If you get Pap tests with HPV test, you only need to test every 5 years, unless you have an abnormal result.     You do not need a Pap test if your uterus was removed (hysterectomy) and you have not had cancer.    You should be tested each year for STDs (sexually transmitted diseases) if you're at risk.     Have a mammogram every 1 to 2 years.    Have a colonoscopy at age 50, or have a yearly FIT test (stool test). These exams screen for colon cancer.      Have a cholesterol test every 5 years, or more often if advised.    Have a diabetes test (fasting glucose) every three years. If you are at risk for diabetes, you should have this test more often.     If you are at risk for osteoporosis (brittle bone disease), think about having a bone density scan (DEXA).    Shots: Get a flu shot each year. Get a tetanus shot every 10 years.    Nutrition:     Eat at least 5 servings of fruits and vegetables each day.    Eat whole-grain bread, whole-wheat pasta and brown rice instead of white grains and rice.    Talk to your provider about Calcium and Vitamin D.     Lifestyle    Exercise at least 150 minutes a week (30 minutes a day, 5 days a week). This will help you control your weight and prevent disease.    Limit alcohol to one drink per day.    No smoking.     Wear sunscreen to prevent skin cancer.     See your dentist every six months for an exam and cleaning.    See your eye doctor every 1 to 2 years.    Before Your Surgery      Call your surgeon if there is any change in your health. This includes signs of a cold or flu (such as a sore throat, runny nose, cough, rash or fever).    Do not smoke, drink alcohol or take over the counter medicine (unless your surgeon or primary care doctor tells you to) for the 24 hours before  and after surgery.    If you take prescribed drugs: Follow your doctor s orders about which medicines to take and which to stop until after surgery.    Eating and drinking prior to surgery: follow the instructions from your surgeon    Take a shower or bath the night before surgery. Use the soap your surgeon gave you to gently clean your skin. If you do not have soap from your surgeon, use your regular soap. Do not shave or scrub the surgery site.  Wear clean pajamas and have clean sheets on your bed.
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
## 2017-11-27 NOTE — PROGRESS NOTES
[address] [address]  [phone]  Dept: [phone]    PRE-OP EVALUATION:  Today's date: 2017    Ilsa Yusuf (: 1960) presents for pre-operative evaluation assessment as requested by Dr. Suzanne Reyes.  She requires evaluation and anesthesia risk assessment prior to undergoing surgery/procedure for treatment of epidural spinal injection.  Proposed procedure: epidural spinal injection.    Date of Surgery/ Procedure: 2017  Time of Surgery/ Procedure: 12:45  Hospital/Surgical Facility: [facility]    Primary Physician: Catrachita Collado  Type of Anesthesia Anticipated: to be determined    Patient has a Health Care Directive or Living Will:      1. NO - Do you have a history of heart attack, stroke, stent, bypass or surgery on an artery in the head, neck, heart or legs?  2. NO - Do you ever have any pain or discomfort in your chest?  3. NO - Do you have a history of  Heart Failure?  4. NO - Are you troubled by shortness of breath when: walking on the level, up a slight hill or at night?  5. NO - Do you currently have a cold, bronchitis or other respiratory infection?  6. NO - Do you have a cough, shortness of breath or wheezing?  7. NO - Do you sometimes get pains in the calves of your legs when you walk?  8. NO - Do you or anyone in your family have previous history of blood clots?  9. NO - Do you or does anyone in your family have a serious bleeding problem such as prolonged bleeding following surgeries or cuts?  10. NO - Have you ever had problems with anemia or been told to take iron pills?  11. NO - Have you had any abnormal blood loss such as black, tarry or bloody stools, or abnormal vaginal bleeding?  12. NO - Have you ever had a blood transfusion?  13. NO - Have you or any of your relatives ever had problems with anesthesia?  14. YES - DO YOU HAVE SLEEP APNEA, EXCESSIVE SNORING OR DAYTIME DROWSINESS? Sleep  apnea  15. NO - Do you have any prosthetic heart valves?  16. NO - Do you have prosthetic joints?  17. NO - Is there any chance that you may be pregnant?        HPI:                                                      Brief HPI related to upcoming procedure: Ilsa has had chronic cervical radiculopathy; she is suffering with bilateral arm/hand numbness.  The plan is to perform epidural injections to the c spine under anesthesia.  She is in her usual state of health.  Her chronic pain is managed by her specialists at [facility], and she is currently taking Nucynta and muscle relaxants.  She clarifies usually take tizanidine but when that is not enough she takes flexeril.  She does also sparingly take percocet.    The patient would also like me to recheck her left upper abdomen and flank area; she has noticed a prominence in this area that is tender to touch, but doesn't otherwise hurt.  It seems like it is getting bigger. She denies recent constipation or diarrhea.      See problem list for active medical problems.  Problems all longstanding and stable, except as noted/documented.  See ROS for pertinent symptoms related to these conditions.                                                                                                  .    MEDICAL HISTORY:                                                    Patient Active Problem List    Diagnosis Date Noted     Calculus of left kidney 08/26/2017     Priority: Medium     Primary osteoarthritis of right knee 12/26/2016     Priority: Medium     Peripheral tear of medial meniscus of right knee, unspecified whether old or current tear, initial encounter 12/26/2016     Priority: Medium     Partner relationship problems 12/05/2016     Priority: Medium     Cervical radiculopathy 10/28/2016     Priority: Medium     Neck pain 09/29/2016     Priority: Medium     Attention-deficit hyperactivity disorder, predominantly hyperactive type 04/19/2016     Priority:  Medium     Obesity 06/21/2015     Priority: Medium     Primary insomnia 06/02/2015     Priority: Medium     Esophageal reflux 04/13/2015     Priority: Medium     CKD (chronic kidney disease) stage 3, GFR 30-59 ml/min 03/09/2015     Priority: Medium     Hyperlipidemia LDL goal <130 01/30/2012     Priority: Medium     Major depressive disorder, recurrent episode, moderate (H) 01/18/2012     Priority: Medium     PTSD (post-traumatic stress disorder) 01/18/2012     Priority: Medium     Acquired hypothyroidism 01/18/2012     Priority: Medium      Past Medical History:   Diagnosis Date     Arthritis 2012    both knees; hands     Depressive disorder      Depressive disorder, not elsewhere classified 08/28/12    DC 10/05/12-[facility]     Hyperlipidemia LDL goal < 130     mevacor     Hypothyroid      Moderate major depression (H)     abilify, cymbalta, seroquel, and Dr Antonio cowart     PTSD (post-traumatic stress disorder)      Seizure (H) 03/2011    one episode, was on Keppra, EEG negative     Past Surgical History:   Procedure Laterality Date     ABDOMEN SURGERY       C PARTIAL EXCISION THYROID,UNILAT  1991    rt, negative path then, treated with synthroid.     CHOLECYSTECTOMY       COLONOSCOPY  2012     HERNIA REPAIR       ORTHOPEDIC SURGERY  left knee     renal artery surgery  child    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     TONSILLECTOMY       ureteral reimplantation       Current Outpatient Prescriptions   Medication Sig Dispense Refill     cyclobenzaprine (FLEXERIL) 5 MG tablet Take 1 tablet (5 mg) by mouth 3 times daily as needed for muscle spasms 42 tablet 0     omeprazole (PRILOSEC) 40 MG capsule Take 1 capsule (40 mg) by mouth daily Take 30-60 minutes before a meal. 90 capsule 2     lovastatin (MEVACOR) 20 MG tablet Take 1 tablet (20 mg) by mouth At Bedtime 90 tablet 3     OLANZapine (ZYPREXA) 2.5 MG tablet TAKE 1 TABLET(2.5 MG) BY MOUTH AT BEDTIME 90 tablet 3     topiramate (TOPAMAX) 50 MG  tablet Take 1-2 tablets ( mg) by mouth 2 times daily 120 tablet 3     oxyCODONE-acetaminophen (PERCOCET) 5-325 MG per tablet Take 1-2 tablets by mouth every 6 hours as needed for pain 15 tablet 0     tapentadol (NUCYNTA) 50 MG TABS tablet Take 1 tablet (50 mg) by mouth every 6 hours as needed for moderate to severe pain Max #4/day. 120 tablet 0     tiZANidine (ZANAFLEX) 4 MG tablet Take 1-3 tablets (4-12 mg) by mouth 3 times daily as needed for muscle spasms 210 tablet 3     prazosin (MINIPRESS) 5 MG capsule Take 1 capsule (5 mg) by mouth At Bedtime Take 1 capsule at bedtime 90 capsule 3     doxepin (SINEQUAN) 10 MG capsule TAKE ONE TO TWO CAPSULES BY MOUTH AT BEDTIME 180 capsule 8     DULoxetine (CYMBALTA) 60 MG EC capsule TAKE 2 CAPSULES BY MOUTH EVERY  capsule 1     levothyroxine (SYNTHROID/LEVOTHROID) 150 MCG tablet Take 1 tablet (150 mcg) by mouth every other day (Patient taking differently: Take 150 mcg by mouth every other day Pt take med on Sunday, Tuesday and Thursday) 45 tablet 3     levothyroxine (SYNTHROID/LEVOTHROID) 175 MCG tablet Take 1 tablet (175 mcg) by mouth every other day 45 tablet 3     FIBER PO Two capsules in the morning and two capsules at night       ondansetron (ZOFRAN) 4 MG tablet Take 1 tablet (4 mg) by mouth every 6 hours as needed for nausea 18 tablet 5     fluticasone (FLONASE) 50 MCG/ACT nasal spray Spray 1-2 sprays into both nostrils daily 16 g 11     NONFORMULARY Take 20 mg by mouth 2 times daily D-amphetamine Salt combo       cholecalciferol (VITAMIN D3) 5000 UNITS CAPS capsule Take 1 capsule (5,000 Units) by mouth daily Take 1 per day 100 capsule 2     multivitamin, therapeutic with minerals (MULTI-VITAMIN) TABS Take 1 tablet by mouth daily       fish oil-omega-3 fatty acids (OMEGA 3) 1000 MG capsule Take 2 g by mouth daily Takes 1 in am and 1 at bedtime       OTC products: None, except as noted above    Allergies   Allergen Reactions     Gabapentin Other (See  "Comments)     Patient states she gets \"horrific nightmares\" with this medication     Dilaudid [Hydromorphone Hcl]      Made her feel like her skin was crawling     Nsaids Other (See Comments)     Kidney failure     Compazine [Prochlorperazine] Other (See Comments)     \"Makes my skin crawl, I was going nuts.\"      Latex Allergy: NO    Social History   Substance Use Topics     Smoking status: Current Some Day Smoker     Packs/day: 0.30     Last attempt to quit: 2/17/2015     Smokeless tobacco: Never Used      Comment: recreational     Alcohol use 0.0 oz/week      Comment: 2 a night on weekends     History   Drug Use No       REVIEW OF SYSTEMS:                                                    Constitutional, neuro, ENT, endocrine, pulmonary, cardiac, gastrointestinal, genitourinary, musculoskeletal, integument and psychiatric systems are negative, except as otherwise noted.      EXAM:                                                    /69  Pulse 60  Temp 98.8  F (37.1  C) (Oral)  Resp 18  Ht 5' 8.5\" (1.74 m)  Wt 206 lb 6 oz (93.6 kg)  SpO2 99%  BMI 30.92 kg/m2    GENERAL APPEARANCE: healthy, alert and no distress     EYES: EOMI, PERRL     HENT: ear canals and TM's normal and nose and mouth without ulcers or lesions     NECK: no adenopathy, no asymmetry, masses, or scars and thyroid normal to palpation     RESP: lungs clear to auscultation - no rales, rhonchi or wheezes     BREAST: normal without masses, tenderness or nipple discharge and no palpable axillary masses or adenopathy     CV: regular rates and rhythm, normal S1 S2, no S3 or S4 and no murmur, click or rub     ABDOMEN:  When she is standing, there is an obvious asymmetry with a bulge of her left upper abdomen/side.  It is firm and tender, but no discrete edges, is not reducible.  Otherwise, the abdomen is soft, nontender, no HSM or masses and bowel sounds normal     MS: extremities normal- no gross deformities noted, no evidence of inflammation " in joints, FROM in all extremities.     SKIN: no suspicious lesions or rashes     NEURO: Normal strength and tone, sensory exam grossly normal, mentation intact and speech normal     PSYCH: mentation appears normal. and affect normal/bright     LYMPHATICS: No axillary, cervical, or supraclavicular nodes    DIAGNOSTICS:                                                      EKG: appears normal, NSR, normal axis, normal intervals, no acute ST/T changes c/w ischemia, no LVH by voltage criteria, unchanged from previous tracings  Labs Resulted Today:   Results for orders placed or performed in visit on 11/27/17   CBC with platelets   Result Value Ref Range    WBC 6.6 4.0 - 11.0 10e9/L    RBC Count 4.91 3.8 - 5.2 10e12/L    Hemoglobin 15.1 11.7 - 15.7 g/dL    Hematocrit 45.6 35.0 - 47.0 %    MCV 93 78 - 100 fl    MCH 30.8 26.5 - 33.0 pg    MCHC 33.1 31.5 - 36.5 g/dL    RDW 14.6 10.0 - 15.0 %    Platelet Count 207 150 - 450 10e9/L     Labs Drawn and in Process:   Unresulted Labs Ordered in the Past 30 Days of this Admission     Date and Time Order Name Status Description    11/27/2017 1423 GLUCOSE In process     11/27/2017 1423 LIPID PROFILE In process     11/27/2017 1423 TSH WITH FREE T4 REFLEX In process           Recent Labs   Lab Test  09/03/17   1616  08/16/17   0904   HGB  15.6  14.9   PLT  251  233   INR  0.92   --    NA  140  139   POTASSIUM  3.6  3.8   CR  1.00  0.89        IMPRESSION:                                                    Reason for surgery/procedure: cervical radiculopathy  Diagnosis/reason for consult: pre-operative consultation    The proposed surgical procedure is considered LOW risk.    REVISED CARDIAC RISK INDEX  The patient has the following serious cardiovascular risks for perioperative complications such as (MI, PE, VFib and 3  AV Block):  No serious cardiac risks  INTERPRETATION: 0 risks: Class I (very low risk - 0.4% complication rate)    The patient has the following additional risks for  perioperative complications:  Possible high tolerance to opioid analgesics  ABDOUL--the patient uses a CPAP      ICD-10-CM    1. Cervical radiculopathy M54.12 cyclobenzaprine (FLEXERIL) 5 MG tablet     CBC with platelets     EKG 12-lead complete w/read - Clinics   2. Esophageal reflux K21.9    3. Routine general medical examination at a health care facility Z00.00 Glucose   4. Hyperlipidemia LDL goal <160 E78.5 Lipid Profile   5. Hypothyroidism, unspecified type E03.9 TSH with free T4 reflex   6. Pre-op exam Z01.818 CBC with platelets     EKG 12-lead complete w/read - Clinics   7. Abdominal pain, left upper quadrant R10.12 US Abdomen Limited   8. Gastroesophageal reflux disease, esophagitis presence not specified K21.9 omeprazole (PRILOSEC) 40 MG capsule       RECOMMENDATIONS:                                                    -hold fish oil  -I advised no narcotic pain medications on the day of the procedure but would defer to her specialist    APPROVAL GIVEN to proceed with proposed procedure, without further diagnostic evaluation     Other--left upper abdominal/flank asymmetry or swelling--CT scan done in august when she was noticing this did not show any explanation.  I will order an ultrasound.  Other than constipation or asymmetrical distrubution of fatty tissue, I am not sure what this may be.  It does not feel like a hernia, though it is near a surgical scar.    Signed Electronically by: Catrachita Collado MD    Copy of this evaluation report is provided to requesting physician.    [place] Preop Guidelines

## 2017-11-28 LAB
CHOLEST SERPL-MCNC: 170 MG/DL
GLUCOSE SERPL-MCNC: 85 MG/DL (ref 70–99)
HDLC SERPL-MCNC: 54 MG/DL
LDLC SERPL CALC-MCNC: 95 MG/DL
NONHDLC SERPL-MCNC: 116 MG/DL
T4 FREE SERPL-MCNC: 0.74 NG/DL (ref 0.76–1.46)
TRIGL SERPL-MCNC: 107 MG/DL
TSH SERPL DL<=0.005 MIU/L-ACNC: 17.23 MU/L (ref 0.4–4)

## 2017-11-29 ENCOUNTER — HOSPITAL ENCOUNTER (OUTPATIENT)
Dept: ULTRASOUND IMAGING | Facility: CLINIC | Age: 57
Discharge: HOME | End: 2017-11-29
Attending: FAMILY MEDICINE | Admitting: FAMILY MEDICINE
Payer: MEDICARE

## 2017-11-29 DIAGNOSIS — J32.8 OTHER CHRONIC SINUSITIS: ICD-10-CM

## 2017-11-29 DIAGNOSIS — R10.12 ABDOMINAL PAIN, LEFT UPPER QUADRANT: ICD-10-CM

## 2017-11-29 PROCEDURE — 76705 ECHO EXAM OF ABDOMEN: CPT

## 2017-11-29 RX ORDER — FLUTICASONE PROPIONATE 50 MCG
SPRAY, SUSPENSION (ML) NASAL
Qty: 48 ML | Refills: 0 | Status: SHIPPED | OUTPATIENT
Start: 2017-11-29 | End: 2019-03-21

## 2017-12-11 DIAGNOSIS — G47.33 OSA (OBSTRUCTIVE SLEEP APNEA): Primary | ICD-10-CM

## 2017-12-13 ENCOUNTER — DOCUMENTATION ONLY (OUTPATIENT)
Dept: SLEEP MEDICINE | Facility: CLINIC | Age: 57
End: 2017-12-13

## 2017-12-13 NOTE — PROGRESS NOTES
Patient came to [facility] for mask fitting appointment on December 13, 2017. Patient requested to switch masks because poor seal/leak.  Patient tried on the followings masks: RESMED AIRFIT N20 SIZE SMALL AND MARTINEZ&PAYKEL ESON 2 SIZE SMALL. (SIZE MEDIUMS WERE TOO LARGE). PATIENT LIKED SPECIFIC FEATURES ABOUT BOTH MASKS AND BOTH MASKS SEALED WELL.   Patient selected  [employer]kel, type: ESON 2, Nasal mask, SIZE Small. Patient stated that she lost some weight and was concerned about her pressures. I recommended that she follow up with her Sleep Doctor about her concern with her pressures.

## 2017-12-13 NOTE — Clinical Note
Patient came in to [facility] for a mask fitting due to leaking with current mask. We found a mask that seals well for patient.  Patient talked about her concern with her pressures. Patient stated she has lost quite a bit of weight and she is concerned her pressures are not adequate for her now. I recommended that she call the [facility] to schedule a follow up appointment with you to review her downloads and talk about her CPAP pressures.

## 2017-12-20 ENCOUNTER — TRANSFERRED RECORDS (OUTPATIENT)
Dept: HEALTH INFORMATION MANAGEMENT | Facility: CLINIC | Age: 57
End: 2017-12-20

## 2017-12-20 LAB — PHQ9 SCORE: 3

## 2017-12-29 ENCOUNTER — RADIANT APPOINTMENT (OUTPATIENT)
Dept: MRI IMAGING | Facility: CLINIC | Age: 57
End: 2017-12-29
Attending: NURSE PRACTITIONER
Payer: COMMERCIAL

## 2017-12-29 DIAGNOSIS — M54.2 CERVICALGIA: ICD-10-CM

## 2018-02-14 DIAGNOSIS — E03.9 ACQUIRED HYPOTHYROIDISM: ICD-10-CM

## 2018-02-15 NOTE — TELEPHONE ENCOUNTER
"Requested Prescriptions   Pending Prescriptions Disp Refills     levothyroxine (SYNTHROID/LEVOTHROID) 175 MCG tablet [Pharmacy Med Name: LEVOTHYROXINE 0.175MG (175MCG)TABS] 90 tablet 0     Sig: TAKE 1 TABLET BY MOUTH EVERY DAY    Thyroid Protocol Failed    2/14/2018  8:26 PM       Failed - Normal TSH on file in past 12 months    Recent Labs   Lab Test  11/27/17   1447   TSH  17.23*      Provider advised to please recheck TSH in 4-6 weeks - patient is duee       Passed - Patient is 12 years or older       Passed - Recent or future visit with authorizing provider's specialty    Patient had office visit in the last year or has a visit in the next 30 days with authorizing provider.  See \"Patient Info\" tab in inbasket, or \"Choose Columns\" in Meds & Orders section of the refill encounter.            Passed - No active pregnancy on record    If patient is pregnant or has had a positive pregnancy test, please check TSH.         Passed - No positive pregnancy test in past 12 months    If patient is pregnant or has had a positive pregnancy test, please check TSH.          Routing refill request to provider for review/approval because:  Pharmacy is pushing pack requesting 90 day supply - patient is due for labs - provider to review - sometimes these requests do not come from patient    Thank you,  Valeriy Mcginnis RN        "

## 2018-02-16 RX ORDER — LEVOTHYROXINE SODIUM 175 UG/1
TABLET ORAL
Qty: 90 TABLET | Refills: 0 | OUTPATIENT
Start: 2018-02-16

## 2018-02-16 NOTE — TELEPHONE ENCOUNTER
Please ask pt to make an appointment for a tsh recheck asap.  Catrachita Collado MD said she will not refill for #90 days  Thanks!     Solange Kelley RN

## 2018-02-16 NOTE — TELEPHONE ENCOUNTER
Med was filled yesterday and [employer] message was sent notifying pt to come in for [facility]  [place]

## 2018-02-20 ENCOUNTER — TRANSFERRED RECORDS (OUTPATIENT)
Dept: HEALTH INFORMATION MANAGEMENT | Facility: CLINIC | Age: 58
End: 2018-02-20

## 2018-02-20 LAB — PHQ9 SCORE: 3

## 2018-02-26 ENCOUNTER — OFFICE VISIT (OUTPATIENT)
Dept: FAMILY MEDICINE | Facility: CLINIC | Age: 58
End: 2018-02-26
Payer: COMMERCIAL

## 2018-02-26 VITALS
TEMPERATURE: 98.6 F | OXYGEN SATURATION: 98 % | SYSTOLIC BLOOD PRESSURE: 122 MMHG | DIASTOLIC BLOOD PRESSURE: 76 MMHG | RESPIRATION RATE: 18 BRPM | BODY MASS INDEX: 30.78 KG/M2 | HEART RATE: 74 BPM | WEIGHT: 205.4 LBS

## 2018-02-26 DIAGNOSIS — N76.0 BV (BACTERIAL VAGINOSIS): ICD-10-CM

## 2018-02-26 DIAGNOSIS — E03.9 ACQUIRED HYPOTHYROIDISM: ICD-10-CM

## 2018-02-26 DIAGNOSIS — B96.89 BV (BACTERIAL VAGINOSIS): ICD-10-CM

## 2018-02-26 DIAGNOSIS — L65.9 HAIR LOSS: ICD-10-CM

## 2018-02-26 DIAGNOSIS — N89.8 VAGINAL DISCHARGE: Primary | ICD-10-CM

## 2018-02-26 LAB
SPECIMEN SOURCE: ABNORMAL
WET PREP SPEC: ABNORMAL

## 2018-02-26 PROCEDURE — 99213 OFFICE O/P EST LOW 20 MIN: CPT | Performed by: FAMILY MEDICINE

## 2018-02-26 PROCEDURE — 87210 SMEAR WET MOUNT SALINE/INK: CPT | Performed by: FAMILY MEDICINE

## 2018-02-26 RX ORDER — METRONIDAZOLE 500 MG/1
500 TABLET ORAL 2 TIMES DAILY
Qty: 14 TABLET | Refills: 0 | Status: SHIPPED | OUTPATIENT
Start: 2018-02-26 | End: 2018-05-02

## 2018-02-26 NOTE — PROGRESS NOTES
HPI      ROS      Physical Exam    LEFT MESSAGE FOR PT TO CALL BACK TO SCHEDULE LAB ONLY APPOINTMENT FOR THYROID.     SUBJECTIVE:   Ilsa Yusuf is a 57 year old female who presents to clinic today for the following health issues:      Vaginal Symptoms      Duration: 5 days     Description  vaginal discharge - clear and thin, burning and pain with intercourse; has not noticed pruritis or odor.  No bloody discharge.  No urinary symptoms such as dysuria or frequency.      Intensity:  moderate, severe    Accompanying signs and symptoms (fever/dysuria/abdominal or back pain): pelvic fullness    History  Sexually active: yes, single partner, contraception not required  Possibility of pregnancy: No  Recent antibiotic use: no     Precipitating or alleviating factors: Has not been sexually active for 2 years but recently sexually active with her .  Denies concern for STI.      Therapies tried and outcome: none     Other: hair loss: mainly to the front and a spot on the back, nails also brittle.  Did increase her dose of levothyroxine but never came to have the recheck of TSH.  No other symptoms of concern.  Wonders about spironolactone.    She notes that she was approved for medical marijuana, but the monthly cost seems prohibitive.  She follows with a separate pain management clinic for her cervical radiculopathy and low back pain (herniated disc L5-S1 contacting left S1 nerve roots mild/boderate bilateral neural foraminal stenosis at L5-S1 and mild spinal canal stenosis L2-L5).             Problem list and histories reviewed & adjusted, as indicated.  Additional history: as documented    Patient Active Problem List   Diagnosis     Major depressive disorder, recurrent episode, moderate (H)     PTSD (post-traumatic stress disorder)     Acquired hypothyroidism     Hyperlipidemia LDL goal <130     CKD (chronic kidney disease) stage 3, GFR 30-59 ml/min     Esophageal reflux     Primary insomnia     Obesity      Attention-deficit hyperactivity disorder, predominantly hyperactive type     Neck pain     Cervical radiculopathy     Partner relationship problems     Primary osteoarthritis of right knee     Peripheral tear of medial meniscus of right knee, unspecified whether old or current tear, initial encounter     Calculus of left kidney     Past Surgical History:   Procedure Laterality Date     ABDOMEN SURGERY       C PARTIAL EXCISION THYROID,UNILAT      rt, negative path then, treated with synthroid.     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     TONSILLECTOMY       ureteral reimplantation         Social History   Substance Use Topics     Smoking status: Current Some Day Smoker     Packs/day: 0.30     Last attempt to quit: 2015     Smokeless tobacco: Never Used      Comment: recreational     Alcohol use 0.0 oz/week      Comment: 2 a night on weekends     Family History   Problem Relation Age of Onset     C.A.D. Father 58      at 58, heart disease     MENTAL ILLNESS Father      Coronary Artery Disease Father      Hyperlipidemia Father      Alzheimer Disease Mother      total care now     Depression Mother      Anxiety Disorder Mother      DIABETES Sister      adult onset     Thyroid Disease Sister          Current Outpatient Prescriptions   Medication Sig Dispense Refill     metroNIDAZOLE (FLAGYL) 500 MG tablet Take 1 tablet (500 mg) by mouth 2 times daily 14 tablet 0     levothyroxine (SYNTHROID/LEVOTHROID) 175 MCG tablet Take 1 tablet (175 mcg) by mouth daily 30 tablet 0     fluticasone (FLONASE) 50 MCG/ACT spray INHALE 1- 2 SPRAYS IN EACH NOSTRIL DAILY. 48 mL 0     cyclobenzaprine (FLEXERIL) 5 MG tablet Take 1 tablet (5 mg) by mouth 3 times daily as needed for muscle spasms 42 tablet 0     omeprazole (PRILOSEC) 40 MG capsule Take 1 capsule (40 mg) by mouth daily Take 30-60  minutes before a meal. 90 capsule 2     levothyroxine (SYNTHROID/LEVOTHROID) 150 MCG tablet Take 1 tab by mouth on Tuesday, Thursday, Saturday and Sunday 45 tablet 3     levothyroxine (SYNTHROID/LEVOTHROID) 175 MCG tablet Take 1 tab by mouth Monday, Wednesday, Friday 45 tablet 3     lovastatin (MEVACOR) 20 MG tablet Take 1 tablet (20 mg) by mouth At Bedtime 90 tablet 3     OLANZapine (ZYPREXA) 2.5 MG tablet TAKE 1 TABLET(2.5 MG) BY MOUTH AT BEDTIME 90 tablet 3     topiramate (TOPAMAX) 50 MG tablet Take 1-2 tablets ( mg) by mouth 2 times daily 120 tablet 3     oxyCODONE-acetaminophen (PERCOCET) 5-325 MG per tablet Take 1-2 tablets by mouth every 6 hours as needed for pain 15 tablet 0     tiZANidine (ZANAFLEX) 4 MG tablet Take 1-3 tablets (4-12 mg) by mouth 3 times daily as needed for muscle spasms 210 tablet 3     prazosin (MINIPRESS) 5 MG capsule Take 1 capsule (5 mg) by mouth At Bedtime Take 1 capsule at bedtime 90 capsule 3     doxepin (SINEQUAN) 10 MG capsule TAKE ONE TO TWO CAPSULES BY MOUTH AT BEDTIME 180 capsule 8     DULoxetine (CYMBALTA) 60 MG EC capsule TAKE 2 CAPSULES BY MOUTH EVERY  capsule 1     FIBER PO Two capsules in the morning and two capsules at night       ondansetron (ZOFRAN) 4 MG tablet Take 1 tablet (4 mg) by mouth every 6 hours as needed for nausea 18 tablet 5     NONFORMULARY Take 20 mg by mouth 2 times daily D-amphetamine Salt combo       cholecalciferol (VITAMIN D3) 5000 UNITS CAPS capsule Take 1 capsule (5,000 Units) by mouth daily Take 1 per day 100 capsule 2     multivitamin, therapeutic with minerals (MULTI-VITAMIN) TABS Take 1 tablet by mouth daily       fish oil-omega-3 fatty acids (OMEGA 3) 1000 MG capsule Take 2 g by mouth daily Takes 1 in am and 1 at bedtime       tapentadol (NUCYNTA) 50 MG TABS tablet Take 1 tablet (50 mg) by mouth every 6 hours as needed for moderate to severe pain Max #4/day. (Patient not taking: Reported on 2/26/2018) 120 tablet 0     Allergies  "  Allergen Reactions     Gabapentin Other (See Comments)     Patient states she gets \"horrific nightmares\" with this medication     Dilaudid [Hydromorphone Hcl]      Made her feel like her skin was crawling     Nsaids Other (See Comments)     Kidney failure     Compazine [Prochlorperazine] Other (See Comments)     \"Makes my skin crawl, I was going nuts.\"       Reviewed and updated as needed this visit by clinical staff       Reviewed and updated as needed this visit by Provider         ROS:  Constitutional, HEENT, cardiovascular, pulmonary, gi and gu systems are negative, except as otherwise noted.    OBJECTIVE:     /76 (BP Location: Right arm, Patient Position: Sitting, Cuff Size: Adult Regular)  Pulse 74  Temp 98.6  F (37  C) (Oral)  Resp 18  Wt 205 lb 6.4 oz (93.2 kg)  SpO2 98%  BMI 30.78 kg/m2  Body mass index is 30.78 kg/(m^2).  GENERAL APPEARANCE: healthy, alert and no distress  EYES: Eyes grossly normal to inspection, PERRL and conjunctivae and sclerae normal  NECK: no adenopathy, no asymmetry, masses, or scars and thyroid normal to palpation  RESP: lungs clear to auscultation - no rales, rhonchi or wheezes  CV: regular rates and rhythm, normal S1 S2, no S3 or S4 and no murmur, click or rub  ABDOMEN: soft, some epigastric tenderness but no guarding or rebound, no lower abdominal tenderness, active bowel sounds.   SKIN: thinning hair to the frontal aspect, the scalp appears normal.   PSYCH: mentation appears normal and affect normal/bright    Diagnostic Test Results:  Wet prep: clue cells      ASSESSMENT/PLAN:     1. Vaginal discharge  Bacterial vaginosis with clue cells on wet prep today.  Discussed treatment with antibiotics, f/u if any AEs, rx done for metronidazole.   - Wet prep    2. Acquired hypothyroidism  -Needs to have repeat TSH/T4 today to check her newer dose.     3. BV (bacterial vaginosis)    - metroNIDAZOLE (FLAGYL) 500 MG tablet; Take 1 tablet (500 mg) by mouth 2 times daily  " Dispense: 14 tablet; Refill: 0    4. Hair loss  Discussed this can certainly be related to thyroid disease, but may have other contributing factors.  - DERMATOLOGY REFERRAL        Catrachita Collado MD  [facility]

## 2018-02-26 NOTE — Clinical Note
I thought I had her go to lab after her visit today for recheck of thyroid, but apparently did not.  Please ask her to return for lab visit.  She is WAY overdue for thyroid recheck.  It's a future order from November.  Thanks

## 2018-02-26 NOTE — NURSING NOTE
"Chief Complaint   Patient presents with     Vaginal Problem     watery discharge and painful intercourse.       Initial /76 (BP Location: Right arm, Patient Position: Sitting, Cuff Size: Adult Regular)  Pulse 74  Temp 98.6  F (37  C) (Oral)  Resp 18  Wt 205 lb 6.4 oz (93.2 kg)  SpO2 98%  BMI 30.78 kg/m2 Estimated body mass index is 30.78 kg/(m^2) as calculated from the following:    Height as of 11/27/17: 5' 8.5\" (1.74 m).    Weight as of this encounter: 205 lb 6.4 oz (93.2 kg).  Medication Reconciliation: complete   Lakesha Granado MA      "

## 2018-02-26 NOTE — MR AVS SNAPSHOT
After Visit Summary   2/26/2018    Ilsa Yusuf    MRN: [id number]           Patient Information     Date Of Birth          1960        Visit Information        Provider Department      2/26/2018 2:00 PM Catrachita Collado MD [facility]        Today's Diagnoses     Vaginal discharge    -  1    Acquired hypothyroidism        BV (bacterial vaginosis)        Hair loss           Follow-ups after your visit        Additional Services     DERMATOLOGY REFERRAL       Your provider has referred you to: [facility]: Dermatology Clinic [facility] [phone]   [email or web address]    Please be aware that coverage of these services is subject to the terms and limitations of your health insurance plan.  Call member services at your health plan with any benefit or coverage questions.      Please bring the following with you to your appointment:    (1) Any X-Rays, CTs or MRIs which have been performed.  Contact the facility where they were done to arrange for  prior to your scheduled appointment.    (2) List of current medications  (3) This referral request   (4) Any documents/labs given to you for this referral                  Who to contact     If you have questions or need follow up information about today's clinic visit or your schedule please contact [facility] directly at [phone].  Normal or non-critical lab and imaging results will be communicated to you by MyChart, letter or phone within 4 business days after the clinic has received the results. If you do not hear from us within 7 days, please contact the clinic through MyChart or phone. If you have a critical or abnormal lab result, we will notify you by phone as soon as possible.  Submit refill requests through [employer] or call your pharmacy and they will forward the refill request to us. Please allow 3 business days for your refill to be completed.           Additional Information About Your Visit        [employer] Information     [employer] gives you secure access to your electronic health record. If you see a primary care provider, you can also send messages to your care team and make appointments. If you have questions, please call your primary care clinic.  If you do not have a primary care provider, please call [phone] and they will assist you.        Care EveryWhere ID     This is your Care EveryWhere ID. This could be used by other organizations to access your [place] medical records  [id number]        Your Vitals Were     Pulse Temperature Respirations Pulse Oximetry BMI (Body Mass Index)       74 98.6  F (37  C) (Oral) 18 98% 30.78 kg/m2        Blood Pressure from Last 3 Encounters:   02/26/18 122/76   11/27/17 107/69   09/18/17 106/69    Weight from Last 3 Encounters:   02/26/18 205 lb 6.4 oz (93.2 kg)   11/27/17 206 lb 6 oz (93.6 kg)   09/18/17 209 lb 12.8 oz (95.2 kg)              We Performed the Following     DERMATOLOGY REFERRAL     Wet prep          Today's Medication Changes          These changes are accurate as of 2/26/18  7:51 PM.  If you have any questions, ask your nurse or doctor.               Start taking these medicines.        Dose/Directions    metroNIDAZOLE 500 MG tablet   Commonly known as:  FLAGYL   Used for:  BV (bacterial vaginosis)   Started by:  Catrachita Collado MD        Dose:  500 mg   Take 1 tablet (500 mg) by mouth 2 times daily   Quantity:  14 tablet   Refills:  0            Where to get your medicines      These medications were sent to [id number] - [place], [place] - [address] AT [facility] [address]  [facility] [address] [place] [address]    Hours:  24-hours Phone:  [phone]     metroNIDAZOLE 500 MG tablet                Primary Care Provider Office Phone # Fax #    Catrachita Collado [phone] [address]  [place] [address]        Equal Access to  Services     [facility]: Hadii vladislav Heck, waaxda luqadaha, qaybta kaalmada lalo, jane bonilla . So [facility] [phone].    ATENCIÓN: Si habla atif, tiene a castle disposición servicios gratuitos de asistencia lingüística. Llame al [phone].    We comply with applicable federal civil rights laws and [place] laws. We do not discriminate on the basis of race, color, national origin, age, disability, sex, sexual orientation, or gender identity.            Thank you!     Thank you for choosing [facility]  for your care. Our goal is always to provide you with excellent care. Hearing back from our patients is one way we can continue to improve our services. Please take a few minutes to complete the written survey that you may receive in the mail after your visit with us. Thank you!             Your Updated Medication List - Protect others around you: Learn how to safely use, store and throw away your medicines at www.disposemymeds.org.          This list is accurate as of 2/26/18  7:51 PM.  Always use your most recent med list.                   Brand Name Dispense Instructions for use Diagnosis    cholecalciferol 5000 UNITS Caps capsule    vitamin D3    100 capsule    Take 1 capsule (5,000 Units) by mouth daily Take 1 per day    Major depressive disorder, recurrent episode, moderate (H)       cyclobenzaprine 5 MG tablet    FLEXERIL    42 tablet    Take 1 tablet (5 mg) by mouth 3 times daily as needed for muscle spasms    Cervical radiculopathy       doxepin 10 MG capsule    SINEquan    180 capsule    TAKE ONE TO TWO CAPSULES BY MOUTH AT BEDTIME    Major depressive disorder, recurrent episode, moderate (H)       DULoxetine 60 MG EC capsule    CYMBALTA    180 capsule    TAKE 2 CAPSULES BY MOUTH EVERY DAY    Major depressive disorder, recurrent episode, moderate (H)       FIBER PO      Two capsules in the morning and two capsules at night        fish  oil-omega-3 fatty acids 1000 MG capsule      Take 2 g by mouth daily Takes 1 in am and 1 at bedtime        fluticasone 50 MCG/ACT spray    FLONASE    48 mL    INHALE 1- 2 SPRAYS IN EACH NOSTRIL DAILY.    Other chronic sinusitis       * levothyroxine 150 MCG tablet    SYNTHROID/LEVOTHROID    45 tablet    Take 1 tab by mouth on Tuesday, Thursday, Saturday and Sunday    Acquired hypothyroidism       * levothyroxine 175 MCG tablet    SYNTHROID/LEVOTHROID    45 tablet    Take 1 tab by mouth Monday, Wednesday, Friday    Acquired hypothyroidism       * levothyroxine 175 MCG tablet    SYNTHROID/LEVOTHROID    30 tablet    Take 1 tablet (175 mcg) by mouth daily    Acquired hypothyroidism       lovastatin 20 MG tablet    MEVACOR    90 tablet    Take 1 tablet (20 mg) by mouth At Bedtime    Hyperlipidemia LDL goal <130       metroNIDAZOLE 500 MG tablet    FLAGYL    14 tablet    Take 1 tablet (500 mg) by mouth 2 times daily    BV (bacterial vaginosis)       Multi-vitamin Tabs tablet      Take 1 tablet by mouth daily        NONFORMULARY      Take 20 mg by mouth 2 times daily D-amphetamine Salt combo        OLANZapine 2.5 MG tablet    zyPREXA    90 tablet    TAKE 1 TABLET(2.5 MG) BY MOUTH AT BEDTIME    Major depressive disorder, recurrent episode, moderate (H)       omeprazole 40 MG capsule    priLOSEC    90 capsule    Take 1 capsule (40 mg) by mouth daily Take 30-60 minutes before a meal.    Gastroesophageal reflux disease, esophagitis presence not specified       ondansetron 4 MG tablet    ZOFRAN    18 tablet    Take 1 tablet (4 mg) by mouth every 6 hours as needed for nausea    Norovirus       oxyCODONE-acetaminophen 5-325 MG per tablet    PERCOCET    15 tablet    Take 1-2 tablets by mouth every 6 hours as needed for pain        prazosin 5 MG capsule    MINIPRESS    90 capsule    Take 1 capsule (5 mg) by mouth At Bedtime Take 1 capsule at bedtime    Primary insomnia       tapentadol 50 MG Tabs tablet    NUCYNTA    120 tablet     Take 1 tablet (50 mg) by mouth every 6 hours as needed for moderate to severe pain Max #4/day.    Chronic pain syndrome       tiZANidine 4 MG tablet    ZANAFLEX    210 tablet    Take 1-3 tablets (4-12 mg) by mouth 3 times daily as needed for muscle spasms    Cervical radiculopathy       topiramate 50 MG tablet    TOPAMAX    120 tablet    Take 1-2 tablets ( mg) by mouth 2 times daily    Chronic pain syndrome       * Notice:  This list has 3 medication(s) that are the same as other medications prescribed for you. Read the directions carefully, and ask your doctor or other care provider to review them with you.

## 2018-02-28 ENCOUNTER — TELEPHONE (OUTPATIENT)
Dept: FAMILY MEDICINE | Facility: CLINIC | Age: 58
End: 2018-02-28

## 2018-02-28 DIAGNOSIS — E03.9 ACQUIRED HYPOTHYROIDISM: Primary | ICD-10-CM

## 2018-02-28 DIAGNOSIS — F33.1 MAJOR DEPRESSIVE DISORDER, RECURRENT EPISODE, MODERATE (H): ICD-10-CM

## 2018-02-28 DIAGNOSIS — F90.1 ATTENTION-DEFICIT HYPERACTIVITY DISORDER, PREDOMINANTLY HYPERACTIVE TYPE: ICD-10-CM

## 2018-02-28 NOTE — TELEPHONE ENCOUNTER
No, we did not discuss adderall.  Was there a break in therapy, or does she just not take it every day?  Last rx I see is from October/november.  I can probably do the refill.  Please let me know how she has been taking or not taking them.  Thanks.    There is a lab for thyroid in the chart, she can have it done asap.  Fasting labs, please.

## 2018-02-28 NOTE — TELEPHONE ENCOUNTER
"Requested Prescriptions   Pending Prescriptions Disp Refills     DULoxetine (CYMBALTA) 60 MG EC capsule [Pharmacy Med Name: DULOXETINE DR 60MG CAPSULES]  Last Written Prescription Date:  3/23/2017  Last Fill Quantity: 180 capsule,  # refills: 1   Last Office Visit: 2/26/2018  Future Office Visit:      180 capsule 0     Sig: TAKE 2 CAPSULES BY MOUTH EVERY DAY    Serotonin-Norepinephrine Reuptake Inhibitors  Failed    2/28/2018  1:22 PM       Failed - PHQ-9 score of less than 5 in past 6 months    The PHQ-9 criteria is meant to fail. It requires a PHQ-9 score review  PHQ-9 SCORE 11/29/2016 1/9/2017 8/25/2017   Total Score - - -   Total Score MyChart - - -   Total Score 11 16 6     DONELL-7 SCORE 10/25/2016 11/8/2016 11/29/2016   Total Score - - -   Total Score 7 13 10   Total Score - - -          Passed - Blood pressure under 140/90 in past 12 months    BP Readings from Last 3 Encounters:   02/26/18 122/76   11/27/17 107/69   09/18/17 106/69          Passed - Patient is age 18 or older       Passed - No active pregnancy on record       Passed - No positive pregnancy test in past 12 months       Passed - Recent (6 mo) or future visit with authorizing provider's specialty    Patient had office visit in the last 6 months or has a visit in the next 30 days with authorizing provider.  See \"Patient Info\" tab in inbasket, or \"Choose Columns\" in Meds & Orders section of the refill encounter.              "

## 2018-02-28 NOTE — TELEPHONE ENCOUNTER
Patient called explaining she was seen yesterday and discussed getting RX for Adderall but forgot to get it    Patient also states she tried to stop the lab to check Thyroid however, was told there wasn't an order    Patient is asking if you want labs done to check thyroid?    Patient can come on Monday for labs and to  the scripts    Ok to leave message

## 2018-03-01 RX ORDER — DULOXETIN HYDROCHLORIDE 60 MG/1
CAPSULE, DELAYED RELEASE ORAL
Qty: 180 CAPSULE | Refills: 0 | Status: SHIPPED | OUTPATIENT
Start: 2018-03-01 | End: 2018-06-04

## 2018-03-01 NOTE — TELEPHONE ENCOUNTER
Called pt to do PHQ9 for refill on duloxetine TE(2/28/2018) and pt was asking about refill on adderall. Notified pt of AS message and pt reports that she has been taking adderall 20 mg every day. Pt notes that before she was taking 20 mg two times daily but now is only taking once daily.    Ger Granado MA

## 2018-03-01 NOTE — TELEPHONE ENCOUNTER
Will send my chart msg asking her how she has been taking the adderall  tsh future order placed

## 2018-03-01 NOTE — TELEPHONE ENCOUNTER
Routing refill request to provider for review/approval because:  --Last PHQ-9 >4 and out of date.  --HM shows as not due but flow sheets show last PHQ-9 was 8/25/17    MA  --Please contact patient for PHQ-9..  Thank you.  --HM shows as not due but flow sheets show last PHQ-9 was 8/25/17  --Request routed to provider to authorize a refill.      Last OV : 2/26/18 Tobias.      PHQ-9 SCORE 11/29/2016 1/9/2017 8/25/2017   Total Score - - -   Total Score MyChart - - -   Total Score 11 16 6

## 2018-03-02 RX ORDER — DEXTROAMPHETAMINE SACCHARATE, AMPHETAMINE ASPARTATE, DEXTROAMPHETAMINE SULFATE AND AMPHETAMINE SULFATE 5; 5; 5; 5 MG/1; MG/1; MG/1; MG/1
20 TABLET ORAL DAILY
Qty: 30 TABLET | Refills: 0 | Status: SHIPPED | OUTPATIENT
Start: 2018-03-02 | End: 2018-07-23

## 2018-03-02 RX ORDER — DEXTROAMPHETAMINE SACCHARATE, AMPHETAMINE ASPARTATE, DEXTROAMPHETAMINE SULFATE AND AMPHETAMINE SULFATE 5; 5; 5; 5 MG/1; MG/1; MG/1; MG/1
20 TABLET ORAL DAILY
Qty: 30 TABLET | Refills: 0 | Status: SHIPPED | OUTPATIENT
Start: 2018-04-01 | End: 2018-07-23

## 2018-03-02 RX ORDER — DEXTROAMPHETAMINE SACCHARATE, AMPHETAMINE ASPARTATE, DEXTROAMPHETAMINE SULFATE AND AMPHETAMINE SULFATE 5; 5; 5; 5 MG/1; MG/1; MG/1; MG/1
20 TABLET ORAL DAILY
Qty: 30 TABLET | Refills: 0 | Status: SHIPPED | OUTPATIENT
Start: 2018-05-01 | End: 2018-07-23

## 2018-03-02 ASSESSMENT — PATIENT HEALTH QUESTIONNAIRE - PHQ9: SUM OF ALL RESPONSES TO PHQ QUESTIONS 1-9: 2

## 2018-03-02 NOTE — TELEPHONE ENCOUNTER
Called pt and LVM that rx will be at  for  and remind pt to make a lab appt to get her TSH done.    Left 3 rx of amphetamine-dextroamphetamine (ADDERALL) 20 MG per tablet at .    Ger Granado MA

## 2018-03-02 NOTE — TELEPHONE ENCOUNTER
Thanks, rx's in grand box.   There was already a TSH placed as future order, along with some other labs she was due for.  Please have her come in and do them all.  They were ordered in Nov.

## 2018-03-15 DIAGNOSIS — Z13.1 SCREENING FOR DIABETES MELLITUS: ICD-10-CM

## 2018-03-15 DIAGNOSIS — Z51.81 ENCOUNTER FOR THERAPEUTIC DRUG MONITORING: ICD-10-CM

## 2018-03-15 DIAGNOSIS — E78.5 HYPERLIPIDEMIA LDL GOAL <130: ICD-10-CM

## 2018-03-15 DIAGNOSIS — E03.9 ACQUIRED HYPOTHYROIDISM: ICD-10-CM

## 2018-03-15 PROCEDURE — 82947 ASSAY GLUCOSE BLOOD QUANT: CPT | Performed by: FAMILY MEDICINE

## 2018-03-15 PROCEDURE — 80061 LIPID PANEL: CPT | Performed by: FAMILY MEDICINE

## 2018-03-15 PROCEDURE — 84439 ASSAY OF FREE THYROXINE: CPT | Performed by: FAMILY MEDICINE

## 2018-03-15 PROCEDURE — 84443 ASSAY THYROID STIM HORMONE: CPT | Performed by: FAMILY MEDICINE

## 2018-03-15 PROCEDURE — 36415 COLL VENOUS BLD VENIPUNCTURE: CPT | Performed by: FAMILY MEDICINE

## 2018-03-16 DIAGNOSIS — E03.9 ACQUIRED HYPOTHYROIDISM: ICD-10-CM

## 2018-03-16 LAB
CHOLEST SERPL-MCNC: 215 MG/DL
GLUCOSE SERPL-MCNC: 95 MG/DL (ref 70–99)
HDLC SERPL-MCNC: 46 MG/DL
LDLC SERPL CALC-MCNC: 147 MG/DL
NONHDLC SERPL-MCNC: 169 MG/DL
T4 FREE SERPL-MCNC: 0.97 NG/DL (ref 0.76–1.46)
TRIGL SERPL-MCNC: 109 MG/DL
TSH SERPL DL<=0.005 MIU/L-ACNC: 11.61 MU/L (ref 0.4–4)

## 2018-03-16 RX ORDER — LEVOTHYROXINE SODIUM 200 UG/1
200 TABLET ORAL DAILY
Qty: 60 TABLET | Refills: 0 | Status: SHIPPED | OUTPATIENT
Start: 2018-03-16 | End: 2018-05-04

## 2018-03-17 NOTE — TELEPHONE ENCOUNTER
"Requested Prescriptions   Pending Prescriptions Disp Refills     levothyroxine (SYNTHROID/LEVOTHROID) 200 MCG tablet [Pharmacy Med Name: LEVOTHYROXINE SOD 0.2MG(200MCG) TAB]  Last Written Prescription Date:  3/16/2018  Last Fill Quantity: 60 tabs,  # refills: 0   Last office visit: 2/26/2018 with prescribing provider:  Tobias   Future Office Visit:     90 tablet 0     Sig: TAKE 1 TABLET(200 MCG) BY MOUTH DAILY    Thyroid Protocol Failed    3/16/2018  5:48 PM       Failed - Normal TSH on file in past 12 months    Recent Labs   Lab Test  03/15/18   1334   TSH  11.61*             Passed - Patient is 12 years or older       Passed - Recent (12 mo) or future (30 days) visit within the authorizing provider's specialty    Patient had office visit in the last 12 months or has a visit in the next 30 days with authorizing provider or within the authorizing provider's specialty.  See \"Patient Info\" tab in inbasket, or \"Choose Columns\" in Meds & Orders section of the refill encounter.           Passed - No active pregnancy on record    If patient is pregnant or has had a positive pregnancy test, please check TSH.         Passed - No positive pregnancy test in past 12 months    If patient is pregnant or has had a positive pregnancy test, please check TSH.            "

## 2018-03-19 RX ORDER — LEVOTHYROXINE SODIUM 200 UG/1
TABLET ORAL
Qty: 60 TABLET | Refills: 0 | OUTPATIENT
Start: 2018-03-19

## 2018-04-02 DIAGNOSIS — F33.1 MAJOR DEPRESSIVE DISORDER, RECURRENT EPISODE, MODERATE (H): ICD-10-CM

## 2018-04-03 NOTE — TELEPHONE ENCOUNTER
"Requested Prescriptions   Pending Prescriptions Disp Refills     doxepin (SINEQUAN) 10 MG capsule [Pharmacy Med Name: DOXEPIN 10MG CAPSULES]  Last Written Prescription Date:  3-23-17  Last Fill Quantity: 180 tab,  # refills: 8   Last office visit: 2-26-18 with prescribing provider:  Catrachita Collado    Future Office Visit:    180 capsule 0     Sig: TAKE 1 TO 2 CAPSULES BY MOUTH EVERY NIGHT AT BEDTIME    Tricyclic Antidepressants Protocol Passed    4/2/2018  8:39 PM       Passed - Blood pressure under 140/90 in past 12 months    BP Readings from Last 3 Encounters:   02/26/18 122/76   11/27/17 107/69   09/18/17 106/69          Passed - PHQ-9 score less than 5 in past 6 months    Please review last PHQ-9 score.   PHQ-9 SCORE 1/9/2017 8/25/2017 3/1/2018   Total Score - - -   Total Score MyChart - - -   Total Score 16 6 2     DONELL-7 SCORE 10/25/2016 11/8/2016 11/29/2016   Total Score - - -   Total Score 7 13 10   Total Score - - -          Passed - Patient is age 18 or older       Passed - No active pregnancy on record       Passed - No positive pregnancy test in past 12 months       Passed - Recent (6 mo) or future (30 days) visit within the authorizing provider's specialty    Patient had office visit in the last 6 months or has a visit in the next 30 days with authorizing provider or within the authorizing provider's specialty.  See \"Patient Info\" tab in inbasket, or \"Choose Columns\" in Meds & Orders section of the refill encounter.              "

## 2018-04-05 RX ORDER — DOXEPIN HYDROCHLORIDE 10 MG/1
CAPSULE ORAL
Qty: 180 CAPSULE | Refills: 1 | Status: SHIPPED | OUTPATIENT
Start: 2018-04-05 | End: 2018-11-19

## 2018-04-23 ENCOUNTER — TRANSFERRED RECORDS (OUTPATIENT)
Dept: HEALTH INFORMATION MANAGEMENT | Facility: CLINIC | Age: 58
End: 2018-04-23

## 2018-05-02 ENCOUNTER — APPOINTMENT (OUTPATIENT)
Dept: MRI IMAGING | Facility: CLINIC | Age: 58
End: 2018-05-02
Attending: EMERGENCY MEDICINE
Payer: MEDICARE

## 2018-05-02 ENCOUNTER — HOSPITAL ENCOUNTER (EMERGENCY)
Facility: CLINIC | Age: 58
Discharge: HOME | End: 2018-05-02
Attending: EMERGENCY MEDICINE | Admitting: EMERGENCY MEDICINE
Payer: MEDICARE

## 2018-05-02 VITALS
SYSTOLIC BLOOD PRESSURE: 115 MMHG | OXYGEN SATURATION: 97 % | HEIGHT: 69 IN | RESPIRATION RATE: 16 BRPM | BODY MASS INDEX: 29.77 KG/M2 | TEMPERATURE: 98.4 F | HEART RATE: 71 BPM | WEIGHT: 201 LBS | DIASTOLIC BLOOD PRESSURE: 69 MMHG

## 2018-05-02 DIAGNOSIS — R33.9 URINARY RETENTION: ICD-10-CM

## 2018-05-02 LAB
ALBUMIN UR-MCNC: NEGATIVE MG/DL
ANION GAP SERPL CALCULATED.3IONS-SCNC: 7 MMOL/L (ref 3–14)
APPEARANCE UR: CLEAR
BASOPHILS # BLD AUTO: 0.1 10E9/L (ref 0–0.2)
BASOPHILS NFR BLD AUTO: 0.6 %
BILIRUB UR QL STRIP: NEGATIVE
BUN SERPL-MCNC: 23 MG/DL (ref 7–30)
CALCIUM SERPL-MCNC: 9 MG/DL (ref 8.5–10.1)
CHLORIDE SERPL-SCNC: 110 MMOL/L (ref 94–109)
CO2 SERPL-SCNC: 24 MMOL/L (ref 20–32)
COLOR UR AUTO: NORMAL
CREAT SERPL-MCNC: 0.9 MG/DL (ref 0.52–1.04)
DIFFERENTIAL METHOD BLD: NORMAL
EOSINOPHIL # BLD AUTO: 0.3 10E9/L (ref 0–0.7)
EOSINOPHIL NFR BLD AUTO: 2.7 %
ERYTHROCYTE [DISTWIDTH] IN BLOOD BY AUTOMATED COUNT: 13.4 % (ref 10–15)
GFR SERPL CREATININE-BSD FRML MDRD: 64 ML/MIN/1.7M2
GLUCOSE SERPL-MCNC: 116 MG/DL (ref 70–99)
GLUCOSE UR STRIP-MCNC: NEGATIVE MG/DL
HCT VFR BLD AUTO: 40.2 % (ref 35–47)
HGB BLD-MCNC: 13.3 G/DL (ref 11.7–15.7)
HGB UR QL STRIP: NEGATIVE
IMM GRANULOCYTES # BLD: 0 10E9/L (ref 0–0.4)
IMM GRANULOCYTES NFR BLD: 0.2 %
KETONES UR STRIP-MCNC: NEGATIVE MG/DL
LEUKOCYTE ESTERASE UR QL STRIP: NEGATIVE
LYMPHOCYTES # BLD AUTO: 4.3 10E9/L (ref 0.8–5.3)
LYMPHOCYTES NFR BLD AUTO: 44 %
MCH RBC QN AUTO: 31 PG (ref 26.5–33)
MCHC RBC AUTO-ENTMCNC: 33.1 G/DL (ref 31.5–36.5)
MCV RBC AUTO: 94 FL (ref 78–100)
MONOCYTES # BLD AUTO: 0.5 10E9/L (ref 0–1.3)
MONOCYTES NFR BLD AUTO: 5.4 %
NEUTROPHILS # BLD AUTO: 4.6 10E9/L (ref 1.6–8.3)
NEUTROPHILS NFR BLD AUTO: 47.1 %
NITRATE UR QL: NEGATIVE
NRBC # BLD AUTO: 0 10*3/UL
NRBC BLD AUTO-RTO: 0 /100
PH UR STRIP: 5.5 PH (ref 5–7)
PLATELET # BLD AUTO: 205 10E9/L (ref 150–450)
POTASSIUM SERPL-SCNC: 3.6 MMOL/L (ref 3.4–5.3)
RBC # BLD AUTO: 4.29 10E12/L (ref 3.8–5.2)
RBC #/AREA URNS AUTO: 0 /HPF (ref 0–2)
SODIUM SERPL-SCNC: 140 MMOL/L (ref 133–144)
SOURCE: NORMAL
SP GR UR STRIP: 1.01 (ref 1–1.03)
UROBILINOGEN UR STRIP-MCNC: NORMAL MG/DL (ref 0–2)
WBC # BLD AUTO: 9.8 10E9/L (ref 4–11)
WBC #/AREA URNS AUTO: 0 /HPF (ref 0–5)

## 2018-05-02 PROCEDURE — 72148 MRI LUMBAR SPINE W/O DYE: CPT

## 2018-05-02 PROCEDURE — 51702 INSERT TEMP BLADDER CATH: CPT | Performed by: EMERGENCY MEDICINE

## 2018-05-02 PROCEDURE — 99285 EMERGENCY DEPT VISIT HI MDM: CPT | Mod: 25 | Performed by: EMERGENCY MEDICINE

## 2018-05-02 PROCEDURE — 81001 URINALYSIS AUTO W/SCOPE: CPT | Performed by: EMERGENCY MEDICINE

## 2018-05-02 PROCEDURE — 85025 COMPLETE CBC W/AUTO DIFF WBC: CPT | Performed by: EMERGENCY MEDICINE

## 2018-05-02 PROCEDURE — 80048 BASIC METABOLIC PNL TOTAL CA: CPT | Performed by: EMERGENCY MEDICINE

## 2018-05-02 PROCEDURE — 72141 MRI NECK SPINE W/O DYE: CPT

## 2018-05-02 PROCEDURE — 25000132 ZZH RX MED GY IP 250 OP 250 PS 637: Mod: GY | Performed by: EMERGENCY MEDICINE

## 2018-05-02 PROCEDURE — 51798 US URINE CAPACITY MEASURE: CPT | Performed by: EMERGENCY MEDICINE

## 2018-05-02 PROCEDURE — 99284 EMERGENCY DEPT VISIT MOD MDM: CPT | Mod: Z6 | Performed by: EMERGENCY MEDICINE

## 2018-05-02 PROCEDURE — 87086 URINE CULTURE/COLONY COUNT: CPT | Performed by: EMERGENCY MEDICINE

## 2018-05-02 PROCEDURE — A9270 NON-COVERED ITEM OR SERVICE: HCPCS | Mod: GY | Performed by: EMERGENCY MEDICINE

## 2018-05-02 RX ORDER — LORAZEPAM 0.5 MG/1
1 TABLET ORAL ONCE
Status: COMPLETED | OUTPATIENT
Start: 2018-05-02 | End: 2018-05-02

## 2018-05-02 RX ORDER — OXYCODONE AND ACETAMINOPHEN 5; 325 MG/1; MG/1
1 TABLET ORAL ONCE
Status: COMPLETED | OUTPATIENT
Start: 2018-05-02 | End: 2018-05-02

## 2018-05-02 RX ADMIN — LORAZEPAM 1 MG: 0.5 TABLET ORAL at 05:51

## 2018-05-02 RX ADMIN — OXYCODONE HYDROCHLORIDE AND ACETAMINOPHEN 1 TABLET: 5; 325 TABLET ORAL at 10:52

## 2018-05-02 NOTE — ED TRIAGE NOTES
Patient presents with c/o urinary retention. Patient states she has been unable to void for the past eight hours. Denies hx of urinary retention or urology procedures. Bladder scanned for 63 mL. Does have left kidney stone.

## 2018-05-02 NOTE — ED PROVIDER NOTES
History     Chief Complaint   Patient presents with     Urinary Retention     HPI  Ilsa Yusuf is a 57 year old female with a history of ureteral implantation who presents with urinary retention and lower abdominal pain.  She states that she was not able to urinate since 3:00 this afternoon.  She denies any other symptoms of diarrhea or constipation.  She has no fever or chills  .She has no vomiting.  No prior history of urinary retention or ernandez placement.  She reports some swelling in her ankles bilaterally.  No shortness of breath or chest pain.  She has no history of DVT.  She has a prior history of cervical and lumbar radiculopathy.  She reports some increased left leg pain last evening.  She denies any numbness.          PAST MEDICAL HISTORY:   Past Medical History:   Diagnosis Date     Arthritis 2012    both knees; hands     Depressive disorder      Depressive disorder, not elsewhere classified 12    DC 10/05/12-[facility]     Hyperlipidemia LDL goal < 130     mevacor     Hypothyroid      Moderate major depression (H)     abilify, cymbalta, seroquel, and sofya, Dr Antonio Perales     PTSD (post-traumatic stress disorder)      Seizure (H) 2011    one episode, was on Keppra, EEG negative       PAST SURGICAL HISTORY:   Past Surgical History:   Procedure Laterality Date     ABDOMEN SURGERY       C PARTIAL EXCISION THYROID,UNILAT      rt, negative path then, treated with synthroid.     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     TONSILLECTOMY       ureteral reimplantation         FAMILY HISTORY:   Family History   Problem Relation Age of Onset     C.A.D. Father 58      at 58, heart disease     MENTAL ILLNESS Father      Coronary Artery Disease Father      Hyperlipidemia Father      Alzheimer Disease Mother      total care now     Depression Mother       Anxiety Disorder Mother      DIABETES Sister      adult onset     Thyroid Disease Sister        SOCIAL HISTORY:   Social History   Substance Use Topics     Smoking status: Current Some Day Smoker     Packs/day: 0.30     Last attempt to quit: 2/17/2015     Smokeless tobacco: Never Used      Comment: recreational     Alcohol use 0.0 oz/week      Comment: 2 a night on weekends       Patient's Medications   New Prescriptions    No medications on file   Previous Medications    AMPHETAMINE-DEXTROAMPHETAMINE (ADDERALL) 20 MG PER TABLET    Take 1 tablet (20 mg) by mouth daily    CHOLECALCIFEROL (VITAMIN D3) 5000 UNITS CAPS CAPSULE    Take 1 capsule (5,000 Units) by mouth daily Take 1 per day    DOXEPIN (SINEQUAN) 10 MG CAPSULE    TAKE 1 TO 2 CAPSULES BY MOUTH EVERY NIGHT AT BEDTIME    DULOXETINE (CYMBALTA) 60 MG EC CAPSULE    TAKE 2 CAPSULES BY MOUTH EVERY DAY    FIBER PO    Two capsules in the morning and two capsules at night    FISH OIL-OMEGA-3 FATTY ACIDS (OMEGA 3) 1000 MG CAPSULE    Take 2 g by mouth daily Takes 1 in am and 1 at bedtime    FLUTICASONE (FLONASE) 50 MCG/ACT SPRAY    INHALE 1- 2 SPRAYS IN EACH NOSTRIL DAILY.    LEVOTHYROXINE (SYNTHROID/LEVOTHROID) 150 MCG TABLET    Take 1 tab by mouth on Tuesday, Thursday, Saturday and Sunday    LEVOTHYROXINE (SYNTHROID/LEVOTHROID) 175 MCG TABLET    Take 1 tab by mouth Monday, Wednesday, Friday    LEVOTHYROXINE (SYNTHROID/LEVOTHROID) 200 MCG TABLET    Take 1 tablet (200 mcg) by mouth daily    LOVASTATIN (MEVACOR) 20 MG TABLET    Take 1 tablet (20 mg) by mouth At Bedtime    MULTIVITAMIN, THERAPEUTIC WITH MINERALS (MULTI-VITAMIN) TABS    Take 1 tablet by mouth daily    NONFORMULARY    Take 20 mg by mouth 2 times daily D-amphetamine Salt combo    OLANZAPINE (ZYPREXA) 2.5 MG TABLET    TAKE 1 TABLET(2.5 MG) BY MOUTH AT BEDTIME    OMEPRAZOLE (PRILOSEC) 40 MG CAPSULE    Take 1 capsule (40 mg) by mouth daily Take 30-60 minutes before a meal.    ONDANSETRON (ZOFRAN) 4 MG TABLET     "Take 1 tablet (4 mg) by mouth every 6 hours as needed for nausea    OXYCODONE-ACETAMINOPHEN (PERCOCET) 5-325 MG PER TABLET    Take 1-2 tablets by mouth every 6 hours as needed for pain    PRAZOSIN (MINIPRESS) 5 MG CAPSULE    Take 1 capsule (5 mg) by mouth At Bedtime Take 1 capsule at bedtime    TAPENTADOL (NUCYNTA) 50 MG TABS TABLET    Take 1 tablet (50 mg) by mouth every 6 hours as needed for moderate to severe pain Max #4/day.    TIZANIDINE (ZANAFLEX) 4 MG TABLET    Take 1-3 tablets (4-12 mg) by mouth 3 times daily as needed for muscle spasms    TOPIRAMATE (TOPAMAX) 50 MG TABLET    Take 1-2 tablets ( mg) by mouth 2 times daily   Modified Medications    No medications on file   Discontinued Medications    CYCLOBENZAPRINE (FLEXERIL) 5 MG TABLET    Take 1 tablet (5 mg) by mouth 3 times daily as needed for muscle spasms    METRONIDAZOLE (FLAGYL) 500 MG TABLET    Take 1 tablet (500 mg) by mouth 2 times daily          Allergies   Allergen Reactions     Gabapentin Other (See Comments)     Patient states she gets \"horrific nightmares\" with this medication     Dilaudid [Hydromorphone Hcl]      Made her feel like her skin was crawling     Nsaids Other (See Comments)     Kidney failure     Compazine [Prochlorperazine] Other (See Comments)     \"Makes my skin crawl, I was going nuts.\"               I have reviewed the Medications, Allergies, Past Medical and Surgical History, and Social History in the Epic system.    Review of Systems   All other systems reviewed and are negative.      Physical Exam   BP: 136/82  Pulse: 87  Temp: 97.9  F (36.6  C)  Resp: 16  Height: 174 cm (5' 8.5\")  Weight: 91.2 kg (201 lb)  SpO2: 99 %      Physical Exam   Constitutional: She is oriented to person, place, and time. No distress.   HENT:   Head: Normocephalic and atraumatic.   Eyes: Conjunctivae are normal. Pupils are equal, round, and reactive to light.   Neck: Normal range of motion. Neck supple.   Cardiovascular: Normal rate and intact " distal pulses.    Pulmonary/Chest: Effort normal. No respiratory distress. She has no wheezes. She has no rales. She exhibits no tenderness.   Abdominal: There is no rebound and no guarding.   Lower mid abdominal tenderness   Musculoskeletal: Normal range of motion. She exhibits edema. She exhibits no tenderness.   Neurological: She is alert and oriented to person, place, and time. She exhibits normal muscle tone.   Skin: Skin is warm and dry. No rash noted. She is not diaphoretic.   Psychiatric: She has a normal mood and affect. Her behavior is normal. Thought content normal.   Nursing note and vitals reviewed.      ED Course     ED Course     Procedures             Critical Care time:  none             Labs Ordered and Resulted from Time of ED Arrival Up to the Time of Departure from the ED   ROUTINE UA WITH MICROSCOPIC   BASIC METABOLIC PANEL   CBC WITH PLATELETS DIFFERENTIAL   URINE CULTURE AEROBIC BACTERIAL            Assessments & Plan (with Medical Decision Making)   1.  Urinary retention      58 yo F with a history of ureteral reimplantation who presents with lower abdominal pain and urinary retention.  She was retaining 350cc of urine and a ernandez was placed.  Urinalysis showed no signs of infection.  Ilsa has a prior history of lumbar and cervical radiculopathy with MRI  showing disc protrusion on the S1 nerve root and also cervical disc protrusion.  Her neurologic exam shows no focal deficits and she has no radicular symptoms at this time however there is concern for cord compression given her clinical presentation and therefore we will obtain an MRI of her lumbar and cervical spine.  Patient will be signed out to the oncoming ER physician to follow up on MRI.       I have reviewed the nursing notes.    I have reviewed the findings, diagnosis, plan and need for follow up with the patient.    New Prescriptions    No medications on file       Final diagnoses:   None       5/2/2018   [facility]     Dominick Molina MD  05/07/18 0033       Dominick Molina MD  05/07/18 0318

## 2018-05-02 NOTE — ED AVS SNAPSHOT
[facility], Emergency Department    [address] [address]    Phone:  [phone]                                       Ilsa Yusuf   MRN: [id number]    Department:  [facility], Emergency Department   Date of Visit:  5/2/2018           Patient Information     Date Of Birth          1960        Your diagnoses for this visit were:     None       You were seen by Dominick Molina MD and Naldo Gutierrez MD.      Discharge References/Attachments     PARKER CATHETER, CARE (ENGLISH)    URINARY RETENTION, FEMALE (ENGLISH)      24 Hour Appointment Hotline       To make an appointment at any [facility], call [phone] ([phone]). If you don't have a family doctor or clinic, we will help you find one. [place] [facility] clinics are conveniently located to serve the needs of you and your family.             Review of your medicines      Our records show that you are taking the medicines listed below. If these are incorrect, please call your family doctor or clinic.        Dose / Directions Last dose taken    * amphetamine-dextroamphetamine 20 MG per tablet   Commonly known as:  ADDERALL   Dose:  20 mg   Quantity:  30 tablet        Take 1 tablet (20 mg) by mouth daily   Refills:  0        cholecalciferol 5000 units Caps capsule   Commonly known as:  vitamin D3   Dose:  5000 Units   Quantity:  100 capsule        Take 1 capsule (5,000 Units) by mouth daily Take 1 per day   Refills:  2        doxepin 10 MG capsule   Commonly known as:  SINEquan   Quantity:  180 capsule        TAKE 1 TO 2 CAPSULES BY MOUTH EVERY NIGHT AT BEDTIME   Refills:  1        DULoxetine 60 MG EC capsule   Commonly known as:  CYMBALTA   Quantity:  180 capsule        TAKE 2 CAPSULES BY MOUTH EVERY DAY   Refills:  0        FIBER PO        Two capsules in the morning and two capsules at night   Refills:  0        fish oil-omega-3 fatty acids 1000 MG capsule   Dose:  2 g        Take 2 g by mouth daily Takes 1 in  am and 1 at bedtime   Refills:  0        fluticasone 50 MCG/ACT spray   Commonly known as:  FLONASE   Quantity:  48 mL        INHALE 1- 2 SPRAYS IN EACH NOSTRIL DAILY.   Refills:  0        * levothyroxine 150 MCG tablet   Commonly known as:  SYNTHROID/LEVOTHROID   Quantity:  45 tablet        Take 1 tab by mouth on Tuesday, Thursday, Saturday and Sunday   Refills:  3        * levothyroxine 175 MCG tablet   Commonly known as:  SYNTHROID/LEVOTHROID   Quantity:  45 tablet        Take 1 tab by mouth Monday, Wednesday, Friday   Refills:  3        * levothyroxine 200 MCG tablet   Commonly known as:  SYNTHROID/LEVOTHROID   Dose:  200 mcg   Quantity:  60 tablet        Take 1 tablet (200 mcg) by mouth daily   Refills:  0        lovastatin 20 MG tablet   Commonly known as:  MEVACOR   Dose:  20 mg   Quantity:  90 tablet        Take 1 tablet (20 mg) by mouth At Bedtime   Refills:  3        Multi-vitamin Tabs tablet   Dose:  1 tablet        Take 1 tablet by mouth daily   Refills:  0        NONFORMULARY   Dose:  20 mg        Take 20 mg by mouth 2 times daily D-amphetamine Salt combo   Refills:  0        OLANZapine 2.5 MG tablet   Commonly known as:  zyPREXA   Quantity:  90 tablet        TAKE 1 TABLET(2.5 MG) BY MOUTH AT BEDTIME   Refills:  3        omeprazole 40 MG capsule   Commonly known as:  priLOSEC   Dose:  40 mg   Quantity:  90 capsule        Take 1 capsule (40 mg) by mouth daily Take 30-60 minutes before a meal.   Refills:  2        ondansetron 4 MG tablet   Commonly known as:  ZOFRAN   Dose:  4 mg   Quantity:  18 tablet        Take 1 tablet (4 mg) by mouth every 6 hours as needed for nausea   Refills:  5        oxyCODONE-acetaminophen 5-325 MG per tablet   Commonly known as:  PERCOCET   Dose:  1-2 tablet   Quantity:  15 tablet        Take 1-2 tablets by mouth every 6 hours as needed for pain   Refills:  0        prazosin 5 MG capsule   Commonly known as:  MINIPRESS   Dose:  5 mg   Quantity:  90 capsule        Take 1  capsule (5 mg) by mouth At Bedtime Take 1 capsule at bedtime   Refills:  3        tapentadol 50 MG Tabs tablet   Commonly known as:  NUCYNTA   Dose:  50 mg   Quantity:  120 tablet        Take 1 tablet (50 mg) by mouth every 6 hours as needed for moderate to severe pain Max #4/day.   Refills:  0        tiZANidine 4 MG tablet   Commonly known as:  ZANAFLEX   Dose:  4-12 mg   Quantity:  210 tablet        Take 1-3 tablets (4-12 mg) by mouth 3 times daily as needed for muscle spasms   Refills:  3        topiramate 50 MG tablet   Commonly known as:  TOPAMAX   Dose:   mg   Quantity:  120 tablet        Take 1-2 tablets ( mg) by mouth 2 times daily   Refills:  3        * Notice:  This list has 4 medication(s) that are the same as other medications prescribed for you. Read the directions carefully, and ask your doctor or other care provider to review them with you.      ASK your doctor about these medications        Dose / Directions Last dose taken    * amphetamine-dextroamphetamine 20 MG per tablet   Commonly known as:  ADDERALL   Dose:  20 mg   Quantity:  30 tablet   Ask about: Should I take this medication?        Take 1 tablet (20 mg) by mouth daily   Refills:  0        * Notice:  This list has 1 medication(s) that are the same as other medications prescribed for you. Read the directions carefully, and ask your doctor or other care provider to review them with you.            Procedures and tests performed during your visit     Basic metabolic panel    CBC with platelets differential    Cervical spine MRI w/o contrast    Lumbar spine MRI w/o contrast    UA with Microscopic    Urine Culture      Orders Needing Specimen Collection     None      Pending Results     Date and Time Order Name Status Description    5/2/2018 0404 Urine Culture Preliminary             Pending Culture Results     Date and Time Order Name Status Description    5/2/2018 0404 Urine Culture Preliminary             Pending Results  Instructions     If you had any lab results that were not finalized at the time of your Discharge, you can call the ED Lab Result RN at [phone]. You will be contacted by this team for any positive Lab results or changes in treatment. The nurses are available 7 days a week from 10A to 6:30P.  You can leave a message 24 hours per day and they will return your call.        Thank you for choosing [facility]       Thank you for choosing [facility] for your care. Our goal is always to provide you with excellent care. Hearing back from our patients is one way we can continue to improve our services. Please take a few minutes to complete the written survey that you may receive in the mail after you visit with us. Thank you!        [employer] Information     [employer] gives you secure access to your electronic health record. If you see a primary care provider, you can also send messages to your care team and make appointments. If you have questions, please call your primary care clinic.  If you do not have a primary care provider, please call [phone] and they will assist you.        Care EveryWhere ID     This is your Care EveryWhere ID. This could be used by other organizations to access your [facility] medical records  [id number]        Equal Access to Services     KRISTINA MILLER : Hadbroderick Heck, yaritza roth, claudia may, jnae ma. So [facility] [phone].    ATENCIÓN: Si habla español, tiene a castle disposición servicios gratuitos de asistencia lingüística. Llame al [phone].    We comply with applicable federal civil rights laws and [place] laws. We do not discriminate on the basis of race, color, national origin, age, disability, sex, sexual orientation, or gender identity.            After Visit Summary       This is your record. Keep this with you and show to your community pharmacist(s) and doctor(s) at your next visit.

## 2018-05-02 NOTE — ED NOTES
Patient educated on ernandez catheter home care completed and plan of care regarding urinary catheter. Patient citing they are going to follow up with primary care physician and will seek urologist through primary care physician.

## 2018-05-02 NOTE — ED NOTES
10:29 AM  Patient signed out to me pending results of MRI of C-spine and L-spine.  Results were reviewed with patient which reveal no significant change from previous MRI in December and July of last year.  She is requesting pain medication.  She was given 1 Percocet and will be discharged to follow-up with her primary provider, pain clinic and urology given that she presented with urinary retention.     Naldo Gutierrez MD  05/02/18 9483

## 2018-05-02 NOTE — ED AVS SNAPSHOT
[facility], [place], Emergency Department    [address] [place] [address]    Phone:  [phone]                                       Ilsa Yusuf   MRN: [id number]    Department:  [facility], Emergency Department   Date of Visit:  5/2/2018           After Visit Summary Signature Page     I have received my discharge instructions, and my questions have been answered. I have discussed any challenges I see with this plan with the nurse or doctor.    ..........................................................................................................................................  Patient/Patient Representative Signature      ..........................................................................................................................................  Patient Representative Print Name and Relationship to Patient    ..................................................               ................................................  Date                                            Time    ..........................................................................................................................................  Reviewed by Signature/Title    ...................................................              ..............................................  Date                                                            Time

## 2018-05-03 LAB
BACTERIA SPEC CULT: NO GROWTH
Lab: NORMAL
SPECIMEN SOURCE: NORMAL

## 2018-05-04 ENCOUNTER — RADIANT APPOINTMENT (OUTPATIENT)
Dept: ULTRASOUND IMAGING | Facility: CLINIC | Age: 58
End: 2018-05-04
Attending: FAMILY MEDICINE
Payer: COMMERCIAL

## 2018-05-04 ENCOUNTER — RADIANT APPOINTMENT (OUTPATIENT)
Dept: GENERAL RADIOLOGY | Facility: CLINIC | Age: 58
End: 2018-05-04
Attending: FAMILY MEDICINE
Payer: COMMERCIAL

## 2018-05-04 ENCOUNTER — OFFICE VISIT (OUTPATIENT)
Dept: FAMILY MEDICINE | Facility: CLINIC | Age: 58
End: 2018-05-04
Payer: COMMERCIAL

## 2018-05-04 ENCOUNTER — TELEPHONE (OUTPATIENT)
Dept: FAMILY MEDICINE | Facility: CLINIC | Age: 58
End: 2018-05-04

## 2018-05-04 VITALS
RESPIRATION RATE: 18 BRPM | TEMPERATURE: 98.1 F | HEART RATE: 72 BPM | WEIGHT: 213.8 LBS | DIASTOLIC BLOOD PRESSURE: 66 MMHG | SYSTOLIC BLOOD PRESSURE: 104 MMHG | BODY MASS INDEX: 32.04 KG/M2 | OXYGEN SATURATION: 97 %

## 2018-05-04 DIAGNOSIS — R06.02 SOB (SHORTNESS OF BREATH): ICD-10-CM

## 2018-05-04 DIAGNOSIS — R60.9 EDEMA, UNSPECIFIED TYPE: ICD-10-CM

## 2018-05-04 DIAGNOSIS — R33.9 URINARY RETENTION: Primary | ICD-10-CM

## 2018-05-04 DIAGNOSIS — E03.9 ACQUIRED HYPOTHYROIDISM: ICD-10-CM

## 2018-05-04 DIAGNOSIS — A08.11 NOROVIRUS: ICD-10-CM

## 2018-05-04 DIAGNOSIS — R33.9 URINARY RETENTION: ICD-10-CM

## 2018-05-04 LAB
ALBUMIN SERPL-MCNC: 3.8 G/DL (ref 3.4–5)
ALP SERPL-CCNC: 89 U/L (ref 40–150)
ALT SERPL W P-5'-P-CCNC: 38 U/L (ref 0–50)
ANION GAP SERPL CALCULATED.3IONS-SCNC: 7 MMOL/L (ref 3–14)
AST SERPL W P-5'-P-CCNC: 18 U/L (ref 0–45)
BILIRUB SERPL-MCNC: 0.3 MG/DL (ref 0.2–1.3)
BUN SERPL-MCNC: 18 MG/DL (ref 7–30)
CALCIUM SERPL-MCNC: 9.1 MG/DL (ref 8.5–10.1)
CHLORIDE SERPL-SCNC: 112 MMOL/L (ref 94–109)
CO2 SERPL-SCNC: 24 MMOL/L (ref 20–32)
CREAT SERPL-MCNC: 0.96 MG/DL (ref 0.52–1.04)
GFR SERPL CREATININE-BSD FRML MDRD: 60 ML/MIN/1.7M2
GLUCOSE SERPL-MCNC: 103 MG/DL (ref 70–99)
NT-PROBNP SERPL-MCNC: 93 PG/ML (ref 0–125)
POTASSIUM SERPL-SCNC: 3.9 MMOL/L (ref 3.4–5.3)
PROT SERPL-MCNC: 7.5 G/DL (ref 6.8–8.8)
SODIUM SERPL-SCNC: 143 MMOL/L (ref 133–144)
TSH SERPL DL<=0.005 MIU/L-ACNC: 1.68 MU/L (ref 0.4–4)

## 2018-05-04 PROCEDURE — 84443 ASSAY THYROID STIM HORMONE: CPT | Performed by: FAMILY MEDICINE

## 2018-05-04 PROCEDURE — 83880 ASSAY OF NATRIURETIC PEPTIDE: CPT | Performed by: FAMILY MEDICINE

## 2018-05-04 PROCEDURE — 36415 COLL VENOUS BLD VENIPUNCTURE: CPT | Performed by: FAMILY MEDICINE

## 2018-05-04 PROCEDURE — 99214 OFFICE O/P EST MOD 30 MIN: CPT | Performed by: FAMILY MEDICINE

## 2018-05-04 PROCEDURE — 80053 COMPREHEN METABOLIC PANEL: CPT | Performed by: FAMILY MEDICINE

## 2018-05-04 RX ORDER — PHENAZOPYRIDINE HYDROCHLORIDE 200 MG/1
200 TABLET, FILM COATED ORAL 3 TIMES DAILY PRN
Qty: 30 TABLET | Refills: 1 | Status: SHIPPED | OUTPATIENT
Start: 2018-05-04 | End: 2018-07-23

## 2018-05-04 RX ORDER — LEVOTHYROXINE SODIUM 200 UG/1
200 TABLET ORAL DAILY
Qty: 90 TABLET | Refills: 3 | Status: SHIPPED | OUTPATIENT
Start: 2018-05-04 | End: 2019-04-15

## 2018-05-04 RX ORDER — ONDANSETRON 4 MG/1
4 TABLET, FILM COATED ORAL EVERY 6 HOURS PRN
Qty: 18 TABLET | Refills: 5 | Status: SHIPPED | OUTPATIENT
Start: 2018-05-04 | End: 2019-03-19

## 2018-05-04 NOTE — MR AVS SNAPSHOT
After Visit Summary   5/4/2018    Ilsa Yusuf    MRN: [id number]           Patient Information     Date Of Birth          1960        Visit Information        Provider Department      5/4/2018 10:40 AM Catrachita Collado MD [facility]        Today's Diagnoses     Urinary retention    -  1    Norovirus        Edema, unspecified type        Acquired hypothyroidism        SOB (shortness of breath)           Follow-ups after your visit        Additional Services     UROLOGY ADULT REFERRAL       Your provider has referred you to: [facility] [place] [phone]   [email or web address]  FHN: [facility] [place] [phone]   [email or web address]    Please be aware that coverage of these services is subject to the terms and limitations of your health insurance plan.  Call member services at your health plan with any benefit or coverage questions.      Please bring the following with you to your appointment:    (1) Any X-Rays, CTs or MRIs which have been performed.  Contact the facility where they were done to arrange for  prior to your scheduled appointment.    (2) List of current medications  (3) This referral request   (4) Any documents/labs given to you for this referral                  Your next 10 appointments already scheduled     May 08, 2018  9:00 AM CDT   Return Visit with Breanne Ellis PA-C   [facility] ([facility])    [address]  [address]  [facility] [address] [phone]              Who to contact     If you have questions or need follow up information about today's clinic visit or your schedule please contact [facility] directly at [phone].  Normal or non-critical lab and imaging results will be communicated to you by MyChart, letter or phone within 4 business days after the clinic has received  the results. If you do not hear from us within 7 days, please contact the clinic through [employer] or phone. If you have a critical or abnormal lab result, we will notify you by phone as soon as possible.  Submit refill requests through [employer] or call your pharmacy and they will forward the refill request to us. Please allow 3 business days for your refill to be completed.          Additional Information About Your Visit        [employer] Information     [employer] gives you secure access to your electronic health record. If you see a primary care provider, you can also send messages to your care team and make appointments. If you have questions, please call your primary care clinic.  If you do not have a primary care provider, please call [phone] and they will assist you.        Care EveryWhere ID     This is your Care EveryWhere ID. This could be used by other organizations to access your [facility] medical records  [id number]        Your Vitals Were     Pulse Temperature Respirations Pulse Oximetry BMI (Body Mass Index)       72 98.1  F (36.7  C) (Oral) 18 97% 32.04 kg/m2        Blood Pressure from Last 3 Encounters:   05/04/18 104/66   05/02/18 115/69   02/26/18 122/76    Weight from Last 3 Encounters:   05/04/18 213 lb 12.8 oz (97 kg)   05/02/18 201 lb (91.2 kg)   02/26/18 205 lb 6.4 oz (93.2 kg)              We Performed the Following     BNP-N terminal pro     Comprehensive metabolic panel     TSH with free T4 reflex     UROLOGY ADULT REFERRAL          Today's Medication Changes          These changes are accurate as of 5/4/18  6:11 PM.  If you have any questions, ask your nurse or doctor.               Start taking these medicines.        Dose/Directions    phenazopyridine 200 MG tablet   Commonly known as:  PYRIDIUM   Used for:  Urinary retention   Started by:  Catrachita Collado MD        Dose:  200 mg   Take 1 tablet (200 mg) by mouth 3 times daily as needed for irritation   Quantity:  30 tablet   Refills:   1         These medicines have changed or have updated prescriptions.        Dose/Directions    levothyroxine 200 MCG tablet   Commonly known as:  SYNTHROID/LEVOTHROID   This may have changed:  Another medication with the same name was removed. Continue taking this medication, and follow the directions you see here.   Used for:  Acquired hypothyroidism   Changed by:  Catrachita Collado MD        Dose:  200 mcg   Take 1 tablet (200 mcg) by mouth daily   Quantity:  90 tablet   Refills:  3         Stop taking these medicines if you haven't already. Please contact your care team if you have questions.     tapentadol 50 MG Tabs tablet   Commonly known as:  NUCYNTA   Stopped by:  Catrachita Collado MD                Where to get your medicines      These medications were sent to [facility] [id number] - [place], [place] - [address] AT [facility] [address]  [address], [place] [address]    Hours:  24-hours Phone:  [phone]     levothyroxine 200 MCG tablet    ondansetron 4 MG tablet    phenazopyridine 200 MG tablet                Primary Care Provider Office Phone # Fax #    Catrachita Collado [phone] [address] [place] [address]        Equal Access to Services     KRISTINA MILLER AH: Hadii vladislav ku hadasho Soomaali, waaxda luqadaha, qaybta kaalmada adeegyada, waxay idiin hayifrahn anthony ma. So [facility] [phone].    ATENCIÓN: Si habla español, tiene a castle disposición servicios gratuitos de asistencia lingüística. Llame al [phone].    We comply with applicable federal civil rights laws and [place] laws. We do not discriminate on the basis of race, color, national origin, age, disability, sex, sexual orientation, or gender identity.            Thank you!     Thank you for choosing [facility]  for your care. Our goal is always to provide you with excellent care. Hearing back from our patients is one way we can continue to  improve our services. Please take a few minutes to complete the written survey that you may receive in the mail after your visit with us. Thank you!             Your Updated Medication List - Protect others around you: Learn how to safely use, store and throw away your medicines at www.disposemymeds.org.          This list is accurate as of 5/4/18  6:11 PM.  Always use your most recent med list.                   Brand Name Dispense Instructions for use Diagnosis    amphetamine-dextroamphetamine 20 MG per tablet    ADDERALL    30 tablet    Take 1 tablet (20 mg) by mouth daily    Attention-deficit hyperactivity disorder, predominantly hyperactive type       cholecalciferol 5000 units Caps capsule    vitamin D3    100 capsule    Take 1 capsule (5,000 Units) by mouth daily Take 1 per day    Major depressive disorder, recurrent episode, moderate (H)       doxepin 10 MG capsule    SINEquan    180 capsule    TAKE 1 TO 2 CAPSULES BY MOUTH EVERY NIGHT AT BEDTIME    Major depressive disorder, recurrent episode, moderate (H)       DULoxetine 60 MG EC capsule    CYMBALTA    180 capsule    TAKE 2 CAPSULES BY MOUTH EVERY DAY    Major depressive disorder, recurrent episode, moderate (H)       FIBER PO      Two capsules in the morning and two capsules at night        fish oil-omega-3 fatty acids 1000 MG capsule      Take 2 g by mouth daily Takes 1 in am and 1 at bedtime        fluticasone 50 MCG/ACT spray    FLONASE    48 mL    INHALE 1- 2 SPRAYS IN EACH NOSTRIL DAILY.    Other chronic sinusitis       levothyroxine 200 MCG tablet    SYNTHROID/LEVOTHROID    90 tablet    Take 1 tablet (200 mcg) by mouth daily    Acquired hypothyroidism       lovastatin 20 MG tablet    MEVACOR    90 tablet    Take 1 tablet (20 mg) by mouth At Bedtime    Hyperlipidemia LDL goal <130       Multi-vitamin Tabs tablet      Take 1 tablet by mouth daily        NONFORMULARY      Take 20 mg by mouth 2 times daily D-amphetamine Salt combo        OLANZapine 2.5  MG tablet    zyPREXA    90 tablet    TAKE 1 TABLET(2.5 MG) BY MOUTH AT BEDTIME    Major depressive disorder, recurrent episode, moderate (H)       omeprazole 40 MG capsule    priLOSEC    90 capsule    Take 1 capsule (40 mg) by mouth daily Take 30-60 minutes before a meal.    Gastroesophageal reflux disease, esophagitis presence not specified       ondansetron 4 MG tablet    ZOFRAN    18 tablet    Take 1 tablet (4 mg) by mouth every 6 hours as needed for nausea    Norovirus       oxyCODONE-acetaminophen 5-325 MG per tablet    PERCOCET    15 tablet    Take 1-2 tablets by mouth every 6 hours as needed for pain        phenazopyridine 200 MG tablet    PYRIDIUM    30 tablet    Take 1 tablet (200 mg) by mouth 3 times daily as needed for irritation    Urinary retention       prazosin 5 MG capsule    MINIPRESS    90 capsule    Take 1 capsule (5 mg) by mouth At Bedtime Take 1 capsule at bedtime    Primary insomnia       tiZANidine 4 MG tablet    ZANAFLEX    210 tablet    Take 1-3 tablets (4-12 mg) by mouth 3 times daily as needed for muscle spasms    Cervical radiculopathy       topiramate 50 MG tablet    TOPAMAX    120 tablet    Take 1-2 tablets ( mg) by mouth 2 times daily    Chronic pain syndrome

## 2018-05-04 NOTE — TELEPHONE ENCOUNTER
Patient calling for todays US results and very anxious about what it reveals.   Reviewed the notes from Dr. Collado after her review of the scan. Patient feels much better.  She knows about the urology appt and will try the pyridium.  Lisa Momin RN

## 2018-05-04 NOTE — PROGRESS NOTES
SUBJECTIVE:   Ilsa Yusuf is a 57 year old female who presents to clinic today for the following health issues:      ED/UC Followup:    [facility]  Date of visit: 5/2/2018  Reason for visit: urinary symptoms  Current Status: patient has ernandez catheter, SOB       The patient was unable to urinate 5/2/18 and presented to the ER.  Routine labs including CBC, CMP, UA were normal, and lumbar MRI showed stable findings of multilevel lumbar spondylosis, L5-S1 disc protrusion, mild/moderate bilateral neural foraminal stenosis at L5-S1 and mild spinal canal stenosis at L2-L5, unchanged from 9/3/17.  Cervical spine MRI showed multilevel cervical spondylosis resulting in moderate spinal canal stenosis at C5-6 and mild spinal canal stenosis at C6-7, moderate left and mild right neural foraminal stenosis at C5-6, no definite change from 12/29/17.     A ernandez catheter was placed and she was advised to follow up here.  She reports that the urine has been mostly dark yellow; no hematuria.  She is having bladder spasms and requests pyridium.  This has never happened to her before.  She underwent ureter re-implantation surgery at the age of 5.  No other urologic procedures.  She states her fingers and feet have been swollen and she feels mildly short of breath with exertion.  She endorses a slight dry cough.  No fevers.    She denies taking anything over the counter other than some fiber. She has not taken benadryl or decongestants.         Problem list and histories reviewed & adjusted, as indicated.  Additional history: as documented    Patient Active Problem List   Diagnosis     Major depressive disorder, recurrent episode, moderate (H)     PTSD (post-traumatic stress disorder)     Acquired hypothyroidism     Hyperlipidemia LDL goal <130     CKD (chronic kidney disease) stage 3, GFR 30-59 ml/min     Esophageal reflux     Primary insomnia     Obesity     Attention-deficit hyperactivity disorder, predominantly  hyperactive type     Neck pain     Cervical radiculopathy     Partner relationship problems     Primary osteoarthritis of right knee     Peripheral tear of medial meniscus of right knee, unspecified whether old or current tear, initial encounter     Calculus of left kidney     Past Surgical History:   Procedure Laterality Date     ABDOMEN SURGERY       C PARTIAL EXCISION THYROID,UNILAT      rt, negative path then, treated with synthroid.     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     TONSILLECTOMY       ureteral reimplantation         Social History   Substance Use Topics     Smoking status: Current Some Day Smoker     Packs/day: 0.30     Last attempt to quit: 2015     Smokeless tobacco: Never Used      Comment: recreational     Alcohol use 0.0 oz/week      Comment: 2 a night on weekends     Family History   Problem Relation Age of Onset     C.A.D. Father 58      at 58, heart disease     MENTAL ILLNESS Father      Coronary Artery Disease Father      Hyperlipidemia Father      Alzheimer Disease Mother      total care now     Depression Mother      Anxiety Disorder Mother      DIABETES Sister      adult onset     Melanoma Sister      Breast Cancer Sister      Thyroid Disease Sister          Current Outpatient Prescriptions   Medication Sig Dispense Refill     amphetamine-dextroamphetamine (ADDERALL) 20 MG per tablet Take 1 tablet (20 mg) by mouth daily 30 tablet 0     cholecalciferol (VITAMIN D3) 5000 UNITS CAPS capsule Take 1 capsule (5,000 Units) by mouth daily Take 1 per day 100 capsule 2     doxepin (SINEQUAN) 10 MG capsule TAKE 1 TO 2 CAPSULES BY MOUTH EVERY NIGHT AT BEDTIME 180 capsule 1     DULoxetine (CYMBALTA) 60 MG EC capsule TAKE 2 CAPSULES BY MOUTH EVERY  capsule 0     FIBER PO Two capsules in the morning and two capsules at night       fish oil-omega-3  fatty acids (OMEGA 3) 1000 MG capsule Take 2 g by mouth daily Takes 1 in am and 1 at bedtime       fluticasone (FLONASE) 50 MCG/ACT spray INHALE 1- 2 SPRAYS IN EACH NOSTRIL DAILY. 48 mL 0     levothyroxine (SYNTHROID/LEVOTHROID) 150 MCG tablet Take 1 tab by mouth on Tuesday, Thursday, Saturday and Sunday 45 tablet 3     levothyroxine (SYNTHROID/LEVOTHROID) 175 MCG tablet Take 1 tab by mouth Monday, Wednesday, Friday 45 tablet 3     levothyroxine (SYNTHROID/LEVOTHROID) 200 MCG tablet Take 1 tablet (200 mcg) by mouth daily 60 tablet 0     lovastatin (MEVACOR) 20 MG tablet Take 1 tablet (20 mg) by mouth At Bedtime 90 tablet 3     multivitamin, therapeutic with minerals (MULTI-VITAMIN) TABS Take 1 tablet by mouth daily       NONFORMULARY Take 20 mg by mouth 2 times daily D-amphetamine Salt combo       OLANZapine (ZYPREXA) 2.5 MG tablet TAKE 1 TABLET(2.5 MG) BY MOUTH AT BEDTIME 90 tablet 3     omeprazole (PRILOSEC) 40 MG capsule Take 1 capsule (40 mg) by mouth daily Take 30-60 minutes before a meal. 90 capsule 2     ondansetron (ZOFRAN) 4 MG tablet Take 1 tablet (4 mg) by mouth every 6 hours as needed for nausea 18 tablet 5     oxyCODONE-acetaminophen (PERCOCET) 5-325 MG per tablet Take 1-2 tablets by mouth every 6 hours as needed for pain 15 tablet 0     phenazopyridine (PYRIDIUM) 200 MG tablet Take 1 tablet (200 mg) by mouth 3 times daily as needed for irritation 30 tablet 1     prazosin (MINIPRESS) 5 MG capsule Take 1 capsule (5 mg) by mouth At Bedtime Take 1 capsule at bedtime 90 capsule 3     tiZANidine (ZANAFLEX) 4 MG tablet Take 1-3 tablets (4-12 mg) by mouth 3 times daily as needed for muscle spasms 210 tablet 3     topiramate (TOPAMAX) 50 MG tablet Take 1-2 tablets ( mg) by mouth 2 times daily 120 tablet 3     tapentadol (NUCYNTA) 50 MG TABS tablet Take 1 tablet (50 mg) by mouth every 6 hours as needed for moderate to severe pain Max #4/day. (Patient not taking: Reported on 2/26/2018) 120 tablet 0  "    Allergies   Allergen Reactions     Gabapentin Other (See Comments)     Patient states she gets \"horrific nightmares\" with this medication     Dilaudid [Hydromorphone Hcl]      Made her feel like her skin was crawling     Nsaids Other (See Comments)     Kidney failure     Compazine [Prochlorperazine] Other (See Comments)     \"Makes my skin crawl, I was going nuts.\"       Reviewed and updated as needed this visit by clinical staff       Reviewed and updated as needed this visit by Provider         ROS:  Constitutional, HEENT, cardiovascular, pulmonary, gi and gu systems are negative, except as otherwise noted.    OBJECTIVE:     /66  Pulse 72  Temp 98.1  F (36.7  C) (Oral)  Resp 18  Wt 213 lb 12.8 oz (97 kg)  SpO2 97%  BMI 32.04 kg/m2  Body mass index is 32.04 kg/(m^2).  GENERAL APPEARANCE: alert and no distress, well-appearing  EYES: Eyes grossly normal to inspection, PERRL and conjunctivae and sclerae normal  NECK: no adenopathy, no asymmetry, masses, or scars and thyroid normal to palpation  RESP: lungs clear to auscultation - no rales, rhonchi or wheezes, speaking in full sentences without dyspnea  CV: regular rates and rhythm, normal S1 S2, no S3 or S4 and no murmur, click or rub  MS: fingers appear mildly swollen; trace pedal edema bilaterally.    Diagnostic Test Results:  Results for orders placed or performed in visit on 05/04/18   Comprehensive metabolic panel   Result Value Ref Range    Sodium 143 133 - 144 mmol/L    Potassium 3.9 3.4 - 5.3 mmol/L    Chloride 112 (H) 94 - 109 mmol/L    Carbon Dioxide 24 20 - 32 mmol/L    Anion Gap 7 3 - 14 mmol/L    Glucose 103 (H) 70 - 99 mg/dL    Urea Nitrogen 18 7 - 30 mg/dL    Creatinine 0.96 0.52 - 1.04 mg/dL    GFR Estimate 60 (L) >60 mL/min/1.7m2    GFR Estimate If Black 72 >60 mL/min/1.7m2    Calcium 9.1 8.5 - 10.1 mg/dL    Bilirubin Total 0.3 0.2 - 1.3 mg/dL    Albumin 3.8 3.4 - 5.0 g/dL    Protein Total 7.5 6.8 - 8.8 g/dL    Alkaline Phosphatase 89 " 40 - 150 U/L    ALT 38 0 - 50 U/L    AST 18 0 - 45 U/L   BNP-N terminal pro   Result Value Ref Range    N-Terminal Pro Bnp 93 0 - 125 pg/mL   TSH with free T4 reflex   Result Value Ref Range    TSH 1.68 0.40 - 4.00 mU/L       ASSESSMENT/PLAN:   56yo F presents for f/u after ER visit for urinary retention.  Though it is not clear what the cause is, she does take medications with this side effect, including doxepin, adderall, tizanidine, olanzapine (low risk).  However, none of these are new. I will recheck her renal fxn and a renal US and we have called to schedule a urology visit for her early next week.  Rx done for pyridium.  She has mild swelling of fingers and feet; again, checking CMP, will also check BNP and CXR given her report of dyspnea, though she does not appear dyspneic, O2 sat normal, resp exam clear.     I also rechecked her TSH today; TSH has been abnormal and we have been titrating up her dose.  The TSH is finally within normal range, will continue dose of 200mcg daily.     Catrachita Collado MD  [facility]

## 2018-05-08 ENCOUNTER — OFFICE VISIT (OUTPATIENT)
Dept: UROLOGY | Facility: CLINIC | Age: 58
End: 2018-05-08
Payer: COMMERCIAL

## 2018-05-08 VITALS
BODY MASS INDEX: 31.55 KG/M2 | WEIGHT: 213 LBS | HEART RATE: 80 BPM | HEIGHT: 69 IN | DIASTOLIC BLOOD PRESSURE: 62 MMHG | SYSTOLIC BLOOD PRESSURE: 100 MMHG

## 2018-05-08 DIAGNOSIS — R33.9 URINARY RETENTION: Primary | ICD-10-CM

## 2018-05-08 PROCEDURE — 99203 OFFICE O/P NEW LOW 30 MIN: CPT | Mod: 25 | Performed by: PHYSICIAN ASSISTANT

## 2018-05-08 PROCEDURE — 51700 IRRIGATION OF BLADDER: CPT | Performed by: PHYSICIAN ASSISTANT

## 2018-05-08 RX ORDER — CYCLOBENZAPRINE HCL 5 MG
TABLET ORAL
Refills: 0 | COMMUNITY
Start: 2017-12-10 | End: 2018-07-23

## 2018-05-08 RX ORDER — NITROFURANTOIN 25; 75 MG/1; MG/1
100 CAPSULE ORAL ONCE
Qty: 1 CAPSULE | Refills: 0 | Status: SHIPPED | OUTPATIENT
Start: 2018-05-08 | End: 2018-05-08

## 2018-05-08 ASSESSMENT — PAIN SCALES - GENERAL: PAINLEVEL: MODERATE PAIN (5)

## 2018-05-08 NOTE — PROGRESS NOTES
CC: Urinary retention.    HPI: It is a pleasure to see Ms. Ilsa Yusuf, a 57 year old female seen today in the urology clinic in consultation from Dr. Molina for evaluation of urinary retention. This developed acutely requiring Moraes catheter placement on 5/2/18 (350cc). This has been draining well with pale, yellow urine. The patient is tolerating the catheter without too issue. No clots of hematuria noted.    The patient has no prior history of AUR or similar symptoms. Prior to cath placement, she had 2-3 days of bladder spasms leading up to the retention. Currently denies fevers, chills, N/V, abdominal/back pain.    Past Medical History:   Diagnosis Date     Arthritis 2012    both knees; hands     Depressive disorder      Depressive disorder, not elsewhere classified 08/28/12    DC 10/05/12-[facility]     Hyperlipidemia LDL goal < 130     mevacor     Hypothyroid      Moderate major depression (H)     abilify, cymbalta, seroquel, and nuvigil, Dr Antonio Perales     Mumps      PTSD (post-traumatic stress disorder)      Seizure (H) 03/2011    one episode, was on Keppra, EEG negative     Past Surgical History:   Procedure Laterality Date     ABDOMEN SURGERY       BLADDER SURGERY       C PARTIAL EXCISION THYROID,UNILAT  1991    rt, negative path then, treated with synthroid.     CHOLECYSTECTOMY       COLONOSCOPY  2012     CYSTOSCOPY       HERNIA REPAIR       ORTHOPEDIC SURGERY  left knee     renal artery surgery  child    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     TONSILLECTOMY       ureteral reimplantation       Current Outpatient Prescriptions   Medication Sig Dispense Refill     amphetamine-dextroamphetamine (ADDERALL) 20 MG per tablet Take 1 tablet (20 mg) by mouth daily 30 tablet 0     cholecalciferol (VITAMIN D3) 5000 UNITS CAPS capsule Take 1 capsule (5,000 Units) by mouth daily Take 1 per day 100 capsule 2     doxepin (SINEQUAN) 10 MG capsule TAKE 1 TO 2 CAPSULES BY MOUTH EVERY NIGHT AT BEDTIME  "180 capsule 1     DULoxetine (CYMBALTA) 60 MG EC capsule TAKE 2 CAPSULES BY MOUTH EVERY  capsule 0     FIBER PO Two capsules in the morning and two capsules at night       fish oil-omega-3 fatty acids (OMEGA 3) 1000 MG capsule Take 2 g by mouth daily Takes 1 in am and 1 at bedtime       fluticasone (FLONASE) 50 MCG/ACT spray INHALE 1- 2 SPRAYS IN EACH NOSTRIL DAILY. 48 mL 0     levothyroxine (SYNTHROID/LEVOTHROID) 200 MCG tablet Take 1 tablet (200 mcg) by mouth daily 90 tablet 3     lovastatin (MEVACOR) 20 MG tablet Take 1 tablet (20 mg) by mouth At Bedtime 90 tablet 3     multivitamin, therapeutic with minerals (MULTI-VITAMIN) TABS Take 1 tablet by mouth daily       NONFORMULARY Take 20 mg by mouth 2 times daily D-amphetamine Salt combo       OLANZapine (ZYPREXA) 2.5 MG tablet TAKE 1 TABLET(2.5 MG) BY MOUTH AT BEDTIME 90 tablet 3     omeprazole (PRILOSEC) 40 MG capsule Take 1 capsule (40 mg) by mouth daily Take 30-60 minutes before a meal. 90 capsule 2     ondansetron (ZOFRAN) 4 MG tablet Take 1 tablet (4 mg) by mouth every 6 hours as needed for nausea 18 tablet 5     oxyCODONE-acetaminophen (PERCOCET) 5-325 MG per tablet Take 1-2 tablets by mouth every 6 hours as needed for pain 15 tablet 0     phenazopyridine (PYRIDIUM) 200 MG tablet Take 1 tablet (200 mg) by mouth 3 times daily as needed for irritation 30 tablet 1     prazosin (MINIPRESS) 5 MG capsule Take 1 capsule (5 mg) by mouth At Bedtime Take 1 capsule at bedtime 90 capsule 3     tiZANidine (ZANAFLEX) 4 MG tablet Take 1-3 tablets (4-12 mg) by mouth 3 times daily as needed for muscle spasms 210 tablet 3     cyclobenzaprine (FLEXERIL) 5 MG tablet   0     topiramate (TOPAMAX) 50 MG tablet Take 1-2 tablets ( mg) by mouth 2 times daily 120 tablet 3     Allergies   Allergen Reactions     Gabapentin Other (See Comments)     Patient states she gets \"horrific nightmares\" with this medication     Dilaudid [Hydromorphone Hcl]      Made her feel like her " "skin was crawling     Nsaids Other (See Comments)     Kidney failure     Compazine [Prochlorperazine] Other (See Comments)     \"Makes my skin crawl, I was going nuts.\"     Family History: There is no h/o  malignancy.  There is no h/o urolithiasis.     Social History: The patient does not smoke cigarettes.  Denies EtOH and illicit drug use.      ROS: A comprehensive 14 point ROS was obtained and was  otherwise negative except for that outlined above in the HPI.    PHYSICAL EXAM:   Vitals:    05/08/18 0906   BP: 100/62   Pulse: 80   Weight: 96.6 kg (213 lb)   Height: 1.753 m (5' 9\")     GENERAL: Well groomed/well developed/well nourished female in NAD.  HEENT: EOMI, AT, NC.  SKIN: Warm to touch, dry.  No visible rashes or lesions.  RESP: No increased respiratory effort.  LYMPH: No LE edema.  ABD: Soft, NT, ND.  No CVAT.  MS: Full ROM in extremities.  : Moraes catheter indwelling draining yellow urine.  NEURO: Alert and oriented x 3.  PSYCH: Normal mood and affect, pleasant and agreeable during interview and exam.    PROCEDURE: A trial of void was performed by nursing staff today in the clinic.  The patient's Moraes leg bag was disconnected and 250 cc of sterile water were infused into the patient's bladder via gravity drainage, which the patient tolerated fine.  The Moraes balloon was decompressed and the catheter was then removed.  After a few minutes Ms. Yusuf voided 300 cc.  Postvoid residual urine volume by ultrasound was measured as 0 cc.  The patient did pass today's trial of void and the catheter did not need to be replaced.      REVIEW OF OUTSIDE RECORDS: 5 minutes spent reviewing previous/outside records.      ASSESSMENT/PLAN:  57 year old female who developed acute urinary retention requiring Moraes catheter placement.     The patient successfully passed a trial of void today and should avoid bladder irritants.  A refill was needed today.  The patient should return for a follow up office visit in 1 " month for PVR.    Should the patient continue to have voiding difficulty, the next appropriate studies would be cystoscopy and/or videourodynamics to better assess bladder function.      I have enjoyed participating in the medical care of this patient.  Please do not hesitate to contact me with any questions or concerns.      Breanne Ellis PA-C  [facility]    30 min spent with the patient, >50% of this time was spent in a face-to-face manner and on coordination of care of urinary retention.

## 2018-05-08 NOTE — MR AVS SNAPSHOT
After Visit Summary   5/8/2018    Ilsa Yusuf    MRN: [id number]           Patient Information     Date Of Birth          1960        Visit Information        Provider Department      5/8/2018 9:00 AM Breanne Ellis PA-C [facility] [place]        Today's Diagnoses     Urinary retention    -  1      Care Instructions    Below is a list of things that can irritate the bladder and should be avoided:      Caffeinated soft drinks.    Coffee.    Tea.    Chocolate.    Tomato-based foods.    Acidic juices and fruits. (includes cranberry juice)    Alcohol.    Carbonated drinks.    Aspartame/Nutrasweet.    Keep bowels regular.             Follow-ups after your visit        Follow-up notes from your care team     Return in about 4 weeks (around 6/5/2018).      Your next 10 appointments already scheduled     Jun 05, 2018  2:00 PM CDT   Return Visit with Breanne Ellis PA-C   [facility] ([facility])    [address]  [address]  [facility] [id number] [phone]              Who to contact     If you have questions or need follow up information about today's clinic visit or your schedule please contact [facility] [place] directly at [phone].  Normal or non-critical lab and imaging results will be communicated to you by MyChart, letter or phone within 4 business days after the clinic has received the results. If you do not hear from us within 7 days, please contact the clinic through [employer] or phone. If you have a critical or abnormal lab result, we will notify you by phone as soon as possible.  Submit refill requests through [employer] or call your pharmacy and they will forward the refill request to us. Please allow 3 business days for your refill to be completed.          Additional Information About Your Visit        [employer] Information     [employer] gives you  "secure access to your electronic health record. If you see a primary care provider, you can also send messages to your care team and make appointments. If you have questions, please call your primary care clinic.  If you do not have a primary care provider, please call [phone] and they will assist you.        Care EveryWhere ID     This is your Care EveryWhere ID. This could be used by other organizations to access your [facility] medical records  [id number]        Your Vitals Were     Pulse Height BMI (Body Mass Index)             80 1.753 m (5' 9\") 31.45 kg/m2          Blood Pressure from Last 3 Encounters:   05/08/18 100/62   05/04/18 104/66   05/02/18 115/69    Weight from Last 3 Encounters:   05/08/18 96.6 kg (213 lb)   05/04/18 97 kg (213 lb 12.8 oz)   05/02/18 91.2 kg (201 lb)              We Performed the Following     IRRIGATION BLADDER SIMPLE LAVAGE/INSTILLATION (62873)          Today's Medication Changes          These changes are accurate as of 5/8/18 10:17 AM.  If you have any questions, ask your nurse or doctor.               Start taking these medicines.        Dose/Directions    nitroFURantoin (macrocrystal-monohydrate) 100 MG capsule   Commonly known as:  MACROBID   Used for:  Urinary retention   Started by:  Breanne Ellis PA-C        Dose:  100 mg   Take 1 capsule (100 mg) by mouth once for 1 dose   Quantity:  1 capsule   Refills:  0            Where to get your medicines      These medications were sent to [facility] [place] [id number] [facility] [address]     Phone:  [phone]     nitroFURantoin (macrocrystal-monohydrate) 100 MG capsule                Primary Care Provider Office Phone # Fax #    Catrachita Collado [phone] [address]  [place] [address]        Equal Access to Services     KRISTINA MILLER AH: Hadii vladislav ku hadasho Soomaali, waaxda luqadaha, qaybta kaalmada lalo, jane " marcella rayjaneth la'aan ah. So [facility] [phone].    ATENCIÓN: Si kaciela atif, tiene a castle disposición servicios gratuitos de asistencia lingüística. Chaparro al [phone].    We comply with applicable federal civil rights laws and [place] laws. We do not discriminate on the basis of race, color, national origin, age, disability, sex, sexual orientation, or gender identity.            Thank you!     Thank you for choosing [facility] [place]  for your care. Our goal is always to provide you with excellent care. Hearing back from our patients is one way we can continue to improve our services. Please take a few minutes to complete the written survey that you may receive in the mail after your visit with us. Thank you!             Your Updated Medication List - Protect others around you: Learn how to safely use, store and throw away your medicines at www.disposemymeds.org.          This list is accurate as of 5/8/18 10:17 AM.  Always use your most recent med list.                   Brand Name Dispense Instructions for use Diagnosis    amphetamine-dextroamphetamine 20 MG per tablet    ADDERALL    30 tablet    Take 1 tablet (20 mg) by mouth daily    Attention-deficit hyperactivity disorder, predominantly hyperactive type       cholecalciferol 5000 units Caps capsule    vitamin D3    100 capsule    Take 1 capsule (5,000 Units) by mouth daily Take 1 per day    Major depressive disorder, recurrent episode, moderate (H)       cyclobenzaprine 5 MG tablet    FLEXERIL          doxepin 10 MG capsule    SINEquan    180 capsule    TAKE 1 TO 2 CAPSULES BY MOUTH EVERY NIGHT AT BEDTIME    Major depressive disorder, recurrent episode, moderate (H)       DULoxetine 60 MG EC capsule    CYMBALTA    180 capsule    TAKE 2 CAPSULES BY MOUTH EVERY DAY    Major depressive disorder, recurrent episode, moderate (H)       FIBER PO      Two capsules in the morning and two capsules at night        fish  oil-omega-3 fatty acids 1000 MG capsule      Take 2 g by mouth daily Takes 1 in am and 1 at bedtime        fluticasone 50 MCG/ACT spray    FLONASE    48 mL    INHALE 1- 2 SPRAYS IN EACH NOSTRIL DAILY.    Other chronic sinusitis       levothyroxine 200 MCG tablet    SYNTHROID/LEVOTHROID    90 tablet    Take 1 tablet (200 mcg) by mouth daily    Acquired hypothyroidism       lovastatin 20 MG tablet    MEVACOR    90 tablet    Take 1 tablet (20 mg) by mouth At Bedtime    Hyperlipidemia LDL goal <130       Multi-vitamin Tabs tablet      Take 1 tablet by mouth daily        nitroFURantoin (macrocrystal-monohydrate) 100 MG capsule    MACROBID    1 capsule    Take 1 capsule (100 mg) by mouth once for 1 dose    Urinary retention       NONFORMULARY      Take 20 mg by mouth 2 times daily D-amphetamine Salt combo        OLANZapine 2.5 MG tablet    zyPREXA    90 tablet    TAKE 1 TABLET(2.5 MG) BY MOUTH AT BEDTIME    Major depressive disorder, recurrent episode, moderate (H)       omeprazole 40 MG capsule    priLOSEC    90 capsule    Take 1 capsule (40 mg) by mouth daily Take 30-60 minutes before a meal.    Gastroesophageal reflux disease, esophagitis presence not specified       ondansetron 4 MG tablet    ZOFRAN    18 tablet    Take 1 tablet (4 mg) by mouth every 6 hours as needed for nausea    Norovirus       oxyCODONE-acetaminophen 5-325 MG per tablet    PERCOCET    15 tablet    Take 1-2 tablets by mouth every 6 hours as needed for pain        phenazopyridine 200 MG tablet    PYRIDIUM    30 tablet    Take 1 tablet (200 mg) by mouth 3 times daily as needed for irritation    Urinary retention       prazosin 5 MG capsule    MINIPRESS    90 capsule    Take 1 capsule (5 mg) by mouth At Bedtime Take 1 capsule at bedtime    Primary insomnia       tiZANidine 4 MG tablet    ZANAFLEX    210 tablet    Take 1-3 tablets (4-12 mg) by mouth 3 times daily as needed for muscle spasms    Cervical radiculopathy       topiramate 50 MG tablet     TOPAMAX    120 tablet    Take 1-2 tablets ( mg) by mouth 2 times daily    Chronic pain syndrome

## 2018-05-08 NOTE — NURSING NOTE
TOV done. Bladder was filled with 250  cc sterile water by slow drip. At that point Ilsa felt a very strong urge to void. Indwelling ernandez was removed with ease after balloon deflation. Ilsa was escorted to the bathroom and asked to void.. She voided out  300 cc . Will take one macrobid todat with food. Sophia Raymundo LPN

## 2018-05-08 NOTE — NURSING NOTE
Arrives with indwelling ernandez . Was seen at ED last week as she was unable to void. Does have some L flank pain. Hasknown back trouble herniated disc. Sophia Raymundo LPN

## 2018-05-08 NOTE — LETTER
5/8/2018       RE: Ilsa Yusuf  [address]  [place] [address]     Dear Colleague,    Thank you for referring your patient, Ilsa Yusuf, to the [facility] [place] at [facility]. Please see a copy of my visit note below.    CC: Urinary retention.    HPI: It is a pleasure to see Ms. Ilsa Yusuf, a 57 year old female seen today in the urology clinic in consultation from Dr. Molina for evaluation of urinary retention. This developed acutely requiring Moraes catheter placement on 5/2/18 (350cc). This has been draining well with pale, yellow urine. The patient is tolerating the catheter without too issue. No clots of hematuria noted.    The patient has no prior history of AUR or similar symptoms. Prior to cath placement, she had 2-3 days of bladder spasms leading up to the retention. Currently denies fevers, chills, N/V, abdominal/back pain.    Past Medical History:   Diagnosis Date     Arthritis 2012    both knees; hands     Depressive disorder      Depressive disorder, not elsewhere classified 08/28/12    DC 10/05/12-[facility]     Hyperlipidemia LDL goal < 130     mevacor     Hypothyroid      Moderate major depression (H)     abilify, cymbalta, seroquel, and nuvigil, Dr Antonio Perales     Mumps      PTSD (post-traumatic stress disorder)      Seizure (H) 03/2011    one episode, was on Keppra, EEG negative     Past Surgical History:   Procedure Laterality Date     ABDOMEN SURGERY       BLADDER SURGERY       C PARTIAL EXCISION THYROID,UNILAT  1991    rt, negative path then, treated with synthroid.     CHOLECYSTECTOMY       COLONOSCOPY  2012     CYSTOSCOPY       HERNIA REPAIR       ORTHOPEDIC SURGERY  left knee     renal artery surgery  child    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     TONSILLECTOMY       ureteral reimplantation       Current Outpatient Prescriptions   Medication Sig Dispense Refill      amphetamine-dextroamphetamine (ADDERALL) 20 MG per tablet Take 1 tablet (20 mg) by mouth daily 30 tablet 0     cholecalciferol (VITAMIN D3) 5000 UNITS CAPS capsule Take 1 capsule (5,000 Units) by mouth daily Take 1 per day 100 capsule 2     doxepin (SINEQUAN) 10 MG capsule TAKE 1 TO 2 CAPSULES BY MOUTH EVERY NIGHT AT BEDTIME 180 capsule 1     DULoxetine (CYMBALTA) 60 MG EC capsule TAKE 2 CAPSULES BY MOUTH EVERY  capsule 0     FIBER PO Two capsules in the morning and two capsules at night       fish oil-omega-3 fatty acids (OMEGA 3) 1000 MG capsule Take 2 g by mouth daily Takes 1 in am and 1 at bedtime       fluticasone (FLONASE) 50 MCG/ACT spray INHALE 1- 2 SPRAYS IN EACH NOSTRIL DAILY. 48 mL 0     levothyroxine (SYNTHROID/LEVOTHROID) 200 MCG tablet Take 1 tablet (200 mcg) by mouth daily 90 tablet 3     lovastatin (MEVACOR) 20 MG tablet Take 1 tablet (20 mg) by mouth At Bedtime 90 tablet 3     multivitamin, therapeutic with minerals (MULTI-VITAMIN) TABS Take 1 tablet by mouth daily       NONFORMULARY Take 20 mg by mouth 2 times daily D-amphetamine Salt combo       OLANZapine (ZYPREXA) 2.5 MG tablet TAKE 1 TABLET(2.5 MG) BY MOUTH AT BEDTIME 90 tablet 3     omeprazole (PRILOSEC) 40 MG capsule Take 1 capsule (40 mg) by mouth daily Take 30-60 minutes before a meal. 90 capsule 2     ondansetron (ZOFRAN) 4 MG tablet Take 1 tablet (4 mg) by mouth every 6 hours as needed for nausea 18 tablet 5     oxyCODONE-acetaminophen (PERCOCET) 5-325 MG per tablet Take 1-2 tablets by mouth every 6 hours as needed for pain 15 tablet 0     phenazopyridine (PYRIDIUM) 200 MG tablet Take 1 tablet (200 mg) by mouth 3 times daily as needed for irritation 30 tablet 1     prazosin (MINIPRESS) 5 MG capsule Take 1 capsule (5 mg) by mouth At Bedtime Take 1 capsule at bedtime 90 capsule 3     tiZANidine (ZANAFLEX) 4 MG tablet Take 1-3 tablets (4-12 mg) by mouth 3 times daily as needed for muscle spasms 210 tablet 3     cyclobenzaprine  "(FLEXERIL) 5 MG tablet   0     topiramate (TOPAMAX) 50 MG tablet Take 1-2 tablets ( mg) by mouth 2 times daily 120 tablet 3     Allergies   Allergen Reactions     Gabapentin Other (See Comments)     Patient states she gets \"horrific nightmares\" with this medication     Dilaudid [Hydromorphone Hcl]      Made her feel like her skin was crawling     Nsaids Other (See Comments)     Kidney failure     Compazine [Prochlorperazine] Other (See Comments)     \"Makes my skin crawl, I was going nuts.\"     Family History: There is no h/o  malignancy.  There is no h/o urolithiasis.     Social History: The patient does not smoke cigarettes.  Denies EtOH and illicit drug use.      ROS: A comprehensive 14 point ROS was obtained and was  otherwise negative except for that outlined above in the HPI.    PHYSICAL EXAM:   Vitals:    05/08/18 0906   BP: 100/62   Pulse: 80   Weight: 96.6 kg (213 lb)   Height: 1.753 m (5' 9\")     GENERAL: Well groomed/well developed/well nourished female in NAD.  HEENT: EOMI, AT, NC.  SKIN: Warm to touch, dry.  No visible rashes or lesions.  RESP: No increased respiratory effort.  LYMPH: No LE edema.  ABD: Soft, NT, ND.  No CVAT.  MS: Full ROM in extremities.  : Moraes catheter indwelling draining yellow urine.  NEURO: Alert and oriented x 3.  PSYCH: Normal mood and affect, pleasant and agreeable during interview and exam.    PROCEDURE: A trial of void was performed by nursing staff today in the clinic.  The patient's Moraes leg bag was disconnected and 250 cc of sterile water were infused into the patient's bladder via gravity drainage, which the patient tolerated fine.  The Moraes balloon was decompressed and the catheter was then removed.  After a few minutes Ms. Yusuf voided 300 cc.  Postvoid residual urine volume by ultrasound was measured as 0 cc.  The patient did pass today's trial of void and the catheter did not need to be replaced.      REVIEW OF OUTSIDE RECORDS: 5 minutes spent " reviewing previous/outside records.      ASSESSMENT/PLAN:  57 year old female who developed acute urinary retention requiring Moraes catheter placement.     The patient successfully passed a trial of void today and should avoid bladder irritants.  A refill was needed today.  The patient should return for a follow up office visit in 1 month for PVR.    Should the patient continue to have voiding difficulty, the next appropriate studies would be cystoscopy and/or videourodynamics to better assess bladder function.      I have enjoyed participating in the medical care of this patient.  Please do not hesitate to contact me with any questions or concerns.      Breanne Ellis PA-C  [facility]    30 min spent with the patient, >50% of this time was spent in a face-to-face manner and on coordination of care of urinary retention.       Again, thank you for allowing me to participate in the care of your patient.      Sincerely,    Breanne Ellis PA-C, DONA

## 2018-05-08 NOTE — PATIENT INSTRUCTIONS
Below is a list of things that can irritate the bladder and should be avoided:      Caffeinated soft drinks.    Coffee.    Tea.    Chocolate.    Tomato-based foods.    Acidic juices and fruits. (includes cranberry juice)    Alcohol.    Carbonated drinks.    Aspartame/Nutrasweet.    Keep bowels regular.

## 2018-05-09 DIAGNOSIS — G89.4 CHRONIC PAIN SYNDROME: ICD-10-CM

## 2018-05-11 NOTE — TELEPHONE ENCOUNTER
"Requested Prescriptions   Pending Prescriptions Disp Refills     topiramate (TOPAMAX) 50 MG tablet [Pharmacy Med Name: TOPIRAMATE 50MG TABLETS]  Last Written Prescription Date:  8/25/17  Last Fill Quantity: 120 tablet,  # refills: 3   Last Office Visit with G, P or [facility] prescribing provider:  5/4/18   Future Office Visit:      120 tablet 0     Sig: TAKE 1 TO 2 TABLETS(50  MG) BY MOUTH TWICE DAILY    Anti-Seizure Meds Protocol  Failed    5/9/2018 10:53 AM       Failed - Review Authorizing provider's last note.     Refer to last progress notes: confirm request is for original authorizing provider (cannot be through other providers).       Passed - Recent (12 mo) or future (30 days) visit within the authorizing provider's specialty    Patient had office visit in the last 12 months or has a visit in the next 30 days with authorizing provider or within the authorizing provider's specialty.  See \"Patient Info\" tab in inbasket, or \"Choose Columns\" in Meds & Orders section of the refill encounter.           Passed - Normal CBC on file in past 26 months    Recent Labs   Lab Test  05/02/18   0430   WBC  9.8   RBC  4.29   HGB  13.3   HCT  40.2   PLT  205          Passed - Normal ALT or AST on file in past 26 months    Recent Labs   Lab Test  05/04/18   1116   ALT  38     Recent Labs   Lab Test  05/04/18   1116   AST  18          Passed - Normal platelet count on file in past 26 months    Recent Labs   Lab Test  05/02/18   0430   PLT  205          Passed - No active pregnancy on record       Passed - No positive pregnancy test in last 12 months          "

## 2018-05-14 DIAGNOSIS — G89.4 CHRONIC PAIN SYNDROME: ICD-10-CM

## 2018-05-14 RX ORDER — TOPIRAMATE 50 MG/1
TABLET, FILM COATED ORAL
Qty: 120 TABLET | Refills: 1 | Status: SHIPPED | OUTPATIENT
Start: 2018-05-14 | End: 2018-07-14

## 2018-05-14 NOTE — TELEPHONE ENCOUNTER
Last notes do not indicate any change with the Topamax Rx.    Last CR on 5/4/2018    Prescription approved per [facility] Refill Protocol.  Thanks! Gilda Caldera RN

## 2018-05-15 NOTE — TELEPHONE ENCOUNTER
"Requested Prescriptions   Pending Prescriptions Disp Refills     topiramate (TOPAMAX) 50 MG tablet [Pharmacy Med Name: TOPIRAMATE 50MG TABLETS]  Last Written Prescription Date:  5-14-18  Last Fill Quantity: 120 TAB,  # refills: 1   Last office visit: 5/4/2018 with prescribing provider:  Catrachita Collado    Future Office Visit:    360 tablet 1     Sig: TAKE 1 TO 2 TABLETS(50  MG) BY MOUTH TWICE DAILY    Anti-Seizure Meds Protocol  Failed    5/14/2018 11:44 AM       Failed - Review Authorizing provider's last note.     Refer to last progress notes: confirm request is for original authorizing provider (cannot be through other providers).       Passed - Recent (12 mo) or future (30 days) visit within the authorizing provider's specialty    Patient had office visit in the last 12 months or has a visit in the next 30 days with authorizing provider or within the authorizing provider's specialty.  See \"Patient Info\" tab in inbasket, or \"Choose Columns\" in Meds & Orders section of the refill encounter.           Passed - Normal CBC on file in past 26 months    Recent Labs   Lab Test  05/02/18   0430   WBC  9.8   RBC  4.29   HGB  13.3   HCT  40.2   PLT  205          Passed - Normal ALT or AST on file in past 26 months    Recent Labs   Lab Test  05/04/18   1116   ALT  38     Recent Labs   Lab Test  05/04/18   1116   AST  18          Passed - Normal platelet count on file in past 26 months    Recent Labs   Lab Test  05/02/18   0430   PLT  205          Passed - No active pregnancy on record       Passed - No positive pregnancy test in last 12 months          "

## 2018-05-18 RX ORDER — TOPIRAMATE 50 MG/1
TABLET, FILM COATED ORAL
Qty: 360 TABLET | Refills: 1 | OUTPATIENT
Start: 2018-05-18

## 2018-06-04 ENCOUNTER — MYC REFILL (OUTPATIENT)
Dept: FAMILY MEDICINE | Facility: CLINIC | Age: 58
End: 2018-06-04

## 2018-06-04 DIAGNOSIS — F51.01 PRIMARY INSOMNIA: ICD-10-CM

## 2018-06-04 DIAGNOSIS — F33.1 MAJOR DEPRESSIVE DISORDER, RECURRENT EPISODE, MODERATE (H): ICD-10-CM

## 2018-06-04 RX ORDER — PRAZOSIN HYDROCHLORIDE 5 MG/1
5 CAPSULE ORAL AT BEDTIME
Qty: 90 CAPSULE | Refills: 3 | Status: CANCELLED | OUTPATIENT
Start: 2018-06-04

## 2018-06-04 NOTE — TELEPHONE ENCOUNTER
Message from [employer]:  Original authorizing provider: MD Ilsa Latham would like a refill of the following medications:  prazosin (MINIPRESS) 5 MG capsule [Catrachita Collado MD]    Preferred pharmacy: [id number] - [place], [place] - [address] AT [facility] [address]    Comment:      Medication renewals requested in this message routed to other providers:  DULoxetine (CYMBALTA) 60 MG EC capsule [Sanjay Fritz MD]

## 2018-06-04 NOTE — TELEPHONE ENCOUNTER
Message from [employer]:  Original authorizing provider: MD Ilsa Burgess would like a refill of the following medications:  DULoxetine (CYMBALTA) 60 MG EC capsule [Sanjay Fritz MD]    Preferred pharmacy: [id number] - [place], [place] - [address] AT [facility] [address]    Comment:      Medication renewals requested in this message routed to other providers:  prazosin (MINIPRESS) 5 MG capsule [Catrachita Collado MD]

## 2018-06-05 NOTE — TELEPHONE ENCOUNTER
"Requested Prescriptions   Pending Prescriptions Disp Refills     prazosin (MINIPRESS) 5 MG capsule [Pharmacy Med Name: PRAZOSIN HCL 5MG CAPSULES]  Last Written Prescription Date:  5-1-17  Last Fill Quantity: 90 cap,  # refills: 3   Last office visit: 5/4/2018 with prescribing provider:  Catrachita Collado    Future Office Visit:    90 capsule 0     Sig: TAKE 1 CAPSULE(5 MG) BY MOUTH AT BEDTIME    Alpha Blockers Passed    6/4/2018  2:56 PM       Passed - Blood pressure under 140/90 in past 12 months    BP Readings from Last 3 Encounters:   05/08/18 100/62   05/04/18 104/66   05/02/18 115/69          Passed - Recent (12 mo) or future (30 days) visit within the authorizing provider's specialty    Patient had office visit in the last 12 months or has a visit in the next 30 days with authorizing provider or within the authorizing provider's specialty.  See \"Patient Info\" tab in inbasket, or \"Choose Columns\" in Meds & Orders section of the refill encounter.           Passed - Patient does not have Tadalafil, Vardenafil, or Sildenafil on their medication list       Passed - Patient is 18 years of age or older       Passed - No active pregnancy on record       Passed - No positive pregnancy test in past 12 months          "

## 2018-06-05 NOTE — TELEPHONE ENCOUNTER
"Requested Prescriptions   Pending Prescriptions Disp Refills     DULoxetine (CYMBALTA) 60 MG EC capsule  Last Written Prescription Date:  3/1/2018  Last Fill Quantity: 180 capsule,  # refills: 0   Last Office Visit: 5/4/2018   Future Office Visit:      180 capsule 0    Serotonin-Norepinephrine Reuptake Inhibitors  Passed    6/4/2018  4:07 PM       Passed - Blood pressure under 140/90 in past 12 months    BP Readings from Last 3 Encounters:   05/08/18 100/62   05/04/18 104/66   05/02/18 115/69          Passed - PHQ-9 score of less than 5 in past 6 months    Please review last PHQ-9 score.   PHQ-9 SCORE 1/9/2017 8/25/2017 3/1/2018   Total Score - - -   Total Score MyChart - - -   Total Score 16 6 2     DONELL-7 SCORE 10/25/2016 11/8/2016 11/29/2016   Total Score - - -   Total Score 7 13 10   Total Score - - -          Passed - Patient is age 18 or older       Passed - No active pregnancy on record       Passed - No positive pregnancy test in past 12 months       Passed - Recent (6 mo) or future (30 days) visit within the authorizing provider's specialty    Patient had office visit in the last 6 months or has a visit in the next 30 days with authorizing provider or within the authorizing provider's specialty.  See \"Patient Info\" tab in inbasket, or \"Choose Columns\" in Meds & Orders section of the refill encounter.              "

## 2018-06-05 NOTE — TELEPHONE ENCOUNTER
"Requested Prescriptions   Pending Prescriptions Disp Refills     prazosin (MINIPRESS) 5 MG capsule  Last Written Prescription Date:  5/*1/2017  Last Fill Quantity: 90 capsule,  # refills: 3   Last Office Visit: 5/4/2018   Future Office Visit:      90 capsule 3     Sig: Take 1 capsule (5 mg) by mouth At Bedtime Take 1 capsule at bedtime    Alpha Blockers Passed    6/4/2018  4:07 PM       Passed - Blood pressure under 140/90 in past 12 months    BP Readings from Last 3 Encounters:   05/08/18 100/62   05/04/18 104/66   05/02/18 115/69          Passed - Recent (12 mo) or future (30 days) visit within the authorizing provider's specialty    Patient had office visit in the last 12 months or has a visit in the next 30 days with authorizing provider or within the authorizing provider's specialty.  See \"Patient Info\" tab in inbasket, or \"Choose Columns\" in Meds & Orders section of the refill encounter.           Passed - Patient does not have Tadalafil, Vardenafil, or Sildenafil on their medication list       Passed - Patient is 18 years of age or older       Passed - No active pregnancy on record       Passed - No positive pregnancy test in past 12 months          "

## 2018-06-11 RX ORDER — DULOXETIN HYDROCHLORIDE 60 MG/1
CAPSULE, DELAYED RELEASE ORAL
Qty: 180 CAPSULE | Refills: 1 | Status: SHIPPED | OUTPATIENT
Start: 2018-06-11 | End: 2018-12-17

## 2018-06-11 RX ORDER — PRAZOSIN HYDROCHLORIDE 5 MG/1
CAPSULE ORAL
Qty: 90 CAPSULE | Refills: 2 | Status: SHIPPED | OUTPATIENT
Start: 2018-06-11 | End: 2019-04-15

## 2018-06-25 ENCOUNTER — TRANSFERRED RECORDS (OUTPATIENT)
Dept: HEALTH INFORMATION MANAGEMENT | Facility: CLINIC | Age: 58
End: 2018-06-25

## 2018-07-14 DIAGNOSIS — G89.4 CHRONIC PAIN SYNDROME: ICD-10-CM

## 2018-07-14 NOTE — TELEPHONE ENCOUNTER
"Requested Prescriptions   Pending Prescriptions Disp Refills     topiramate (TOPAMAX) 50 MG tablet [Pharmacy Med Name: TOPIRAMATE 50MG TABLETS]  Last Written Prescription Date:  5/14/18  Last Fill Quantity: 120 tablet,  # refills: 1   Last Office Visit: 5/4/2018   Future Office Visit:      360 tablet 1     Sig: TAKE 1 TO 2 TABLETS(50  MG) BY MOUTH TWICE DAILY    Anti-Seizure Meds Protocol  Failed    7/14/2018  1:21 AM       Failed - Review Authorizing provider's last note.     Refer to last progress notes: confirm request is for original authorizing provider (cannot be through other providers).         Passed - Recent (12 mo) or future (30 days) visit within the authorizing provider's specialty    Patient had office visit in the last 12 months or has a visit in the next 30 days with authorizing provider or within the authorizing provider's specialty.  See \"Patient Info\" tab in inbasket, or \"Choose Columns\" in Meds & Orders section of the refill encounter.           Passed - Normal CBC on file in past 26 months    Recent Labs   Lab Test  05/02/18   0430   WBC  9.8   RBC  4.29   HGB  13.3   HCT  40.2   PLT  205       For [facility] ONLY: [id number] = WBC, [id number] = RBC         Passed - Normal ALT or AST on file in past 26 months    Recent Labs   Lab Test  05/04/18   1116   ALT  38     Recent Labs   Lab Test  05/04/18   1116   AST  18            Passed - Normal platelet count on file in past 26 months    Recent Labs   Lab Test  05/02/18   0430   PLT  205              Passed - No active pregnancy on record       Passed - No positive pregnancy test in last 12 months          "

## 2018-07-19 RX ORDER — TOPIRAMATE 50 MG/1
TABLET, FILM COATED ORAL
Qty: 360 TABLET | Refills: 0 | Status: SHIPPED | OUTPATIENT
Start: 2018-07-19 | End: 2018-09-20

## 2018-07-23 ENCOUNTER — OFFICE VISIT (OUTPATIENT)
Dept: FAMILY MEDICINE | Facility: CLINIC | Age: 58
End: 2018-07-23
Payer: COMMERCIAL

## 2018-07-23 VITALS
RESPIRATION RATE: 18 BRPM | BODY MASS INDEX: 31.75 KG/M2 | OXYGEN SATURATION: 96 % | HEART RATE: 74 BPM | SYSTOLIC BLOOD PRESSURE: 108 MMHG | TEMPERATURE: 96.5 F | DIASTOLIC BLOOD PRESSURE: 69 MMHG | WEIGHT: 215 LBS

## 2018-07-23 DIAGNOSIS — Z91.89 DES EXPOSURE IN UTERO: Primary | ICD-10-CM

## 2018-07-23 DIAGNOSIS — F90.1 ATTENTION-DEFICIT HYPERACTIVITY DISORDER, PREDOMINANTLY HYPERACTIVE TYPE: ICD-10-CM

## 2018-07-23 PROCEDURE — 99213 OFFICE O/P EST LOW 20 MIN: CPT | Performed by: FAMILY MEDICINE

## 2018-07-23 RX ORDER — DEXTROAMPHETAMINE SACCHARATE, AMPHETAMINE ASPARTATE, DEXTROAMPHETAMINE SULFATE AND AMPHETAMINE SULFATE 5; 5; 5; 5 MG/1; MG/1; MG/1; MG/1
20 TABLET ORAL DAILY
Qty: 30 TABLET | Refills: 0 | Status: SHIPPED | OUTPATIENT
Start: 2018-09-21 | End: 2018-11-06

## 2018-07-23 RX ORDER — DEXTROAMPHETAMINE SACCHARATE, AMPHETAMINE ASPARTATE, DEXTROAMPHETAMINE SULFATE AND AMPHETAMINE SULFATE 5; 5; 5; 5 MG/1; MG/1; MG/1; MG/1
20 TABLET ORAL DAILY
Qty: 30 TABLET | Refills: 0 | Status: SHIPPED | OUTPATIENT
Start: 2018-08-22 | End: 2018-11-06

## 2018-07-23 RX ORDER — DEXTROAMPHETAMINE SACCHARATE, AMPHETAMINE ASPARTATE, DEXTROAMPHETAMINE SULFATE AND AMPHETAMINE SULFATE 5; 5; 5; 5 MG/1; MG/1; MG/1; MG/1
20 TABLET ORAL DAILY
Qty: 30 TABLET | Refills: 0 | Status: SHIPPED | OUTPATIENT
Start: 2018-07-23 | End: 2018-11-06

## 2018-07-23 RX ORDER — DEXTROAMPHETAMINE SACCHARATE, AMPHETAMINE ASPARTATE, DEXTROAMPHETAMINE SULFATE AND AMPHETAMINE SULFATE 5; 5; 5; 5 MG/1; MG/1; MG/1; MG/1
20 TABLET ORAL DAILY
Qty: 30 TABLET | Refills: 0 | Status: SHIPPED | OUTPATIENT
Start: 2018-09-21 | End: 2018-07-23

## 2018-07-23 NOTE — MR AVS SNAPSHOT
"              After Visit Summary   7/23/2018    Ilsa Yusuf    MRN: [id number]           Patient Information     Date Of Birth          1960        Visit Information        Provider Department      7/23/2018 11:20 AM Catrachita Collado MD [facility]        Today's Diagnoses     Attention-deficit hyperactivity disorder, predominantly hyperactive type           Follow-ups after your visit        Your next 10 appointments already scheduled     Jul 25, 2018 10:40 AM CDT   MA SCREENING DIGITAL BILATERAL with [id number]   [facility] ([facility])    [address] [place] [address], [address]  [facility] [address] [phone]           Do not use any powder, lotion or deodorant under your arms or on your breast. If you do, we will ask you to remove it before your exam.  Wear comfortable, two-piece clothing.  If you have any allergies, tell your care team.  Bring any previous mammograms from other facilities or have them mailed to the breast center. Three-dimensional (3D) mammograms are available at [facility] [place] locations in [facility], [facility], [facility], and [place]. [facility] locations include [place] and [facility] in [place]. Benefits of 3D mammograms include: - Improved rate of cancer detection - Decreases your chance of having to go back for more tests, which means fewer: - \"False-positive\" results (This means that there is an abnormal area but it isn't cancer.) - Invasive testing procedures, such as a biopsy or surgery - Can provide clearer images of the breast if you have dense breast tissue. 3D mammography is an optional exam that anyone can have with a 2D mammogram. It doesn't replace or take the place of a 2D mammogram. 2D mammograms remain an effective screening test for all women.  Not all insurance companies cover the cost of a 3D mammogram. Check with your insurance.            Jul " 27, 2018  1:30 PM CDT   Return Sleep Patient with Crow Merino MD   [facility] ([facility] [place])    [address] [address] [phone]              Who to contact     If you have questions or need follow up information about today's clinic visit or your schedule please contact [facility] directly at [phone].  Normal or non-critical lab and imaging results will be communicated to you by [employer], letter or phone within 4 business days after the clinic has received the results. If you do not hear from us within 7 days, please contact the clinic through [employer] or phone. If you have a critical or abnormal lab result, we will notify you by phone as soon as possible.  Submit refill requests through [employer] or call your pharmacy and they will forward the refill request to us. Please allow 3 business days for your refill to be completed.          Additional Information About Your Visit        [employer] Information     [employer] gives you secure access to your electronic health record. If you see a primary care provider, you can also send messages to your care team and make appointments. If you have questions, please call your primary care clinic.  If you do not have a primary care provider, please call [phone] and they will assist you.        Care EveryWhere ID     This is your Care EveryWhere ID. This could be used by other organizations to access your [facility] medical records  [id number]        Your Vitals Were     Pulse Temperature Respirations Pulse Oximetry BMI (Body Mass Index)       74 96.5  F (35.8  C) (Oral) 18 96% 31.75 kg/m2        Blood Pressure from Last 3 Encounters:   07/23/18 108/69   05/08/18 100/62   05/04/18 104/66    Weight from Last 3 Encounters:   07/23/18 215 lb (97.5 kg)   05/08/18 213 lb (96.6 kg)   05/04/18 213 lb 12.8 oz (97 kg)              Today, you had the following     No orders found for display          Today's Medication Changes          These changes are accurate as of 7/23/18 11:59 AM.  If you have any questions, ask your nurse or doctor.               Start taking these medicines.        Dose/Directions    * amphetamine-dextroamphetamine 20 MG per tablet   Commonly known as:  ADDERALL   Used for:  Attention-deficit hyperactivity disorder, predominantly hyperactive type   Started by:  Catrachita Collado MD        Dose:  20 mg   Take 1 tablet (20 mg) by mouth daily   Quantity:  30 tablet   Refills:  0       * amphetamine-dextroamphetamine 20 MG per tablet   Commonly known as:  ADDERALL   Used for:  Attention-deficit hyperactivity disorder, predominantly hyperactive type   Started by:  Catrachita Collado MD        Dose:  20 mg   Start taking on:  8/22/2018   Take 1 tablet (20 mg) by mouth daily   Quantity:  30 tablet   Refills:  0       * amphetamine-dextroamphetamine 20 MG per tablet   Commonly known as:  ADDERALL   Used for:  Attention-deficit hyperactivity disorder, predominantly hyperactive type   Started by:  Catrachita Collado MD        Dose:  20 mg   Start taking on:  9/21/2018   Take 1 tablet (20 mg) by mouth daily   Quantity:  30 tablet   Refills:  0       * Notice:  This list has 3 medication(s) that are the same as other medications prescribed for you. Read the directions carefully, and ask your doctor or other care provider to review them with you.         Where to get your medicines      Some of these will need a paper prescription and others can be bought over the counter.  Ask your nurse if you have questions.     Bring a paper prescription for each of these medications     amphetamine-dextroamphetamine 20 MG per tablet    amphetamine-dextroamphetamine 20 MG per tablet    amphetamine-dextroamphetamine 20 MG per tablet                Primary Care Provider Office Phone # Fax #    Catrachita Collado [phone] [address]  [place] [address]        [facility]  Access to Services     [facility]: Hadii aad ku hadgerardoeliazar Nahedmartha, wakierada luqadaha, qaybta kaalmasjane greco. So [facility] [phone].    ATENCIÓN: Si habla español, tiene a castle disposición servicios gratuitos de asistencia lingüística. Llame al [phone].    We comply with applicable federal civil rights laws and [place] laws. We do not discriminate on the basis of race, color, national origin, age, disability, sex, sexual orientation, or gender identity.            Thank you!     Thank you for choosing [facility]  for your care. Our goal is always to provide you with excellent care. Hearing back from our patients is one way we can continue to improve our services. Please take a few minutes to complete the written survey that you may receive in the mail after your visit with us. Thank you!             Your Updated Medication List - Protect others around you: Learn how to safely use, store and throw away your medicines at www.disposemymeds.org.          This list is accurate as of 7/23/18 11:59 AM.  Always use your most recent med list.                   Brand Name Dispense Instructions for use Diagnosis    * amphetamine-dextroamphetamine 20 MG per tablet    ADDERALL    30 tablet    Take 1 tablet (20 mg) by mouth daily    Attention-deficit hyperactivity disorder, predominantly hyperactive type       * amphetamine-dextroamphetamine 20 MG per tablet   Start taking on:  8/22/2018    ADDERALL    30 tablet    Take 1 tablet (20 mg) by mouth daily    Attention-deficit hyperactivity disorder, predominantly hyperactive type       * amphetamine-dextroamphetamine 20 MG per tablet   Start taking on:  9/21/2018    ADDERALL    30 tablet    Take 1 tablet (20 mg) by mouth daily    Attention-deficit hyperactivity disorder, predominantly hyperactive type       cholecalciferol 5000 units Caps capsule    vitamin D3    100 capsule    Take 1 capsule (5,000 Units) by mouth  daily Take 1 per day    Major depressive disorder, recurrent episode, moderate (H)       cyclobenzaprine 5 MG tablet    FLEXERIL          doxepin 10 MG capsule    SINEquan    180 capsule    TAKE 1 TO 2 CAPSULES BY MOUTH EVERY NIGHT AT BEDTIME    Major depressive disorder, recurrent episode, moderate (H)       DULoxetine 60 MG EC capsule    CYMBALTA    180 capsule    TAKE 2 CAPSULES BY MOUTH EVERY DAY    Major depressive disorder, recurrent episode, moderate (H)       FIBER PO      Two capsules in the morning and two capsules at night        fish oil-omega-3 fatty acids 1000 MG capsule      Take 2 g by mouth daily Takes 1 in am and 1 at bedtime        fluticasone 50 MCG/ACT spray    FLONASE    48 mL    INHALE 1- 2 SPRAYS IN EACH NOSTRIL DAILY.    Other chronic sinusitis       levothyroxine 200 MCG tablet    SYNTHROID/LEVOTHROID    90 tablet    Take 1 tablet (200 mcg) by mouth daily    Acquired hypothyroidism       lovastatin 20 MG tablet    MEVACOR    90 tablet    Take 1 tablet (20 mg) by mouth At Bedtime    Hyperlipidemia LDL goal <130       Multi-vitamin Tabs tablet      Take 1 tablet by mouth daily        NONFORMULARY      Take 20 mg by mouth 2 times daily D-amphetamine Salt combo        OLANZapine 2.5 MG tablet    zyPREXA    90 tablet    TAKE 1 TABLET(2.5 MG) BY MOUTH AT BEDTIME    Major depressive disorder, recurrent episode, moderate (H)       omeprazole 40 MG capsule    priLOSEC    90 capsule    Take 1 capsule (40 mg) by mouth daily Take 30-60 minutes before a meal.    Gastroesophageal reflux disease, esophagitis presence not specified       ondansetron 4 MG tablet    ZOFRAN    18 tablet    Take 1 tablet (4 mg) by mouth every 6 hours as needed for nausea    Norovirus       oxyCODONE-acetaminophen 5-325 MG per tablet    PERCOCET    15 tablet    Take 1-2 tablets by mouth every 6 hours as needed for pain        phenazopyridine 200 MG tablet    PYRIDIUM    30 tablet    Take 1 tablet (200 mg) by mouth 3 times daily  as needed for irritation    Urinary retention       prazosin 5 MG capsule    MINIPRESS    90 capsule    TAKE 1 CAPSULE(5 MG) BY MOUTH AT BEDTIME    Primary insomnia       tiZANidine 4 MG tablet    ZANAFLEX    210 tablet    Take 1-3 tablets (4-12 mg) by mouth 3 times daily as needed for muscle spasms    Cervical radiculopathy       topiramate 50 MG tablet    TOPAMAX    360 tablet    TAKE 1 TO 2 TABLETS(50  MG) BY MOUTH TWICE DAILY    Chronic pain syndrome       * Notice:  This list has 3 medication(s) that are the same as other medications prescribed for you. Read the directions carefully, and ask your doctor or other care provider to review them with you.

## 2018-07-23 NOTE — PROGRESS NOTES
SUBJECTIVE:   Ilsa Yusuf is a 57 year old female who presents to clinic today for the following health issues:    Medication Followup of ADDERALL     Taking Medication as prescribed: yes    Side Effects:  None    Medication Helping Symptoms:  yes     Taking adderall once daily continues to do well for her attention span and also her mood.  When she ran out, she tried doing without it, but was bothered by poor attention, could not sit and read a book, and it also impacted her mood negatively.  She would like to continue on it.  Denies chest pain, palpitations, dizziness, headache, abdominal pain, insomnia.     Chronic neck pain: the patient notes that she still has numbness in the bilateral upper extremities and neck pain.  She follows with a pain management clinic.  She was approved to have medical marijuana, but she could not afford it.  She plans to see neurosurgery again in follow up due to the numbness.   She continues to manage her pain with tizanidine, topamax, and as needed percocet.     She is due for mammogram and will be due this fall for pap.  Last pap was 2015 and normal.  She denies a h/o abnormal pap. However, today she recalls that her mother was taking TORRI, likely during the pregnancy with her.          Problem list and histories reviewed & adjusted, as indicated.  Additional history: as documented    Patient Active Problem List   Diagnosis     Major depressive disorder, recurrent episode, moderate (H)     PTSD (post-traumatic stress disorder)     Acquired hypothyroidism     Hyperlipidemia LDL goal <130     CKD (chronic kidney disease) stage 3, GFR 30-59 ml/min     Esophageal reflux     Primary insomnia     Obesity     Attention-deficit hyperactivity disorder, predominantly hyperactive type     Neck pain     Cervical radiculopathy     Partner relationship problems     Primary osteoarthritis of right knee     Peripheral tear of medial meniscus of right knee, unspecified whether old or  current tear, initial encounter     Calculus of left kidney     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     CHOLECYSTECTOMY       COLONOSCOPY       CYSTOSCOPY       HERNIA REPAIR       ORTHOPEDIC SURGERY  left knee     renal artery surgery  child    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     TONSILLECTOMY       ureteral reimplantation         Social History   Substance Use Topics     Smoking status: Former Smoker     Packs/day: 0.30     Quit date: 2015     Smokeless tobacco: Never Used      Comment: recreational     Alcohol use 0.0 oz/week      Comment: 2 a night on weekends     Family History   Problem Relation Age of Onset     C.A.D. Father 58      at 58, heart disease     Mental Illness Father      Coronary Artery Disease Father      Hyperlipidemia Father      Alzheimer Disease Mother      total care now     Depression Mother      Anxiety Disorder Mother      Diabetes Sister      adult onset     Melanoma Sister      Breast Cancer Sister      Thyroid Disease Sister          Current Outpatient Prescriptions   Medication Sig Dispense Refill     amphetamine-dextroamphetamine (ADDERALL) 20 MG per tablet Take 1 tablet (20 mg) by mouth daily 30 tablet 0     [START ON 2018] amphetamine-dextroamphetamine (ADDERALL) 20 MG per tablet Take 1 tablet (20 mg) by mouth daily 30 tablet 0     [START ON 2018] amphetamine-dextroamphetamine (ADDERALL) 20 MG per tablet Take 1 tablet (20 mg) by mouth daily 30 tablet 0     cholecalciferol (VITAMIN D3) 5000 UNITS CAPS capsule Take 1 capsule (5,000 Units) by mouth daily Take 1 per day 100 capsule 2     doxepin (SINEQUAN) 10 MG capsule TAKE 1 TO 2 CAPSULES BY MOUTH EVERY NIGHT AT BEDTIME 180 capsule 1     DULoxetine (CYMBALTA) 60 MG EC capsule TAKE 2 CAPSULES BY MOUTH EVERY  capsule  "1     FIBER PO Two capsules in the morning and two capsules at night       fish oil-omega-3 fatty acids (OMEGA 3) 1000 MG capsule Take 2 g by mouth daily Takes 1 in am and 1 at bedtime       fluticasone (FLONASE) 50 MCG/ACT spray INHALE 1- 2 SPRAYS IN EACH NOSTRIL DAILY. 48 mL 0     levothyroxine (SYNTHROID/LEVOTHROID) 200 MCG tablet Take 1 tablet (200 mcg) by mouth daily 90 tablet 3     lovastatin (MEVACOR) 20 MG tablet Take 1 tablet (20 mg) by mouth At Bedtime 90 tablet 3     multivitamin, therapeutic with minerals (MULTI-VITAMIN) TABS Take 1 tablet by mouth daily       NONFORMULARY Take 20 mg by mouth 2 times daily D-amphetamine Salt combo       OLANZapine (ZYPREXA) 2.5 MG tablet TAKE 1 TABLET(2.5 MG) BY MOUTH AT BEDTIME 90 tablet 3     omeprazole (PRILOSEC) 40 MG capsule Take 1 capsule (40 mg) by mouth daily Take 30-60 minutes before a meal. 90 capsule 2     ondansetron (ZOFRAN) 4 MG tablet Take 1 tablet (4 mg) by mouth every 6 hours as needed for nausea 18 tablet 5     oxyCODONE-acetaminophen (PERCOCET) 5-325 MG per tablet Take 1-2 tablets by mouth every 6 hours as needed for pain 15 tablet 0     prazosin (MINIPRESS) 5 MG capsule TAKE 1 CAPSULE(5 MG) BY MOUTH AT BEDTIME 90 capsule 2     tiZANidine (ZANAFLEX) 4 MG tablet Take 1-3 tablets (4-12 mg) by mouth 3 times daily as needed for muscle spasms 210 tablet 3     topiramate (TOPAMAX) 50 MG tablet TAKE 1 TO 2 TABLETS(50  MG) BY MOUTH TWICE DAILY 360 tablet 0     Allergies   Allergen Reactions     Gabapentin Other (See Comments)     Patient states she gets \"horrific nightmares\" with this medication     Dilaudid [Hydromorphone Hcl]      Made her feel like her skin was crawling     Nsaids Other (See Comments)     Kidney failure     Compazine [Prochlorperazine] Other (See Comments)     \"Makes my skin crawl, I was going nuts.\"       Reviewed and updated as needed this visit by clinical staff  Tobacco  Allergies  Med Hx  Surg Hx  Fam Hx  Soc Hx    "   Reviewed and updated as needed this visit by Provider         ROS:  Constitutional, HEENT, cardiovascular, pulmonary, gi and gu systems are negative, except as otherwise noted.    OBJECTIVE:     /69 (BP Location: Left arm, Patient Position: Sitting, Cuff Size: Adult Regular)  Pulse 74  Temp 96.5  F (35.8  C) (Oral)  Resp 18  Wt 215 lb (97.5 kg)  SpO2 96%  BMI 31.75 kg/m2  Body mass index is 31.75 kg/(m^2).  GENERAL APPEARANCE: healthy, alert and no distress  RESP: lungs clear to auscultation - no rales, rhonchi or wheezes  CV: regular rates and rhythm, normal S1 S2, no S3 or S4 and no murmur, click or rub  PSYCH: mentation appears normal and affect normal/bright, normal eye contact, normal speech rate and rhythm.         ASSESSMENT/PLAN:             1. Attention-deficit hyperactivity disorder, predominantly hyperactive type  Medication is refilled.  Ok to call in for next three month refill, but will need another office visit in 6 months for this.  - amphetamine-dextroamphetamine (ADDERALL) 20 MG per tablet; Take 1 tablet (20 mg) by mouth daily  Dispense: 30 tablet; Refill: 0  - amphetamine-dextroamphetamine (ADDERALL) 20 MG per tablet; Take 1 tablet (20 mg) by mouth daily  Dispense: 30 tablet; Refill: 0  - amphetamine-dextroamphetamine (ADDERALL) 20 MG per tablet; Take 1 tablet (20 mg) by mouth daily  Dispense: 30 tablet; Refill: 0    2. 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           Catrachita Collado MD  [facility]

## 2018-07-24 ENCOUNTER — HEALTH MAINTENANCE LETTER (OUTPATIENT)
Age: 58
End: 2018-07-24

## 2018-07-27 ENCOUNTER — OFFICE VISIT (OUTPATIENT)
Dept: SLEEP MEDICINE | Facility: CLINIC | Age: 58
End: 2018-07-27
Payer: COMMERCIAL

## 2018-07-27 VITALS
WEIGHT: 218 LBS | RESPIRATION RATE: 16 BRPM | OXYGEN SATURATION: 99 % | HEART RATE: 72 BPM | BODY MASS INDEX: 32.29 KG/M2 | SYSTOLIC BLOOD PRESSURE: 130 MMHG | DIASTOLIC BLOOD PRESSURE: 84 MMHG | HEIGHT: 69 IN

## 2018-07-27 DIAGNOSIS — G47.33 OSA (OBSTRUCTIVE SLEEP APNEA): Primary | ICD-10-CM

## 2018-07-27 PROCEDURE — 99213 OFFICE O/P EST LOW 20 MIN: CPT | Performed by: INTERNAL MEDICINE

## 2018-07-27 NOTE — NURSING NOTE
"Chief Complaint   Patient presents with     CPAP Follow Up     Follow up citlalli        Initial /77  Pulse 72  Resp 16  Ht 1.74 m (5' 8.5\")  Wt 98.9 kg (218 lb)  SpO2 99%  BMI 32.66 kg/m2 Estimated body mass index is 32.66 kg/(m^2) as calculated from the following:    Height as of this encounter: 1.74 m (5' 8.5\").    Weight as of this encounter: 98.9 kg (218 lb).    Medication Reconciliation: complete    ESS 17    Terrie Abbott MA      "

## 2018-07-27 NOTE — MR AVS SNAPSHOT
After Visit Summary   7/27/2018    Ilsa Yusuf    MRN: [id number]           Patient Information     Date Of Birth          1960        Visit Information        Provider Department      7/27/2018 1:30 PM Crow Merino MD [facility]        Today's Diagnoses     ABDOUL (obstructive sleep apnea)    -  1      Care Instructions      Your BMI is Body mass index is 32.66 kg/(m^2).  Weight management is a personal decision.  If you are interested in exploring weight loss strategies, the following discussion covers the approaches that may be successful. Body mass index (BMI) is one way to tell whether you are at a healthy weight, overweight, or obese. It measures your weight in relation to your height.  A BMI of 18.5 to 24.9 is in the healthy range. A person with a BMI of 25 to 29.9 is considered overweight, and someone with a BMI of 30 or greater is considered obese. More than two-thirds of American adults are considered overweight or obese.  Being overweight or obese increases the risk for further weight gain. Excess weight may lead to heart disease and diabetes.  Creating and following plans for healthy eating and physical activity may help you improve your health.  Weight control is part of healthy lifestyle and includes exercise, emotional health, and healthy eating habits. Careful eating habits lifelong are the mainstay of weight control. Though there are significant health benefits from weight loss, long-term weight loss with diet alone may be very difficult to achieve- studies show long-term success with dietary management in less than 10% of people. Attaining a healthy weight may be especially difficult to achieve in those with severe obesity. In some cases, medications, devices and surgical management might be considered.  What can you do?  If you are overweight or obese and are interested in methods for weight loss, you should discuss this with your provider.     Consider  reducing daily calorie intake by 500 calories.     Keep a food journal.     Avoiding skipping meals, consider cutting portions instead.    Diet combined with exercise helps maintain muscle while optimizing fat loss. Strength training is particularly important for building and maintaining muscle mass. Exercise helps reduce stress, increase energy, and improves fitness. Increasing exercise without diet control, however, may not burn enough calories to loose weight.       Start walking three days a week 10-20 minutes at a time    Work towards walking thirty minutes five days a week     Eventually, increase the speed of your walking for 1-2 minutes at time    In addition, we recommend that you review healthy lifestyles and methods for weight loss available through the National Institutes of Health patient information sites:  http://win.niddk.nih.gov/publications/index.htm    And look into health and wellness programs that may be available through your health insurance provider, employer, local community center, or [employer] club.    Weight management plan: Patient was referred to their PCP to discuss a diet and exercise plan.          Your Body mass index is 32.66 kg/(m^2).  Weight management is a personal decision.  If you are interested in exploring weight loss strategies, the following discussion covers the approaches that may be successful. Body mass index (BMI) is one way to tell whether you are at a healthy weight, overweight, or obese. It measures your weight in relation to your height.  A BMI of 18.5 to 24.9 is in the healthy range. A person with a BMI of 25 to 29.9 is considered overweight, and someone with a BMI of 30 or greater is considered obese. More than two-thirds of American adults are considered overweight or obese.  Being overweight or obese increases the risk for further weight gain. Excess weight may lead to heart disease and diabetes.  Creating and following plans for healthy eating and physical  activity may help you improve your health.  Weight control is part of healthy lifestyle and includes exercise, emotional health, and healthy eating habits. Careful eating habits lifelong are the mainstay of weight control. Though there are significant health benefits from weight loss, long-term weight loss with diet alone may be very difficult to achieve- studies show long-term success with dietary management in less than 10% of people. Attaining a healthy weight may be especially difficult to achieve in those with severe obesity. In some cases, medications, devices and surgical management might be considered.  What can you do?  If you are overweight or obese and are interested in methods for weight loss, you should discuss this with your provider.     Consider reducing daily calorie intake by 500 calories.     Keep a food journal.     Avoiding skipping meals, consider cutting portions instead.    Diet combined with exercise helps maintain muscle while optimizing fat loss. Strength training is particularly important for building and maintaining muscle mass. Exercise helps reduce stress, increase energy, and improves fitness. Increasing exercise without diet control, however, may not burn enough calories to loose weight.       Start walking three days a week 10-20 minutes at a time    Work towards walking thirty minutes five days a week     Eventually, increase the speed of your walking for 1-2 minutes at time    In addition, we recommend that you review healthy lifestyles and methods for weight loss available through the National Institutes of Health patient information sites:  http://win.niddk.nih.gov/publications/index.htm    And look into health and wellness programs that may be available through your health insurance provider, employer, local community center, or [employer] club.    Weight management plan: Patient was referred to their PCP to discuss a diet and exercise plan.          Follow-ups after your visit       "  Your next 10 appointments already scheduled     May 22, 2019 11:30 AM CDT   Return Sleep Patient with Crow Merino MD   [facility] ([facility])    [address] [address] [phone]              Who to contact     If you have questions or need follow up information about today's clinic visit or your schedule please contact [facility] directly at [phone].  Normal or non-critical lab and imaging results will be communicated to you by [employer], letter or phone within 4 business days after the clinic has received the results. If you do not hear from us within 7 days, please contact the clinic through [employer] or phone. If you have a critical or abnormal lab result, we will notify you by phone as soon as possible.  Submit refill requests through [employer] or call your pharmacy and they will forward the refill request to us. Please allow 3 business days for your refill to be completed.          Additional Information About Your Visit        [employer] Information     [employer] gives you secure access to your electronic health record. If you see a primary care provider, you can also send messages to your care team and make appointments. If you have questions, please call your primary care clinic.  If you do not have a primary care provider, please call [phone] and they will assist you.        Care EveryWhere ID     This is your Care EveryWhere ID. This could be used by other organizations to access your [facility] medical records  [id number]        Your Vitals Were     Pulse Respirations Height Pulse Oximetry BMI (Body Mass Index)       72 16 1.74 m (5' 8.5\") 99% 32.66 kg/m2        Blood Pressure from Last 3 Encounters:   07/27/18 130/84   07/23/18 108/69   05/08/18 100/62    Weight from Last 3 Encounters:   07/27/18 98.9 kg (218 lb)   07/23/18 97.5 kg (215 lb)   05/08/18 96.6 kg (213 lb)              We Performed the Following  "    Comprehensive DME          Today's Medication Changes          These changes are accurate as of 7/27/18  2:11 PM.  If you have any questions, ask your nurse or doctor.               Stop taking these medicines if you haven't already. Please contact your care team if you have questions.     NONFORMULARY   Stopped by:  Crow Merino MD                    Primary Care Provider Office Phone # Fax #    Catrachita Collado [phone] [address] [place] [address]        Equal Access to Services     [facility]: Hadii aad ku hadasho Soomaali, waaxda luqadaha, qaybta kaalmada adeegyada, waxay idiin hayaan adeeg khjaneth bonilla . So [facility] [phone].    ATENCIÓN: Si habla español, tiene a castle disposición servicios gratuitos de asistencia lingüística. [facility] [phone].    We comply with applicable federal civil rights laws and [place] laws. We do not discriminate on the basis of race, color, national origin, age, disability, sex, sexual orientation, or gender identity.            Thank you!     Thank you for choosing [facility]  for your care. Our goal is always to provide you with excellent care. Hearing back from our patients is one way we can continue to improve our services. Please take a few minutes to complete the written survey that you may receive in the mail after your visit with us. Thank you!             Your Updated Medication List - Protect others around you: Learn how to safely use, store and throw away your medicines at www.disposemymeds.org.          This list is accurate as of 7/27/18  2:11 PM.  Always use your most recent med list.                   Brand Name Dispense Instructions for use Diagnosis    * amphetamine-dextroamphetamine 20 MG per tablet    ADDERALL    30 tablet    Take 1 tablet (20 mg) by mouth daily    Attention-deficit hyperactivity disorder, predominantly hyperactive type       * amphetamine-dextroamphetamine 20 MG per tablet    Start taking on:  8/22/2018    ADDERALL    30 tablet    Take 1 tablet (20 mg) by mouth daily    Attention-deficit hyperactivity disorder, predominantly hyperactive type       * amphetamine-dextroamphetamine 20 MG per tablet   Start taking on:  9/21/2018    ADDERALL    30 tablet    Take 1 tablet (20 mg) by mouth daily    Attention-deficit hyperactivity disorder, predominantly hyperactive type       cholecalciferol 5000 units Caps capsule    vitamin D3    100 capsule    Take 1 capsule (5,000 Units) by mouth daily Take 1 per day    Major depressive disorder, recurrent episode, moderate (H)       doxepin 10 MG capsule    SINEquan    180 capsule    TAKE 1 TO 2 CAPSULES BY MOUTH EVERY NIGHT AT BEDTIME    Major depressive disorder, recurrent episode, moderate (H)       DULoxetine 60 MG EC capsule    CYMBALTA    180 capsule    TAKE 2 CAPSULES BY MOUTH EVERY DAY    Major depressive disorder, recurrent episode, moderate (H)       FIBER PO      Two capsules in the morning and two capsules at night        fish oil-omega-3 fatty acids 1000 MG capsule      Take 2 g by mouth daily Takes 1 in am and 1 at bedtime        fluticasone 50 MCG/ACT spray    FLONASE    48 mL    INHALE 1- 2 SPRAYS IN EACH NOSTRIL DAILY.    Other chronic sinusitis       levothyroxine 200 MCG tablet    SYNTHROID/LEVOTHROID    90 tablet    Take 1 tablet (200 mcg) by mouth daily    Acquired hypothyroidism       lovastatin 20 MG tablet    MEVACOR    90 tablet    Take 1 tablet (20 mg) by mouth At Bedtime    Hyperlipidemia LDL goal <130       Multi-vitamin Tabs tablet      Take 1 tablet by mouth daily        OLANZapine 2.5 MG tablet    zyPREXA    90 tablet    TAKE 1 TABLET(2.5 MG) BY MOUTH AT BEDTIME    Major depressive disorder, recurrent episode, moderate (H)       omeprazole 40 MG capsule    priLOSEC    90 capsule    Take 1 capsule (40 mg) by mouth daily Take 30-60 minutes before a meal.    Gastroesophageal reflux disease, esophagitis presence not specified        ondansetron 4 MG tablet    ZOFRAN    18 tablet    Take 1 tablet (4 mg) by mouth every 6 hours as needed for nausea    Norovirus       oxyCODONE-acetaminophen 5-325 MG per tablet    PERCOCET    15 tablet    Take 1-2 tablets by mouth every 6 hours as needed for pain        prazosin 5 MG capsule    MINIPRESS    90 capsule    TAKE 1 CAPSULE(5 MG) BY MOUTH AT BEDTIME    Primary insomnia       tiZANidine 4 MG tablet    ZANAFLEX    210 tablet    Take 1-3 tablets (4-12 mg) by mouth 3 times daily as needed for muscle spasms    Cervical radiculopathy       topiramate 50 MG tablet    TOPAMAX    360 tablet    TAKE 1 TO 2 TABLETS(50  MG) BY MOUTH TWICE DAILY    Chronic pain syndrome       * Notice:  This list has 3 medication(s) that are the same as other medications prescribed for you. Read the directions carefully, and ask your doctor or other care provider to review them with you.

## 2018-07-27 NOTE — PATIENT INSTRUCTIONS

## 2018-07-27 NOTE — PROGRESS NOTES
Obstructive Sleep Apnea - PAP Follow-Up Visit:    Chief Complaint   Patient presents with     CPAP Follow Up     Follow up citlalli        Ilsa Yusuf comes in today for follow-up of their moderate sleep apnea, managed with CPAP.     PSG from 1/28/2016 showed an AHI of 25.5 per hour.     Overall, she rates the experience with PAP as 7 (0 poor, 10 great). The mask is not comfortable. The mask is not leaking.  She is snoring with the mask on. She is not having gasp arousals.  She is not having significant oral/nasal dryness. The pressure settings are comfortable.     She uses nasal mask.      Bedtime is typically 11 pm. Usually it takes about 20 minutes to fall asleep with the mask on. Wake time is typically 7 am.  Patient is using PAP therapy 9 hours per night. The patient is usually getting 9 hours of sleep per night.    She does feel rested in the morning.    Total score - [place]: 17 (7/27/2018  1:49 PM)    [employer]Med     Auto-PAP 7-12 cmH2O download:  30/30 total days of use. 0 nonuse days. 29/30 days with >4 hours use.  Average use 9 hours 3 minutes per day. Median Leak 0.7 L/min. 95%ile Leak 8.4 L/min. CPAP 95% pressure 11.9cm. 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 07/23/2018     Priority: Medium     Calculus of left kidney 08/26/2017     Priority: Medium     Primary osteoarthritis of right knee 12/26/2016     Priority: Medium     Peripheral tear of medial meniscus of right knee, unspecified whether old or current tear, initial encounter 12/26/2016     Priority: Medium     Partner relationship problems 12/05/2016     Priority: Medium     Cervical radiculopathy 10/28/2016     Priority: Medium     Neck pain 09/29/2016     Priority: Medium     Attention-deficit hyperactivity disorder, predominantly hyperactive type 04/19/2016     Priority: Medium     Obesity  "06/21/2015     Priority: Medium     Primary insomnia 06/02/2015     Priority: Medium     Esophageal reflux 04/13/2015     Priority: Medium     CKD (chronic kidney disease) stage 3, GFR 30-59 ml/min 03/09/2015     Priority: Medium     Hyperlipidemia LDL goal <130 01/30/2012     Priority: Medium     Major depressive disorder, recurrent episode, moderate (H) 01/18/2012     Priority: Medium     PTSD (post-traumatic stress disorder) 01/18/2012     Priority: Medium     Acquired hypothyroidism 01/18/2012     Priority: Medium          /77  Pulse 72  Resp 16  Ht 1.74 m (5' 8.5\")  Wt 98.9 kg (218 lb)  SpO2 99%  BMI 32.66 kg/m2    Impression/Plan:     Moderate sleep apnea.     - Tolerating PAP well. There has been some daytime fatigue in last 6 months. Patient's weight has increased 12 lbs over last one year and she complains of breakthrough snoring.  AHI is normal on download. However, considering residual symptoms, I recommended pressure increase in auto titration settings which was completed on her device.     Plan:     1. Continue auto PAP therapy. Pressure settings increased to 8-15 cm H2O     Ilsa Yusuf will follow up in about 1 year(s).     Fifteen minutes spent with patient, all of which were spent face-to-face counseling, consulting, coordinating plan of care.      Crow Merino MD, MD    CC:  Catrachita Collado,   "

## 2018-08-03 ENCOUNTER — TRANSFERRED RECORDS (OUTPATIENT)
Dept: HEALTH INFORMATION MANAGEMENT | Facility: CLINIC | Age: 58
End: 2018-08-03

## 2018-08-09 ENCOUNTER — HOSPITAL ENCOUNTER (EMERGENCY)
Facility: CLINIC | Age: 58
Discharge: HOME | End: 2018-08-09
Attending: EMERGENCY MEDICINE | Admitting: EMERGENCY MEDICINE
Payer: MEDICARE

## 2018-08-09 VITALS
DIASTOLIC BLOOD PRESSURE: 74 MMHG | RESPIRATION RATE: 16 BRPM | WEIGHT: 200 LBS | HEIGHT: 69 IN | TEMPERATURE: 97.9 F | BODY MASS INDEX: 29.62 KG/M2 | OXYGEN SATURATION: 99 % | HEART RATE: 70 BPM | SYSTOLIC BLOOD PRESSURE: 136 MMHG

## 2018-08-09 DIAGNOSIS — F33.1 MAJOR DEPRESSIVE DISORDER, RECURRENT EPISODE, MODERATE (H): ICD-10-CM

## 2018-08-09 DIAGNOSIS — F51.01 PRIMARY INSOMNIA: ICD-10-CM

## 2018-08-09 DIAGNOSIS — N32.89 BLADDER SPASMS: ICD-10-CM

## 2018-08-09 DIAGNOSIS — K21.9 GASTROESOPHAGEAL REFLUX DISEASE, ESOPHAGITIS PRESENCE NOT SPECIFIED: ICD-10-CM

## 2018-08-09 DIAGNOSIS — M54.12 CERVICAL RADICULOPATHY: ICD-10-CM

## 2018-08-09 DIAGNOSIS — G89.4 CHRONIC PAIN SYNDROME: ICD-10-CM

## 2018-08-09 LAB
ALBUMIN UR-MCNC: NEGATIVE MG/DL
ANION GAP SERPL CALCULATED.3IONS-SCNC: 4 MMOL/L (ref 3–14)
APPEARANCE UR: CLEAR
BASOPHILS # BLD AUTO: 0 10E9/L (ref 0–0.2)
BASOPHILS NFR BLD AUTO: 0.5 %
BILIRUB UR QL STRIP: NEGATIVE
BUN SERPL-MCNC: 23 MG/DL (ref 7–30)
CALCIUM SERPL-MCNC: 9 MG/DL (ref 8.5–10.1)
CHLORIDE SERPL-SCNC: 109 MMOL/L (ref 94–109)
CO2 SERPL-SCNC: 26 MMOL/L (ref 20–32)
COLOR UR AUTO: YELLOW
CREAT SERPL-MCNC: 1.04 MG/DL (ref 0.52–1.04)
DIFFERENTIAL METHOD BLD: NORMAL
EOSINOPHIL # BLD AUTO: 0.2 10E9/L (ref 0–0.7)
EOSINOPHIL NFR BLD AUTO: 1.7 %
ERYTHROCYTE [DISTWIDTH] IN BLOOD BY AUTOMATED COUNT: 14.1 % (ref 10–15)
GFR SERPL CREATININE-BSD FRML MDRD: 54 ML/MIN/1.7M2
GLUCOSE SERPL-MCNC: 91 MG/DL (ref 70–99)
GLUCOSE UR STRIP-MCNC: NEGATIVE MG/DL
HCT VFR BLD AUTO: 44.3 % (ref 35–47)
HGB BLD-MCNC: 14.9 G/DL (ref 11.7–15.7)
HGB UR QL STRIP: NEGATIVE
IMM GRANULOCYTES # BLD: 0 10E9/L (ref 0–0.4)
IMM GRANULOCYTES NFR BLD: 0.2 %
KETONES UR STRIP-MCNC: NEGATIVE MG/DL
LEUKOCYTE ESTERASE UR QL STRIP: NEGATIVE
LYMPHOCYTES # BLD AUTO: 2.5 10E9/L (ref 0.8–5.3)
LYMPHOCYTES NFR BLD AUTO: 28.4 %
MCH RBC QN AUTO: 30.6 PG (ref 26.5–33)
MCHC RBC AUTO-ENTMCNC: 33.6 G/DL (ref 31.5–36.5)
MCV RBC AUTO: 91 FL (ref 78–100)
MONOCYTES # BLD AUTO: 0.5 10E9/L (ref 0–1.3)
MONOCYTES NFR BLD AUTO: 6.2 %
MUCOUS THREADS #/AREA URNS LPF: PRESENT /LPF
NEUTROPHILS # BLD AUTO: 5.4 10E9/L (ref 1.6–8.3)
NEUTROPHILS NFR BLD AUTO: 63 %
NITRATE UR QL: NEGATIVE
NRBC # BLD AUTO: 0 10*3/UL
NRBC BLD AUTO-RTO: 0 /100
PH UR STRIP: 6.5 PH (ref 5–7)
PLATELET # BLD AUTO: 202 10E9/L (ref 150–450)
POTASSIUM SERPL-SCNC: 3.8 MMOL/L (ref 3.4–5.3)
RBC # BLD AUTO: 4.87 10E12/L (ref 3.8–5.2)
RBC #/AREA URNS AUTO: 0 /HPF (ref 0–2)
SODIUM SERPL-SCNC: 140 MMOL/L (ref 133–144)
SOURCE: ABNORMAL
SP GR UR STRIP: 1.02 (ref 1–1.03)
UROBILINOGEN UR STRIP-MCNC: NORMAL MG/DL (ref 0–2)
WBC # BLD AUTO: 8.7 10E9/L (ref 4–11)
WBC #/AREA URNS AUTO: <1 /HPF (ref 0–5)

## 2018-08-09 PROCEDURE — 85025 COMPLETE CBC W/AUTO DIFF WBC: CPT | Performed by: EMERGENCY MEDICINE

## 2018-08-09 PROCEDURE — 25000128 H RX IP 250 OP 636: Performed by: EMERGENCY MEDICINE

## 2018-08-09 PROCEDURE — 81001 URINALYSIS AUTO W/SCOPE: CPT | Performed by: EMERGENCY MEDICINE

## 2018-08-09 PROCEDURE — 80048 BASIC METABOLIC PNL TOTAL CA: CPT | Performed by: EMERGENCY MEDICINE

## 2018-08-09 PROCEDURE — 99283 EMERGENCY DEPT VISIT LOW MDM: CPT | Mod: Z6 | Performed by: EMERGENCY MEDICINE

## 2018-08-09 PROCEDURE — 51798 US URINE CAPACITY MEASURE: CPT | Performed by: EMERGENCY MEDICINE

## 2018-08-09 PROCEDURE — 96360 HYDRATION IV INFUSION INIT: CPT | Performed by: EMERGENCY MEDICINE

## 2018-08-09 PROCEDURE — 87086 URINE CULTURE/COLONY COUNT: CPT | Performed by: EMERGENCY MEDICINE

## 2018-08-09 PROCEDURE — 99283 EMERGENCY DEPT VISIT LOW MDM: CPT | Mod: 25 | Performed by: EMERGENCY MEDICINE

## 2018-08-09 RX ORDER — TROSPIUM CHLORIDE 20 MG/1
20 TABLET, FILM COATED ORAL
Qty: 60 TABLET | Refills: 0 | Status: SHIPPED | OUTPATIENT
Start: 2018-08-09 | End: 2018-11-06

## 2018-08-09 RX ADMIN — SODIUM CHLORIDE 1000 ML: 9 INJECTION, SOLUTION INTRAVENOUS at 12:10

## 2018-08-09 ASSESSMENT — ENCOUNTER SYMPTOMS
ARTHRALGIAS: 0
FEVER: 0
COLOR CHANGE: 0
NECK STIFFNESS: 0
HEADACHES: 0
DIFFICULTY URINATING: 1
CONFUSION: 0
EYE REDNESS: 0
NUMBNESS: 0
BACK PAIN: 1
SHORTNESS OF BREATH: 0
DIARRHEA: 1

## 2018-08-09 NOTE — DISCHARGE INSTRUCTIONS
Please make an appointment to follow up with Urology Clinic (phone: [phone]) as soon as possible.    Return if fever, worsening abdominal pain or unable to pass urine.

## 2018-08-09 NOTE — ED PROVIDER NOTES
History     Chief Complaint   Patient presents with     Urinary Retention     HPI  Ilsa Yusuf is a 58 year old female with a history of urinary retention (5/2018), CKD stage 3, calculus of left kidney, seizures, s/p bladder surgery, ureteral reimplantation, and renal artery surgery (child) who presents to the Emergency Department for the evaluation of urinary retention. Patient states she has not been able to urinate more than a couple drops since 6:00 pm yesterday and feels as if she has a full bladder. Patient states she has had similar issues in the past which she came to the ED for on 5/2. At that time she was found to be retaining 350cc and a ernandez was placed. She then followed up with Urology who were unable to provide an answer for her urinary retention though states the drugs she is taking do have side effects of urinary retention (doxepin, adderall, tizanidine, olanzapine), though none of these are new medications and all of her doses are low. She reports having asymptomatic UTI in the past. Patient also complains of back pain and left leg pain for the past three months with worsening right leg pain. She has a prior history of lumbar and cervical radiculopathy with MRI  showing disc protrusion on the S1 nerve root and also cervical disc disease. She denies numbness of legs or saddle and denies bowel incontinence. She also reports watery stool since 5:00 am.    I have reviewed the Medications, Allergies, Past Medical and Surgical History, and Social History in the [employer] system.  Past Medical History:   Diagnosis Date     Arthritis 2012    both knees; hands     Depressive disorder      Depressive disorder, not elsewhere classified 08/28/12    DC 10/05/12-[facility]     Hyperlipidemia LDL goal < 130     mevacor     Hypothyroid      Moderate major depression (H)     abilify, cymbalta, seroquel, and sofya, Dr Antonio Yoon      PTSD (post-traumatic stress disorder)      Seizure (H)  2011    one episode, was on Keppra, EEG negative       Past Surgical History:   Procedure Laterality Date     ABDOMEN SURGERY       BLADDER SURGERY       C PARTIAL EXCISION THYROID,UNILAT      rt, negative path then, treated with synthroid.     CHOLECYSTECTOMY       COLONOSCOPY       CYSTOSCOPY       HERNIA REPAIR       ORTHOPEDIC SURGERY  left knee     renal artery surgery  child    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     TONSILLECTOMY       ureteral reimplantation         Family History   Problem Relation Age of Onset     C.A.D. Father 58      at 58, heart disease     Mental Illness Father      Coronary Artery Disease Father      Hyperlipidemia Father      Alzheimer Disease Mother      total care now     Depression Mother      Anxiety Disorder Mother      Diabetes Sister      adult onset     Melanoma Sister      Breast Cancer Sister      Thyroid Disease Sister        Social History   Substance Use Topics     Smoking status: Light Tobacco Smoker     Packs/day: 0.30     Last attempt to quit: 2015     Smokeless tobacco: Never Used      Comment: recreational     Alcohol use 0.0 oz/week      Comment: once a week       No current facility-administered medications for this encounter.      Current Outpatient Prescriptions   Medication     trospium (SANCTURA) 20 MG tablet     amphetamine-dextroamphetamine (ADDERALL) 20 MG per tablet     [START ON 2018] amphetamine-dextroamphetamine (ADDERALL) 20 MG per tablet     [START ON 2018] amphetamine-dextroamphetamine (ADDERALL) 20 MG per tablet     cholecalciferol (VITAMIN D3) 5000 UNITS CAPS capsule     doxepin (SINEQUAN) 10 MG capsule     DULoxetine (CYMBALTA) 60 MG EC capsule     FIBER PO     fish oil-omega-3 fatty acids (OMEGA 3) 1000 MG capsule     fluticasone (FLONASE) 50 MCG/ACT spray     levothyroxine (SYNTHROID/LEVOTHROID) 200 MCG tablet     lovastatin (MEVACOR) 20 MG tablet     multivitamin, therapeutic with minerals (MULTI-VITAMIN)  "TABS     OLANZapine (ZYPREXA) 2.5 MG tablet     omeprazole (PRILOSEC) 40 MG capsule     ondansetron (ZOFRAN) 4 MG tablet     oxyCODONE-acetaminophen (PERCOCET) 5-325 MG per tablet     prazosin (MINIPRESS) 5 MG capsule     tiZANidine (ZANAFLEX) 4 MG tablet     topiramate (TOPAMAX) 50 MG tablet        Allergies   Allergen Reactions     Gabapentin Other (See Comments)     Patient states she gets \"horrific nightmares\" with this medication     Dilaudid [Hydromorphone Hcl]      Made her feel like her skin was crawling     Nsaids Other (See Comments)     Kidney failure     Compazine [Prochlorperazine] Other (See Comments)     \"Makes my skin crawl, I was going nuts.\"       Review of Systems   Constitutional: Negative for fever.   HENT: Negative for congestion.    Eyes: Negative for redness.   Respiratory: Negative for shortness of breath.    Cardiovascular: Negative for chest pain.   Gastrointestinal: Positive for diarrhea.   Genitourinary: Positive for difficulty urinating and urgency.   Musculoskeletal: Positive for back pain (radiates bilateral legs). Negative for arthralgias and neck stiffness.   Skin: Negative for color change.   Neurological: Negative for numbness and headaches.   Psychiatric/Behavioral: Negative for confusion.       Physical Exam   BP: 129/74 (Simultaneous filing. User may not have seen previous data.)  Pulse: 77  Temp: 97.9  F (36.6  C)  Resp: 20  Height: 175.3 cm (5' 9\")  Weight: 90.7 kg (200 lb)  SpO2: 96 % (Simultaneous filing. User may not have seen previous data.)      Physical Exam   Constitutional: No distress.   HENT:   Head: Atraumatic.   Mouth/Throat: Oropharynx is clear and moist. No oropharyngeal exudate.   Eyes: Pupils are equal, round, and reactive to light. No scleral icterus.   Cardiovascular: Normal heart sounds and intact distal pulses.    Pulmonary/Chest: Breath sounds normal. No respiratory distress. She has no wheezes. She has no rales.   Abdominal: Soft. Bowel sounds are " normal. She exhibits no distension. There is tenderness in the suprapubic area. There is no CVA tenderness.   Musculoskeletal: She exhibits no edema or tenderness.   Skin: Skin is warm. No rash noted. She is not diaphoretic.       ED Course   10:48 AM  The patient was seen and examined by Dean Perez MD in Room 11.     ED Course     Procedures             Critical Care time:  none             Labs Ordered and Resulted from Time of ED Arrival Up to the Time of Departure from the ED   BASIC METABOLIC PANEL - Abnormal; Notable for the following:        Result Value    GFR Estimate 54 (*)     All other components within normal limits   ROUTINE UA WITH MICROSCOPIC - Abnormal; Notable for the following:     Mucous Urine Present (*)     All other components within normal limits   CBC WITH PLATELETS DIFFERENTIAL   BLADDER SCAN   INDWELLING URINARY CATHETER (PARKER)            Assessments & Plan (with Medical Decision Making)   The patient states that she cannot always tell when she has urinary tract infection but does feel that she has urinary retention.  She is adamant that she did not pass any urine since 6 PM last night.  Given her past history of Parker was placed however only relieved her of 150 cc.  She actually developed worsening bladder spasms with that was placed.  Urinalysis does not show any sign of infection.  Laboratory tests are also at baseline without signs of worsening renal function.  She is a former ICU nurse and has measured her urine output per kilo per hour in the past when she had a Parker.  We had a long discussion about her urine output and she likely had some urine come out during the night when she was attempting to pass it.  I spoke with Rere from neurology who recommended starting Sanctura for the bladder spasms.  The patient was reassured and feels comfortable going home but understands that she should follow-up with urology as soon as possible.  She will return if she is unable to pass  urine but at this point there is no indication for keeping the Moraes in and it was removed prior to discharge.    I have reviewed the nursing notes.    I have reviewed the findings, diagnosis, plan and need for follow up with the patient.    Discharge Medication List as of 8/9/2018  2:02 PM      START taking these medications    Details   trospium (SANCTURA) 20 MG tablet Take 1 tablet (20 mg) by mouth 2 times daily (before meals), Disp-60 tablet, R-0, Local Print             Final diagnoses:   Bladder spasms     I, Tom Villasenor, am serving as a trained medical scribe to document services personally performed by Dean Perez MD, based on the provider's statements to me.   I, Dean Perez MD, was physically present and have reviewed and verified the accuracy of this note documented by Tom Villasenor.    8/9/2018   [facility], [place], EMERGENCY DEPARTMENT     Velasquez Perez MD  08/10/18 [id number]

## 2018-08-09 NOTE — ED AVS SNAPSHOT
[facility], Emergency Department    [address] [address]    Phone:  [phone]                                       Ilsa Yusuf   MRN: [id number]    Department:  [facility], Emergency Department   Date of Visit:  8/9/2018           Patient Information     Date Of Birth          1960        Your diagnoses for this visit were:     Bladder spasms        You were seen by Velasquez Perez MD.        Discharge Instructions       Please make an appointment to follow up with Urology Clinic (phone: [phone]) as soon as possible.    Return if fever, worsening abdominal pain or unable to pass urine.          Your next 10 appointments already scheduled     May 22, 2019 11:30 AM CDT   Return Sleep Patient with Crow Merino MD   [facility] ([facility])    [address] [place] [address] [phone]              24 Hour Appointment Hotline       To make an appointment at any [facility], call [phone] ([phone]). If you don't have a family doctor or clinic, we will help you find one. [facility] clinics are conveniently located to serve the needs of you and your family.             Review of your medicines      START taking        Dose / Directions Last dose taken    trospium 20 MG tablet   Commonly known as:  SANCTURA   Dose:  20 mg   Quantity:  60 tablet        Take 1 tablet (20 mg) by mouth 2 times daily (before meals)   Refills:  0          Our records show that you are taking the medicines listed below. If these are incorrect, please call your family doctor or clinic.        Dose / Directions Last dose taken    * amphetamine-dextroamphetamine 20 MG per tablet   Commonly known as:  ADDERALL   Dose:  20 mg   Quantity:  30 tablet        Take 1 tablet (20 mg) by mouth daily   Refills:  0        * amphetamine-dextroamphetamine 20 MG per tablet   Commonly known as:  ADDERALL   Dose:  20 mg    Quantity:  30 tablet   Start taking on:  8/22/2018        Take 1 tablet (20 mg) by mouth daily   Refills:  0        * amphetamine-dextroamphetamine 20 MG per tablet   Commonly known as:  ADDERALL   Dose:  20 mg   Quantity:  30 tablet   Start taking on:  9/21/2018        Take 1 tablet (20 mg) by mouth daily   Refills:  0        cholecalciferol 5000 units Caps capsule   Commonly known as:  vitamin D3   Dose:  5000 Units   Quantity:  100 capsule        Take 1 capsule (5,000 Units) by mouth daily Take 1 per day   Refills:  2        doxepin 10 MG capsule   Commonly known as:  SINEquan   Quantity:  180 capsule        TAKE 1 TO 2 CAPSULES BY MOUTH EVERY NIGHT AT BEDTIME   Refills:  1        DULoxetine 60 MG EC capsule   Commonly known as:  CYMBALTA   Quantity:  180 capsule        TAKE 2 CAPSULES BY MOUTH EVERY DAY   Refills:  1        FIBER PO        Two capsules in the morning and two capsules at night   Refills:  0        fish oil-omega-3 fatty acids 1000 MG capsule   Dose:  2 g        Take 2 g by mouth daily Takes 1 in am and 1 at bedtime   Refills:  0        fluticasone 50 MCG/ACT spray   Commonly known as:  FLONASE   Quantity:  48 mL        INHALE 1- 2 SPRAYS IN EACH NOSTRIL DAILY.   Refills:  0        levothyroxine 200 MCG tablet   Commonly known as:  SYNTHROID/LEVOTHROID   Dose:  200 mcg   Quantity:  90 tablet        Take 1 tablet (200 mcg) by mouth daily   Refills:  3        lovastatin 20 MG tablet   Commonly known as:  MEVACOR   Dose:  20 mg   Quantity:  90 tablet        Take 1 tablet (20 mg) by mouth At Bedtime   Refills:  3        Multi-vitamin Tabs tablet   Dose:  1 tablet        Take 1 tablet by mouth daily   Refills:  0        OLANZapine 2.5 MG tablet   Commonly known as:  zyPREXA   Quantity:  90 tablet        TAKE 1 TABLET(2.5 MG) BY MOUTH AT BEDTIME   Refills:  3        omeprazole 40 MG capsule   Commonly known as:  priLOSEC   Dose:  40 mg   Quantity:  90 capsule        Take 1 capsule (40 mg) by mouth  daily Take 30-60 minutes before a meal.   Refills:  2        ondansetron 4 MG tablet   Commonly known as:  ZOFRAN   Dose:  4 mg   Quantity:  18 tablet        Take 1 tablet (4 mg) by mouth every 6 hours as needed for nausea   Refills:  5        oxyCODONE-acetaminophen 5-325 MG per tablet   Commonly known as:  PERCOCET   Dose:  1-2 tablet   Quantity:  15 tablet        Take 1-2 tablets by mouth every 6 hours as needed for pain   Refills:  0        prazosin 5 MG capsule   Commonly known as:  MINIPRESS   Quantity:  90 capsule        TAKE 1 CAPSULE(5 MG) BY MOUTH AT BEDTIME   Refills:  2        tiZANidine 4 MG tablet   Commonly known as:  ZANAFLEX   Dose:  4-12 mg   Quantity:  210 tablet        Take 1-3 tablets (4-12 mg) by mouth 3 times daily as needed for muscle spasms   Refills:  3        topiramate 50 MG tablet   Commonly known as:  TOPAMAX   Quantity:  360 tablet        TAKE 1 TO 2 TABLETS(50  MG) BY MOUTH TWICE DAILY   Refills:  0        * Notice:  This list has 3 medication(s) that are the same as other medications prescribed for you. Read the directions carefully, and ask your doctor or other care provider to review them with you.            Prescriptions were sent or printed at these locations (1 Prescription)                   Other Prescriptions                Printed at Department/Unit printer (1 of 1)         trospium (SANCTURA) 20 MG tablet                Procedures and tests performed during your visit     Basic metabolic panel    Bladder scan    CBC with platelets differential    Moraes catheter    UA with Microscopic    Urine Culture      Orders Needing Specimen Collection     None      Pending Results     Date and Time Order Name Status Description    8/9/2018 1057 Urine Culture In process             Pending Culture Results     Date and Time Order Name Status Description    8/9/2018 1057 Urine Culture In process             Pending Results Instructions     If you had any lab results that were not  finalized at the time of your Discharge, you can call the ED Lab Result RN at [phone]. You will be contacted by this team for any positive Lab results or changes in treatment. The nurses are available 7 days a week from 10A to 6:30P.  You can leave a message 24 hours per day and they will return your call.        Thank you for choosing [facility]       Thank you for choosing [facility] for your care. Our goal is always to provide you with excellent care. Hearing back from our patients is one way we can continue to improve our services. Please take a few minutes to complete the written survey that you may receive in the mail after you visit with us. Thank you!        [employer] Information     [employer] gives you secure access to your electronic health record. If you see a primary care provider, you can also send messages to your care team and make appointments. If you have questions, please call your primary care clinic.  If you do not have a primary care provider, please call [phone] and they will assist you.        Care EveryWhere ID     This is your Care EveryWhere ID. This could be used by other organizations to access your [facility] medical records  [id number]        Equal Access to Services     KRISTINA MILLER : Faye Heck, yaritza roth, jane reid. So [facility] [phone].    ATENCIÓN: Si habla español, tiene a castle disposición servicios gratuitos de asistencia lingüística. Alyssaame al [phone].    We comply with applicable federal civil rights laws and [place] laws. We do not discriminate on the basis of race, color, national origin, age, disability, sex, sexual orientation, or gender identity.            After Visit Summary       This is your record. Keep this with you and show to your community pharmacist(s) and doctor(s) at your next visit.

## 2018-08-09 NOTE — TELEPHONE ENCOUNTER
Requested Prescriptions   Pending Prescriptions Disp Refills     DULoxetine (CYMBALTA) 60 MG EC capsule  Last Written Prescription Date:  6/11/2018  Last Fill Quantity: 180 capsule,  # refills: 1   Last Office Visit: 7/23/2018   Future Office Visit:      180 capsule 1     Sig: TAKE 2 CAPSULES BY MOUTH EVERY DAY    There is no refill protocol information for this order        OLANZapine (ZYPREXA) 2.5 MG tablet  Last Written Prescription Date:  11/3/2017  Last Fill Quantity: 90 tablet,  # refills: 3   Last Office Visit: 7/23/2018   Future Office Visit:      90 tablet 3    There is no refill protocol information for this order        omeprazole (PRILOSEC) 40 MG capsule  Last Written Prescription Date:  11/27/2017  Last Fill Quantity: 90 capsule,  # refills: 2   Last Office Visit: 7/23/2018   Future Office Visit:      90 capsule 2     Sig: Take 1 capsule (40 mg) by mouth daily Take 30-60 minutes before a meal.    There is no refill protocol information for this order        prazosin (MINIPRESS) 5 MG capsule  Last Written Prescription Date:  6/11/2018  Last Fill Quantity: 90 capsule,  # refills: 2   Last Office Visit: 7/23/2018   Future Office Visit:      90 capsule 2    There is no refill protocol information for this order        tiZANidine (ZANAFLEX) 4 MG tablet  Last Written Prescription Date:  5/10/2017  Last Fill Quantity: 210 tablet,  # refills: 3   Last Office Visit: 7/23/2018   Future Office Visit:      210 tablet 3     Sig: Take 1-3 tablets (4-12 mg) by mouth 3 times daily as needed for muscle spasms    There is no refill protocol information for this order        topiramate (TOPAMAX) 50 MG tablet  Last Written Prescription Date:  7/19/2018  Last Fill Quantity: 360 tablet,  # refills: 0   Last Office Visit: 7/23/2018   Future Office Visit:      360 tablet 0    There is no refill protocol information for this order        doxepin (SINEQUAN) 10 MG capsule  Last Written Prescription Date:  4/5/2018  Last Fill  Quantity: 180 capsule,  # refills: 1   Last Office Visit: 7/23/2018   Future Office Visit:    180 capsule 1    There is no refill protocol information for this order

## 2018-08-09 NOTE — ED AVS SNAPSHOT
[facility], [place], Emergency Department    [address] [place] [address]    Phone:  [phone]                                       Ilsa Yusuf   MRN: [id number]    Department:  [facility], Emergency Department   Date of Visit:  8/9/2018           After Visit Summary Signature Page     I have received my discharge instructions, and my questions have been answered. I have discussed any challenges I see with this plan with the nurse or doctor.    ..........................................................................................................................................  Patient/Patient Representative Signature      ..........................................................................................................................................  Patient Representative Print Name and Relationship to Patient    ..................................................               ................................................  Date                                            Time    ..........................................................................................................................................  Reviewed by Signature/Title    ...................................................              ..............................................  Date                                                            Time

## 2018-08-09 NOTE — ED TRIAGE NOTES
Pt arrives from home with complaints of urinary retention. Pt's last void was yesterday around 1800. Pt reports having a few drops of urine this morning. Pt is having bladder spasms and abdominal pain. A&O, VSS.

## 2018-08-10 LAB
BACTERIA SPEC CULT: NO GROWTH
Lab: NORMAL
SPECIMEN SOURCE: NORMAL

## 2018-08-14 RX ORDER — PRAZOSIN HYDROCHLORIDE 5 MG/1
CAPSULE ORAL
Qty: 90 CAPSULE | Refills: 2 | OUTPATIENT
Start: 2018-08-14

## 2018-08-14 RX ORDER — DOXEPIN HYDROCHLORIDE 10 MG/1
CAPSULE ORAL
Qty: 180 CAPSULE | Refills: 1 | OUTPATIENT
Start: 2018-08-14

## 2018-08-14 RX ORDER — TOPIRAMATE 50 MG/1
TABLET, FILM COATED ORAL
Qty: 360 TABLET | Refills: 0 | OUTPATIENT
Start: 2018-08-14

## 2018-08-14 RX ORDER — OLANZAPINE 2.5 MG/1
TABLET, FILM COATED ORAL
Qty: 90 TABLET | Refills: 3 | OUTPATIENT
Start: 2018-08-14

## 2018-08-14 RX ORDER — OMEPRAZOLE 40 MG/1
40 CAPSULE, DELAYED RELEASE ORAL DAILY
Qty: 90 CAPSULE | Refills: 2 | OUTPATIENT
Start: 2018-08-14

## 2018-08-14 RX ORDER — DULOXETIN HYDROCHLORIDE 60 MG/1
CAPSULE, DELAYED RELEASE ORAL
Qty: 180 CAPSULE | Refills: 1 | OUTPATIENT
Start: 2018-08-14

## 2018-08-14 NOTE — TELEPHONE ENCOUNTER
See 8/13/18 encounter where patient says she does not need these orders sent yet.  I sent msg to pharmacy.    Omkar SMALLWOOD

## 2018-09-20 ENCOUNTER — MYC MEDICAL ADVICE (OUTPATIENT)
Dept: FAMILY MEDICINE | Facility: CLINIC | Age: 58
End: 2018-09-20

## 2018-09-20 DIAGNOSIS — G89.4 CHRONIC PAIN SYNDROME: ICD-10-CM

## 2018-09-20 DIAGNOSIS — J32.8 OTHER CHRONIC SINUSITIS: ICD-10-CM

## 2018-09-20 RX ORDER — TOPIRAMATE 50 MG/1
TABLET, FILM COATED ORAL
Qty: 360 TABLET | Refills: 1 | Status: SHIPPED | OUTPATIENT
Start: 2018-09-20 | End: 2019-01-09

## 2018-09-20 RX ORDER — AZELASTINE HYDROCHLORIDE 137 UG/1
1-2 SPRAY, METERED NASAL DAILY
Qty: 30 ML | Refills: 5 | Status: SHIPPED | OUTPATIENT
Start: 2018-09-20 | End: 2018-11-06

## 2018-10-15 DIAGNOSIS — F33.1 MAJOR DEPRESSIVE DISORDER, RECURRENT EPISODE, MODERATE (H): ICD-10-CM

## 2018-10-15 NOTE — TELEPHONE ENCOUNTER
Requested Prescriptions   Pending Prescriptions Disp Refills     OLANZapine (ZYPREXA) 2.5 MG tablet  Last Written Prescription Date:  11-3-17  Last Fill Quantity: 90 tab,  # refills: 3   Last office visit: 7/23/2018 with prescribing provider:  Catrachita Collado    Future Office Visit:    90 tablet 3    Antipsychotic Medications Passed    10/15/2018  4:46 PM       Passed - Blood pressure under 140/90 in past 12 months    BP Readings from Last 3 Encounters:   08/09/18 136/74   07/27/18 130/84   07/23/18 108/69          Passed - Patient is 12 years of age or older       Passed - Lipid panel on file within the past 12 months    Recent Labs   Lab Test  03/15/18   1334   09/30/15   1124   CHOL  215*   < >  162   TRIG  109   < >  119   HDL  46*   < >  48*   LDL  147*   < >  90   NHDL  169*   < >   --    VLDL   --    --   24   CHOLHDLRATIO   --    --   3.4    < > = values in this interval not displayed.          Passed - CBC on file in past 12 months    Recent Labs   Lab Test  08/09/18   1133   WBC  8.7   RBC  4.87   HGB  14.9   HCT  44.3   PLT  202     For [facility] ONLY: NVMY219 = WBC, IYDY616 = RBC         Passed - Heart Rate on file within past 12 months    Pulse Readings from Last 3 Encounters:   08/09/18 70   07/27/18 72   07/23/18 74          Passed - A1c or Glucose on file in past 12 months    Recent Labs   Lab Test  08/09/18   1133   GLC  91     Please review patients last 3 weights. If a weight gain of >10 lbs exists, you may refill the prescription once after instructing the patient to schedule an appointment within the next 30 days.    Wt Readings from Last 3 Encounters:   08/09/18 200 lb (90.7 kg)   07/27/18 218 lb (98.9 kg)   07/23/18 215 lb (97.5 kg)          Passed - Patient is not pregnant       Passed - No positve pregnancy test on file in past 12 months       Passed - Recent (6 mo) or future (30 days) visit within the authorizing provider's specialty    Patient had office visit in the last 6 months or has a  "visit in the next 30 days with authorizing provider or within the authorizing provider's specialty.  See \"Patient Info\" tab in inbasket, or \"Choose Columns\" in Meds & Orders section of the refill encounter.              "

## 2018-10-18 RX ORDER — OLANZAPINE 2.5 MG/1
TABLET, FILM COATED ORAL
Qty: 90 TABLET | Refills: 1 | Status: SHIPPED | OUTPATIENT
Start: 2018-10-18 | End: 2019-04-15

## 2018-10-18 NOTE — TELEPHONE ENCOUNTER
Prescription approved per [facility] Refill Protocol.    Signed Prescriptions:                        Disp   Refills    OLANZapine (ZYPREXA) 2.5 MG tablet         90 tab*1        Sig: TAKE 1 TABLET(2.5 MG) BY MOUTH AT BEDTIME  Authorizing Provider: CHA ROBERTSON  Ordering User: ROLANDA POLK      Closing encounter - no further actions needed at this time    Rolanda Plok RN

## 2018-10-20 DIAGNOSIS — F33.1 MAJOR DEPRESSIVE DISORDER, RECURRENT EPISODE, MODERATE (H): ICD-10-CM

## 2018-10-20 DIAGNOSIS — K21.9 GASTROESOPHAGEAL REFLUX DISEASE, ESOPHAGITIS PRESENCE NOT SPECIFIED: ICD-10-CM

## 2018-10-23 RX ORDER — OLANZAPINE 2.5 MG/1
TABLET, FILM COATED ORAL
Qty: 90 TABLET | Refills: 0 | OUTPATIENT
Start: 2018-10-23

## 2018-10-23 RX ORDER — OMEPRAZOLE 40 MG/1
CAPSULE, DELAYED RELEASE ORAL
Qty: 90 CAPSULE | Refills: 1 | Status: SHIPPED | OUTPATIENT
Start: 2018-10-23 | End: 2018-11-06

## 2018-10-23 NOTE — TELEPHONE ENCOUNTER
"Requested Prescriptions   Pending Prescriptions Disp Refills     omeprazole (PRILOSEC) 40 MG capsule [Pharmacy Med Name: OMEPRAZOLE 40MG CAPSULES]  Last Written Prescription Date:  11-27-17  Last Fill Quantity: 90 CAP,  # refills: 2   Last office visit: 7/23/2018 with prescribing provider:  Catrachita Collado Office Visit:    90 capsule 0     Sig: TAKE 1 CAPSULE(40 MG) BY MOUTH DAILY 30 TO 60 MINUTES BEFORE A MEAL    PPI Protocol Passed    10/20/2018  1:14 PM       Passed - Not on Clopidogrel (unless Pantoprazole ordered)       Passed - No diagnosis of osteoporosis on record       Passed - Recent (12 mo) or future (30 days) visit within the authorizing provider's specialty    Patient had office visit in the last 12 months or has a visit in the next 30 days with authorizing provider or within the authorizing provider's specialty.  See \"Patient Info\" tab in inbasket, or \"Choose Columns\" in Meds & Orders section of the refill encounter.         Passed - Patient is age 18 or older       Passed - No active pregnacy on record       Passed - No positive pregnancy test in past 12 months     ____________________________________         OLANZapine (ZYPREXA) 2.5 MG tablet [Pharmacy Med Name: OLANZAPINE 2.5MG TABLETS]  This maybe a DUPLICATE.   Last Written Prescription Date:  10-18-18  Last Fill Quantity: 90 TAB,  # refills: 1   Last office visit: 7/23/2018 with prescribing provider:  Catrachita Collado Office Visit:    90 tablet 0     Sig: TAKE 1 TABLET(2.5 MG) BY MOUTH AT BEDTIME    Antipsychotic Medications Passed    10/20/2018  1:14 PM       Passed - Blood pressure under 140/90 in past 12 months    BP Readings from Last 3 Encounters:   08/09/18 136/74   07/27/18 130/84   07/23/18 108/69          Passed - Patient is 12 years of age or older       Passed - Lipid panel on file within the past 12 months    Recent Labs   Lab Test  03/15/18   1334   09/30/15   1124   CHOL  215*   < >  162   TRIG  109   < >  " "119   HDL  46*   < >  48*   LDL  147*   < >  90   NHDL  169*   < >   --    VLDL   --    --   24   CHOLHDLRATIO   --    --   3.4    < > = values in this interval not displayed.          Passed - CBC on file in past 12 months    Recent Labs   Lab Test  08/09/18   1133   WBC  8.7   RBC  4.87   HGB  14.9   HCT  44.3   PLT  202          Passed - Heart Rate on file within past 12 months    Pulse Readings from Last 3 Encounters:   08/09/18 70   07/27/18 72   07/23/18 74          Passed - A1c or Glucose on file in past 12 months    Recent Labs   Lab Test  08/09/18   1133   GLC  91     Please review patients last 3 weights. If a weight gain of >10 lbs exists, you may refill the prescription once after instructing the patient to schedule an appointment within the next 30 days.    Wt Readings from Last 3 Encounters:   08/09/18 200 lb (90.7 kg)   07/27/18 218 lb (98.9 kg)   07/23/18 215 lb (97.5 kg)          Passed - Patient is not pregnant       Passed - No positve pregnancy test on file in past 12 months       Passed - Recent (6 mo) or future (30 days) visit within the authorizing provider's specialty    Patient had office visit in the last 6 months or has a visit in the next 30 days with authorizing provider or within the authorizing provider's specialty.  See \"Patient Info\" tab in inbasket, or \"Choose Columns\" in Meds & Orders section of the refill encounter.              "

## 2018-11-06 ENCOUNTER — OFFICE VISIT (OUTPATIENT)
Dept: FAMILY MEDICINE | Facility: CLINIC | Age: 58
End: 2018-11-06
Payer: COMMERCIAL

## 2018-11-06 VITALS
SYSTOLIC BLOOD PRESSURE: 97 MMHG | OXYGEN SATURATION: 97 % | HEART RATE: 74 BPM | DIASTOLIC BLOOD PRESSURE: 59 MMHG | BODY MASS INDEX: 32.93 KG/M2 | WEIGHT: 223 LBS | RESPIRATION RATE: 20 BRPM

## 2018-11-06 DIAGNOSIS — E78.5 HYPERLIPIDEMIA LDL GOAL <130: ICD-10-CM

## 2018-11-06 DIAGNOSIS — M54.12 CERVICAL RADICULOPATHY: ICD-10-CM

## 2018-11-06 DIAGNOSIS — K21.9 GASTROESOPHAGEAL REFLUX DISEASE, ESOPHAGITIS PRESENCE NOT SPECIFIED: ICD-10-CM

## 2018-11-06 DIAGNOSIS — Z23 NEED FOR PROPHYLACTIC VACCINATION AND INOCULATION AGAINST INFLUENZA: ICD-10-CM

## 2018-11-06 DIAGNOSIS — G47.00 INSOMNIA, UNSPECIFIED TYPE: ICD-10-CM

## 2018-11-06 DIAGNOSIS — Z51.81 ENCOUNTER FOR THERAPEUTIC DRUG MONITORING: ICD-10-CM

## 2018-11-06 DIAGNOSIS — J32.8 OTHER CHRONIC SINUSITIS: ICD-10-CM

## 2018-11-06 DIAGNOSIS — G47.00 INSOMNIA, UNSPECIFIED TYPE: Primary | ICD-10-CM

## 2018-11-06 DIAGNOSIS — N18.30 CKD (CHRONIC KIDNEY DISEASE) STAGE 3, GFR 30-59 ML/MIN (H): ICD-10-CM

## 2018-11-06 DIAGNOSIS — F90.1 ATTENTION-DEFICIT HYPERACTIVITY DISORDER, PREDOMINANTLY HYPERACTIVE TYPE: ICD-10-CM

## 2018-11-06 DIAGNOSIS — E03.9 ACQUIRED HYPOTHYROIDISM: ICD-10-CM

## 2018-11-06 PROCEDURE — 80053 COMPREHEN METABOLIC PANEL: CPT | Performed by: FAMILY MEDICINE

## 2018-11-06 PROCEDURE — 84443 ASSAY THYROID STIM HORMONE: CPT | Performed by: FAMILY MEDICINE

## 2018-11-06 PROCEDURE — G0008 ADMIN INFLUENZA VIRUS VAC: HCPCS | Performed by: FAMILY MEDICINE

## 2018-11-06 PROCEDURE — 36415 COLL VENOUS BLD VENIPUNCTURE: CPT | Performed by: FAMILY MEDICINE

## 2018-11-06 PROCEDURE — 99214 OFFICE O/P EST MOD 30 MIN: CPT | Mod: 25 | Performed by: FAMILY MEDICINE

## 2018-11-06 PROCEDURE — 90682 RIV4 VACC RECOMBINANT DNA IM: CPT | Performed by: FAMILY MEDICINE

## 2018-11-06 RX ORDER — DEXTROAMPHETAMINE SACCHARATE, AMPHETAMINE ASPARTATE, DEXTROAMPHETAMINE SULFATE AND AMPHETAMINE SULFATE 5; 5; 5; 5 MG/1; MG/1; MG/1; MG/1
20 TABLET ORAL DAILY
Qty: 30 TABLET | Refills: 0 | Status: SHIPPED | OUTPATIENT
Start: 2018-11-06 | End: 2019-03-19

## 2018-11-06 RX ORDER — DEXTROAMPHETAMINE SACCHARATE, AMPHETAMINE ASPARTATE, DEXTROAMPHETAMINE SULFATE AND AMPHETAMINE SULFATE 5; 5; 5; 5 MG/1; MG/1; MG/1; MG/1
20 TABLET ORAL DAILY
Qty: 30 TABLET | Refills: 0 | Status: SHIPPED | OUTPATIENT
Start: 2018-11-06 | End: 2018-11-06

## 2018-11-06 RX ORDER — DEXTROAMPHETAMINE SACCHARATE, AMPHETAMINE ASPARTATE, DEXTROAMPHETAMINE SULFATE AND AMPHETAMINE SULFATE 5; 5; 5; 5 MG/1; MG/1; MG/1; MG/1
20 TABLET ORAL DAILY
Qty: 30 TABLET | Refills: 0 | Status: SHIPPED | OUTPATIENT
Start: 2019-01-05 | End: 2019-03-19

## 2018-11-06 RX ORDER — DEXTROAMPHETAMINE SACCHARATE, AMPHETAMINE ASPARTATE, DEXTROAMPHETAMINE SULFATE AND AMPHETAMINE SULFATE 5; 5; 5; 5 MG/1; MG/1; MG/1; MG/1
20 TABLET ORAL DAILY
Qty: 30 TABLET | Refills: 0 | Status: SHIPPED | OUTPATIENT
Start: 2018-12-06 | End: 2019-03-19

## 2018-11-06 RX ORDER — AZELASTINE HYDROCHLORIDE 137 UG/1
1-2 SPRAY, METERED NASAL DAILY
Qty: 30 ML | Refills: 5 | Status: SHIPPED | OUTPATIENT
Start: 2018-11-06 | End: 2019-03-19

## 2018-11-06 RX ORDER — TRAZODONE HYDROCHLORIDE 50 MG/1
50-150 TABLET, FILM COATED ORAL
Qty: 90 TABLET | Refills: 1 | Status: SHIPPED | OUTPATIENT
Start: 2018-11-06 | End: 2019-02-16

## 2018-11-06 ASSESSMENT — ANXIETY QUESTIONNAIRES
IF YOU CHECKED OFF ANY PROBLEMS ON THIS QUESTIONNAIRE, HOW DIFFICULT HAVE THESE PROBLEMS MADE IT FOR YOU TO DO YOUR WORK, TAKE CARE OF THINGS AT HOME, OR GET ALONG WITH OTHER PEOPLE: VERY DIFFICULT
GAD7 TOTAL SCORE: 8
6. BECOMING EASILY ANNOYED OR IRRITABLE: NOT AT ALL
2. NOT BEING ABLE TO STOP OR CONTROL WORRYING: MORE THAN HALF THE DAYS
5. BEING SO RESTLESS THAT IT IS HARD TO SIT STILL: NOT AT ALL
3. WORRYING TOO MUCH ABOUT DIFFERENT THINGS: MORE THAN HALF THE DAYS
1. FEELING NERVOUS, ANXIOUS, OR ON EDGE: MORE THAN HALF THE DAYS
7. FEELING AFRAID AS IF SOMETHING AWFUL MIGHT HAPPEN: SEVERAL DAYS

## 2018-11-06 ASSESSMENT — PATIENT HEALTH QUESTIONNAIRE - PHQ9
5. POOR APPETITE OR OVEREATING: SEVERAL DAYS
SUM OF ALL RESPONSES TO PHQ QUESTIONS 1-9: 7

## 2018-11-06 NOTE — PROGRESS NOTES
"  SUBJECTIVE:   Ilsa Yusuf is a 58 year old female who presents to clinic today for the following health issues:      Medication Followup of adderall 20 mg tablet     Taking Medication as prescribed: yes    Side Effects:  None    Medication Helping Symptoms:  Yes      Would like to discuss possible side effects from omeprazole.      1. Insomnia: trouble falling asleep.  Reports good sleep hygeine.  Uses Guided imagery, warm bath, reading, no screens before bed, stops caffeine at noon.  She has been on doxepin, but she thinks it has caused her urinary retention, and it is not really helping her sleep anymore.  She says she did try her friend's trazodone (after calling her pharmacist and having them run an interaction check), and it worked very well for her.  She asks if she can switch to this. She does have ABDOUL and recently required adjustment of her CPAP, increasing the pressure and switching to a mask.  She follows with sleep medicine.      2.  CKD3: the patient is concerned about what she has read about chronic PPI use.  She is worried because she feels certain she has interstitial nephritis.  She asks if she should stop her omeprazole, but at the same time, she is scared of having recurrent GERD symptoms which were severe for her in the past.     3.  GERD: in 2010, she had \"barbed wire pain\" as her heartburn and she was started on omeprazole, which she continues on.  She has no current symptoms.  She states she has not had EGD.  Denies dysphagia, abd pain or melena.     4 . ADHD: stable on her dose of adderall, denies s/e other than the insomnia, which she feels is unrelated.            Problem list and histories reviewed & adjusted, as indicated.  Additional history: as documented    Patient Active Problem List   Diagnosis     Major depressive disorder, recurrent episode, moderate (H)     PTSD (post-traumatic stress disorder)     Acquired hypothyroidism     Hyperlipidemia LDL goal <130     CKD " (chronic kidney disease) stage 3, GFR 30-59 ml/min (H)     Esophageal reflux     Primary insomnia     Obesity     Attention-deficit hyperactivity disorder, predominantly hyperactive type     Neck pain     Cervical radiculopathy     Primary osteoarthritis of right knee     Peripheral tear of medial meniscus of right knee, unspecified whether old or current tear, initial encounter     Calculus of left kidney     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     CHOLECYSTECTOMY       COLONOSCOPY       CYSTOSCOPY       HERNIA REPAIR       ORTHOPEDIC SURGERY  left knee     renal artery surgery  child    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     TONSILLECTOMY       ureteral reimplantation         Social History   Substance Use Topics     Smoking status: Light Tobacco Smoker     Packs/day: 0.30     Last attempt to quit: 2015     Smokeless tobacco: Never Used      Comment: recreational     Alcohol use 0.0 oz/week      Comment: once a week     Family History   Problem Relation Age of Onset     C.A.D. Father 58      at 58, heart disease     Mental Illness Father      Coronary Artery Disease Father      Hyperlipidemia Father      Alzheimer Disease Mother      total care now     Depression Mother      Anxiety Disorder Mother      Diabetes Sister      adult onset     Melanoma Sister      Breast Cancer Sister      Thyroid Disease Sister          Current Outpatient Prescriptions   Medication Sig Dispense Refill     [START ON 2019] amphetamine-dextroamphetamine (ADDERALL) 20 MG per tablet Take 1 tablet (20 mg) by mouth daily 30 tablet 0     [START ON 2018] amphetamine-dextroamphetamine (ADDERALL) 20 MG per tablet Take 1 tablet (20 mg) by mouth daily 30 tablet 0     amphetamine-dextroamphetamine (ADDERALL) 20 MG per tablet Take 1 tablet  (20 mg) by mouth daily 30 tablet 0     Azelastine HCl 137 MCG/SPRAY SOLN Spray 1-2 sprays in nostril daily 30 mL 5     cholecalciferol (VITAMIN D3) 5000 UNITS CAPS capsule Take 1 capsule (5,000 Units) by mouth daily Take 1 per day 100 capsule 2     doxepin (SINEQUAN) 10 MG capsule TAKE 1 TO 2 CAPSULES BY MOUTH EVERY NIGHT AT BEDTIME 180 capsule 1     DULoxetine (CYMBALTA) 60 MG EC capsule TAKE 2 CAPSULES BY MOUTH EVERY  capsule 1     FIBER PO Two capsules in the morning and two capsules at night       fish oil-omega-3 fatty acids (OMEGA 3) 1000 MG capsule Take 2 g by mouth daily Takes 1 in am and 1 at bedtime       fluticasone (FLONASE) 50 MCG/ACT spray INHALE 1- 2 SPRAYS IN EACH NOSTRIL DAILY. 48 mL 0     levothyroxine (SYNTHROID/LEVOTHROID) 200 MCG tablet Take 1 tablet (200 mcg) by mouth daily 90 tablet 3     lovastatin (MEVACOR) 20 MG tablet Take 1 tablet (20 mg) by mouth At Bedtime 90 tablet 3     multivitamin, therapeutic with minerals (MULTI-VITAMIN) TABS Take 1 tablet by mouth daily       OLANZapine (ZYPREXA) 2.5 MG tablet TAKE 1 TABLET(2.5 MG) BY MOUTH AT BEDTIME 90 tablet 1     omeprazole (PRILOSEC) 20 MG CR capsule Take 1 capsule (20 mg) by mouth daily 30 capsule 0     ondansetron (ZOFRAN) 4 MG tablet Take 1 tablet (4 mg) by mouth every 6 hours as needed for nausea 18 tablet 5     oxyCODONE-acetaminophen (PERCOCET) 5-325 MG per tablet Take 1-2 tablets by mouth every 6 hours as needed for pain 15 tablet 0     prazosin (MINIPRESS) 5 MG capsule TAKE 1 CAPSULE(5 MG) BY MOUTH AT BEDTIME 90 capsule 2     tiZANidine (ZANAFLEX) 4 MG tablet Take 1-3 tablets (4-12 mg) by mouth 3 times daily as needed for muscle spasms 210 tablet 3     topiramate (TOPAMAX) 50 MG tablet TAKE 1 TO 2 TABLETS(50  MG) BY MOUTH TWICE DAILY 360 tablet 1     traZODone (DESYREL) 50 MG tablet Take 1-3 tablets ( mg) by mouth nightly as needed for sleep 90 tablet 1     Allergies   Allergen Reactions     Gabapentin Other (See  "Comments)     Patient states she gets \"horrific nightmares\" with this medication     Dilaudid [Hydromorphone Hcl]      Made her feel like her skin was crawling     Nsaids Other (See Comments)     Kidney failure     Compazine [Prochlorperazine] Other (See Comments)     \"Makes my skin crawl, I was going nuts.\"       Reviewed and updated as needed this visit by clinical staff       Reviewed and updated as needed this visit by Provider         ROS:  Constitutional, HEENT, cardiovascular, pulmonary, gi and gu systems are negative, except as otherwise noted.    OBJECTIVE:     BP 97/59  Pulse 74  Resp 20  Wt 223 lb (101.2 kg)  SpO2 97%  BMI 32.93 kg/m2  Body mass index is 32.93 kg/(m^2).  GENERAL APPEARANCE: healthy, alert and no distress  EYES: Eyes grossly normal to inspection, PERRL and conjunctivae and sclerae normal  NECK: no adenopathy, no asymmetry, masses, or scars and thyroid normal to palpation  RESP: lungs clear to auscultation - no rales, rhonchi or wheezes  CV: regular rates and rhythm, normal S1 S2, no S3 or S4 and no murmur, click or rub  PSYCH: mentation appears normal and affect a little blunt; there is some marked psychomotor slowing and delay in her responses. She appears drowsy.        ASSESSMENT/PLAN:             1. Insomnia, unspecified type  Stop doxepin, start trazodone.  - traZODone (DESYREL) 50 MG tablet; Take 1-3 tablets ( mg) by mouth nightly as needed for sleep  Dispense: 90 tablet; Refill: 1    2. Attention-deficit hyperactivity disorder, predominantly hyperactive type  F/u 3 months  - amphetamine-dextroamphetamine (ADDERALL) 20 MG per tablet; Take 1 tablet (20 mg) by mouth daily  Dispense: 30 tablet; Refill: 0  - amphetamine-dextroamphetamine (ADDERALL) 20 MG per tablet; Take 1 tablet (20 mg) by mouth daily  Dispense: 30 tablet; Refill: 0  - amphetamine-dextroamphetamine (ADDERALL) 20 MG per tablet; Take 1 tablet (20 mg) by mouth daily  Dispense: 30 tablet; Refill: 0    3. CKD " (chronic kidney disease) stage 3, GFR 30-59 ml/min (H)  H/o CKD 3, recheck. Discussed when renal fxn is stable, this is not interstitial nephritis.    4. Gastroesophageal reflux disease, esophagitis presence not specified  Discussed tapering, see AVS  - omeprazole (PRILOSEC) 20 MG CR capsule; Take 1 capsule (20 mg) by mouth daily  Dispense: 30 capsule; Refill: 0    5. Need for prophylactic vaccination and inoculation against influenza    - FLU VACCINE, (RIV4) RECOMBINANT HA  , IM (FluBlok, egg free) [89754]- >18 YRS ([facility]G recommended  50-64 YRS)  - Vaccine Administration, Initial [83699]    6. Encounter for therapeutic drug monitoring    - TSH with free T4 reflex  - Comprehensive metabolic panel    7. Acquired hypothyroidism  - TSH with free T4 reflex    8. Cervical radiculopathy  She continues with her pain management clinic and takes percocet regularly.      9. Hyperlipidemia LDL goal <130      10. Other chronic sinusitis  Med refill  - Azelastine HCl 137 MCG/SPRAY SOLN; Spray 1-2 sprays in nostril daily  Dispense: 30 mL; Refill: 5        Catrachita Collado MD  [facility]      Injectable Influenza Immunization Documentation    1.  Is the person to be vaccinated sick today?   No    2. Does the person to be vaccinated have an allergy to a component   of the vaccine?   No  Egg Allergy Algorithm Link    3. Has the person to be vaccinated ever had a serious reaction   to influenza vaccine in the past?   No    4. Has the person to be vaccinated ever had Guillain-Barré syndrome?   No    Form completed by   Jeffery Westfall MA

## 2018-11-06 NOTE — MR AVS SNAPSHOT
After Visit Summary   11/6/2018    Ilsa Yusuf    MRN: [id number]           Patient Information     Date Of Birth          1960        Visit Information        Provider Department      11/6/2018 5:40 PM Catrachita Collado MD [facility]        Today's Diagnoses     Need for prophylactic vaccination and inoculation against influenza    -  1    Other chronic sinusitis        Attention-deficit hyperactivity disorder, predominantly hyperactive type        Encounter for therapeutic drug monitoring        Insomnia, unspecified type        Acquired hypothyroidism        Cervical radiculopathy        Gastroesophageal reflux disease, esophagitis presence not specified          Care Instructions    Decrease omeprazole to 20mg daily for one month, plan is for 10mg the month after that, but let me know how your heart burn is doing.  You may take zantac 1-2 tabs daily as needed for heartburn.       Stop doxepin.  Start trazodone 1-3 tabs at bedtime.  Let me know which dose best for you.      We will recheck your kidney function and thyroid today.       Please schedule a physical and a mammogram.          Follow-ups after your visit        Your next 10 appointments already scheduled     May 22, 2019 11:30 AM CDT   Return Sleep Patient with Crow Merino MD   [facility] ([facility])    [address] [place] [id number] [phone]              Who to contact     If you have questions or need follow up information about today's clinic visit or your schedule please contact [facility] directly at [phone].  Normal or non-critical lab and imaging results will be communicated to you by MyChart, letter or phone within 4 business days after the clinic has received the results. If you do not hear from us within 7 days, please contact the clinic through MyChart or phone. If you have a critical or abnormal  lab result, we will notify you by phone as soon as possible.  Submit refill requests through [facility] or call your pharmacy and they will forward the refill request to us. Please allow 3 business days for your refill to be completed.          Additional Information About Your Visit        [employer] Information     [facility] gives you secure access to your electronic health record. If you see a primary care provider, you can also send messages to your care team and make appointments. If you have questions, please call your primary care clinic.  If you do not have a primary care provider, please call [phone] and they will assist you.        Care EveryWhere ID     This is your Care EveryWhere ID. This could be used by other organizations to access your [facility] medical records  [id number]        Your Vitals Were     Pulse Respirations Pulse Oximetry BMI (Body Mass Index)          74 20 97% 32.93 kg/m2         Blood Pressure from Last 3 Encounters:   11/06/18 97/59   08/09/18 136/74   07/27/18 130/84    Weight from Last 3 Encounters:   11/06/18 223 lb (101.2 kg)   08/09/18 200 lb (90.7 kg)   07/27/18 218 lb (98.9 kg)              We Performed the Following     Comprehensive metabolic panel     FLU VACCINE, (RIV4) RECOMBINANT HA  , IM (FluBlok, egg free) [76381]- >18 YRS ([facility]G recommended  50-64 YRS)     TSH with free T4 reflex     Vaccine Administration, Initial [21470]          Today's Medication Changes          These changes are accurate as of 11/6/18  6:50 PM.  If you have any questions, ask your nurse or doctor.               Start taking these medicines.        Dose/Directions    * amphetamine-dextroamphetamine 20 MG per tablet   Commonly known as:  ADDERALL   Used for:  Attention-deficit hyperactivity disorder, predominantly hyperactive type   Started by:  Catrachita Collado MD        Dose:  20 mg   Take 1 tablet (20 mg) by mouth daily   Quantity:  30 tablet   Refills:  0       * amphetamine-dextroamphetamine  20 MG per tablet   Commonly known as:  ADDERALL   Used for:  Attention-deficit hyperactivity disorder, predominantly hyperactive type   Started by:  Catrachita Collado MD        Dose:  20 mg   Start taking on:  12/6/2018   Take 1 tablet (20 mg) by mouth daily   Quantity:  30 tablet   Refills:  0       * amphetamine-dextroamphetamine 20 MG per tablet   Commonly known as:  ADDERALL   Used for:  Attention-deficit hyperactivity disorder, predominantly hyperactive type   Started by:  Catrachita Collado MD        Dose:  20 mg   Start taking on:  1/5/2019   Take 1 tablet (20 mg) by mouth daily   Quantity:  30 tablet   Refills:  0       traZODone 50 MG tablet   Commonly known as:  DESYREL   Used for:  Insomnia, unspecified type   Started by:  Catrachita Collado MD        Dose:   mg   Take 1-3 tablets ( mg) by mouth nightly as needed for sleep   Quantity:  90 tablet   Refills:  1       * Notice:  This list has 3 medication(s) that are the same as other medications prescribed for you. Read the directions carefully, and ask your doctor or other care provider to review them with you.      These medicines have changed or have updated prescriptions.        Dose/Directions    omeprazole 20 MG CR capsule   Commonly known as:  priLOSEC   This may have changed:    - medication strength  - See the new instructions.   Used for:  Gastroesophageal reflux disease, esophagitis presence not specified   Changed by:  Catrachita Collado MD        Dose:  20 mg   Take 1 capsule (20 mg) by mouth daily   Quantity:  30 capsule   Refills:  0         Stop taking these medicines if you haven't already. Please contact your care team if you have questions.     trospium 20 MG tablet   Commonly known as:  SANCTURA   Stopped by:  Catrachita Collado MD                Where to get your medicines      These medications were sent to [id number] - [place], [place] - [address] [facility] [address]  [place], [place] [address]    Hours:  24-hours Phone:  [phone]     Azelastine HCl 137 MCG/SPRAY Soln    omeprazole 20 MG CR capsule    traZODone 50 MG tablet         Some of these will need a paper prescription and others can be bought over the counter.  Ask your nurse if you have questions.     Bring a paper prescription for each of these medications     amphetamine-dextroamphetamine 20 MG per tablet    amphetamine-dextroamphetamine 20 MG per tablet    amphetamine-dextroamphetamine 20 MG per tablet                Primary Care Provider Office Phone # Fax #    Catrachita Collado [phone] [address]  [place] [address]        Equal Access to Services     KRISTINA MILLER : Hadii vladislav byrnes hadasho Somartha, waaxda luqadaha, qaybta kaalmada adeegyada, jane bonilla . So [facility] [phone].    ATENCIÓN: Si habla español, tiene a castle disposición servicios gratuitos de asistencia lingüística. ame al [phone].    We comply with applicable federal civil rights laws and [place] laws. We do not discriminate on the basis of race, color, national origin, age, disability, sex, sexual orientation, or gender identity.            Thank you!     Thank you for choosing [facility]  for your care. Our goal is always to provide you with excellent care. Hearing back from our patients is one way we can continue to improve our services. Please take a few minutes to complete the written survey that you may receive in the mail after your visit with us. Thank you!             Your Updated Medication List - Protect others around you: Learn how to safely use, store and throw away your medicines at www.disposemymeds.org.          This list is accurate as of 11/6/18  6:50 PM.  Always use your most recent med list.                   Brand Name Dispense Instructions for use Diagnosis    * amphetamine-dextroamphetamine 20 MG per tablet    ADDERALL    30 tablet     Take 1 tablet (20 mg) by mouth daily    Attention-deficit hyperactivity disorder, predominantly hyperactive type       * amphetamine-dextroamphetamine 20 MG per tablet   Start taking on:  12/6/2018    ADDERALL    30 tablet    Take 1 tablet (20 mg) by mouth daily    Attention-deficit hyperactivity disorder, predominantly hyperactive type       * amphetamine-dextroamphetamine 20 MG per tablet   Start taking on:  1/5/2019    ADDERALL    30 tablet    Take 1 tablet (20 mg) by mouth daily    Attention-deficit hyperactivity disorder, predominantly hyperactive type       Azelastine HCl 137 MCG/SPRAY Soln     30 mL    Spray 1-2 sprays in nostril daily    Other chronic sinusitis       cholecalciferol 5000 units Caps capsule    vitamin D3    100 capsule    Take 1 capsule (5,000 Units) by mouth daily Take 1 per day    Major depressive disorder, recurrent episode, moderate (H)       doxepin 10 MG capsule    SINEquan    180 capsule    TAKE 1 TO 2 CAPSULES BY MOUTH EVERY NIGHT AT BEDTIME    Major depressive disorder, recurrent episode, moderate (H)       DULoxetine 60 MG EC capsule    CYMBALTA    180 capsule    TAKE 2 CAPSULES BY MOUTH EVERY DAY    Major depressive disorder, recurrent episode, moderate (H)       FIBER PO      Two capsules in the morning and two capsules at night        fish oil-omega-3 fatty acids 1000 MG capsule      Take 2 g by mouth daily Takes 1 in am and 1 at bedtime        fluticasone 50 MCG/ACT spray    FLONASE    48 mL    INHALE 1- 2 SPRAYS IN EACH NOSTRIL DAILY.    Other chronic sinusitis       levothyroxine 200 MCG tablet    SYNTHROID/LEVOTHROID    90 tablet    Take 1 tablet (200 mcg) by mouth daily    Acquired hypothyroidism       lovastatin 20 MG tablet    MEVACOR    90 tablet    Take 1 tablet (20 mg) by mouth At Bedtime    Hyperlipidemia LDL goal <130       Multi-vitamin Tabs tablet      Take 1 tablet by mouth daily        OLANZapine 2.5 MG tablet    zyPREXA    90 tablet    TAKE 1 TABLET(2.5 MG) BY  MOUTH AT BEDTIME    Major depressive disorder, recurrent episode, moderate (H)       omeprazole 20 MG CR capsule    priLOSEC    30 capsule    Take 1 capsule (20 mg) by mouth daily    Gastroesophageal reflux disease, esophagitis presence not specified       ondansetron 4 MG tablet    ZOFRAN    18 tablet    Take 1 tablet (4 mg) by mouth every 6 hours as needed for nausea    Norovirus       oxyCODONE-acetaminophen 5-325 MG per tablet    PERCOCET    15 tablet    Take 1-2 tablets by mouth every 6 hours as needed for pain        prazosin 5 MG capsule    MINIPRESS    90 capsule    TAKE 1 CAPSULE(5 MG) BY MOUTH AT BEDTIME    Primary insomnia       tiZANidine 4 MG tablet    ZANAFLEX    210 tablet    Take 1-3 tablets (4-12 mg) by mouth 3 times daily as needed for muscle spasms    Cervical radiculopathy       topiramate 50 MG tablet    TOPAMAX    360 tablet    TAKE 1 TO 2 TABLETS(50  MG) BY MOUTH TWICE DAILY    Chronic pain syndrome       traZODone 50 MG tablet    DESYREL    90 tablet    Take 1-3 tablets ( mg) by mouth nightly as needed for sleep    Insomnia, unspecified type       * Notice:  This list has 3 medication(s) that are the same as other medications prescribed for you. Read the directions carefully, and ask your doctor or other care provider to review them with you.

## 2018-11-07 ENCOUNTER — TELEPHONE (OUTPATIENT)
Dept: FAMILY MEDICINE | Facility: CLINIC | Age: 58
End: 2018-11-07

## 2018-11-07 DIAGNOSIS — N18.30 CKD (CHRONIC KIDNEY DISEASE) STAGE 3, GFR 30-59 ML/MIN (H): Primary | ICD-10-CM

## 2018-11-07 LAB
ALBUMIN SERPL-MCNC: 4.2 G/DL (ref 3.4–5)
ALP SERPL-CCNC: 85 U/L (ref 40–150)
ALT SERPL W P-5'-P-CCNC: 34 U/L (ref 0–50)
ANION GAP SERPL CALCULATED.3IONS-SCNC: 11 MMOL/L (ref 3–14)
AST SERPL W P-5'-P-CCNC: 23 U/L (ref 0–45)
BILIRUB SERPL-MCNC: 0.4 MG/DL (ref 0.2–1.3)
BUN SERPL-MCNC: 24 MG/DL (ref 7–30)
CALCIUM SERPL-MCNC: 9.5 MG/DL (ref 8.5–10.1)
CHLORIDE SERPL-SCNC: 109 MMOL/L (ref 94–109)
CO2 SERPL-SCNC: 22 MMOL/L (ref 20–32)
CREAT SERPL-MCNC: 1.04 MG/DL (ref 0.52–1.04)
GFR SERPL CREATININE-BSD FRML MDRD: 54 ML/MIN/1.7M2
GLUCOSE SERPL-MCNC: 86 MG/DL (ref 70–99)
POTASSIUM SERPL-SCNC: 3.8 MMOL/L (ref 3.4–5.3)
PROT SERPL-MCNC: 8 G/DL (ref 6.8–8.8)
SODIUM SERPL-SCNC: 142 MMOL/L (ref 133–144)
TSH SERPL DL<=0.005 MIU/L-ACNC: 0.84 MU/L (ref 0.4–4)

## 2018-11-07 ASSESSMENT — ANXIETY QUESTIONNAIRES: GAD7 TOTAL SCORE: 8

## 2018-11-07 NOTE — TELEPHONE ENCOUNTER
Reason for Call:  Other call back    Detailed comments: Patient states that she can not get into urology at [facility] until mid December. Patient is demanding PCP calls [facility] and gets her in sooner within 2 weeks. She will not wait and want an appointment and call back ASAP. Patient is very upset on phone.     Phone Number Patient can be reached at: Home number on file [phone] (home)    Best Time: Any     Can we leave a detailed message on this number? YES    Call taken on 11/7/2018 at 3:50 PM by Tom Godoy

## 2018-11-07 NOTE — PATIENT INSTRUCTIONS
Decrease omeprazole to 20mg daily for one month, plan is for 10mg the month after that, but let me know how your heart burn is doing.  You may take zantac 1-2 tabs daily as needed for heartburn.       Stop doxepin.  Start trazodone 1-3 tabs at bedtime.  Let me know which dose best for you.      We will recheck your kidney function and thyroid today.       Please schedule a physical and a mammogram.

## 2018-11-07 NOTE — TELEPHONE ENCOUNTER
Catrachita Collado MD  Please see pt msg below  Re:appt with urology  She would like you to call    Thanks!     Solange Kelley RN

## 2018-11-07 NOTE — TELEPHONE ENCOUNTER
"Requested Prescriptions   Pending Prescriptions Disp Refills     omeprazole (PRILOSEC) 20 MG CR capsule [Pharmacy Med Name: OMEPRAZOLE 20MG CAPSULES]  Last Written Prescription Date:  11/6/2018  Last Fill Quantity: 30 capsule,  # refills: 0   Last Office Visit: 11/6/2018   Future Office Visit:      90 capsule 0     Sig: TAKE 1 CAPSULE(20 MG) BY MOUTH DAILY    PPI Protocol Passed    11/6/2018  6:44 PM       Passed - Not on Clopidogrel (unless Pantoprazole ordered)       Passed - No diagnosis of osteoporosis on record       Passed - Recent (12 mo) or future (30 days) visit within the authorizing provider's specialty    Patient had office visit in the last 12 months or has a visit in the next 30 days with authorizing provider or within the authorizing provider's specialty.  See \"Patient Info\" tab in [employer], or \"Choose Columns\" in Meds & Orders section of the refill encounter.           Passed - Patient is age 18 or older       Passed - No active pregnacy on record       Passed - No positive pregnancy test in past 12 months     _________________________________________________________________________________________________________________________       traZODone (DESYREL) 50 MG tablet [Pharmacy Med Name: TRAZODONE 50MG TABLETS]  Last Written Prescription Date:  11/6/2018  Last Fill Quantity: 90 tablet,  # refills: 1   Last Office Visit: 11/6/2018   Future Office Visit:      270 tablet 1     Sig: TAKE 1 TO 3 TABLETS(50  MG) BY MOUTH EVERY NIGHT AS NEEDED FOR SLEEP    Serotonin Modulators Passed    11/6/2018  6:44 PM       Passed - Recent (12 mo) or future (30 days) visit within the authorizing provider's specialty    Patient had office visit in the last 12 months or has a visit in the next 30 days with authorizing provider or within the authorizing provider's specialty.  See \"Patient Info\" tab in [employer], or \"Choose Columns\" in Meds & Orders section of the refill encounter.           Passed - Patient is age 18 " or older       Passed - No active pregnancy on record       Passed - No positive pregnancy test in past 12 months

## 2018-11-08 NOTE — TELEPHONE ENCOUNTER
I do not know of any reason why she needs an urgent urology visit.  I have sent her a results note addressing the questions she brought up in her lengthy DDR[employer] message.  I will order some urine tests for her, but if she does not want to do them, she does not have to.    I do not see an order that is just to measure a 24 hour urine sample, just for volume.  But perhaps lab can give her a jug or hat with measurements so that she can measure her urine output in a 24 hour period.

## 2018-11-12 RX ORDER — TRAZODONE HYDROCHLORIDE 50 MG/1
TABLET, FILM COATED ORAL
Qty: 270 TABLET | Refills: 1 | OUTPATIENT
Start: 2018-11-12

## 2018-11-14 ENCOUNTER — MYC REFILL (OUTPATIENT)
Dept: FAMILY MEDICINE | Facility: CLINIC | Age: 58
End: 2018-11-14

## 2018-11-14 DIAGNOSIS — F33.1 MAJOR DEPRESSIVE DISORDER, RECURRENT EPISODE, MODERATE (H): ICD-10-CM

## 2018-11-14 NOTE — TELEPHONE ENCOUNTER
"Requested Prescriptions   Pending Prescriptions Disp Refills     doxepin (SINEQUAN) 10 MG capsule  Last Written Prescription Date:  4/5/2018  Last Fill Quantity: 180 capsule,  # refills: 1   Last Office Visit: 11/6/2018   Future Office Visit:      180 capsule 1    Tricyclic Antidepressants Protocol Failed    11/14/2018  1:02 PM       Failed - PHQ-9 score less than 5 in past 6 months    Please review last PHQ-9 score.   PHQ-9 SCORE 8/25/2017 3/1/2018 11/6/2018   Total Score - - -   Total Score MyChart - - -   Total Score 6 2 7     DONELL-7 SCORE 11/8/2016 11/29/2016 11/6/2018   Total Score - - -   Total Score 13 10 8   Total Score - - -          Passed - Blood pressure under 140/90 in past 12 months    BP Readings from Last 3 Encounters:   11/06/18 97/59   08/09/18 136/74   07/27/18 130/84          Passed - Patient is age 18 or older       Passed - No active pregnancy on record       Passed - No positive pregnancy test in past 12 months       Passed - Recent (6 mo) or future (30 days) visit within the authorizing provider's specialty    Patient had office visit in the last 6 months or has a visit in the next 30 days with authorizing provider or within the authorizing provider's specialty.  See \"Patient Info\" tab in inbasket, or \"Choose Columns\" in Meds & Orders section of the refill encounter.              "

## 2018-11-14 NOTE — TELEPHONE ENCOUNTER
Message from [employer]:  Original authorizing provider: MD Ilsa Latham would like a refill of the following medications:  doxepin (SINEQUAN) 10 MG capsule [Catrachita Collado MD]    Preferred pharmacy: Other - Medication was discontinued. Please see note.    Comment:  Dear Team, This medication was DIScontinued by Dr Collado on my last visit on 11/06/18. In its place Dr Collado started Trazadone which is working blessedly well. Thank you for your time. Ilsa Yusuf

## 2018-11-15 DIAGNOSIS — N18.30 CKD (CHRONIC KIDNEY DISEASE) STAGE 3, GFR 30-59 ML/MIN (H): ICD-10-CM

## 2018-11-15 LAB
ALBUMIN UR-MCNC: NEGATIVE MG/DL
APPEARANCE UR: CLEAR
BILIRUB UR QL STRIP: NEGATIVE
COLOR UR AUTO: YELLOW
CREAT UR-MCNC: 118 MG/DL
GLUCOSE UR STRIP-MCNC: NEGATIVE MG/DL
HGB UR QL STRIP: NEGATIVE
KETONES UR STRIP-MCNC: NEGATIVE MG/DL
LEUKOCYTE ESTERASE UR QL STRIP: NEGATIVE
MICROALBUMIN UR-MCNC: 6 MG/L
MICROALBUMIN/CREAT UR: 4.97 MG/G CR (ref 0–25)
NITRATE UR QL: NEGATIVE
PH UR STRIP: 7 PH (ref 5–7)
SOURCE: NORMAL
SP GR UR STRIP: 1.02 (ref 1–1.03)
UROBILINOGEN UR STRIP-ACNC: 0.2 EU/DL (ref 0.2–1)

## 2018-11-15 PROCEDURE — 82043 UR ALBUMIN QUANTITATIVE: CPT | Performed by: FAMILY MEDICINE

## 2018-11-15 PROCEDURE — 81003 URINALYSIS AUTO W/O SCOPE: CPT | Performed by: FAMILY MEDICINE

## 2018-11-17 ENCOUNTER — MYC MEDICAL ADVICE (OUTPATIENT)
Dept: FAMILY MEDICINE | Facility: CLINIC | Age: 58
End: 2018-11-17

## 2018-11-19 RX ORDER — DOXEPIN HYDROCHLORIDE 10 MG/1
CAPSULE ORAL
Qty: 180 CAPSULE | Refills: 1 | Status: CANCELLED | OUTPATIENT
Start: 2018-11-19

## 2018-11-20 NOTE — TELEPHONE ENCOUNTER
Medication was discontinued - not on med list -  patient states that she used the wrong pathway to get message to clinic to discontinue medication     Liz Padilla Registered Nurse   [facility]

## 2018-11-26 ENCOUNTER — TELEPHONE (OUTPATIENT)
Dept: GENERAL RADIOLOGY | Facility: CLINIC | Age: 58
End: 2018-11-26

## 2018-11-26 DIAGNOSIS — F33.1 MAJOR DEPRESSIVE DISORDER, RECURRENT EPISODE, MODERATE (H): ICD-10-CM

## 2018-11-28 NOTE — TELEPHONE ENCOUNTER
Requested Prescriptions   Pending Prescriptions Disp Refills     OLANZapine (ZYPREXA) 2.5 MG tablet [Pharmacy Med Name: OLANZAPINE 2.5MG TABLETS]  This maybe a DUPLICATE.   Last Written Prescription Date:  10-18-18  Last Fill Quantity: 90 tab,  # refills: 1   Last office visit: 11-6-18 with prescribing provider:  Catrachita Collado    Future Office Visit:    90 tablet 0     Sig: TAKE 1 TABLET(2.5 MG) BY MOUTH AT BEDTIME    Antipsychotic Medications Passed    11/26/2018 10:49 PM       Passed - Blood pressure under 140/90 in past 12 months    BP Readings from Last 3 Encounters:   11/06/18 97/59   08/09/18 136/74   07/27/18 130/84          Passed - Patient is 12 years of age or older       Passed - Lipid panel on file within the past 12 months    Recent Labs   Lab Test  03/15/18   1334   09/30/15   1124   CHOL  215*   < >  162   TRIG  109   < >  119   HDL  46*   < >  48*   LDL  147*   < >  90   NHDL  169*   < >   --    VLDL   --    --   24   CHOLHDLRATIO   --    --   3.4    < > = values in this interval not displayed.          Passed - CBC on file in past 12 months    Recent Labs   Lab Test  08/09/18   1133   WBC  8.7   RBC  4.87   HGB  14.9   HCT  44.3   PLT  202          Passed - Heart Rate on file within past 12 months    Pulse Readings from Last 3 Encounters:   11/06/18 74   08/09/18 70   07/27/18 72          Passed - A1c or Glucose on file in past 12 months    Recent Labs   Lab Test  11/06/18   1845   GLC  86     Please review patients last 3 weights. If a weight gain of >10 lbs exists, you may refill the prescription once after instructing the patient to schedule an appointment within the next 30 days.    Wt Readings from Last 3 Encounters:   11/06/18 223 lb (101.2 kg)   08/09/18 200 lb (90.7 kg)   07/27/18 218 lb (98.9 kg)          Passed - Patient is not pregnant       Passed - No positve pregnancy test on file in past 12 months       Passed - Recent (6 mo) or future (30 days) visit within the authorizing  "provider's specialty    Patient had office visit in the last 6 months or has a visit in the next 30 days with authorizing provider or within the authorizing provider's specialty.  See \"Patient Info\" tab in inbasket, or \"Choose Columns\" in Meds & Orders section of the refill encounter.              "

## 2018-11-28 NOTE — TELEPHONE ENCOUNTER
Refill request originating with [facility] in [place], [place], but three month supply with 1 refill was sent to [facility] on 10/18/18.    Team coordinators-Please clarify with patient with which pharmacy is she filling Olanzapine 2.5 mg tablet?    Thank you!  CHANTE Longo, E[place], RN

## 2018-11-29 ENCOUNTER — FCC EXTENDED DOCUMENTATION (OUTPATIENT)
Dept: PSYCHOLOGY | Facility: CLINIC | Age: 58
End: 2018-11-29

## 2018-11-29 DIAGNOSIS — F33.1 MAJOR DEPRESSIVE DISORDER, RECURRENT EPISODE, MODERATE (H): Primary | ICD-10-CM

## 2018-11-29 RX ORDER — OLANZAPINE 2.5 MG/1
TABLET, FILM COATED ORAL
Qty: 90 TABLET | Refills: 0 | OUTPATIENT
Start: 2018-11-29

## 2018-11-29 NOTE — LETTER
11/29/2018    Ilsa Yusuf  [address]  [place] [address]      Greetings    Your last date of service at [facility] was January 18, 2017.  Since I have not heard from you regarding your desire to continue services with me through [facility], you will be discharged.  In the future, should you desire to seek services again through our clinic, please do not hesitate to call us.    If you need to re-establish care in the future, it will need to be with another [place] therapist. I have resigned from my practice at [facility] and my last day will be December 20th.  For your convenience we have also listed contact information for three agencies which provide mental health services around the [facility] area.    [facility]         [place] [facility]     [facility]    [place] [phone] [place] [phone]              [place] [phone]  [place] [phone] [place] [phone]        [place] [phone]  [place] [phone] [place] [phone]     [place] [phone]  [place] [phone] [place] [phone]          [place] [facility] [phone]  [place] [phone]    Sincerely,  Roberto Carlos Mercado MA, LMFT, LICSW
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
## 2018-11-29 NOTE — Clinical Note
Hi,  Client is no longer participating in therapy. Therefore they have been discharged from the active client list. Client can return to therapy with [facility] in the future if needed.  Please contact me if you have any questions.  Roberto Carlos Mercado MA, LMFT, LICSW

## 2018-11-29 NOTE — TELEPHONE ENCOUNTER
TRiage reviewed and LMOM. Since she states that she received Zyprexa from [employer] (ordered in October) did not fill the request asked for from [facility].    Will review the Zanaflex request. [facility] pharmacy said the zanaflex has been filled.  Forgot to ask by whom.(?pain clinic?)  Lisa Momin RN

## 2018-11-29 NOTE — TELEPHONE ENCOUNTER
"Spoke with patient about where we should be filling medication.   She states the [facility] in on [address].      Reason for call:  Medication   If this is a refill request, has the caller requested the refill from the pharmacy already? No  Will the patient be using a [facility]? No  Name of the pharmacy and phone number for the current request:   [id number] - [place], [place] - [address] [place] [address] AT [facility] [address]  [address]  [place] [address]  Phone: [phone] Fax: [phone]    Name of the medication requested: tiZANidine (ZANAFLEX) 4 MG tablet    Other request: Pt states she got the supply for OLANZapine (ZYPREXA) 2.5 MG tablet from [facility] \"out of the blue\". She states its been about 4 months since she has received anything from their mail order pharmacy.     Phone number to reach patient:  Cell number on file:    Telephone Information:   Mobile [phone]       Best Time:  anytime    Can we leave a detailed message on this number?  YES        Meg BAIG     [facility]            "

## 2018-12-05 DIAGNOSIS — G47.00 INSOMNIA, UNSPECIFIED TYPE: ICD-10-CM

## 2018-12-06 NOTE — TELEPHONE ENCOUNTER
"Requested Prescriptions   Pending Prescriptions Disp Refills     traZODone (DESYREL) 50 MG tablet [Pharmacy Med Name: TRAZODONE 50MG TABLETS]  This maybe a DUPLICATE.   Last Written Prescription Date:  11-6-18  Last Fill Quantity: 90 tab,  # refills: 1   Last office visit: 11/6/2018 with prescribing provider:  Catrachita Collado    Future Office Visit:    90 tablet 0     Sig: TAKE 1 TO 3 TABLETS(50  MG) BY MOUTH EVERY NIGHT AS NEEDED FOR SLEEP    Serotonin Modulators Passed    12/5/2018 11:46 PM       Passed - Recent (12 mo) or future (30 days) visit within the authorizing provider's specialty    Patient had office visit in the last 12 months or has a visit in the next 30 days with authorizing provider or within the authorizing provider's specialty.  See \"Patient Info\" tab in inbasket, or \"Choose Columns\" in Meds & Orders section of the refill encounter.           Passed - Patient is age 18 or older       Passed - No active pregnancy on record       Passed - No positive pregnancy test in past 12 months          "

## 2018-12-07 RX ORDER — TRAZODONE HYDROCHLORIDE 50 MG/1
TABLET, FILM COATED ORAL
Qty: 90 TABLET | Refills: 0 | OUTPATIENT
Start: 2018-12-07

## 2018-12-11 ENCOUNTER — TRANSFERRED RECORDS (OUTPATIENT)
Dept: HEALTH INFORMATION MANAGEMENT | Facility: CLINIC | Age: 58
End: 2018-12-11

## 2018-12-17 ENCOUNTER — MYC MEDICAL ADVICE (OUTPATIENT)
Dept: FAMILY MEDICINE | Facility: CLINIC | Age: 58
End: 2018-12-17

## 2018-12-17 ENCOUNTER — MYC REFILL (OUTPATIENT)
Dept: FAMILY MEDICINE | Facility: CLINIC | Age: 58
End: 2018-12-17

## 2018-12-17 DIAGNOSIS — F33.1 MAJOR DEPRESSIVE DISORDER, RECURRENT EPISODE, MODERATE (H): ICD-10-CM

## 2018-12-17 RX ORDER — DULOXETIN HYDROCHLORIDE 60 MG/1
CAPSULE, DELAYED RELEASE ORAL
Qty: 180 CAPSULE | Refills: 1 | Status: SHIPPED | OUTPATIENT
Start: 2018-12-17 | End: 2019-03-19

## 2018-12-24 DIAGNOSIS — G47.33 OSA (OBSTRUCTIVE SLEEP APNEA): Primary | ICD-10-CM

## 2018-12-27 ENCOUNTER — TRANSFERRED RECORDS (OUTPATIENT)
Dept: HEALTH INFORMATION MANAGEMENT | Facility: CLINIC | Age: 58
End: 2018-12-27

## 2018-12-30 DIAGNOSIS — E78.5 HYPERLIPIDEMIA LDL GOAL <130: ICD-10-CM

## 2018-12-30 RX ORDER — LOVASTATIN 20 MG
TABLET ORAL
Qty: 90 TABLET | Refills: 0 | Status: SHIPPED | OUTPATIENT
Start: 2018-12-30 | End: 2019-03-21

## 2018-12-30 NOTE — TELEPHONE ENCOUNTER
"Prescription approved per [facility] Refill Protocol.  ERIBERTO BondsN, RN            Requested Prescriptions   Pending Prescriptions Disp Refills     lovastatin (MEVACOR) 20 MG tablet [Pharmacy Med Name: LOVASTATIN 20MG TABLETS] 90 tablet 0     Sig: TAKE 1 TABLET(20 MG) BY MOUTH AT BEDTIME    Statins Protocol Passed - 12/30/2018  3:47 AM       Passed - LDL on file in past 12 months    Recent Labs   Lab Test 03/15/18  1334   *            Passed - No abnormal creatine kinase in past 12 months    No lab results found.            Passed - Recent (12 mo) or future (30 days) visit within the authorizing provider's specialty    Patient had office visit in the last 12 months or has a visit in the next 30 days with authorizing provider or within the authorizing provider's specialty.  See \"Patient Info\" tab in inbasket, or \"Choose Columns\" in Meds & Orders section of the refill encounter.             Passed - Patient is age 18 or older       Passed - No active pregnancy on record       Passed - No positive pregnancy test in past 12 months          "

## 2019-01-09 ENCOUNTER — MYC REFILL (OUTPATIENT)
Dept: FAMILY MEDICINE | Facility: CLINIC | Age: 59
End: 2019-01-09

## 2019-01-09 DIAGNOSIS — G89.4 CHRONIC PAIN SYNDROME: ICD-10-CM

## 2019-01-09 RX ORDER — TOPIRAMATE 50 MG/1
TABLET, FILM COATED ORAL
Qty: 360 TABLET | Refills: 1 | Status: SHIPPED | OUTPATIENT
Start: 2019-01-09 | End: 2019-05-29

## 2019-01-09 NOTE — TELEPHONE ENCOUNTER
"Requested Prescriptions   Pending Prescriptions Disp Refills     topiramate (TOPAMAX) 50 MG tablet 360 tablet 1     Sig: TAKE 1 TO 2 TABLETS(50  MG) BY MOUTH TWICE DAILY    Anti-Seizure Meds Protocol  Failed - 1/9/2019  2:14 PM       Failed - Review Authorizing provider's last note.     Refer to last progress notes: confirm request is for original authorizing provider (cannot be through other providers).         Passed - Recent (12 mo) or future (30 days) visit within the authorizing provider's specialty    Patient had office visit in the last 12 months or has a visit in the next 30 days with authorizing provider or within the authorizing provider's specialty.  See \"Patient Info\" tab in inbasket, or \"Choose Columns\" in Meds & Orders section of the refill encounter.             Passed - Normal CBC on file in past 26 months    Recent Labs   Lab Test 08/09/18  1133   WBC 8.7   RBC 4.87   HGB 14.9   HCT 44.3                   Passed - Normal ALT or AST on file in past 26 months    Recent Labs   Lab Test 11/06/18  1845   ALT 34     Recent Labs   Lab Test 11/06/18  1845   AST 23            Passed - Normal platelet count on file in past 26 months    Recent Labs   Lab Test 08/09/18  1133                 Passed - Medication is active on med list       Passed - No active pregnancy on record       Passed - No positive pregnancy test in last 12 months          "

## 2019-01-30 DIAGNOSIS — K21.9 GASTROESOPHAGEAL REFLUX DISEASE, ESOPHAGITIS PRESENCE NOT SPECIFIED: ICD-10-CM

## 2019-01-30 NOTE — TELEPHONE ENCOUNTER
"Requested Prescriptions   Pending Prescriptions Disp Refills     omeprazole (PRILOSEC) 20 MG DR capsule  This maybe a DUPLICATE.   Last Written Prescription Date:  2-1-19  Last Fill Quantity: 90 cap,  # refills: 0   Last office visit: 11/6/2018 with prescribing provider:  Catrachita Collado    Future Office Visit:    30 capsule 0     Sig: Take 1 capsule (20 mg) by mouth daily    PPI Protocol Passed - 1/30/2019  9:41 AM       Passed - Not on Clopidogrel (unless Pantoprazole ordered)       Passed - No diagnosis of osteoporosis on record       Passed - Recent (12 mo) or future (30 days) visit within the authorizing provider's specialty    Patient had office visit in the last 12 months or has a visit in the next 30 days with authorizing provider or within the authorizing provider's specialty.  See \"Patient Info\" tab in inbasket, or \"Choose Columns\" in Meds & Orders section of the refill encounter.           Passed - Medication is active on med list       Passed - Patient is age 18 or older       Passed - No active pregnacy on record       Passed - No positive pregnancy test in past 12 months         "

## 2019-01-30 NOTE — TELEPHONE ENCOUNTER
Prescription approved per [facility] Refill Protocol.    Clotilde Polk, RN  Triage Nurse

## 2019-01-30 NOTE — TELEPHONE ENCOUNTER
"Requested Prescriptions   Pending Prescriptions Disp Refills     omeprazole (PRILOSEC) 20 MG DR capsule [Pharmacy Med Name: OMEPRAZOLE 20MG CAPSULES]      Last Written Prescription Date:  1/30/2019   THIS MAY BE A DUPLICATE.   Last Fill Quantity: 30 CAPSULE   ,  # refills: 0   Last Office Visit: 11/6/2018   Future Office Visit:      90 capsule 0     Sig: TAKE 1 CAPSULE(20 MG) BY MOUTH DAILY    PPI Protocol Passed - 1/30/2019  4:09 PM       Passed - Not on Clopidogrel (unless Pantoprazole ordered)       Passed - No diagnosis of osteoporosis on record       Passed - Recent (12 mo) or future (30 days) visit within the authorizing provider's specialty    Patient had office visit in the last 12 months or has a visit in the next 30 days with authorizing provider or within the authorizing provider's specialty.  See \"Patient Info\" tab in inbasket, or \"Choose Columns\" in Meds & Orders section of the refill encounter.           Passed - Medication is active on med list       Passed - Patient is age 18 or older       Passed - No active pregnacy on record       Passed - No positive pregnancy test in past 12 months          "

## 2019-02-01 NOTE — TELEPHONE ENCOUNTER
Prescription approved per [facility] [place]ill Protocol.    Clotilde Polk, RN  Triage Nurse

## 2019-02-06 DIAGNOSIS — K21.9 GASTROESOPHAGEAL REFLUX DISEASE, ESOPHAGITIS PRESENCE NOT SPECIFIED: ICD-10-CM

## 2019-02-06 NOTE — TELEPHONE ENCOUNTER
"Requested Prescriptions   Pending Prescriptions Disp Refills     omeprazole (PRILOSEC) 40 MG DR capsule  Discontinued 11-2-18  Last Written Prescription Date:  10-23-18  Last Fill Quantity: 90 cap,   # refills: 1  Last Office Visit: 11-6-18  Future Office visit:       Routing refill request to provider for review/approval because:  Drug not active on patient's medication list    90 capsule 0     Sig: Take 1 capsule (20 mg) by mouth daily    PPI Protocol Passed - 2/6/2019 10:40 AM       Passed - Not on Clopidogrel (unless Pantoprazole ordered)       Passed - No diagnosis of osteoporosis on record       Passed - Recent (12 mo) or future (30 days) visit within the authorizing provider's specialty    Patient had office visit in the last 12 months or has a visit in the next 30 days with authorizing provider or within the authorizing provider's specialty.  See \"Patient Info\" tab in inbasket, or \"Choose Columns\" in Meds & Orders section of the refill encounter.           Passed - Medication is active on med list       Passed - Patient is age 18 or older       Passed - No active pregnacy on record       Passed - No positive pregnancy test in past 12 months         "

## 2019-02-07 DIAGNOSIS — M54.12 CERVICAL RADICULOPATHY: ICD-10-CM

## 2019-02-07 NOTE — TELEPHONE ENCOUNTER
tiZANidine (ZANAFLEX) 4 MG tablet      Last Written Prescription Date:  5-10-17  Last Fill Quantity: 210 tab,   # refills: 3  Last Office Visit: 11-6-18  Future Office visit:       Routing refill request to provider for review/approval because:  Drug not on the [facility], [facility] or [facility] refill protocol or controlled substance

## 2019-02-11 RX ORDER — OMEPRAZOLE 40 MG/1
40 CAPSULE, DELAYED RELEASE ORAL DAILY
Qty: 90 CAPSULE | Refills: 3 | Status: SHIPPED | OUTPATIENT
Start: 2019-02-11 | End: 2019-04-10

## 2019-02-11 NOTE — TELEPHONE ENCOUNTER
Right.  Per the last note, she was trying to taper off.     I will send in the 40mg as it sounds like the taper didn't work?  Just making sure this request was coming from the patient, not an autorefill.  Thank you

## 2019-02-14 DIAGNOSIS — G47.00 INSOMNIA, UNSPECIFIED TYPE: ICD-10-CM

## 2019-02-16 RX ORDER — TRAZODONE HYDROCHLORIDE 50 MG/1
50-150 TABLET, FILM COATED ORAL
Qty: 90 TABLET | Refills: 1 | Status: SHIPPED | OUTPATIENT
Start: 2019-02-16 | End: 2019-02-16

## 2019-02-17 NOTE — TELEPHONE ENCOUNTER
Prescription approved per [facility] [place]ill Protocol.  Maria Antonia Nam RN

## 2019-03-06 ENCOUNTER — TRANSFERRED RECORDS (OUTPATIENT)
Dept: HEALTH INFORMATION MANAGEMENT | Facility: CLINIC | Age: 59
End: 2019-03-06

## 2019-03-06 LAB — PHQ9 SCORE: 15

## 2019-03-19 ENCOUNTER — OFFICE VISIT (OUTPATIENT)
Dept: FAMILY MEDICINE | Facility: CLINIC | Age: 59
End: 2019-03-19
Payer: COMMERCIAL

## 2019-03-19 ENCOUNTER — HOSPITAL ENCOUNTER (OUTPATIENT)
Dept: MAMMOGRAPHY | Facility: CLINIC | Age: 59
Discharge: HOME | End: 2019-03-19
Attending: FAMILY MEDICINE | Admitting: FAMILY MEDICINE
Payer: COMMERCIAL

## 2019-03-19 ENCOUNTER — RESULT FOLLOW UP (OUTPATIENT)
Dept: FAMILY MEDICINE | Facility: CLINIC | Age: 59
End: 2019-03-19

## 2019-03-19 ENCOUNTER — HOSPITAL ENCOUNTER (OUTPATIENT)
Dept: MAMMOGRAPHY | Facility: CLINIC | Age: 59
End: 2019-03-19
Attending: FAMILY MEDICINE
Payer: COMMERCIAL

## 2019-03-19 VITALS
HEART RATE: 71 BPM | HEIGHT: 69 IN | OXYGEN SATURATION: 99 % | WEIGHT: 212 LBS | RESPIRATION RATE: 16 BRPM | DIASTOLIC BLOOD PRESSURE: 72 MMHG | SYSTOLIC BLOOD PRESSURE: 110 MMHG | TEMPERATURE: 98.1 F | BODY MASS INDEX: 31.4 KG/M2

## 2019-03-19 DIAGNOSIS — E78.5 HYPERLIPIDEMIA LDL GOAL <130: ICD-10-CM

## 2019-03-19 DIAGNOSIS — N63.20 LEFT BREAST LUMP: ICD-10-CM

## 2019-03-19 DIAGNOSIS — R87.810 CERVICAL HIGH RISK HPV (HUMAN PAPILLOMAVIRUS) TEST POSITIVE: ICD-10-CM

## 2019-03-19 DIAGNOSIS — F33.1 MAJOR DEPRESSIVE DISORDER, RECURRENT EPISODE, MODERATE (H): ICD-10-CM

## 2019-03-19 DIAGNOSIS — Z00.00 WELL ADULT EXAM: Primary | ICD-10-CM

## 2019-03-19 DIAGNOSIS — J32.8 OTHER CHRONIC SINUSITIS: ICD-10-CM

## 2019-03-19 DIAGNOSIS — R73.01 IMPAIRED FASTING GLUCOSE: ICD-10-CM

## 2019-03-19 DIAGNOSIS — Z91.89 DES EXPOSURE IN UTERO: ICD-10-CM

## 2019-03-19 DIAGNOSIS — G47.00 INSOMNIA, UNSPECIFIED TYPE: ICD-10-CM

## 2019-03-19 DIAGNOSIS — N60.22 FIBROADENOSIS OF LEFT BREAST: ICD-10-CM

## 2019-03-19 DIAGNOSIS — F90.1 ATTENTION-DEFICIT HYPERACTIVITY DISORDER, PREDOMINANTLY HYPERACTIVE TYPE: ICD-10-CM

## 2019-03-19 LAB
CHOLEST SERPL-MCNC: 159 MG/DL
GLUCOSE SERPL-MCNC: 117 MG/DL (ref 70–99)
HDLC SERPL-MCNC: 48 MG/DL
LDLC SERPL CALC-MCNC: 92 MG/DL
NONHDLC SERPL-MCNC: 111 MG/DL
TRIGL SERPL-MCNC: 94 MG/DL

## 2019-03-19 PROCEDURE — 76642 ULTRASOUND BREAST LIMITED: CPT | Mod: LT

## 2019-03-19 PROCEDURE — G0279 TOMOSYNTHESIS, MAMMO: HCPCS

## 2019-03-19 PROCEDURE — 87624 HPV HI-RISK TYP POOLED RSLT: CPT | Performed by: FAMILY MEDICINE

## 2019-03-19 PROCEDURE — 77066 DX MAMMO INCL CAD BI: CPT

## 2019-03-19 PROCEDURE — 36415 COLL VENOUS BLD VENIPUNCTURE: CPT | Performed by: FAMILY MEDICINE

## 2019-03-19 PROCEDURE — G0145 SCR C/V CYTO,THINLAYER,RESCR: HCPCS | Performed by: FAMILY MEDICINE

## 2019-03-19 PROCEDURE — G0476 HPV COMBO ASSAY CA SCREEN: HCPCS | Performed by: FAMILY MEDICINE

## 2019-03-19 PROCEDURE — 99396 PREV VISIT EST AGE 40-64: CPT | Performed by: FAMILY MEDICINE

## 2019-03-19 PROCEDURE — 82947 ASSAY GLUCOSE BLOOD QUANT: CPT | Performed by: FAMILY MEDICINE

## 2019-03-19 PROCEDURE — 80061 LIPID PANEL: CPT | Performed by: FAMILY MEDICINE

## 2019-03-19 RX ORDER — DEXTROAMPHETAMINE SACCHARATE, AMPHETAMINE ASPARTATE, DEXTROAMPHETAMINE SULFATE AND AMPHETAMINE SULFATE 5; 5; 5; 5 MG/1; MG/1; MG/1; MG/1
20 TABLET ORAL DAILY
Qty: 30 TABLET | Refills: 0 | Status: SHIPPED | OUTPATIENT
Start: 2019-03-19 | End: 2019-04-18

## 2019-03-19 RX ORDER — TRAZODONE HYDROCHLORIDE 50 MG/1
TABLET, FILM COATED ORAL
Qty: 270 TABLET | Refills: 2 | Status: SHIPPED | OUTPATIENT
Start: 2019-03-19 | End: 2019-03-27

## 2019-03-19 RX ORDER — ONDANSETRON 4 MG/1
4 TABLET, FILM COATED ORAL EVERY 6 HOURS PRN
Qty: 18 TABLET | Refills: 5 | Status: SHIPPED | OUTPATIENT
Start: 2019-03-19 | End: 2021-10-18

## 2019-03-19 RX ORDER — DEXTROAMPHETAMINE SACCHARATE, AMPHETAMINE ASPARTATE, DEXTROAMPHETAMINE SULFATE AND AMPHETAMINE SULFATE 5; 5; 5; 5 MG/1; MG/1; MG/1; MG/1
20 TABLET ORAL DAILY
Qty: 30 TABLET | Refills: 0 | Status: SHIPPED | OUTPATIENT
Start: 2019-05-18 | End: 2019-06-17

## 2019-03-19 RX ORDER — AZELASTINE HYDROCHLORIDE 137 UG/1
1-2 SPRAY, METERED NASAL DAILY
Qty: 30 ML | Refills: 5 | Status: SHIPPED | OUTPATIENT
Start: 2019-03-19 | End: 2019-03-27

## 2019-03-19 RX ORDER — DEXTROAMPHETAMINE SACCHARATE, AMPHETAMINE ASPARTATE, DEXTROAMPHETAMINE SULFATE AND AMPHETAMINE SULFATE 5; 5; 5; 5 MG/1; MG/1; MG/1; MG/1
20 TABLET ORAL DAILY
Qty: 30 TABLET | Refills: 0 | Status: SHIPPED | OUTPATIENT
Start: 2019-04-18 | End: 2019-05-18

## 2019-03-19 RX ORDER — DULOXETIN HYDROCHLORIDE 60 MG/1
CAPSULE, DELAYED RELEASE ORAL
Qty: 180 CAPSULE | Refills: 1 | Status: SHIPPED | OUTPATIENT
Start: 2019-03-19 | End: 2021-10-18

## 2019-03-19 ASSESSMENT — ENCOUNTER SYMPTOMS
PALPITATIONS: 0
WEAKNESS: 0
SORE THROAT: 0
ABDOMINAL PAIN: 0
DIARRHEA: 0
NAUSEA: 1
DIZZINESS: 0
DYSURIA: 0
BREAST MASS: 1
FEVER: 0
HEARTBURN: 1
EYE PAIN: 0
MYALGIAS: 1
CHILLS: 0
NERVOUS/ANXIOUS: 1
ARTHRALGIAS: 1
COUGH: 0
CONSTIPATION: 0
HEMATOCHEZIA: 0
SHORTNESS OF BREATH: 0
HEMATURIA: 0
HEADACHES: 1
PARESTHESIAS: 0
JOINT SWELLING: 0

## 2019-03-19 ASSESSMENT — PATIENT HEALTH QUESTIONNAIRE - PHQ9
SUM OF ALL RESPONSES TO PHQ QUESTIONS 1-9: 14
10. IF YOU CHECKED OFF ANY PROBLEMS, HOW DIFFICULT HAVE THESE PROBLEMS MADE IT FOR YOU TO DO YOUR WORK, TAKE CARE OF THINGS AT HOME, OR GET ALONG WITH OTHER PEOPLE: VERY DIFFICULT
SUM OF ALL RESPONSES TO PHQ QUESTIONS 1-9: 14

## 2019-03-19 ASSESSMENT — MIFFLIN-ST. JEOR: SCORE: 1592.04

## 2019-03-19 NOTE — NURSING NOTE
Patient identified using two patient identifiers.  Ear exam showing wax occlusion completed by provider.  Solution: warm water was placed in the left ear(s) via irrigation tool: elephant ear.      Ger Granado MA

## 2019-03-19 NOTE — LETTER
March 4, 2020      Ilsa Yusuf  [address] [place] [address]    Dear ,      At [facility], your health and wellness is our primary concern. That is why we are following up on a positive high risk HPV test from 03/19/19. Your provider had recommended that you have a Pap smear and HPV test completed by 03/19/20. Our records do not show that this has been scheduled.    It is important to complete the follow up that your provider has suggested for you to ensure that there are no worsening changes which may, over time, develop into cancer.      Please contact our office at  [phone] to schedule an appointment for a Pap smear and HPV test at your earliest convenience. If you have questions or concerns, please call the clinic and we will be happy to assist you.    If you have completed the tests outside of [facility], please have the results forwarded to our office. We will update the chart for your primary Physician to review before your next annual physical.     Thank you for choosing [facility]!    Sincerely,      Your [facility] Care Team //[facility]

## 2019-03-19 NOTE — PROGRESS NOTES
SUBJECTIVE:   CC: Ilsa Yusuf is an 58 year old woman who presents for preventive health visit.     HPI    1.  ADHD: stable, continues to do well on her medication adderall, denies side effects, states it is still effective for her concentration and focus, giving her the ability to complete tasks, read books, etc.    2.  Chronic sinusitis, nasal congestion--requests refill of nasal spray, doing well for her.      3.  Insomnia: the patient reports that the new medication from last visit, trazodone, is working very well for her insomnia.  She usually takes two tabs, though sometimes takes 3 (50mg each).  She denies any side effects.     4.  MDD: she reports that her mood has been stable.  She has some mildly depressed mood, noting impact of her neck pain (see below).  She will need a refill of her Cymbalta.  Also taking olanzapine.    5.  Cervical radiculopathy: she has been having a flare of right neck pain with radiation to her right arm.  She is followed by her pain clinic; she states she is going to have repeat MRI as the next step of evaluation.  She does take chronic opiates through her pain medicine provider.    6.  Hypothyroidism.     7. HL: on lovastatin.     8.  GERD: states 20mg omeprazole is not really helping her symptoms enough.  Previously, she had wanted to try off of it due to her worry about her kidneys.    CV: patient with       Today's PHQ-2 Score:   PHQ-2 (  [employer]) 2018   Q1: Little interest or pleasure in doing things 0   Q2: Feeling down, depressed or hopeless 1   PHQ-2 Score 1   Q1: Little interest or pleasure in doing things -   Q2: Feeling down, depressed or hopeless -   PHQ-2 Score -     Abuse: Current or Past(Physical, Sexual or Emotional)- No  Do you feel safe in your environment? Yes    Social History     Tobacco Use     Smoking status: Light Tobacco Smoker     Packs/day: 0.30     Last attempt to quit: 2015     Years since quittin.0     Smokeless tobacco:  Never Used     Tobacco comment: recreational   Substance Use Topics     Alcohol use: Yes     Alcohol/week: 0.0 oz     Comment: once a week     No flowsheet data found.    Reviewed orders with patient.  Reviewed health maintenance and updated orders accordingly - Yes  BP Readings from Last 3 Encounters:   19 110/72   18 97/59   18 136/74    Wt Readings from Last 3 Encounters:   19 96.2 kg (212 lb)   18 101.2 kg (223 lb)   18 90.7 kg (200 lb)                  Patient Active Problem List   Diagnosis     Major depressive disorder, recurrent episode, moderate (H)     PTSD (post-traumatic stress disorder)     Acquired hypothyroidism     Hyperlipidemia LDL goal <130     CKD (chronic kidney disease) stage 3, GFR 30-59 ml/min (H)     Esophageal reflux     Primary insomnia     Obesity     Attention-deficit hyperactivity disorder, predominantly hyperactive type     Neck pain     Cervical radiculopathy     Primary osteoarthritis of right knee     Peripheral tear of medial meniscus of right knee, unspecified whether old or current tear, initial encounter     Calculus of left kidney     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     CHOLECYSTECTOMY       COLONOSCOPY       CYSTOSCOPY       HERNIA REPAIR       ORTHOPEDIC SURGERY  left knee     renal artery surgery  child    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     TONSILLECTOMY       ureteral reimplantation         Social History     Tobacco Use     Smoking status: Light Tobacco Smoker     Packs/day: 0.30     Last attempt to quit: 2015     Years since quittin.0     Smokeless tobacco: Never Used     Tobacco comment: recreational   Substance Use Topics     Alcohol use: Yes     Alcohol/week: 0.0 oz     Comment: once a week     Family History   Problem Relation  Age of Onset     C.A.D. Father 58         at 58, heart disease     Mental Illness Father      Coronary Artery Disease Father      Hyperlipidemia Father      Alzheimer Disease Mother         total care now     Depression Mother      Anxiety Disorder Mother      Diabetes Sister         adult onset     Melanoma Sister      Breast Cancer Sister      Thyroid Disease Sister          Current Outpatient Medications   Medication Sig Dispense Refill     [START ON 2019] amphetamine-dextroamphetamine (ADDERALL) 20 MG tablet Take 1 tablet (20 mg) by mouth daily 30 tablet 0     [START ON 2019] amphetamine-dextroamphetamine (ADDERALL) 20 MG tablet Take 1 tablet (20 mg) by mouth daily 30 tablet 0     amphetamine-dextroamphetamine (ADDERALL) 20 MG tablet Take 1 tablet (20 mg) by mouth daily 30 tablet 0     Azelastine HCl 137 MCG/SPRAY SOLN Spray 1-2 sprays in nostril daily 30 mL 5     cholecalciferol (VITAMIN D3) 5000 UNITS CAPS capsule Take 1 capsule (5,000 Units) by mouth daily Take 1 per day 100 capsule 2     DULoxetine (CYMBALTA) 60 MG capsule TAKE 2 CAPSULES BY MOUTH EVERY  capsule 1     FIBER PO Two capsules in the morning and two capsules at night       fish oil-omega-3 fatty acids (OMEGA 3) 1000 MG capsule Take 2 g by mouth daily Takes 1 in am and 1 at bedtime       levothyroxine (SYNTHROID/LEVOTHROID) 200 MCG tablet Take 1 tablet (200 mcg) by mouth daily 90 tablet 3     lovastatin (MEVACOR) 20 MG tablet TAKE 1 TABLET(20 MG) BY MOUTH AT BEDTIME 90 tablet 0     multivitamin, therapeutic with minerals (MULTI-VITAMIN) TABS Take 1 tablet by mouth daily       OLANZapine (ZYPREXA) 2.5 MG tablet TAKE 1 TABLET(2.5 MG) BY MOUTH AT BEDTIME 90 tablet 1     ondansetron (ZOFRAN) 4 MG tablet Take 1 tablet (4 mg) by mouth every 6 hours as needed for nausea 18 tablet 5     oxyCODONE-acetaminophen (PERCOCET) 5-325 MG per tablet Take 1-2 tablets by mouth every 6 hours as needed for pain 15 tablet 0     prazosin  "(MINIPRESS) 5 MG capsule TAKE 1 CAPSULE(5 MG) BY MOUTH AT BEDTIME 90 capsule 2     tiZANidine (ZANAFLEX) 4 MG tablet Take 1-3 tablets (4-12 mg) by mouth 3 times daily as needed for muscle spasms 210 tablet 3     topiramate (TOPAMAX) 50 MG tablet TAKE 1 TO 2 TABLETS(50  MG) BY MOUTH TWICE DAILY 360 tablet 1     traZODone (DESYREL) 50 MG tablet TAKE 1 TO 3 TABLETS(50  MG) BY MOUTH EVERY NIGHT AS NEEDED FOR SLEEP 270 tablet 2     fluticasone (FLONASE) 50 MCG/ACT spray INHALE 1- 2 SPRAYS IN EACH NOSTRIL DAILY. (Patient not taking: Reported on 3/19/2019) 48 mL 0     omeprazole (PRILOSEC) 20 MG DR capsule TAKE 1 CAPSULE(20 MG) BY MOUTH DAILY (Patient not taking: Reported on 3/19/2019) 90 capsule 0     omeprazole (PRILOSEC) 40 MG DR capsule Take 1 capsule (40 mg) by mouth daily (Patient not taking: Reported on 3/19/2019) 90 capsule 3     Allergies   Allergen Reactions     Gabapentin Other (See Comments)     Patient states she gets \"horrific nightmares\" with this medication     Dilaudid [Hydromorphone Hcl]      Made her feel like her skin was crawling     Nsaids Other (See Comments)     Kidney failure     Compazine [Prochlorperazine] Other (See Comments)     \"Makes my skin crawl, I was going nuts.\"           Pertinent mammograms are reviewed under the imaging tab.  History of abnormal Pap smear: YES - other categories - see link Cervical Cytology Screening Guidelines  PAP / HPV 9/30/2015   PAP NIL     Reviewed and updated as needed this visit by clinical staff  Tobacco  Allergies  Meds  Med Hx  Surg Hx  Fam Hx  Soc Hx        Reviewed and updated as needed this visit by Provider            Review of Systems  She is having increased right neck pain radiating to right arm; a 10 point ROS Is reviewed and otherwise negative.       OBJECTIVE:   /72 (BP Location: Right arm, Patient Position: Sitting, Cuff Size: Adult Regular)   Pulse 71   Temp 98.1  F (36.7  C) (Oral)   Resp 16   Ht 1.746 m (5' 8.75\")  "  Wt 96.2 kg (212 lb)   SpO2 99%   BMI 31.54 kg/m    Physical Exam  GENERAL APPEARANCE: mildly obese, alert and no distress  EYES: Eyes grossly normal to inspection, PERRL and conjunctivae and sclerae normal  HENT: ear canals and TM's normal, nose and mouth without ulcers or lesions, oropharynx clear and oral mucous membranes moist  NECK: no adenopathy, no asymmetry, masses, or scars and thyroid normal to palpation  RESP: lungs clear to auscultation - no rales, rhonchi or wheezes  BREAST: there is a 2cm firm, somewhat mobile mass to the left breast at the 12 to 1 o'clock position; it is nontender; there is some faint yellow ecchymosis a little below this.   The right breast is normal without masses, tenderness; no nipple discharge and no palpable axillary masses or adenopathy bilaterally  CV: regular rate and rhythm, normal S1 S2, no S3 or S4, no murmur, click or rub, no peripheral edema and peripheral pulses strong  ABDOMEN: soft, nontender, no hepatosplenomegaly, no masses and bowel sounds normal   (female): normal female external genitalia, normal urethral meatus, vaginal mucosal atrophy noted, cervix is difficult to visual, no unusual discharge.  MS: no musculoskeletal defects are noted and gait is age appropriate without ataxia  SKIN: no suspicious lesions or rashes  NEURO: Normal gait and station, mentation intact and speech normal  PSYCH: mentation appears normal and affect normal/bright    Diagnostic Test Results:  Results for orders placed or performed in visit on 03/19/19   Lipid Profile   Result Value Ref Range    Cholesterol 159 <200 mg/dL    Triglycerides 94 <150 mg/dL    HDL Cholesterol 48 (L) >49 mg/dL    LDL Cholesterol Calculated 92 <100 mg/dL    Non HDL Cholesterol 111 <130 mg/dL   Glucose   Result Value Ref Range    Glucose 117 (H) 70 - 99 mg/dL       ASSESSMENT/PLAN:   1. Well adult exam  CV: patient on statin for HL, will be checking fasting labs today.  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  This was done today.  I palpated a left breast mass on exam; there is some old bruising over the area, but she does not endorse trauma, therefore I have recommended further imaging with US, due for bilateral mammogram.  Due in 2022 for colon cancer screening.  Bone health: thyroid disease, lack of exercise, postmenopausal.  She does take vitamin D.   Immunizations: I recommended Shingrix (we do not have in stock today).  Tdap was done 2013.    - Pap imaged thin layer screen with HPV - recommended age 30 - 65  - HPV High Risk Types DNA Cervical  - Lipid Profile  - Glucose    2. mass of left breast  - US Breast Left Limited 1-3 Quadrants; Future    3. Attention-deficit hyperactivity disorder, predominantly hyperactive type  Stable.  Medication is refilled.  OV or e visit in 3 months.    - amphetamine-dextroamphetamine (ADDERALL) 20 MG tablet; Take 1 tablet (20 mg) by mouth daily  Dispense: 30 tablet; Refill: 0  - amphetamine-dextroamphetamine (ADDERALL) 20 MG tablet; Take 1 tablet (20 mg) by mouth daily  Dispense: 30 tablet; Refill: 0  - amphetamine-dextroamphetamine (ADDERALL) 20 MG tablet; Take 1 tablet (20 mg) by mouth daily  Dispense: 30 tablet; Refill: 0    4. Other chronic sinusitis  Refilled nasal spray  - Azelastine HCl 137 MCG/SPRAY SOLN; Spray 1-2 sprays in nostril daily  Dispense: 30 mL; Refill: 5    5. Major depressive disorder, recurrent episode, moderate (H)  Refilled medication, mood has been stable.   - DULoxetine (CYMBALTA) 60 MG capsule; TAKE 2 CAPSULES BY MOUTH EVERY DAY  Dispense: 180 capsule; Refill: 1    6. Insomnia, unspecified type  Improved, continue trazodone.  - traZODone (DESYREL) 50 MG tablet; TAKE 1 TO 3 TABLETS(50  MG) BY MOUTH EVERY NIGHT AS NEEDED FOR SLEEP  Dispense: 270 tablet; Refill: 2    7. Impaired fasting glucose  Her fasting glucose today was elevated, I have asked her to come back for A1c.  - **A1C FUTURE anytime; Future    8. 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    Weight as of this encounter: 96.2 kg (212 lb).           reports that she has been smoking.  She has been smoking about 0.30 packs per day. she has never used smokeless tobacco.      Counseling Resources:  ATP IV Guidelines  Pooled Cohorts Equation Calculator  Breast Cancer Risk Calculator  FRAX Risk Assessment  ICSI Preventive Guidelines  Dietary Guidelines for Americans, 2010  [employer] MyPlate  ASA Prophylaxis  Lung CA Screening    Catrachita Collado MD  [facility]  Answers for HPI/ROS submitted by the patient on 3/19/2019   Annual Exam:  If you checked off any problems, how difficult have these problems made it for you to do your work, take care of things at home, or get along with other people?: Very difficult  PHQ9 TOTAL SCORE: 14    "

## 2019-03-20 ASSESSMENT — PATIENT HEALTH QUESTIONNAIRE - PHQ9: SUM OF ALL RESPONSES TO PHQ QUESTIONS 1-9: 14

## 2019-03-21 ENCOUNTER — TELEPHONE (OUTPATIENT)
Dept: FAMILY MEDICINE | Facility: CLINIC | Age: 59
End: 2019-03-21

## 2019-03-21 DIAGNOSIS — N32.89 BLADDER SPASM: Primary | ICD-10-CM

## 2019-03-21 DIAGNOSIS — R33.9 URINARY RETENTION: ICD-10-CM

## 2019-03-21 LAB
COPATH REPORT: NORMAL
PAP: NORMAL

## 2019-03-21 RX ORDER — LOVASTATIN 20 MG
20 TABLET ORAL AT BEDTIME
Qty: 90 TABLET | Refills: 3 | Status: SHIPPED | OUTPATIENT
Start: 2019-03-21 | End: 2021-10-21

## 2019-03-21 NOTE — TELEPHONE ENCOUNTER
Routing to provider - Tobias - please review and advise as appropriate    Last office visit 3/19/19

## 2019-03-21 NOTE — TELEPHONE ENCOUNTER
Patient requesting a call from Dr Collado's nurse.  When she saw Dr Collado on 3-19, she forgot to ask her about getting on that medication they discussed earlier to help her empty her bladder easier.  Please call.  She uses [facility], [address], [place], [place] [id number], [phone].

## 2019-03-22 ENCOUNTER — TELEPHONE (OUTPATIENT)
Dept: FAMILY MEDICINE | Facility: CLINIC | Age: 59
End: 2019-03-22

## 2019-03-22 RX ORDER — TROSPIUM CHLORIDE 20 MG/1
20 TABLET, FILM COATED ORAL
Qty: 60 TABLET | Refills: 3 | Status: SHIPPED | OUTPATIENT
Start: 2019-03-22 | End: 2019-03-23

## 2019-03-22 NOTE — TELEPHONE ENCOUNTER
I called herl .  Last year, her specialist had advised trying Sanctura for her bladder spasms and urinary retention.  I will send it in.  I advised it will likely need a prior auth.

## 2019-03-22 NOTE — TELEPHONE ENCOUNTER
Prior Authorization Retail Medication Request    Medication/Dose: trospium (SANCTURA) 20 MG tablet  ICD code (if different than what is on RX):  Previously Tried and Failed:  Rationale:    Insurance Name: 0  Insurance ID: [id number]    Pharmacy Information (if different than what is on RX)  Name:  Phone:    Please include previous medications tried and failed.  Please ask insurance for medications on formulary.

## 2019-03-22 NOTE — TELEPHONE ENCOUNTER
Huddle with Tobias - please triage and get more information    Writer called patient left non detailed message requesting return call to clinic and ask to speak with nurse    Valeriy Mcginnis RN

## 2019-03-23 DIAGNOSIS — N32.89 BLADDER SPASM: ICD-10-CM

## 2019-03-23 DIAGNOSIS — R33.9 URINARY RETENTION: ICD-10-CM

## 2019-03-24 NOTE — TELEPHONE ENCOUNTER
"Requested Prescriptions   Pending Prescriptions Disp Refills     trospium (SANCTURA) 20 MG tablet [Pharmacy Med Name: TROSPIUM CL 20MG TABLETS]      Last Written Prescription Date:  3/22/2019  Last Fill Quantity: 60 tablet    ,  # refills: 3   Last Office Visit: 3/19/2019   Future Office Visit:      180 tablet 3     Sig: TAKE 1 TABLET(20 MG) BY MOUTH TWICE DAILY BEFORE MEALS    Muscarinic Antagonists (Urinary Incontinence Agents) Passed - 3/23/2019 10:02 PM       Passed - Recent (12 mo) or future (30 days) visit within the authorizing provider's specialty    Patient had office visit in the last 12 months or has a visit in the next 30 days with authorizing provider or within the authorizing provider's specialty.  See \"Patient Info\" tab in inbasket, or \"Choose Columns\" in Meds & Orders section of the refill encounter.           Passed - Patient does not have a diagnosis of glaucoma on the problem list    If glaucoma diagnosis is new, refer refill to physician.       Passed - Medication is active on med list       Passed - Patient is 18 years of age or older          "

## 2019-03-25 ENCOUNTER — TELEPHONE (OUTPATIENT)
Dept: FAMILY MEDICINE | Facility: CLINIC | Age: 59
End: 2019-03-25

## 2019-03-25 LAB
FINAL DIAGNOSIS: ABNORMAL
HPV HR 12 DNA CVX QL NAA+PROBE: POSITIVE
HPV16 DNA SPEC QL NAA+PROBE: NEGATIVE
HPV18 DNA SPEC QL NAA+PROBE: NEGATIVE
SPECIMEN DESCRIPTION: ABNORMAL
SPECIMEN SOURCE CVX/VAG CYTO: ABNORMAL

## 2019-03-25 NOTE — TELEPHONE ENCOUNTER
Reason for Call:  Other call back    Detailed comments: Pt called and is wanting to discuss this ongoing problem with this medication. She said the pharmacy wont fill it and she was to contact her primary. Please Advise thank you    Phone Number Patient can be reached at: Home number on file [phone] (home)    Best Time: anytime    Can we leave a detailed message on this number? YES    Call taken on 3/25/2019 at 10:10 AM by Jessica Crum

## 2019-03-25 NOTE — PROGRESS NOTES
3/19/19 NIL pap, + HR HPV (not 16, 18). Plan: cotest 1 year. ([facility])  3/25/19 patient notified by phone and mychart ([facility])  03/04/20 Cotest reminder letter sent. ([facility])

## 2019-03-25 NOTE — TELEPHONE ENCOUNTER
S-(situation): unable to fill sanctura     B-(background): patient had an rx of sanctura sent on 3/22/19 by Dr. Catrachita Collado (per notes she was aware med may require PA)     A-(assessment): patient is having bladder spasms - she does not believe that she has tried any other medications for this in the past - patient has not contacted her insurance company     R-(recommendations): per chart review - PA encounter started, explained process to patient - sent to PA team and advised patient we would let her know what insurance company is requesting in order to cover - explained they may have a different medication on formulary that they will require her to try first     Liz Padilla, Registered Nurse   [facility]

## 2019-03-26 RX ORDER — TROSPIUM CHLORIDE 20 MG/1
TABLET, FILM COATED ORAL
Qty: 180 TABLET | Refills: 3 | Status: SHIPPED | OUTPATIENT
Start: 2019-03-26 | End: 2019-06-28

## 2019-03-26 NOTE — TELEPHONE ENCOUNTER
Rx approved and refilled per [facility] refill protocol.     Aidee Chowdhury RN  03/26/19  2:12 PM

## 2019-03-27 ENCOUNTER — TELEPHONE (OUTPATIENT)
Dept: FAMILY MEDICINE | Facility: CLINIC | Age: 59
End: 2019-03-27

## 2019-03-27 ENCOUNTER — MYC REFILL (OUTPATIENT)
Dept: FAMILY MEDICINE | Facility: CLINIC | Age: 59
End: 2019-03-27

## 2019-03-27 DIAGNOSIS — J32.8 OTHER CHRONIC SINUSITIS: ICD-10-CM

## 2019-03-27 DIAGNOSIS — G47.00 INSOMNIA, UNSPECIFIED TYPE: ICD-10-CM

## 2019-03-27 RX ORDER — AZELASTINE HYDROCHLORIDE 137 UG/1
1-2 SPRAY, METERED NASAL DAILY
Qty: 30 ML | Refills: 5 | Status: SHIPPED | OUTPATIENT
Start: 2019-03-27 | End: 2019-04-10

## 2019-03-27 RX ORDER — TRAZODONE HYDROCHLORIDE 50 MG/1
TABLET, FILM COATED ORAL
Qty: 30 TABLET | Refills: 0 | Status: SHIPPED | OUTPATIENT
Start: 2019-03-27 | End: 2022-01-26

## 2019-03-27 NOTE — TELEPHONE ENCOUNTER
Reason for Call:  Other call back    Detailed comments: Pt called and was trying to make an apt to see , She was seen in the ER on 3/26 for very painful disc in her neck. They took xrays of her neck but she was also having severe pain on her right arm and shoulder. She was wondering but not guarantees if  can fit her in earlier then 4/2. Please Advise thank you     Phone Number Patient can be reached at: Home number on file [phone] (home)    Best Time: anytime    Can we leave a detailed message on this number? YES    Call taken on 3/27/2019 at 3:43 PM by Jessica Crum

## 2019-03-27 NOTE — TELEPHONE ENCOUNTER
Could use the Friday 12:40 spot.   HOWEVER: I am not sure what I can do for her after reviewing the records.  She follows with a pain management clinic for the neck pain.  I do get records from there occasionally, but I do not know what their latest plan is (?spine injection).    If she wants to go back to a spine specialist for re-consultation, I can refer her there (and would not need a visit to do so).  If she would like physical therapy, I can refer for that.  Beyond that, I am not sure how I can be of help, though I am always happy to see her.  Thanks.

## 2019-03-27 NOTE — TELEPHONE ENCOUNTER
PA was denied. Please order alternative med with complete SIG or begin appeal process.     If you would like to appeal:   Create letter of medical necessity or    Compile supporting clinical documentation in EPIC Telephone encounter (TE).    Route TE to: [facility].     PRIOR AUTHORIZATION DENIED     Medication: trospium (SANCTURA) 20 MG tablet, rec 3-22-19     Denial Date: 3/27/2019     Denial Rational:  Must try/fail formulary alternatives.

## 2019-03-27 NOTE — TELEPHONE ENCOUNTER
PRIOR AUTHORIZATION DENIED    Medication: trospium (SANCTURA) 20 MG tablet, rec 3-22-19    Denial Date: 3/27/2019    Denial Rational:  Must try/fail formulary alternatives.         Appeal Information:    If you would like to appeal, please supply P/A team with a letter of medical necessity with clinical reason.

## 2019-03-27 NOTE — TELEPHONE ENCOUNTER
Dr. Collado,  Please see phone message below.     Would you be willing to fit patient in any sooner than 04/02/2019 at 11:00?    Please advise    Thank You!  Clotilde Polk, RN  Triage Nurse

## 2019-03-27 NOTE — TELEPHONE ENCOUNTER
Central Prior Authorization Team   Phone: [phone]      PA Initiation    Medication: trospium (SANCTURA) 20 MG tablet, rec 3-22-19  Insurance Company: [employer] - Phone [phone] Fax [phone]  Pharmacy Filling the Rx: [id number] - [place], [place] - [address] AT [address]  Filling Pharmacy Phone: [phone]  Filling Pharmacy Fax:    Start Date: 3/27/2019

## 2019-03-27 NOTE — TELEPHONE ENCOUNTER
Dr. Collado-Please review and may close encounter.  Patient wanted you updated.    Writer called patient and reviewed message per Dr. Collado.    Patient verbalized understanding and stated she will follow up with her pain clinic: Dr. Reyes, to ensure all options have been exhausted.    Patient requested 4/2/19 appt with Dr. Collado be cancelled.    This pain is new and patient expressed frustration over ER doctor not ordering MRI for right shoulder/arm as pain is also located there.    Only has one more dose of Trazodone remaining and waiting for mail order.  Sent [facility] refill request today.    Writer informed patient writer would work on refill for short supply of Trazodone.    Thank you!  CHANTE Longo, [place], RN

## 2019-03-27 NOTE — TELEPHONE ENCOUNTER
"See 3/27/19 telephone encounter.    Prescriptions approved per [facility] Refill Protocol.  FLORENCE Bonds, RN      Requested Prescriptions   Pending Prescriptions Disp Refills     Azelastine HCl 137 MCG/SPRAY SOLN 30 mL 5     Sig: Spray 1-2 sprays in nostril daily    Antihistamines Protocol Passed - 3/27/2019  4:49 PM       Passed - Patient is 3-64 years of age    Apply weight-based dosing for peds patients age 3 - 12 years of age.    Forward request to provider for patients under the age of 3 or over the age of 64.         Passed - Recent (12 mo) or future (30 days) visit within the authorizing provider's specialty    Patient had office visit in the last 12 months or has a visit in the next 30 days with authorizing provider or within the authorizing provider's specialty.  See \"Patient Info\" tab in inbasket, or \"Choose Columns\" in Meds & Orders section of the refill encounter.             Passed - Medication is active on med list        traZODone (DESYREL) 50 MG tablet 270 tablet 2     Sig: TAKE 1 TO 3 TABLETS(50  MG) BY MOUTH EVERY NIGHT AS NEEDED FOR SLEEP    Serotonin Modulators Passed - 3/27/2019  4:49 PM       Passed - Recent (12 mo) or future (30 days) visit within the authorizing provider's specialty    Patient had office visit in the last 12 months or has a visit in the next 30 days with authorizing provider or within the authorizing provider's specialty.  See \"Patient Info\" tab in inbasket, or \"Choose Columns\" in Meds & Orders section of the refill encounter.             Passed - Medication is active on med list       Passed - Patient is age 18 or older       Passed - No active pregnancy on record       Passed - No positive pregnancy test in past 12 months          "

## 2019-03-28 NOTE — TELEPHONE ENCOUNTER
Thanks so much.  Ok for short supply of trazodone if needed, 50mg take 1-3 tabs at night as needed for sleep, qty 21, zero refills.

## 2019-03-28 NOTE — TELEPHONE ENCOUNTER
LYUBOVM requesting a call back from patient to relay message from A.S. Below.    Thanks! Gilda Caldera RN

## 2019-04-02 ENCOUNTER — OFFICE VISIT (OUTPATIENT)
Dept: URGENT CARE | Facility: URGENT CARE | Age: 59
End: 2019-04-02
Payer: COMMERCIAL

## 2019-04-02 ENCOUNTER — ANCILLARY PROCEDURE (OUTPATIENT)
Dept: GENERAL RADIOLOGY | Facility: CLINIC | Age: 59
End: 2019-04-02
Attending: INTERNAL MEDICINE
Payer: COMMERCIAL

## 2019-04-02 VITALS
SYSTOLIC BLOOD PRESSURE: 124 MMHG | WEIGHT: 213 LBS | TEMPERATURE: 98.9 F | HEART RATE: 77 BPM | DIASTOLIC BLOOD PRESSURE: 86 MMHG | BODY MASS INDEX: 31.68 KG/M2 | OXYGEN SATURATION: 97 %

## 2019-04-02 DIAGNOSIS — R05.9 COUGH: ICD-10-CM

## 2019-04-02 DIAGNOSIS — R50.9 ACUTE FEBRILE ILLNESS: ICD-10-CM

## 2019-04-02 DIAGNOSIS — R09.89 CHEST CONGESTION: ICD-10-CM

## 2019-04-02 DIAGNOSIS — J22 LRTI (LOWER RESPIRATORY TRACT INFECTION): Primary | ICD-10-CM

## 2019-04-02 LAB
BASOPHILS # BLD AUTO: 0 10E9/L (ref 0–0.2)
BASOPHILS NFR BLD AUTO: 0.4 %
DIFFERENTIAL METHOD BLD: NORMAL
EOSINOPHIL # BLD AUTO: 0.1 10E9/L (ref 0–0.7)
EOSINOPHIL NFR BLD AUTO: 1.6 %
ERYTHROCYTE [DISTWIDTH] IN BLOOD BY AUTOMATED COUNT: 14.1 % (ref 10–15)
FLUAV+FLUBV AG SPEC QL: NEGATIVE
FLUAV+FLUBV AG SPEC QL: NEGATIVE
HCT VFR BLD AUTO: 45.8 % (ref 35–47)
HGB BLD-MCNC: 15 G/DL (ref 11.7–15.7)
LYMPHOCYTES # BLD AUTO: 2.8 10E9/L (ref 0.8–5.3)
LYMPHOCYTES NFR BLD AUTO: 38.1 %
MCH RBC QN AUTO: 30.2 PG (ref 26.5–33)
MCHC RBC AUTO-ENTMCNC: 32.8 G/DL (ref 31.5–36.5)
MCV RBC AUTO: 92 FL (ref 78–100)
MONOCYTES # BLD AUTO: 0.6 10E9/L (ref 0–1.3)
MONOCYTES NFR BLD AUTO: 7.5 %
NEUTROPHILS # BLD AUTO: 3.8 10E9/L (ref 1.6–8.3)
NEUTROPHILS NFR BLD AUTO: 52.4 %
PLATELET # BLD AUTO: 213 10E9/L (ref 150–450)
RBC # BLD AUTO: 4.96 10E12/L (ref 3.8–5.2)
SPECIMEN SOURCE: NORMAL
WBC # BLD AUTO: 7.3 10E9/L (ref 4–11)

## 2019-04-02 PROCEDURE — 71046 X-RAY EXAM CHEST 2 VIEWS: CPT

## 2019-04-02 PROCEDURE — 99214 OFFICE O/P EST MOD 30 MIN: CPT | Performed by: INTERNAL MEDICINE

## 2019-04-02 PROCEDURE — 87804 INFLUENZA ASSAY W/OPTIC: CPT | Performed by: INTERNAL MEDICINE

## 2019-04-02 PROCEDURE — 85025 COMPLETE CBC W/AUTO DIFF WBC: CPT | Performed by: INTERNAL MEDICINE

## 2019-04-02 PROCEDURE — 36415 COLL VENOUS BLD VENIPUNCTURE: CPT | Performed by: INTERNAL MEDICINE

## 2019-04-02 RX ORDER — AZITHROMYCIN 250 MG/1
TABLET, FILM COATED ORAL
Qty: 6 TABLET | Refills: 0 | Status: SHIPPED | OUTPATIENT
Start: 2019-04-02 | End: 2019-04-10

## 2019-04-02 RX ORDER — CODEINE PHOSPHATE AND GUAIFENESIN 10; 100 MG/5ML; MG/5ML
1-2 SOLUTION ORAL
Qty: 118 ML | Refills: 0 | Status: SHIPPED | OUTPATIENT
Start: 2019-04-02 | End: 2019-07-19

## 2019-04-02 ASSESSMENT — ENCOUNTER SYMPTOMS
WHEEZING: 0
HEADACHES: 1
SORE THROAT: 1
MYALGIAS: 1
ARTHRALGIAS: 1
CHILLS: 1
SHORTNESS OF BREATH: 1
LIGHT-HEADEDNESS: 1
DIAPHORESIS: 1

## 2019-04-02 NOTE — PROGRESS NOTES
SUBJECTIVE:   Ilsa Yusuf is a 58 year old female presenting with a chief complaint of   Chief Complaint   Patient presents with     Urgent Care     Pt in clinic to have eval for aches, cough, congestion, chest tightness, and fever for 6 days.     Fever     Cough     Generalized Body Aches     Nasal Congestion       She is an established patient of [facility].    URI Adult    Onset of symptoms was 6-7 day(s) ago.  Course of illness is worsening.    Severity - feels awful  Current and Associated symptoms: fever 101 yesterday, stuffy nose, cough - productive and fatigue  Treatment measures tried include lozenges/zinc.  Predisposing factors include ill contact: unknown and   No hx asthma    Renters downstairs to smoke      Review of Systems   Constitutional: Positive for chills and diaphoresis.   HENT: Positive for sore throat. Negative for ear pain.    Respiratory: Positive for shortness of breath. Negative for wheezing.         Chest congestion   Musculoskeletal: Positive for arthralgias and myalgias.   Neurological: Positive for light-headedness and headaches.       Past Medical History:   Diagnosis Date     Arthritis 2012    both knees; hands     Cervical high risk HPV (human papillomavirus) test positive 2019    see problem list     Depressive disorder      Depressive disorder, not elsewhere classified 12    DC 10/05/12-[facility]     Hyperlipidemia LDL goal < 130     mevacor     Hypothyroid      Moderate major depression (H)     abilify, cymbalta, seroquel, and sofya, Dr Alvarez Persamy     Mumps      PTSD (post-traumatic stress disorder)      Seizure (H) 2011    one episode, was on Keppra, EEG negative     Family History   Problem Relation Age of Onset     C.A.D. Father 58         at 58, heart disease     Mental Illness Father      Coronary Artery Disease Father      Hyperlipidemia Father      Alzheimer Disease Mother         total care now     Depression Mother      Anxiety  Disorder Mother      Diabetes Sister         adult onset     Melanoma Sister      Breast Cancer Sister      Thyroid Disease Sister      Current Outpatient Medications   Medication Sig Dispense Refill     [START ON 5/18/2019] amphetamine-dextroamphetamine (ADDERALL) 20 MG tablet Take 1 tablet (20 mg) by mouth daily 30 tablet 0     [START ON 4/18/2019] amphetamine-dextroamphetamine (ADDERALL) 20 MG tablet Take 1 tablet (20 mg) by mouth daily 30 tablet 0     amphetamine-dextroamphetamine (ADDERALL) 20 MG tablet Take 1 tablet (20 mg) by mouth daily 30 tablet 0     Azelastine HCl 137 MCG/SPRAY SOLN Spray 1-2 sprays in nostril daily 30 mL 5     azithromycin (ZITHROMAX) 250 MG tablet Take 2 tablets (500 mg) by mouth daily for 1 day, THEN 1 tablet (250 mg) daily for 4 days. 6 tablet 0     cholecalciferol (VITAMIN D3) 5000 UNITS CAPS capsule Take 1 capsule (5,000 Units) by mouth daily Take 1 per day 100 capsule 2     DULoxetine (CYMBALTA) 60 MG capsule TAKE 2 CAPSULES BY MOUTH EVERY  capsule 1     FIBER PO Two capsules in the morning and two capsules at night       fish oil-omega-3 fatty acids (OMEGA 3) 1000 MG capsule Take 2 g by mouth daily Takes 1 in am and 1 at bedtime       guaiFENesin-codeine (ROBITUSSIN AC) 100-10 MG/5ML solution Take 5-10 mLs by mouth nightly as needed for cough 118 mL 0     levothyroxine (SYNTHROID/LEVOTHROID) 200 MCG tablet Take 1 tablet (200 mcg) by mouth daily 90 tablet 3     lovastatin (MEVACOR) 20 MG tablet Take 1 tablet (20 mg) by mouth At Bedtime 90 tablet 3     multivitamin, therapeutic with minerals (MULTI-VITAMIN) TABS Take 1 tablet by mouth daily       OLANZapine (ZYPREXA) 2.5 MG tablet TAKE 1 TABLET(2.5 MG) BY MOUTH AT BEDTIME 90 tablet 1     omeprazole (PRILOSEC) 20 MG DR capsule TAKE 1 CAPSULE(20 MG) BY MOUTH DAILY 90 capsule 0     omeprazole (PRILOSEC) 40 MG DR capsule Take 1 capsule (40 mg) by mouth daily 90 capsule 3     ondansetron (ZOFRAN) 4 MG tablet Take 1 tablet (4 mg)  by mouth every 6 hours as needed for nausea 18 tablet 5     oxyCODONE-acetaminophen (PERCOCET) 5-325 MG per tablet Take 1-2 tablets by mouth every 6 hours as needed for pain 15 tablet 0     prazosin (MINIPRESS) 5 MG capsule TAKE 1 CAPSULE(5 MG) BY MOUTH AT BEDTIME 90 capsule 2     tiZANidine (ZANAFLEX) 4 MG tablet Take 1-3 tablets (4-12 mg) by mouth 3 times daily as needed for muscle spasms 210 tablet 3     topiramate (TOPAMAX) 50 MG tablet TAKE 1 TO 2 TABLETS(50  MG) BY MOUTH TWICE DAILY 360 tablet 1     traZODone (DESYREL) 50 MG tablet TAKE 1 TO 3 TABLETS(50  MG) BY MOUTH EVERY NIGHT AS NEEDED FOR SLEEP 30 tablet 0     trospium (SANCTURA) 20 MG tablet TAKE 1 TABLET(20 MG) BY MOUTH TWICE DAILY BEFORE MEALS 180 tablet 3     Social History     Tobacco Use     Smoking status: Former Smoker     Packs/day: 0.30     Last attempt to quit: 2015     Years since quittin.1     Smokeless tobacco: Never Used     Tobacco comment: recreational   Substance Use Topics     Alcohol use: Yes     Alcohol/week: 0.0 oz     Comment: once a week       OBJECTIVE  /86   Pulse 77   Temp 98.9  F (37.2  C) (Oral)   Wt 96.6 kg (213 lb)   SpO2 97%   BMI 31.68 kg/m      Physical Exam   Constitutional: She appears well-developed and well-nourished. No distress.   Fatigue  She did cough up and show me a sample of mucus which was yellow   HENT:   Nose: Nose normal.   Mouth/Throat: Oropharynx is clear and moist. No oropharyngeal exudate.   Tympanic membranes normal bilaterally   Cardiovascular: Normal rate, regular rhythm and normal heart sounds.   Pulmonary/Chest: Effort normal and breath sounds normal. No respiratory distress. She has no wheezes.   Lymphadenopathy:     She has no cervical adenopathy.   Psychiatric:   Patient is upset about being sick she does not feel well   Vitals reviewed.      Labs:  Results for orders placed or performed in visit on 19 (from the past 24 hour(s))   Influenza A/B antigen    Result Value Ref Range    Influenza A/B Agn Specimen Nasopharyngeal     Influenza A Negative NEG^Negative    Influenza B Negative NEG^Negative   CBC with platelets and differential   Result Value Ref Range    WBC 7.3 4.0 - 11.0 10e9/L    RBC Count 4.96 3.8 - 5.2 10e12/L    Hemoglobin 15.0 11.7 - 15.7 g/dL    Hematocrit 45.8 35.0 - 47.0 %    MCV 92 78 - 100 fl    MCH 30.2 26.5 - 33.0 pg    MCHC 32.8 31.5 - 36.5 g/dL    RDW 14.1 10.0 - 15.0 %    Platelet Count 213 150 - 450 10e9/L    % Neutrophils 52.4 %    % Lymphocytes 38.1 %    % Monocytes 7.5 %    % Eosinophils 1.6 %    % Basophils 0.4 %    Absolute Neutrophil 3.8 1.6 - 8.3 10e9/L    Absolute Lymphocytes 2.8 0.8 - 5.3 10e9/L    Absolute Monocytes 0.6 0.0 - 1.3 10e9/L    Absolute Eosinophils 0.1 0.0 - 0.7 10e9/L    Absolute Basophils 0.0 0.0 - 0.2 10e9/L    Diff Method Automated Method        X-Ray was done, my findings are: Chest x-ray appears clear and was compared to her past x-ray    ASSESSMENT:      ICD-10-CM    1. LRTI (lower respiratory tract infection) J22 azithromycin (ZITHROMAX) 250 MG tablet     guaiFENesin-codeine (ROBITUSSIN AC) 100-10 MG/5ML solution   2. Acute febrile illness R50.9 Influenza A/B antigen     CBC with platelets and differential     XR Chest 2 Views   3. Cough R05 Influenza A/B antigen     CBC with platelets and differential     XR Chest 2 Views   4. Chest congestion R09.89 Influenza A/B antigen     CBC with platelets and differential     XR Chest 2 Views        Medical Decision Making:    Differential Diagnosis:  URI Adult/Peds:  Bronchitis-viral, Laryngitis and Pneumonia    Serious Comorbid Conditions:  Adult:  None    PLAN:      Patient Instructions     Fluids  Rest  vicks  Menthol cough drops  Robitussin codeine bedtime  Monitor fever    zpak    Recheck with primary 3 days          Patient Education     Viral Bronchitis (Adult)    You have a viral bronchitis. Bronchitis is inflammation and swelling of the lining of the lungs. This  is often caused by an infection. Symptoms include a dry, hacking cough that is worse at night. The cough may bring up yellow-green mucus. You may also feel short of breath or wheeze. Other symptoms may include tiredness, chest discomfort, and chills.  Bronchitis that is caused by a virus is not treated with antibiotics. Instead, medicines may be given to help relieve symptoms. Symptoms can last up to 2 weeks, although the cough may last much longer.  This illness is contagious during the first few days and is spread through the air by coughing and sneezing, or by direct contact (touching the sick person and then touching your own eyes, nose, or mouth).  Most viral illnesses resolve within 10 to 14 days with rest and simple home remedies, although they may sometimes last for several weeks.  Home care    If symptoms are severe, rest at home for the first 2 to 3 days. When you go back to your usual activities, don't let yourself get too tired.    Do not smoke. Also avoid being exposed to secondhand smoke.    You may use over-the-counter medicine to control fever or pain, unless another pain medicine was prescribed. If you have chronic liver or kidney disease or have ever had a stomach ulcer or gastrointestinal bleeding, talk with your healthcare provider before using these medicines. Also talk to your provider if you are taking medicine to prevent blood clots. Aspirin should never be given to anyone younger than 18 years of age who is ill with a viral infection or fever. It may cause severe liver or brain damage.    Your appetite may be poor, so a light diet is fine. Avoid dehydration by drinking 6 to 8 glasses of fluids per day (such as water, soft drinks, sports drinks, juices, tea, or soup). Extra fluids will help loosen secretions in the nose and lungs.    Over-the-counter cough, cold, and sore-throat medicines will not shorten the length of the illness, but they may help to reduce symptoms. Don't use decongestants  if you have high blood pressure.  Follow-up care  Follow up with your healthcare provider, or as advised. If you had an X-ray or ECG (electrocardiogram), a specialist will review it. You will be notified of any new findings that may affect your care.  If you are age 65 or older, or if you have a chronic lung disease or condition that affects your immune system, or you smoke, ask your healthcare provider about getting a pneumococcal vaccine and a yearly flu shot (influenza vaccine).  When to seek medical advice  Call your healthcare provider right away if any of these occur:    Fever of 100.4 F (38 C) or higher, or as directed by your healthcare provider    Coughing up increased amounts of colored sputum    Weakness, drowsiness, headache, facial pain, ear pain, or a stiff neck  Call 911  Call 911 if any of these occur:    Coughing up blood    Worsening weakness, drowsiness, headache, or stiff neck    Trouble breathing, wheezing, or pain with breathing  Date Last Reviewed: 6/1/2018 2000-2018 [employer]. [address] [place] [address], [place] [address]. All rights reserved. This information is not intended as a substitute for professional medical care. Always follow your healthcare professional's instructions.

## 2019-04-02 NOTE — PATIENT INSTRUCTIONS
Fluids  Rest  vicks  Menthol cough drops  Robitussin codeine bedtime  Monitor fever    zpak    Recheck with primary 3 days          Patient Education     Viral Bronchitis (Adult)    You have a viral bronchitis. Bronchitis is inflammation and swelling of the lining of the lungs. This is often caused by an infection. Symptoms include a dry, hacking cough that is worse at night. The cough may bring up yellow-green mucus. You may also feel short of breath or wheeze. Other symptoms may include tiredness, chest discomfort, and chills.  Bronchitis that is caused by a virus is not treated with antibiotics. Instead, medicines may be given to help relieve symptoms. Symptoms can last up to 2 weeks, although the cough may last much longer.  This illness is contagious during the first few days and is spread through the air by coughing and sneezing, or by direct contact (touching the sick person and then touching your own eyes, nose, or mouth).  Most viral illnesses resolve within 10 to 14 days with rest and simple home remedies, although they may sometimes last for several weeks.  Home care    If symptoms are severe, rest at home for the first 2 to 3 days. When you go back to your usual activities, don't let yourself get too tired.    Do not smoke. Also avoid being exposed to secondhand smoke.    You may use over-the-counter medicine to control fever or pain, unless another pain medicine was prescribed. If you have chronic liver or kidney disease or have ever had a stomach ulcer or gastrointestinal bleeding, talk with your healthcare provider before using these medicines. Also talk to your provider if you are taking medicine to prevent blood clots. Aspirin should never be given to anyone younger than 18 years of age who is ill with a viral infection or fever. It may cause severe liver or brain damage.    Your appetite may be poor, so a light diet is fine. Avoid dehydration by drinking 6 to 8 glasses of fluids per day (such as  water, soft drinks, sports drinks, juices, tea, or soup). Extra fluids will help loosen secretions in the nose and lungs.    Over-the-counter cough, cold, and sore-throat medicines will not shorten the length of the illness, but they may help to reduce symptoms. Don't use decongestants if you have high blood pressure.  Follow-up care  Follow up with your healthcare provider, or as advised. If you had an X-ray or ECG (electrocardiogram), a specialist will review it. You will be notified of any new findings that may affect your care.  If you are age 65 or older, or if you have a chronic lung disease or condition that affects your immune system, or you smoke, ask your healthcare provider about getting a pneumococcal vaccine and a yearly flu shot (influenza vaccine).  When to seek medical advice  Call your healthcare provider right away if any of these occur:    Fever of 100.4 F (38 C) or higher, or as directed by your healthcare provider    Coughing up increased amounts of colored sputum    Weakness, drowsiness, headache, facial pain, ear pain, or a stiff neck  Call 911  Call 911 if any of these occur:    Coughing up blood    Worsening weakness, drowsiness, headache, or stiff neck    Trouble breathing, wheezing, or pain with breathing  Date Last Reviewed: 6/1/2018 2000-2018 [employer]. [address] [place] [address], [place] [address]. All rights reserved. This information is not intended as a substitute for professional medical care. Always follow your healthcare professional's instructions.

## 2019-04-03 ENCOUNTER — AMBULATORY - HEALTHEAST (OUTPATIENT)
Dept: NEUROSURGERY | Facility: CLINIC | Age: 59
End: 2019-04-03

## 2019-04-03 DIAGNOSIS — M54.2 NECK PAIN: ICD-10-CM

## 2019-04-10 ENCOUNTER — MYC MEDICAL ADVICE (OUTPATIENT)
Dept: FAMILY MEDICINE | Facility: CLINIC | Age: 59
End: 2019-04-10

## 2019-04-10 DIAGNOSIS — K21.9 GASTROESOPHAGEAL REFLUX DISEASE, ESOPHAGITIS PRESENCE NOT SPECIFIED: ICD-10-CM

## 2019-04-10 DIAGNOSIS — J32.9 CHRONIC SINUSITIS, UNSPECIFIED LOCATION: Primary | ICD-10-CM

## 2019-04-10 RX ORDER — IPRATROPIUM BROMIDE 21 UG/1
2 SPRAY, METERED NASAL EVERY 12 HOURS
Qty: 30 ML | Refills: 5 | Status: SHIPPED | OUTPATIENT
Start: 2019-04-10 | End: 2019-04-15

## 2019-04-10 RX ORDER — OMEPRAZOLE 40 MG/1
40 CAPSULE, DELAYED RELEASE ORAL DAILY
Qty: 90 CAPSULE | Refills: 2 | Status: SHIPPED | OUTPATIENT
Start: 2019-04-10 | End: 2021-10-18

## 2019-04-10 NOTE — TELEPHONE ENCOUNTER
"[facility]     Patient question in regards to labs from 4/2:    \" I didn't understand the CBC Dr Collado, no elevation in the white count? I felt like death warmed over,  extremely productive wet cough producing (yuk) large amounts of green phlegm.  Dr Haynes said the chest X-ray was clear. That was great news, but with producing that much phlegm it comes from somewhere?  So I was just wondering why my lab work said my body wasn't mounting a response to being sick.  Thanks so much for your time as always.  Deanne Yusuf\"      Please advise.       Patient did state she is till doing well-not sick any more-just had above question.    Thanks! Gilda Caldera RN    "

## 2019-04-10 NOTE — TELEPHONE ENCOUNTER
The white count does not always reflect how miserable we feel.  It can be high, normal, or even low in the setting of infections, and that can change depending on at what point in the illness you are checking.    I am so glad to hear she is feeling better.

## 2019-04-11 NOTE — TELEPHONE ENCOUNTER
"Requested Prescriptions   Pending Prescriptions Disp Refills     omeprazole (PRILOSEC) 20 MG DR capsule [Pharmacy Med Name: OMEPRAZOLE 20 MG Capsule Delayed Release] 90 capsule 0     Sig: TAKE 1 CAPSULE EVERY DAY      Last Written Prescription Date:  4/10/2019  Last Fill Quantity: 90 capsule    ,  # refills: 2   Last Office Visit: 3/19/2019   Future Office Visit:            PPI Protocol Passed - 4/10/2019  7:19 PM        Passed - Not on Clopidogrel (unless Pantoprazole ordered)        Passed - No diagnosis of osteoporosis on record        Passed - Recent (12 mo) or future (30 days) visit within the authorizing provider's specialty     Patient had office visit in the last 12 months or has a visit in the next 30 days with authorizing provider or within the authorizing provider's specialty.  See \"Patient Info\" tab in inbasket, or \"Choose Columns\" in Meds & Orders section of the refill encounter.          Passed - Medication is active on med list        Passed - Patient is age 18 or older        Passed - No active pregnacy on record        Passed - No positive pregnancy test in past 12 months          "

## 2019-04-12 DIAGNOSIS — E03.9 ACQUIRED HYPOTHYROIDISM: ICD-10-CM

## 2019-04-12 DIAGNOSIS — F51.01 PRIMARY INSOMNIA: ICD-10-CM

## 2019-04-12 DIAGNOSIS — J32.9 CHRONIC SINUSITIS, UNSPECIFIED LOCATION: ICD-10-CM

## 2019-04-12 NOTE — TELEPHONE ENCOUNTER
Requested Prescriptions   Pending Prescriptions Disp Refills     ipratropium (ATROVENT) 0.03 % nasal spray  Transferring to Mail Order Pharmacy.   Last Written Prescription Date:  4-10-19  Last Fill Quantity: 30 ml,   # refills: 5  Last Office Visit: 3-19-19  Future Office visit:       Routing refill request to provider for review/approval because:  Drug not on the G, P or [facility] refill protocol or controlled substance  30 mL 5     Sig: Spray 2 sprays into both nostrils every 12 hours       There is no refill protocol information for this order

## 2019-04-15 RX ORDER — IPRATROPIUM BROMIDE 21 UG/1
2 SPRAY, METERED NASAL EVERY 12 HOURS
Qty: 90 ML | Refills: 1 | Status: SHIPPED | OUTPATIENT
Start: 2019-04-15 | End: 2020-03-17

## 2019-04-15 RX ORDER — PRAZOSIN HYDROCHLORIDE 5 MG/1
CAPSULE ORAL
Qty: 90 CAPSULE | Refills: 2 | OUTPATIENT
Start: 2019-04-15

## 2019-04-15 RX ORDER — LEVOTHYROXINE SODIUM 200 UG/1
200 TABLET ORAL DAILY
Qty: 90 TABLET | Refills: 3 | OUTPATIENT
Start: 2019-04-15

## 2019-04-15 NOTE — TELEPHONE ENCOUNTER
See [employer] message of 4/15/2019. Pt wants rxs at [facility] now.   Denied to benjie Pleitez, RN, BSN     ]

## 2019-04-15 NOTE — TELEPHONE ENCOUNTER
Rx signed for ipratropium per MD intention on 4/15/19. Pt switching pharmacies    Elodia Pleitez, RN, BSN

## 2019-04-16 ENCOUNTER — TRANSFERRED RECORDS (OUTPATIENT)
Dept: HEALTH INFORMATION MANAGEMENT | Facility: CLINIC | Age: 59
End: 2019-04-16

## 2019-05-07 ENCOUNTER — OFFICE VISIT - HEALTHEAST (OUTPATIENT)
Dept: NEUROSURGERY | Facility: CLINIC | Age: 59
End: 2019-05-07

## 2019-05-07 ENCOUNTER — HOSPITAL ENCOUNTER (OUTPATIENT)
Dept: RADIOLOGY | Facility: CLINIC | Age: 59
Discharge: HOME | End: 2019-05-07
Attending: NEUROLOGICAL SURGERY

## 2019-05-07 ENCOUNTER — COMMUNICATION - HEALTHEAST (OUTPATIENT)
Dept: TELEHEALTH | Facility: CLINIC | Age: 59
End: 2019-05-07

## 2019-05-07 DIAGNOSIS — R20.2 BILATERAL ARM NUMBNESS AND TINGLING WHILE SLEEPING: ICD-10-CM

## 2019-05-07 DIAGNOSIS — G56.03 BILATERAL CARPAL TUNNEL SYNDROME: ICD-10-CM

## 2019-05-07 DIAGNOSIS — M54.2 NECK PAIN: ICD-10-CM

## 2019-05-07 DIAGNOSIS — R20.0 BILATERAL ARM NUMBNESS AND TINGLING WHILE SLEEPING: ICD-10-CM

## 2019-05-13 ENCOUNTER — COMMUNICATION - HEALTHEAST (OUTPATIENT)
Dept: NEUROSURGERY | Facility: CLINIC | Age: 59
End: 2019-05-13

## 2019-05-15 ENCOUNTER — TRANSFERRED RECORDS (OUTPATIENT)
Dept: HEALTH INFORMATION MANAGEMENT | Facility: CLINIC | Age: 59
End: 2019-05-15

## 2019-05-24 ENCOUNTER — HOSPITAL ENCOUNTER (OUTPATIENT)
Dept: PHYSICAL MEDICINE AND REHAB | Facility: CLINIC | Age: 59
Discharge: HOME | End: 2019-05-24
Attending: NEUROLOGICAL SURGERY

## 2019-05-24 ENCOUNTER — TRANSFERRED RECORDS (OUTPATIENT)
Dept: HEALTH INFORMATION MANAGEMENT | Facility: CLINIC | Age: 59
End: 2019-05-24

## 2019-05-24 DIAGNOSIS — M50.30 DDD (DEGENERATIVE DISC DISEASE), CERVICAL: ICD-10-CM

## 2019-05-24 DIAGNOSIS — G56.01 CARPAL TUNNEL SYNDROME OF RIGHT WRIST: ICD-10-CM

## 2019-05-24 DIAGNOSIS — M54.2 CERVICALGIA: ICD-10-CM

## 2019-05-24 DIAGNOSIS — M79.18 MYOFASCIAL PAIN: ICD-10-CM

## 2019-05-24 DIAGNOSIS — R20.0 BILATERAL ARM NUMBNESS AND TINGLING WHILE SLEEPING: ICD-10-CM

## 2019-05-24 DIAGNOSIS — G89.4 CHRONIC PAIN SYNDROME: ICD-10-CM

## 2019-05-24 DIAGNOSIS — R20.2 PARESTHESIA OF ARM: ICD-10-CM

## 2019-05-24 DIAGNOSIS — R20.2 BILATERAL ARM NUMBNESS AND TINGLING WHILE SLEEPING: ICD-10-CM

## 2019-05-24 ASSESSMENT — MIFFLIN-ST. JEOR: SCORE: 1609.25

## 2019-05-24 NOTE — LETTER
[facility]  [address] [place] [id number]  Phone: [phone]    05/31/19    Ilsa Yusuf  [facility]  [place] [address]      To whom it may concern:     In order to ensure we are providing the best quality care, we have reviewed your chart and see that you are due for a follow up appointment.    We have also sent your topiramate (TOPAMAX) 50 MG tablet medication to your pharmacy. For future medication refills, please contact your primary care clinic to schedule an appointment. This can be requested via [employer] or by calling the clinic at [phone].    We greatly appreciate the opportunity to serve you. Thank you for trusting us with your health care.      Sincerely,      Your care team at [facility]

## 2019-05-25 NOTE — TELEPHONE ENCOUNTER
"Requested Prescriptions   Pending Prescriptions Disp Refills     topiramate (TOPAMAX) 50 MG tablet  Last Written Prescription Date:  1/9/2019  Last Fill Quantity: 360 tabs,  # refills: 1   Last office visit: 3/19/2019 with prescribing provider:  Tobias   Future Office Visit:     360 tablet 1     Sig: TAKE 1 TO 2 TABLETS(50  MG) BY MOUTH TWICE DAILY       Anti-Seizure Meds Protocol  Failed - 5/24/2019  2:17 PM        Failed - Review Authorizing provider's last note.      Refer to last progress notes: confirm request is for original authorizing provider (cannot be through other providers).          Passed - Recent (12 mo) or future (30 days) visit within the authorizing provider's specialty     Patient had office visit in the last 12 months or has a visit in the next 30 days with authorizing provider or within the authorizing provider's specialty.  See \"Patient Info\" tab in inbasket, or \"Choose Columns\" in Meds & Orders section of the refill encounter.              Passed - Normal CBC on file in past 26 months     Recent Labs   Lab Test 04/02/19  1402   WBC 7.3   RBC 4.96   HGB 15.0   HCT 45.8                    Passed - Normal ALT or AST on file in past 26 months     Recent Labs   Lab Test 11/06/18  1845   ALT 34     Recent Labs   Lab Test 11/06/18  1845   AST 23             Passed - Normal platelet count on file in past 26 months     Recent Labs   Lab Test 04/02/19  1402                  Passed - Medication is active on med list        Passed - No active pregnancy on record        Passed - No positive pregnancy test in last 12 months          "

## 2019-05-29 RX ORDER — TOPIRAMATE 50 MG/1
TABLET, FILM COATED ORAL
Qty: 120 TABLET | Refills: 3 | Status: SHIPPED | OUTPATIENT
Start: 2019-05-29 | End: 2019-08-01

## 2019-05-29 NOTE — TELEPHONE ENCOUNTER
Patient has not scheduled chronic pain follow up visit, per plan from 1/9/19 MyChart refill encounter.    One month supply pended.    Dr. Collado-Please sign if agree.    Team coordinators-Please contact patient to schedule.    Thank you!  CHANTE Longo, BSN, RN

## 2019-06-03 ENCOUNTER — MYC MEDICAL ADVICE (OUTPATIENT)
Dept: FAMILY MEDICINE | Facility: CLINIC | Age: 59
End: 2019-06-03

## 2019-06-03 DIAGNOSIS — F90.1 ATTENTION-DEFICIT HYPERACTIVITY DISORDER, PREDOMINANTLY HYPERACTIVE TYPE: Primary | ICD-10-CM

## 2019-06-05 RX ORDER — DEXTROAMPHETAMINE SACCHARATE, AMPHETAMINE ASPARTATE, DEXTROAMPHETAMINE SULFATE AND AMPHETAMINE SULFATE 5; 5; 5; 5 MG/1; MG/1; MG/1; MG/1
20 TABLET ORAL DAILY
Qty: 30 TABLET | Refills: 0 | Status: SHIPPED | OUTPATIENT
Start: 2019-08-16 | End: 2019-09-15

## 2019-06-05 RX ORDER — DEXTROAMPHETAMINE SACCHARATE, AMPHETAMINE ASPARTATE, DEXTROAMPHETAMINE SULFATE AND AMPHETAMINE SULFATE 5; 5; 5; 5 MG/1; MG/1; MG/1; MG/1
20 TABLET ORAL DAILY
Qty: 30 TABLET | Refills: 0 | Status: SHIPPED | OUTPATIENT
Start: 2019-07-17 | End: 2019-08-16

## 2019-06-05 RX ORDER — DEXTROAMPHETAMINE SACCHARATE, AMPHETAMINE ASPARTATE, DEXTROAMPHETAMINE SULFATE AND AMPHETAMINE SULFATE 5; 5; 5; 5 MG/1; MG/1; MG/1; MG/1
20 TABLET ORAL DAILY
Qty: 30 TABLET | Refills: 0 | Status: SHIPPED | OUTPATIENT
Start: 2019-06-17 | End: 2019-07-17

## 2019-06-05 NOTE — TELEPHONE ENCOUNTER
I walked Rx's for Adderall 20 MG tablet to the  for   Start date: 06/17/19, 07/1719, 08/16/2019    Alexa Swift MA

## 2019-06-17 ENCOUNTER — TRANSFERRED RECORDS (OUTPATIENT)
Dept: HEALTH INFORMATION MANAGEMENT | Facility: CLINIC | Age: 59
End: 2019-06-17

## 2019-06-28 ENCOUNTER — OFFICE VISIT (OUTPATIENT)
Dept: URGENT CARE | Facility: URGENT CARE | Age: 59
End: 2019-06-28
Payer: COMMERCIAL

## 2019-06-28 ENCOUNTER — APPOINTMENT (OUTPATIENT)
Dept: GENERAL RADIOLOGY | Facility: CLINIC | Age: 59
End: 2019-06-28
Attending: INTERNAL MEDICINE
Payer: COMMERCIAL

## 2019-06-28 ENCOUNTER — HOSPITAL ENCOUNTER (EMERGENCY)
Facility: CLINIC | Age: 59
Discharge: HOME | End: 2019-06-28
Attending: INTERNAL MEDICINE | Admitting: INTERNAL MEDICINE
Payer: COMMERCIAL

## 2019-06-28 VITALS
OXYGEN SATURATION: 98 % | DIASTOLIC BLOOD PRESSURE: 69 MMHG | SYSTOLIC BLOOD PRESSURE: 109 MMHG | TEMPERATURE: 99.4 F | RESPIRATION RATE: 26 BRPM | HEART RATE: 69 BPM

## 2019-06-28 VITALS
HEIGHT: 68 IN | HEART RATE: 63 BPM | RESPIRATION RATE: 16 BRPM | BODY MASS INDEX: 31.67 KG/M2 | SYSTOLIC BLOOD PRESSURE: 123 MMHG | OXYGEN SATURATION: 97 % | TEMPERATURE: 100.3 F | WEIGHT: 209 LBS | DIASTOLIC BLOOD PRESSURE: 70 MMHG

## 2019-06-28 DIAGNOSIS — J18.9 PNEUMONIA OF LEFT LOWER LOBE DUE TO INFECTIOUS ORGANISM: ICD-10-CM

## 2019-06-28 DIAGNOSIS — R06.02 SOB (SHORTNESS OF BREATH): Primary | ICD-10-CM

## 2019-06-28 LAB
ALBUMIN SERPL-MCNC: 3.6 G/DL (ref 3.4–5)
ALP SERPL-CCNC: 101 U/L (ref 40–150)
ALT SERPL W P-5'-P-CCNC: 42 U/L (ref 0–50)
ANION GAP SERPL CALCULATED.3IONS-SCNC: 8 MMOL/L (ref 3–14)
AST SERPL W P-5'-P-CCNC: 31 U/L (ref 0–45)
BASOPHILS # BLD AUTO: 0.1 10E9/L (ref 0–0.2)
BASOPHILS NFR BLD AUTO: 0.8 %
BILIRUB SERPL-MCNC: 0.9 MG/DL (ref 0.2–1.3)
BUN SERPL-MCNC: 19 MG/DL (ref 7–30)
CALCIUM SERPL-MCNC: 9.6 MG/DL (ref 8.5–10.1)
CHLORIDE SERPL-SCNC: 109 MMOL/L (ref 94–109)
CO2 BLDCOV-SCNC: 22 MMOL/L (ref 21–28)
CO2 SERPL-SCNC: 23 MMOL/L (ref 20–32)
CREAT SERPL-MCNC: 0.76 MG/DL (ref 0.52–1.04)
DIFFERENTIAL METHOD BLD: ABNORMAL
EOSINOPHIL # BLD AUTO: 0.1 10E9/L (ref 0–0.7)
EOSINOPHIL NFR BLD AUTO: 1.4 %
ERYTHROCYTE [DISTWIDTH] IN BLOOD BY AUTOMATED COUNT: 12.7 % (ref 10–15)
GFR SERPL CREATININE-BSD FRML MDRD: 85 ML/MIN/{1.73_M2}
GLUCOSE SERPL-MCNC: 94 MG/DL (ref 70–99)
HCT VFR BLD AUTO: 48.5 % (ref 35–47)
HGB BLD-MCNC: 15.7 G/DL (ref 11.7–15.7)
IMM GRANULOCYTES # BLD: 0 10E9/L (ref 0–0.4)
IMM GRANULOCYTES NFR BLD: 0.2 %
LACTATE BLD-SCNC: 1 MMOL/L (ref 0.7–2.1)
LYMPHOCYTES # BLD AUTO: 2.4 10E9/L (ref 0.8–5.3)
LYMPHOCYTES NFR BLD AUTO: 26.1 %
MCH RBC QN AUTO: 29.3 PG (ref 26.5–33)
MCHC RBC AUTO-ENTMCNC: 32.4 G/DL (ref 31.5–36.5)
MCV RBC AUTO: 91 FL (ref 78–100)
MONOCYTES # BLD AUTO: 0.7 10E9/L (ref 0–1.3)
MONOCYTES NFR BLD AUTO: 7.8 %
NEUTROPHILS # BLD AUTO: 5.8 10E9/L (ref 1.6–8.3)
NEUTROPHILS NFR BLD AUTO: 63.7 %
NRBC # BLD AUTO: 0 10*3/UL
NRBC BLD AUTO-RTO: 0 /100
PCO2 BLDV: 35 MM HG (ref 40–50)
PH BLDV: 7.4 PH (ref 7.32–7.43)
PLATELET # BLD AUTO: 215 10E9/L (ref 150–450)
PO2 BLDV: 44 MM HG (ref 25–47)
POTASSIUM SERPL-SCNC: 3.7 MMOL/L (ref 3.4–5.3)
PROT SERPL-MCNC: 8.1 G/DL (ref 6.8–8.8)
RBC # BLD AUTO: 5.35 10E12/L (ref 3.8–5.2)
SAO2 % BLDV FROM PO2: 81 %
SODIUM SERPL-SCNC: 140 MMOL/L (ref 133–144)
WBC # BLD AUTO: 9 10E9/L (ref 4–11)

## 2019-06-28 PROCEDURE — 99284 EMERGENCY DEPT VISIT MOD MDM: CPT | Mod: Z6 | Performed by: INTERNAL MEDICINE

## 2019-06-28 PROCEDURE — 80053 COMPREHEN METABOLIC PANEL: CPT | Performed by: INTERNAL MEDICINE

## 2019-06-28 PROCEDURE — 96365 THER/PROPH/DIAG IV INF INIT: CPT | Performed by: INTERNAL MEDICINE

## 2019-06-28 PROCEDURE — 25000132 ZZH RX MED GY IP 250 OP 250 PS 637: Performed by: INTERNAL MEDICINE

## 2019-06-28 PROCEDURE — 99214 OFFICE O/P EST MOD 30 MIN: CPT

## 2019-06-28 PROCEDURE — 25000128 H RX IP 250 OP 636: Performed by: INTERNAL MEDICINE

## 2019-06-28 PROCEDURE — 36415 COLL VENOUS BLD VENIPUNCTURE: CPT | Performed by: INTERNAL MEDICINE

## 2019-06-28 PROCEDURE — 85025 COMPLETE CBC W/AUTO DIFF WBC: CPT | Performed by: INTERNAL MEDICINE

## 2019-06-28 PROCEDURE — 82803 BLOOD GASES ANY COMBINATION: CPT

## 2019-06-28 PROCEDURE — 71045 X-RAY EXAM CHEST 1 VIEW: CPT

## 2019-06-28 PROCEDURE — 83605 ASSAY OF LACTIC ACID: CPT

## 2019-06-28 PROCEDURE — 87040 BLOOD CULTURE FOR BACTERIA: CPT | Performed by: INTERNAL MEDICINE

## 2019-06-28 PROCEDURE — 96367 TX/PROPH/DG ADDL SEQ IV INF: CPT | Performed by: INTERNAL MEDICINE

## 2019-06-28 PROCEDURE — 25800030 ZZH RX IP 258 OP 636: Performed by: INTERNAL MEDICINE

## 2019-06-28 PROCEDURE — 99284 EMERGENCY DEPT VISIT MOD MDM: CPT | Mod: 25 | Performed by: INTERNAL MEDICINE

## 2019-06-28 RX ORDER — AZITHROMYCIN 250 MG/1
250 TABLET, FILM COATED ORAL DAILY
Qty: 6 TABLET | Refills: 0 | Status: SHIPPED | OUTPATIENT
Start: 2019-06-28 | End: 2019-07-19

## 2019-06-28 RX ORDER — TOPIRAMATE 25 MG/1
50-100 TABLET, FILM COATED ORAL ONCE
Status: COMPLETED | OUTPATIENT
Start: 2019-06-28 | End: 2019-06-28

## 2019-06-28 RX ORDER — OXYCODONE AND ACETAMINOPHEN 5; 325 MG/1; MG/1
1-2 TABLET ORAL ONCE
Status: COMPLETED | OUTPATIENT
Start: 2019-06-28 | End: 2019-06-28

## 2019-06-28 RX ORDER — CEFTRIAXONE 2 G/1
2 INJECTION, POWDER, FOR SOLUTION INTRAMUSCULAR; INTRAVENOUS ONCE
Status: COMPLETED | OUTPATIENT
Start: 2019-06-28 | End: 2019-06-28

## 2019-06-28 RX ADMIN — CEFTRIAXONE SODIUM 2 G: 2 INJECTION, POWDER, FOR SOLUTION INTRAMUSCULAR; INTRAVENOUS at 15:48

## 2019-06-28 RX ADMIN — TOPIRAMATE 100 MG: 25 TABLET, FILM COATED ORAL at 15:48

## 2019-06-28 RX ADMIN — OXYCODONE HYDROCHLORIDE AND ACETAMINOPHEN 2 TABLET: 5; 325 TABLET ORAL at 15:48

## 2019-06-28 RX ADMIN — SODIUM CHLORIDE 1000 ML: 900 INJECTION, SOLUTION INTRAVENOUS at 15:19

## 2019-06-28 RX ADMIN — TIZANIDINE 4 MG: 4 TABLET ORAL at 15:48

## 2019-06-28 RX ADMIN — AZITHROMYCIN MONOHYDRATE 500 MG: 500 INJECTION, POWDER, LYOPHILIZED, FOR SOLUTION INTRAVENOUS at 16:25

## 2019-06-28 ASSESSMENT — ENCOUNTER SYMPTOMS
CHEST TIGHTNESS: 1
HEADACHES: 0
COUGH: 1
COLOR CHANGE: 0
FEVER: 1
ARTHRALGIAS: 0
ABDOMINAL PAIN: 0
NECK STIFFNESS: 0
DIZZINESS: 1
EYE REDNESS: 0
CONFUSION: 0
SHORTNESS OF BREATH: 1
SORE THROAT: 1
DIFFICULTY URINATING: 0

## 2019-06-28 ASSESSMENT — MIFFLIN-ST. JEOR: SCORE: 1566.52

## 2019-06-28 NOTE — ED TRIAGE NOTES
Ilsa comes in with cough, fever and sore throat symptoms. She became unable to stand for CXR today at the clinic and so they called 911.

## 2019-06-28 NOTE — DISCHARGE INSTRUCTIONS

## 2019-06-28 NOTE — ED AVS SNAPSHOT
[facility] [place], Emergency Department  [address] [place] [id number]  Phone:  [phone]                                    Ilsa Yusuf   MRN: [id number]    Department:  [facility], Emergency Department   Date of Visit:  6/28/2019           After Visit Summary Signature Page    I have received my discharge instructions, and my questions have been answered. I have discussed any challenges I see with this plan with the nurse or doctor.    ..........................................................................................................................................  Patient/Patient Representative Signature      ..........................................................................................................................................  Patient Representative Print Name and Relationship to Patient    ..................................................               ................................................  Date                                   Time    ..........................................................................................................................................  Reviewed by Signature/Title    ...................................................              ..............................................  Date                                               Time          22EPIC Rev 08/18

## 2019-06-28 NOTE — ED NOTES
When author entered room pt. noted to be comfortable on NC and conversing normally with NAD. When patient began talking about how she did not want to be admitted observation status because her insurance would not cover it, it was assessed that she increased her breathing rate and appeared to be hyperventilating. Pt. Reports that she feels like she has increased work of breathing and needs to be admitted to receive O2. O2 sats WNL on RA, 2 LPM NC applied for comfort measures only.

## 2019-06-28 NOTE — ED PROVIDER NOTES
[facility] EMERGENCY DEPARTMENT ([facility])  6/28/19    History     Chief Complaint   Patient presents with     Shortness of Breath     The history is provided by the patient and medical records.     Ilsa Yusuf is a 58 year old female with a past medical history significant for CKD stage 3, HLD, osteoarthritis, MDD, PTSD, seizures, GERD and ureteral implantation who presents here to the Emergency Department due to shortness of breath and fever. Patient reports that her symptoms started on 6/26/2019. Patient believed that her symptoms would get better on their own, however, her symptoms have progressed. Patient is complaining of a fever, chest pressure, cough, sore throat, shortness of breath, and dizziness. Patient notes that she has been coughing up bright yellow sputum. Patient has a temperature of 100.3 here in the ED.    I have reviewed the Medications, Allergies, Past Medical and Surgical History, and Social History in the [employer] system.    Past Medical History:   Diagnosis Date     Arthritis 2012    both knees; hands     Cervical high risk HPV (human papillomavirus) test positive 03/19/2019    see problem list     Depressive disorder      Depressive disorder, not elsewhere classified 08/28/12    DC 10/05/12-[facility]     Hyperlipidemia LDL goal < 130     mevacor     Hypothyroid      Moderate major depression (H)     abilify, cymbalta, seroquel, and nuvidelicia, Dr Alvarez Persamy     Mumps      PTSD (post-traumatic stress disorder)      Seizure (H) 03/2011    one episode, was on Keppra, EEG negative       Past Surgical History:   Procedure Laterality Date     ABDOMEN SURGERY       BLADDER SURGERY       C PARTIAL EXCISION THYROID,UNILAT  1991    rt, negative path then, treated with synthroid.     CHOLECYSTECTOMY       COLONOSCOPY  2012     CYSTOSCOPY       HERNIA REPAIR       ORTHOPEDIC SURGERY  left knee     renal artery surgery  child    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      TONSILLECTOMY       ureteral reimplantation         Family History   Problem Relation Age of Onset     C.A.D. Father 58         at 58, heart disease     Mental Illness Father      Coronary Artery Disease Father      Hyperlipidemia Father      Alzheimer Disease Mother         total care now     Depression Mother      Anxiety Disorder Mother      Diabetes Sister         adult onset     Melanoma Sister      Breast Cancer Sister      Thyroid Disease Sister        Social History     Tobacco Use     Smoking status: Former Smoker     Packs/day: 0.30     Last attempt to quit: 2015     Years since quittin.3     Smokeless tobacco: Never Used     Tobacco comment: recreational   Substance Use Topics     Alcohol use: Yes     Alcohol/week: 0.0 oz     Comment: once a week       No current facility-administered medications for this encounter.      Current Outpatient Medications   Medication     amphetamine-dextroamphetamine (ADDERALL) 20 MG tablet     [START ON 2019] amphetamine-dextroamphetamine (ADDERALL) 20 MG tablet     [START ON 2019] amphetamine-dextroamphetamine (ADDERALL) 20 MG tablet     azithromycin (ZITHROMAX Z-MONIKA) 250 MG tablet     cholecalciferol (VITAMIN D3) 5000 UNITS CAPS capsule     DULoxetine (CYMBALTA) 60 MG capsule     FIBER PO     fish oil-omega-3 fatty acids (OMEGA 3) 1000 MG capsule     ipratropium (ATROVENT) 0.03 % nasal spray     levothyroxine (SYNTHROID/LEVOTHROID) 200 MCG tablet     lovastatin (MEVACOR) 20 MG tablet     multivitamin, therapeutic with minerals (MULTI-VITAMIN) TABS     OLANZapine (ZYPREXA) 2.5 MG tablet     omeprazole (PRILOSEC) 40 MG DR capsule     ondansetron (ZOFRAN) 4 MG tablet     oxyCODONE-acetaminophen (PERCOCET) 5-325 MG per tablet     prazosin (MINIPRESS) 5 MG capsule     tiZANidine (ZANAFLEX) 4 MG tablet     topiramate (TOPAMAX) 50 MG tablet     traZODone (DESYREL) 50 MG tablet     guaiFENesin-codeine (ROBITUSSIN AC) 100-10 MG/5ML solution        Allergies  "  Allergen Reactions     Gabapentin Other (See Comments)     Patient states she gets \"horrific nightmares\" with this medication     Dilaudid [Hydromorphone Hcl]      Made her feel like her skin was crawling     Nsaids Other (See Comments)     Kidney failure     Compazine [Prochlorperazine] Other (See Comments)     \"Makes my skin crawl, I was going nuts.\"       Review of Systems   Constitutional: Positive for fever.   HENT: Positive for sore throat. Negative for congestion.    Eyes: Negative for redness.   Respiratory: Positive for cough, chest tightness and shortness of breath.    Cardiovascular: Negative for chest pain.   Gastrointestinal: Negative for abdominal pain.   Genitourinary: Negative for difficulty urinating.   Musculoskeletal: Negative for arthralgias and neck stiffness.   Skin: Negative for color change.   Neurological: Positive for dizziness. Negative for headaches.   Psychiatric/Behavioral: Negative for confusion.       Physical Exam   BP: 123/70  Pulse: 63  Heart Rate: 63  Temp: 100.3  F (37.9  C)  Resp: 22  Height: 172.7 cm (5' 8\")  Weight: 94.8 kg (209 lb)  SpO2: 97 %      Physical Exam   Constitutional: No distress.   HENT:   Head: Atraumatic.   Mouth/Throat: Oropharynx is clear and moist. No oropharyngeal exudate.   Eyes: Pupils are equal, round, and reactive to light. No scleral icterus.   Neck: Neck supple. No JVD present.   Cardiovascular: Normal rate, regular rhythm, normal heart sounds and intact distal pulses. Exam reveals no gallop and no friction rub.   No murmur heard.  Pulmonary/Chest: Effort normal. No accessory muscle usage or stridor. No apnea, no tachypnea and no bradypnea. No respiratory distress. She has decreased breath sounds in the left lower field. She has no wheezes. She has no rhonchi. She has no rales.   Abdominal: Soft. Bowel sounds are normal. There is no tenderness.   Musculoskeletal: She exhibits no edema or tenderness.   Skin: Skin is warm. No rash noted. She is not " diaphoretic.       ED Course   2:25 PM  The patient was seen and examined by Susan Garcia MD in Room ED15.        Procedures        Results for orders placed or performed during the hospital encounter of 06/28/19 (from the past 24 hour(s))   CBC with platelets differential     Status: Abnormal    Collection Time: 06/28/19  2:52 PM   Result Value Ref Range    WBC 9.0 4.0 - 11.0 10e9/L    RBC Count 5.35 (H) 3.8 - 5.2 10e12/L    Hemoglobin 15.7 11.7 - 15.7 g/dL    Hematocrit 48.5 (H) 35.0 - 47.0 %    MCV 91 78 - 100 fl    MCH 29.3 26.5 - 33.0 pg    MCHC 32.4 31.5 - 36.5 g/dL    RDW 12.7 10.0 - 15.0 %    Platelet Count 215 150 - 450 10e9/L    Diff Method Automated Method     % Neutrophils 63.7 %    % Lymphocytes 26.1 %    % Monocytes 7.8 %    % Eosinophils 1.4 %    % Basophils 0.8 %    % Immature Granulocytes 0.2 %    Nucleated RBCs 0 0 /100    Absolute Neutrophil 5.8 1.6 - 8.3 10e9/L    Absolute Lymphocytes 2.4 0.8 - 5.3 10e9/L    Absolute Monocytes 0.7 0.0 - 1.3 10e9/L    Absolute Eosinophils 0.1 0.0 - 0.7 10e9/L    Absolute Basophils 0.1 0.0 - 0.2 10e9/L    Abs Immature Granulocytes 0.0 0 - 0.4 10e9/L    Absolute Nucleated RBC 0.0    Comprehensive metabolic panel     Status: None    Collection Time: 06/28/19  2:52 PM   Result Value Ref Range    Sodium 140 133 - 144 mmol/L    Potassium 3.7 3.4 - 5.3 mmol/L    Chloride 109 94 - 109 mmol/L    Carbon Dioxide 23 20 - 32 mmol/L    Anion Gap 8 3 - 14 mmol/L    Glucose 94 70 - 99 mg/dL    Urea Nitrogen 19 7 - 30 mg/dL    Creatinine 0.76 0.52 - 1.04 mg/dL    GFR Estimate 85 >60 mL/min/[1.73_m2]    GFR Estimate If Black >90 >60 mL/min/[1.73_m2]    Calcium 9.6 8.5 - 10.1 mg/dL    Bilirubin Total 0.9 0.2 - 1.3 mg/dL    Albumin 3.6 3.4 - 5.0 g/dL    Protein Total 8.1 6.8 - 8.8 g/dL    Alkaline Phosphatase 101 40 - 150 U/L    ALT 42 0 - 50 U/L    AST 31 0 - 45 U/L   Blood culture     Status: None (Preliminary result)    Collection Time: 06/28/19  2:52 PM   Result Value Ref Range     Specimen Description Blood Left Hand     Special Requests Received in aerobic bottle only     Culture Micro PENDING    ISTAT gases lactate natacha POCT     Status: Abnormal    Collection Time: 06/28/19  3:00 PM   Result Value Ref Range    Ph Venous 7.40 7.32 - 7.43 pH    PCO2 Venous 35 (L) 40 - 50 mm Hg    PO2 Venous 44 25 - 47 mm Hg    Bicarbonate Venous 22 21 - 28 mmol/L    O2 Sat Venous 81 %    Lactic Acid 1.0 0.7 - 2.1 mmol/L   XR Chest Port 1 View     Status: None    Collection Time: 06/28/19  3:08 PM    Narrative    Exam: XR CHEST PORT 1 VW, 6/28/2019 3:08 PM    Indication: fever cough    Comparison: 4/2/2019    Findings:     Single AP view of the chest. The cardiac silhouette is stably  enlarged. No pneumothorax. Trace left pleural effusion with overlying  basilar opacities. Patchy left retrocardiac opacities. The lungs are  otherwise clear. The visualized upper abdomen is unremarkable. No  acute osseous and amounted.      Impression    Impression:   1. Patchy left basilar/retrocardiac opacities, concerning for  infection.  2. Small left pleural effusion with overlying basilar atelectasis  and/or consolidation.    I have personally reviewed the examination and initial interpretation  and I agree with the findings.    TABATHA LESTER MD           Labs Ordered and Resulted from Time of ED Arrival Up to the Time of Departure from the ED   CBC WITH PLATELETS DIFFERENTIAL - Abnormal; Notable for the following components:       Result Value    RBC Count 5.35 (*)     Hematocrit 48.5 (*)     All other components within normal limits   ISTAT  GASES LACTATE NATACHA POCT - Abnormal; Notable for the following components:    PCO2 Venous 35 (*)     All other components within normal limits   COMPREHENSIVE METABOLIC PANEL   PULSE OXIMETRY NURSING   VITAL SIGNS   ISTAT CG4 GASES LACTATE NATACHA NURSING POCT   SPUTUM CULTURE AEROBIC BACTERIAL            Assessments & Plan (with Medical Decision Making)  LLL pneumonia without hypoxia or  HD compromise, pneumonia severity index only 60 with small effusion, offered ED OBS but pt declined and ok to discharge with antibiotics, given rocephin 2 gran and zithro per protocol after cx, follow up with her PMD in one week.       I have reviewed the nursing notes.    I have reviewed the findings, diagnosis, plan and need for follow up with the patient.       Medication List      Started    azithromycin 250 MG tablet  Commonly known as:  ZITHROMAX Z-MONIKA  250 mg, Oral, DAILY, As directed            Final diagnoses:   Pneumonia of left lower lobe due to infectious organism (H)     I, Ang Portillo, am serving as a trained medical scribe to document services personally performed by Susan Garcia MD, based on the provider's statements to me.   I, Susan Garcia MD, was physically present and have reviewed and verified the accuracy of this note documented by Ang Portillo.    6/28/2019   [facility], [place], EMERGENCY DEPARTMENT     Susan Garcia MD  06/28/19 [id number]

## 2019-06-28 NOTE — PROGRESS NOTES
Subjective     Ilsa Yusuf is a 58 year old female who presents to clinic today for the following health issues:    HPI   Presents with increased SOB since Tuesday PM.  No fever or chills.  Notes chest pressure not pain.  Hx of pneumonia one year ago.  No asthma.  No recent wheezing.  No known sick contacts.    Cannot stop panting in office.  Sent for CXR and could not stand for xray due to SOB.    O2 sats normal on RA.        Patient Active Problem List   Diagnosis     Major depressive disorder, recurrent episode, moderate (H)     PTSD (post-traumatic stress disorder)     Acquired hypothyroidism     Hyperlipidemia LDL goal <130     CKD (chronic kidney disease) stage 3, GFR 30-59 ml/min (H)     Esophageal reflux     Primary insomnia     Obesity     Attention-deficit hyperactivity disorder, predominantly hyperactive type     Neck pain     Cervical radiculopathy     Primary osteoarthritis of right knee     Peripheral tear of medial meniscus of right knee, unspecified whether old or current tear, initial encounter     Calculus of left kidney     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     CHOLECYSTECTOMY       COLONOSCOPY       CYSTOSCOPY       HERNIA REPAIR       ORTHOPEDIC SURGERY  left knee     renal artery surgery  child    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     TONSILLECTOMY       ureteral reimplantation         Social History     Tobacco Use     Smoking status: Former Smoker     Packs/day: 0.30     Last attempt to quit: 2015     Years since quittin.3     Smokeless tobacco: Never Used     Tobacco comment: recreational   Substance Use Topics     Alcohol use: Yes     Alcohol/week: 0.0 oz     Comment: once a week     Family History   Problem Relation  Age of Onset     C.A.D. Father 58         at 58, heart disease     Mental Illness Father      Coronary Artery Disease Father      Hyperlipidemia Father      Alzheimer Disease Mother         total care now     Depression Mother      Anxiety Disorder Mother      Diabetes Sister         adult onset     Melanoma Sister      Breast Cancer Sister      Thyroid Disease Sister          Current Outpatient Medications   Medication Sig Dispense Refill     amphetamine-dextroamphetamine (ADDERALL) 20 MG tablet Take 1 tablet (20 mg) by mouth daily 30 tablet 0     [START ON 2019] amphetamine-dextroamphetamine (ADDERALL) 20 MG tablet Take 1 tablet (20 mg) by mouth daily 30 tablet 0     [START ON 2019] amphetamine-dextroamphetamine (ADDERALL) 20 MG tablet Take 1 tablet (20 mg) by mouth daily 30 tablet 0     cholecalciferol (VITAMIN D3) 5000 UNITS CAPS capsule Take 1 capsule (5,000 Units) by mouth daily Take 1 per day 100 capsule 2     DULoxetine (CYMBALTA) 60 MG capsule TAKE 2 CAPSULES BY MOUTH EVERY  capsule 1     FIBER PO Two capsules in the morning and two capsules at night       fish oil-omega-3 fatty acids (OMEGA 3) 1000 MG capsule Take 2 g by mouth daily Takes 1 in am and 1 at bedtime       guaiFENesin-codeine (ROBITUSSIN AC) 100-10 MG/5ML solution Take 5-10 mLs by mouth nightly as needed for cough 118 mL 0     ipratropium (ATROVENT) 0.03 % nasal spray Spray 2 sprays into both nostrils every 12 hours 90 mL 1     levothyroxine (SYNTHROID/LEVOTHROID) 200 MCG tablet Take 1 tablet (200 mcg) by mouth daily 90 tablet 1     lovastatin (MEVACOR) 20 MG tablet Take 1 tablet (20 mg) by mouth At Bedtime 90 tablet 3     multivitamin, therapeutic with minerals (MULTI-VITAMIN) TABS Take 1 tablet by mouth daily       OLANZapine (ZYPREXA) 2.5 MG tablet TAKE 1 TABLET(2.5 MG) BY MOUTH AT BEDTIME 90 tablet 3     omeprazole (PRILOSEC) 40 MG DR capsule Take 1 capsule (40 mg) by mouth daily 90 capsule 2     ondansetron (ZOFRAN) 4  "MG tablet Take 1 tablet (4 mg) by mouth every 6 hours as needed for nausea 18 tablet 5     oxyCODONE-acetaminophen (PERCOCET) 5-325 MG per tablet Take 1-2 tablets by mouth every 6 hours as needed for pain 15 tablet 0     prazosin (MINIPRESS) 5 MG capsule TAKE 1 CAPSULE(5 MG) BY MOUTH AT BEDTIME 90 capsule 3     tiZANidine (ZANAFLEX) 4 MG tablet Take 1-3 tablets (4-12 mg) by mouth 3 times daily as needed for muscle spasms 210 tablet 3     topiramate (TOPAMAX) 50 MG tablet TAKE 1 TO 2 TABLETS(50  MG) BY MOUTH TWICE DAILY 120 tablet 3     traZODone (DESYREL) 50 MG tablet TAKE 1 TO 3 TABLETS(50  MG) BY MOUTH EVERY NIGHT AS NEEDED FOR SLEEP 30 tablet 0     trospium (SANCTURA) 20 MG tablet TAKE 1 TABLET(20 MG) BY MOUTH TWICE DAILY BEFORE MEALS 180 tablet 3     Allergies   Allergen Reactions     Gabapentin Other (See Comments)     Patient states she gets \"horrific nightmares\" with this medication     Dilaudid [Hydromorphone Hcl]      Made her feel like her skin was crawling     Nsaids Other (See Comments)     Kidney failure     Compazine [Prochlorperazine] Other (See Comments)     \"Makes my skin crawl, I was going nuts.\"     Recent Labs   Lab Test 03/19/19  1159 11/06/18  1845 08/09/18  1133 05/04/18  1116  03/15/18  1334 11/27/17  1447 09/03/17  1616   LDL 92  --   --   --   --  147* 95  --    HDL 48*  --   --   --   --  46* 54  --    TRIG 94  --   --   --   --  109 107  --    ALT  --  34  --  38  --   --   --  46   CR  --  1.04 1.04 0.96   < >  --   --  1.00   GFRESTIMATED  --  54* 54* 60*   < >  --   --  57*   GFRESTBLACK  --  65 65 72   < >  --   --  69   POTASSIUM  --  3.8 3.8 3.9   < >  --   --  3.6   TSH  --  0.84  --  1.68  --  11.61* 17.23*  --     < > = values in this interval not displayed.      BP Readings from Last 3 Encounters:   06/28/19 109/69   04/02/19 124/86   03/19/19 110/72    Wt Readings from Last 3 Encounters:   04/02/19 96.6 kg (213 lb)   03/19/19 96.2 kg (212 lb)   11/06/18 101.2 kg " (223 lb)                    Reviewed and updated as needed this visit by Provider         Review of Systems   ROS COMP: Constitutional, HEENT, cardiovascular, pulmonary, gi and gu systems are negative, except as otherwise noted.      Objective    /69 (BP Location: Left arm, Patient Position: Sitting, Cuff Size: Adult Regular)   Pulse 69   Temp 99.4  F (37.4  C) (Oral)   Resp 26   SpO2 98%   There is no height or weight on file to calculate BMI.  Physical Exam   GENERAL: healthy, alert, tachypneic  EYES: Eyes grossly normal to inspection, PERRL and conjunctivae and sclerae normal  NECK: no adenopathy, no asymmetry, masses, or scars and thyroid normal to palpation  RESP: lungs clear to auscultation - no rales, rhonchi or wheezes  CV: regular rate and rhythm, normal S1 S2, no S3 or S4, no murmur, click or rub, no peripheral edema and peripheral pulses strong  ABDOMEN: soft, nontender, no hepatosplenomegaly, no masses and bowel sounds normal  MS: no gross musculoskeletal defects noted, no edema  PSYCH: mentation appears normal, affect normal/bright            Assessment & Plan     1. SOB (shortness of breath)    Unable to complete CXR due to acute SOB.  Sent to ED by ambulance for higher level care and eval.    Deedee Jones MD  [facility] Provider  [facility] URGENT CARE

## 2019-07-02 ENCOUNTER — MYC MEDICAL ADVICE (OUTPATIENT)
Dept: FAMILY MEDICINE | Facility: CLINIC | Age: 59
End: 2019-07-02

## 2019-07-04 LAB
BACTERIA SPEC CULT: NO GROWTH
BACTERIA SPEC CULT: NO GROWTH
Lab: NORMAL
Lab: NORMAL
SPECIMEN SOURCE: NORMAL
SPECIMEN SOURCE: NORMAL

## 2019-07-11 ENCOUNTER — DOCUMENTATION ONLY (OUTPATIENT)
Dept: SLEEP MEDICINE | Facility: CLINIC | Age: 59
End: 2019-07-11

## 2019-07-11 NOTE — PROGRESS NOTES
JORGE CAME TO [facility] ON 7/10/19 STATING THAT WHEN SHE WAKES UP IN THE MIDDLE OF THE NIGHT AND IN THE MORNING SHE HAS A VERY DRY MOUTH AND HER MOUTH IS WIDE OPEN, NOT SURE IF THE MASK IS STILL SEALED AROUND HER MASK. LOOKED AT A DOWNLOAD AND PT IS HAVING QUITE A BIT OF LEAK BUT HER AHI IS STILL AT 5 OR BELOW FOR THE LAST 7 NIGHTS WITH THE EXCEPTION OF ONE NIGHT WHERE AHI WAS 10.1. TALKED WITH PT ABOUT CLEANING SUPPLIES, HER CUSHION HAD VISIBLE FACE OILS ON IT AT TIME OF FITTING. CUSHION ALSO HAD BLACK TAPE AROUND IT AND PT STATED SHE HAD GOTTEN A SPLIT IN THE CUSHION. TOLD PT THE CUSHION IS A VERY IMPORTANT PIECE OF EQUIPMENT AND IF THIS ISN'T SEALING THEN HER THERAPY WON'T BE AS EFFECTIVE. PT STATED SHE WILL GET A NEW ONE TODAY AND THEN BE BETTER ABOUT CLEANING IT. PT ALSO ASKED ABOUT A CHINSTRAP. PT TRIALED THE RESPIRONICS BLK PREMIUM CHINSTRAP- STATED THIS FELT GOOD AND PUT MASK ON OVER THE TOP. PT RECEIVED OK SEAL WITH HER OLD MASK.  TOLD PT I WILL SET A REMINDER TO CHECK HER DOWNLOAD IN ABOUT 2 WEEKS AND SEE IF HER LEAK IS BETTER.

## 2019-07-19 ENCOUNTER — OFFICE VISIT (OUTPATIENT)
Dept: SLEEP MEDICINE | Facility: CLINIC | Age: 59
End: 2019-07-19
Payer: COMMERCIAL

## 2019-07-19 VITALS
HEIGHT: 69 IN | RESPIRATION RATE: 16 BRPM | DIASTOLIC BLOOD PRESSURE: 72 MMHG | SYSTOLIC BLOOD PRESSURE: 125 MMHG | WEIGHT: 209 LBS | BODY MASS INDEX: 30.96 KG/M2 | HEART RATE: 74 BPM | OXYGEN SATURATION: 96 %

## 2019-07-19 DIAGNOSIS — G47.33 OSA (OBSTRUCTIVE SLEEP APNEA): Primary | ICD-10-CM

## 2019-07-19 DIAGNOSIS — G47.00 INSOMNIA, UNSPECIFIED TYPE: ICD-10-CM

## 2019-07-19 PROCEDURE — 99214 OFFICE O/P EST MOD 30 MIN: CPT | Performed by: PHYSICIAN ASSISTANT

## 2019-07-19 RX ORDER — MORPHINE SULFATE 15 MG/1
TABLET, FILM COATED, EXTENDED RELEASE ORAL
Refills: 0 | COMMUNITY
Start: 2019-07-17 | End: 2020-07-21

## 2019-07-19 ASSESSMENT — MIFFLIN-ST. JEOR: SCORE: 1582.4

## 2019-07-19 NOTE — PROGRESS NOTES
CPAP Follow-Up Visit:    Date on this visit: 7/19/2019    Ilsa Yusuf comes in today for follow-up of her CPAP use for moderate ABDOUL. She was initially seen at the [facility] for observed apnea 8-10 times per night and difficulty falling asleep for 3-4 years. Her medical history is significant for depression, PTSD, seizure disorder (possibly), hypothyroidism and obesity. Deanne was 6 minutes late today.    PSG from 1/28/2016 showed an AHI of 25.5 per hour.     She is on auto CPAP 8-15 cm. She was last seen about a year ago and her pressures were increased from 7-12 cm due to observed snoring with the CPAP.  She feels her pressures are not enough. She is having difficulty falling asleep because of air hunger. This has been an issue for 6-8 weeks. She feels it started before pneumonia. She used a friends travel CPAP one night (4 days ago) when she stayed over. They set the pressure near the upper limit. She used the same kind of mask. She felt she slept/breathed like a baby and felt better the next day. She does use the chin strap but still gets some dry mouth. She does not notice mask leak.     She presented to [facility] about 9 days ago with complaints of waking in the middle of the night and morning with a wide open mouth and dry mouth. She uses an AirFit F20 full face mask. Her download showed a lot of leak. She was given a chin strap. She was given a new mask and advised to clean her mask more regularly to improve the seal.  She has been cleaning the mask every other night with alcohol. She is feeling really tired all day.     Deanne was in the ED for pneumonia about 3 weeks ago.    The compliance data shows that she has used the CPAP for 29/30 nights, 90% of nights for >4 hours.  The 95th% pressure is 14.6 cm.  The 95th% leak is 39.2 lpm.  The average nightly usage is 9:15, median 9:47.  The average AHI is 3.8/hr. Her mask leak has been still been high most of the time since  her visit with [facility].     She takes percocet 4 times per week. She takes morphine 15 mg a couple times per week, usually at night.    Bedtime is 10 PM. She takes 50 mg trazodone. She does not fall asleep until 11-11:30 PM. She is up at 10 AM. She wakes 2 times per night for <5 minutes. She does not nap.     Past medical/surgical history, family history, social history, medications and allergies were reviewed.      Problem List:  Patient Active Problem List    Diagnosis Date Noted     Cervical high risk HPV (human papillomavirus) test positive 03/19/2019     Priority: Medium     3/19/19 NIL pap, + HR HPV (not 16, 18). Plan: cotest 1 year.       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 07/23/2018     Priority: Medium     Calculus of left kidney 08/26/2017     Priority: Medium     Primary osteoarthritis of right knee 12/26/2016     Priority: Medium     Peripheral tear of medial meniscus of right knee, unspecified whether old or current tear, initial encounter 12/26/2016     Priority: Medium     Cervical radiculopathy 10/28/2016     Priority: Medium     Herniated disc C5       Neck pain 09/29/2016     Priority: Medium     Attention-deficit hyperactivity disorder, predominantly hyperactive type 04/19/2016     Priority: Medium     Obesity 06/21/2015     Priority: Medium     Primary insomnia 06/02/2015     Priority: Medium     Esophageal reflux 04/13/2015     Priority: Medium     CKD (chronic kidney disease) stage 3, GFR 30-59 ml/min (H) 03/09/2015     Priority: Medium     Hyperlipidemia LDL goal <130 01/30/2012     Priority: Medium     Major depressive disorder, recurrent episode, moderate (H) 01/18/2012     Priority: Medium     PTSD (post-traumatic stress disorder) 01/18/2012     Priority: Medium     Acquired hypothyroidism 01/18/2012     Priority: Medium        Impression/Plan:    (G47.33) ABDOUL (obstructive sleep apnea)  (primary encounter diagnosis)  Comment: Deanne comes in today because she feels short of breath with her  current CPAP pressures. Her download shows her 95th% pressure is 14.6 cm and her AHI is normal at 3.8/hr. She said she did much better when she used a CPAP of her friends with higher pressures.  We tried various pressures in clinic and she felt comfortable on 16 cm. Her download shows that her 95th% pressure was 14.6 cm. Possible increased pressure need secondary to recovering from pneumonia. She has been losing weight (only about 5 pounds in the last year), so her pressure need should be reducing if anything.   Plan: Comprehensive DME        I changed her pressure to a fixed setting of 16 cm. She was encouraged to let me know if she starts feeling the pressures are uncomfortable again.    (G47.00) Insomnia, unspecified type  Comment: Sleep latency is 60-90 minutes. She is in bed almost 12 hours. She takes 50 mg trazodone. Once she falls asleep, she seems to sleep well. She denies napping. She thinks she needs 8.5 hours.  Plan: Reduce time in bed to closer to 8.5 hours. Avoid naps or sleeping in.       She will follow up with me in about 1 year(s).     Twenty-five minutes spent with patient, all of which were spent face-to-face counseling, consulting, coordinating plan of care.      Bennett Goltz, PA-C    CC: No ref. provider found

## 2019-07-19 NOTE — NURSING NOTE
"Chief Complaint   Patient presents with     CPAP Follow Up     Follow up citlalli       Initial /72   Pulse 74   Resp 16   Ht 1.753 m (5' 9\")   Wt 94.8 kg (209 lb)   SpO2 96%   BMI 30.86 kg/m   Estimated body mass index is 30.86 kg/m  as calculated from the following:    Height as of this encounter: 1.753 m (5' 9\").    Weight as of this encounter: 94.8 kg (209 lb).    Medication Reconciliation: complete    ESS 18    Terrie Abbott MA    "

## 2019-07-19 NOTE — PATIENT INSTRUCTIONS
Reduce time in bed to closer to 8.5 hours. Avoid naps or sleeping in.     Let me know if you have any further discomfort with the pressure settings.        Your BMI is Body mass index is 30.86 kg/m .  Weight management is a personal decision.  If you are interested in exploring weight loss strategies, the following discussion covers the approaches that may be successful. Body mass index (BMI) is one way to tell whether you are at a healthy weight, overweight, or obese. It measures your weight in relation to your height.  A BMI of 18.5 to 24.9 is in the healthy range. A person with a BMI of 25 to 29.9 is considered overweight, and someone with a BMI of 30 or greater is considered obese. More than two-thirds of American adults are considered overweight or obese.  Being overweight or obese increases the risk for further weight gain. Excess weight may lead to heart disease and diabetes.  Creating and following plans for healthy eating and physical activity may help you improve your health.  Weight control is part of healthy lifestyle and includes exercise, emotional health, and healthy eating habits. Careful eating habits lifelong are the mainstay of weight control. Though there are significant health benefits from weight loss, long-term weight loss with diet alone may be very difficult to achieve- studies show long-term success with dietary management in less than 10% of people. Attaining a healthy weight may be especially difficult to achieve in those with severe obesity. In some cases, medications, devices and surgical management might be considered.  What can you do?  If you are overweight or obese and are interested in methods for weight loss, you should discuss this with your provider.     Consider reducing daily calorie intake by 500 calories.     Keep a food journal.     Avoiding skipping meals, consider cutting portions instead.    Diet combined with exercise helps maintain muscle while optimizing fat loss.  Strength training is particularly important for building and maintaining muscle mass. Exercise helps reduce stress, increase energy, and improves fitness. Increasing exercise without diet control, however, may not burn enough calories to loose weight.       Start walking three days a week 10-20 minutes at a time    Work towards walking thirty minutes five days a week     Eventually, increase the speed of your walking for 1-2 minutes at time    In addition, we recommend that you review healthy lifestyles and methods for weight loss available through the National Institutes of Health patient information sites:  http://win.niddk.nih.gov/publications/index.htm    And look into health and wellness programs that may be available through your health insurance provider, employer, local community center, or boris club.    Weight management plan: Patient was referred to their PCP to discuss a diet and exercise plan.

## 2019-08-01 DIAGNOSIS — G89.4 CHRONIC PAIN SYNDROME: ICD-10-CM

## 2019-08-01 NOTE — LETTER
[facility]  [address] [place] [address]  Phone: [phone]    08/08/19    Ilsa Yusuf  [address]  [place] [address]      To whom it may concern:     In order to ensure we are providing the best quality care, we have reviewed your chart and see that you are due for a follow up appointment. We can only authorize one sky refill until you establish care with a new provider in our clinic since Dr. Collado is no longer with [facility].      We have also sent your topiramate (TOPAMAX) 50 MG tablet medication to your pharmacy. For future medication refills, please contact your primary care clinic to schedule the above appointment. This can be requested via [employer] or by calling the clinic at [phone].    We greatly appreciate the opportunity to serve you. Thank you for trusting us with your health care.      Sincerely,      Your care team at [facility]

## 2019-08-02 NOTE — TELEPHONE ENCOUNTER
"Requested Prescriptions   Pending Prescriptions Disp Refills     topiramate (TOPAMAX) 50 MG tablet [Pharmacy Med Name: TOPIRAMATE 50 MG Tablet]  Last Written Prescription Date:  5-29-19  Last Fill Quantity: 120 tab,  # refills: 3   Last office visit: 3/19/2019 with prescribing provider:  Catrachita Collado    Future Office Visit:    360 tablet 3     Sig: TAKE 1 TO 2 TABLETS TWICE DAILY (NEED MD APPOINTMENT FOR REFILLS)       Anti-Seizure Meds Protocol  Failed - 8/1/2019  7:15 PM        Failed - Review Authorizing provider's last note.      Refer to last progress notes: confirm request is for original authorizing provider (cannot be through other providers).        Failed - Normal CBC on file in past 26 months     Recent Labs   Lab Test 06/28/19  1452   WBC 9.0   RBC 5.35*   HGB 15.7   HCT 48.5*              Passed - Recent (12 mo) or future (30 days) visit within the authorizing provider's specialty     Patient had office visit in the last 12 months or has a visit in the next 30 days with authorizing provider or within the authorizing provider's specialty.  See \"Patient Info\" tab in inbasket, or \"Choose Columns\" in Meds & Orders section of the refill encounter.          Passed - Normal ALT or AST on file in past 26 months     Recent Labs   Lab Test 06/28/19  1452   ALT 42     Recent Labs   Lab Test 06/28/19  1452   AST 31           Passed - Normal platelet count on file in past 26 months     Recent Labs   Lab Test 06/28/19  1452              Passed - Medication is active on med list        Passed - No active pregnancy on record        Passed - No positive pregnancy test in last 12 months         "

## 2019-08-07 RX ORDER — TOPIRAMATE 50 MG/1
TABLET, FILM COATED ORAL
Qty: 360 TABLET | Refills: 0 | Status: SHIPPED | OUTPATIENT
Start: 2019-08-07 | End: 2019-11-18

## 2019-08-08 NOTE — TELEPHONE ENCOUNTER
LM informing patient that RX was refilled. Patient is due for follow up appt to establish care with a new provider prior to additional refills given. Sending letter.

## 2019-09-16 ENCOUNTER — TRANSFERRED RECORDS (OUTPATIENT)
Dept: HEALTH INFORMATION MANAGEMENT | Facility: CLINIC | Age: 59
End: 2019-09-16

## 2019-10-18 ENCOUNTER — TRANSFERRED RECORDS (OUTPATIENT)
Dept: HEALTH INFORMATION MANAGEMENT | Facility: CLINIC | Age: 59
End: 2019-10-18

## 2019-10-31 ENCOUNTER — MEDICAL CORRESPONDENCE (OUTPATIENT)
Dept: HEALTH INFORMATION MANAGEMENT | Facility: CLINIC | Age: 59
End: 2019-10-31

## 2019-11-01 ENCOUNTER — ANCILLARY PROCEDURE (OUTPATIENT)
Dept: CT IMAGING | Facility: CLINIC | Age: 59
End: 2019-11-01
Attending: FAMILY MEDICINE
Payer: COMMERCIAL

## 2019-11-01 DIAGNOSIS — R10.12 ABDOMINAL PAIN, LEFT UPPER QUADRANT: ICD-10-CM

## 2019-11-01 DIAGNOSIS — R19.00 ABDOMINAL MASS, UNSPECIFIED ABDOMINAL LOCATION: ICD-10-CM

## 2019-11-01 DIAGNOSIS — N64.4 BREAST TENDERNESS IN FEMALE: ICD-10-CM

## 2019-11-01 LAB
CREAT BLD-MCNC: 1 MG/DL (ref 0.52–1.04)
GFR SERPL CREATININE-BSD FRML MDRD: 56 ML/MIN/{1.73_M2}
RADIOLOGIST FLAGS: NORMAL

## 2019-11-01 RX ORDER — IOPAMIDOL 755 MG/ML
128 INJECTION, SOLUTION INTRAVASCULAR ONCE
Status: COMPLETED | OUTPATIENT
Start: 2019-11-01 | End: 2019-11-01

## 2019-11-01 RX ADMIN — IOPAMIDOL 100 ML: 755 INJECTION, SOLUTION INTRAVASCULAR at 15:36

## 2019-11-07 ENCOUNTER — TELEPHONE (OUTPATIENT)
Dept: FAMILY MEDICINE | Facility: CLINIC | Age: 59
End: 2019-11-07

## 2019-11-08 NOTE — TELEPHONE ENCOUNTER
HOWARD informing patient to contact the ordering provider Dr. Karl Leong for the result     Writer faxed CT Report from 11/1 to Dr. Karl Leong. Petra Fax: [phone]    Tiffany Ortiz

## 2019-11-08 NOTE — TELEPHONE ENCOUNTER
Looks like this was ordered by Dr. Karl Leong from [facility]. Can you see that he got the patient contacts him for the result? And also that he got the result.     Thanks    Sanjay Fritz MD

## 2019-11-11 ENCOUNTER — ANCILLARY PROCEDURE (OUTPATIENT)
Dept: ULTRASOUND IMAGING | Facility: CLINIC | Age: 59
End: 2019-11-11
Attending: FAMILY MEDICINE
Payer: COMMERCIAL

## 2019-11-11 DIAGNOSIS — N94.89 ADNEXAL MASS: ICD-10-CM

## 2019-11-18 ENCOUNTER — TELEPHONE (OUTPATIENT)
Dept: FAMILY MEDICINE | Facility: CLINIC | Age: 59
End: 2019-11-18

## 2019-11-18 DIAGNOSIS — G89.4 CHRONIC PAIN SYNDROME: ICD-10-CM

## 2019-11-19 NOTE — TELEPHONE ENCOUNTER
"Requested Prescriptions   Pending Prescriptions Disp Refills     topiramate (TOPAMAX) 50 MG tablet [Pharmacy Med Name: TOPIRAMATE 50 MG Tablet] 360 tablet 0     Sig: TAKE 1 TO 2 TABLETS TWICE DAILY (NEED MD APPOINTMENT)         Last Written Prescription Date:  8/7/19  Last Fill Quantity: 360,   # refills: 0  Last Office Visit: 3/19/19 - Tobias  Future Office visit:       Routing refill request to provider for review/approval because:  Dx chronic pain      Anti-Seizure Meds Protocol  Failed - 11/18/2019  8:44 PM        Failed - Review Authorizing provider's last note.      Refer to last progress notes: confirm request is for original authorizing provider (cannot be through other providers).          Failed - Normal CBC on file in past 26 months     Recent Labs   Lab Test 06/28/19  1452   WBC 9.0   RBC 5.35*   HGB 15.7   HCT 48.5*                    Passed - Recent (12 mo) or future (30 days) visit within the authorizing provider's specialty     Patient has had an office visit with the authorizing provider or a provider within the authorizing providers department within the previous 12 mos or has a future within next 30 days. See \"Patient Info\" tab in inbasket, or \"Choose Columns\" in Meds & Orders section of the refill encounter.              Passed - Normal ALT or AST on file in past 26 months     Recent Labs   Lab Test 06/28/19  1452   ALT 42     Recent Labs   Lab Test 06/28/19  1452   AST 31             Passed - Normal platelet count on file in past 26 months     Recent Labs   Lab Test 06/28/19  1452                  Passed - Medication is active on med list        Passed - No active pregnancy on record        Passed - No positive pregnancy test in last 12 months          "

## 2019-11-20 RX ORDER — TOPIRAMATE 50 MG/1
TABLET, FILM COATED ORAL
Qty: 360 TABLET | Refills: 0 | Status: SHIPPED | OUTPATIENT
Start: 2019-11-20 | End: 2021-10-18

## 2019-11-25 NOTE — TELEPHONE ENCOUNTER
Received fax from [facility]    Message: We received Rx's for Topiramate 50 MG Tab (Date 11/20/2019 by Dr. Link) and Topiramate 100 MG TAB (Date 11/15/2019 by Dr. Latham) Please clarify current therapy.    Routing to Sophia Link; Please advise     Alexa Swift MA

## 2019-12-17 DIAGNOSIS — E03.9 ACQUIRED HYPOTHYROIDISM: ICD-10-CM

## 2019-12-17 RX ORDER — LEVOTHYROXINE SODIUM 200 UG/1
TABLET ORAL
Qty: 30 TABLET | Refills: 0 | Status: SHIPPED | OUTPATIENT
Start: 2019-12-17 | End: 2021-10-21 | Stop reason: DRUGHIGH

## 2019-12-17 NOTE — TELEPHONE ENCOUNTER
"Requested Prescriptions   Pending Prescriptions Disp Refills     levothyroxine (SYNTHROID/LEVOTHROID) 200 MCG tablet [Pharmacy Med Name: LEVOTHYROXINE SODIUM 200 MCG Tablet] 90 tablet 1     Sig: TAKE 1 TABLET EVERY DAY      Last Written Prescription Date:  4/15/2019  Last Fill Quantity: 90 tablet    ,  # refills: 1   Last Office Visit: 3/19/2019   Future Office Visit:            Thyroid Protocol Failed - 12/17/2019  1:09 PM        Failed - Normal TSH on file in past 12 months     Recent Labs   Lab Test 11/06/18  1845   TSH 0.84           Passed - Patient is 12 years or older        Passed - Recent (12 mo) or future (30 days) visit within the authorizing provider's specialty     Patient has had an office visit with the authorizing provider or a provider within the authorizing providers department within the previous 12 mos or has a future within next 30 days. See \"Patient Info\" tab in inbasket, or \"Choose Columns\" in Meds & Orders section of the refill encounter.          Passed - Medication is active on med list        Passed - No active pregnancy on record     If patient is pregnant or has had a positive pregnancy test, please check TSH        Passed - No positive pregnancy test in past 12 months     If patient is pregnant or has had a positive pregnancy test, please check TSH.          "

## 2019-12-17 NOTE — LETTER
[facility]  [address] [place] [address]  Phone: [phone]    12/18/19    Ilsa Yusuf  [address] [facility] [address]      To whom it may concern:     In order to ensure we are providing the best quality care, we have reviewed your chart and see that you are due for a non fasting lab only appointment.    We have also sent your levothyroxine (SYNTHROID/LEVOTHROID) 200 MCG tablet medication to your pharmacy. For future medication refills, please contact the clinic to schedule the above appointment. This can be requested via Live[employer] or by calling the clinic at [phone].    We greatly appreciate the opportunity to serve you. Thank you for trusting us with your health care.      Sincerely,    Your care team at [facility]

## 2019-12-18 NOTE — TELEPHONE ENCOUNTER
HP Reception Medication is being filled for 1 time refill only due to:  Patient needs labs TSH.     Signed Prescriptions:                        Disp   Refills    levothyroxine (SYNTHROID/LEVOTHROID) 200 M*30 tab*0        Sig: TAKE 1 TABLET EVERY DAY  Authorizing Provider: AGUSTIN YODER  Ordering User: ROLANDA POLK

## 2019-12-18 NOTE — TELEPHONE ENCOUNTER
LM informing patient that RX was refilled. Patient is due for a non fasting lab only appt prior to additional refills given. Sending letter.    Tiffany Ortiz

## 2020-01-16 ENCOUNTER — TRANSFERRED RECORDS (OUTPATIENT)
Dept: HEALTH INFORMATION MANAGEMENT | Facility: CLINIC | Age: 60
End: 2020-01-16

## 2020-02-24 DIAGNOSIS — F33.1 MAJOR DEPRESSIVE DISORDER, RECURRENT EPISODE, MODERATE (H): ICD-10-CM

## 2020-02-24 DIAGNOSIS — J32.9 CHRONIC SINUSITIS, UNSPECIFIED LOCATION: ICD-10-CM

## 2020-02-25 NOTE — TELEPHONE ENCOUNTER
"Requested Prescriptions   Pending Prescriptions Disp Refills     DULoxetine (CYMBALTA) 60 MG capsule [Pharmacy Med Name: DULOXETINE HYDROCHLORIDE 60 MG Capsule Delayed Release Particles]  Last Written Prescription Date:  3-19-19  Last Fill Quantity: 180 cap,  # refills: 1   Last office visit: 3/19/2019 with prescribing provider:  Catrachita Collado   Future Office Visit:   180 capsule 1     Sig: TAKE 2 CAPSULES BY MOUTH EVERY DAY       Serotonin-Norepinephrine Reuptake Inhibitors  Failed - 2/24/2020 11:34 PM        Failed - PHQ-9 score of less than 5 in past 6 months     Please review last PHQ-9 score.   PHQ-9 SCORE 3/1/2018 11/6/2018 3/19/2019   PHQ-9 Total Score - - -   PHQ-9 Total Score MyChart - - 14 (Moderate depression)   PHQ-9 Total Score 2 7 14     DONELL-7 SCORE 11/8/2016 11/29/2016 11/6/2018   Total Score - - -   Total Score 13 10 8   Total Score - - -           Failed - Recent (6 mo) or future (30 days) visit within the authorizing provider's specialty     Patient had office visit in the last 6 months or has a visit in the next 30 days with authorizing provider or within the authorizing provider's specialty.  See \"Patient Info\" tab in inbasket, or \"Choose Columns\" in Meds & Orders section of the refill encounter.         Passed - Blood pressure under 140/90 in past 12 months     BP Readings from Last 3 Encounters:   07/19/19 125/72   06/28/19 123/70   06/28/19 109/69           Passed - Medication is active on med list        Passed - Patient is age 18 or older        Passed - No active pregnancy on record        Passed - No positive pregnancy test in past 12 months     ____________________________________         ipratropium (ATROVENT) 0.03 % nasal spray [Pharmacy Med Name: IPRATROPIUM BROMIDE 0.03 % Solution]  Last Written Prescription Date:  4-15-19  Last Fill Quantity: 90 ml,   # refills: 1  Last Office Visit: 3-19-19  Future Office visit:       Routing refill request to provider for review/approval " "because:  Drug not on the [facility], P or  Health refill protocol or controlled substance 60 mL      Sig: USE  2 SPRAYS INTO BOTH NOSTRILS EVERY 12 HOURS       There is no refill protocol information for this order     ____________________________________         OLANZapine (ZYPREXA) 2.5 MG tablet [Pharmacy Med Name: OLANZAPINE 2.5 MG Tablet]  This maybe too early.   Last Written Prescription Date:  4-15-19  Last Fill Quantity: 90 tab,  # refills: 3   Last office visit: 3/19/2019 with prescribing provider:  Catrachita Collado  Future Office Visit:   90 tablet 3     Sig: TAKE 1 TABLET AT BEDTIME       Antipsychotic Medications Failed - 2/24/2020 11:34 PM        Failed - Recent (6 mo) or future (30 days) visit within the authorizing provider's specialty     Patient had office visit in the last 6 months or has a visit in the next 30 days with authorizing provider or within the authorizing provider's specialty.  See \"Patient Info\" tab in inbasket, or \"Choose Columns\" in Meds & Orders section of the refill encounter.          Passed - Blood pressure under 140/90 in past 12 months     BP Readings from Last 3 Encounters:   07/19/19 125/72   06/28/19 123/70   06/28/19 109/69           Passed - Patient is 12 years of age or older        Passed - Lipid panel on file within the past 12 months     Recent Labs   Lab Test 03/19/19  1159  09/30/15  1124   CHOL 159   < > 162   TRIG 94   < > 119   HDL 48*   < > 48*   LDL 92   < > 90   NHDL 111   < >  --    VLDL  --   --  24   CHOLHDLRATIO  --   --  3.4    < > = values in this interval not displayed.           Passed - CBC on file in past 12 months     Recent Labs   Lab Test 06/28/19  1452   WBC 9.0   RBC 5.35*   HGB 15.7   HCT 48.5*              Passed - Heart Rate on file within past 12 months     Pulse Readings from Last 3 Encounters:   07/19/19 74   06/28/19 63   06/28/19 69           Passed - A1c or Glucose on file in past 12 months     Recent Labs   Lab Test 06/28/19  1452 "   GLC 94     Please review patients last 3 weights. If a weight gain of >10 lbs exists, you may refill the prescription once after instructing the patient to schedule an appointment within the next 30 days.    Wt Readings from Last 3 Encounters:   07/19/19 94.8 kg (209 lb)   06/28/19 94.8 kg (209 lb)   04/02/19 96.6 kg (213 lb)           Passed - Medication is active on med list        Passed - Patient is not pregnant        Passed - No positve pregnancy test on file in past 12 months

## 2020-02-28 RX ORDER — DULOXETIN HYDROCHLORIDE 60 MG/1
CAPSULE, DELAYED RELEASE ORAL
Qty: 180 CAPSULE | Refills: 1 | OUTPATIENT
Start: 2020-02-28

## 2020-02-28 RX ORDER — IPRATROPIUM BROMIDE 21 UG/1
SPRAY, METERED NASAL
Qty: 60 ML | OUTPATIENT
Start: 2020-02-28

## 2020-02-28 RX ORDER — OLANZAPINE 2.5 MG/1
TABLET, FILM COATED ORAL
Qty: 90 TABLET | Refills: 3 | OUTPATIENT
Start: 2020-02-28

## 2020-03-02 ENCOUNTER — HEALTH MAINTENANCE LETTER (OUTPATIENT)
Age: 60
End: 2020-03-02

## 2020-03-04 ENCOUNTER — PATIENT OUTREACH (OUTPATIENT)
Dept: PEDIATRICS | Facility: CLINIC | Age: 60
End: 2020-03-04

## 2020-03-11 DIAGNOSIS — J32.9 CHRONIC SINUSITIS, UNSPECIFIED LOCATION: ICD-10-CM

## 2020-03-13 NOTE — TELEPHONE ENCOUNTER
ipratropium (ATROVENT) 0.03 % nasal spray      Last Written Prescription Date:  4/15/19  Last Fill Quantity: 90mL,   # refills: 1  Last Office Visit: Robert Rivas Office visit:       Routing refill request to provider for review/approval because:  Drug not on the [facility], P or [facility] refill protocol or controlled substance

## 2020-03-17 RX ORDER — IPRATROPIUM BROMIDE 21 UG/1
2 SPRAY, METERED NASAL 2 TIMES DAILY
Qty: 90 ML | Refills: 3 | Status: SHIPPED | OUTPATIENT
Start: 2020-03-17 | End: 2021-10-18

## 2020-05-10 DIAGNOSIS — E78.5 HYPERLIPIDEMIA LDL GOAL <130: ICD-10-CM

## 2020-05-12 RX ORDER — LOVASTATIN 20 MG
TABLET ORAL
Qty: 90 TABLET | Refills: 3 | OUTPATIENT
Start: 2020-05-12

## 2020-05-12 NOTE — TELEPHONE ENCOUNTER
"Requested Prescriptions   Pending Prescriptions Disp Refills     lovastatin (MEVACOR) 20 MG tablet [Pharmacy Med Name: LOVASTATIN 20 MG Tablet] 90 tablet 3     Sig: TAKE 1 TABLET AT BEDTIME         Last Written Prescription Date:  3/21/19  Last Fill Quantity: 90,   # refills: 3  Last Office Visit: 3/12/19  Future Office visit:       Routing refill request to provider for review/approval because:  Previously written by Tobias, due for annual office visit, appears to be seeing Karl Leong in [facility]      Statins Protocol Failed - 5/10/2020  1:47 AM        Failed - LDL on file in past 12 months     Recent Labs   Lab Test 03/19/19  1159   LDL 92             Failed - Recent (12 mo) or future (30 days) visit within the authorizing provider's specialty     Patient has had an office visit with the authorizing provider or a provider within the authorizing providers department within the previous 12 mos or has a future within next 30 days. See \"Patient Info\" tab in inbasket, or \"Choose Columns\" in Meds & Orders section of the refill encounter.              Passed - No abnormal creatine kinase in past 12 months     No lab results found.             Passed - Medication is active on med list        Passed - Patient is age 18 or older        Passed - No active pregnancy on record        Passed - No positive pregnancy test in past 12 months           HP Reception - please ask patient to schedule appointment for future refills  "

## 2020-07-03 NOTE — NURSING NOTE
"Chief Complaint   Patient presents with     ER F/U     A.D.H.D     medication       Initial /69 (BP Location: Right arm, Patient Position: Sitting, Cuff Size: Adult Regular)  Pulse 71  Temp 95.8  F (35.4  C) (Tympanic)  Resp 18  Wt 212 lb (96.2 kg)  SpO2 95%  BMI 31.77 kg/m2 Estimated body mass index is 31.77 kg/(m^2) as calculated from the following:    Height as of 8/16/17: 5' 8.5\" (1.74 m).    Weight as of this encounter: 212 lb (96.2 kg).  Medication Reconciliation: unable or not appropriate to perform         Ger Granado MA      " MICHELE  Patient has apt next week. Results given at time of procedure

## 2020-07-21 VITALS — HEIGHT: 69 IN | WEIGHT: 209 LBS | BODY MASS INDEX: 30.96 KG/M2

## 2020-07-21 RX ORDER — OXYCODONE HYDROCHLORIDE 10 MG/1
TABLET ORAL PRN
COMMUNITY
Start: 2019-08-23

## 2020-07-21 ASSESSMENT — MIFFLIN-ST. JEOR: SCORE: 1577.4

## 2020-07-21 NOTE — PROGRESS NOTES
"Ilsa Yusuf is a 59 year old female who is being evaluated via a billable video visit.      The patient has been notified of following:     \"This video visit will be conducted via a call between you and your physician/provider. We have found that certain health care needs can be provided without the need for an in-person physical exam.  This service lets us provide the care you need with a video conversation.  If a prescription is necessary we can send it directly to your pharmacy.  If lab work is needed we can place an order for that and you can then stop by our lab to have the test done at a later time.    Video visits are billed at different rates depending on your insurance coverage.  Please reach out to your insurance provider with any questions.    If during the course of the call the physician/provider feels a video visit is not appropriate, you will not be charged for this service.\"    Patient has given verbal consent for Video visit? Yes  How would you like to obtain your AVS? MyChart  If you are dropped from the video visit, the video invite should be resent to: Text to cell phone: [phone]  Will anyone else be joining your video visit? No        Video-Visit Details    Type of service:  Video Visit    Video Start Time: 1102  Video End Time: 1128    Originating Location (pt. Location): Home    Distant Location (provider location):  [facility]     Platform used for Video Visit: Luna Cohen MA      [facility]   Patient Name:  Ilsa Yusuf  MRN:  [id number]    :  1960  Date of Clinic Visit:  2020  Primary Care Provider:  Oswald Harris          HISTORY OF PRESENT ILLNESS:  Ilsa Yusuf is a 59 year old female with history of ABDOUL on CPAP, depression, PTSD, possible seizure disorder, hypothyroidism, cervical radiculopathy, and obesity presenting for yearly CPAP visit. She was last seen by Oli " "Goltz, PA-C at [facility] in July 2019, where CPAP pressure was adjusted up to 16. Pt diagnosed with ABDOUL in 2016; polysomnogram at the time showed AHI of 25.5. Her sleep had overall improved on CPAP and pt was very complaint with use.      INTERVAL HISTORY:   Ms. Yusuf continues to use CPAP nightly and is satisfied with its current settings. Her bed partner, Rad, has told patient she sometimes snores with the mask on. Her primary complaint today is nasal congestion, which is present both in the daytime and at night. She feels there is an \"anatomical problem\" with her nose. She has never seen an ENT specialist. Atrovent and saline are not helping. She breathes through her mouth 60% of the time.     She aims for 9 hours of sleep every night. She takes melatonin 5mg and trazodone 50mg before bed. She also recently started medical marijuana for anxiety and pain, which has helped immensely with those symptoms. She denies nighttime awakenings, sleep walking, talking in sleep and is unaware of urge to move legs or abnormal muscle movements at night.      ASSESSMENT   Ilsa Yusuf is a 59 year old female with history of ABDOUL on CPAP. She is overall doing quite well from ABDOUL standpoint. She complains of nasal congestion and snoring, which may indicate some lingering obstruction versus anatomical issue such as deviated septum. No further workup/changes from sleep standpoint but she may benefit from evaluation by ENT      RECOMMENDATIONS:  - ENT referral (Dr. Sophia Mansfield) for nasal congestion   - saline nasal rinse before bed   - No adjustments to CPAP setting  - Follow up visit in 1 year  - New CPAP supplies  - Advised to never operate a motor vehicle if tired or sleepy    Patient was seen and discussed via virtual visit with Dr. Alva.    Felix Medellin MD  PGY-2 Neurology Resident  [facility] Department of Neurology  Pager: [phone]    Sleep Staff Addendum Video  I have seen and " "evaluate the patient on 7-22 with Dr Medellin and agree with the documentation.  I opened up the visit, I closed the visit and I personally spent > 25 minutes in the care of the patient.     Avila Alva MD    REVIEW OF SYSTEMS: 12-point RoS negative except as per HPI.    ALLERGIES:  Allergies   Allergen Reactions     Gabapentin Other (See Comments)     Patient states she gets \"horrific nightmares\" with this medication     Dilaudid [Hydromorphone Hcl]      Made her feel like her skin was crawling     Nsaids Other (See Comments)     Kidney failure     Compazine [Prochlorperazine] Other (See Comments)     \"Makes my skin crawl, I was going nuts.\"     MEDICATIONS:  Current Outpatient Medications   Medication Sig Dispense Refill     cholecalciferol (VITAMIN D3) 5000 UNITS CAPS capsule Take 1 capsule (5,000 Units) by mouth daily Take 1 per day 100 capsule 2     DULoxetine (CYMBALTA) 60 MG capsule TAKE 2 CAPSULES BY MOUTH EVERY  capsule 1     FIBER PO Two capsules in the morning and two capsules at night       ipratropium (ATROVENT) 0.03 % nasal spray Spray 2 sprays into both nostrils 2 times daily 90 mL 3     levothyroxine (SYNTHROID/LEVOTHROID) 200 MCG tablet TAKE 1 TABLET EVERY DAY 30 tablet 0     lovastatin (MEVACOR) 20 MG tablet Take 1 tablet (20 mg) by mouth At Bedtime 90 tablet 3     multivitamin, therapeutic with minerals (MULTI-VITAMIN) TABS Take 1 tablet by mouth daily       OLANZapine (ZYPREXA) 2.5 MG tablet TAKE 1 TABLET(2.5 MG) BY MOUTH AT BEDTIME 90 tablet 3     Omega-3 Fatty Acids (OMEGA 3 PO) Take 2 g by mouth daily Takes 1 in am and 1 at bedtime       omeprazole (PRILOSEC) 40 MG DR capsule Take 1 capsule (40 mg) by mouth daily 90 capsule 2     ondansetron (ZOFRAN) 4 MG tablet Take 1 tablet (4 mg) by mouth every 6 hours as needed for nausea 18 tablet 5     oxyCODONE IR (ROXICODONE) 10 MG tablet TK 1 TO 2 TABLETS PO EVERY FOUR TO SIX HOURS AS NEEDED FOR PAIN MAX 6 TABLETS A DAY       prazosin " (MINIPRESS) 5 MG capsule TAKE 1 CAPSULE(5 MG) BY MOUTH AT BEDTIME 90 capsule 3     tiZANidine (ZANAFLEX) 4 MG tablet Take 1-3 tablets (4-12 mg) by mouth 3 times daily as needed for muscle spasms 210 tablet 3     topiramate (TOPAMAX) 50 MG tablet TAKE 1 TO 2 TABLETS TWICE DAILY (NEED MD APPOINTMENT) 360 tablet 0     traZODone (DESYREL) 50 MG tablet TAKE 1 TO 3 TABLETS(50  MG) BY MOUTH EVERY NIGHT AS NEEDED FOR SLEEP 30 tablet 0     PAST MEDICAL HISTORY:  Past Medical History:   Diagnosis Date     Arthritis 2012    both knees; hands     Cervical high risk HPV (human papillomavirus) test positive 03/19/2019    see problem list     Depressive disorder      Depressive disorder, not elsewhere classified 08/28/12    DC 10/05/12-[facility]     Hyperlipidemia LDL goal < 130     mevacor     Hypothyroid      Moderate major depression (H)     abilify, cymbalta, seroquel, and sofya, Dr Antonio Perales     Mumps      PTSD (post-traumatic stress disorder)      Seizure (H) 03/2011    one episode, was on Keppra, EEG negative     PAST SURGICAL HISTORY:  Past Surgical History:   Procedure Laterality Date     ABDOMEN SURGERY       BLADDER SURGERY       C PARTIAL EXCISION THYROID,UNILAT  1991    rt, negative path then, treated with synthroid.     CHOLECYSTECTOMY       COLONOSCOPY  2012     CYSTOSCOPY       HERNIA REPAIR       ORTHOPEDIC SURGERY  left knee     renal artery surgery  child    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     TONSILLECTOMY       ureteral reimplantation       SOCIAL HISTORY:  Social History     Socioeconomic History     Marital status: Single     Spouse name: Not on file     Number of children: Not on file     Years of education: Not on file     Highest education level: Not on file   Occupational History     Not on file   Social Needs     Financial resource strain: Not on file     Food insecurity     Worry: Not on file     Inability: Not on file     Transportation needs     Medical: Not on file      Non-medical: Not on file   Tobacco Use     Smoking status: Former Smoker     Packs/day: 0.30     Last attempt to quit: 2015     Years since quittin.4     Smokeless tobacco: Never Used     Tobacco comment: recreational   Substance and Sexual Activity     Alcohol use: Yes     Alcohol/week: 0.0 standard drinks     Comment: once a week     Drug use: No     Sexual activity: Yes     Partners: Male     Birth control/protection: None   Lifestyle     Physical activity     Days per week: Not on file     Minutes per session: Not on file     Stress: Not on file   Relationships     Social connections     Talks on phone: Not on file     Gets together: Not on file     Attends Quaker service: Not on file     Active member of club or organization: Not on file     Attends meetings of clubs or organizations: Not on file     Relationship status: Not on file     Intimate partner violence     Fear of current or ex partner: Not on file     Emotionally abused: Not on file     Physically abused: Not on file     Forced sexual activity: Not on file   Other Topics Concern     Parent/sibling w/ CABG, MI or angioplasty before 65F 55M? 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       FAMILY HISTORY:  Family History   Problem Relation Age of Onset     C.A.D. Father 58         at 58, heart disease     Mental Illness Father      Coronary Artery Disease Father      Hyperlipidemia Father      Alzheimer Disease Mother         total care now     Depression Mother      Anxiety Disorder Mother      Diabetes Sister         adult onset     Melanoma Sister      Breast Cancer Sister      Thyroid Disease Sister        .

## 2020-07-22 ENCOUNTER — VIRTUAL VISIT (OUTPATIENT)
Dept: SLEEP MEDICINE | Facility: CLINIC | Age: 60
End: 2020-07-22
Payer: COMMERCIAL

## 2020-07-22 DIAGNOSIS — G47.33 OSA (OBSTRUCTIVE SLEEP APNEA): Primary | ICD-10-CM

## 2020-07-22 DIAGNOSIS — J34.89 NASAL OBSTRUCTION: ICD-10-CM

## 2020-07-22 PROCEDURE — 99214 OFFICE O/P EST MOD 30 MIN: CPT | Mod: 95 | Performed by: PSYCHIATRY & NEUROLOGY

## 2020-08-05 NOTE — NURSING NOTE
1 year reminder card will be mailed to ptRENO Griffin        ENT referral mailed to ptRENO Griffin

## 2020-08-07 ENCOUNTER — DOCUMENTATION ONLY (OUTPATIENT)
Dept: SLEEP MEDICINE | Facility: CLINIC | Age: 60
End: 2020-08-07

## 2020-08-10 ENCOUNTER — OFFICE VISIT (OUTPATIENT)
Dept: OTOLARYNGOLOGY | Facility: CLINIC | Age: 60
End: 2020-08-10
Attending: PSYCHIATRY & NEUROLOGY
Payer: COMMERCIAL

## 2020-08-10 VITALS — DIASTOLIC BLOOD PRESSURE: 84 MMHG | SYSTOLIC BLOOD PRESSURE: 147 MMHG | HEART RATE: 83 BPM | OXYGEN SATURATION: 97 %

## 2020-08-10 DIAGNOSIS — R09.81 NASAL CONGESTION: Primary | ICD-10-CM

## 2020-08-10 PROCEDURE — 99202 OFFICE O/P NEW SF 15 MIN: CPT | Performed by: OTOLARYNGOLOGY

## 2020-08-10 RX ORDER — FLUTICASONE PROPIONATE 50 MCG
1 SPRAY, SUSPENSION (ML) NASAL DAILY
Qty: 16 G | Refills: 1 | Status: SHIPPED | OUTPATIENT
Start: 2020-08-10 | End: 2020-09-23

## 2020-08-10 ASSESSMENT — PAIN SCALES - GENERAL: PAINLEVEL: NO PAIN (0)

## 2020-08-10 NOTE — LETTER
8/10/2020         RE: Ilsa Yusuf  [address] [address] [address]        Dear Colleague,    Thank you for referring your patient, Ilsa Yusuf, to the [facility]. Please see a copy of my visit note below.    Otolaryngology Adult Consultation    Patient: Ilsa Yusuf  : 1960        HPI:  Ilsa Yusuf is a 60 year old female seen today in the Otolaryngology Clinic for difficulty breathing through her nose.  Patient reports that she feels that she does not get enough air through her nose.  It something that she feels like she is likely had all her life.  She denies any significant nasal trauma.  Both sides feel equally restricted to her.  When she is sitting quietly she does feel like her nasal breathing is fine.  But as soon as she does any little bit of activity she feels like she cannot breathe through her nose very well or get enough air through her nose.  She then resorts to mouth breathing.  Something as simple as just washing the dishes or doing simple yard work will make her feel very aware of how poorly her nasal airflow is.  Additionally she has been told by several family members that her nasal breathing is very loud at times.  She does have a history of obstructive sleep apnea and is currently using CPAP.  She was prescribed Atrovent nasal spray presumably to help with her nasal breathing.  She reports that it does not seem like it does anything for her.  She is also use nasal saline which does not feel very helpful for her.    Medications:  Current Outpatient Rx   Medication Sig Dispense Refill     fluticasone (FLONASE) 50 MCG/ACT nasal spray Spray 1 spray into both nostrils daily 16 g 1     cholecalciferol (VITAMIN D3) 5000 UNITS CAPS capsule Take 1 capsule (5,000 Units) by mouth daily Take 1 per day 100 capsule 2     DULoxetine (CYMBALTA) 60 MG capsule TAKE 2 CAPSULES BY MOUTH EVERY  capsule 1     FIBER PO Two capsules in the  morning and two capsules at night       ipratropium (ATROVENT) 0.03 % nasal spray Spray 2 sprays into both nostrils 2 times daily 90 mL 3     levothyroxine (SYNTHROID/LEVOTHROID) 200 MCG tablet TAKE 1 TABLET EVERY DAY 30 tablet 0     lovastatin (MEVACOR) 20 MG tablet Take 1 tablet (20 mg) by mouth At Bedtime 90 tablet 3     multivitamin, therapeutic with minerals (MULTI-VITAMIN) TABS Take 1 tablet by mouth daily       OLANZapine (ZYPREXA) 2.5 MG tablet TAKE 1 TABLET(2.5 MG) BY MOUTH AT BEDTIME 90 tablet 3     Omega-3 Fatty Acids (OMEGA 3 PO) Take 2 g by mouth daily Takes 1 in am and 1 at bedtime       omeprazole (PRILOSEC) 40 MG DR capsule Take 1 capsule (40 mg) by mouth daily 90 capsule 2     ondansetron (ZOFRAN) 4 MG tablet Take 1 tablet (4 mg) by mouth every 6 hours as needed for nausea 18 tablet 5     oxyCODONE IR (ROXICODONE) 10 MG tablet TK 1 TO 2 TABLETS PO EVERY FOUR TO SIX HOURS AS NEEDED FOR PAIN MAX 6 TABLETS A DAY       prazosin (MINIPRESS) 5 MG capsule TAKE 1 CAPSULE(5 MG) BY MOUTH AT BEDTIME 90 capsule 3     tiZANidine (ZANAFLEX) 4 MG tablet Take 1-3 tablets (4-12 mg) by mouth 3 times daily as needed for muscle spasms 210 tablet 3     topiramate (TOPAMAX) 50 MG tablet TAKE 1 TO 2 TABLETS TWICE DAILY (NEED MD APPOINTMENT) 360 tablet 0     traZODone (DESYREL) 50 MG tablet TAKE 1 TO 3 TABLETS(50  MG) BY MOUTH EVERY NIGHT AS NEEDED FOR SLEEP 30 tablet 0       Allergies: Gabapentin; Dilaudid [hydromorphone hcl]; Nsaids; and Compazine [prochlorperazine]     PMH:  Past Medical History:   Diagnosis Date     Arthritis 2012    both knees; hands     Cervical high risk HPV (human papillomavirus) test positive 03/19/2019    see problem list     Depressive disorder      Depressive disorder, not elsewhere classified 08/28/12    DC 10/05/12-[facility]     Hyperlipidemia LDL goal < 130     mevacor     Hypothyroid      Moderate major depression (H)     abilify, cymbalta, seroquel, and sofya, Dr Antonio Perales      Mumps      PTSD (post-traumatic stress disorder)      Seizure (H) 2011    one episode, was on Keppra, EEG negative       PSH:  Past Surgical History:   Procedure Laterality Date     ABDOMEN SURGERY       BLADDER SURGERY       C PARTIAL EXCISION THYROID,UNILAT      rt, negative path then, treated with synthroid.     CHOLECYSTECTOMY       COLONOSCOPY       CYSTOSCOPY       HERNIA REPAIR       ORTHOPEDIC SURGERY  left knee     renal artery surgery  child    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     TONSILLECTOMY       ureteral reimplantation         FH:  Family History   Problem Relation Age of Onset     C.A.D. Father 58         at 58, heart disease     Mental Illness Father      Coronary Artery Disease Father      Hyperlipidemia Father      Alzheimer Disease Mother         total care now     Depression Mother      Anxiety Disorder Mother      Diabetes Sister         adult onset     Melanoma Sister      Breast Cancer Sister      Thyroid Disease Sister         SH:  Social History     Tobacco Use     Smoking status: Former Smoker     Packs/day: 0.30     Last attempt to quit: 2015     Years since quittin.4     Smokeless tobacco: Never Used     Tobacco comment: recreational   Substance Use Topics     Alcohol use: Yes     Alcohol/week: 0.0 standard drinks     Comment: once a week     Drug use: No       Review of Systems  No flowsheet data found.    Physical Exam:    GEN:  The patient is alert, oriented and in no acute distress.  HEAD:  Head, face scalp is grossly normal.  NOSE: External nose does appear straight.  With quiet respiration patient does not have significant external valve collapse.  However with a little bit more effortful breathing she does have very significant nasal valve collapse.  Additionally she does have very noisy nasal breathing throughout parts of our consultation today.  Intranasally septum is fairly in the midline.  She does appear to have fairly narrow nostrils.   Dilation of the nostrils with the nasal speculum did improve her breathing.  She also had improvement in her nasal breathing with Boundary maneuver.  Minimal to slight improvement with nasal internal valve stenting.    Assessment/Plan: Patient presents with a chief complaint of difficulty of breathing through her nose.  In my opinion I think most of her issue is actually with her external nose rather than intranasally.  I think she would probably benefit from a functional rhinoplasty.  I would like for her to meet with Dr. Ryley Gannon, our facial plastic surgeon for consultation and discussion of this.  In the meantime I will have her use a nasal steroid spray rather than the Atrovent to see if that improves her nasal breathing.    I spent a total of 20 minutes face-to-face with Ilsa Yusuf during today's office visit.  Over 50% of this time was spent counseling the patient on and/or coordinating care as documented in my assessment and plan.        Again, thank you for allowing me to participate in the care of your patient.        Sincerely,        Sophia Kern MD

## 2020-08-10 NOTE — PATIENT INSTRUCTIONS
Please stop using the Atrovent nasal spray.  Instead, we will have you try Flonase nasal spray.      Please meet with Dr. Ryley Gannon to learn davy about rhinoplasty

## 2020-08-10 NOTE — PROGRESS NOTES
Otolaryngology Adult Consultation    Patient: Ilsa Yusuf  : 1960        HPI:  Ilsa Yusuf is a 60 year old female seen today in the Otolaryngology Clinic for difficulty breathing through her nose.  Patient reports that she feels that she does not get enough air through her nose.  It something that she feels like she is likely had all her life.  She denies any significant nasal trauma.  Both sides feel equally restricted to her.  When she is sitting quietly she does feel like her nasal breathing is fine.  But as soon as she does any little bit of activity she feels like she cannot breathe through her nose very well or get enough air through her nose.  She then resorts to mouth breathing.  Something as simple as just washing the dishes or doing simple yard work will make her feel very aware of how poorly her nasal airflow is.  Additionally she has been told by several family members that her nasal breathing is very loud at times.  She does have a history of obstructive sleep apnea and is currently using CPAP.  She was prescribed Atrovent nasal spray presumably to help with her nasal breathing.  She reports that it does not seem like it does anything for her.  She is also use nasal saline which does not feel very helpful for her.    Medications:  Current Outpatient Rx   Medication Sig Dispense Refill     fluticasone (FLONASE) 50 MCG/ACT nasal spray Spray 1 spray into both nostrils daily 16 g 1     cholecalciferol (VITAMIN D3) 5000 UNITS CAPS capsule Take 1 capsule (5,000 Units) by mouth daily Take 1 per day 100 capsule 2     DULoxetine (CYMBALTA) 60 MG capsule TAKE 2 CAPSULES BY MOUTH EVERY  capsule 1     FIBER PO Two capsules in the morning and two capsules at night       ipratropium (ATROVENT) 0.03 % nasal spray Spray 2 sprays into both nostrils 2 times daily 90 mL 3     levothyroxine (SYNTHROID/LEVOTHROID) 200 MCG tablet TAKE 1 TABLET EVERY DAY 30 tablet 0     lovastatin (MEVACOR)  20 MG tablet Take 1 tablet (20 mg) by mouth At Bedtime 90 tablet 3     multivitamin, therapeutic with minerals (MULTI-VITAMIN) TABS Take 1 tablet by mouth daily       OLANZapine (ZYPREXA) 2.5 MG tablet TAKE 1 TABLET(2.5 MG) BY MOUTH AT BEDTIME 90 tablet 3     Omega-3 Fatty Acids (OMEGA 3 PO) Take 2 g by mouth daily Takes 1 in am and 1 at bedtime       omeprazole (PRILOSEC) 40 MG DR capsule Take 1 capsule (40 mg) by mouth daily 90 capsule 2     ondansetron (ZOFRAN) 4 MG tablet Take 1 tablet (4 mg) by mouth every 6 hours as needed for nausea 18 tablet 5     oxyCODONE IR (ROXICODONE) 10 MG tablet TK 1 TO 2 TABLETS PO EVERY FOUR TO SIX HOURS AS NEEDED FOR PAIN MAX 6 TABLETS A DAY       prazosin (MINIPRESS) 5 MG capsule TAKE 1 CAPSULE(5 MG) BY MOUTH AT BEDTIME 90 capsule 3     tiZANidine (ZANAFLEX) 4 MG tablet Take 1-3 tablets (4-12 mg) by mouth 3 times daily as needed for muscle spasms 210 tablet 3     topiramate (TOPAMAX) 50 MG tablet TAKE 1 TO 2 TABLETS TWICE DAILY (NEED MD APPOINTMENT) 360 tablet 0     traZODone (DESYREL) 50 MG tablet TAKE 1 TO 3 TABLETS(50  MG) BY MOUTH EVERY NIGHT AS NEEDED FOR SLEEP 30 tablet 0       Allergies: Gabapentin; Dilaudid [hydromorphone hcl]; Nsaids; and Compazine [prochlorperazine]     PMH:  Past Medical History:   Diagnosis Date     Arthritis 2012    both knees; hands     Cervical high risk HPV (human papillomavirus) test positive 03/19/2019    see problem list     Depressive disorder      Depressive disorder, not elsewhere classified 08/28/12    DC 10/05/12-[facility]     Hyperlipidemia LDL goal < 130     mevacor     Hypothyroid      Moderate major depression (H)     abilify, cymbalta, seroquel, and sofya, Dr Antonio Yoon      PTSD (post-traumatic stress disorder)      Seizure (H) 03/2011    one episode, was on Keppra, EEG negative       PSH:  Past Surgical History:   Procedure Laterality Date     ABDOMEN SURGERY       BLADDER SURGERY       C PARTIAL EXCISION  THYROID,UNILAT      rt, negative path then, treated with synthroid.     CHOLECYSTECTOMY       COLONOSCOPY       CYSTOSCOPY       HERNIA REPAIR       ORTHOPEDIC SURGERY  left knee     renal artery surgery  child    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     TONSILLECTOMY       ureteral reimplantation         FH:  Family History   Problem Relation Age of Onset     C.A.D. Father 58         at 58, heart disease     Mental Illness Father      Coronary Artery Disease Father      Hyperlipidemia Father      Alzheimer Disease Mother         total care now     Depression Mother      Anxiety Disorder Mother      Diabetes Sister         adult onset     Melanoma Sister      Breast Cancer Sister      Thyroid Disease Sister         SH:  Social History     Tobacco Use     Smoking status: Former Smoker     Packs/day: 0.30     Last attempt to quit: 2015     Years since quittin.4     Smokeless tobacco: Never Used     Tobacco comment: recreational   Substance Use Topics     Alcohol use: Yes     Alcohol/week: 0.0 standard drinks     Comment: once a week     Drug use: No       Review of Systems  No flowsheet data found.    Physical Exam:    GEN:  The patient is alert, oriented and in no acute distress.  HEAD:  Head, face scalp is grossly normal.  NOSE: External nose does appear straight.  With quiet respiration patient does not have significant external valve collapse.  However with a little bit more effortful breathing she does have very significant nasal valve collapse.  Additionally she does have very noisy nasal breathing throughout parts of our consultation today.  Intranasally septum is fairly in the midline.  She does appear to have fairly narrow nostrils.  Dilation of the nostrils with the nasal speculum did improve her breathing.  She also had improvement in her nasal breathing with Volodymyr maneuver.  Minimal to slight improvement with nasal internal valve stenting.    Assessment/Plan: Patient presents  with a chief complaint of difficulty of breathing through her nose.  In my opinion I think most of her issue is actually with her external nose rather than intranasally.  I think she would probably benefit from a functional rhinoplasty.  I would like for her to meet with Dr. Ryley Gannon, our facial plastic surgeon for consultation and discussion of this.  In the meantime I will have her use a nasal steroid spray rather than the Atrovent to see if that improves her nasal breathing.    I spent a total of 20 minutes face-to-face with Ilsa Yusuf during today's office visit.  Over 50% of this time was spent counseling the patient on and/or coordinating care as documented in my assessment and plan.

## 2020-08-27 ENCOUNTER — TELEPHONE (OUTPATIENT)
Dept: OTOLARYNGOLOGY | Facility: CLINIC | Age: 60
End: 2020-08-27

## 2020-08-27 NOTE — TELEPHONE ENCOUNTER
[facility] Call Center    Phone Message    May a detailed message be left on voicemail: yes     Reason for Call: The patient is requesting a call to discuss if she can increase the frequency of Flonase.  She currently uses it in the morning and would like to repeat at bed time.  Please advise.  Thank you.     Action Taken: Message routed to:  Adult Clinics: ENT p [phone]    Travel Screening: Not Applicable

## 2020-08-27 NOTE — TELEPHONE ENCOUNTER
Phone call to patient. OK to use the Flonase at night as well. Upcoming appt on 9/17 with Dr. Gannon to discuss nasal surgery. Advised patient if she feels there is more benefit to using it at night, it is okay to just use at night as well. Patient stated she feels like her nose is cement - that the mucus gets thick in her nose. Will try Flonase twice daily and will see Dr. Gannon on 9/17.      Bridget Chiang LPN

## 2020-09-11 NOTE — PROGRESS NOTES
Dear Doctor Erlin,    Thank you for asking me to see your patient, Ms. Ilsa Yusuf, in consultation to evaluate her bilateral nasal obstruction.  Today I had the pleasure of seeing her at the [facility] in the Department of Otolaryngology at [facility].      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 Kern.  -9/17/20: NOSE = 14.    2) ABDOUL.  -1/28/16: moderate ABDOUL with hypoxemia. AHI 25.5. (Greater than 5 minutes O2 sat below 89%) was present.  Baseline oxygen saturation was 92.2%. Lowest oxygen saturation was 85.9%.    3) Allergic rhinitis.    Comorbidities: Kidney dysfunction  Pertinent medications: Fish Oil  Photographs: No photos taken.   Connection: Preferred name = Deanne, SO = Don.    Care Checklist:   _Try different CPAP masks to see if they help.   _Try breathe right strips over her deep supra alar creases to see if this helps with breathing. If that is beneficial, would indicate correction of lateral crural recurvature and cephalic malposition would also be beneficial.   _If we consider surgery in the future, operative plan may include: septoplasty, correction of lateral crural recurvature and/or cephalic malposition, alar batten grafting (septal donor), and turbinate reduction.  _Review the changes caused by this surgical plan with the patient. I showed her the expected outcome of a widened supra alar dimples in the mirror and she was unsure of whether this was acceptable to her. I counseled her to decline surgery if that outcome is unacceptable to her.   _No NSAIDs because of kidney dysfunction.   _Will need admission postop because of ABDOUL if we consider surgery.  _Stop fish oil 10 days preop and 7 days  postop if we consider surgery.   _Hold CPAP for at least several weeks after any nasal surgery if we consider surgery in the future.   _Perform nasal endoscopy if we consider surgery in the future.  _Obtain photographs if we consider surgery in the future.     MEDICAL DECISION MAKING:   Nasal airway obstruction. She describes two problems:   1) Daytime nasal congestion. This likely results from a combination of septal deviation, dynamic nasal valve collapse due to lateral crural recurvature and/or cephalic malposition, and turbinate hypertrophy. Fortunately, she has found flonase quite helpful but she is uncertain if that benefit is enough for her. I asked her to try breathe right strips over her deep supra alar creases. If they are beneficial, she would likely find the nasal surgery outlined in the care checklist beneficial.  I showed her the expected outcome of that surgery with widened supra alar dimples in the mirror and she was unsure of whether this was acceptable to her. I counseled her to decline surgery if that outcome is unacceptable to her. She will consider this option and try the breathe right strips.   2) But she describes a different type of obstruction when she wears her nasal CPAP. She pinches her middle vault when she describes this obstruction and says that her mask collapses her nose. She has short nasal bones and her current mask is not resting on bone. Her middle vault cartilage cannot support the pressure of the mask and I believe that no reasonable nasal reconstruction can withstand that pressure given her preexisting short nasal bones. That is why I recommend first trying some other CPAP masks. She may want to try nasal pillows that rest on her nostrils and/or a mask that rests higher on her nose directly over her short nasal bones. I recommend she consult with her sleep medicine providers given these recommendations. We talked about this using a mirror and she felt comfortable talking with  her sleep medicine providers about these ideas.    I encouraged her to return if these options do not provide satisfactory benefit and we can consider surgery.     Allergic rhinitis: We acknowledged that allergies likely play a role in her nasal obstruction given her positive response to flonase and that surgery will not improve her allergic rhinitis. Even with a successful surgery, I expect she will need nasal steroid spray treatment postoperatively.     It has been a pleasure to participate in the care of Ms. Yusuf.  Thank you for this kind referral.     Sincerely,    Jeffry Gannon MD    Division of Facial Plastic and Reconstructive Surgery,   Department of Otolaryngology  [facility]   ______________________________________________________________________        HISTORY OF PRESENT ILLNESS and NASAL QUESTIONAIRRE:  As you know, Ms. Yusuf is an 60 year old-year-old female who presents with nasal obstruction. Dr. Kern wanted me to evaluate her because of concern for nasal valve collapse. Has nasal congestion during the day and mouth breaths most of the time. Has trouble at night and started to have trouble with CPAP in since late 2017,     Flonase helps during the day but not at night when the CPAP rests on her ULC (she pinches her midvault when describing where CPAP puts on the pressure).    She first noticed nasal obstruction or congestion 2 years ago.     Is the congestion worse on one side or the other? bilateral.  Provider- Is the congestion worse on one side or the other? bilateral.    Is the congestion constant or intermittent? constant.  Provider- Is the congestion constant or intermittent? constant.    Yes - environmental. Do you have a history of allergies?   Yes - stuffy nose. Do you have allergic symptoms like sneezing, runny nose, watery eyes?   No. Are some seasons worse for your breathing than others?      No. History of prior nasal surgery: absent.    Provider- History of prior nasal surgery: absent.     No. History of nasal trauma: absent.   Provider- History of nasal trauma: absent.     Preferred side to sleep: back.     Yes - flonase. Have you tried any nasal medications or nasal sprays?   Provider- Have you tried any nasal medications or nasal sprays? Yes - used for 4 weeks. It has helped very much but that help is still not enough. She still has trouble breathing.     Yes - not effective. Have you tried breathe-right strips?    No. Do you have any concerns about the appearance of your nose? (If any concerns, document patient's words)  Provider- Do you have any concerns about the appearance of your nose? No.      Nasal Obstruction Symptom Evaluation (NOSE QUESTIONNAIRE):   [Administer the paper survey to the patient called the Nasal Obstruction Symptom Evaluation (NOSE QUESTIONNAIRE), copy the results below:]    Nasal congestion or stuffiness is a moderate problem.   Nasal blockage or obstruction is a moderate problem.   Trouble breathing through her nose is a fairly bad problem.   Trouble sleeping is a fairly bad problem.   Inability to get enough air through her nose during exercise or exertion is a fairly bad problem.           REVIEW OF SYSTEMS: a 12 system review was performed by the patient care staff:    Do you currently have or have you ever had in the past:    No. Complications with sedation or anesthesia.  Yes - fish oil. Use blood thinners. Yes:  Fish Oil.   No. Any heart problems.   No. Chest pain.   No. A pacemaker.  No. Problems with excessive bleeding or a bleeding disorder.  No. Problems with blood clots or a clotting disorder.   Yes - cpap since 2016. Sleep apnea or sleep with a CPAP machine.       No. Excessive scarring.   No. Night sweats.   No. Fevers.   No. Double vision.   No. Vision loss.   Yes . Snoring.   Yes. Difficulty breathing through your nose.   Yes . Runny nose.   No. Sneezing.   No. Itchy eyes.   No. Itchy throat.   No. Face  pain.   No. Face weakness.   No. Face numbness.   No. Difficulty swallowing.   No. Pain with swallowing.,   No. Difficulty hearing or hearing loss.   No. Difficulty urinating.   Yes . Anxiety.   Yes . Depression.     FAMILY HISTORY:  No. Family history of excessive bleeding or a bleeding disorder?    No. Family history of blood clots or a clotting disorder?    Family History   Problem Relation Age of Onset     C.A.D. Father 58         at 58, heart disease     Mental Illness Father      Coronary Artery Disease Father      Hyperlipidemia Father      Alzheimer Disease Mother         total care now     Depression Mother      Anxiety Disorder Mother      Diabetes Sister         adult onset     Melanoma Sister      Breast Cancer Sister      Thyroid Disease Sister        PAST MEDICAL HISTORY:   Past Medical History:   Diagnosis Date     Arthritis     both knees; hands     Cervical high risk HPV (human papillomavirus) test positive 2019    see problem list     Depressive disorder      Depressive disorder, not elsewhere classified 12    DC 10/05/12-[facility]     Hyperlipidemia LDL goal < 130     mevacor     Hypothyroid      Moderate major depression (H)     abilify, cymbalta, seroquel, and nuvigil, Dr Alvarez Persing     Mumps      PTSD (post-traumatic stress disorder)      Seizure (H) 2011    one episode, was on Keppra, EEG negative       PAST SURGICAL HISTORY:   Past Surgical History:   Procedure Laterality Date     ABDOMEN SURGERY       BLADDER SURGERY       C PARTIAL EXCISION THYROID,UNILAT      rt, negative path then, treated with synthroid.     CHOLECYSTECTOMY       COLONOSCOPY       CYSTOSCOPY       HERNIA REPAIR       ORTHOPEDIC SURGERY  left knee     renal artery surgery  child    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     TONSILLECTOMY       ureteral reimplantation         SOCIAL HISTORY:   Social History     Tobacco Use     Smoking status: Former Smoker     Packs/day: 0.30      Last attempt to quit: 2015     Years since quittin.5     Smokeless tobacco: Never Used     Tobacco comment: recreational   Substance Use Topics     Alcohol use: Yes     Alcohol/week: 0.0 standard drinks     Comment: once a week       ALLERGIES: Gabapentin; Dilaudid [hydromorphone hcl]; Nsaids; and Compazine [prochlorperazine]    MEDICATIONS:   Current Outpatient Medications   Medication Sig Dispense Refill     cholecalciferol (VITAMIN D3) 5000 UNITS CAPS capsule Take 1 capsule (5,000 Units) by mouth daily Take 1 per day 100 capsule 2     DULoxetine (CYMBALTA) 60 MG capsule TAKE 2 CAPSULES BY MOUTH EVERY  capsule 1     FIBER PO Two capsules in the morning and two capsules at night       fluticasone (FLONASE) 50 MCG/ACT nasal spray Spray 1 spray into both nostrils daily 16 g 1     ipratropium (ATROVENT) 0.03 % nasal spray Spray 2 sprays into both nostrils 2 times daily 90 mL 3     levothyroxine (SYNTHROID/LEVOTHROID) 200 MCG tablet TAKE 1 TABLET EVERY DAY 30 tablet 0     lovastatin (MEVACOR) 20 MG tablet Take 1 tablet (20 mg) by mouth At Bedtime 90 tablet 3     multivitamin, therapeutic with minerals (MULTI-VITAMIN) TABS Take 1 tablet by mouth daily       OLANZapine (ZYPREXA) 2.5 MG tablet TAKE 1 TABLET(2.5 MG) BY MOUTH AT BEDTIME 90 tablet 3     Omega-3 Fatty Acids (OMEGA 3 PO) Take 2 g by mouth daily Takes 1 in am and 1 at bedtime       omeprazole (PRILOSEC) 40 MG DR capsule Take 1 capsule (40 mg) by mouth daily 90 capsule 2     ondansetron (ZOFRAN) 4 MG tablet Take 1 tablet (4 mg) by mouth every 6 hours as needed for nausea 18 tablet 5     oxyCODONE IR (ROXICODONE) 10 MG tablet TK 1 TO 2 TABLETS PO EVERY FOUR TO SIX HOURS AS NEEDED FOR PAIN MAX 6 TABLETS A DAY       prazosin (MINIPRESS) 5 MG capsule TAKE 1 CAPSULE(5 MG) BY MOUTH AT BEDTIME 90 capsule 3     tiZANidine (ZANAFLEX) 4 MG tablet Take 1-3 tablets (4-12 mg) by mouth 3 times daily as needed for muscle spasms 210 tablet 3     topiramate  (TOPAMAX) 50 MG tablet TAKE 1 TO 2 TABLETS TWICE DAILY (NEED MD APPOINTMENT) 360 tablet 0     traZODone (DESYREL) 50 MG tablet TAKE 1 TO 3 TABLETS(50  MG) BY MOUTH EVERY NIGHT AS NEEDED FOR SLEEP 30 tablet 0           _______________________________________________      Provider- Review of systems, FH, PMH, PSH, SH, ALL, and Medications taken by the patient care staff was reviewed by me: Jeffry Gannon MD        PHYSICAL EXAMINATION:    NOSE:   Nasal skin quality: unremarkable    Frontal view:     Dorsum frontal view relatively straight.      Nostril show: asymmetric nostril margins but maintaining gull in flight appearance.     Lateral view:     Radix position: Normal.    Rhinion: straight profile.    Projection: appropriate.    Rotation:appropriate.    Alar sidewall: deep supra alar creases.    Alar-columellar relationship: normal.    Palpation:     Dorsal palpation: Smooth on palpation.    Nasal bone length: Short.     Tip support: average.    Palpation of the septum reveals the caudal septum located on the nasal spine.    Base view:    Tip shape: unremarkable    External nasal valve: dynamic valve collapse present bilaterally    Lateral crura: suspect cephalic malposition or recurvature bilaterally.    Medial crura: normal.    Columella: slight deviation.     Intranasal exam/anterior rhinoscopy:     Septum deviation left in the region of the internal nasal valve creating nasal airway obstruction on that side.    No septal perforation anteriorly.      Internal nasal valve: normal    Turbinate hypertrophy was observed.     No polyps or purulence.    Modified [place] maneuver: the patient reports a significant improvement in breathing during lateralization of the bilateral nasal sidewall in the region of the  external nasal valve opposite her deep supra alar creases.      Remainder of physical exam:   CONSTITUTIONAL:  No apparent distress.  Pleasant affect.  Normal ability to communicate.  CRANIOFACIAL:   Normocephalic, atraumatic.  SKIN:  No lesions or scars.  EYES:  Extraocular muscles intact.  NOSE: the nasal exam documentation above was scribed by our patient care staff as I was performing the examination.  EARS:  Normal auricles.   ORAL CAVITY AND OROPHARYNX: no lesions on inspection.  NECK:  The parotid is soft, without masses.  Supple laryngeal landmarks.  LYMPHATIC:  No palpable lymphadenopathy.  CARDIOVASCULAR:  Carotid pulses are palpable bilaterally.  NEUROLOGIC:  Facial nerve intact.  RESPIRATORY:  Normal respiratory effort.  No stridor.  Voice strong.

## 2020-09-17 ENCOUNTER — OFFICE VISIT (OUTPATIENT)
Dept: OTOLARYNGOLOGY | Facility: CLINIC | Age: 60
End: 2020-09-17
Payer: COMMERCIAL

## 2020-09-17 VITALS — SYSTOLIC BLOOD PRESSURE: 144 MMHG | DIASTOLIC BLOOD PRESSURE: 81 MMHG | HEART RATE: 78 BPM

## 2020-09-17 DIAGNOSIS — R09.81 NASAL CONGESTION: Primary | ICD-10-CM

## 2020-09-17 PROCEDURE — 99215 OFFICE O/P EST HI 40 MIN: CPT | Performed by: OTOLARYNGOLOGY

## 2020-09-17 RX ORDER — FLUOROURACIL 50 MG/G
CREAM TOPICAL
COMMUNITY
Start: 2020-09-08 | End: 2022-02-28

## 2020-09-17 ASSESSMENT — PAIN SCALES - GENERAL: PAINLEVEL: NO PAIN (0)

## 2020-09-17 NOTE — NURSING NOTE
Ilsa Yusuf's goals for this visit include:   Chief Complaint   Patient presents with     Nose Problem     nasal valve collapse, ref Hsia       She requests these members of her care team be copied on today's visit information:     PCP: Oswald Harris    Referring Provider:  Sophia Kern MD  [address] [place] [id number]  [place] [address]    There were no vitals taken for this visit.    Do you need any medication refills at today's visit? No

## 2020-09-17 NOTE — Clinical Note
Please send letter in progress note section  to referring physician and PCP with appropriate letterhead.

## 2020-09-17 NOTE — LETTER
9/17/2020         RE: Ilsa Yusuf  [address] [facility] [address]        Dear Colleague,    Thank you for referring your patient, Ilsa Yusuf, to the [facility]. Please see a copy of my visit note below.    Dear Doctor Erlin,    Thank you for asking me to see your patient, Ms. Ilsa Yusuf, in consultation to evaluate her bilateral nasal obstruction.  Today I had the pleasure of seeing her at the Facial Plastic and Reconstructive Surgery Clinic in the Department of Otolaryngology at [facility].      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 Kern.  -9/17/20: NOSE = 14.    2) ABDOUL.  -1/28/16: moderate ABDOUL with hypoxemia. AHI 25.5. (Greater than 5 minutes O2 sat below 89%) was present.  Baseline oxygen saturation was 92.2%. Lowest oxygen saturation was 85.9%.    3) Allergic rhinitis.    Comorbidities: Kidney dysfunction  Pertinent medications: Fish Oil  Photographs: No photos taken.   Connection: Preferred name = Deanne, SO = Don.    Care Checklist:   _Try different CPAP masks to see if they help.   _Try breathe right strips over her deep supra alar creases to see if this helps with breathing. If that is beneficial, would indicate correction of lateral crural recurvature and cephalic malposition would also be beneficial.   _If we consider surgery in the future, operative plan may include: septoplasty, correction of lateral crural recurvature and/or cephalic malposition, alar batten grafting (septal donor), and turbinate reduction.  _Review the changes caused by this surgical plan with the patient. I showed her the expected outcome of a widened supra alar dimples in the mirror and she was unsure of whether this  was acceptable to her. I counseled her to decline surgery if that outcome is unacceptable to her.   _No NSAIDs because of kidney dysfunction.   _Will need admission postop because of ABDOUL if we consider surgery.  _Stop fish oil 10 days preop and 7 days postop if we consider surgery.   _Hold CPAP for at least several weeks after any nasal surgery if we consider surgery in the future.   _Perform nasal endoscopy if we consider surgery in the future.  _Obtain photographs if we consider surgery in the future.     MEDICAL DECISION MAKING:   Nasal airway obstruction. She describes two problems:   1) Daytime nasal congestion. This likely results from a combination of septal deviation, dynamic nasal valve collapse due to lateral crural recurvature and/or cephalic malposition, and turbinate hypertrophy. Fortunately, she has found flonase quite helpful but she is uncertain if that benefit is enough for her. I asked her to try breathe right strips over her deep supra alar creases. If they are beneficial, she would likely find the nasal surgery outlined in the care checklist beneficial.  I showed her the expected outcome of that surgery with widened supra alar dimples in the mirror and she was unsure of whether this was acceptable to her. I counseled her to decline surgery if that outcome is unacceptable to her. She will consider this option and try the breathe right strips.   2) But she describes a different type of obstruction when she wears her nasal CPAP. She pinches her middle vault when she describes this obstruction and says that her mask collapses her nose. She has short nasal bones and her current mask is not resting on bone. Her middle vault cartilage cannot support the pressure of the mask and I believe that no reasonable nasal reconstruction can withstand that pressure given her preexisting short nasal bones. That is why I recommend first trying some other CPAP masks. She may want to try nasal pillows that rest on her  nostrils and/or a mask that rests higher on her nose directly over her short nasal bones. I recommend she consult with her sleep medicine providers given these recommendations. We talked about this using a mirror and she felt comfortable talking with her sleep medicine providers about these ideas.    I encouraged her to return if these options do not provide satisfactory benefit and we can consider surgery.     Allergic rhinitis: We acknowledged that allergies likely play a role in her nasal obstruction given her positive response to flonase and that surgery will not improve her allergic rhinitis. Even with a successful surgery, I expect she will need nasal steroid spray treatment postoperatively.     It has been a pleasure to participate in the care of Ms. Yusuf.  Thank you for this kind referral.     Sincerely,    Jeffry Gannon MD    Division of Facial Plastic and Reconstructive Surgery,   Department of Otolaryngology  [facility]   ______________________________________________________________________        HISTORY OF PRESENT ILLNESS and NASAL QUESTIONAIRRE:  As you know, Ms. Yusuf is an 60 year old-year-old female who presents with nasal obstruction. Dr. Kern wanted me to evaluate her because of concern for nasal valve collapse. Has nasal congestion during the day and mouth breaths most of the time. Has trouble at night and started to have trouble with CPAP in since late 2017,     Flonase helps during the day but not at night when the CPAP rests on her ULC (she pinches her midvault when describing where CPAP puts on the pressure).    She first noticed nasal obstruction or congestion 2 years ago.     Is the congestion worse on one side or the other? bilateral.  Provider- Is the congestion worse on one side or the other? bilateral.    Is the congestion constant or intermittent? constant.  Provider- Is the congestion constant or intermittent? constant.    Yes -  environmental. Do you have a history of allergies?   Yes - stuffy nose. Do you have allergic symptoms like sneezing, runny nose, watery eyes?   No. Are some seasons worse for your breathing than others?      No. History of prior nasal surgery: absent.   Provider- History of prior nasal surgery: absent.     No. History of nasal trauma: absent.   Provider- History of nasal trauma: absent.     Preferred side to sleep: back.     Yes - flonase. Have you tried any nasal medications or nasal sprays?   Provider- Have you tried any nasal medications or nasal sprays? Yes - used for 4 weeks. It has helped very much but that help is still not enough. She still has trouble breathing.     Yes - not effective. Have you tried breathe-right strips?    No. Do you have any concerns about the appearance of your nose? (If any concerns, document patient's words)  Provider- Do you have any concerns about the appearance of your nose? No.      Nasal Obstruction Symptom Evaluation (NOSE QUESTIONNAIRE):   [Administer the paper survey to the patient called the Nasal Obstruction Symptom Evaluation (NOSE QUESTIONNAIRE), copy the results below:]    Nasal congestion or stuffiness is a moderate problem.   Nasal blockage or obstruction is a moderate problem.   Trouble breathing through her nose is a fairly bad problem.   Trouble sleeping is a fairly bad problem.   Inability to get enough air through her nose during exercise or exertion is a fairly bad problem.           REVIEW OF SYSTEMS: a 12 system review was performed by the patient care staff:    Do you currently have or have you ever had in the past:    No. Complications with sedation or anesthesia.  Yes - fish oil. Use blood thinners. Yes:  Fish Oil.   No. Any heart problems.   No. Chest pain.   No. A pacemaker.  No. Problems with excessive bleeding or a bleeding disorder.  No. Problems with blood clots or a clotting disorder.   Yes - cpap since 2016. Sleep apnea or sleep with a CPAP machine.        No. Excessive scarring.   No. Night sweats.   No. Fevers.   No. Double vision.   No. Vision loss.   Yes . Snoring.   Yes. Difficulty breathing through your nose.   Yes . Runny nose.   No. Sneezing.   No. Itchy eyes.   No. Itchy throat.   No. Face pain.   No. Face weakness.   No. Face numbness.   No. Difficulty swallowing.   No. Pain with swallowing.,   No. Difficulty hearing or hearing loss.   No. Difficulty urinating.   Yes . Anxiety.   Yes . Depression.     FAMILY HISTORY:  No. Family history of excessive bleeding or a bleeding disorder?    No. Family history of blood clots or a clotting disorder?    Family History   Problem Relation Age of Onset     C.A.D. Father 58         at 58, heart disease     Mental Illness Father      Coronary Artery Disease Father      Hyperlipidemia Father      Alzheimer Disease Mother         total care now     Depression Mother      Anxiety Disorder Mother      Diabetes Sister         adult onset     Melanoma Sister      Breast Cancer Sister      Thyroid Disease Sister        PAST MEDICAL HISTORY:   Past Medical History:   Diagnosis Date     Arthritis     both knees; hands     Cervical high risk HPV (human papillomavirus) test positive 2019    see problem list     Depressive disorder      Depressive disorder, not elsewhere classified 12    DC 10/05/12-[facility]     Hyperlipidemia LDL goal < 130     mevacor     Hypothyroid      Moderate major depression (H)     abilify, cymbalta, seroquel, and nuvigibrigida, Dr Antonio Perales     Mumps      PTSD (post-traumatic stress disorder)      Seizure (H) 2011    one episode, was on Keppra, EEG negative       PAST SURGICAL HISTORY:   Past Surgical History:   Procedure Laterality Date     ABDOMEN SURGERY       BLADDER SURGERY       C PARTIAL EXCISION THYROID,UNILAT      rt, negative path then, treated with synthroid.     CHOLECYSTECTOMY       COLONOSCOPY       CYSTOSCOPY       HERNIA REPAIR       ORTHOPEDIC SURGERY   left knee     renal artery surgery  child    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     TONSILLECTOMY       ureteral reimplantation         SOCIAL HISTORY:   Social History     Tobacco Use     Smoking status: Former Smoker     Packs/day: 0.30     Last attempt to quit: 2015     Years since quittin.5     Smokeless tobacco: Never Used     Tobacco comment: recreational   Substance Use Topics     Alcohol use: Yes     Alcohol/week: 0.0 standard drinks     Comment: once a week       ALLERGIES: Gabapentin; Dilaudid [hydromorphone hcl]; Nsaids; and Compazine [prochlorperazine]    MEDICATIONS:   Current Outpatient Medications   Medication Sig Dispense Refill     cholecalciferol (VITAMIN D3) 5000 UNITS CAPS capsule Take 1 capsule (5,000 Units) by mouth daily Take 1 per day 100 capsule 2     DULoxetine (CYMBALTA) 60 MG capsule TAKE 2 CAPSULES BY MOUTH EVERY  capsule 1     FIBER PO Two capsules in the morning and two capsules at night       fluticasone (FLONASE) 50 MCG/ACT nasal spray Spray 1 spray into both nostrils daily 16 g 1     ipratropium (ATROVENT) 0.03 % nasal spray Spray 2 sprays into both nostrils 2 times daily 90 mL 3     levothyroxine (SYNTHROID/LEVOTHROID) 200 MCG tablet TAKE 1 TABLET EVERY DAY 30 tablet 0     lovastatin (MEVACOR) 20 MG tablet Take 1 tablet (20 mg) by mouth At Bedtime 90 tablet 3     multivitamin, therapeutic with minerals (MULTI-VITAMIN) TABS Take 1 tablet by mouth daily       OLANZapine (ZYPREXA) 2.5 MG tablet TAKE 1 TABLET(2.5 MG) BY MOUTH AT BEDTIME 90 tablet 3     Omega-3 Fatty Acids (OMEGA 3 PO) Take 2 g by mouth daily Takes 1 in am and 1 at bedtime       omeprazole (PRILOSEC) 40 MG DR capsule Take 1 capsule (40 mg) by mouth daily 90 capsule 2     ondansetron (ZOFRAN) 4 MG tablet Take 1 tablet (4 mg) by mouth every 6 hours as needed for nausea 18 tablet 5     oxyCODONE IR (ROXICODONE) 10 MG tablet TK 1 TO 2 TABLETS PO EVERY FOUR TO SIX HOURS AS NEEDED FOR PAIN MAX 6  TABLETS A DAY       prazosin (MINIPRESS) 5 MG capsule TAKE 1 CAPSULE(5 MG) BY MOUTH AT BEDTIME 90 capsule 3     tiZANidine (ZANAFLEX) 4 MG tablet Take 1-3 tablets (4-12 mg) by mouth 3 times daily as needed for muscle spasms 210 tablet 3     topiramate (TOPAMAX) 50 MG tablet TAKE 1 TO 2 TABLETS TWICE DAILY (NEED MD APPOINTMENT) 360 tablet 0     traZODone (DESYREL) 50 MG tablet TAKE 1 TO 3 TABLETS(50  MG) BY MOUTH EVERY NIGHT AS NEEDED FOR SLEEP 30 tablet 0           _______________________________________________      Provider- Review of systems, FH, PMH, PSH, SH, ALL, and Medications taken by the patient care staff was reviewed by me: Jeffry Gannon MD        PHYSICAL EXAMINATION:    NOSE:   Nasal skin quality: unremarkable    Frontal view:     Dorsum frontal view relatively straight.      Nostril show: asymmetric nostril margins but maintaining gull in flight appearance.     Lateral view:     Radix position: Normal.    Rhinion: straight profile.    Projection: appropriate.    Rotation:appropriate.    Alar sidewall: deep supra alar creases.    Alar-columellar relationship: normal.    Palpation:     Dorsal palpation: Smooth on palpation.    Nasal bone length: Short.     Tip support: average.    Palpation of the septum reveals the caudal septum located on the nasal spine.    Base view:    Tip shape: unremarkable    External nasal valve: dynamic valve collapse present bilaterally    Lateral crura: suspect cephalic malposition or recurvature bilaterally.    Medial crura: normal.    Columella: slight deviation.     Intranasal exam/anterior rhinoscopy:     Septum deviation left in the region of the internal nasal valve creating nasal airway obstruction on that side.    No septal perforation anteriorly.      Internal nasal valve: normal    Turbinate hypertrophy was observed.     No polyps or purulence.    Modified [place] maneuver: the patient reports a significant improvement in breathing during lateralization of  the bilateral nasal sidewall in the region of the  external nasal valve opposite her deep supra alar creases.      Remainder of physical exam:   CONSTITUTIONAL:  No apparent distress.  Pleasant affect.  Normal ability to communicate.  CRANIOFACIAL:  Normocephalic, atraumatic.  SKIN:  No lesions or scars.  EYES:  Extraocular muscles intact.  NOSE: the nasal exam documentation above was scribed by our patient care staff as I was performing the examination.  EARS:  Normal auricles.   ORAL CAVITY AND OROPHARYNX: no lesions on inspection.  NECK:  The parotid is soft, without masses.  Supple laryngeal landmarks.  LYMPHATIC:  No palpable lymphadenopathy.  CARDIOVASCULAR:  Carotid pulses are palpable bilaterally.  NEUROLOGIC:  Facial nerve intact.  RESPIRATORY:  Normal respiratory effort.  No stridor.  Voice strong.          Again, thank you for allowing me to participate in the care of your patient.        Sincerely,        Jeffry Gannon MD

## 2020-09-23 ENCOUNTER — TELEPHONE (OUTPATIENT)
Dept: OTOLARYNGOLOGY | Facility: CLINIC | Age: 60
End: 2020-09-23

## 2020-09-23 DIAGNOSIS — R09.81 NASAL CONGESTION: ICD-10-CM

## 2020-09-23 RX ORDER — FLUTICASONE PROPIONATE 50 MCG
1 SPRAY, SUSPENSION (ML) NASAL DAILY
Qty: 48 G | Refills: 1 | Status: SHIPPED | OUTPATIENT
Start: 2020-09-23 | End: 2021-08-09

## 2020-09-23 NOTE — TELEPHONE ENCOUNTER
[facility] Call Center    Phone Message    May a detailed message be left on voicemail: yes     Reason for Call: Medication Question or concern regarding medication   Prescription Clarification  Fluticasone needs to have script sent to another pharmacy, please  Send to [employer] for mail order pharmacy.  Please call patient to confirm sent to new pharmacy.     Appt w Dr. Gannon went well - no surgery for now FYI.    Also, patient said this med has been working wonderfully, thank you!      Action Taken: Message routed to:  Adult Clinics: ENT p [id number]    Travel Screening: Not Applicable

## 2020-09-23 NOTE — TELEPHONE ENCOUNTER
Phone call to pt, prescription has been sent to [facility] as requested. Maryuri Piña RN

## 2020-10-27 ENCOUNTER — OFFICE VISIT (OUTPATIENT)
Dept: UROLOGY | Facility: CLINIC | Age: 60
End: 2020-10-27
Payer: COMMERCIAL

## 2020-10-27 VITALS
OXYGEN SATURATION: 98 % | WEIGHT: 224 LBS | HEIGHT: 69 IN | BODY MASS INDEX: 33.18 KG/M2 | DIASTOLIC BLOOD PRESSURE: 78 MMHG | HEART RATE: 89 BPM | SYSTOLIC BLOOD PRESSURE: 130 MMHG

## 2020-10-27 DIAGNOSIS — R33.9 URINARY RETENTION: Primary | ICD-10-CM

## 2020-10-27 DIAGNOSIS — R33.9 URINARY RETENTION: ICD-10-CM

## 2020-10-27 LAB
ALBUMIN UR-MCNC: NEGATIVE MG/DL
APPEARANCE UR: CLEAR
BILIRUB UR QL STRIP: NEGATIVE
COLOR UR AUTO: YELLOW
GLUCOSE UR STRIP-MCNC: NEGATIVE MG/DL
HGB UR QL STRIP: NEGATIVE
KETONES UR STRIP-MCNC: NEGATIVE MG/DL
LEUKOCYTE ESTERASE UR QL STRIP: NEGATIVE
NITRATE UR QL: NEGATIVE
PH UR STRIP: 6 PH (ref 5–7)
RESIDUAL VOLUME (RV) (EXTERNAL): 18
SOURCE: NORMAL
SP GR UR STRIP: 1.02 (ref 1–1.03)
UROBILINOGEN UR STRIP-ACNC: 0.2 EU/DL (ref 0.2–1)

## 2020-10-27 PROCEDURE — 99214 OFFICE O/P EST MOD 30 MIN: CPT | Mod: 25 | Performed by: PHYSICIAN ASSISTANT

## 2020-10-27 PROCEDURE — 81003 URINALYSIS AUTO W/O SCOPE: CPT | Performed by: PHYSICIAN ASSISTANT

## 2020-10-27 PROCEDURE — 51798 US URINE CAPACITY MEASURE: CPT | Performed by: PHYSICIAN ASSISTANT

## 2020-10-27 RX ORDER — ZOLPIDEM TARTRATE 5 MG/1
5 TABLET ORAL
COMMUNITY
End: 2021-10-18

## 2020-10-27 ASSESSMENT — PAIN SCALES - GENERAL: PAINLEVEL: MODERATE PAIN (4)

## 2020-10-27 ASSESSMENT — MIFFLIN-ST. JEOR: SCORE: 1640.44

## 2020-10-27 NOTE — LETTER
10/27/2020       RE: Ilsa Yusuf  [address] [facility] [address]     Dear Colleague,    Thank you for referring your patient, Ilsa Yusuf, to the [facility] UROLOGY CLINIC [place] at [facility]. Please see a copy of my visit note below.    CC: Urinary retention.    HPI: It is a pleasure to see Ms. Ilsa Yusuf, a pleasant 60 year old female seen today in the urology clinic for evaluation of urinary retention and dysuria. She has a hx of requiring Moraes catheter placement on 5/2/18 (350cc) and subsequently passed TOV.     Returns to our office for urgency and low volume voidind. Last UA/UC neg. Cr normal.     Past Medical History:   Diagnosis Date     Arthritis 2012    both knees; hands     Cervical high risk HPV (human papillomavirus) test positive 03/19/2019    see problem list     Depressive disorder      Depressive disorder, not elsewhere classified 08/28/12    DC 10/05/12-[facility]     Hyperlipidemia LDL goal < 130     mevacor     Hypothyroid      Moderate major depression (H)     abilify, cymbalta, seroquel, and nuvigil, Dr Antonio Perales     Mumps      PTSD (post-traumatic stress disorder)      Seizure (H) 03/2011    one episode, was on Keppra, EEG negative     Past Surgical History:   Procedure Laterality Date     ABDOMEN SURGERY       BLADDER SURGERY       C PARTIAL EXCISION THYROID,UNILAT  1991    rt, negative path then, treated with synthroid.     CHOLECYSTECTOMY       COLONOSCOPY  2012     CYSTOSCOPY       HERNIA REPAIR       ORTHOPEDIC SURGERY  left knee     renal artery surgery  child    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     TONSILLECTOMY       ureteral reimplantation       Current Outpatient Medications   Medication Sig Dispense Refill     cholecalciferol (VITAMIN D3) 5000 UNITS CAPS capsule Take 1 capsule (5,000 Units) by mouth daily Take 1 per day 100 capsule 2     DULoxetine (CYMBALTA) 60 MG capsule TAKE 2  "CAPSULES BY MOUTH EVERY  capsule 1     FIBER PO Two capsules in the morning and two capsules at night       fluorouracil (EFUDEX) 5 % external cream TOM TOPICALLY  TO  RIGHT CHEEK LEISION BID FOR 2 WEEKS       fluticasone (FLONASE) 50 MCG/ACT nasal spray Spray 1 spray into both nostrils daily 48 g 1     ipratropium (ATROVENT) 0.03 % nasal spray Spray 2 sprays into both nostrils 2 times daily 90 mL 3     levothyroxine (SYNTHROID/LEVOTHROID) 200 MCG tablet TAKE 1 TABLET EVERY DAY 30 tablet 0     lovastatin (MEVACOR) 20 MG tablet Take 1 tablet (20 mg) by mouth At Bedtime 90 tablet 3     multivitamin, therapeutic with minerals (MULTI-VITAMIN) TABS Take 1 tablet by mouth daily       OLANZapine (ZYPREXA) 2.5 MG tablet TAKE 1 TABLET(2.5 MG) BY MOUTH AT BEDTIME 90 tablet 3     Omega-3 Fatty Acids (OMEGA 3 PO) Take 2 g by mouth daily Takes 1 in am and 1 at bedtime       omeprazole (PRILOSEC) 40 MG DR capsule Take 1 capsule (40 mg) by mouth daily 90 capsule 2     ondansetron (ZOFRAN) 4 MG tablet Take 1 tablet (4 mg) by mouth every 6 hours as needed for nausea 18 tablet 5     oxyCODONE IR (ROXICODONE) 10 MG tablet TK 1 TO 2 TABLETS PO EVERY FOUR TO SIX HOURS AS NEEDED FOR PAIN MAX 6 TABLETS A DAY       prazosin (MINIPRESS) 5 MG capsule TAKE 1 CAPSULE(5 MG) BY MOUTH AT BEDTIME 90 capsule 3     tiZANidine (ZANAFLEX) 4 MG tablet Take 1-3 tablets (4-12 mg) by mouth 3 times daily as needed for muscle spasms 210 tablet 3     topiramate (TOPAMAX) 50 MG tablet TAKE 1 TO 2 TABLETS TWICE DAILY (NEED MD APPOINTMENT) 360 tablet 0     traZODone (DESYREL) 50 MG tablet TAKE 1 TO 3 TABLETS(50  MG) BY MOUTH EVERY NIGHT AS NEEDED FOR SLEEP 30 tablet 0     Allergies   Allergen Reactions     Gabapentin Other (See Comments)     Patient states she gets \"horrific nightmares\" with this medication     Dilaudid [Hydromorphone Hcl]      Made her feel like her skin was crawling     Nsaids Other (See Comments)     Kidney failure     Compazine " "[Prochlorperazine] Other (See Comments)     \"Makes my skin crawl, I was going nuts.\"     Family History: There is no h/o  malignancy.  There is no h/o urolithiasis.     Social History: The patient does not smoke cigarettes.  Denies EtOH and illicit drug use.      ROS: A comprehensive 14 point ROS was obtained and was  otherwise negative except for that outlined above in the HPI.    PHYSICAL EXAM:   /78 (BP Location: Left arm, Patient Position: Sitting, Cuff Size: Adult Regular)   Pulse 89   Ht 1.753 m (5' 9\")   Wt 101.6 kg (224 lb)   SpO2 98%   BMI 33.08 kg/m    PSYCH: NAD  EYES: EOMI  MOUTH: MMM  NECK: Supple, no notable adenopathy  RESP: Unlabored breathing  CARDIAC: No LE edema  SKIN: Warm, no rashes  ABD: soft, Nontender  NEURO: AAO x3    ASSESSMENT/PLAN:  60 year old female  With a hx of urinary retention.   -Check renal US for hydro.     Should the patient continue to have voiding difficulty, the next appropriate studies would be cystoscopy and/or videourodynamics to better assess bladder function.        Breanne Ellis PA-C  [facility]    30 min spent with the patient, >50% of this time was spent in a face-to-face manner and on coordination of care of urinary retention.         "

## 2020-10-27 NOTE — NURSING NOTE
Chief Complaint   Patient presents with     Urinary Retention   PVR was 18 ml.  Sophia Gonzalez LPN

## 2020-10-27 NOTE — PROGRESS NOTES
CC: Urinary retention.    HPI: It is a pleasure to see Ms. Ilsa Yusuf, a pleasant 60 year old female seen today in the urology clinic for evaluation of urinary retention and dysuria. She has a hx of requiring Moraes catheter placement on 5/2/18 (350cc) and subsequently passed TOV.     Returns to our office for urgency and low volume voidind. Last UA/UC neg. Cr normal.     Past Medical History:   Diagnosis Date     Arthritis 2012    both knees; hands     Cervical high risk HPV (human papillomavirus) test positive 03/19/2019    see problem list     Depressive disorder      Depressive disorder, not elsewhere classified 08/28/12    DC 10/05/12-[facility]     Hyperlipidemia LDL goal < 130     mevacor     Hypothyroid      Moderate major depression (H)     abilify, cymbalta, seroquel, and sofya, Dr Antonio Perales     Mumps      PTSD (post-traumatic stress disorder)      Seizure (H) 03/2011    one episode, was on Keppra, EEG negative     Past Surgical History:   Procedure Laterality Date     ABDOMEN SURGERY       BLADDER SURGERY       C PARTIAL EXCISION THYROID,UNILAT  1991    rt, negative path then, treated with synthroid.     CHOLECYSTECTOMY       COLONOSCOPY  2012     CYSTOSCOPY       HERNIA REPAIR       ORTHOPEDIC SURGERY  left knee     renal artery surgery  child    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     TONSILLECTOMY       ureteral reimplantation       Current Outpatient Medications   Medication Sig Dispense Refill     cholecalciferol (VITAMIN D3) 5000 UNITS CAPS capsule Take 1 capsule (5,000 Units) by mouth daily Take 1 per day 100 capsule 2     DULoxetine (CYMBALTA) 60 MG capsule TAKE 2 CAPSULES BY MOUTH EVERY  capsule 1     FIBER PO Two capsules in the morning and two capsules at night       fluorouracil (EFUDEX) 5 % external cream TOM TOPICALLY  TO  RIGHT CHEEK LEISION BID FOR 2 WEEKS       fluticasone (FLONASE) 50 MCG/ACT nasal spray Spray 1 spray into both nostrils daily 48 g 1      "ipratropium (ATROVENT) 0.03 % nasal spray Spray 2 sprays into both nostrils 2 times daily 90 mL 3     levothyroxine (SYNTHROID/LEVOTHROID) 200 MCG tablet TAKE 1 TABLET EVERY DAY 30 tablet 0     lovastatin (MEVACOR) 20 MG tablet Take 1 tablet (20 mg) by mouth At Bedtime 90 tablet 3     multivitamin, therapeutic with minerals (MULTI-VITAMIN) TABS Take 1 tablet by mouth daily       OLANZapine (ZYPREXA) 2.5 MG tablet TAKE 1 TABLET(2.5 MG) BY MOUTH AT BEDTIME 90 tablet 3     Omega-3 Fatty Acids (OMEGA 3 PO) Take 2 g by mouth daily Takes 1 in am and 1 at bedtime       omeprazole (PRILOSEC) 40 MG DR capsule Take 1 capsule (40 mg) by mouth daily 90 capsule 2     ondansetron (ZOFRAN) 4 MG tablet Take 1 tablet (4 mg) by mouth every 6 hours as needed for nausea 18 tablet 5     oxyCODONE IR (ROXICODONE) 10 MG tablet TK 1 TO 2 TABLETS PO EVERY FOUR TO SIX HOURS AS NEEDED FOR PAIN MAX 6 TABLETS A DAY       prazosin (MINIPRESS) 5 MG capsule TAKE 1 CAPSULE(5 MG) BY MOUTH AT BEDTIME 90 capsule 3     tiZANidine (ZANAFLEX) 4 MG tablet Take 1-3 tablets (4-12 mg) by mouth 3 times daily as needed for muscle spasms 210 tablet 3     topiramate (TOPAMAX) 50 MG tablet TAKE 1 TO 2 TABLETS TWICE DAILY (NEED MD APPOINTMENT) 360 tablet 0     traZODone (DESYREL) 50 MG tablet TAKE 1 TO 3 TABLETS(50  MG) BY MOUTH EVERY NIGHT AS NEEDED FOR SLEEP 30 tablet 0     Allergies   Allergen Reactions     Gabapentin Other (See Comments)     Patient states she gets \"horrific nightmares\" with this medication     Dilaudid [Hydromorphone Hcl]      Made her feel like her skin was crawling     Nsaids Other (See Comments)     Kidney failure     Compazine [Prochlorperazine] Other (See Comments)     \"Makes my skin crawl, I was going nuts.\"     Family History: There is no h/o  malignancy.  There is no h/o urolithiasis.     Social History: The patient does not smoke cigarettes.  Denies EtOH and illicit drug use.      ROS: A comprehensive 14 point ROS was " "obtained and was  otherwise negative except for that outlined above in the HPI.    PHYSICAL EXAM:   /78 (BP Location: Left arm, Patient Position: Sitting, Cuff Size: Adult Regular)   Pulse 89   Ht 1.753 m (5' 9\")   Wt 101.6 kg (224 lb)   SpO2 98%   BMI 33.08 kg/m    PSYCH: NAD  EYES: EOMI  MOUTH: MMM  NECK: Supple, no notable adenopathy  RESP: Unlabored breathing  CARDIAC: No LE edema  SKIN: Warm, no rashes  ABD: soft, Nontender  NEURO: AAO x3    ASSESSMENT/PLAN:  60 year old female  With a hx of urinary retention.   -Check renal US for hydro.     Should the patient continue to have voiding difficulty, the next appropriate studies would be cystoscopy and/or videourodynamics to better assess bladder function.        Breanne Ellis PA-C  [facility]    30 min spent with the patient, >50% of this time was spent in a face-to-face manner and on coordination of care of urinary retention.           "

## 2020-10-28 ENCOUNTER — TELEPHONE (OUTPATIENT)
Dept: UROLOGY | Facility: CLINIC | Age: 60
End: 2020-10-28

## 2020-10-28 ENCOUNTER — HOSPITAL ENCOUNTER (OUTPATIENT)
Dept: ULTRASOUND IMAGING | Facility: CLINIC | Age: 60
Discharge: HOME | End: 2020-10-28
Attending: PHYSICIAN ASSISTANT | Admitting: PHYSICIAN ASSISTANT
Payer: COMMERCIAL

## 2020-10-28 DIAGNOSIS — R33.9 URINARY RETENTION: ICD-10-CM

## 2020-10-28 PROCEDURE — 76770 US EXAM ABDO BACK WALL COMP: CPT

## 2020-10-28 NOTE — TELEPHONE ENCOUNTER
Called Deanne with the below results of her renal ultrasound done today. She expressed understanding.  TAMARA Barajas RN      ----- Message from Breanne Ellis PA-C sent at 10/28/2020  4:01 PM CDT -----  No hydronephrosis or back up, please call pt and let them know.   DONA CHACON

## 2020-12-14 ENCOUNTER — HEALTH MAINTENANCE LETTER (OUTPATIENT)
Age: 60
End: 2020-12-14

## 2021-01-04 ENCOUNTER — APPOINTMENT (OUTPATIENT)
Dept: CT IMAGING | Facility: CLINIC | Age: 61
End: 2021-01-04
Attending: EMERGENCY MEDICINE
Payer: COMMERCIAL

## 2021-01-04 ENCOUNTER — HOSPITAL ENCOUNTER (EMERGENCY)
Facility: CLINIC | Age: 61
Discharge: HOME | End: 2021-01-04
Attending: EMERGENCY MEDICINE | Admitting: EMERGENCY MEDICINE
Payer: COMMERCIAL

## 2021-01-04 VITALS
DIASTOLIC BLOOD PRESSURE: 79 MMHG | WEIGHT: 224 LBS | BODY MASS INDEX: 33.18 KG/M2 | HEIGHT: 69 IN | RESPIRATION RATE: 16 BRPM | SYSTOLIC BLOOD PRESSURE: 141 MMHG | TEMPERATURE: 98.6 F | HEART RATE: 68 BPM | OXYGEN SATURATION: 97 %

## 2021-01-04 DIAGNOSIS — R51.9 ACUTE NONINTRACTABLE HEADACHE, UNSPECIFIED HEADACHE TYPE: ICD-10-CM

## 2021-01-04 LAB
ALBUMIN SERPL-MCNC: 4.1 G/DL (ref 3.4–5)
ALP SERPL-CCNC: 109 U/L (ref 40–150)
ALT SERPL W P-5'-P-CCNC: 35 U/L (ref 0–50)
ANION GAP SERPL CALCULATED.3IONS-SCNC: 4 MMOL/L (ref 3–14)
AST SERPL W P-5'-P-CCNC: 26 U/L (ref 0–45)
BASOPHILS # BLD AUTO: 0.1 10E9/L (ref 0–0.2)
BASOPHILS NFR BLD AUTO: 0.8 %
BILIRUB SERPL-MCNC: 0.6 MG/DL (ref 0.2–1.3)
BUN SERPL-MCNC: 16 MG/DL (ref 7–30)
CALCIUM SERPL-MCNC: 9.5 MG/DL (ref 8.5–10.1)
CHLORIDE SERPL-SCNC: 105 MMOL/L (ref 94–109)
CO2 SERPL-SCNC: 29 MMOL/L (ref 20–32)
CREAT SERPL-MCNC: 0.9 MG/DL (ref 0.52–1.04)
CRP SERPL-MCNC: 10 MG/L (ref 0–8)
DIFFERENTIAL METHOD BLD: ABNORMAL
EOSINOPHIL # BLD AUTO: 0.2 10E9/L (ref 0–0.7)
EOSINOPHIL NFR BLD AUTO: 1.7 %
ERYTHROCYTE [DISTWIDTH] IN BLOOD BY AUTOMATED COUNT: 13 % (ref 10–15)
ERYTHROCYTE [SEDIMENTATION RATE] IN BLOOD BY WESTERGREN METHOD: 5 MM/H (ref 0–30)
GFR SERPL CREATININE-BSD FRML MDRD: 69 ML/MIN/{1.73_M2}
GLUCOSE SERPL-MCNC: 100 MG/DL (ref 70–99)
HCT VFR BLD AUTO: 50.5 % (ref 35–47)
HGB BLD-MCNC: 16.4 G/DL (ref 11.7–15.7)
IMM GRANULOCYTES # BLD: 0 10E9/L (ref 0–0.4)
IMM GRANULOCYTES NFR BLD: 0.3 %
LYMPHOCYTES # BLD AUTO: 2.6 10E9/L (ref 0.8–5.3)
LYMPHOCYTES NFR BLD AUTO: 29.3 %
MCH RBC QN AUTO: 29.2 PG (ref 26.5–33)
MCHC RBC AUTO-ENTMCNC: 32.5 G/DL (ref 31.5–36.5)
MCV RBC AUTO: 90 FL (ref 78–100)
MONOCYTES # BLD AUTO: 0.5 10E9/L (ref 0–1.3)
MONOCYTES NFR BLD AUTO: 6.2 %
NEUTROPHILS # BLD AUTO: 5.4 10E9/L (ref 1.6–8.3)
NEUTROPHILS NFR BLD AUTO: 61.7 %
NRBC # BLD AUTO: 0 10*3/UL
NRBC BLD AUTO-RTO: 0 /100
PLATELET # BLD AUTO: 234 10E9/L (ref 150–450)
POTASSIUM SERPL-SCNC: 4.3 MMOL/L (ref 3.4–5.3)
PROT SERPL-MCNC: 8 G/DL (ref 6.8–8.8)
RBC # BLD AUTO: 5.62 10E12/L (ref 3.8–5.2)
SODIUM SERPL-SCNC: 139 MMOL/L (ref 133–144)
WBC # BLD AUTO: 8.7 10E9/L (ref 4–11)

## 2021-01-04 PROCEDURE — 86140 C-REACTIVE PROTEIN: CPT | Performed by: EMERGENCY MEDICINE

## 2021-01-04 PROCEDURE — 85025 COMPLETE CBC W/AUTO DIFF WBC: CPT | Performed by: EMERGENCY MEDICINE

## 2021-01-04 PROCEDURE — 250N000011 HC RX IP 250 OP 636: Performed by: PHYSICIAN ASSISTANT

## 2021-01-04 PROCEDURE — 70496 CT ANGIOGRAPHY HEAD: CPT | Mod: 26 | Performed by: RADIOLOGY

## 2021-01-04 PROCEDURE — 250N000013 HC RX MED GY IP 250 OP 250 PS 637: Performed by: EMERGENCY MEDICINE

## 2021-01-04 PROCEDURE — 85652 RBC SED RATE AUTOMATED: CPT | Performed by: EMERGENCY MEDICINE

## 2021-01-04 PROCEDURE — 70496 CT ANGIOGRAPHY HEAD: CPT

## 2021-01-04 PROCEDURE — 99285 EMERGENCY DEPT VISIT HI MDM: CPT | Mod: 25 | Performed by: EMERGENCY MEDICINE

## 2021-01-04 PROCEDURE — 99285 EMERGENCY DEPT VISIT HI MDM: CPT | Performed by: EMERGENCY MEDICINE

## 2021-01-04 PROCEDURE — 80053 COMPREHEN METABOLIC PANEL: CPT | Performed by: EMERGENCY MEDICINE

## 2021-01-04 PROCEDURE — 70450 CT HEAD/BRAIN W/O DYE: CPT

## 2021-01-04 PROCEDURE — 70450 CT HEAD/BRAIN W/O DYE: CPT | Mod: 26 | Performed by: RADIOLOGY

## 2021-01-04 RX ORDER — IOPAMIDOL 755 MG/ML
75 INJECTION, SOLUTION INTRAVASCULAR ONCE
Status: COMPLETED | OUTPATIENT
Start: 2021-01-04 | End: 2021-01-04

## 2021-01-04 RX ORDER — OXYCODONE HYDROCHLORIDE 5 MG/1
10 TABLET ORAL ONCE
Status: COMPLETED | OUTPATIENT
Start: 2021-01-04 | End: 2021-01-04

## 2021-01-04 RX ADMIN — IOPAMIDOL 75 ML: 755 INJECTION, SOLUTION INTRAVENOUS at 15:57

## 2021-01-04 RX ADMIN — OXYCODONE HYDROCHLORIDE 10 MG: 5 TABLET ORAL at 16:25

## 2021-01-04 ASSESSMENT — ENCOUNTER SYMPTOMS
FEVER: 0
VOMITING: 0
COUGH: 0
SORE THROAT: 0
NAUSEA: 0
SPEECH DIFFICULTY: 0
WEAKNESS: 0
NUMBNESS: 0
SHORTNESS OF BREATH: 0
HEADACHES: 1
PHOTOPHOBIA: 0

## 2021-01-04 ASSESSMENT — MIFFLIN-ST. JEOR: SCORE: 1640.44

## 2021-01-04 NOTE — DISCHARGE INSTRUCTIONS
Please make an appointment to follow up with Your Primary Care Provider as needed.    Rest and stay well-hydrated tonight.    Return to the emergency department for any problems.

## 2021-01-04 NOTE — ED AVS SNAPSHOT
[facility] Emergency Department  [address] [facility] [address]  Phone: [phone]                                    Ilsa Yusuf   MRN: [id number]    Department: [facility] Emergency Department   Date of Visit: 1/4/2021           After Visit Summary Signature Page    I have received my discharge instructions, and my questions have been answered. I have discussed any challenges I see with this plan with the nurse or doctor.    ..........................................................................................................................................  Patient/Patient Representative Signature      ..........................................................................................................................................  Patient Representative Print Name and Relationship to Patient    ..................................................               ................................................  Date                                   Time    ..........................................................................................................................................  Reviewed by Signature/Title    ...................................................              ..............................................  Date                                               Time          22EPIC Rev 08/18

## 2021-01-04 NOTE — ED PROVIDER NOTES
"ED Provider Note  [facility] EMERGENCY DEPARTMENT ([facility])  1/04/21  History     Chief Complaint   Patient presents with     Headache     left side of head     The history is provided by the patient.     Ilsa Yusuf is a 60 year old female with a medical history significant for migraines, CKD stage III, hyperlipidemia and hypothyroidism who presents to the Emergency Department for evaluation of a severe left-sided temporal headache.  Patient reports that she first began developing pressure in the left temporal region late yesterday afternoon.  She reports that the pain was gradually worsening and then at approximately 8 PM she had 1 second of \"tearing pain\".  The patient reports that she has had severe stabbing pain in her left temporal region since.  Patient has tried taking Tylenol 500 mg at home, but this did not help with her headache.  Patient denies any numbness, tingling or weakness in her extremities, she denies any difficulty speaking, changes in vision, increased pain with chewing or fevers.  Patient denies any recent head traumas or falls.  She denies any associated nausea or vomiting.  She denies anything making the headache worse.  Patient denies being on any anticoagulation medications.  Patient denies ever having a similar headache in the past.  Patient denies any recent cough, shortness of breath, sore throat or loss of taste/smell.    Past Medical History  Past Medical History:   Diagnosis Date     Arthritis 2012    both knees; hands     Cervical high risk HPV (human papillomavirus) test positive 03/19/2019    see problem list     Depressive disorder      Depressive disorder, not elsewhere classified 08/28/12    DC 10/05/12-[facility]     Hyperlipidemia LDL goal < 130     mevacor     Hypothyroid      Moderate major depression (H)     abilify, cymbalta, seroquel, and sofya, Dr Antonio Perales     Mumps      PTSD (post-traumatic " "stress disorder)      Seizure (H) 03/2011    one episode, was on Keppra, EEG negative     Past Surgical History:   Procedure Laterality Date     ABDOMEN SURGERY       BLADDER SURGERY       C PARTIAL EXCISION THYROID,UNILAT  1991    rt, negative path then, treated with synthroid.     CHOLECYSTECTOMY       COLONOSCOPY  2012     CYSTOSCOPY       HERNIA REPAIR       ORTHOPEDIC SURGERY  left knee     renal artery surgery  child    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     TONSILLECTOMY       ureteral reimplantation            DULoxetine (CYMBALTA) 60 MG capsule       levothyroxine (SYNTHROID/LEVOTHROID) 200 MCG tablet       lovastatin (MEVACOR) 20 MG tablet       OLANZapine (ZYPREXA) 2.5 MG tablet       omeprazole (PRILOSEC) 40 MG DR capsule       prazosin (MINIPRESS) 5 MG capsule       zolpidem (AMBIEN) 5 MG tablet       cholecalciferol (VITAMIN D3) 5000 UNITS CAPS capsule       FIBER PO       fluorouracil (EFUDEX) 5 % external cream       fluticasone (FLONASE) 50 MCG/ACT nasal spray       ipratropium (ATROVENT) 0.03 % nasal spray       multivitamin, therapeutic with minerals (MULTI-VITAMIN) TABS       Omega-3 Fatty Acids (OMEGA 3 PO)       ondansetron (ZOFRAN) 4 MG tablet       oxyCODONE IR (ROXICODONE) 10 MG tablet       tiZANidine (ZANAFLEX) 4 MG tablet       topiramate (TOPAMAX) 50 MG tablet       traZODone (DESYREL) 50 MG tablet       UNABLE TO FIND       UNKNOWN TO PATIENT      Allergies   Allergen Reactions     Gabapentin Other (See Comments)     Patient states she gets \"horrific nightmares\" with this medication     Dilaudid [Hydromorphone Hcl]      Made her feel like her skin was crawling     Nsaids Other (See Comments)     Kidney failure     Compazine [Prochlorperazine] Other (See Comments)     \"Makes my skin crawl, I was going nuts.\"     Past medical history, past surgical history, medications, and allergies were reviewed with the patient. Additional pertinent items: Migraines    Family History  Family " "History   Problem Relation Age of Onset     C.A.D. Father 58         at 58, heart disease     Mental Illness Father      Coronary Artery Disease Father      Hyperlipidemia Father      Alzheimer Disease Mother         total care now     Depression Mother      Anxiety Disorder Mother      Diabetes Sister         adult onset     Melanoma Sister      Breast Cancer Sister      Thyroid Disease Sister      Family history was reviewed with the patient. Additional pertinent items: None    Social History  Social History     Tobacco Use     Smoking status: Former Smoker     Packs/day: 0.30     Quit date: 2015     Years since quittin.8     Smokeless tobacco: Never Used     Tobacco comment: recreational   Substance Use Topics     Alcohol use: Yes     Alcohol/week: 0.0 standard drinks     Comment: once a week     Drug use: No      Social history was reviewed with the patient. Additional pertinent items: None      Review of Systems   Constitutional: Negative for fever.   HENT: Negative for sore throat.         Negative for loss of taste/smell   Eyes: Negative for photophobia and visual disturbance.   Respiratory: Negative for cough and shortness of breath.    Gastrointestinal: Negative for nausea and vomiting.   Neurological: Positive for headaches (left temporal). Negative for speech difficulty, weakness and numbness.   All other systems reviewed and are negative.      Physical Exam   BP: (!) 162/84  Pulse: 79  Temp: 98.6  F (37  C)  Resp: 16  Height: 175.3 cm (5' 9\")  Weight: 101.6 kg (224 lb)  SpO2: 97 %  Physical Exam  Vitals signs and nursing note reviewed.   Constitutional:       General: She is not in acute distress.     Appearance: She is well-developed. She is not ill-appearing, toxic-appearing or diaphoretic.      Comments: Patient is awake and alert, mentating normally, no acute distress.  She does appear uncomfortable.   HENT:      Head: Normocephalic and atraumatic.        Mouth/Throat:      Lips: Pink. "      Mouth: Mucous membranes are moist.      Pharynx: Oropharynx is clear. No oropharyngeal exudate.   Eyes:      General: Lids are normal. No visual field deficit or scleral icterus.     Extraocular Movements: Extraocular movements intact.      Right eye: No nystagmus.      Left eye: No nystagmus.      Conjunctiva/sclera: Conjunctivae normal.      Pupils: Pupils are equal, round, and reactive to light.   Neck:      Musculoskeletal: Normal range of motion and neck supple. No erythema or neck rigidity.      Thyroid: No thyromegaly.      Vascular: No JVD.      Trachea: No tracheal deviation.      Comments: No meningeal signs noted.  Cardiovascular:      Rate and Rhythm: Normal rate and regular rhythm.      Pulses: Normal pulses.      Heart sounds: Normal heart sounds. No murmur. No friction rub. No gallop.    Pulmonary:      Effort: Pulmonary effort is normal. No respiratory distress.      Breath sounds: Normal breath sounds.   Abdominal:      General: Bowel sounds are normal. There is no distension.      Palpations: Abdomen is soft. There is no mass.      Tenderness: There is no abdominal tenderness. There is no guarding or rebound.   Musculoskeletal: Normal range of motion.         General: No tenderness.      Right lower leg: No edema.      Left lower leg: No edema.   Lymphadenopathy:      Cervical: No cervical adenopathy.   Skin:     General: Skin is warm and dry.      Capillary Refill: Capillary refill takes less than 2 seconds.      Coloration: Skin is not pale.      Findings: No erythema or rash.   Neurological:      Mental Status: She is alert and oriented to person, place, and time.      Cranial Nerves: Cranial nerves are intact. No cranial nerve deficit or dysarthria.      Sensory: No sensory deficit.      Motor: Motor function is intact. No weakness or pronator drift.      Coordination: Coordination is intact. Finger-Nose-Finger Test normal. Rapid alternating movements normal.      Gait: Gait is intact.       Comments: No aphasia noted.   Psychiatric:         Mood and Affect: Mood and affect normal.         Speech: Speech normal.         Behavior: Behavior normal.         ED Course      Procedures     1:51 PM  The patient was seen and examined by Kurtis Colon MD in Room [facility].                Results for orders placed or performed during the hospital encounter of 01/04/21   CT Head w/o Contrast     Status: None    Narrative    CT HEAD W/O CONTRAST 1/4/2021 2:37 PM    History: Severe left-sided headache     Comparison: 8/19/2016    Technique: Using multidetector thin collimation helical acquisition  technique, axial, coronal and sagittal CT images from the skull base  to the vertex were obtained without intravenous contrast.    Findings: There is no intracranial hemorrhage, mass effect, or midline  shift. Gray/white matter differentiation in both cerebral hemispheres  is preserved. Ventricles are proportionate to the cerebral sulci. The  basal cisterns are clear.    The bony calvaria and the bones of the skull base are normal. Mucosal  thickening in the right sphenoid locule. The visualized portions of  the paranasal sinuses and mastoid air cells are otherwise clear.       Impression    Impression:  No acute intracranial pathology.     I have personally reviewed the examination and initial interpretation  and I agree with the findings.    BRYAN STARR MD   CTA Head with Contrast     Status: None    Narrative    CTA HEAD WITH CONTRAST 1/4/2021 4:06 PM    CT angiogram of the head with contrast  Reconstruction by the Radiologist on the 3D workstation    History: Headache, acute, severe, worst HA of life  ICD-10:    Comparison:  CT head 8/19/2016    Technique:   Following rapid bolus intravenous injection of nonionic contrast  material, helical images were obtained using thin collimation  multidetector helical technique from the base of the skull through the  [place]. This CT angiogram data was reconstructed at  thin  intervals with mild overlap. Images were sent to the [employer]  workstation, and 3D and multiplanar reconstructions were performed by  the technologist and the radiologist. The source images, multiplanar  reformations, and 3D reconstructions in both maximum intensity  projection display and volume rendered models were reviewed.    Contrast: iopamidol (ISOVUE-370) solution 75 mL    Findings:    No aneurysm or stenosis of the major intracranial arteries at the base  of the brain. Anterior and right posterior commuting arteries are  patent. Left posterior communicating artery is not well-visualized.      Impression    Impression:   No aneurysm or stenosis of the major intracranial arteries.    CHRISTIE DANIELS MD   CBC with platelets differential     Status: Abnormal   Result Value Ref Range    WBC 8.7 4.0 - 11.0 10e9/L    RBC Count 5.62 (H) 3.8 - 5.2 10e12/L    Hemoglobin 16.4 (H) 11.7 - 15.7 g/dL    Hematocrit 50.5 (H) 35.0 - 47.0 %    MCV 90 78 - 100 fl    MCH 29.2 26.5 - 33.0 pg    MCHC 32.5 31.5 - 36.5 g/dL    RDW 13.0 10.0 - 15.0 %    Platelet Count 234 150 - 450 10e9/L    Diff Method Automated Method     % Neutrophils 61.7 %    % Lymphocytes 29.3 %    % Monocytes 6.2 %    % Eosinophils 1.7 %    % Basophils 0.8 %    % Immature Granulocytes 0.3 %    Nucleated RBCs 0 0 /100    Absolute Neutrophil 5.4 1.6 - 8.3 10e9/L    Absolute Lymphocytes 2.6 0.8 - 5.3 10e9/L    Absolute Monocytes 0.5 0.0 - 1.3 10e9/L    Absolute Eosinophils 0.2 0.0 - 0.7 10e9/L    Absolute Basophils 0.1 0.0 - 0.2 10e9/L    Abs Immature Granulocytes 0.0 0 - 0.4 10e9/L    Absolute Nucleated RBC 0.0    Comprehensive metabolic panel     Status: Abnormal   Result Value Ref Range    Sodium 139 133 - 144 mmol/L    Potassium 4.3 3.4 - 5.3 mmol/L    Chloride 105 94 - 109 mmol/L    Carbon Dioxide 29 20 - 32 mmol/L    Anion Gap 4 3 - 14 mmol/L    Glucose 100 (H) 70 - 99 mg/dL    Urea Nitrogen 16 7 - 30 mg/dL    Creatinine 0.90 0.52 - 1.04 mg/dL    GFR  Estimate 69 >60 mL/min/[1.73_m2]    GFR Estimate If Black 79 >60 mL/min/[1.73_m2]    Calcium 9.5 8.5 - 10.1 mg/dL    Bilirubin Total 0.6 0.2 - 1.3 mg/dL    Albumin 4.1 3.4 - 5.0 g/dL    Protein Total 8.0 6.8 - 8.8 g/dL    Alkaline Phosphatase 109 40 - 150 U/L    ALT 35 0 - 50 U/L    AST 26 0 - 45 U/L   Erythrocyte sedimentation rate auto     Status: None   Result Value Ref Range    Sed Rate 5 0 - 30 mm/h   CRP inflammation     Status: Abnormal   Result Value Ref Range    CRP Inflammation 10.0 (H) 0.0 - 8.0 mg/L     Medications   oxyCODONE (ROXICODONE) tablet 10 mg (10 mg Oral Given 1/4/21 1625)   sodium chloride (PF) 0.9% PF flush 90 mL (90 mLs Intravenous Given 1/4/21 0787)   iopamidol (ISOVUE-370) solution 75 mL (75 mLs Intravenous Given 1/4/21 2507)        Assessments & Plan (with Medical Decision Making)   This patient presented to the emergency department with a left-sided temporal headache.  This is a more acute onset starting yesterday but was gradual, though she did describe a tearing sensation in her left temporal area.  There was no antecedent trauma.  Noncon head CT demonstrated no evidence of acute bleed or mass-effect.  I did have discussion with the patient and through shared decision making decided to progress to CT angiogram to rule out cerebral aneurysm as the headache is begun close to 24 hours prior to presentation diminishing the sensitivity of a Noncon head CT to uptake of sentinel bleed from subarachnoid hemorrhage.  Sed rate is not elevated either decreasing suspicion for temporal arteritis.  Patient had no ocular complaints, has a normal pupillary exam and no redness of the left eye decreasing any suspicion for acute angle-closure glaucoma.  Her CT angiogram demonstrated no evidence of cerebral aneurysm for further decreasing suspicion for a subarachnoid hemorrhage.  Patient had been given 10 mg of oxycodone and was feeling better and at this point time I am comfortable discharging her home  to have her follow-up with her primary clinic if needed as I can find no significant pathology.  This part of the medical record was transcribed by Catrachita Luo, Medical Scribe, from a dictation done by Kurtis Colon MD     I have reviewed the nursing notes. I have reviewed the findings, diagnosis, plan and need for follow up with the patient.    Discharge Medication List as of 1/4/2021  6:01 PM          Final diagnoses:   Acute nonintractable headache, unspecified headache type       --  I, Epi Thomason, am serving as a trained medical scribe to document services personally performed by Kurtis Colon MD, based on the provider's statements to me.     I, Kurtis Colon MD, was physically present and have reviewed and verified the accuracy of this note documented by Epi Thomason.    Kurtis Colon MD  [facility] EMERGENCY DEPARTMENT  1/4/2021     Kurtis Colon MD  01/05/21 0713

## 2021-01-25 ENCOUNTER — PATIENT OUTREACH (OUTPATIENT)
Dept: FAMILY MEDICINE | Facility: CLINIC | Age: 61
End: 2021-01-25

## 2021-01-25 NOTE — LETTER
January 25, 2021      Ilsa Yusuf  [address] [facility] [address]        Dear ,    At [facility], your health and wellness are our primary concern. That is why we are following up on your most recent positive high-risk Human Papillomavirus (HPV) test.    Please call [phone] to schedule an appointment for your recommended follow-up Pap smear and Human Papillomavirus (HPV) test at your earliest convenience.     If you have completed the appointment outside of the [facility] system, please have the records forwarded to our office. We will update your chart for your provider to review before your next annual wellness visit.     Thank you for choosing [facility]!      Sincerely,    Your [facility] Care Team

## 2021-03-29 NOTE — TELEPHONE ENCOUNTER
Hi Dr. Gallo,  I'm sending this to you since Dr. Mcdonald is no longer with [facility].   Patient is lost to pap tracking follow-up. Attempts to contact pt have been made per reminder process and there has been no reply and/or no appt scheduled.       Pap Hx:  TORRI Exposure, needs annual Pap's for life   3/19/19 NIL pap, + HR HPV (not 16, 18). Plan: cotest 1 year.  CCT tracking.   1/25/21 Reminder letter  02/24/21 Reminder call-lm  03/29/21 Lost to follow-up for pap tracking, chilo routed to provider

## 2021-04-08 ENCOUNTER — TRANSFERRED RECORDS (OUTPATIENT)
Dept: HEALTH INFORMATION MANAGEMENT | Facility: CLINIC | Age: 61
End: 2021-04-08
Payer: COMMERCIAL

## 2021-04-09 ENCOUNTER — TRANSFERRED RECORDS (OUTPATIENT)
Dept: HEALTH INFORMATION MANAGEMENT | Facility: CLINIC | Age: 61
End: 2021-04-09
Payer: COMMERCIAL

## 2021-04-18 ENCOUNTER — HEALTH MAINTENANCE LETTER (OUTPATIENT)
Age: 61
End: 2021-04-18

## 2021-05-28 NOTE — TELEPHONE ENCOUNTER
Called and answered all questions in details to Ilsa's satisfaction. Will give Dr. Barnes an update re numbness.  Yamile Sung, RN, CNRN

## 2021-05-28 NOTE — TELEPHONE ENCOUNTER
Pt is calling with a few things that she would like  to know. One being her thumbs are tingly all the time. Also, she has some questions regarding her EMG that she will be completing. Please call the PT back @ [phone]. PT will not be available from 10-2:30 5/14/19 so please do not return her call during those times.

## 2021-05-28 NOTE — PROGRESS NOTES
"NEUROSURGERY CONSULTATION NOTE    5/6/2019     Ilsa Yusuf is a 58 y.o. female who is sent to us in consultation by Fatou Latham  And I operated on her sister.    for evaluation of HA and back pain.    The pt describes the symptoms as having  started  Maybe years ago without an inciting event in her anterior neck going to her posterior, traveling to her head down to her shoulder blades with pain in her R back if standing more than 12 minutes.  L Arm goes to sleep if she lays on her L and R arm goes to sleep if she lays on her R.  Better with laying on her back.    Also happens with driving .    Neck Pain is worse with flexion or hyperextension and better with supine position.  Pt was previously  treated with PT, (traction, (helped)) and exercises.  She also has had 2 injections     R shoulder injection helped 4/8/19 at [facility] which helped apparently for 1 wk.     No ANNIE this year (last one was 9 mos ago at [facility]     R foot weak 5 mos ago.       HPI:    No past medical history on file.  No past surgical history on file.      REVIEW OF SYSTEMS:  ROS reviewed with pt as documented on pt health form of 5/6/2019.    Negative cardiac, pulmonary, hematological     .  No family hx of anesthetic reactions  .  No family hx of hypercoagulability .       MEDICATIONS:  No current outpatient medications on file.     No current facility-administered medications for this visit.          ALLERGIES/SENSITIVITIES:     Allergies   Allergen Reactions     Dilaudid [Hydromorphone] Other (See Comments)     \"creepy crawly skin\"     Nsaids (Non-Steroidal Anti-Inflammatory Drug)      Kidney failure       PERTINENT SOCIAL HISTORY: occasionally cigarette  Social History     Socioeconomic History     Marital status: Single     Spouse name: Not on file     Number of children: Not on file     Years of education: Not on file     Highest education level: Not on file   Occupational History     Not on file   Social Needs     Financial " resource strain: Not on file     Food insecurity:     Worry: Not on file     Inability: Not on file     Transportation needs:     Medical: Not on file     Non-medical: Not on file   Tobacco Use     Smoking status: Not on file   Substance and Sexual Activity     Alcohol use: Not on file     Drug use: Not on file     Sexual activity: Not on file   Lifestyle     Physical activity:     Days per week: Not on file     Minutes per session: Not on file     Stress: Not on file   Relationships     Social connections:     Talks on phone: Not on file     Gets together: Not on file     Attends [employer] service: Not on file     Active member of club or organization: Not on file     Attends meetings of clubs or organizations: Not on file     Relationship status: Not on file     Intimate partner violence:     Fear of current or ex partner: Not on file     Emotionally abused: Not on file     Physically abused: Not on file     Forced sexual activity: Not on file   Other Topics Concern     Not on file   Social History Narrative     Not on file         FAMILY HISTORY:  No family history on file.     PHYSICAL EXAM:   Constitutional: There were no vitals taken for this visit.     Mental Status: A & O in no acute distress.  Affect is appropriate.  Speech is fluent.  Recent and remote memory are intact.  Attention span and concentration are normal.     Cranial Nerves: CN1: grossly intact per patient recall. CN2: No funduscopic exam performed. CN3,4 & 6: Pupillary light response, lateral and vertical gaze normal.  No nystagmus.  Visual fields are full to confrontation. CN5: Intact to touch CN7: No facial weakness, smile, facial symmetry intact. CN8: Intact to spoken voice. CN9&10: Gag reflex, uvula midline, palate rises with phonation. CN11: Shoulder shrug 5/5 intact bilaterally. CN12: Tongue midline and moves freely from side to side.     Motor: No pronator drift of upper extremity. Normal bulk and tone all muscle groups of upper and  lower extremities.  Finger abduction weak bilaterally      Sensory: Sensation intact bilaterally to light touch      Coordination:  Heel/toe/  gait intact.  unsteady  tandem gait      Reflexes; supinator, biceps, triceps, knee/ ankle jerk  Absent   hoffmans/ no no     babinski/ clonus.    phalen's positive for median nerve distribution symptoms     Shoulder maneuvers cause pain on the right     IMAGING: I personally reviewed all radiographic images    MRI  Some narrowing at C56 where there is endplate degeneration and a small disk herniation that mildly displaces the cord.      Flex/ext stable       CONSULTATION ASSESSMENT AND PLAN:  Pt has a constilation of symptoms which I think is a combination of R shoulder pathology, cervical spine pain and CTS Bilaterally.  I would recommend an EMG and CTR one at a time and a shoulder evaluation has already been done by Ortho.      I am waiting for the injection results from last year to determine whether or not C56 has been targeted and the response.     Once we gather what injections have already been done, we will be likely to order a bilateral  TF ANNIE at 56  Vs Facet injection (depends on what has already been done).     I spent more than 45 minutes in this apt, examining the pt, reviewing the scans, reviewing notes from chart, discussing treatment options with risks and benefits and coordinating care. >50 % clinic time was spent in face to face counseling and coordinating care    Zoey Lee       Cc:   Catrachita Collado MD  [address] [place] [address]

## 2021-05-28 NOTE — PROGRESS NOTES
Neurosurgery consultation was requested by: Fatou Latham NP for evaluation of cervical spinal stenosis  Pain: presents in the neck over the past 3 years. Also chronic back pain with standing up  Radicular Pain is present: in bilateral shoulders and biceps R>L  Lhermitte sign: denies  Motor complaints: weakness in both legs  Sensory complaints: paresthesias in all four extremity  Gait and balance issues: absent  Bowel or bladder issues: denies incontinence or saddle anesthesia  Duration of SX is: chronic  The symptoms are worse with: activity  The symptoms are better with: rest  Injury: denies  Severity is: moderate  Patient has tried the following conservative measures: pain meds are managed by Fatou Latham NP at [facility], has had ANNIE x 4, in PT now  MARIO score is: 72%  Yamile Sung RN, CNRN

## 2021-05-29 NOTE — PROGRESS NOTES
Patient presents at the request of Dr. Lee for bilateral upper extremity EMG.  She has bilateral hand numbness and tingling all digits involved with pain in the thumbs.  She has neck pain upper thoracic pain and diffuse arm pain as well.  Right greater than or equal to left in severity.    EMG/NCS  results: Please see scanned full report.    Comment NCS: Abnormal study:    1.  Prolonged right and normal left median versus ulnar transcarpal latency comparison.  2.  Normal right median and ulnar SNAPs, bilateral ulnar and right median transcarpal studies, normal bilateral median and ulnar CMAPs.    Comment EMG: Normal study.  1.  Normal needle EMG  bilateral upper extremities.    Interpretation: Abnormal study: There is electrodiagnostic evidence of:    1.  Right median neuropathy at the wrist consistent with entrapment in the carpal tunnel, very mild in severity.    2.  There is no electrodiagnostic evidence of left median neuropathy at the wrist.      3. There is no electrodiagnostic evidence of cervical radiculopathy, brachial plexopathy, or ulnar neuropathy in the bilateral upper extremities.    The testing was completed in its entirety by the physician.      It was our pleasure caring for your patient today, if there any questions or concerns please do not hesitate to contact us.

## 2021-05-29 NOTE — PROGRESS NOTES
Assessment/Plan:      Diagnoses and all orders for this visit:    Cervicalgia    Myofascial pain  -     Erythrocyte Sedimentation Rate; Future  -     C-Reactive Protein; Future  -     Antinuclear Antibody (TAMEKA) [place]; Future  -     Rheumatoid factor; Future  -     Lyme Antibody [place]; Future  -     CCP Antibodies; Future    Paresthesia of arm    Carpal tunnel syndrome of right wrist    DDD (degenerative disc disease), cervical        Assessment: Pleasant 58 y.o. female with history of anxiety, depression, reflux, stage I kidney disease, hypothyroidism, chronic pain.  The pain clinic with:    1.  3 to 4-year history of cervical spine pain with recurrent left arm paresthesias.  She does have cervical degenerative disc disease at C5-6 with a broad-based disc osteophyte complex resulting in moderate central stenosis but no spinal cord signal abnormality or myelopathic findings on exam.    2.  Cervical thoracic lumbar spine pain with arm and leg pain/myalgias.  Consistent with a chronic widespread myofascial pain.  Cannot exclude an inflammatory component.    3.  Median neuropathy at the wrist in the right upper extremity with hand paresthesias bilaterally.    4.  She does have headaches chronically.  5. Right shoulder pain may have a component of rotator cuff impingement.    Discussion:    1. We discussed the diagnosis and treatment options with the patient.  I discussed her MRI along with a EMG which was done today.  We discussed her previous treatment such as therapy along with her current pain management.  She is seeing a pain clinic for medications.    2.  Recommend lab work as above to evaluate for inflammatory neuropathy or connective tissue disease.  I believe she does have a component of widespread myofascial pain and may have fibromyalgia as a primary pain syndrome.    3.  She does have degenerative changes at C5-6 with at least moderate central stenosis.  There is some CSF preserved surrounding the cord on  the axial images.  We discussed central cord syndrome as a diagnosis and a rare complication of spinal stenosis.  All her questions were answered.    4.  Should continue to wear carpal tunnel brace in the right upper extremity.    5.  We will follow-up by phone with results of imaging.  Will refer to rheumatology based on lab results to confirm diagnosis of fibromyalgia versus inflammatory arthropathy.  Polymyalgia rheumatic is on the differential.      It was our pleasure caring for your patient today, if there any questions or concerns please do not hesitate to contact us.      Subjective:   Patient ID: Ilsa Yusuf is a 58 y.o. female.    History of Present Illness:Patient presents at the request of Dr. Lee for evaluation of cervical spine pain upper thoracic spine pain bilateral arm pain and paresthesias.  She tells me that she has herniated disks in her neck along with lumbar spine and arm paresthesias.  She rode a horse a lot as a teenager but really did not develop any pain until 3 to 4 years ago.  No injury.  She started having headaches which have waxed and waned.  These are temporal headaches frontal headaches but also has cervical spine pain upper thoracic spine pain and bilateral arm pain right greater than left into the thumbs with numbness and tingling in all digits.  Her pain is constant worse with any prolonged sitting standing walking for 15 to 20 minutes at a time.  Better with medications and some physical therapy exercises and stretches.  Her pain is a 9/10 at worst 7/10 today best.    She has had physical therapy in the past and doing her home exercises.  She sees [facility] pain clinic for numerous medications including MS Contin, oxycodone, Topamax tizanidine.  Tells me she has had multiple injections and I am unable to find any of the cervical epidurals that she has had in and reports having at least 3 or 4 of these in the past which may be give 1 month of up to 75%  "improvement but still had pain.  She also has chronic low back pain and leg pain basically whole body pain.      Imaging:  MRI report and images were personally reviewed and discussed with the patient.  A plastic model was utilized during the discussion.  MRI of the cervical spine personally reviewed.  This is from March 26, 2019.  This reveals some degenerative disc disease C5-6 moderate disc height loss with moderate broad-based disc bulge moderate central stenosis and moderate left greater than right foraminal stenosis.  There is preserved CSF surrounding the cord on axial images.  Mild degenerative changes C6-7 no spinal cord signal abnormality.    Flexion extension x-rays of the cervical spine also personally reviewed showing no instability from flexion to extension.    EMG bilateral upper extremities reviewed.  There is electrodiagnostic evidence of right median neuropathy at the wrist consistent with entrapment in the carpal tunnel, very mild.  No electrodiagnostic evidence of left median neuropathy at the wrist, cervical radiculopathy, brachial plexopathy or ulnar neuropathy in the bilateral upper extremities    Review of Systems: Complains of headaches, ringing in her ears, some hesitancy with urination.  \"Sciatica\".  And itching.  No red swollen joints rashes, chest pain, shortness of breath abdominal pain nausea or vomiting. Remainder of 12 point review systems negative unless listed above.    Past Medical History:   Diagnosis Date     Anxiety      Chronic kidney disease      Depression      Disease of thyroid gland      GERD (gastroesophageal reflux disease)      Social history: .  Retired.  2 grown daughters in their 20s.  No tobacco, does drink alcohol.    The following portions of the patient's history were reviewed and updated as appropriate: allergies, current medications, past family history, past medical history, past social history, past surgical history and problem list.      WHO 5: " 15    NDI Score: 76      Objective:   Physical Exam:    Vitals:    05/24/19 0748   BP: 119/58     Body mass index is 32.43 kg/m .      General:  Well-appearing female in no acute distress.  Pleasant, cooperative, and interactive throughout the examination and interview.  CV: No lower extremity edema on inspection or paltation.  Lymphatics: No cervical lymphadenopathy palpated.  Eyes: sclera clear.  Skin: No rashes or lesions seen over the head/neck, hairline, arms, legs,  .  Respirations unlabored.  MSK: Gait is mildly shuffling, cautious..  Able to heel-toe and without difficulty.  Negative Romberg.  Spine: normal AP curves of the C, T, and L spine.  Significantly reduced range of motion cervical spinal planes second.  Palpation: Tenderness to light palpation cervical spine upper trapezius arms and lumbar spine.  Extremities: Decreased range of motion right shoulder internal rotation mildly.  Decreased shoulder abduction to 90 degrees on the right.  Full range of motion of the left shoulder, bilateral elbows, and wrists with no effusions or tenderness to palpation.  Pain with arm drop empty can on the right as well as Lange on the right  .     Full range of motion of the  knees, and ankles from a seated position with no effusions or tenderness to palpation. No hypermobility of the upper or lower extremities.  Neurologic exam: Mental status: Patient is alert and oriented with normal affect.  Attention, knowledge, memory, and language are intact.  Normal coordination throughout the examination.  Reflexes are 2+ and symmetric biceps, triceps, brachioradialis,1+ patellar, and 0Achilles with Negative Anthony's.  Sensation is decreased nondermatomal distribution right lateral forearm and bilateral  lateral malleolus regions.   Manual muscle testing reveals  5-/5 the right, 5/5 left shoulder abductor's giveaway to pain.  5/5 bilateral elbow flexors/extensors, wrist extensors, interosseous, and finger flexors; lower  extremity strength appears grossly normal.   Normal muscle bulk and tone in the arms and legs.  Negative Spurling's test bilaterally.

## 2021-05-29 NOTE — PATIENT INSTRUCTIONS - HE
Thank you for choosing the [facility] for your EMG testing.    The ordering provider will receive your final EMG results within the next few days.  Please follow up with your provider for the results and further treatment recommendations.

## 2021-06-03 VITALS — WEIGHT: 218 LBS | BODY MASS INDEX: 32.29 KG/M2 | HEIGHT: 69 IN

## 2021-06-12 ENCOUNTER — HEALTH MAINTENANCE LETTER (OUTPATIENT)
Age: 61
End: 2021-06-12

## 2021-06-24 ENCOUNTER — COMMUNICATION - HEALTHEAST (OUTPATIENT)
Dept: INTERNAL MEDICINE | Facility: CLINIC | Age: 61
End: 2021-06-24

## 2021-06-26 NOTE — TELEPHONE ENCOUNTER
New Appointment Needed  What is the reason for the visit:    New patient, establish care, abdominal pain after eating, nausea, fatigue  Provider Preference: Dr Yepez, patient declined scheduling w/another provider  How soon do you need to be seen?: Asap  Waitlist offered?: No  Okay to leave a detailed message:  Yes

## 2021-06-29 ENCOUNTER — TRANSFERRED RECORDS (OUTPATIENT)
Dept: HEALTH INFORMATION MANAGEMENT | Facility: CLINIC | Age: 61
End: 2021-06-29
Payer: COMMERCIAL

## 2021-07-03 NOTE — ADDENDUM NOTE
Addendum Note by Pili Sung RN at 5/7/2019  3:00 PM     Author: Pili Sung RN Service: -- Author Type: Registered Nurse    Filed: 5/8/2019 10:59 AM Encounter Date: 5/7/2019 Status: Signed    : Pili Sung RN (Registered Nurse)    Addended by: PILI SUNG on: 5/8/2019 10:59 AM        Modules accepted: Orders

## 2021-07-03 NOTE — ADDENDUM NOTE
Addendum Note by Pili Sung RN at 5/7/2019  3:00 PM     Author: Pili Sung RN Service: -- Author Type: Registered Nurse    Filed: 5/8/2019  4:08 PM Encounter Date: 5/7/2019 Status: Signed    : Pili Sung RN (Registered Nurse)    Addended by: PILI SUNG on: 5/8/2019 04:08 PM        Modules accepted: Orders

## 2021-08-13 ENCOUNTER — TELEPHONE (OUTPATIENT)
Dept: SLEEP MEDICINE | Facility: CLINIC | Age: 61
End: 2021-08-13

## 2021-08-13 NOTE — TELEPHONE ENCOUNTER
CHECKED [employer]VIEW AND CHANGE THE PT PRESSURES TO REFLECT CURRENT RX ON FILE. CALLED PT AND INFORMED THE PT OF THE CHANGE. PT HAD NO OTHER QUESTIONS OR CONCERNS AT THIS TIME.

## 2021-10-02 ENCOUNTER — HEALTH MAINTENANCE LETTER (OUTPATIENT)
Age: 61
End: 2021-10-02

## 2021-10-16 NOTE — PROGRESS NOTES
Assessment & Plan     Acquired hypothyroidism  Low TSH with borderline high free T4.  With the symptoms she is having of heat disturbance irritability and increased anxiety, over replacement of thyroid appears to be an issue.  At this point time we will decrease 175 mcg daily and recheck labs in 8 to 12 weeks.  - TSH with free T4 reflex; Future  - TSH with free T4 reflex  - T4 free  - TSH with free T4 reflex; Future  - levothyroxine (SYNTHROID/LEVOTHROID) 175 MCG tablet; Take 1 tablet (175 mcg) by mouth daily    Primary insomnia  She continues on the prazosin for sleep assistance.  This is resolving the nightmare she has had previously.  She will follow-up if sleep issues persist after the change in thyroid dose.  - prazosin (MINIPRESS) 5 MG capsule; TAKE 1 CAPSULE(5 MG) BY MOUTH AT BEDTIME    Stage 3a chronic kidney disease (H)  Current lab testing improved compared to previous with normal creatinine and GFR currently.  We will continue to monitor this over time.  - Comprehensive metabolic panel (BMP + Alb, Alk Phos, ALT, AST, Total. Bili, TP); Future  - CBC with platelets; Future  - CBC with platelets  - Comprehensive metabolic panel (BMP + Alb, Alk Phos, ALT, AST, Total. Bili, TP)    Nausea  As needed Zofran prescription is given.  Did have evaluation with gastroenterology earlier this year with normal EGD.  Monitor symptoms.  - ondansetron (ZOFRAN) 4 MG tablet; Take 1 tablet (4 mg) by mouth every 6 hours as needed for nausea    Hyperlipidemia LDL goal <130  Cholesterol is under good control with current treatment which she is tolerating well.  Refill lovastatin given.  - lovastatin (MEVACOR) 20 MG tablet; Take 1 tablet (20 mg) by mouth At Bedtime  Lipid panel reflex to direct LDL Non-fasting; Future  - Lipid panel reflex to direct LDL Non-fasting    Gastroesophageal reflux disease without esophagitis  Symptoms of reflux though a normal endoscopy.  She will continue the omeprazole as previous.  - omeprazole  (PRILOSEC) 40 MG DR capsule; Take 1 capsule (40 mg) by mouth daily  - ondansetron (ZOFRAN) 4 MG tablet; Take 1 tablet (4 mg) by mouth every 6 hours as needed for nausea    Major depressive disorder, recurrent episode, moderate (H)  Depression symptoms are under good control.  Continue Cymbalta as previously prescribed.  Will attempt to obtain records from psychiatry as well.  - DULoxetine (CYMBALTA) 60 MG capsule; TAKE 2 CAPSULES BY MOUTH EVERY DAY    Attention-deficit hyperactivity disorder, predominantly hyperactive type  Reviewed records in care everywhere and she appears to have been taking the Adderall regularly.  Refill is given for this.  - amphetamine-dextroamphetamine (ADDERALL) 20 MG tablet; Take 1 tablet (20 mg) by mouth daily    Screening for diabetes mellitus  Prediabetic range blood sugar currently.  We will follow this up in 6 to 12 months  - Comprehensive metabolic panel (BMP + Alb, Alk Phos, ALT, AST, Total. Bili, TP); Future  - Comprehensive metabolic panel (BMP + Alb, Alk Phos, ALT, AST, Total. Bili, TP)       Need for prophylactic vaccination and inoculation against influenza  Flu vaccine given today    Elevated hemoglobin (H)/Other abnormality of red blood cells   Borderline elevated white blood cell count and elevated hemoglobin and hematocrit with normal indices.  In review of her chart it does not appear that this is a consistent finding.  We will plan a return to clinic with the labs to be rechecked when her thyroid testing is repeated.  - CBC with platelets; Future  - Ferritin; Future      Review of prior external note(s) from [facility]  Review of the result(s) of each unique test - Endoscopy, CBC serial, CMP  Ordering of each unique test  Prescription drug management  I spent a total of 41 minutes on the day of the visit.   Time spent doing chart review, history and exam, documentation and further activities per the note       BMI:   Estimated  "body mass index is 31.79 kg/m  as calculated from the following:    Height as of this encounter: 1.753 m (5' 9\").    Weight as of this encounter: 97.7 kg (215 lb 4.8 oz).   Weight management plan: Discussed healthy diet and exercise guidelines    Patient Instructions   Lab today.    Refills updated with exception of the cholesterol and thyroid medication   Those will be updated when results return.    Release [facility].    I will be in contact with suggestions/thought regarding heat symptoms.     Flu shot today      Return in about 4 weeks (around 11/15/2021) for Physical Exam.    Crista Elder MD  [facility] [place] ANDRZEJ Alicea is a 61 year old who presents for the following health issues  accompanied by her self:    History of Present Illness       She eats 2-3 servings of fruits and vegetables daily.She consumes 1 sweetened beverage(s) daily.She exercises with enough effort to increase her heart rate 20 to 29 minutes per day.  She exercises with enough effort to increase her heart rate 5 days per week.   She is taking medications regularly.       New Patient/Transfer of Care    Patient is here to establish care as well as get information regarding sleep.  Attention -  Uses adderall 6 years - initially prescribed by psych in [place].  Taking M-F tolerated well.  No side effects described.    Loose stools intermittently.    Hyperlipidemia Follow-Up      Are you regularly taking any medication or supplement to lower your cholesterol?   Yes- Lovastatin    Are you having muscle aches or other side effects that you think could be caused by your cholesterol lowering medication?  No    Depression and Anxiety Follow-Up    How are you doing with your depression since your last visit? Improved     How are you doing with your anxiety since your last visit?  No change    Are you having other symptoms that might be associated with depression or anxiety? No    Have " you had a significant life event? No     Do you have any concerns with your use of alcohol or other drugs? No    Social History     Tobacco Use     Smoking status: Former Smoker     Packs/day: 0.30     Quit date: 2015     Years since quittin.6     Smokeless tobacco: Never Used     Tobacco comment: recreational   Vaping Use     Vaping Use: Never used   Substance Use Topics     Alcohol use: Not Currently     Alcohol/week: 0.0 standard drinks     Drug use: Yes     Comment: medical marijuana     PHQ 2018 3/19/2019 10/18/2021   PHQ-9 Total Score 7 14 3   Q9: Thoughts of better off dead/self-harm past 2 weeks Not at all Not at all Not at all     DONELL-7 SCORE 2016 2018 10/18/2021   Total Score - - -   Total Score - - 7 (mild anxiety)   Total Score 10 8 7   Total Score - - -         Suicide Assessment Five-step Evaluation and Treatment (SAFE-T)    Chronic Kidney Disease Follow-up      Do you take any over the counter pain medicine?: No    Hypothyroidism Follow-up      Since last visit, patient describes the following symptoms: Weight stable, no hair loss, no skin changes, no constipation, no loose stools     Primary insomnia -sleep difficulties at times sometimes related pain and others to that restlessness and heat sensation.  Does have trazodone which she uses regularly.    Nausea/Gastroesophageal reflux disease without esophagitis  -has prescription for Prilosec and Zofran.  Has seen gastroenterology in May and  of this year ([facility]) reports had a colonoscopy in May and EGD in .  Willing to have her records released here so we can review those.  EGD results below:  Impression:   Abdominal pain in female   MD Impression Comments:  Normal exam. No ulcer or masses. No hiatal hernia. Nothing seen to cause pain.       Attention-deficit hyperactivity disorder, predominantly hyperactive type - treated with Adderall 20 mg daily.  Denies side effects.  Reports improvement in focus and attention with  "medication.      Review of Systems   Constitutional, HEENT, cardiovascular, pulmonary, GI, , musculoskeletal, neuro, skin, endocrine and psych systems are negative, except as otherwise noted.      Objective    /72   Pulse 89   Temp 98.6  F (37  C) (Tympanic)   Resp 18   Ht 1.753 m (5' 9\")   Wt 97.7 kg (215 lb 4.8 oz)   SpO2 97%   BMI 31.79 kg/m    Body mass index is 31.79 kg/m .  Physical Exam   GENERAL: alert, no distress and obese  NECK: no adenopathy, no asymmetry, masses, or scars and thyroid normal to palpation  RESP: lungs clear to auscultation - no rales, rhonchi or wheezes  CV: regular rate and rhythm, normal S1 S2, no S3 or S4, no murmur, click or rub, no peripheral edema and peripheral pulses strong  ABDOMEN: soft, nontender, no hepatosplenomegaly, no masses and bowel sounds normal  MS: no gross musculoskeletal defects noted, no edema  SKIN: no suspicious lesions or rashes  BACK: no CVA tenderness, no paralumbar tenderness  PSYCH: mentation appears normal, affect normal/bright    Results for orders placed or performed in visit on 10/18/21   Lipid panel reflex to direct LDL Non-fasting     Status: Abnormal   Result Value Ref Range    Cholesterol 185 <200 mg/dL    Triglycerides 196 (H) <150 mg/dL    Direct Measure HDL 38 (L) >=50 mg/dL    LDL Cholesterol Calculated 108 (H) <=100 mg/dL    Non HDL Cholesterol 147 (H) <130 mg/dL    Patient Fasting > 8hrs? No     Narrative    Cholesterol  Desirable:  <200 mg/dL    Triglycerides  Normal:  Less than 150 mg/dL  Borderline High:  150-199 mg/dL  High:  200-499 mg/dL  Very High:  Greater than or equal to 500 mg/dL    Direct Measure HDL  Female:  Greater than or equal to 50 mg/dL   Male:  Greater than or equal to 40 mg/dL    LDL Cholesterol  Desirable:  <100mg/dL  Above Desirable:  100-129 mg/dL   Borderline High:  130-159 mg/dL   High:  160-189 mg/dL   Very High:  >= 190 mg/dL    Non HDL Cholesterol  Desirable:  130 mg/dL  Above Desirable:  130-159 " mg/dL  Borderline High:  160-189 mg/dL  High:  190-219 mg/dL  Very High:  Greater than or equal to 220 mg/dL   CBC with platelets     Status: Abnormal   Result Value Ref Range    WBC Count 11.6 (H) 4.0 - 11.0 10e3/uL    RBC Count 5.48 (H) 3.80 - 5.20 10e6/uL    Hemoglobin 16.4 (H) 11.7 - 15.7 g/dL    Hematocrit 48.8 (H) 35.0 - 47.0 %    MCV 89 78 - 100 fL    MCH 29.9 26.5 - 33.0 pg    MCHC 33.6 31.5 - 36.5 g/dL    RDW 13.9 10.0 - 15.0 %    Platelet Count 250 150 - 450 10e3/uL   Comprehensive metabolic panel (BMP + Alb, Alk Phos, ALT, AST, Total. Bili, TP)     Status: Abnormal   Result Value Ref Range    Sodium 139 133 - 144 mmol/L    Potassium 4.1 3.4 - 5.3 mmol/L    Chloride 106 94 - 109 mmol/L    Carbon Dioxide (CO2) 31 20 - 32 mmol/L    Anion Gap 2 (L) 3 - 14 mmol/L    Urea Nitrogen 18 7 - 30 mg/dL    Creatinine 0.90 0.52 - 1.04 mg/dL    Calcium 9.6 8.5 - 10.1 mg/dL    Glucose 103 (H) 70 - 99 mg/dL    Alkaline Phosphatase 109 40 - 150 U/L    AST 18 0 - 45 U/L    ALT 39 0 - 50 U/L    Protein Total 7.7 6.8 - 8.8 g/dL    Albumin 4.1 3.4 - 5.0 g/dL    Bilirubin Total 0.4 0.2 - 1.3 mg/dL    GFR Estimate 68 >60 mL/min/1.73m2   TSH with free T4 reflex     Status: Abnormal   Result Value Ref Range    TSH 0.09 (L) 0.40 - 4.00 mU/L   T4 free     Status: Normal   Result Value Ref Range    Free T4 1.39 0.76 - 1.46 ng/dL               Answers for HPI/ROS submitted by the patient on 10/18/2021  If you checked off any problems, how difficult have these problems made it for you to do your work, take care of things at home, or get along with other people?: Somewhat difficult  PHQ9 TOTAL SCORE: 3  DONELL 7 TOTAL SCORE: 7

## 2021-10-18 ENCOUNTER — OFFICE VISIT (OUTPATIENT)
Dept: FAMILY MEDICINE | Facility: CLINIC | Age: 61
End: 2021-10-18
Payer: COMMERCIAL

## 2021-10-18 VITALS
OXYGEN SATURATION: 97 % | TEMPERATURE: 98.6 F | SYSTOLIC BLOOD PRESSURE: 134 MMHG | DIASTOLIC BLOOD PRESSURE: 72 MMHG | HEIGHT: 69 IN | WEIGHT: 215.3 LBS | BODY MASS INDEX: 31.89 KG/M2 | HEART RATE: 89 BPM | RESPIRATION RATE: 18 BRPM

## 2021-10-18 DIAGNOSIS — K21.9 GASTROESOPHAGEAL REFLUX DISEASE WITHOUT ESOPHAGITIS: ICD-10-CM

## 2021-10-18 DIAGNOSIS — E78.5 HYPERLIPIDEMIA LDL GOAL <130: ICD-10-CM

## 2021-10-18 DIAGNOSIS — R71.8 OTHER ABNORMALITY OF RED BLOOD CELLS: ICD-10-CM

## 2021-10-18 DIAGNOSIS — F51.01 PRIMARY INSOMNIA: ICD-10-CM

## 2021-10-18 DIAGNOSIS — F33.1 MAJOR DEPRESSIVE DISORDER, RECURRENT EPISODE, MODERATE (H): ICD-10-CM

## 2021-10-18 DIAGNOSIS — Z13.1 SCREENING FOR DIABETES MELLITUS: ICD-10-CM

## 2021-10-18 DIAGNOSIS — N18.31 STAGE 3A CHRONIC KIDNEY DISEASE (H): ICD-10-CM

## 2021-10-18 DIAGNOSIS — E03.9 ACQUIRED HYPOTHYROIDISM: Primary | ICD-10-CM

## 2021-10-18 DIAGNOSIS — R11.0 NAUSEA: ICD-10-CM

## 2021-10-18 DIAGNOSIS — Z23 NEED FOR PROPHYLACTIC VACCINATION AND INOCULATION AGAINST INFLUENZA: ICD-10-CM

## 2021-10-18 DIAGNOSIS — D58.2 ELEVATED HEMOGLOBIN (H): ICD-10-CM

## 2021-10-18 DIAGNOSIS — F90.1 ATTENTION-DEFICIT HYPERACTIVITY DISORDER, PREDOMINANTLY HYPERACTIVE TYPE: ICD-10-CM

## 2021-10-18 LAB
ALBUMIN SERPL-MCNC: 4.1 G/DL (ref 3.4–5)
ALP SERPL-CCNC: 109 U/L (ref 40–150)
ALT SERPL W P-5'-P-CCNC: 39 U/L (ref 0–50)
ANION GAP SERPL CALCULATED.3IONS-SCNC: 2 MMOL/L (ref 3–14)
AST SERPL W P-5'-P-CCNC: 18 U/L (ref 0–45)
BILIRUB SERPL-MCNC: 0.4 MG/DL (ref 0.2–1.3)
BUN SERPL-MCNC: 18 MG/DL (ref 7–30)
CALCIUM SERPL-MCNC: 9.6 MG/DL (ref 8.5–10.1)
CHLORIDE BLD-SCNC: 106 MMOL/L (ref 94–109)
CHOLEST SERPL-MCNC: 185 MG/DL
CO2 SERPL-SCNC: 31 MMOL/L (ref 20–32)
CREAT SERPL-MCNC: 0.9 MG/DL (ref 0.52–1.04)
ERYTHROCYTE [DISTWIDTH] IN BLOOD BY AUTOMATED COUNT: 13.9 % (ref 10–15)
FASTING STATUS PATIENT QL REPORTED: NO
GFR SERPL CREATININE-BSD FRML MDRD: 68 ML/MIN/1.73M2
GLUCOSE BLD-MCNC: 103 MG/DL (ref 70–99)
HCT VFR BLD AUTO: 48.8 % (ref 35–47)
HDLC SERPL-MCNC: 38 MG/DL
HGB BLD-MCNC: 16.4 G/DL (ref 11.7–15.7)
LDLC SERPL CALC-MCNC: 108 MG/DL
MCH RBC QN AUTO: 29.9 PG (ref 26.5–33)
MCHC RBC AUTO-ENTMCNC: 33.6 G/DL (ref 31.5–36.5)
MCV RBC AUTO: 89 FL (ref 78–100)
NONHDLC SERPL-MCNC: 147 MG/DL
PLATELET # BLD AUTO: 250 10E3/UL (ref 150–450)
POTASSIUM BLD-SCNC: 4.1 MMOL/L (ref 3.4–5.3)
PROT SERPL-MCNC: 7.7 G/DL (ref 6.8–8.8)
RBC # BLD AUTO: 5.48 10E6/UL (ref 3.8–5.2)
SODIUM SERPL-SCNC: 139 MMOL/L (ref 133–144)
T4 FREE SERPL-MCNC: 1.39 NG/DL (ref 0.76–1.46)
TRIGL SERPL-MCNC: 196 MG/DL
TSH SERPL DL<=0.005 MIU/L-ACNC: 0.09 MU/L (ref 0.4–4)
WBC # BLD AUTO: 11.6 10E3/UL (ref 4–11)

## 2021-10-18 PROCEDURE — 36415 COLL VENOUS BLD VENIPUNCTURE: CPT | Performed by: FAMILY MEDICINE

## 2021-10-18 PROCEDURE — 84439 ASSAY OF FREE THYROXINE: CPT | Performed by: FAMILY MEDICINE

## 2021-10-18 PROCEDURE — 80053 COMPREHEN METABOLIC PANEL: CPT | Performed by: FAMILY MEDICINE

## 2021-10-18 PROCEDURE — 96127 BRIEF EMOTIONAL/BEHAV ASSMT: CPT | Performed by: FAMILY MEDICINE

## 2021-10-18 PROCEDURE — 99215 OFFICE O/P EST HI 40 MIN: CPT | Mod: 25 | Performed by: FAMILY MEDICINE

## 2021-10-18 PROCEDURE — 80061 LIPID PANEL: CPT | Performed by: FAMILY MEDICINE

## 2021-10-18 PROCEDURE — 84443 ASSAY THYROID STIM HORMONE: CPT | Performed by: FAMILY MEDICINE

## 2021-10-18 PROCEDURE — 85027 COMPLETE CBC AUTOMATED: CPT | Performed by: FAMILY MEDICINE

## 2021-10-18 PROCEDURE — G0008 ADMIN INFLUENZA VIRUS VAC: HCPCS | Performed by: FAMILY MEDICINE

## 2021-10-18 PROCEDURE — 90682 RIV4 VACC RECOMBINANT DNA IM: CPT | Performed by: FAMILY MEDICINE

## 2021-10-18 RX ORDER — OMEPRAZOLE 40 MG/1
40 CAPSULE, DELAYED RELEASE ORAL DAILY
Qty: 90 CAPSULE | Refills: 1 | Status: SHIPPED | OUTPATIENT
Start: 2021-10-18 | End: 2022-02-28

## 2021-10-18 RX ORDER — TOPIRAMATE 25 MG/1
25 TABLET, FILM COATED ORAL DAILY
COMMUNITY
Start: 2021-10-18 | End: 2022-09-02

## 2021-10-18 RX ORDER — DEXTROAMPHETAMINE SACCHARATE, AMPHETAMINE ASPARTATE, DEXTROAMPHETAMINE SULFATE AND AMPHETAMINE SULFATE 5; 5; 5; 5 MG/1; MG/1; MG/1; MG/1
20 TABLET ORAL DAILY
COMMUNITY
End: 2021-10-18

## 2021-10-18 RX ORDER — DULOXETIN HYDROCHLORIDE 60 MG/1
CAPSULE, DELAYED RELEASE ORAL
Qty: 180 CAPSULE | Refills: 1 | Status: SHIPPED | OUTPATIENT
Start: 2021-10-18 | End: 2022-02-28

## 2021-10-18 RX ORDER — PRAZOSIN HYDROCHLORIDE 5 MG/1
CAPSULE ORAL
Qty: 90 CAPSULE | Refills: 1 | Status: SHIPPED | OUTPATIENT
Start: 2021-10-18 | End: 2022-02-28

## 2021-10-18 RX ORDER — ONDANSETRON 4 MG/1
4 TABLET, FILM COATED ORAL EVERY 6 HOURS PRN
Qty: 18 TABLET | Refills: 5 | Status: SHIPPED | OUTPATIENT
Start: 2021-10-18 | End: 2021-12-18

## 2021-10-18 RX ORDER — DEXTROAMPHETAMINE SACCHARATE, AMPHETAMINE ASPARTATE, DEXTROAMPHETAMINE SULFATE AND AMPHETAMINE SULFATE 5; 5; 5; 5 MG/1; MG/1; MG/1; MG/1
20 TABLET ORAL DAILY
Qty: 30 TABLET | Refills: 0 | Status: SHIPPED | OUTPATIENT
Start: 2021-10-18 | End: 2021-12-18

## 2021-10-18 ASSESSMENT — ANXIETY QUESTIONNAIRES
5. BEING SO RESTLESS THAT IT IS HARD TO SIT STILL: NOT AT ALL
6. BECOMING EASILY ANNOYED OR IRRITABLE: SEVERAL DAYS
4. TROUBLE RELAXING: MORE THAN HALF THE DAYS
3. WORRYING TOO MUCH ABOUT DIFFERENT THINGS: SEVERAL DAYS
GAD7 TOTAL SCORE: 7
7. FEELING AFRAID AS IF SOMETHING AWFUL MIGHT HAPPEN: SEVERAL DAYS
8. IF YOU CHECKED OFF ANY PROBLEMS, HOW DIFFICULT HAVE THESE MADE IT FOR YOU TO DO YOUR WORK, TAKE CARE OF THINGS AT HOME, OR GET ALONG WITH OTHER PEOPLE?: SOMEWHAT DIFFICULT
GAD7 TOTAL SCORE: 7
GAD7 TOTAL SCORE: 7
7. FEELING AFRAID AS IF SOMETHING AWFUL MIGHT HAPPEN: SEVERAL DAYS
1. FEELING NERVOUS, ANXIOUS, OR ON EDGE: SEVERAL DAYS
2. NOT BEING ABLE TO STOP OR CONTROL WORRYING: SEVERAL DAYS

## 2021-10-18 ASSESSMENT — PATIENT HEALTH QUESTIONNAIRE - PHQ9
10. IF YOU CHECKED OFF ANY PROBLEMS, HOW DIFFICULT HAVE THESE PROBLEMS MADE IT FOR YOU TO DO YOUR WORK, TAKE CARE OF THINGS AT HOME, OR GET ALONG WITH OTHER PEOPLE: SOMEWHAT DIFFICULT
SUM OF ALL RESPONSES TO PHQ QUESTIONS 1-9: 3
SUM OF ALL RESPONSES TO PHQ QUESTIONS 1-9: 3

## 2021-10-18 ASSESSMENT — MIFFLIN-ST. JEOR: SCORE: 1595.97

## 2021-10-18 NOTE — PATIENT INSTRUCTIONS
Lab today.    Refills updated with exception of the cholesterol and thyroid medication   Those will be updated when results return.    Release of information for the records from [facility].    I will be in contact with suggestions/thought regarding heat symptoms.     Flu shot today

## 2021-10-18 NOTE — Clinical Note
Please abstract the following data from this visit with this patient into the appropriate field in Epic:    Tests that can be patient reported without a hard copy:    Colonoscopy done on this date: feb 2012 (approximately), by this group: [facility], results were hyperplastic polyps.     Other Tests found in the patient's chart through Chart Review/Care Everywhere:    Pap smear done by this group [facility] on this date: 3/19/19 NIL   and HPV done by this group [facility] on this date: 3/19/19 - +HPV    Note to [employer]st[employer]: If this section is blank, no results were found via Chart Review/Care Everywhere.

## 2021-10-19 ENCOUNTER — TELEPHONE (OUTPATIENT)
Dept: FAMILY MEDICINE | Facility: CLINIC | Age: 61
End: 2021-10-19

## 2021-10-19 ASSESSMENT — PATIENT HEALTH QUESTIONNAIRE - PHQ9: SUM OF ALL RESPONSES TO PHQ QUESTIONS 1-9: 3

## 2021-10-19 ASSESSMENT — ANXIETY QUESTIONNAIRES: GAD7 TOTAL SCORE: 7

## 2021-10-19 NOTE — TELEPHONE ENCOUNTER
PA required.   Obtain PA or change medications for ondansetron (ZOFRAN) 4 MG tablet ?    To complete the PA :  1.  Go to [email or web address] and click 'Enter a Key'    2.  Enter the patient's last name and  and the key.       Key: [id number]    3.  Complete the form and click 'Send to Plan'

## 2021-10-20 ENCOUNTER — MYC MEDICAL ADVICE (OUTPATIENT)
Dept: FAMILY MEDICINE | Facility: CLINIC | Age: 61
End: 2021-10-20

## 2021-10-21 RX ORDER — LOVASTATIN 20 MG
20 TABLET ORAL AT BEDTIME
Qty: 90 TABLET | Refills: 3 | Status: SHIPPED | OUTPATIENT
Start: 2021-10-21 | End: 2022-09-26

## 2021-10-21 RX ORDER — LEVOTHYROXINE SODIUM 175 UG/1
175 TABLET ORAL DAILY
Qty: 90 TABLET | Refills: 0 | Status: SHIPPED | OUTPATIENT
Start: 2021-10-21 | End: 2022-02-28

## 2021-11-04 ENCOUNTER — MYC MEDICAL ADVICE (OUTPATIENT)
Dept: FAMILY MEDICINE | Facility: CLINIC | Age: 61
End: 2021-11-04
Payer: COMMERCIAL

## 2021-11-04 DIAGNOSIS — Z12.31 ENCOUNTER FOR SCREENING MAMMOGRAM FOR BREAST CANCER: ICD-10-CM

## 2021-11-04 DIAGNOSIS — N62 LARGE BREASTS: Primary | ICD-10-CM

## 2021-11-12 ENCOUNTER — HOSPITAL ENCOUNTER (OUTPATIENT)
Dept: MAMMOGRAPHY | Facility: CLINIC | Age: 61
Discharge: HOME | End: 2021-11-12
Attending: FAMILY MEDICINE | Admitting: FAMILY MEDICINE
Payer: COMMERCIAL

## 2021-11-12 DIAGNOSIS — Z12.31 VISIT FOR SCREENING MAMMOGRAM: ICD-10-CM

## 2021-11-12 PROCEDURE — 77063 BREAST TOMOSYNTHESIS BI: CPT

## 2021-11-15 ENCOUNTER — OFFICE VISIT (OUTPATIENT)
Dept: URGENT CARE | Facility: URGENT CARE | Age: 61
End: 2021-11-15
Payer: COMMERCIAL

## 2021-11-15 VITALS
SYSTOLIC BLOOD PRESSURE: 148 MMHG | WEIGHT: 215 LBS | TEMPERATURE: 98.2 F | OXYGEN SATURATION: 96 % | HEART RATE: 77 BPM | DIASTOLIC BLOOD PRESSURE: 82 MMHG | BODY MASS INDEX: 31.75 KG/M2

## 2021-11-15 DIAGNOSIS — R10.12 LUQ ABDOMINAL PAIN: Primary | ICD-10-CM

## 2021-11-15 DIAGNOSIS — Z87.442 HISTORY OF KIDNEY STONES: ICD-10-CM

## 2021-11-15 DIAGNOSIS — R10.84 ABDOMINAL PAIN, GENERALIZED: ICD-10-CM

## 2021-11-15 DIAGNOSIS — R30.0 DYSURIA: ICD-10-CM

## 2021-11-15 DIAGNOSIS — Z87.448 HISTORY OF PYELONEPHRITIS: ICD-10-CM

## 2021-11-15 LAB
ALBUMIN UR-MCNC: NEGATIVE MG/DL
APPEARANCE UR: CLEAR
BILIRUB UR QL STRIP: NEGATIVE
CLUE CELLS: ABNORMAL
COLOR UR AUTO: YELLOW
GLUCOSE UR STRIP-MCNC: NEGATIVE MG/DL
HGB UR QL STRIP: NEGATIVE
KETONES UR STRIP-MCNC: NEGATIVE MG/DL
LEUKOCYTE ESTERASE UR QL STRIP: NEGATIVE
NITRATE UR QL: NEGATIVE
PH UR STRIP: 8 [PH] (ref 5–7)
SP GR UR STRIP: 1.01 (ref 1–1.03)
TRICHOMONAS, WET PREP: ABNORMAL
UROBILINOGEN UR STRIP-ACNC: 0.2 E.U./DL
WBC'S/HIGH POWER FIELD, WET PREP: ABNORMAL
YEAST, WET PREP: ABNORMAL

## 2021-11-15 PROCEDURE — 99215 OFFICE O/P EST HI 40 MIN: CPT | Performed by: NURSE PRACTITIONER

## 2021-11-15 PROCEDURE — 81003 URINALYSIS AUTO W/O SCOPE: CPT | Performed by: NURSE PRACTITIONER

## 2021-11-15 PROCEDURE — 87210 SMEAR WET MOUNT SALINE/INK: CPT | Performed by: NURSE PRACTITIONER

## 2021-11-15 NOTE — PROGRESS NOTES
Assessment & Plan     LUQ abdominal pain      Dysuria    - UA Macro with Reflex to Micro and Culture - lab collect  - Wet prep - lab collect      Abdominal pain, generalized      History of pyelonephritis      History of kidney stones       Reviewed UA during visit showing no UTI or kidney infection or hematuria indicating stone. Reviewed normal wet prep showing no vaginal infection. With severe abdominal pain, history of kidney stone recommend further evaluation in emergency room. Patient is agreeable and is discharged in stable condition.         Sunita Torres NP  [facility]            Yair Alicea is a 61 year old female who presents to clinic today for the following health issues:  Chief Complaint   Patient presents with     UTI     Possible uti      UTI    Onset of symptoms was 4 day(s).  Course of illness is worsening  Current and associated symptoms bladder spasms, dysuria, urinary frequency, some urgency, temp of 100.2F. She gets intermittent twinges in her stomach   Denies fever chills, vaginal discharge, itching, odor, nausea, emesis, flank pain, abdominal pain, respiratory symptoms  Predisposing factors include history of frequent UTI's, history of Pyelonephritis and kidney stones   She drinks plenty of water daily. Minimal caffeine intake.   She took an at home dipstick test showing positive nitrates and positive leukocytes      Problem list, Medication list, Allergies, and Medical history reviewed in EPIC.    ROS:  Review of systems negative except for noted above        Objective    BP (!) 148/82   Pulse 77   Temp 98.2  F (36.8  C) (Oral)   Wt 97.5 kg (215 lb)   SpO2 96%   BMI 31.75 kg/m    Physical Exam  Constitutional:       General: She is not in acute distress.     Appearance: She is not toxic-appearing or diaphoretic.   Abdominal:      General: Bowel sounds are normal. There is distension.      Palpations: Abdomen is soft.      Tenderness: There is  generalized abdominal tenderness and tenderness in the right upper quadrant and left upper quadrant. There is left CVA tenderness. There is no right CVA tenderness, guarding or rebound.      Comments: Severe LUQ abdominal pain with palpation   Lymphadenopathy:      Cervical: No cervical adenopathy.   Neurological:      Mental Status: She is alert.          Labs:  Results for orders placed or performed in visit on 11/15/21   UA Macro with Reflex to Micro and Culture - lab collect     Status: Abnormal    Specimen: Urine, Clean Catch   Result Value Ref Range    Color Urine Yellow Colorless, Straw, Light Yellow, Yellow    Appearance Urine Clear Clear    Glucose Urine Negative Negative mg/dL    Bilirubin Urine Negative Negative    Ketones Urine Negative Negative mg/dL    Specific Gravity Urine 1.010 1.003 - 1.035    Blood Urine Negative Negative    pH Urine 8.0 (H) 5.0 - 7.0    Protein Albumin Urine Negative Negative mg/dL    Urobilinogen Urine 0.2 0.2, 1.0 E.U./dL    Nitrite Urine Negative Negative    Leukocyte Esterase Urine Negative Negative    Narrative    Microscopic not indicated   Wet prep - lab collect     Status: Abnormal    Specimen: Vagina; Swab   Result Value Ref Range    Trichomonas Absent Absent    Yeast Absent Absent    Clue Cells Absent Absent    WBCs/high power field 2+ (A) None

## 2021-11-15 NOTE — PATIENT INSTRUCTIONS
Go to emergency room for further evaluation of severe luq abdominal pain, dysuria, bladder spasms, urinary frequency, urgency

## 2021-12-06 ENCOUNTER — IMMUNIZATION (OUTPATIENT)
Dept: NURSING | Facility: CLINIC | Age: 61
End: 2021-12-06
Payer: COMMERCIAL

## 2021-12-06 DIAGNOSIS — Z23 HIGH PRIORITY FOR 2019-NCOV VACCINE: Primary | ICD-10-CM

## 2021-12-06 PROCEDURE — 0004A COVID-19,PF,PFIZER (12+ YRS): CPT

## 2021-12-06 PROCEDURE — 99207 PR NO CHARGE NURSE ONLY: CPT

## 2021-12-06 PROCEDURE — 91300 COVID-19,PF,PFIZER (12+ YRS): CPT

## 2021-12-16 ENCOUNTER — MYC MEDICAL ADVICE (OUTPATIENT)
Dept: FAMILY MEDICINE | Facility: CLINIC | Age: 61
End: 2021-12-16

## 2021-12-16 DIAGNOSIS — F90.1 ATTENTION-DEFICIT HYPERACTIVITY DISORDER, PREDOMINANTLY HYPERACTIVE TYPE: ICD-10-CM

## 2021-12-16 DIAGNOSIS — K21.9 GASTROESOPHAGEAL REFLUX DISEASE WITHOUT ESOPHAGITIS: ICD-10-CM

## 2021-12-16 DIAGNOSIS — R11.0 NAUSEA: ICD-10-CM

## 2021-12-18 RX ORDER — DEXTROAMPHETAMINE SACCHARATE, AMPHETAMINE ASPARTATE, DEXTROAMPHETAMINE SULFATE AND AMPHETAMINE SULFATE 5; 5; 5; 5 MG/1; MG/1; MG/1; MG/1
20 TABLET ORAL DAILY
Qty: 30 TABLET | Refills: 0 | Status: SHIPPED | OUTPATIENT
Start: 2021-12-18 | End: 2022-04-05

## 2021-12-18 RX ORDER — ONDANSETRON 4 MG/1
4 TABLET, FILM COATED ORAL EVERY 6 HOURS PRN
Qty: 18 TABLET | Refills: 5 | Status: SHIPPED | OUTPATIENT
Start: 2021-12-18 | End: 2022-03-15

## 2021-12-23 ENCOUNTER — TELEPHONE (OUTPATIENT)
Dept: FAMILY MEDICINE | Facility: CLINIC | Age: 61
End: 2021-12-23

## 2021-12-23 DIAGNOSIS — R11.0 NAUSEA: ICD-10-CM

## 2021-12-23 DIAGNOSIS — K21.9 GASTROESOPHAGEAL REFLUX DISEASE WITHOUT ESOPHAGITIS: ICD-10-CM

## 2021-12-23 NOTE — TELEPHONE ENCOUNTER
ondansetron (ZOFRAN) 4 MG tablet 18 tablet 5 12/18/2021     PA is needed for medication. Please go to go[email or web address].    KEY: [id number]

## 2021-12-24 ENCOUNTER — TELEPHONE (OUTPATIENT)
Dept: FAMILY MEDICINE | Facility: CLINIC | Age: 61
End: 2021-12-24
Payer: COMMERCIAL

## 2021-12-24 NOTE — TELEPHONE ENCOUNTER
PA Initiation    Medication: ondansetron (ZOFRAN) 4 MG tab 12/18/2021- INITIATED  Insurance Company: [employer] [place] - Phone [phone] Fax [phone]  Pharm[facility] Filling the Rx: [id number] [place] [address] [address] [facility] AT [address]  Filling Pharmacy Phone: [phone]  Filling Pharmacy Fax: [phone]  Start Date: 12/24/2021

## 2021-12-28 ENCOUNTER — TELEPHONE (OUTPATIENT)
Dept: FAMILY MEDICINE | Facility: CLINIC | Age: 61
End: 2021-12-28
Payer: COMMERCIAL

## 2021-12-28 NOTE — TELEPHONE ENCOUNTER
Prescription for zofran declined.  If desired PCP will need to make appeal to medicare- please call patient and inform that pcp is not in the office until next week

## 2021-12-28 NOTE — TELEPHONE ENCOUNTER
PRIOR AUTHORIZATION DENIED    Medication: ondansetron (ZOFRAN) 4 MG tab 12/18/2021- DENIED    Denial Date: 12/24/2021    Denial Rational:   NOT COVERED UNDER PART D. Pharmacy tried billing through Part B-denied.    Appeal Information: N/A    Per [place] at [facility] [phone]      [place] Prior Authorization Team  Phone: [phone]

## 2022-01-26 ENCOUNTER — MYC MEDICAL ADVICE (OUTPATIENT)
Dept: FAMILY MEDICINE | Facility: CLINIC | Age: 62
End: 2022-01-26
Payer: COMMERCIAL

## 2022-01-26 DIAGNOSIS — G47.00 INSOMNIA, UNSPECIFIED TYPE: ICD-10-CM

## 2022-01-26 RX ORDER — TRAZODONE HYDROCHLORIDE 50 MG/1
TABLET, FILM COATED ORAL
Qty: 90 TABLET | Refills: 0 | Status: SHIPPED | OUTPATIENT
Start: 2022-01-26 | End: 2022-03-31

## 2022-01-26 NOTE — TELEPHONE ENCOUNTER
Would you like patient to set up an appointment regarding her medication request? Her last appointment with you was 10/18/21.    Dede Sloan RN [facility]

## 2022-02-02 ENCOUNTER — VIRTUAL VISIT (OUTPATIENT)
Dept: FAMILY MEDICINE | Facility: OTHER | Age: 62
End: 2022-02-02
Payer: COMMERCIAL

## 2022-02-02 DIAGNOSIS — Z20.822 PERSON UNDER INVESTIGATION FOR COVID-19: Primary | ICD-10-CM

## 2022-02-02 PROCEDURE — 99213 OFFICE O/P EST LOW 20 MIN: CPT | Mod: 95 | Performed by: PHYSICIAN ASSISTANT

## 2022-02-02 RX ORDER — ALBUTEROL SULFATE 90 UG/1
1-2 AEROSOL, METERED RESPIRATORY (INHALATION) EVERY 4 HOURS PRN
Qty: 18 G | Refills: 0 | Status: SHIPPED | OUTPATIENT
Start: 2022-02-02

## 2022-02-02 RX ORDER — AZITHROMYCIN 250 MG/1
TABLET, FILM COATED ORAL
Qty: 6 TABLET | Refills: 0 | Status: SHIPPED | OUTPATIENT
Start: 2022-02-02 | End: 2022-02-07

## 2022-02-02 NOTE — PROGRESS NOTES
Deanne is a 61 year old who is being evaluated via a billable telephone visit.      What phone number would you like to be contacted at? [phone]  How would you like to obtain your AVS? MyChart    Assessment & Plan       ICD-10-CM    1. Person under investigation for COVID-19  Z20.822 albuterol (PROAIR HFA/PROVENTIL HFA/VENTOLIN HFA) 108 (90 Base) MCG/ACT inhaler     azithromycin (ZITHROMAX) 250 MG tablet     Symptomatic; Yes; 1/28/2022 COVID-19 Virus (Coronavirus) by PCR Nose       Moderate/high suspicion for COVID-19 given her symptoms so will order PCR testing.  Encouraged to stay quarantined until results are back.  Continue with symptomatic care with rest and plenty of fluids and I also recommend vitamin C, vitamin D and zinc.  Given that this is day 6 of symptoms with a history of pneumonia, will treat with azithromycin to cover for bacterial pneumonia.  Will also prescribe an albuterol inhaler to take as directed.  If she has any worsening shortness of breath, she should be seen emergently.       Werner Holly PA-C  [facility]   Deanne is a 61 year old who presents for the following health issues:    HPI     Ever since Friday, she has felt miserable including body aches, altered taste, sore throat, nasal congestion, a productive cough and shortness of breath. She is fully vaccinated and boosted and always wears an N95 when she goes out in public so she denies any known exposure to COVID-19. She does have a history of pneumonia but denies COPD or asthma history.     Review of Systems   Constitutional, HEENT, cardiovascular, pulmonary, gi and gu systems are negative, except as otherwise noted.      Objective         Vitals:  No vitals were obtained today due to virtual visit.    Physical Exam   alert and no distress but sounds congested  PSYCH: Alert and oriented times 3; coherent speech, normal   rate and volume, able to articulate logical thoughts, able   to  abstract reason, no tangential thoughts, no hallucinations   or delusions. Her affect is normal  RESP: No cough, no audible wheezing, able to talk in full sentences  Remainder of exam unable to be completed due to telephone visits          Phone call duration: 9 minutes

## 2022-02-02 NOTE — PATIENT INSTRUCTIONS
Will treat with azithromycin and albuterol.  Will order COVID testing.  Stay quarantined until results are back.  Add vitamin D, vitamin C and zinc.  Stay well hydrated and if symptoms significantly worsen, you should be seen emergently.

## 2022-02-21 ENCOUNTER — HOSPITAL ENCOUNTER (EMERGENCY)
Facility: CLINIC | Age: 62
Discharge: HOME | End: 2022-02-21
Payer: COMMERCIAL

## 2022-02-21 ENCOUNTER — NURSE TRIAGE (OUTPATIENT)
Dept: NURSING | Facility: CLINIC | Age: 62
End: 2022-02-21
Payer: COMMERCIAL

## 2022-02-21 VITALS
OXYGEN SATURATION: 99 % | TEMPERATURE: 98.2 F | HEART RATE: 76 BPM | DIASTOLIC BLOOD PRESSURE: 92 MMHG | WEIGHT: 215 LBS | SYSTOLIC BLOOD PRESSURE: 178 MMHG | RESPIRATION RATE: 30 BRPM | BODY MASS INDEX: 32.58 KG/M2 | HEIGHT: 68 IN

## 2022-02-21 NOTE — TELEPHONE ENCOUNTER
"S-(situation): dizziness, high BP, palpitations, left arm feels cold    B-(background):     Pt has been having these symptoms present the past 4-5 days.  Has not passed out.  History of herniated disc    A-(assessment):     Random episodes of dizziness at rest. Slightly dizzy at the time of call.   Palpitations - \"heart beating stronger than usual.\" No skipped beats. No reports of slow or fast heartbeat.    BP of 170/97 Pulse 78  Left arm feels heavy and cold. Right arm feels fine.    There are episodes that she feels unstable, with unsteady gait. Pt states she can walk just fine right now.    No unilateral weakness or numbness.        R-(recommendations): ED/UC or PCP triage.    Pt prefers to head to [facility] ED. Declined offer to do 2nd level triage.    Lola Mclaughlin RN/[place] Nurse Advisor                  Reason for Disposition    Extra heart beats OR irregular heart beating (i.e., 'palpitations')    Systolic BP >= 160 OR Diastolic >= 100, and any cardiac or neurologic symptoms (e.g., chest pain, difficulty breathing, unsteady gait, blurred vision)    Additional Information    Negative: Shock suspected (e.g., cold/pale/clammy skin, too weak to stand, low BP, rapid pulse)    Negative: Fainted, and still feels dizzy afterwards    Negative: Difficult to awaken or acting confused (e.g., disoriented, slurred speech)    Negative: Severe difficulty breathing (e.g., struggling for each breath, speaks in single words)    Negative: Overdose (accidental or intentional) of medications    Negative: New neurologic deficit that is present now: * Weakness of the face, arm, or leg on one side of the body * Numbness of the face, arm, or leg on one side of the body * Loss of speech or garbled speech    Negative: Heart beating < 50 beats per minute OR > 140 beats per minute    Negative: Sounds like a life-threatening emergency to the triager    Negative: SEVERE dizziness (e.g., unable to stand, requires support to walk, feels " like passing out now)    Negative: SEVERE headache or neck pain    Negative: Spinning or tilting sensation (vertigo) present now and one or more stroke risk factors (i.e., hypertension, diabetes, prior stroke/TIA, heart attack, age over 60) (Exception: prior physician evaluation for this AND no different/worse than usual)    Negative: Loss of vision or double vision    Negative: Sounds like a life-threatening emergency to the triager    Protocols used: DIZZINESS-A-OH, HIGH BLOOD PRESSURE-A-OH

## 2022-02-21 NOTE — ED TRIAGE NOTES
"Pt arrives ambulatory to triage w/ concern for sob, dizziness, chest and left arm \"heaviness.\" Pt states onset of aforementioned symptoms ~2days ago, worsening this am. No cough, no known sick contacts. Denies pain.  "

## 2022-02-28 ENCOUNTER — OFFICE VISIT (OUTPATIENT)
Dept: FAMILY MEDICINE | Facility: CLINIC | Age: 62
End: 2022-02-28
Payer: COMMERCIAL

## 2022-02-28 VITALS
BODY MASS INDEX: 33.28 KG/M2 | DIASTOLIC BLOOD PRESSURE: 80 MMHG | TEMPERATURE: 98.2 F | HEART RATE: 90 BPM | SYSTOLIC BLOOD PRESSURE: 130 MMHG | RESPIRATION RATE: 22 BRPM | OXYGEN SATURATION: 96 % | WEIGHT: 218.9 LBS

## 2022-02-28 DIAGNOSIS — R07.89 CHEST PRESSURE: Primary | ICD-10-CM

## 2022-02-28 DIAGNOSIS — R09.81 NASAL CONGESTION: ICD-10-CM

## 2022-02-28 DIAGNOSIS — K21.9 GASTROESOPHAGEAL REFLUX DISEASE WITHOUT ESOPHAGITIS: ICD-10-CM

## 2022-02-28 DIAGNOSIS — R71.8 OTHER ABNORMALITY OF RED BLOOD CELLS: ICD-10-CM

## 2022-02-28 DIAGNOSIS — F51.01 PRIMARY INSOMNIA: ICD-10-CM

## 2022-02-28 DIAGNOSIS — R42 DIZZINESS: ICD-10-CM

## 2022-02-28 DIAGNOSIS — F33.1 MAJOR DEPRESSIVE DISORDER, RECURRENT EPISODE, MODERATE (H): ICD-10-CM

## 2022-02-28 DIAGNOSIS — E03.9 ACQUIRED HYPOTHYROIDISM: ICD-10-CM

## 2022-02-28 DIAGNOSIS — I10 PRIMARY HYPERTENSION: ICD-10-CM

## 2022-02-28 DIAGNOSIS — D58.2 ELEVATED HEMOGLOBIN (H): ICD-10-CM

## 2022-02-28 LAB
ALBUMIN SERPL-MCNC: 4.2 G/DL (ref 3.4–5)
ALP SERPL-CCNC: 101 U/L (ref 40–150)
ALT SERPL W P-5'-P-CCNC: 31 U/L (ref 0–50)
ANION GAP SERPL CALCULATED.3IONS-SCNC: 4 MMOL/L (ref 3–14)
AST SERPL W P-5'-P-CCNC: 15 U/L (ref 0–45)
BILIRUB SERPL-MCNC: 0.4 MG/DL (ref 0.2–1.3)
BUN SERPL-MCNC: 23 MG/DL (ref 7–30)
CALCIUM SERPL-MCNC: 9.8 MG/DL (ref 8.5–10.1)
CHLORIDE BLD-SCNC: 105 MMOL/L (ref 94–109)
CO2 SERPL-SCNC: 29 MMOL/L (ref 20–32)
CREAT SERPL-MCNC: 0.92 MG/DL (ref 0.52–1.04)
CREAT UR-MCNC: 108 MG/DL
ERYTHROCYTE [DISTWIDTH] IN BLOOD BY AUTOMATED COUNT: 13.4 % (ref 10–15)
FERRITIN SERPL-MCNC: 70 NG/ML (ref 8–252)
GFR SERPL CREATININE-BSD FRML MDRD: 70 ML/MIN/1.73M2
GLUCOSE BLD-MCNC: 120 MG/DL (ref 70–99)
HCT VFR BLD AUTO: 48.1 % (ref 35–47)
HGB BLD-MCNC: 15.8 G/DL (ref 11.7–15.7)
MCH RBC QN AUTO: 29.2 PG (ref 26.5–33)
MCHC RBC AUTO-ENTMCNC: 32.8 G/DL (ref 31.5–36.5)
MCV RBC AUTO: 89 FL (ref 78–100)
MICROALBUMIN UR-MCNC: 16 MG/L
MICROALBUMIN/CREAT UR: 14.81 MG/G CR (ref 0–25)
PLATELET # BLD AUTO: 219 10E3/UL (ref 150–450)
POTASSIUM BLD-SCNC: 4 MMOL/L (ref 3.4–5.3)
PROT SERPL-MCNC: 7.8 G/DL (ref 6.8–8.8)
RBC # BLD AUTO: 5.42 10E6/UL (ref 3.8–5.2)
SODIUM SERPL-SCNC: 138 MMOL/L (ref 133–144)
T4 FREE SERPL-MCNC: 1.66 NG/DL (ref 0.76–1.46)
TSH SERPL DL<=0.005 MIU/L-ACNC: 0.08 MU/L (ref 0.4–4)
WBC # BLD AUTO: 8.2 10E3/UL (ref 4–11)

## 2022-02-28 PROCEDURE — 85027 COMPLETE CBC AUTOMATED: CPT | Performed by: FAMILY MEDICINE

## 2022-02-28 PROCEDURE — 84439 ASSAY OF FREE THYROXINE: CPT | Performed by: FAMILY MEDICINE

## 2022-02-28 PROCEDURE — 84443 ASSAY THYROID STIM HORMONE: CPT | Performed by: FAMILY MEDICINE

## 2022-02-28 PROCEDURE — 82043 UR ALBUMIN QUANTITATIVE: CPT | Performed by: FAMILY MEDICINE

## 2022-02-28 PROCEDURE — U0003 INFECTIOUS AGENT DETECTION BY NUCLEIC ACID (DNA OR RNA); SEVERE ACUTE RESPIRATORY SYNDROME CORONAVIRUS 2 (SARS-COV-2) (CORONAVIRUS DISEASE [COVID-19]), AMPLIFIED PROBE TECHNIQUE, MAKING USE OF HIGH THROUGHPUT TECHNOLOGIES AS DESCRIBED BY CMS-2020-01-R: HCPCS | Performed by: FAMILY MEDICINE

## 2022-02-28 PROCEDURE — 99215 OFFICE O/P EST HI 40 MIN: CPT | Performed by: FAMILY MEDICINE

## 2022-02-28 PROCEDURE — 36415 COLL VENOUS BLD VENIPUNCTURE: CPT | Performed by: FAMILY MEDICINE

## 2022-02-28 PROCEDURE — 82728 ASSAY OF FERRITIN: CPT | Performed by: FAMILY MEDICINE

## 2022-02-28 PROCEDURE — 80053 COMPREHEN METABOLIC PANEL: CPT | Performed by: FAMILY MEDICINE

## 2022-02-28 PROCEDURE — 93000 ELECTROCARDIOGRAM COMPLETE: CPT | Performed by: FAMILY MEDICINE

## 2022-02-28 PROCEDURE — U0005 INFEC AGEN DETEC AMPLI PROBE: HCPCS | Performed by: FAMILY MEDICINE

## 2022-02-28 RX ORDER — DEXTROAMPHETAMINE SACCHARATE, AMPHETAMINE ASPARTATE, DEXTROAMPHETAMINE SULFATE AND AMPHETAMINE SULFATE 5; 5; 5; 5 MG/1; MG/1; MG/1; MG/1
20 TABLET ORAL DAILY
Qty: 30 TABLET | Refills: 0 | Status: CANCELLED | OUTPATIENT
Start: 2022-02-28 | End: 2022-03-30

## 2022-02-28 RX ORDER — FLUTICASONE PROPIONATE 50 MCG
2 SPRAY, SUSPENSION (ML) NASAL 2 TIMES DAILY
Qty: 48 G | Refills: 1 | Status: SHIPPED | OUTPATIENT
Start: 2022-02-28 | End: 2022-10-20

## 2022-02-28 RX ORDER — LEVOTHYROXINE SODIUM 175 UG/1
175 TABLET ORAL DAILY
Qty: 90 TABLET | Refills: 0 | Status: SHIPPED | OUTPATIENT
Start: 2022-02-28 | End: 2022-06-07

## 2022-02-28 RX ORDER — DULOXETIN HYDROCHLORIDE 60 MG/1
120 CAPSULE, DELAYED RELEASE ORAL 2 TIMES DAILY
COMMUNITY
Start: 2021-02-08 | End: 2022-02-28

## 2022-02-28 RX ORDER — DEXTROAMPHETAMINE SACCHARATE, AMPHETAMINE ASPARTATE, DEXTROAMPHETAMINE SULFATE AND AMPHETAMINE SULFATE 5; 5; 5; 5 MG/1; MG/1; MG/1; MG/1
20 TABLET ORAL DAILY
Qty: 30 TABLET | Refills: 0 | Status: CANCELLED | OUTPATIENT
Start: 2022-03-31 | End: 2022-04-30

## 2022-02-28 RX ORDER — LISINOPRIL 5 MG/1
5 TABLET ORAL DAILY
Qty: 90 TABLET | Refills: 0 | Status: SHIPPED | OUTPATIENT
Start: 2022-02-28 | End: 2022-04-05

## 2022-02-28 RX ORDER — OMEPRAZOLE 40 MG/1
40 CAPSULE, DELAYED RELEASE ORAL DAILY
Qty: 90 CAPSULE | Refills: 1 | Status: SHIPPED | OUTPATIENT
Start: 2022-02-28 | End: 2022-10-20

## 2022-02-28 RX ORDER — DEXTROAMPHETAMINE SACCHARATE, AMPHETAMINE ASPARTATE, DEXTROAMPHETAMINE SULFATE AND AMPHETAMINE SULFATE 5; 5; 5; 5 MG/1; MG/1; MG/1; MG/1
20 TABLET ORAL DAILY
Qty: 30 TABLET | Refills: 0 | Status: CANCELLED | OUTPATIENT
Start: 2022-05-01 | End: 2022-05-31

## 2022-02-28 RX ORDER — DULOXETIN HYDROCHLORIDE 60 MG/1
120 CAPSULE, DELAYED RELEASE ORAL AT BEDTIME
Qty: 180 CAPSULE | Refills: 1 | Status: SHIPPED | OUTPATIENT
Start: 2022-02-28 | End: 2022-06-10

## 2022-02-28 RX ORDER — PRAZOSIN HYDROCHLORIDE 5 MG/1
CAPSULE ORAL
Qty: 90 CAPSULE | Refills: 1 | Status: SHIPPED | OUTPATIENT
Start: 2022-02-28 | End: 2022-04-05

## 2022-02-28 RX ORDER — LEVOTHYROXINE SODIUM 150 UG/1
150 TABLET ORAL DAILY
Qty: 90 TABLET | Refills: 0 | Status: SHIPPED | OUTPATIENT
Start: 2022-02-28 | End: 2022-02-28

## 2022-02-28 ASSESSMENT — PAIN SCALES - GENERAL: PAINLEVEL: SEVERE PAIN (7)

## 2022-02-28 NOTE — PROGRESS NOTES
Assessment & Plan     Chest pressure  EKG unchanged from previous.  Monitor for recurrent chest pain symptoms     - EKG 12-lead complete w/read - Clinics    Dizziness  Some symptoms are suggestive of vertigo although this is not definitely confirmed on her exam.  Additional evaluation with carotid ultrasound and echocardiogram is planned as well as laboratory testing is noted.  - US Carotid Bilateral; Future  - Echocardiogram Complete; Future  - Comprehensive metabolic panel (BMP + Alb, Alk Phos, ALT, AST, Total. Bili, TP); Future  - Symptomatic; Yes; 2/21/2022 COVID-19 Virus (Coronavirus) by PCR Nose  - Comprehensive metabolic panel (BMP + Alb, Alk Phos, ALT, AST, Total. Bili, TP)    Primary hypertension  Blood pressure is normal at the office here today although it is higher than her preferred or previous levels.  She has had an elevated at home.  She will begin a low-dose of lisinopril as a protective measure for the elevated blood pressure while monitoring her blood pressure and performing the other evaluation as noted.  - Albumin Random Urine Quantitative with Creat Ratio; Future  - lisinopril (ZESTRIL) 5 MG tablet; Take 1 tablet (5 mg) by mouth daily  - Echocardiogram Complete; Future  - Albumin Random Urine Quantitative with Creat Ratio    Acquired hypothyroidism  Our records indicate that she is taking 175 mcg of levothyroxine daily.  When results returned indicating over replacement of her thyroid hormone, a phone call was made to Deanne and confirmed that she is actually been taking 200 mcg recently because that is what she was given by the pharmacy.  Our most recent prescription sent was 175 mcg.  Since she has been taking 200 mcg recently, we have decreased to the 175 and an updated prescription that is sent today.  Follow-up labs in 8 to 12 weeks is recommended.  Monitoring of her symptoms from above planned with this adjustment in her dosage.    - levothyroxine (SYNTHROID/LEVOTHROID) 175 MCG tablet;  Take 1 tablet (175 mcg) by mouth daily  - TSH with free T4 reflex  - T4 free    Nasal congestion  Refill Flonase  - fluticasone (FLONASE) 50 MCG/ACT nasal spray; Spray 2 sprays into both nostrils 2 times daily    Primary insomnia  Refill prazosin  - prazosin (MINIPRESS) 5 MG capsule; TAKE 1 CAPSULE(5 MG) BY MOUTH AT BEDTIME    Gastroesophageal reflux disease without esophagitis  Refill omeprazole  - omeprazole (PRILOSEC) 40 MG DR capsule; Take 1 capsule (40 mg) by mouth daily    Major depressive disorder, recurrent episode, moderate (H)  Continue current duloxetine  - DULoxetine (CYMBALTA) 60 MG capsule; Take 2 capsules (120 mg) by mouth At Bedtime Only in evening    Elevated hemoglobin (H)/Other abnormality of red blood cells   Continued elevated hemoglobin hematocrit.  Normal ferritin.  Additional evaluation with peripheral smear will be planned in future  - CBC with platelets  - Ferritin    Ordering of each unique test  Prescription drug management    47 minutes spent on the date of the encounter doing chart review, review of test results, patient visit and documentation        Patient Instructions   Labs today.    Ultrasound of carotid and ECHO ordered.   You can call [phone] to schedule this at the [facility] building in [place]      Refill medications.      Begin Lisinopril 5 mg daily.    I would recommend you consider referral to PHYSICAL THERAPY for treatment of the inner ear as possible cause for positional dizziness symptoms.      Return in about 2 weeks (around 3/14/2022) for as needed for persistent symptoms.    Crista Elder MD  [facility]      Yair Alicea is a 61 year old who presents for the following health issues  accompanied by her soon to be .    Blood pressure elevation - 170-180/ over a 3-4 day period 1 week ago.  Taking lisinopril for BP and prazosin for nightmares    Fatigued - lack of energy and sleepy.      Head movement causes dizziness  per pt. Uncertain if vertigo present.    HPI     ED/UC Followup:    Facility:  [facility]   Date of visit:  2022  Reason for visit: Elevation of blood pressure, caused dizziness  Current Status: Dizziness    Went to ED but was not seen.  Left after waiting for a prolonged period of time.      Hypertension Follow-up      Do you check your blood pressure regularly outside of the clinic? Yes     Are you following a low salt diet? Yes    Are your blood pressures ever more than 140 on the top number (systolic) OR more   than 90 on the bottom number (diastolic), for example 140/90? Yes    Depression Followup    How are you doing with your depression since your last visit? No change    Are you having other symptoms that might be associated with depression? No    Have you had a significant life event?  Health Concerns     Are you feeling anxious or having panic attacks?   Yes:  intermittently    Do you have any concerns with your use of alcohol or other drugs? No    Social History     Tobacco Use     Smoking status: Former Smoker     Packs/day: 0.30     Quit date: 2015     Years since quittin.0     Smokeless tobacco: Never Used     Tobacco comment: recreational   Vaping Use     Vaping Use: Never used   Substance Use Topics     Alcohol use: Not Currently     Alcohol/week: 0.0 standard drinks     Drug use: Yes     Comment: medical marijuana     PHQ 2018 3/19/2019 10/18/2021   PHQ-9 Total Score 7 14 3   Q9: Thoughts of better off dead/self-harm past 2 weeks Not at all Not at all Not at all     DONELL-7 SCORE 2016 2018 10/18/2021   Total Score - - -   Total Score - - 7 (mild anxiety)   Total Score 10 8 7   Total Score - - -         Suicide Assessment Five-step Evaluation and Treatment (SAFE-T)    Hypothyroidism Follow-up      Since last visit, patient describes the following symptoms: weight loss of 30# lbs and anxiety, chest pressure.      Nasal congestion - needs refill flonase.   Controlling symptoms.  No ear symptoms.    Primary insomnia - using prazosin for sleep/nightmares.  Refill needed.    Gastroesophageal reflux disease without esophagitis  - omeprazole use regularly.      Elevated hemoglobin (H)/Other abnormality of red blood cells - chronically high hemoglobin.  Lab evaluation was recommended and she will have this done with today's visit.    Review of Systems   Constitutional, HEENT, cardiovascular, pulmonary, GI, , musculoskeletal, neuro, skin, endocrine and psych systems are negative, except as otherwise noted.      Objective    /80   Pulse 90   Temp 98.2  F (36.8  C) (Temporal)   Resp 22   Wt 99.3 kg (218 lb 14.4 oz)   SpO2 96%   BMI 33.28 kg/m    Body mass index is 33.28 kg/m .  Physical Exam   GENERAL: alert, no distress and obese  HENT: ear canals and TM's normal, nose and mouth without ulcers or lesions  NECK: no adenopathy, no asymmetry, masses, or scars and thyroid normal to palpation  RESP: lungs clear to auscultation - no rales, rhonchi or wheezes  CV: regular rate and rhythm, normal S1 S2, no S3 or S4, no murmur, click or rub, no peripheral edema and peripheral pulses strong  ABDOMEN: soft, nontender, no hepatosplenomegaly, no masses and bowel sounds normal  MS: no gross musculoskeletal defects noted, no edema  SKIN: no suspicious lesions or rashes  NEURO: Normal strength and tone, mentation intact and speech normal  BACK: no CVA tenderness, no paralumbar tenderness  PSYCH: mentation appears normal, affect normal/bright, anxious, judgement and insight intact, appearance well groomed and mild irritability    Results for orders placed or performed in visit on 02/28/22   Symptomatic; Yes; 2/21/2022 COVID-19 Virus (Coronavirus) by PCR Nose     Status: Normal    Specimen: Nose; Swab   Result Value Ref Range    SARS CoV2 PCR Negative Negative, Testing sent to reference lab. Results will be returned via unsolicited result    Narrative    Testing was performed  using the shabnam SARS-CoV-2 assay on the shabnam  [employer] System. This test should be ordered for the detection of  SARS-CoV-2 in individuals who meet SARS-CoV-2 clinical and/or  epidemiological criteria. Test performance is unknown in asymptomatic  patients. This test is for in vitro diagnostic use under the FDA EUA  for laboratories certified under CLIA to perform high and/or moderate  complexity testing. This test has not been FDA cleared or approved. A  negative result does not rule out the presence of PCR inhibitors in  the specimen or target RNA in concentration below the limit of  detection for the assay. The possibility of a false negative should  be considered if the patient's recent exposure or clinical  presentation suggests COVID-19. This test was validated by the [facility]. This  laboratory is certified under the Clinical Laboratory Improvement  Amendments of 1988 (CLIA-88) as qualified to perform high and/or  moderate complexity laboratory testing.   TSH with free T4 reflex     Status: Abnormal   Result Value Ref Range    TSH 0.08 (L) 0.40 - 4.00 mU/L   CBC with platelets     Status: Abnormal   Result Value Ref Range    WBC Count 8.2 4.0 - 11.0 10e3/uL    RBC Count 5.42 (H) 3.80 - 5.20 10e6/uL    Hemoglobin 15.8 (H) 11.7 - 15.7 g/dL    Hematocrit 48.1 (H) 35.0 - 47.0 %    MCV 89 78 - 100 fL    MCH 29.2 26.5 - 33.0 pg    MCHC 32.8 31.5 - 36.5 g/dL    RDW 13.4 10.0 - 15.0 %    Platelet Count 219 150 - 450 10e3/uL   Ferritin     Status: Normal   Result Value Ref Range    Ferritin 70 8 - 252 ng/mL   Albumin Random Urine Quantitative with Creat Ratio     Status: None   Result Value Ref Range    Creatinine Urine mg/dL 108 mg/dL    Albumin Urine mg/L 16 mg/L    Albumin Urine mg/g Cr 14.81 0.00 - 25.00 mg/g Cr   Comprehensive metabolic panel (BMP + Alb, Alk Phos, ALT, AST, Total. Bili, TP)     Status: Abnormal   Result Value Ref Range    Sodium 138 133 - 144 mmol/L     Potassium 4.0 3.4 - 5.3 mmol/L    Chloride 105 94 - 109 mmol/L    Carbon Dioxide (CO2) 29 20 - 32 mmol/L    Anion Gap 4 3 - 14 mmol/L    Urea Nitrogen 23 7 - 30 mg/dL    Creatinine 0.92 0.52 - 1.04 mg/dL    Calcium 9.8 8.5 - 10.1 mg/dL    Glucose 120 (H) 70 - 99 mg/dL    Alkaline Phosphatase 101 40 - 150 U/L    AST 15 0 - 45 U/L    ALT 31 0 - 50 U/L    Protein Total 7.8 6.8 - 8.8 g/dL    Albumin 4.2 3.4 - 5.0 g/dL    Bilirubin Total 0.4 0.2 - 1.3 mg/dL    GFR Estimate 70 >60 mL/min/1.73m2   T4 free     Status: Abnormal   Result Value Ref Range    Free T4 1.66 (H) 0.76 - 1.46 ng/dL           This chart was documented by provider using a voice activated software called Dragon in addition to manual typing. There may be vocabulary errors or other grammatical errors due to this.

## 2022-02-28 NOTE — PATIENT INSTRUCTIONS
Labs today.    Ultrasound of carotid and ECHO ordered.   You can call [phone] to schedule this at the [facility] in [place]      Refill medications.      Begin Lisinopril 5 mg daily.    I would recommend you consider referral to PHYSICAL THERAPY for treatment of the inner ear as possible cause for positional dizziness symptoms.

## 2022-03-01 LAB — SARS-COV-2 RNA RESP QL NAA+PROBE: NEGATIVE

## 2022-03-01 NOTE — RESULT ENCOUNTER NOTE
COVID testing is negative.  Please call or [employer] message me if you have any questions.      PSK

## 2022-03-01 NOTE — RESULT ENCOUNTER NOTE
Your urine testing is normal.  Your thyroid testing indicates you are on too much medication.  This could be causing many of the symptoms you are having.  I would recommend you decrease the thyroid medication to 150 mcg daily.  A new script for this is being sent to the pharmacy for you now.    Your iron stores are normal.  Blood testing for kidney function and liver testing are all normal.  Your blood sugar is listed as high but expected since I do not believe you were fasting for your appointment.  Your blood cell counts are lower than previous but remain on the upper limits of normal.  Please let me know if you have difficulty scheduling the additional testing that is ordered for you.  Please call or [place] [employer] message me if you have any questions.      VIVIAN

## 2022-03-07 ENCOUNTER — MYC MEDICAL ADVICE (OUTPATIENT)
Dept: FAMILY MEDICINE | Facility: CLINIC | Age: 62
End: 2022-03-07
Payer: COMMERCIAL

## 2022-03-07 DIAGNOSIS — G56.01 CARPAL TUNNEL SYNDROME OF RIGHT WRIST: Primary | ICD-10-CM

## 2022-03-07 NOTE — TELEPHONE ENCOUNTER
Routing to provider to review and advise on referral.    Marisa Reyes RN, BSN  [facility]

## 2022-03-09 ENCOUNTER — ANCILLARY PROCEDURE (OUTPATIENT)
Dept: ULTRASOUND IMAGING | Facility: CLINIC | Age: 62
End: 2022-03-09
Attending: FAMILY MEDICINE
Payer: COMMERCIAL

## 2022-03-09 DIAGNOSIS — R42 DIZZINESS: ICD-10-CM

## 2022-03-09 PROCEDURE — 93880 EXTRACRANIAL BILAT STUDY: CPT | Performed by: RADIOLOGY

## 2022-03-10 NOTE — RESULT ENCOUNTER NOTE
Your carotid artery ultrasound does not show significant narrowing/stenosis that would result in symptoms or require treatment.  Please call or MyChart message me if you have any questions.      KAROLYNK

## 2022-03-14 NOTE — PROGRESS NOTES
"  PRIMARY CARE CENTER     Patient Name: Ilsa Yusuf  YOB: 1960  MRN: [id number]    Date of Service: March 15, 2022  Chief Complaint: establishing care, lightheaded, fatigue last 2 weeks not going away, per patient          HISTORY OF PRESENT ILLNESS:    Deanne Yusuf is a 60 yo woman with PMHx hypothyroidism, ABDOUL, HLD, prior seizures, MDD, PTSD who presents today for second opinion of dizziness, fatigue, MCDONNELL x a few weeks.     She first noticed these symptoms ~2/21. She describes that she feels her body is \"working overtime\" to keep up and has noticed uncontrolled HTN (systolics 170s-180s) & tachycardia, dizziness especially when going from sitting to standing or turning her head quickly, and fatigue. These symptoms were constant rather than episodic. No syncope, no falls.     She presented to the ED for this but with long wait-times decided to leave and follow-up with her PCP. She had an appointment with her primary Dr. Elder on 2/28. Dr. Elder determined her symptoms were likely due to thyroid over-replacement so her levothyroxine was decreased from 200 mcg to 175 mcg with plans to follow-up thyroid labs in 8-12 weeks. She also prescribed lisinopril 5 mg but the patient did not take it since she felt her blood pressures improved. Additionally, Dr. Elder stopped the patient's adderall (which she is wondering if she can restart in the near future). She had an EKG as well for chest pressure which was reportedly normal in NSR. For her dizziness, they felt like it was 2/2 vertigo but opted for broad work-up with bilateral carotid US, Echo. The bilateral carotid US was completed 3/9 without significant stenosis. The Echo is scheduled for April.     She reports her blood pressures are back to normal with blood pressures in the 110s-120s/60s-70s by ~March 4th or 10th. She does still intermittently have dizziness and SOB with going up one flight of stairs.     Prior to all of these events, " "Deanne denies any medication changes. She does not increased stress in her life as her brother-in-law was placed on hospice.            PAST MEDICAL HISTORY:     Past Medical History:   Diagnosis Date     Arthritis 2012    both knees; hands     Cervical high risk HPV (human papillomavirus) test positive 03/19/2019    see problem list     Depressive disorder      Depressive disorder, not elsewhere classified 08/28/12    DC 10/05/12-[facility]     Hyperlipidemia LDL goal < 130     mevacor     Hypothyroid      Moderate major depression (H)     abilify, cymbalta, seroquel, and sofya, Dr Antonio Yoon      ABDOUL (obstructive sleep apnea)      PTSD (post-traumatic stress disorder)      Seizure (H) 03/2011    one episode, was on Keppra, EEG negative            PAST SURGICAL HISTORY:     Past Surgical History:   Procedure Laterality Date     ABDOMEN SURGERY       BLADDER SURGERY       CHOLECYSTECTOMY       COLONOSCOPY  2012     CYSTOSCOPY       ORTHOPEDIC SURGERY  left knee     renal artery surgery  child    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     TONSILLECTOMY       ureteral reimplantation       ZZC PARTIAL EXCISION THYROID,UNILAT  1991    rt, negative path then, treated with synthroid.            ALLERGIES:      Allergies   Allergen Reactions     Gabapentin Other (See Comments)     Patient states she gets \"horrific nightmares\" with this medication     Dilaudid [Hydromorphone Hcl]      Made her feel like her skin was crawling     Nsaids Other (See Comments)     Kidney failure     Compazine [Prochlorperazine] Other (See Comments)     \"Makes my skin crawl, I was going nuts.\"            HOME MEDICATIONS:     Current Outpatient Medications   Medication     albuterol (PROAIR HFA/PROVENTIL HFA/VENTOLIN HFA) 108 (90 Base) MCG/ACT inhaler     amphetamine-dextroamphetamine (ADDERALL) 20 MG tablet     cholecalciferol (VITAMIN D3) 5000 UNITS CAPS capsule     DULoxetine (CYMBALTA) 60 MG capsule     fluticasone " (FLONASE) 50 MCG/ACT nasal spray     levothyroxine (SYNTHROID/LEVOTHROID) 175 MCG tablet     lisinopril (ZESTRIL) 5 MG tablet     lovastatin (MEVACOR) 20 MG tablet     multivitamin, therapeutic with minerals (MULTI-VITAMIN) TABS     Omega-3 Fatty Acids (OMEGA 3 PO)     omeprazole (PRILOSEC) 40 MG DR capsule     ondansetron (ZOFRAN) 4 MG tablet     oxyCODONE IR (ROXICODONE) 10 MG tablet     prazosin (MINIPRESS) 5 MG capsule     tiZANidine (ZANAFLEX) 4 MG tablet     topiramate (TOPAMAX) 25 MG tablet     traZODone (DESYREL) 50 MG tablet     UNABLE TO FIND     No current facility-administered medications for this visit.            FAMILY HISTORY:     Family History   Problem Relation Age of Onset     C.A.D. Father 58         at 58, heart disease     Mental Illness Father      Coronary Artery Disease Father      Hyperlipidemia Father      Diverticulitis Father      Alzheimer Disease Mother         total care now     Depression Mother      Anxiety Disorder Mother      Diabetes Sister         adult onset     Melanoma Sister      Breast Cancer Sister      Thyroid Disease Sister      Diabetes Maternal Grandmother             SOCIAL HISTORY:     Social History     Tobacco Use     Smoking status: Former Smoker     Packs/day: 0.30     Quit date: 2015     Years since quittin.0     Smokeless tobacco: Never Used     Tobacco comment: recreational   Vaping Use     Vaping Use: Never used   Substance Use Topics     Alcohol use: Not Currently     Alcohol/week: 0.0 standard drinks     Drug use: Yes     Comment: medical marijuana              REVIEW OF SYSTEMS:     10 point ROS was negative except as noted in HPI         PHYSICAL EXAM:   Vitals: There were no vitals taken for this visit.    Wt Readings from Last 4 Encounters:   22 99.3 kg (218 lb 14.4 oz)   22 97.5 kg (215 lb)   11/15/21 97.5 kg (215 lb)   10/18/21 97.7 kg (215 lb 4.8 oz)       GENERAL: Alert, interactive, NAD  HEENT: NCAT, EOMI  LUNGS: clear to  auscultation bilaterally, no crackles or wheezes  HEART: regular rate and rhythm, normal S1 and S2, no murmur appreciated  ABDOMEN: Soft, nontender, nondistended, no rebound or guarding.  EXTREMITIES: No LE edema bilaterally  SKIN: Warm and dry, no jaundice or acute rashes  NEURO: A&O, moving all 4 limbs spontaneously   PSYCH: Appropriate mood, normal speech, linear thought.            DATA:     Laboratory Data:  CBC RESULTS: Recent Labs   Lab Test 02/28/22  1103   WBC 8.2   RBC 5.42*   HGB 15.8*   HCT 48.1*   MCV 89   MCH 29.2   MCHC 32.8   RDW 13.4        Last Comprehensive Metabolic Panel:  Sodium   Date Value Ref Range Status   02/28/2022 138 133 - 144 mmol/L Final   01/04/2021 139 133 - 144 mmol/L Final     Potassium   Date Value Ref Range Status   02/28/2022 4.0 3.4 - 5.3 mmol/L Final   01/04/2021 4.3 3.4 - 5.3 mmol/L Final     Chloride   Date Value Ref Range Status   02/28/2022 105 94 - 109 mmol/L Final   01/04/2021 105 94 - 109 mmol/L Final     Carbon Dioxide   Date Value Ref Range Status   01/04/2021 29 20 - 32 mmol/L Final     Carbon Dioxide (CO2)   Date Value Ref Range Status   02/28/2022 29 20 - 32 mmol/L Final     Anion Gap   Date Value Ref Range Status   02/28/2022 4 3 - 14 mmol/L Final   01/04/2021 4 3 - 14 mmol/L Final     Glucose   Date Value Ref Range Status   02/28/2022 120 (H) 70 - 99 mg/dL Final   01/04/2021 100 (H) 70 - 99 mg/dL Final     Urea Nitrogen   Date Value Ref Range Status   02/28/2022 23 7 - 30 mg/dL Final   01/04/2021 16 7 - 30 mg/dL Final     Creatinine   Date Value Ref Range Status   02/28/2022 0.92 0.52 - 1.04 mg/dL Final   01/04/2021 0.90 0.52 - 1.04 mg/dL Final     GFR Estimate   Date Value Ref Range Status   02/28/2022 70 >60 mL/min/1.73m2 Final     Comment:     Effective December 21, 2021 eGFRcr in adults is calculated using the 2021 CKD-EPI creatinine equation which includes age and gender (Deena et al., NEJM, DOI: 10.1056/DCUUtm5073309)   01/04/2021 69 >60  mL/min/[1.73_m2] Final     Comment:     Non  GFR Calc  Starting 12/18/2018, serum creatinine based estimated GFR (eGFR) will be   calculated using the Chronic Kidney Disease Epidemiology Collaboration   (CKD-EPI) equation.       Calcium   Date Value Ref Range Status   02/28/2022 9.8 8.5 - 10.1 mg/dL Final   01/04/2021 9.5 8.5 - 10.1 mg/dL Final     Bilirubin Total   Date Value Ref Range Status   02/28/2022 0.4 0.2 - 1.3 mg/dL Final   01/04/2021 0.6 0.2 - 1.3 mg/dL Final     Alkaline Phosphatase   Date Value Ref Range Status   02/28/2022 101 40 - 150 U/L Final   01/04/2021 109 40 - 150 U/L Final     ALT   Date Value Ref Range Status   02/28/2022 31 0 - 50 U/L Final   01/04/2021 35 0 - 50 U/L Final     AST   Date Value Ref Range Status   02/28/2022 15 0 - 45 U/L Final   01/04/2021 26 0 - 45 U/L Final     TSH   Date Value Ref Range Status   02/28/2022 0.08 (L) 0.40 - 4.00 mU/L Final   11/06/2018 0.84 0.40 - 4.00 mU/L Final     T4 Free   Date Value Ref Range Status   03/15/2018 0.97 0.76 - 1.46 ng/dL Final     Free T4   Date Value Ref Range Status   02/28/2022 1.66 (H) 0.76 - 1.46 ng/dL Final       Imaging:  BILATERAL CAROTID DUPLEX DOPPLER ULTRASOUND 3/9/2022 5:19 PM     CLINICAL HISTORY: Dizziness.     COMPARISON: None available.     REFERRING PROVIDER: BLAIRE NORTH S     TECHNIQUE: Grayscale (B-mode) and duplex and spectral Doppler  ultrasound of the common carotid, extracranial internal carotid,  external carotid, and vertebral artery origins. Velocity measurements  obtained with angle correction at or less than 60 degrees.     FINDINGS:     RIGHT SIDE:      Plaque Morphology: Minimal plaque        Proximal CCA: 92/18 cm/s     Mid CCA: 71/20 cm/s     Distal CCA: 68/18 cm/s     Carotid bifurcation: 65/18 cm/s     External CA: 68/10 cm/s        Proximal ICA: 57/17 cm/s     Mid ICA: 57/18 cm/s     Distal ICA: 66/23 cm/s        Vertebral artery: Antegrade: 38/13 cm/s      Innominate artery: 85/12  cm/s     Proximal subclavian: 143/6 cm/s     ICA/CCA ratio: 0.97      LEFT SIDE:      Plaque Morphology: Minimal plaque         Origin CCA: 72/16 cm/s     Proximal CCA: 108/23 cm/s     Mid CCA: 80/19 cm/s     Distal CCA: 77/23 cm/s     Carotid bifurcation: 70/18 cm/s     External CA: 76/16 cm/s        Proximal ICA: 72/20 cm/s     Mid ICA: 61/24 cm/s     Distal ICA: 78/30 cm/s        Vertebral artery: Antegrade: 47/13 cm/s      Subclavian origin: 103/11 cm/s     Proximal subclavian: 103/7 cm/s     ICA/CCA ratio: 1.01                                                                       IMPRESSION:     1. RIGHT ICA: Less than 50% diameter narrowing by grayscale imaging  and sonographic velocity criteria.     2. LEFT ICA:  Less than 50% diameter narrowing by grayscale imaging  and sonographic velocity criteria.     [facility] Accreditation Commission Updated Recommendations for  Carotid Stenosis Interpretation Criteria - October 2021.  [email or web address]-Test  [email or web address]-Gymleyobdnmxbli-for-Carotid-Stenosis-Interpre  ation-Criteria.pdf          Normal            ICA PSV: < 180 cm/s            Plaque Estimate: None            ICA/CCA PSV Ratio: < 2.0            ICA EDV: < 40 cm/s          < 50%            ICA PSV: < 180 cm/s            Plaque Estimate: < 50%            ICA/CCA PSV Ratio: < 2.0            ICA EDV: < 40 cm/s          50 - 69%            ICA PSV: < 180 - 230 cm/s            Plaque Estimate: > 50%            ICA/CCA PSV Ratio: 2.0 - 4.0            ICA EDV: 40 - 100 cm/s          > 70% but less than near occlusion            ICA PSV: > 230 cm/s            Plaque Estimate: > 50%            ICA/CCA PSV Ratio: > 4.0            ICA EDV: > 100 cm/s          Near occlusion            ICA PSV: > 230 cm/s            Plaque Estimate: Visible            ICA/CCA PSV Ratio: Variable            ICA EDV: Variable          Total occlusion            ICA PSV:  "Undetectable            Plaque Estimate: Visible, no detectable lumen            ICA/CCA PSV Ratio: N/A            ICA EDV: N/A                                          Additional criteria from vascular surgery     > 80%       EDV > 120 cm/sec      KEYON SALAZAR MD           ASSESSMENT & PLAN:     Ilsa was seen today for establish care and fatigue.    Diagnoses and all orders for this visit:    Dizziness  SOB  Tachycardia  Pt had new onset dizziness, SOB, tachycardia, and hypertension which is overall improving. Suspect a large degree of this that is from thyrotoxicosis and should continue to improve with new reduction in her synthroid dose. However, cannot completely explain it as synthroid has a long half-life and so would not expect higher dose to be washed out by the time her symptoms resolved. Agree with Echo and additionally will trial Zio patch to monitor for possible arrhythmia. Pt initially described dizziness associated with head movements which may suggest BPPV, but trialed Christos-Hallpike maneuver with Dr. Elder which did not reproduce symptoms per patient report. Later, pt noted the dizziness is largely when moving from sitting to standing, possibly suggesting orthostasis. Reviewed orthostasis behavior modifications.   -     Leadless EKG Monitor 3 to 7 Days; Future  - Agree with Echo   - Orthostasis behavioral modifications   - Continue to avoid Adderall while monitoring blood pressures   - F/u thyroid labs 8-12 weeks     Nausea  Gastroesophageal reflux disease without esophagitis  -     ondansetron (ZOFRAN) 4 MG tablet; Take 1 tablet (4 mg) by mouth every 6 hours as needed for nausea    Lower urinary tract symptoms  Pt reports longstanding issues with bladder \"burning\" sensation that occurs when she wakes up daily. 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 Requests referral to urology for further work up.   -     Adult Urology " Referral    Attention-deficit hyperactivity disorder, predominantly hyperactive type  Major depressive disorder, recurrent episode, moderate (H)  Used to follow with Dr. Hill, last seen 2/2016 and then had an eConsult with Dr. Barillas 11/2019. Patient requests Psychiatry referral to review psychiatric medications and consideration of re-starting Adderall if appropriate.   -     Adult Mental Health  Referral; Future    Patient care plan discussed with attending physician, Dr. Cervantes, who agreed with above.     Marisela Pinto MD  Internal Medicine, PGY-1    Attending Addendum:  Patient seen and examined with resident in clinic today.  Pertinent portions of history and exam were independently verified by myself.  I agree with the exam and plan as outlined above with the following modifications: none.  Mariana Cervantes MD  Internal Medicine

## 2022-03-15 ENCOUNTER — OFFICE VISIT (OUTPATIENT)
Dept: INTERNAL MEDICINE | Facility: CLINIC | Age: 62
End: 2022-03-15
Payer: COMMERCIAL

## 2022-03-15 VITALS
RESPIRATION RATE: 16 BRPM | HEART RATE: 89 BPM | BODY MASS INDEX: 33.04 KG/M2 | WEIGHT: 218 LBS | HEIGHT: 68 IN | SYSTOLIC BLOOD PRESSURE: 135 MMHG | OXYGEN SATURATION: 97 % | DIASTOLIC BLOOD PRESSURE: 75 MMHG

## 2022-03-15 DIAGNOSIS — R00.0 TACHYCARDIA: ICD-10-CM

## 2022-03-15 DIAGNOSIS — F90.1 ATTENTION-DEFICIT HYPERACTIVITY DISORDER, PREDOMINANTLY HYPERACTIVE TYPE: ICD-10-CM

## 2022-03-15 DIAGNOSIS — R11.0 NAUSEA: ICD-10-CM

## 2022-03-15 DIAGNOSIS — R42 DIZZINESS: Primary | ICD-10-CM

## 2022-03-15 DIAGNOSIS — R39.9 LOWER URINARY TRACT SYMPTOMS: ICD-10-CM

## 2022-03-15 DIAGNOSIS — F33.1 MAJOR DEPRESSIVE DISORDER, RECURRENT EPISODE, MODERATE (H): ICD-10-CM

## 2022-03-15 DIAGNOSIS — K21.9 GASTROESOPHAGEAL REFLUX DISEASE WITHOUT ESOPHAGITIS: ICD-10-CM

## 2022-03-15 PROCEDURE — 99204 OFFICE O/P NEW MOD 45 MIN: CPT | Mod: GC

## 2022-03-15 RX ORDER — ONDANSETRON 4 MG/1
4 TABLET, FILM COATED ORAL EVERY 6 HOURS PRN
Qty: 30 TABLET | Refills: 1 | Status: SHIPPED | OUTPATIENT
Start: 2022-03-15 | End: 2022-11-01

## 2022-03-15 ASSESSMENT — PAIN SCALES - GENERAL: PAINLEVEL: MODERATE PAIN (4)

## 2022-03-15 NOTE — NURSING NOTE
Ilsa Yusuf is a 61 year old female patient that presents today in clinic for the following:    Chief Complaint   Patient presents with     Establish Care     Fatigue     Has been experiencing lightheadedness, fatigue, and shortness of breath x2 weeks     The patient's allergies and medications were reviewed as noted. A set of vitals were recorded as noted without incident. The patient does not have any other questions for the provider.    Stephanie Santiago, EMT at 12:18 PM on 3/15/2022

## 2022-03-16 ENCOUNTER — TELEPHONE (OUTPATIENT)
Dept: NEUROSURGERY | Facility: CLINIC | Age: 62
End: 2022-03-16
Payer: COMMERCIAL

## 2022-03-18 ENCOUNTER — TELEPHONE (OUTPATIENT)
Dept: NEUROSURGERY | Facility: CLINIC | Age: 62
End: 2022-03-18
Payer: COMMERCIAL

## 2022-03-18 ENCOUNTER — TELEPHONE (OUTPATIENT)
Dept: ORTHOPEDICS | Facility: CLINIC | Age: 62
End: 2022-03-18
Payer: COMMERCIAL

## 2022-03-18 NOTE — TELEPHONE ENCOUNTER
Health Call Center    Phone Message    May a detailed message be left on voicemail: yes     Reason for Call: Appointment Intake    Referring Provider Name: Dr. Elder  Diagnosis and/or Symptoms: Carpal tunnel syndrome of right wrist    Pt called to schedule appt. Per instructions writer attempted to schedule a new in person appt with Dr. Luo or Dr. Sanchez but there was no template available.     Please call Pt back to schedule.     Action Taken: Message routed to:  [facility] ([facility]    Travel Screening: Not Applicable

## 2022-03-18 NOTE — TELEPHONE ENCOUNTER
Appt:  4.11.22  Added to Wait List    Consult:  Carpal Tunnel Release, BILATERAL hand / Sarai Elder MD / [employer] / EMG ON FILE    [facility]    Phone Message:  See appt information, above. Does the pt need additional imaging to see MD Chris?  If so, please contact pt and let her know that additional imaging will be needed before seeing this provider.  Thank you.    May a detailed message be left on voicemail: Yes     Reason for Call: Other: Imaging Inquiry: Carpal Tunnel Release      Action Taken: Message routed to:  [facility] ([facility]): Team    Travel Screening: Not Applicable

## 2022-03-23 ENCOUNTER — PRE VISIT (OUTPATIENT)
Dept: ORTHOPEDICS | Facility: CLINIC | Age: 62
End: 2022-03-23

## 2022-03-23 NOTE — TELEPHONE ENCOUNTER
Action Carpal Tunnel Release, BILATERAL hand / Sarai Elder MD    Action Taken All records in epic Emg done 05/24/2019 report in epic.  cdk

## 2022-03-24 ENCOUNTER — TELEPHONE (OUTPATIENT)
Dept: BEHAVIORAL HEALTH | Facility: CLINIC | Age: 62
End: 2022-03-24
Payer: COMMERCIAL

## 2022-03-24 NOTE — TELEPHONE ENCOUNTER
[facility]Addiction ([facility] (TC):     Provides Patient Support While Waiting to Access Programmatic and Outpatient MH&A Care and Provides [facility] Crisis Assessment Services     NURSING Referral Review  _________________________________________    This RN has reviewed this Medication Management referral to the Transition Clinic and deemed the referral   [x] Appropriate  [] Inappropriate   []Consulting     Based on the following criteria:    Pt has a psychiatric provider (or pending plan) in place for future prescribing: Yes: 9/2/22 with [facility]     Timeframe until pt's scheduled psychiatry appointment is less than 6 months: 6 month simran     Pt takes psychiatric medications:   Yes:  DULoxetine (CYMBALTA) 60 MG capsule  prazosin (MINIPRESS) 5 MG capsule  traZODone (DESYREL) 50 MG tablet  amphetamine-dextroamphetamine (ADDERALL) 20 MG tablet    Pt's goals seem to align with this temporary service: Yes: 9/2/22     Any additional pertinent information regarding this referral: Patient reported she went to [place] previously.     Patient reports she would like to talk about her medication list possibly altering medications    Initial contact w/ patient/parent: Patient would like to schedule April 5, 2022 10:30am INPERSON     Rn routed to [facility] to facilitate scheduling.     The Transition Clinic phone # is [phone].    Evelyn Ocasio RN on March 24, 2022 at 12:50 PM

## 2022-03-24 NOTE — TELEPHONE ENCOUNTER
Transition Clinic Referral   [place] Only   Limited [place] Availability     Type of Referral:       _____Therapy     _____Therapy & Medication (Psychiatry next level of care appointment needs to be scheduled)   __x___Medication Only (Psychiatry next level of care appointment needs to be scheduled)   _____Diagnostic Assessment Only       Referring Provider Name: Jovan Carnes     Clinician completing the assessment. Marni Collins     Referring Provider: [facility] PROVIDER     If known, referring provider contact name: Mariana Cervantes; Phone Number: Unknown     Reason for Transition Clinic Referral: [facility]     Next Level of Care Patient Will Be Transitioned To: [facility]     Start Date for Next Level of Care Therapy (Required): N/A   ProviderN/A   Location N/A     Start Date for Next Level of Care Medication (Required): 9/2/2022   [facility]    Psychiatry cannot see patients who do not have active medical insurance     What Would Be Helpful from the Transition Clinic: Psych[facility]      Needs: NO     Does Patient Have Access to Technology: Yes     Patient E-mail Address: [email or web address]     Current Patient Phone Number: [phone]; N/A     Clinician Gender Preference (if applicable): NO     Jovan Carnes

## 2022-03-29 ENCOUNTER — LAB (OUTPATIENT)
Dept: LAB | Facility: CLINIC | Age: 62
End: 2022-03-29
Payer: COMMERCIAL

## 2022-03-29 DIAGNOSIS — R30.0 DYSURIA: ICD-10-CM

## 2022-03-29 LAB
ALBUMIN UR-MCNC: NEGATIVE MG/DL
APPEARANCE UR: CLEAR
BILIRUB UR QL STRIP: NEGATIVE
COLOR UR AUTO: YELLOW
GLUCOSE UR STRIP-MCNC: NEGATIVE MG/DL
HGB UR QL STRIP: NEGATIVE
KETONES UR STRIP-MCNC: NEGATIVE MG/DL
LEUKOCYTE ESTERASE UR QL STRIP: ABNORMAL
NITRATE UR QL: NEGATIVE
PH UR STRIP: 6 [PH] (ref 5–7)
SP GR UR STRIP: 1.02 (ref 1–1.03)
UROBILINOGEN UR STRIP-ACNC: 0.2 E.U./DL

## 2022-03-29 PROCEDURE — 81003 URINALYSIS AUTO W/O SCOPE: CPT | Mod: QW

## 2022-03-29 PROCEDURE — 87086 URINE CULTURE/COLONY COUNT: CPT

## 2022-03-30 DIAGNOSIS — G47.00 INSOMNIA, UNSPECIFIED TYPE: ICD-10-CM

## 2022-03-31 LAB — BACTERIA UR CULT: NORMAL

## 2022-03-31 RX ORDER — TRAZODONE HYDROCHLORIDE 50 MG/1
TABLET, FILM COATED ORAL
Qty: 90 TABLET | Refills: 2 | Status: SHIPPED | OUTPATIENT
Start: 2022-03-31 | End: 2022-08-15

## 2022-04-04 ENCOUNTER — VIRTUAL VISIT (OUTPATIENT)
Dept: UROLOGY | Facility: CLINIC | Age: 62
End: 2022-04-04
Payer: COMMERCIAL

## 2022-04-04 VITALS — BODY MASS INDEX: 31.7 KG/M2 | WEIGHT: 214 LBS | HEIGHT: 69 IN

## 2022-04-04 DIAGNOSIS — R30.0 DYSURIA: Primary | ICD-10-CM

## 2022-04-04 DIAGNOSIS — R39.89 BLADDER PAIN: ICD-10-CM

## 2022-04-04 DIAGNOSIS — R39.15 URGENCY OF URINATION: ICD-10-CM

## 2022-04-04 PROCEDURE — 99213 OFFICE O/P EST LOW 20 MIN: CPT | Mod: 95 | Performed by: PHYSICIAN ASSISTANT

## 2022-04-04 RX ORDER — ESTRADIOL 0.1 MG/G
CREAM VAGINAL
Qty: 42.5 G | Refills: 3 | Status: SHIPPED | OUTPATIENT
Start: 2022-04-04 | End: 2022-09-02

## 2022-04-04 ASSESSMENT — PAIN SCALES - GENERAL: PAINLEVEL: MILD PAIN (2)

## 2022-04-04 NOTE — PATIENT INSTRUCTIONS
Estrogen cream three times a week near urethra (pea-sized amount): If >$50, let me know and we can get via a compound pharmacy.  _____________________________________________________  Please see one of the dedicated pelvic floor physical therapists ([facility] [phone]).    Please be aware that coverage of these services is subject to the terms and limitations of your health insurance plan.  Call member services at your health plan with any benefit or coverage questions.      Please bring the following to your appointment:    *Your personal calendar for scheduling future appointments  *Comfortable clothing  ____________________________________________________________________________________________________  Websites with free information:     American [employer] patient website: www.[email or web address]     Total Control Program: www.totalcontrolprogram.com             CYSTOSCOPY    What is a Cystoscopy?  This is a procedure done to check for problems inside the bladder.  Problems may include polyps (growths), tumors, inflammation (swelling and redness) and other concerns.    The Urologist inserts a thin tube (called a cystoscope) into the bladder.  The tube is about the size of a pencil.  We will give you numbing medicine to reduce the pain or discomfort you may feel.    The Urologist will be able to see inside the bladder by filling the bladder with water.  The water makes it easier to see any problems that may be present. You will have a sense to need to urinate and this is normal.       How should I get ready for the exam?  Nothing to do to prepare. You may eat normally the day of the exam. There is no sedation, so you may drive yourself to and from if you can drive.       Please tell your doctor if:  - You have a history of urinary tract infections.  - You know that you have a tumor in your bladder.  - You have bleeding problems.  - You have any allergies.  - You  are or may be pregnant.      What happens after the exam?  You may go back to your normal diet and activity as you feel ready.    For the next two days after the exam, you may notice:  - Some blood in your urine.  - Some burning when you urinate (use the toilet).  - An urge to urinate more often.  - Bladder spasms.    These are normal after the procedure. They should go away on their own after a day or two.      - You can help to relieve the above listed symptoms by:  - Drinking 6 to 8 large glasses of water each day (includes drinks at meals).  This will help clear the urine.  - Take warm baths to relieve pain and bladder spasms.  Do not add anything to the bath water.  - You may take Tylenol (acetaminophen) per label instructions for discomfort.

## 2022-04-04 NOTE — LETTER
4/4/2022       RE: Ilsa Yusuf  [address] [facility] [address]     Dear Colleague,    Thank you for referring your patient, Ilsa Yusuf, to the [facility] [place] at [facility]. Please see a copy of my visit note below.    *PT WILL MEET YOU IN MYCHART*    PT STATES HER BLADDER BURNS  SOMETIMES PT HAS DYSURIA.  PT SOMETIMES HAS BUBBLES IN HER URINE.  CLEAR BUBBLES THAT STAY IN THE TOILET.    Deanne is a 61 year old who is being evaluated via a billable video visit.      How would you like to obtain your AVS? MyChart  If the video visit is dropped, the invitation should be resent by: Text to cell phone: [phone]  Will anyone else be joining your video visit? No      Video Start Time: 11:07 AM  Video-Visit Details    Type of service:  Video Visit    Video End Time:11:18 AM    Originating Location (pt. Location): Home    Distant Location (provider location):  [facility] [place]     Platform used for Video Visit: Luna     CC: bladder pain     HPI: It is a pleasure to see Ms. Ilsa Yusuf, a pleasant 61 year old female seen today for follow-up of urinary retention and dysuria. She has a hx of requiring Moraes catheter placement on 5/2/18 (350cc) and subsequently passed TOV. Last seen in our office in 2020.     Returns to our office for urgency and constant bladder pain, ongoing for 6+ months. Last UA/UC neg. She has dysuria at times as well. Bowels regular. No vaginal dryness or pain with intercourse. No gross hematuria.     Past Medical History:   Diagnosis Date     Arthritis 2012    both knees; hands     Cervical high risk HPV (human papillomavirus) test positive 03/19/2019    see problem list     Depressive disorder      Depressive disorder, not elsewhere classified 08/28/12    DC 10/05/12-[facility]     Hyperlipidemia LDL goal < 130     mevacor     Hypothyroid      Moderate major depression  (H)     abilify, cymbalta, seroquel, and nuvigil, Dr Alvarez Persing     Mumps      ABDOUL (obstructive sleep apnea)      PTSD (post-traumatic stress disorder)      Seizure (H) 2011    one episode, was on Keppra, EEG negative       Past Surgical History:   Procedure Laterality Date     ABDOMEN SURGERY       BLADDER SURGERY       CHOLECYSTECTOMY       COLONOSCOPY       CYSTOSCOPY       ORTHOPEDIC SURGERY  left knee     renal artery surgery  child    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     TONSILLECTOMY       ureteral reimplantation       ZZC PARTIAL EXCISION THYROID,UNILAT      rt, negative path then, treated with synthroid.       Social History     Socioeconomic History     Marital status:      Spouse name: Not on file     Number of children: Not on file     Years of education: Not on file     Highest education level: Not on file   Occupational History     Not on file   Tobacco Use     Smoking status: Former Smoker     Packs/day: 0.30     Quit date: 2015     Years since quittin.1     Smokeless tobacco: Never Used     Tobacco comment: recreational   Vaping Use     Vaping Use: Never used   Substance and Sexual Activity     Alcohol use: Not Currently     Alcohol/week: 0.0 standard drinks     Drug use: Yes     Comment: medical marijuana     Sexual activity: Yes     Partners: Male     Birth control/protection: None   Other Topics Concern     Parent/sibling w/ CABG, MI or angioplasty before 65F 55M? 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       Social Determinants of Health     Financial Resource Strain: Not on file   Food Insecurity: Not on file   Transportation Needs: Not on file   Physical Activity: Not on file   Stress: Not on file   Social Connections: Not on file   Intimate Partner Violence: Not on file   Housing Stability: Not on file       Family History   Problem Relation Age of Onset     C.A.D. Father 58         at 58, heart disease     Mental Illness Father  "     Coronary Artery Disease Father      Hyperlipidemia Father      Diverticulitis Father      Alzheimer Disease Mother         total care now     Depression Mother      Anxiety Disorder Mother      Diabetes Sister         adult onset     Melanoma Sister      Breast Cancer Sister      Thyroid Disease Sister      Diabetes Maternal Grandmother        Allergies   Allergen Reactions     Gabapentin Other (See Comments)     Patient states she gets \"horrific nightmares\" with this medication     Dilaudid [Hydromorphone Hcl]      Made her feel like her skin was crawling     Nsaids Other (See Comments)     Kidney failure     Compazine [Prochlorperazine] Other (See Comments)     \"Makes my skin crawl, I was going nuts.\"       Current Outpatient Medications   Medication     albuterol (PROAIR HFA/PROVENTIL HFA/VENTOLIN HFA) 108 (90 Base) MCG/ACT inhaler     amphetamine-dextroamphetamine (ADDERALL) 20 MG tablet     cholecalciferol (VITAMIN D3) 5000 UNITS CAPS capsule     DULoxetine (CYMBALTA) 60 MG capsule     fluticasone (FLONASE) 50 MCG/ACT nasal spray     levothyroxine (SYNTHROID/LEVOTHROID) 175 MCG tablet     lisinopril (ZESTRIL) 5 MG tablet     lovastatin (MEVACOR) 20 MG tablet     multivitamin, therapeutic with minerals (MULTI-VITAMIN) TABS     Omega-3 Fatty Acids (OMEGA 3 PO)     omeprazole (PRILOSEC) 40 MG DR capsule     ondansetron (ZOFRAN) 4 MG tablet     oxyCODONE IR (ROXICODONE) 10 MG tablet     prazosin (MINIPRESS) 5 MG capsule     tiZANidine (ZANAFLEX) 4 MG tablet     topiramate (TOPAMAX) 25 MG tablet     traZODone (DESYREL) 50 MG tablet     UNABLE TO FIND     No current facility-administered medications for this visit.         PEx:   Height 1.746 m (5' 8.75\"), weight 97.1 kg (214 lb), not currently breastfeeding.    PSYCH: NAD  EYES: EOMI  MOUTH: MMM  NEURO: AAO x3    Urine: 3/29/22 neg, UC neg      A/P: Ilsa Yusuf is a 61 year old female with bladder pain, urgency, poss pelvic floor dysfunction  -Given " 6+ months of symptoms, arrange for cysto.   -PVR with nurse given retention hx  -Pelvic floor PT  -Estrogen cream three times a week near urethra (pea-sized amount): If >$50, she will let me know and we can get via a compound pharmacy.    [facility]        26 minutes spent on the date of the encounter doing chart review, review of outside records, review of test results, interpretation of tests, patient visit and documentation

## 2022-04-04 NOTE — PROGRESS NOTES
*PT WILL MEET YOU IN [employer]*    PT STATES HER BLADDER BURNS  SOMETIMES PT HAS DYSURIA.  PT SOMETIMES HAS BUBBLES IN HER URINE.  CLEAR BUBBLES THAT STAY IN THE TOILET.    Deanne is a 61 year old who is being evaluated via a billable video visit.      How would you like to obtain your AVS? MyChart  If the video visit is dropped, the invitation should be resent by: Text to cell phone: [phone]  Will anyone else be joining your video visit? No      Video Start Time: 11:07 AM  Video-Visit Details    Type of service:  Video Visit    Video End Time:11:18 AM    Originating Location (pt. Location): Home    Distant Location (provider location):  [facility] [place]     Platform used for Video Visit: Luna     CC: bladder pain     HPI: It is a pleasure to see Ms. Ilsa Yusuf, a pleasant 61 year old female seen today for follow-up of urinary retention and dysuria. She has a hx of requiring Moraes catheter placement on 5/2/18 (350cc) and subsequently passed TOV. Last seen in our office in 2020.     Returns to our office for urgency and constant bladder pain, ongoing for 6+ months. Last UA/UC neg. She has dysuria at times as well. Bowels regular. No vaginal dryness or pain with intercourse. No gross hematuria.     Past Medical History:   Diagnosis Date     Arthritis 2012    both knees; hands     Cervical high risk HPV (human papillomavirus) test positive 03/19/2019    see problem list     Depressive disorder      Depressive disorder, not elsewhere classified 08/28/12    DC 10/05/12-[facility]     Hyperlipidemia LDL goal < 130     mevacor     Hypothyroid      Moderate major depression (H)     abilify, cymbalta, seroquel, and nuvigil, Dr Antonio Yoon      ABDOUL (obstructive sleep apnea)      PTSD (post-traumatic stress disorder)      Seizure (H) 03/2011    one episode, was on Keppra, EEG negative       Past Surgical History:   Procedure Laterality Date     ABDOMEN SURGERY       BLADDER  SURGERY       CHOLECYSTECTOMY       COLONOSCOPY  2012     CYSTOSCOPY       ORTHOPEDIC SURGERY  left knee     renal artery surgery  child    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     TONSILLECTOMY       ureteral reimplantation       ZZC PARTIAL EXCISION THYROID,UNILAT      rt, negative path then, treated with synthroid.       Social History     Socioeconomic History     Marital status:      Spouse name: Not on file     Number of children: Not on file     Years of education: Not on file     Highest education level: Not on file   Occupational History     Not on file   Tobacco Use     Smoking status: Former Smoker     Packs/day: 0.30     Quit date: 2015     Years since quittin.1     Smokeless tobacco: Never Used     Tobacco comment: recreational   Vaping Use     Vaping Use: Never used   Substance and Sexual Activity     Alcohol use: Not Currently     Alcohol/week: 0.0 standard drinks     Drug use: Yes     Comment: medical marijuana     Sexual activity: Yes     Partners: Male     Birth control/protection: None   Other Topics Concern     Parent/sibling w/ CABG, MI or angioplasty before 65F 55M? 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       Social Determinants of Health     Financial Resource Strain: Not on file   Food Insecurity: Not on file   Transportation Needs: Not on file   Physical Activity: Not on file   Stress: Not on file   Social Connections: Not on file   Intimate Partner Violence: Not on file   Housing Stability: Not on file       Family History   Problem Relation Age of Onset     C.A.D. Father 58         at 58, heart disease     Mental Illness Father      Coronary Artery Disease Father      Hyperlipidemia Father      Diverticulitis Father      Alzheimer Disease Mother         total care now     Depression Mother      Anxiety Disorder Mother      Diabetes Sister         adult onset     Melanoma Sister      Breast Cancer Sister      Thyroid Disease Sister       "Diabetes Maternal Grandmother        Allergies   Allergen Reactions     Gabapentin Other (See Comments)     Patient states she gets \"horrific nightmares\" with this medication     Dilaudid [Hydromorphone Hcl]      Made her feel like her skin was crawling     Nsaids Other (See Comments)     Kidney failure     Compazine [Prochlorperazine] Other (See Comments)     \"Makes my skin crawl, I was going nuts.\"       Current Outpatient Medications   Medication     albuterol (PROAIR HFA/PROVENTIL HFA/VENTOLIN HFA) 108 (90 Base) MCG/ACT inhaler     amphetamine-dextroamphetamine (ADDERALL) 20 MG tablet     cholecalciferol (VITAMIN D3) 5000 UNITS CAPS capsule     DULoxetine (CYMBALTA) 60 MG capsule     fluticasone (FLONASE) 50 MCG/ACT nasal spray     levothyroxine (SYNTHROID/LEVOTHROID) 175 MCG tablet     lisinopril (ZESTRIL) 5 MG tablet     lovastatin (MEVACOR) 20 MG tablet     multivitamin, therapeutic with minerals (MULTI-VITAMIN) TABS     Omega-3 Fatty Acids (OMEGA 3 PO)     omeprazole (PRILOSEC) 40 MG DR capsule     ondansetron (ZOFRAN) 4 MG tablet     oxyCODONE IR (ROXICODONE) 10 MG tablet     prazosin (MINIPRESS) 5 MG capsule     tiZANidine (ZANAFLEX) 4 MG tablet     topiramate (TOPAMAX) 25 MG tablet     traZODone (DESYREL) 50 MG tablet     UNABLE TO FIND     No current facility-administered medications for this visit.         PEx:   Height 1.746 m (5' 8.75\"), weight 97.1 kg (214 lb), not currently breastfeeding.    PSYCH: NAD  EYES: EOMI  MOUTH: MMM  NEURO: AAO x3    Urine: 3/29/22 neg, UC neg      A/P: Ilsa Yusuf is a 61 year old female with bladder pain, urgency, poss pelvic floor dysfunction  -Given 6+ months of symptoms, arrange for cysto.   -PVR with nurse given retention hx  -Pelvic floor PT  -Estrogen cream three times a week near urethra (pea-sized amount): If >$50, she will let me know and we can get via a compound pharmacy.    Breanne Ellis PA-C  [facility]        26 minutes spent on the date of " the encounter doing chart review, review of outside records, review of test results, interpretation of tests, patient visit and documentation

## 2022-04-05 ENCOUNTER — OFFICE VISIT (OUTPATIENT)
Dept: BEHAVIORAL HEALTH | Facility: CLINIC | Age: 62
End: 2022-04-05
Payer: COMMERCIAL

## 2022-04-05 VITALS
WEIGHT: 222 LBS | SYSTOLIC BLOOD PRESSURE: 136 MMHG | DIASTOLIC BLOOD PRESSURE: 77 MMHG | BODY MASS INDEX: 33.02 KG/M2 | HEART RATE: 74 BPM

## 2022-04-05 DIAGNOSIS — F41.1 GAD (GENERALIZED ANXIETY DISORDER): Primary | ICD-10-CM

## 2022-04-05 DIAGNOSIS — F33.1 MODERATE EPISODE OF RECURRENT MAJOR DEPRESSIVE DISORDER (H): ICD-10-CM

## 2022-04-05 DIAGNOSIS — F43.10 PTSD (POST-TRAUMATIC STRESS DISORDER): ICD-10-CM

## 2022-04-05 PROCEDURE — 99205 OFFICE O/P NEW HI 60 MIN: CPT | Performed by: NURSE PRACTITIONER

## 2022-04-05 PROCEDURE — G0463 HOSPITAL OUTPT CLINIC VISIT: HCPCS

## 2022-04-05 RX ORDER — HYDROXYZINE HYDROCHLORIDE 10 MG/1
10-30 TABLET, FILM COATED ORAL EVERY 6 HOURS PRN
Qty: 30 TABLET | Refills: 0 | Status: SHIPPED | OUTPATIENT
Start: 2022-04-05 | End: 2022-06-06 | Stop reason: DRUGHIGH

## 2022-04-05 RX ORDER — PRAZOSIN HYDROCHLORIDE 5 MG/1
CAPSULE ORAL
Qty: 90 CAPSULE | Refills: 0 | Status: SHIPPED | OUTPATIENT
Start: 2022-04-05 | End: 2022-09-02

## 2022-04-05 RX ORDER — ESCITALOPRAM OXALATE 10 MG/1
10 TABLET ORAL DAILY
Qty: 30 TABLET | Refills: 1 | Status: SHIPPED | OUTPATIENT
Start: 2022-04-05 | End: 2022-05-31

## 2022-04-05 RX ORDER — DULOXETIN HYDROCHLORIDE 20 MG/1
20 CAPSULE, DELAYED RELEASE ORAL 2 TIMES DAILY
Qty: 14 CAPSULE | Refills: 0 | Status: SHIPPED | OUTPATIENT
Start: 2022-04-05 | End: 2022-06-10

## 2022-04-05 RX ORDER — ESCITALOPRAM OXALATE 5 MG/1
5 TABLET ORAL DAILY
Qty: 30 TABLET | Refills: 1 | Status: SHIPPED | OUTPATIENT
Start: 2022-04-05 | End: 2022-05-31

## 2022-04-05 RX ORDER — DULOXETIN HYDROCHLORIDE 30 MG/1
30 CAPSULE, DELAYED RELEASE ORAL DAILY
Qty: 28 CAPSULE | Refills: 0 | Status: SHIPPED | OUTPATIENT
Start: 2022-04-05 | End: 2022-06-10

## 2022-04-05 ASSESSMENT — ANXIETY QUESTIONNAIRES
IF YOU CHECKED OFF ANY PROBLEMS ON THIS QUESTIONNAIRE, HOW DIFFICULT HAVE THESE PROBLEMS MADE IT FOR YOU TO DO YOUR WORK, TAKE CARE OF THINGS AT HOME, OR GET ALONG WITH OTHER PEOPLE: VERY DIFFICULT
3. WORRYING TOO MUCH ABOUT DIFFERENT THINGS: MORE THAN HALF THE DAYS
5. BEING SO RESTLESS THAT IT IS HARD TO SIT STILL: SEVERAL DAYS
6. BECOMING EASILY ANNOYED OR IRRITABLE: SEVERAL DAYS
7. FEELING AFRAID AS IF SOMETHING AWFUL MIGHT HAPPEN: MORE THAN HALF THE DAYS
1. FEELING NERVOUS, ANXIOUS, OR ON EDGE: NEARLY EVERY DAY
GAD7 TOTAL SCORE: 14
2. NOT BEING ABLE TO STOP OR CONTROL WORRYING: MORE THAN HALF THE DAYS
4. TROUBLE RELAXING: NEARLY EVERY DAY

## 2022-04-05 ASSESSMENT — PATIENT HEALTH QUESTIONNAIRE - PHQ9: SUM OF ALL RESPONSES TO PHQ QUESTIONS 1-9: 14

## 2022-04-05 NOTE — Clinical Note
So I didn't write affect as: dramatic because it always seems to cause problems with people demanding it be removed or changed from the documentation BUT this person was pretty dramatic and there were lots of discrepancies about why she quit working, TORRI exposure, ADHD dx, from prior chart review to what she reported during the interview.  It was very odd.    Anyways, I suspect there is an underlying personality disorder ... so eventually DBT might be a good thing to suggest or recommend.    Gina

## 2022-04-05 NOTE — PROGRESS NOTES
"  [facility]iction ([facility] (TC):     Provides Patient Support While Waiting to Access Programmatic and Outpatient MH&A Care and Provides [facility] Crisis Assessment Services     NURSING INTAKE  ____________________________________________________    \"The Transition Clinic is a temporary psychiatry service that helps to bridge the time to your next appointment. It is not intended to be a long-term service and you are expected to attend your scheduled appointment with your next provider.\"  [x] Patient/Parent verbalized understanding    If you need support between appointments, please call [phone] and let them know you're seen by [facility]. You may also send a [employer] message to reach us.    General-     Most pressing MH needs at this time: having trouble with intrusive thoughts of guilt. Pt is worrying about her self, has no motivation to do anything      Any physical health conditions or diagnoses we should be aware of or that are impacting you: recent bladder problem, c/o of burning sensation;  carpal tunnel syndrome, herniated disc      Medications-     DULoxetine (CYMBALTA) 120 MG  prazosin (MINIPRESS) 5 MG capsule  traZODone (DESYREL)  100  Q HS  amphetamine-dextroamphetamine (ADDERALL) 20 MG tablet -stopped in mid February dt SE - tachycardia, SOB, and tiredness   Topamax 25 mg - spotted about 1 month ago  Tizanidine 4 mg PRN    Injectable medications currently prescribed: No  If yes, do you need an appointment for the next injection: NA    Any Controlled Substances that you are prescribed:     Oxycodone 10 mg - 1-2 tabs PRN    Primary care provider: CHERISE HADLEY        DONELL-7 scores:  14  DONELL-7 SCORE 11/6/2018 10/18/2021   Total Score - -   Total Score - 7 (mild anxiety)   Total Score 8 7   Total Score - -       PHQ-9 scores: 14  PHQ-9 SCORE 3/19/2019 10/18/2021   PHQ-9 Total Score - -   PHQ-9 Total Score [facility] 14 (Moderate depression) 3 (Minimal depression) "   PHQ-9 Total Score 14 3           Anything the provider should be aware of for today's appointment: she is getting  in a couple month.    New (awaiting) Mental health provider or next programming: ANNE CHACON    Date of scheduled apt: 9/2/22 Dennis         Chely Bernstein on April 5, 2022 at 10:00 AM   MH&A TC   NURSING Post-Appointment Chart-check:    Correct pharmacy verified with patient and updated in chart? [x] yes []no    Charge captured ? [x] yes  [] no    Medications ordered this visit were e-scribed.  Verified by order class [] yes  [] no    List Medications:    prazosin (MINIPRESS) 5 MG capsule  hydrOXYzine (ATARAX) 10 MG tablet  escitalopram (LEXAPRO) 5 MG and 10 mg tabs  DULoxetine (CYMBALTA) 30 MG capsule  DULoxetine (CYMBALTA) 20 MG   Class: E-Prescribe    Medication changes or discontinuations were communicated to patient's pharmacy: [] yes  [x] no    UA collected [] yes  [x] no  [] n/a-virtual     Future appointment was made: [x] yes  [] no  [] n/a    Dictation completed at time of chart check: [] yes  [x] no    I have checked the documentation for today s encounters and the above information has been reviewed and completed.      Chely Bernstein on April 5, 2022 at 2:13 PM

## 2022-04-05 NOTE — PATIENT INSTRUCTIONS
Start:  Duloxetine for 14 day increments:  -90 mg  -60 mg  ... start Escitalopram 5 mg  -30 mg .... Escitalopram 10 mg  -20 mg .... Escitalopram 15 mg daily thereafter  -Then discontinue     Escitalopram/Lexapro in 14 day increments:  -5 mg  -10 mg  -15 mg (10 mg + 5 mg) daily thereafter      Hydroxyzine 10 to 30 mg (1 to 3 tabs) every 6 hours as needed for anxiety       Continue:  Prazosin 5 mg at bedtime    Trazodone 50 to 150 mg as needed at bedtime
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
## 2022-04-06 ENCOUNTER — TELEPHONE (OUTPATIENT)
Dept: UROLOGY | Facility: CLINIC | Age: 62
End: 2022-04-06
Payer: COMMERCIAL

## 2022-04-06 ASSESSMENT — ANXIETY QUESTIONNAIRES: GAD7 TOTAL SCORE: 14

## 2022-04-06 NOTE — TELEPHONE ENCOUNTER
----- Message from Edel Chase sent at 4/5/2022 10:41 AM CDT -----  PVR with nurse clinic  Cysto with CSF, bladder pain x 6+ months, urgency; UC prior per her protocol    TALA

## 2022-04-11 ENCOUNTER — ANCILLARY PROCEDURE (OUTPATIENT)
Dept: GENERAL RADIOLOGY | Facility: CLINIC | Age: 62
End: 2022-04-11
Attending: STUDENT IN AN ORGANIZED HEALTH CARE EDUCATION/TRAINING PROGRAM
Payer: COMMERCIAL

## 2022-04-11 ENCOUNTER — OFFICE VISIT (OUTPATIENT)
Dept: ORTHOPEDICS | Facility: CLINIC | Age: 62
End: 2022-04-11
Payer: COMMERCIAL

## 2022-04-11 DIAGNOSIS — M25.542 ARTHRALGIA OF BOTH HANDS: ICD-10-CM

## 2022-04-11 DIAGNOSIS — M18.0 OSTEOARTHRITIS OF CARPOMETACARPAL (CMC) JOINT OF BOTH THUMBS: Primary | ICD-10-CM

## 2022-04-11 DIAGNOSIS — M25.541 ARTHRALGIA OF BOTH HANDS: ICD-10-CM

## 2022-04-11 DIAGNOSIS — M25.549 HAND JOINT PAIN: ICD-10-CM

## 2022-04-11 PROCEDURE — 73140 X-RAY EXAM OF FINGER(S): CPT | Mod: 76 | Performed by: RADIOLOGY

## 2022-04-11 PROCEDURE — 99204 OFFICE O/P NEW MOD 45 MIN: CPT | Mod: 25 | Performed by: PHYSICIAN ASSISTANT

## 2022-04-11 PROCEDURE — 20600 DRAIN/INJ JOINT/BURSA W/O US: CPT | Mod: F5 | Performed by: STUDENT IN AN ORGANIZED HEALTH CARE EDUCATION/TRAINING PROGRAM

## 2022-04-11 NOTE — PROGRESS NOTES
xdDate of Service: Apr 11, 2022    Chief Complaint:   Chief Complaint   Patient presents with     Consult     Bilateral CTS, EMG 5/24/19        History of Present Illness: Ilsa Yusuf is a 61 year old, right handed female who presents today for further evaluation of bilateral hand pain and weakness.  Patient states that for the past 6 months she has had progressively worsening bilateral hand/thumb pain that does radiate up into the forearms.  She states that for the past 3 to 4 months she has been wearing bilateral wrist braces.  She started wearing them just at night and now wears them all the time.  She states she has pain at rest, and pain worsens if she even bumps the thumb.  She states she is unable to do many activities that she previously enjoyed like sewing due to his severe bilateral pain.  Patient denies any numbness or tingling.  Patient denies night symptoms. She does note that she was diagnosed with bilateral carpal tunnel syndrome in 2019 via EMG.  She reports that she had been recommended to have surgery for her carpal tunnel but declined at that time.  Patient does have chronic neck pain due to herniated disks and is followed by [facility].     Review of Systems: A 14-point review of systems was obtained on intake and reviewed.      Past Medical History:   Diagnosis Date     Arthritis 2012    both knees; hands     Cervical high risk HPV (human papillomavirus) test positive 03/19/2019    see problem list     Depressive disorder      Depressive disorder, not elsewhere classified 08/28/12    DC 10/05/12-[facility]     Hyperlipidemia LDL goal < 130     mevacor     Hypothyroid      Moderate major depression (H)     abilify, cymbalta, seroquel, and nuchantale, Dr Antonio Perales     [place]      ABDOUL (obstructive sleep apnea)      PTSD (post-traumatic stress disorder)      Seizure (H) 03/2011    one episode, was on Keppra, EEG negative       Past Surgical History:   Procedure Laterality Date      ABDOMEN SURGERY       BLADDER SURGERY       CHOLECYSTECTOMY       COLONOSCOPY  2012     CYSTOSCOPY       ORTHOPEDIC SURGERY  left knee     renal artery surgery  child    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     TONSILLECTOMY       ureteral reimplantation       ZZC PARTIAL EXCISION THYROID,UNILAT  1991    rt, negative path then, treated with synthroid.         Current Outpatient Medications:      albuterol (PROAIR HFA/PROVENTIL HFA/VENTOLIN HFA) 108 (90 Base) MCG/ACT inhaler, Inhale 1-2 puffs into the lungs every 4 hours as needed for shortness of breath / dyspnea or wheezing, Disp: 18 g, Rfl: 0     cholecalciferol (VITAMIN D3) 5000 UNITS CAPS capsule, Take 1 capsule (5,000 Units) by mouth daily Take 1 per day, Disp: 100 capsule, Rfl: 2     DULoxetine (CYMBALTA) 20 MG capsule, Take 1 capsule (20 mg) by mouth 2 times daily, Disp: 14 capsule, Rfl: 0     DULoxetine (CYMBALTA) 30 MG capsule, Take 1 capsule (30 mg) by mouth daily Per tapering schedule, Disp: 28 capsule, Rfl: 0     DULoxetine (CYMBALTA) 60 MG capsule, Take 2 capsules (120 mg) by mouth At Bedtime Only in evening, Disp: 180 capsule, Rfl: 1     escitalopram (LEXAPRO) 10 MG tablet, Take 1 tablet (10 mg) by mouth daily In addition to 5 mg for ttl: 15 mg/day, Disp: 30 tablet, Rfl: 1     escitalopram (LEXAPRO) 5 MG tablet, Take 1 tablet (5 mg) by mouth daily In addition to 10 mg for ttl: 15 mg/day, Disp: 30 tablet, Rfl: 1     estradiol (ESTRACE VAGINAL) 0.1 MG/GM vaginal cream, Apply small amount to the vaginal opening and urethra M, W, F @ h.s. (Patient not taking: No sig reported), Disp: 42.5 g, Rfl: 3     fluticasone (FLONASE) 50 MCG/ACT nasal spray, Spray 2 sprays into both nostrils 2 times daily, Disp: 48 g, Rfl: 1     hydrOXYzine (ATARAX) 10 MG tablet, Take 1-3 tablets (10-30 mg) by mouth every 6 hours as needed for anxiety, Disp: 30 tablet, Rfl: 0     levothyroxine (SYNTHROID/LEVOTHROID) 175 MCG tablet, Take 1 tablet (175 mcg) by mouth daily, Disp:  "90 tablet, Rfl: 0     lovastatin (MEVACOR) 20 MG tablet, Take 1 tablet (20 mg) by mouth At Bedtime, Disp: 90 tablet, Rfl: 3     multivitamin, therapeutic with minerals (MULTI-VITAMIN) TABS, Take 1 tablet by mouth daily, Disp: , Rfl:      Omega-3 Fatty Acids (OMEGA 3 PO), Take 2 g by mouth daily Takes 1 in am and 1 at bedtime, Disp: , Rfl:      omeprazole (PRILOSEC) 40 MG DR capsule, Take 1 capsule (40 mg) by mouth daily, Disp: 90 capsule, Rfl: 1     ondansetron (ZOFRAN) 4 MG tablet, Take 1 tablet (4 mg) by mouth every 6 hours as needed for nausea (Patient not taking: No sig reported), Disp: 30 tablet, Rfl: 1     oxyCODONE IR (ROXICODONE) 10 MG tablet, TK 1 TO 2 TABLETS PO EVERY FOUR TO SIX HOURS AS NEEDED FOR PAIN MAX 6 TABLETS A DAY, Disp: , Rfl:      prazosin (MINIPRESS) 5 MG capsule, TAKE 1 CAPSULE(5 MG) BY MOUTH AT BEDTIME, Disp: 90 capsule, Rfl: 0     tiZANidine (ZANAFLEX) 4 MG tablet, Take 1-3 tablets (4-12 mg) by mouth 3 times daily as needed for muscle spasms, Disp: 210 tablet, Rfl: 3     topiramate (TOPAMAX) 25 MG tablet, Take 1 tablet (25 mg) by mouth daily (Patient not taking: No sig reported), Disp: , Rfl:      traZODone (DESYREL) 50 MG tablet, TAKE 1 TO 3 TABLETS(50  MG) BY MOUTH EVERY NIGHT AS NEEDED FOR SLEEP, Disp: 90 tablet, Rfl: 2     UNABLE TO FIND, MEDICATION NAME: medical cannibus, Disp: , Rfl:     Allergies   Allergen Reactions     Gabapentin Other (See Comments)     Patient states she gets \"horrific nightmares\" with this medication     Dilaudid [Hydromorphone Hcl]      Made her feel like her skin was crawling     Nsaids Other (See Comments)     Kidney failure     Compazine [Prochlorperazine] Other (See Comments)     \"Makes my skin crawl, I was going nuts.\"       Social History     Tobacco Use     Smoking status: Former Smoker     Packs/day: 0.30     Quit date: 2015     Years since quittin.1     Smokeless tobacco: Never Used     Tobacco comment: recreational   Vaping Use     " Vaping Use: Never used   Substance Use Topics     Alcohol use: Not Currently     Alcohol/week: 0.0 standard drinks     Drug use: Yes     Comment: medical marijuana       Family History   Problem Relation Age of Onset     C.A.D. Father 58         at 58, heart disease     Mental Illness Father      Coronary Artery Disease Father      Hyperlipidemia Father      Diverticulitis Father      Alzheimer Disease Mother         total care now     Depression Mother      Anxiety Disorder Mother      Diabetes Sister         adult onset     Melanoma Sister      Breast Cancer Sister      Thyroid Disease Sister      Diabetes Maternal Grandmother        Physical examination:  Well-developed, well-nourished and in no acute distress.  Alert and oriented to surroundings.  On examination of the right upper extremity:     Skin clean, dry and intact.  Tenderness to palpation at the thumb CMC and STT joint.  Pain with CMC grind.  Negative de Quervain's.  Nontender over the scaphoid, SL ligament, fovea.   Sensation:  Median: intact  Radial: intact  Ulnar: intact  Thumb opposition strength: intact  Interosseous strength: intact  Thenar atrophy: Not Present  Interosseous atrophy: Not Present  Tinel's over carpal tunnel: Not Present  Tinel's over cubital tunnel: Not Present  Phalen's: Negative  Carpal tunnel compression: Negative  Elbow flexion compression: Negative    Two point discrimination (mm):   Median: 6mm  Ulnar: 6 mm    On examination of the left upper extremity:     Skin clean, dry and intact.  Tenderness to palpation at the thumb CMC and STT joint.  Pain with CMC grind.  Negative de Quervain's.  Nontender over the scaphoid, SL ligament, fovea.   Sensation:  Median: intact  Radial: intact  Ulnar: intact  Thumb opposition strength: intact  Interosseous strength: intact  Thenar atrophy: Not Present  Interosseous atrophy: Not Present  Tinel's over carpal tunnel: Not Present  Tinel's over cubital tunnel: Not Present  Phalen's:  Negative  Carpal tunnel compression: Negative  Elbow flexion compression: Negative    Two point discrimination (mm):   Median: 6mm  Ulnar: 6 mm    EMG/NCS: EMG obtained on 5/24/2019 demonstrates very mild right sided carpal tunnel syndrome.     Imaging: 3 views of the bilateral thumbs and hands demonstrates severe thumb CMC arthritis bilaterally with bone spurring.    Assessment: 61 year old female with  bilateral severe CMC OA.    Plan:     We had a lengthy discussion about treatment options.  Patient has not had any prior conservative measures.  We will refer the patient for hand therapy for range of motion, strengthening, and orthoses.  Offered patient bilateral CMC injections as well.  She elected to proceed.  Following the right CMC injection, patient elected to see how it it helped and will contact us to proceed with the left injection.  Injection was done by Dr. Wild Perez.  Follow-up in an as-needed basis.    Procedure: Procedure completed by Dr. Wild Perez.  After written informed consent was obtained, the patient's right CMC was prepped with chloraprep.  1.5 mL of a mixture of 1cc 6mg/ mL betamethasone and 0.5 ml 1% lidocaine was injected into the right CMC.  There were no immediate complications.     SAM FARIA PA-C  Orthopaedic Surgery     Hand / Upper Extremity Injection/Arthrocentesis: R thumb CMC    Date/Time: 4/11/2022 12:18 PM  Performed by: Arnaldo Perez MD  Authorized by: Arnaldo Perez MD     Indications:  Pain  Needle Size:  25 G  Guidance: landmark    Approach:  Lateral  Condition: osteoarthritis    Location:  Thumb  Site:  R thumb CMC    Outcome:  Tolerated well, no immediate complications  Procedure discussed: discussed risks, benefits, and alternatives    Consent Given by:  Patient and spouse  Timeout: timeout called immediately prior to procedure    Prep: patient was prepped and draped in usual sterile fashion        [facility] [address]  [facility] [address]  [phone]  Dept: [phone]  ______________________________________________________________________________    Patient: Ilsa Yusuf   : 1960   MRN: [id number]   2022    INVASIVE PROCEDURE SAFETY CHECKLIST    Date: 2022   Procedure:Bilateral thumb CMC jt steroid injections   Patient Name: Ilsa Yusuf  MRN: [id number]  YOB: 1960    Action: Complete sections as appropriate. Any discrepancy results in a HARD COPY until resolved.     PRE PROCEDURE:  Patient ID verified with 2 identifiers (name and  or MRN): Yes  Procedure and site verified with patient/designee (when able): Yes  Accurate consent documentation in medical record: Yes  H&P (or appropriate assessment) documented in medical record: Yes  H&P must be up to 20 days prior to procedure and updates within 24 hours of procedure as applicable: Yes  Relevant diagnostic and radiology test results appropriately labeled and displayed as applicable: Yes  Procedure site(s) marked with provider initials: Yes    TIMEOUT:  Time-Out performed immediately prior to starting procedure, including verbal and active participation of all team members addressing the following:Yes  * Correct patient identify  * Confirmed that the correct side and site are marked  * An accurate procedure consent form  * Agreement on the procedure to be done  * Correct patient position  * Relevant images and results are properly labeled and appropriately displayed  * The need to administer antibiotics or fluids for irrigation purposes during the procedure as applicable   * Safety precautions based on patient history or medication use    DURING PROCEDURE: Verification of correct person, site, and procedures any time the responsibility for care of the patient is transferred to another member of the care team.       The following medications were given:         Prior to injection, verified patient identity  using patient's name and date of birth.  Due to injection administration, patient instructed to remain in clinic for 15 minutes  afterwards, and to report any adverse reaction to me immediately.      Medication Name: Celestone 6mg/ml 0.5 ml used on each side  NDC 5745-4495-62  Drug Amount Wasted:  Yes: 4 mg/ml   Vial/Syringe: Single dose vial  Expiration Date:  2/1/23    Medication Name Lidocaine 1%- 1 ml used per side  NDC 82512-234-12    Scribed by Rubi Pleitez ATC for Dr. Perez on April 11, 2022 at 12:23 pm based on the provider's statements to me.     Rubi Pleitez ATC

## 2022-04-11 NOTE — LETTER
4/11/2022         RE: Ilsa Yusuf  [address] [facility] [address]        Dear Colleague,    Thank you for referring your patient, Ilsa Yusuf, to the [facility] [place]. Please see a copy of my visit note below.    xdDate of Service: Apr 11, 2022    Chief Complaint:   Chief Complaint   Patient presents with     Consult     Bilateral CTS, EMG 5/24/19        History of Present Illness: Ilsa Yusuf is a 61 year old, right handed female who presents today for further evaluation of bilateral hand pain and weakness.  Patient states that for the past 6 months she has had progressively worsening bilateral hand/thumb pain that does radiate up into the forearms.  She states that for the past 3 to 4 months she has been wearing bilateral wrist braces.  She started wearing them just at night and now wears them all the time.  She states she has pain at rest, and pain worsens if she even bumps the thumb.  She states she is unable to do many activities that she previously enjoyed like sewing due to his severe bilateral pain.  Patient denies any numbness or tingling.  Patient denies night symptoms. She does note that she was diagnosed with bilateral carpal tunnel syndrome in 2019 via EMG.  She reports that she had been recommended to have surgery for her carpal tunnel but declined at that time.  Patient does have chronic neck pain due to herniated disks and is followed by [facility].     Review of Systems: A 14-point review of systems was obtained on intake and reviewed.      Past Medical History:   Diagnosis Date     Arthritis 2012    both knees; hands     Cervical high risk HPV (human papillomavirus) test positive 03/19/2019    see problem list     Depressive disorder      Depressive disorder, not elsewhere classified 08/28/12    DC 10/05/12-[facility]     Hyperlipidemia LDL goal < 130     mevacor     Hypothyroid      Moderate major depression (H)      abilify, cymbalta, seroquel, and nuvigil, Dr Antonio Yoon      ABDOUL (obstructive sleep apnea)      PTSD (post-traumatic stress disorder)      Seizure (H) 03/2011    one episode, was on Keppra, EEG negative       Past Surgical History:   Procedure Laterality Date     ABDOMEN SURGERY       BLADDER SURGERY       CHOLECYSTECTOMY       COLONOSCOPY  2012     CYSTOSCOPY       ORTHOPEDIC SURGERY  left knee     renal artery surgery  child    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     TONSILLECTOMY       ureteral reimplantation       ZZC PARTIAL EXCISION THYROID,UNILAT  1991    rt, negative path then, treated with synthroid.         Current Outpatient Medications:      albuterol (PROAIR HFA/PROVENTIL HFA/VENTOLIN HFA) 108 (90 Base) MCG/ACT inhaler, Inhale 1-2 puffs into the lungs every 4 hours as needed for shortness of breath / dyspnea or wheezing, Disp: 18 g, Rfl: 0     cholecalciferol (VITAMIN D3) 5000 UNITS CAPS capsule, Take 1 capsule (5,000 Units) by mouth daily Take 1 per day, Disp: 100 capsule, Rfl: 2     DULoxetine (CYMBALTA) 20 MG capsule, Take 1 capsule (20 mg) by mouth 2 times daily, Disp: 14 capsule, Rfl: 0     DULoxetine (CYMBALTA) 30 MG capsule, Take 1 capsule (30 mg) by mouth daily Per tapering schedule, Disp: 28 capsule, Rfl: 0     DULoxetine (CYMBALTA) 60 MG capsule, Take 2 capsules (120 mg) by mouth At Bedtime Only in evening, Disp: 180 capsule, Rfl: 1     escitalopram (LEXAPRO) 10 MG tablet, Take 1 tablet (10 mg) by mouth daily In addition to 5 mg for ttl: 15 mg/day, Disp: 30 tablet, Rfl: 1     escitalopram (LEXAPRO) 5 MG tablet, Take 1 tablet (5 mg) by mouth daily In addition to 10 mg for ttl: 15 mg/day, Disp: 30 tablet, Rfl: 1     estradiol (ESTRACE VAGINAL) 0.1 MG/GM vaginal cream, Apply small amount to the vaginal opening and urethra M, W, F @ h.s. (Patient not taking: No sig reported), Disp: 42.5 g, Rfl: 3     fluticasone (FLONASE) 50 MCG/ACT nasal spray, Spray 2 sprays into both  "nostrils 2 times daily, Disp: 48 g, Rfl: 1     hydrOXYzine (ATARAX) 10 MG tablet, Take 1-3 tablets (10-30 mg) by mouth every 6 hours as needed for anxiety, Disp: 30 tablet, Rfl: 0     levothyroxine (SYNTHROID/LEVOTHROID) 175 MCG tablet, Take 1 tablet (175 mcg) by mouth daily, Disp: 90 tablet, Rfl: 0     lovastatin (MEVACOR) 20 MG tablet, Take 1 tablet (20 mg) by mouth At Bedtime, Disp: 90 tablet, Rfl: 3     multivitamin, therapeutic with minerals (MULTI-VITAMIN) TABS, Take 1 tablet by mouth daily, Disp: , Rfl:      Omega-3 Fatty Acids (OMEGA 3 PO), Take 2 g by mouth daily Takes 1 in am and 1 at bedtime, Disp: , Rfl:      omeprazole (PRILOSEC) 40 MG DR capsule, Take 1 capsule (40 mg) by mouth daily, Disp: 90 capsule, Rfl: 1     ondansetron (ZOFRAN) 4 MG tablet, Take 1 tablet (4 mg) by mouth every 6 hours as needed for nausea (Patient not taking: No sig reported), Disp: 30 tablet, Rfl: 1     oxyCODONE IR (ROXICODONE) 10 MG tablet, TK 1 TO 2 TABLETS PO EVERY FOUR TO SIX HOURS AS NEEDED FOR PAIN MAX 6 TABLETS A DAY, Disp: , Rfl:      prazosin (MINIPRESS) 5 MG capsule, TAKE 1 CAPSULE(5 MG) BY MOUTH AT BEDTIME, Disp: 90 capsule, Rfl: 0     tiZANidine (ZANAFLEX) 4 MG tablet, Take 1-3 tablets (4-12 mg) by mouth 3 times daily as needed for muscle spasms, Disp: 210 tablet, Rfl: 3     topiramate (TOPAMAX) 25 MG tablet, Take 1 tablet (25 mg) by mouth daily (Patient not taking: No sig reported), Disp: , Rfl:      traZODone (DESYREL) 50 MG tablet, TAKE 1 TO 3 TABLETS(50  MG) BY MOUTH EVERY NIGHT AS NEEDED FOR SLEEP, Disp: 90 tablet, Rfl: 2     UNABLE TO FIND, MEDICATION NAME: medical cannibus, Disp: , Rfl:     Allergies   Allergen Reactions     Gabapentin Other (See Comments)     Patient states she gets \"horrific nightmares\" with this medication     Dilaudid [Hydromorphone Hcl]      Made her feel like her skin was crawling     Nsaids Other (See Comments)     Kidney failure     Compazine [Prochlorperazine] Other (See " "Comments)     \"Makes my skin crawl, I was going nuts.\"       Social History     Tobacco Use     Smoking status: Former Smoker     Packs/day: 0.30     Quit date: 2015     Years since quittin.1     Smokeless tobacco: Never Used     Tobacco comment: recreational   Vaping Use     Vaping Use: Never used   Substance Use Topics     Alcohol use: Not Currently     Alcohol/week: 0.0 standard drinks     Drug use: Yes     Comment: medical marijuana       Family History   Problem Relation Age of Onset     C.A.D. Father 58         at 58, heart disease     Mental Illness Father      Coronary Artery Disease Father      Hyperlipidemia Father      Diverticulitis Father      Alzheimer Disease Mother         total care now     Depression Mother      Anxiety Disorder Mother      Diabetes Sister         adult onset     Melanoma Sister      Breast Cancer Sister      Thyroid Disease Sister      Diabetes Maternal Grandmother        Physical examination:  Well-developed, well-nourished and in no acute distress.  Alert and oriented to surroundings.  On examination of the right upper extremity:     Skin clean, dry and intact.  Tenderness to palpation at the thumb CMC and STT joint.  Pain with CMC grind.  Negative de Quervain's.  Nontender over the scaphoid, SL ligament, fovea.   Sensation:  Median: intact  Radial: intact  Ulnar: intact  Thumb opposition strength: intact  Interosseous strength: intact  Thenar atrophy: Not Present  Interosseous atrophy: Not Present  Tinel's over carpal tunnel: Not Present  Tinel's over cubital tunnel: Not Present  Phalen's: Negative  Carpal tunnel compression: Negative  Elbow flexion compression: Negative    Two point discrimination (mm):   Median: 6mm  Ulnar: 6 mm    On examination of the left upper extremity:     Skin clean, dry and intact.  Tenderness to palpation at the thumb CMC and STT joint.  Pain with CMC grind.  Negative de Quervain's.  Nontender over the scaphoid, SL ligament, fovea. "   Sensation:  Median: intact  Radial: intact  Ulnar: intact  Thumb opposition strength: intact  Interosseous strength: intact  Thenar atrophy: Not Present  Interosseous atrophy: Not Present  Tinel's over carpal tunnel: Not Present  Tinel's over cubital tunnel: Not Present  Phalen's: Negative  Carpal tunnel compression: Negative  Elbow flexion compression: Negative    Two point discrimination (mm):   Median: 6mm  Ulnar: 6 mm    EMG/NCS: EMG obtained on 5/24/2019 demonstrates very mild right sided carpal tunnel syndrome.     Imaging: 3 views of the bilateral thumbs and hands demonstrates severe thumb CMC arthritis bilaterally with bone spurring.    Assessment: 61 year old female with  bilateral severe CMC OA.    Plan:     We had a lengthy discussion about treatment options.  Patient has not had any prior conservative measures.  We will refer the patient for hand therapy for range of motion, strengthening, and orthoses.  Offered patient bilateral CMC injections as well.  She elected to proceed.  Following the right CMC injection, patient elected to see how it it helped and will contact us to proceed with the left injection.  Injection was done by Dr. Wild Perez.  Follow-up in an as-needed basis.    Procedure: Procedure completed by Dr. Wild Perez.  After written informed consent was obtained, the patient's right CMC was prepped with chloraprep.  1.5 mL of a mixture of 1cc 6mg/ mL betamethasone and 0.5 ml 1% lidocaine was injected into the right CMC.  There were no immediate complications.     SAM FARIA PA-C  Orthopaedic Surgery     Hand / Upper Extremity Injection/Arthrocentesis: R thumb CMC    Date/Time: 4/11/2022 12:18 PM  Performed by: Arnaldo Perez MD  Authorized by: Arnaldo Perez MD     Indications:  Pain  Needle Size:  25 G  Guidance: landmark    Approach:  Lateral  Condition: osteoarthritis    Location:  Thumb  Site:  R thumb CMC    Outcome:  Tolerated well, no immediate complications  Procedure discussed: discussed  risks, benefits, and alternatives    Consent Given by:  Patient and spouse  Timeout: timeout called immediately prior to procedure    Prep: patient was prepped and draped in usual sterile fashion        [facility] [address] [id number] [phone]  Dept: [phone]  ______________________________________________________________________________    Patient: Ilsa Yusuf   : 1960   MRN: [id number]   2022    INVASIVE PROCEDURE SAFETY CHECKLIST    Date: 2022   Procedure:Bilateral thumb CMC jt steroid injections   Patient Name: Ilsa Yusuf  MRN: [id number]  YOB: 1960    Action: Complete sections as appropriate. Any discrepancy results in a HARD COPY until resolved.     PRE PROCEDURE:  Patient ID verified with 2 identifiers (name and  or MRN): Yes  Procedure and site verified with patient/designee (when able): Yes  Accurate consent documentation in medical record: Yes  H&P (or appropriate assessment) documented in medical record: Yes  H&P must be up to 20 days prior to procedure and updates within 24 hours of procedure as applicable: Yes  Relevant diagnostic and radiology test results appropriately labeled and displayed as applicable: Yes  Procedure site(s) marked with provider initials: Yes    TIMEOUT:  Time-Out performed immediately prior to starting procedure, including verbal and active participation of all team members addressing the following:Yes  * Correct patient identify  * Confirmed that the correct side and site are marked  * An accurate procedure consent form  * Agreement on the procedure to be done  * Correct patient position  * Relevant images and results are properly labeled and appropriately displayed  * The need to administer antibiotics or fluids for irrigation purposes during the procedure as applicable   * Safety precautions based on patient history or medication  use    DURING PROCEDURE: Verification of correct person, site, and procedures any time the responsibility for care of the patient is transferred to another member of the care team.       The following medications were given:         Prior to injection, verified patient identity using patient's name and date of birth.  Due to injection administration, patient instructed to remain in clinic for 15 minutes  afterwards, and to report any adverse reaction to me immediately.      Medication Name: Celestone 6mg/ml 0.5 ml used on each side  NDC 3641-4938-60  Drug Amount Wasted:  Yes: 4 mg/ml   Vial/Syringe: Single dose vial  Expiration Date:  2/1/23    Medication Name Lidocaine 1%- 1 ml used per side  NDC 90560-834-43    Scribed by Rubi Pleitez ATC for Dr. Perez on April 11, 2022 at 12:23 pm based on the provider's statements to me.     Rubi Pleitez ATC              Attestation signed by Arnaldo Perez MD at 4/13/2022 10:30 PM:  Ilsa Yusuf was seen and evaluated today as part of a shared visit. I reviewed her history, performed her exam, and reviewed her radiographs and prior EMG/NCS. My key exam findings include pain localized to the CMC joints, without evidence or symptoms consistent with carpal or cubital tunnel syndrome. Key management decisions made by me and carried out under my direction include referral to hand therapy for strengthening and splinting. I offered her bilateral CMC joint injections. I performed the right CMC injection which she tolerated well with some pain. She elected to defer the left for now and see how the right injection works first. Follow-up as needed.    Arnaldo Perez MD    Hand, Upper Extremity & Microvascular Surgery  Department of Orthopedic Surgery  [facility]

## 2022-04-11 NOTE — NURSING NOTE
Reason For Visit:   Chief Complaint   Patient presents with     Consult     Bilateral CTS, EMG 5/24/19        Primary MD: Crista Elder  Ref. MD: Dr. Sarai Elder     Age: 61 year old    ?  No      LMP 08/15/2016 (Approximate)       Pain Assessment  Patient Currently in Pain: Yes  0-10 Pain Scale: 7  Primary Pain Location: Wrist    Hand Dominance Evaluation  Hand Dominance: Right          QuickDASH Assessment  No flowsheet data found.       Current Outpatient Medications   Medication Sig Dispense Refill     albuterol (PROAIR HFA/PROVENTIL HFA/VENTOLIN HFA) 108 (90 Base) MCG/ACT inhaler Inhale 1-2 puffs into the lungs every 4 hours as needed for shortness of breath / dyspnea or wheezing 18 g 0     cholecalciferol (VITAMIN D3) 5000 UNITS CAPS capsule Take 1 capsule (5,000 Units) by mouth daily Take 1 per day 100 capsule 2     DULoxetine (CYMBALTA) 20 MG capsule Take 1 capsule (20 mg) by mouth 2 times daily 14 capsule 0     DULoxetine (CYMBALTA) 30 MG capsule Take 1 capsule (30 mg) by mouth daily Per tapering schedule 28 capsule 0     DULoxetine (CYMBALTA) 60 MG capsule Take 2 capsules (120 mg) by mouth At Bedtime Only in evening 180 capsule 1     escitalopram (LEXAPRO) 10 MG tablet Take 1 tablet (10 mg) by mouth daily In addition to 5 mg for ttl: 15 mg/day 30 tablet 1     escitalopram (LEXAPRO) 5 MG tablet Take 1 tablet (5 mg) by mouth daily In addition to 10 mg for ttl: 15 mg/day 30 tablet 1     estradiol (ESTRACE VAGINAL) 0.1 MG/GM vaginal cream Apply small amount to the vaginal opening and urethra M, W, F @ h.s. (Patient not taking: No sig reported) 42.5 g 3     fluticasone (FLONASE) 50 MCG/ACT nasal spray Spray 2 sprays into both nostrils 2 times daily 48 g 1     hydrOXYzine (ATARAX) 10 MG tablet Take 1-3 tablets (10-30 mg) by mouth every 6 hours as needed for anxiety 30 tablet 0     levothyroxine (SYNTHROID/LEVOTHROID) 175 MCG tablet Take 1 tablet (175 mcg) by mouth daily 90 tablet 0     lovastatin  "(MEVACOR) 20 MG tablet Take 1 tablet (20 mg) by mouth At Bedtime 90 tablet 3     multivitamin, therapeutic with minerals (MULTI-VITAMIN) TABS Take 1 tablet by mouth daily       Omega-3 Fatty Acids (OMEGA 3 PO) Take 2 g by mouth daily Takes 1 in am and 1 at bedtime       omeprazole (PRILOSEC) 40 MG DR capsule Take 1 capsule (40 mg) by mouth daily 90 capsule 1     ondansetron (ZOFRAN) 4 MG tablet Take 1 tablet (4 mg) by mouth every 6 hours as needed for nausea (Patient not taking: No sig reported) 30 tablet 1     oxyCODONE IR (ROXICODONE) 10 MG tablet TK 1 TO 2 TABLETS PO EVERY FOUR TO SIX HOURS AS NEEDED FOR PAIN MAX 6 TABLETS A DAY       prazosin (MINIPRESS) 5 MG capsule TAKE 1 CAPSULE(5 MG) BY MOUTH AT BEDTIME 90 capsule 0     tiZANidine (ZANAFLEX) 4 MG tablet Take 1-3 tablets (4-12 mg) by mouth 3 times daily as needed for muscle spasms 210 tablet 3     topiramate (TOPAMAX) 25 MG tablet Take 1 tablet (25 mg) by mouth daily (Patient not taking: No sig reported)       traZODone (DESYREL) 50 MG tablet TAKE 1 TO 3 TABLETS(50  MG) BY MOUTH EVERY NIGHT AS NEEDED FOR SLEEP 90 tablet 2     UNABLE TO FIND MEDICATION NAME: medical cannibus         Allergies   Allergen Reactions     Gabapentin Other (See Comments)     Patient states she gets \"horrific nightmares\" with this medication     Dilaudid [Hydromorphone Hcl]      Made her feel like her skin was crawling     Nsaids Other (See Comments)     Kidney failure     Compazine [Prochlorperazine] Other (See Comments)     \"Makes my skin crawl, I was going nuts.\"       Rubi Pleitez, ATC    "

## 2022-04-18 ENCOUNTER — THERAPY VISIT (OUTPATIENT)
Dept: OCCUPATIONAL THERAPY | Facility: CLINIC | Age: 62
End: 2022-04-18
Attending: PHYSICIAN ASSISTANT
Payer: COMMERCIAL

## 2022-04-18 DIAGNOSIS — M18.0 OSTEOARTHRITIS OF CARPOMETACARPAL (CMC) JOINT OF BOTH THUMBS: ICD-10-CM

## 2022-04-18 DIAGNOSIS — M79.645 BILATERAL THUMB PAIN: ICD-10-CM

## 2022-04-18 DIAGNOSIS — M79.644 BILATERAL THUMB PAIN: ICD-10-CM

## 2022-04-18 PROCEDURE — 97760 ORTHOTIC MGMT&TRAING 1ST ENC: CPT | Mod: GO | Performed by: OCCUPATIONAL THERAPIST

## 2022-04-18 PROCEDURE — 97165 OT EVAL LOW COMPLEX 30 MIN: CPT | Mod: GO | Performed by: OCCUPATIONAL THERAPIST

## 2022-04-18 PROCEDURE — 97110 THERAPEUTIC EXERCISES: CPT | Mod: GO | Performed by: OCCUPATIONAL THERAPIST

## 2022-04-18 NOTE — PROGRESS NOTES
Hand Therapy Initial Evaluation    Current Date:  2022    Diagnosis: B CMC OA  DOI: ~ongoing (2022 MD order date)    Subjective:  Ilsa Yusuf is a 61 year old female.    Patient reports symptoms of the bilateral thumbs which occurred due to gradual onset. Since onset symptoms are Gradually getting worse..    Past Medical History:   Diagnosis Date     Arthritis     both knees; hands     Cervical high risk HPV (human papillomavirus) test positive 2019    see problem list     Depressive disorder      Depressive disorder, not elsewhere classified 12    DC 10/05/12-[facility]     Hyperlipidemia LDL goal < 130     mevacor     Hypothyroid      Moderate major depression (H)     abilify, cymbalta, seroquel, and nuvigil, Dr Alvarez Persamy     Mumps      ABDOUL (obstructive sleep apnea)      PTSD (post-traumatic stress disorder)      Seizure (H) 2011    one episode, was on Keppra, EEG negative     Past Surgical History:   Procedure Laterality Date     ABDOMEN SURGERY       BLADDER SURGERY       CHOLECYSTECTOMY       COLONOSCOPY       CYSTOSCOPY       ORTHOPEDIC SURGERY  left knee     renal artery surgery  child    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     TONSILLECTOMY       ureteral reimplantation       ZZC PARTIAL EXCISION THYROID,UNILAT      rt, negative path then, treated with synthroid.     Current occupation is Retired -  ICU nurse  Job Tasks: Pushing, Pulling, Repetitive Tasks    Occupational Profile Information:  Right hand dominant  Prior functional level:  independent-shared household chores  Patient reports symptoms of pain, stiffness/loss of motion, weakness/loss of strength and edema  Special tests:  x-ray.    Previous treatment: Injection on R thumb performed 2022  Barriers include:none  Mobility: No difficulty  Transportation: drives  Currently not working due to being retired  Leisure activities/hobbies: riding horses, painting  Other: Pt is getting   soon    Functional Outcome Measure:   NT, revisit next visit    Objective:  Pain Level (Scale 0-10)   4/18/2022   At Rest 5/10   With Use 7-8/10     Pain Description  Date 4/18/2022   Location Hand, wrist, thumb   Pain Quality Aching   Frequency constant     Pain is worst  daytime   Exacerbated by  opening jars   Relieved by rest and none   Progression Worsening within the last 6 months     ROM  Thumb 4/18/2022 4/18/2022   AROM  (PROM) L R   MP 0/56 + 0/56 +   IP 0/75 + 0/75 +   RABD 39 43 ++   PABD 46 49   Kapandji Opposition Scale (0-10/10) 10 10     Thumb Observation/Appearance   - none  + mild    ++ moderate    +++ severe     4/18/2022   Shoulder deformity present over CMC R: ++  L: +   Edema over the CMC joint R: +  L: +   Noted collapse of MP into hyperextension during pinch R: +  L: +      Provocative Tests  Pain Report:  - none    + mild    ++ moderate    +++ severe     4/18/2022   AP Joint Laxity of Trapezium R: NT  L: NT   Crepitus present R: -  L: -   CMC Adduction Stress Test R: NT  L: NT   CMC Extension Stress Test R: NT  L: NT   Finkelstein's R: +  L: -   WHAT Test R: ++  L: ++       Strength   (Measured in pounds)  Pain Report: - none  + mild    ++ moderate    +++ severe    4/18/2022 4/18/2022   Trials R L   1  2  3 95 + 95 +   Average 95 + 95 +      Lat Pinch 4/18/2022 4/18/2022   Trials R L   1  2  3 17 ++ 19 +   Average 17 ++ 19 +     3 Pt Pinch 4/18/2022 4/18/2022   Trials R L   1  2  3 24 + 20 +   Average 24 + 20 +     Palpation   Pain Report:  - none    + mild    ++ moderate    +++ severe    4/18/2022   CMC Joint Line R: +  L: +   Thenar Eminence R: ++  L: ++   Web Space R: ++  L: ++   1st DC R: +  L: +   Radial Styloid R: +  L: +   FCR R: ++  L: ++     Assessment:  Patient presents with symptoms consistent with diagnosis of bilateral CMC thumb arthritis, with conservative intervention.    Patient's limitations or Problem List includes:  Pain and Tightness in musculature of the  bilateral wrist, hand and thumb which interferes with the patient's ability to perform Self Care Tasks, Sleep Patterns, Recreational Activities and Household Chores as compared to previous level of function.    Rehab Potential:  Excellent - Return to full activity, no limitations    Patient will benefit from skilled Occupational Therapy to increase stability of thumb and decrease pain to return to previous activity level and resume normal daily tasks and to reach their rehab potential.    Barriers to Learning:  No barrier    Communication Issues:  Patient appears to be able to clearly communicate and understand verbal and written communication and follow directions correctly.    Chart Review: Chart Review, Brief history including review of medical and/or therapy records relating to the presenting problem and Simple history review with patient    Identified Performance Deficits: bathing/showering, toileting, dressing, feeding, hygiene and grooming, health management and maintenance, home establishment and management, meal preparation and cleanup, shopping, sleep and leisure activities    Assessment of Occupational Performance:  5 or more Performance Deficits    Clinical Decision Making (Complexity): Low complexity    Treatment Explanation:  The following has been discussed with the patient:    RX ordered/plan of care  Anticipated outcomes  Possible risks and side effects    Plan:  Frequency:  1 X week, once daily  Duration:  for 6 weeks    Treatment Plan:   Modalities:  US, Fluidotherapy and Paraffin  Therapeutic Exercise:  AROM, AAROM, PROM, Tendon Gliding, Blocking, Reverse Blocking, Place and Hold, Contract Relax, Extensor Tracking, Isotonics, Isometrics and Stabilization   Neuromuscular re-education:  Nerve Gliding, Coordination/Dexterity, Sensory re-education, Desensitization, Kinesthetic Training, Proprioceptive Training, Posture, Kinesiotaping, Strain Counter Strain, Isometrics, Stabilization   Manual  Techniques:  Coordination/Dexterity, Joint mobilization, Scar mobilization, Friction massage, Myofascial release, Manual edema mobilization  Orthotic Fabrication:  Forearm based  Self Care:  Self Care Tasks, Ergonomic Considerations and Work Tasks    Discharge Plan:  Achieve all LTG  [place] in home treatment program.  Reach maximal therapeutic benefit.    Home Program:  Warmth  Ottobock splints  Chip clip webspace release    Next Visit:  Review/progress HEP  Discuss joint protection strategies  Consider use of adaptive equipment

## 2022-04-18 NOTE — PROGRESS NOTES
[facility]    OUTPATIENT Occupational Therapy ORTHOPEDIC EVALUATION  PLAN OF TREATMENT FOR OUTPATIENT REHABILITATION  (COMPLETE FOR INITIAL CLAIMS ONLY)  Patient's Last Name, First Name, M.I.  YOB: 1960  Ilsa Yusuf    Provider s Name:  VALERIE [facility]   Medical Record No.  [id number]   Start of Care Date:  04/18/22   Onset Date:   04/11/22 (MD order date)   Type:     ___PT   _X__OT Medical Diagnosis:    Encounter Diagnosis   Name Primary?    Osteoarthritis of carpometacarpal (CMC) joint of both thumbs         Treatment Diagnosis:  B CMC OA        Goals:     04/18/22 0500   Goal #1   Goal #1 household chores   Previous Performance Level Independent   Current Functional Task    Current Performance Level 8/10 pain gripping a jar   STG Target Perfomance Open a tight or new jar   STG Target Perform Level 6/10 pain   Due Date 05/16/22   LTG Target Task/Performance Uses Adaptive Equipment   Due Date 05/30/22       Therapy Frequency:  1x/week  Predicted Duration of Therapy Intervention:  6 weeks    Nany Hernandez OT                 I CERTIFY THE NEED FOR THESE SERVICES FURNISHED UNDER        THIS PLAN OF TREATMENT AND WHILE UNDER MY CARE     (Physician attestation of this document indicates review and certification of the therapy plan).                     Certification Date From:  04/18/22   Certification Date To:  05/30/22    Referring Provider:  Maryuri Apodaca    Initial Assessment        See Epic Evaluation SOC Date: 04/18/22

## 2022-04-19 ENCOUNTER — DOCUMENTATION ONLY (OUTPATIENT)
Dept: SLEEP MEDICINE | Facility: CLINIC | Age: 62
End: 2022-04-19
Payer: COMMERCIAL

## 2022-04-19 ENCOUNTER — MYC MEDICAL ADVICE (OUTPATIENT)
Dept: SLEEP MEDICINE | Facility: CLINIC | Age: 62
End: 2022-04-19
Payer: COMMERCIAL

## 2022-04-19 DIAGNOSIS — G47.33 OSA (OBSTRUCTIVE SLEEP APNEA): Primary | ICD-10-CM

## 2022-04-19 NOTE — PROGRESS NOTES
" 4/19/2022- KIERRA- [facility] TRANSFERRED CALL TO ME AND I ATTEMPTED TO EXPLAIN THAT THAT FV RECEIVES HUNDREDS OF OF CALL PER AND WE MAY ALWAYS GET BACK IN A TIMELY MANNER, BUT WE TRY TO GET BACK WITH IN 24 HOURS. AFTER LOOKING AT HER IN-HOUSE CALL IT APPEARS IT WAS ACCIDENTLY CLOSED, I APOLOGIZED TO HER AND SHE STATED SHE DOESN'T ACCEPT APOLOGY, SHE REQUESTED OUR VOICEMAIL BE CHANGED BECAUSE SHE WAS NOT CALLED IN TIMELY MANNER. I EXPLAINED THAT I CAN'T MAKE THAT DECISION, BUT WOULD BE HAPPY TO TALK TO MY MANAGER RE: ISSUE. I EXPLAINED I CAN HELP HER WITH CPAP ISSUE, I EXPLAINED THAT DME COORDINATORS MOSTLY HANDLE ALL CPAP CALLS AND RT'S HANDLE ADVANCED CPAP CALLS. SHE WAS UPSET THAT I WAS UNABLE TO GIVE HER \"SPECKS\" TO CPAP MACHINE AND WHEN IT WOULD STOP WORKING. SHE RECD ERROR MESSAGE STATING MOTOR LIFE WAS AT THE END OF IT'S LIFE. I WENT OVER 5 YR REPLACEMENT RULE WITH HER. I EXPLAINED THAT PER HER INS SHE IS ELIGIBLE FOR NEW MACHINE, SHE JUST NEW TO OBTAIN REPLACEMENT RX AND I THEN EXPLAINED WAITLIST PROCESS TO HER. SHE HAS ASKED FOR MANAGERS NAME AND I GAVE [phone] TO CALL HER.  "

## 2022-04-20 ENCOUNTER — TELEPHONE (OUTPATIENT)
Dept: SLEEP MEDICINE | Facility: CLINIC | Age: 62
End: 2022-04-20
Payer: COMMERCIAL

## 2022-04-20 NOTE — TELEPHONE ENCOUNTER
Received e-mail from a Respiratory Coordinator stating patient would like to speak with a supervisor. Spoke with patient regarding her concerns. Patient mentioned that she felt as if everyone she spoke with were just reading what they were suppose to say, but did not feel as if they had any answers. She mentioned that she was frustrated that the DME Coordinator could not tell her more information about her alert she is receiving on her CPAP machine. I let her know the CPAP machines have the motor life exceeded alert pop up around a certain amount of run hours, some are around 24,000 or so (give or take depending on the make/model of the CPAP). I let her know that it does not mean that her machine is currently having issues. I let her know that the Airsense 10 machine life expectancy states it is around 5 years. I let her know that she should keep an eye on her machine and if she feels as if it is not running properly, to contact [facility] to schedule a troubleshoot appointment. I let her know our team could hook the machine up to test the pressures and make sure her pressures are pushing out accurately. I let her know if something is going wrong or her machine stops working, to contact us right away. Patient understood. Patient asked me an off topic question as well regarding her significant other and her significant other was on the phone as well to give permission to speak with the two of them. I also spoke about the wait list, the CPAP shortage and Dr. Callahan's algorithm. Patient thanked me. Patient asked for my name and I gave her my name. I let her know if she needs anything or has any more questions to contact [facility] customer Service at [phone]. I let her know our hours are Monday through Friday, from 8:00am-4:30pm. Patient did not have any other questions at this time.     [facility]  Home Medical Equipment  [address]  [address] [place] [address]  Office: [phone]   Fax: [phone]

## 2022-04-26 ENCOUNTER — THERAPY VISIT (OUTPATIENT)
Dept: OCCUPATIONAL THERAPY | Facility: CLINIC | Age: 62
End: 2022-04-26
Attending: PHYSICIAN ASSISTANT
Payer: COMMERCIAL

## 2022-04-26 DIAGNOSIS — M79.644 BILATERAL THUMB PAIN: ICD-10-CM

## 2022-04-26 DIAGNOSIS — M18.0 OSTEOARTHRITIS OF CARPOMETACARPAL (CMC) JOINT OF BOTH THUMBS: Primary | ICD-10-CM

## 2022-04-26 DIAGNOSIS — M79.645 BILATERAL THUMB PAIN: ICD-10-CM

## 2022-04-26 PROCEDURE — 97112 NEUROMUSCULAR REEDUCATION: CPT | Mod: GO | Performed by: OCCUPATIONAL THERAPIST

## 2022-04-26 PROCEDURE — 97110 THERAPEUTIC EXERCISES: CPT | Mod: GO | Performed by: OCCUPATIONAL THERAPIST

## 2022-04-26 NOTE — PROGRESS NOTES
"SOAP note objective information for 2022.    Diagnosis: B CMC OA  DOI: ~ongoing (2022 MD order date)    Subjective:  Ilsa Yusuf is a 61 year old female.    Patient reports symptoms of the bilateral thumbs which occurred due to gradual onset. Since onset symptoms are Gradually getting worse.    Current occupation is Retired -  ICU nurse  Job Tasks: Pushing, Pulling, Repetitive Tasks    Functional Outcome Measure:   Upper Extremity Functional Index Score:  SCORE:   Column Totals: /80: 23   (A lower score indicates greater disability.)    Objective:  Pain Level (Scale 0-10)   2022   At Rest 5/10   With Use 7-8/10     Pain Description  Date 2022   Location Hand, wrist, thumb Dorsal hand, wrist, forearm, extensor wad   Pain Quality Aching Shooting, radiating. \"Wirey\" pain around circumferential wrists   Frequency constant   constant   Pain is worst  daytime NT   Exacerbated by  opening jars NT   Relieved by rest and none OTC wrist braces or ottobock orthoses   Progression Worsening within the last 6 months No change     ROM  Thumb 2022   AROM  (PROM) L R   MP 0/56 + 0/56 +   IP 0/75 + 0/75 +   RABD 39 43 ++   PABD 46 49   Kapandji Opposition Scale (0-10/10) 10 10     Thumb Observation/Appearance   - none  + mild    ++ moderate    +++ severe     2022   Shoulder deformity present over CMC R: ++  L: +   Edema over the CMC joint R: +  L: +   Noted collapse of MP into hyperextension during pinch R: +  L: +      Provocative Tests  Pain Report:  - none    + mild    ++ moderate    +++ severe     2022   AP Joint Laxity of Trapezium R: NT  L: NT   Crepitus present R: -  L: -   CMC Adduction Stress Test R: NT  L: NT   CMC Extension Stress Test R: NT  L: NT   Finkelstein's R: +  L: -   WHAT Test R: ++  L: ++       Strength   (Measured in pounds)  Pain Report: - none  + mild    ++ moderate    +++ severe    2022   Trials R L   1  2  3 95 + 95 + "   Average 95 + 95 +      Lat Pinch 4/18/2022 4/18/2022   Trials R L   1  2  3 17 ++ 19 +   Average 17 ++ 19 +     3 Pt Pinch 4/18/2022 4/18/2022   Trials R L   1  2  3 24 + 20 +   Average 24 + 20 +     Palpation   Pain Report:  - none    + mild    ++ moderate    +++ severe    4/18/2022   CMC Joint Line R: +  L: +   Thenar Eminence R: ++  L: ++   Web Space R: ++  L: ++   1st DC R: +  L: +   Radial Styloid R: +  L: +   FCR R: ++  L: ++     Please refer to the daily flowsheet for treatment today, total treatment time and time spent performing 1:1 timed codes.    Home Program:  Warmth  Ottobock splints  Chip clip webspace release    Next Visit:  Review/progress HEP  Discuss joint protection strategies  Consider use of adaptive equipment

## 2022-04-27 ENCOUNTER — ANCILLARY PROCEDURE (OUTPATIENT)
Dept: CARDIOLOGY | Facility: CLINIC | Age: 62
End: 2022-04-27
Attending: FAMILY MEDICINE
Payer: COMMERCIAL

## 2022-04-27 DIAGNOSIS — I10 PRIMARY HYPERTENSION: ICD-10-CM

## 2022-04-27 DIAGNOSIS — R42 DIZZINESS: ICD-10-CM

## 2022-04-27 LAB — LVEF ECHO: NORMAL

## 2022-04-29 ENCOUNTER — TELEPHONE (OUTPATIENT)
Dept: SLEEP MEDICINE | Facility: CLINIC | Age: 62
End: 2022-04-29
Payer: COMMERCIAL

## 2022-04-29 NOTE — TELEPHONE ENCOUNTER
LEFT MESSAGE FOR PATIENT TO RETURN CALL AND LET ME KNOW WHICH [facility] SHOWROOM SHE WOULD LIKE BE TO SETUP AT ONCE A MACHINE IS AVAIL FOR HER.

## 2022-05-04 NOTE — TELEPHONE ENCOUNTER
[id number]  [facility]  Mailing letter  msc      ----- Message from Edel Chase sent at 4/5/2022 10:41 AM CDT -----  PVR with nurse clinic  Cysto with CSF, bladder pain x 6+ months, urgency; UC prior per her protocol    TALA

## 2022-05-06 ENCOUNTER — TELEPHONE (OUTPATIENT)
Dept: OCCUPATIONAL THERAPY | Facility: CLINIC | Age: 62
End: 2022-05-06

## 2022-05-06 DIAGNOSIS — M79.644 BILATERAL THUMB PAIN: ICD-10-CM

## 2022-05-06 DIAGNOSIS — M79.645 BILATERAL THUMB PAIN: ICD-10-CM

## 2022-05-06 DIAGNOSIS — M18.0 OSTEOARTHRITIS OF CARPOMETACARPAL (CMC) JOINT OF BOTH THUMBS: Primary | ICD-10-CM

## 2022-05-06 NOTE — TELEPHONE ENCOUNTER
Spoke with pt regarding missed appointment today. Pt reported things have turned a corner and she exercises are helping to resolve the pain. Pt requested to cancel appointment on 5/16/2022. Agreeable to call if further appointments are necessary, but pt reports independent management currently.

## 2022-05-11 NOTE — RESULT ENCOUNTER NOTE
Your heart function is normal with an ejection fraction of 60 to 65% on this echocardiogram.  No valvular abnormalities are noted.  The concentric remodeling noted is a reaction in the heart muscle to elevated blood pressure. It is important to control your blood pressure.  Monitoring at home periodically is recommended if you are able to do that and follow up with me if BP is >140/90.    Please call or MyChart message me if you have any questions.      VIVIAN

## 2022-05-14 ENCOUNTER — HEALTH MAINTENANCE LETTER (OUTPATIENT)
Age: 62
End: 2022-05-14

## 2022-05-23 ENCOUNTER — MYC MEDICAL ADVICE (OUTPATIENT)
Dept: FAMILY MEDICINE | Facility: CLINIC | Age: 62
End: 2022-05-23
Payer: COMMERCIAL

## 2022-05-23 DIAGNOSIS — E03.9 ACQUIRED HYPOTHYROIDISM: Primary | ICD-10-CM

## 2022-05-23 NOTE — TELEPHONE ENCOUNTER
There are no future thyroid orders in currently. Please place orders and advise patient.    Dede Sloan RN [facility]

## 2022-05-24 ENCOUNTER — NURSE TRIAGE (OUTPATIENT)
Dept: FAMILY MEDICINE | Facility: CLINIC | Age: 62
End: 2022-05-24
Payer: COMMERCIAL

## 2022-05-24 NOTE — TELEPHONE ENCOUNTER
Patient called the clinic.  She reports developing a congestive, productive (thick green brown sputum) cough X 4 days. Slight shortness of breath with coughing, generalized weakness and increased fatigue.    Patient has been taking Mucinex daily X 3 days  No fever (temperature while on the phone: 97.2 degree)  Last Tylenol 1000mg at 9pm : 9pm 2 X 500mg.  Patient denies any other symptoms or concerns at this time.    Patient would like to schedule an appointment for further evaluation and treatment.  No appointments at [facility] today.  Patient does not want to schedule the next available appointment at a different clinic at this time.    Patient was instructed to call the [facility] scheduling line or use [facility] to schedule an appointment (same day appointment slot) after midnight (into Wednesday).    Patient was advised to call the clinic [phone] or seek medical assistance at the nearest ER or , if the symptoms persist or worsen before scheduling an appointment.    Reviewed managing symptoms with home interventions.  Patient verbalized understanding and has no further questions or concerns at this time.            Reason for Disposition    Patient wants to be seen    Additional Information    Negative: Bluish (or gray) lips or face    Negative: Severe difficulty breathing (e.g., struggling for each breath, speaks in single words)    Negative: Rapid onset of cough and has hives    Negative: Coughing started suddenly after medicine, an allergic food or bee sting    Negative: Difficulty breathing after exposure to flames, smoke, or fumes    Negative: Sounds like a life-threatening emergency to the triager    Negative: Previous asthma attacks and this feels like asthma attack    Negative: Chest pain present when not coughing    Negative: Difficulty breathing    Negative: Passed out (i.e., fainted, collapsed and was not responding)    Negative: Coughed up > 1 tablespoon (15 ml) blood (Exception:  blood-tinged sputum)    Negative: Fever > 103 F (39.4 C)    Negative: Fever > 101 F (38.3 C) and over 60 years of age    Negative: Fever > 100.0 F (37.8 C) and has diabetes mellitus or a weak immune system (e.g., HIV positive, cancer chemotherapy, organ transplant, splenectomy, chronic steroids)    Negative: Fever > 100.0 F (37.8 C) and bedridden (e.g., nursing home patient, stroke, chronic illness, recovering from surgery)    Negative: Increasing ankle swelling    Negative: Wheezing is present    Negative: SEVERE coughing spells (e.g., whooping sound after coughing, vomiting after coughing)    Negative: Coughing up hugo-colored (reddish-brown) or blood-tinged sputum    Negative: Fever present > 3 days (72 hours)    Negative: Fever returns after gone for over 24 hours and symptoms worse or not improved    Negative: Using nasal washes and pain medicine > 24 hours and sinus pain persists    Negative: Known COPD or other severe lung disease (i.e., bronchiectasis, cystic fibrosis, lung surgery) and worsening symptoms (i.e., increased sputum purulence or amount, increased breathing difficulty)    Negative: Continuous (nonstop) coughing interferes with work or school and no improvement using cough treatment per Care Advice    Protocols used: COUGH-A-OH

## 2022-05-24 NOTE — TELEPHONE ENCOUNTER
"  Additional Information    Negative: Patient sounds very sick or weak to the triager    Answer Assessment - Initial Assessment Questions  1. ONSET: \"When did the cough begin?\"       X 4 days  2. SEVERITY: \"How bad is the cough today?\"       Congestive and productive (green and brown)  3. RESPIRATORY DISTRESS: \"Describe your breathing.\"       Occasional slight shortness of breath with coughing  4. FEVER: \"Do you have a fever?\" If so, ask: \"What is your temperature, how was it measured, and when did it start?\"      97.2 degrees  5. HEMOPTYSIS: \"Are you coughing up any blood?\" If so ask: \"How much?\" (flecks, streaks, tablespoons, etc.)      No  6. TREATMENT: \"What have you done so far to treat the cough?\" (e.g., meds, fluids, humidifier)      yes  7. CARDIAC HISTORY: \"Do you have any history of heart disease?\" (e.g., heart attack, congestive heart failure)       No  8. LUNG HISTORY: \"Do you have any history of lung disease?\"  (e.g., pulmonary embolus, asthma, emphysema)      No  9. PE RISK FACTORS: \"Do you have a history of blood clots?\" (or: recent major surgery, recent prolonged travel, bedridden)      No  10. OTHER SYMPTOMS: \"Do you have any other symptoms? (e.g., runny nose, wheezing, chest pain)        No  12. TRAVEL: \"Have you traveled out of the country in the last month?\" (e.g., travel history, exposures)        No    Protocols used: COUGH-A-OH    "

## 2022-05-25 ENCOUNTER — TELEPHONE (OUTPATIENT)
Dept: FAMILY MEDICINE | Facility: CLINIC | Age: 62
End: 2022-05-25

## 2022-05-25 ENCOUNTER — OFFICE VISIT (OUTPATIENT)
Dept: FAMILY MEDICINE | Facility: CLINIC | Age: 62
End: 2022-05-25
Payer: COMMERCIAL

## 2022-05-25 ENCOUNTER — ANCILLARY PROCEDURE (OUTPATIENT)
Dept: GENERAL RADIOLOGY | Facility: CLINIC | Age: 62
End: 2022-05-25
Attending: PHYSICIAN ASSISTANT
Payer: COMMERCIAL

## 2022-05-25 VITALS
BODY MASS INDEX: 33.33 KG/M2 | HEIGHT: 69 IN | SYSTOLIC BLOOD PRESSURE: 112 MMHG | RESPIRATION RATE: 24 BRPM | HEART RATE: 71 BPM | WEIGHT: 225 LBS | OXYGEN SATURATION: 96 % | DIASTOLIC BLOOD PRESSURE: 70 MMHG | TEMPERATURE: 97.2 F

## 2022-05-25 DIAGNOSIS — E03.9 ACQUIRED HYPOTHYROIDISM: ICD-10-CM

## 2022-05-25 DIAGNOSIS — J20.9 ACUTE BRONCHITIS, UNSPECIFIED ORGANISM: ICD-10-CM

## 2022-05-25 DIAGNOSIS — R05.9 COUGH: ICD-10-CM

## 2022-05-25 DIAGNOSIS — R06.02 SOB (SHORTNESS OF BREATH): ICD-10-CM

## 2022-05-25 DIAGNOSIS — U07.1 INFECTION DUE TO 2019 NOVEL CORONAVIRUS: Primary | ICD-10-CM

## 2022-05-25 LAB
BASOPHILS # BLD AUTO: 0.1 10E3/UL (ref 0–0.2)
BASOPHILS NFR BLD AUTO: 1 %
D DIMER PPP FEU-MCNC: <0.27 UG/ML FEU (ref 0–0.5)
DEPRECATED S PYO AG THROAT QL EIA: NEGATIVE
EOSINOPHIL # BLD AUTO: 0.1 10E3/UL (ref 0–0.7)
EOSINOPHIL NFR BLD AUTO: 1 %
ERYTHROCYTE [DISTWIDTH] IN BLOOD BY AUTOMATED COUNT: 12.9 % (ref 10–15)
GROUP A STREP BY PCR: NOT DETECTED
HCT VFR BLD AUTO: 48 % (ref 35–47)
HGB BLD-MCNC: 15.8 G/DL (ref 11.7–15.7)
IMM GRANULOCYTES # BLD: 0 10E3/UL
IMM GRANULOCYTES NFR BLD: 0 %
LYMPHOCYTES # BLD AUTO: 3.1 10E3/UL (ref 0.8–5.3)
LYMPHOCYTES NFR BLD AUTO: 35 %
MCH RBC QN AUTO: 29.7 PG (ref 26.5–33)
MCHC RBC AUTO-ENTMCNC: 32.9 G/DL (ref 31.5–36.5)
MCV RBC AUTO: 90 FL (ref 78–100)
MONOCYTES # BLD AUTO: 0.6 10E3/UL (ref 0–1.3)
MONOCYTES NFR BLD AUTO: 7 %
NEUTROPHILS # BLD AUTO: 4.9 10E3/UL (ref 1.6–8.3)
NEUTROPHILS NFR BLD AUTO: 56 %
PLATELET # BLD AUTO: 237 10E3/UL (ref 150–450)
RBC # BLD AUTO: 5.32 10E6/UL (ref 3.8–5.2)
TSH SERPL DL<=0.005 MIU/L-ACNC: 0.58 MU/L (ref 0.4–4)
WBC # BLD AUTO: 8.7 10E3/UL (ref 4–11)

## 2022-05-25 PROCEDURE — 36415 COLL VENOUS BLD VENIPUNCTURE: CPT | Performed by: PHYSICIAN ASSISTANT

## 2022-05-25 PROCEDURE — 84443 ASSAY THYROID STIM HORMONE: CPT | Performed by: PHYSICIAN ASSISTANT

## 2022-05-25 PROCEDURE — U0005 INFEC AGEN DETEC AMPLI PROBE: HCPCS | Performed by: PHYSICIAN ASSISTANT

## 2022-05-25 PROCEDURE — 85025 COMPLETE CBC W/AUTO DIFF WBC: CPT | Performed by: PHYSICIAN ASSISTANT

## 2022-05-25 PROCEDURE — 87651 STREP A DNA AMP PROBE: CPT | Performed by: PHYSICIAN ASSISTANT

## 2022-05-25 PROCEDURE — 71046 X-RAY EXAM CHEST 2 VIEWS: CPT | Mod: TC | Performed by: FAMILY MEDICINE

## 2022-05-25 PROCEDURE — 85379 FIBRIN DEGRADATION QUANT: CPT | Performed by: PHYSICIAN ASSISTANT

## 2022-05-25 PROCEDURE — 99214 OFFICE O/P EST MOD 30 MIN: CPT | Mod: CS | Performed by: PHYSICIAN ASSISTANT

## 2022-05-25 PROCEDURE — U0003 INFECTIOUS AGENT DETECTION BY NUCLEIC ACID (DNA OR RNA); SEVERE ACUTE RESPIRATORY SYNDROME CORONAVIRUS 2 (SARS-COV-2) (CORONAVIRUS DISEASE [COVID-19]), AMPLIFIED PROBE TECHNIQUE, MAKING USE OF HIGH THROUGHPUT TECHNOLOGIES AS DESCRIBED BY CMS-2020-01-R: HCPCS | Performed by: PHYSICIAN ASSISTANT

## 2022-05-25 RX ORDER — AZITHROMYCIN 250 MG/1
TABLET, FILM COATED ORAL
Qty: 6 TABLET | Refills: 0 | Status: SHIPPED | OUTPATIENT
Start: 2022-05-25 | End: 2022-05-30

## 2022-05-25 ASSESSMENT — PATIENT HEALTH QUESTIONNAIRE - PHQ9
SUM OF ALL RESPONSES TO PHQ QUESTIONS 1-9: 12
10. IF YOU CHECKED OFF ANY PROBLEMS, HOW DIFFICULT HAVE THESE PROBLEMS MADE IT FOR YOU TO DO YOUR WORK, TAKE CARE OF THINGS AT HOME, OR GET ALONG WITH OTHER PEOPLE: EXTREMELY DIFFICULT
SUM OF ALL RESPONSES TO PHQ QUESTIONS 1-9: 12

## 2022-05-25 ASSESSMENT — PAIN SCALES - GENERAL: PAINLEVEL: MODERATE PAIN (4)

## 2022-05-25 NOTE — RESULT ENCOUNTER NOTE
Dear Deanne  You do not have a blood clot to the lung.  Your blood counts are comparable to previous.  As we reviewed on the phone I may not have the covid test results back in time for the antiviral medications.  I do recommend a quick covid test so that we may start antivirals if able.  Please call or MyChart my office with any questions or concerns.   Vee Tao, PAC

## 2022-05-25 NOTE — PATIENT INSTRUCTIONS
"Please schedule a fasting lab appointment (nothing to eat or drink 9 hours prior except water and/or your medications) along with your physical at your earliest convenience.      Take zithromax daily for five days- follow up with us if symptoms  not improving over the next 3-4 days  I will notify you of lab results via [employer]         NON PRESCRIPTION TREATMENT    Mucinex 600 mg 2 tabs twice a day   Increase humidity to 30-40% in bedroom at night - vaporizer  Avoid decongestant  Saline nasal spray as needed  Increase fluid intake  Benadryl 25mg 1/2 - 1 hour before bed time  Maintain 8 hr minimum of sleep at night  Robitussin DM cough gels for cough         Discharge Instructions for COVID-19 Patients  You have--or may have--COVID-19. Please follow the instructions listed below.   If you have a weakened immune system, discuss with your doctor any other actions you need to take.  How can I protect others?  If you have symptoms (fever, cough, body aches or trouble breathing):  Stay home and away from others (self-isolate) until:  Your other symptoms have resolved (gotten better). And   You've had no fever--and no medicine that reduces fever--for 1 full day (24 hours). And   At least 10 days have passed since your symptoms started. (You may need to wait 20 days. Follow the advice of your care team.)  If you don't show symptoms, but testing showed that you have COVID-19:  Stay home and away from others (self-isolate) until at least 10 days have passed since the date of your first positive COVID-19 test.  During this time  Stay in your own room, even for meals. Use your own bathroom if you can.  Stay away from others in your home. No hugging, kissing or shaking hands. No visitors.  Don't go to work, school or anywhere else.  Clean \"high touch\" surfaces often (doorknobs, counters, handles). Use household cleaning spray or wipes.  You'll find a full list of  on the EPA website: " www.epa.gov/pesticide-registration/list-n-disinfectants-use-against-sars-cov-2.  Cover your mouth and nose with a mask or other face covering to avoid spreading germs.  Wash your hands and face often. Use soap and water.  Caregivers in these groups are at risk for severe illness due to COVID-19:  People 65 years and older  People who live in a nursing home or long-term care facility  People with chronic disease (lung, heart, cancer, diabetes, kidney, liver, immunologic)  People who have a weakened immune system, including those who:  Are in cancer treatment  Take medicine that weakens the immune system, such as corticosteroids  Had a bone marrow or organ transplant  Have an immune deficiency  Have poorly controlled HIV or AIDS  Are obese (body mass index of 40 or higher)  Smoke regularly  Caregivers should wear gloves while washing dishes, handling laundry and cleaning bedrooms and bathrooms.  Use caution when washing and drying laundry: Don't shake dirty laundry and use the warmest water setting that you can.  For more tips on managing your health at home, go to www.cdc.gov/coronavirus/2019-ncov/downloads/10Things.pdf.  How can I take care of myself at home?  Get lots of rest. Drink extra fluids (unless a doctor has told you not to).  Take Tylenol (acetaminophen) for fever or pain. If you have liver or kidney problems, ask your family doctor if it's okay to take Tylenol.   Adults can take either:   650 mg (two 325 mg pills) every 4 to 6 hours, or   1,000 mg (two 500 mg pills) every 8 hours as needed.  Note: Don't take more than 3,000 mg in one day. Acetaminophen is found in many medicines (both prescribed and over-the-counter medicines). Read all labels to be sure you don't take too much.   For children, check the Tylenol bottle for the right dose. The dose is based on the child's age or weight.  If you have other health problems (like cancer, heart failure, an organ transplant or severe kidney disease): Call your  specialty clinic if you don't feel better in the next 2 days.  Know when to call 911. Emergency warning signs include:  Trouble breathing or shortness of breath  Pain or pressure in the chest that doesn't go away  Feeling confused like you haven't felt before, or not being able to wake up  Bluish-colored lips or face  Your doctor may have prescribed a blood thinner medicine. Follow their instructions.  Where can I get more information?  [facility] - About COVID-19:   [email or web address]  CDC - What to Do If You're Sick: www.cdc.gov/coronavirus/2019-ncov/about/steps-when-sick.html  CDC - Ending Home Isolation: www.cdc.gov/coronavirus/2019-ncov/hcp/disposition-in-home-patients.html  CDC - Caring for Someone: www.cdc.gov/coronavirus/2019-ncov/if-you-are-sick/care-for-someone.html  [facility] - Interim Guidance for Hospital Discharge to Home: www.[facility].[place]./diseases/coronavirus/hcp/hospdischarge.pdf  Below are the COVID-19 hotlines at the [facility] ([facility]). Interpreters are available.  For health questions: Call 852-331-5557 or 1-862.585.1742 (7 a.m. to 7 p.m.)  For questions about schools and childcare: Call 299-067-4284 or 1-598.693.8250 (7 a.m. to 7 p.m.)    For informational purposes only. Not to replace the advice of your health care provider. Clinically reviewed by Dr. Aki Schultz.   Copyright   2020 [employer]. All rights reserved. [employer] [id number] - REV 01/05/21.

## 2022-05-25 NOTE — RESULT ENCOUNTER NOTE
Dear Deanne   Your strep test was negative.   Please call or MyChart my office with any questions or concerns.   Vee Tao, PAC

## 2022-05-25 NOTE — TELEPHONE ENCOUNTER
Please call and advise that I am a bit worried that I will not have the covid test back soon enough to be in the time frame for antivirals given that they need to be started five days within onset of symptoms.  I recommend having a covid test at a local pharmacy tonight.  We can have IV monoclonal antibodies within 7 days but only if she meets criteria and I am not sure that she will.  If positive she does meet criteria for covid antivirals

## 2022-05-25 NOTE — TELEPHONE ENCOUNTER
Patient has appointment 5/25/22 at 2:40pm with Vee Tao PA-C.     Marisa Reyes RN, BSN  [facility]

## 2022-05-25 NOTE — TELEPHONE ENCOUNTER
Provider Response to 2nd Level Triage Request    I have reviewed the RN documentation. My recommendation is:  Face To Face Visit. Next Day: to be seen by another provider in same service line- I am not in office on 5/25/22 - assist with scheduling with provider in office.

## 2022-05-25 NOTE — PROGRESS NOTES
"  Assessment & Plan     Infection due to 2019 novel coronavirus  After appointment - called and advised patient I recommend a home covid test since I may not have results before she is out of 5 day window for antivirals.  She did a home test and positive and wrote prescription for paxlovid and reviewed drug interactions.  See phone note.  Later PCR test came back negative??  But symptoms and signs suggest of covid and positive rapid test   - nirmatrelvir and ritonavir (PAXLOVID) therapy pack  Dispense: 30 each; Refill: 0    Cough  Normal chest xray. Negative strep testing  - Symptomatic; Yes; 5/21/2022 COVID-19 Virus (Coronavirus) by PCR Nose  - Streptococcus A Rapid Screen w/Reflex to PCR - Clinic Collect  - Symptomatic; Yes; 5/21/2022 COVID-19 Virus (Coronavirus) by PCR Nose  - XR Chest 2 Views  - CBC with platelets and differential  - D dimer, quantitative  - Group A Streptococcus PCR Throat Swab  - D dimer, quantitative  - CBC with platelets and differential    SOB (shortness of breath)  Normal chest xray,, no hypoxia or tachypnea  But given shortness of breath obtained ddimer to rule out PE and negative   Not anemic   - CBC with platelets and differential  - D dimer, quantitative  - D dimer, quantitative  - CBC with platelets and differential    Acute bronchitis, unspecified organism  Before had covid results treated with zithromax   - azithromycin (ZITHROMAX) 250 MG tablet  Dispense: 6 tablet; Refill: 0    Acquired hypothyroidism  Euthyroid at current dose of levothyroxine 175 mcg daily  - TSH with free T4 reflex      Review of the result(s) of each unique test - ddimer, cbc,   Ordering of each unique test  Prescription drug management         BMI:   Estimated body mass index is 33.47 kg/m  as calculated from the following:    Height as of this encounter: 1.746 m (5' 8.75\").    Weight as of this encounter: 102.1 kg (225 lb).   Weight management plan: Discussed healthy diet and exercise guidelines    Patient " "Instructions   Please schedule a fasting lab appointment (nothing to eat or drink 9 hours prior except water and/or your medications) along with your physical at your earliest convenience.      Take zithromax daily for five days- follow up with us if symptoms  not improving over the next 3-4 days  I will notify you of lab results via [employer]         NON PRESCRIPTION TREATMENT    Mucinex 600 mg 2 tabs twice a day   Increase humidity to 30-40% in bedroom at night - vaporizer  Avoid decongestant  Saline nasal spray as needed  Increase fluid intake  Benadryl 25mg 1/2 - 1 hour before bed time  Maintain 8 hr minimum of sleep at night  Robitussin DM cough gels for cough         Discharge Instructions for COVID-19 Patients  You have--or may have--COVID-19. Please follow the instructions listed below.   If you have a weakened immune system, discuss with your doctor any other actions you need to take.  How can I protect others?  If you have symptoms (fever, cough, body aches or trouble breathing):  1. Stay home and away from others (self-isolate) until:  ? Your other symptoms have resolved (gotten better). And   ? You've had no fever--and no medicine that reduces fever--for 1 full day (24 hours). And   ? At least 10 days have passed since your symptoms started. (You may need to wait 20 days. Follow the advice of your care team.)  If you don't show symptoms, but testing showed that you have COVID-19:    Stay home and away from others (self-isolate) until at least 10 days have passed since the date of your first positive COVID-19 test.  During this time  1. Stay in your own room, even for meals. Use your own bathroom if you can.  2. Stay away from others in your home. No hugging, kissing or shaking hands. No visitors.  3. Don't go to work, school or anywhere else.  4. Clean \"high touch\" surfaces often (doorknobs, counters, handles). Use household cleaning spray or wipes.  5. You'll find a full list of  on the EPA " website: www.epa.gov/pesticide-registration/list-n-disinfectants-use-against-sars-cov-2.  6. Cover your mouth and nose with a mask or other face covering to avoid spreading germs.  7. Wash your hands and face often. Use soap and water.  8. Caregivers in these groups are at risk for severe illness due to COVID-19:  1. People 65 years and older  2. People who live in a nursing home or long-term care facility  3. People with chronic disease (lung, heart, cancer, diabetes, kidney, liver, immunologic)  4. People who have a weakened immune system, including those who:    Are in cancer treatment    Take medicine that weakens the immune system, such as corticosteroids    Had a bone marrow or organ transplant    Have an immune deficiency    Have poorly controlled HIV or AIDS    Are obese (body mass index of 40 or higher)    Smoke regularly  9. Caregivers should wear gloves while washing dishes, handling laundry and cleaning bedrooms and bathrooms.  10. Use caution when washing and drying laundry: Don't shake dirty laundry and use the warmest water setting that you can.  11. For more tips on managing your health at home, go to www.cdc.gov/coronavirus/2019-ncov/downloads/10Things.pdf.  How can I take care of myself at home?  1. Get lots of rest. Drink extra fluids (unless a doctor has told you not to).  2. Take Tylenol (acetaminophen) for fever or pain. If you have liver or kidney problems, ask your family doctor if it's okay to take Tylenol.   Adults can take either:   ? 650 mg (two 325 mg pills) every 4 to 6 hours, or   ? 1,000 mg (two 500 mg pills) every 8 hours as needed.  ? Note: Don't take more than 3,000 mg in one day. Acetaminophen is found in many medicines (both prescribed and over-the-counter medicines). Read all labels to be sure you don't take too much.   For children, check the Tylenol bottle for the right dose. The dose is based on the child's age or weight.  3. If you have other health problems (like cancer,  heart failure, an organ transplant or severe kidney disease): Call your specialty clinic if you don't feel better in the next 2 days.  4. Know when to call 911. Emergency warning signs include:  ? Trouble breathing or shortness of breath  ? Pain or pressure in the chest that doesn't go away  ? Feeling confused like you haven't felt before, or not being able to wake up  ? Bluish-colored lips or face  5. Your doctor may have prescribed a blood thinner medicine. Follow their instructions.  Where can I get more information?  1. [facility] - About COVID-19:   [email or web address]  2. [facility] - What to Do If You're Sick: www.cdc.gov/coronavirus/2019-ncov/about/steps-when-sick.html  3. CDC - Ending Home Isolation: www.cdc.gov/coronavirus/2019-ncov/hcp/disposition-in-home-patients.html  4. [facility] - Caring for Someone: www.cdc.gov/coronavirus/2019-ncov/if-you-are-sick/care-for-someone.html  5. [facility] - Interim Guidance for Hospital Discharge to Home: www.health.[facility].mn.us/diseases/coronavirus/hcp/hospdischarge.pdf  6. Below are the COVID-19 hotlines at the [facility] of Health ([facility]). Interpreters are available.  ? For health questions: Call 220-245-8553 or 1-941.485.1368 (7 a.m. to 7 p.m.)  ? For questions about schools and childcare: Call 250-487-3174 or 1-598.434.7768 (7 a.m. to 7 p.m.)    For informational purposes only. Not to replace the advice of your health care provider. Clinically reviewed by Dr. Aki Schultz.   Copyright   2020 [facility]. All rights reserved. [employer] 807821 - REV 01/05/21.         Return in about 4 weeks (around 6/22/2022), or if symptoms worsen or fail to improve, for in person.    DONA [facility] ANDRZEJ Alicea is a 61 year old who presents for the following health issues     History of Present Illness       Reason for visit:  Coughing up tan/green thick phlegm, sore throat,feeling terrible, my  chest feels sore, headaches.  Symptom onset:  3-7 days ago  Symptoms include:  Coughing up thick phlegm,sore throat,headache,chest feels heavy and sore.  Symptom intensity:  Moderate  Symptom progression:  Staying the same  Had these symptoms before:  Yes  Has tried/received treatment for these symptoms:  Yes  Previous treatment was successful:  Yes    She eats 2-3 servings of fruits and vegetables daily.She consumes 0 sweetened beverage(s) daily.She exercises with enough effort to increase her heart rate 20 to 29 minutes per day.  She exercises with enough effort to increase her heart rate 5 days per week.   She is taking medications regularly.    Today's PHQ-9         PHQ-9 Total Score: 12    PHQ-9 Q9 Thoughts of better off dead/self-harm past 2 weeks :   Not at all    How difficult have these problems made it for you to do your work, take care of things at home, or get along with other people: Extremely difficult     Reports that she is not at risk for covid- barely goes anywhere and wears N95 mask when goes to [employer] and uses pharmacy drive through  Cough productive with Intense thick tan green sputum   Chest feels heavy and achey and tgt   On Oxycodone followed by [facility] pain clinic  Herniated discs in 4places   Feels shortness of breath   Shortness of breath since coughing up little before that   No fever.   Has Body aches. No diarrhea.  Stool is yellow - new - no melena, no hematochezia  Feels bloated - has planned to follow up with gastroenterologist in the future   Symptoms since night of 5/21/22  Coughing up and chest heavy  Feels poisoned - did order radon kit   Retired NICU nurse.    Headache with Temples and inward pressure   Started mucinex on 21 and helpful  No troubles urinating  Drinking a lot of Water, green tea with honey and tania powder            Review of Systems   Constitutional, HEENT, cardiovascular, pulmonary, gi and gu systems are negative, except as otherwise noted.      Objective   "  /70   Pulse 71   Temp 97.2  F (36.2  C)   Resp 24   Ht 1.746 m (5' 8.75\")   Wt 102.1 kg (225 lb)   LMP 08/15/2016 (Approximate)   SpO2 96%   BMI 33.47 kg/m    Body mass index is 33.47 kg/m .  Physical Exam   GENERAL: alert, no distress, obese and fatigued  EYES: Eyes grossly normal to inspection, PERRL and conjunctivae and sclerae normal  HENT: ear canals and TM's normal, nose and mouth without ulcers or lesions  NECK: no adenopathy, no asymmetry, masses, or scars and thyroid normal to palpation  RESP: lungs clear to auscultation - no rales, rhonchi or wheezes  CV: regular rate and rhythm, normal S1 S2, no S3 or S4, no murmur, click or rub, no peripheral edema and peripheral pulses strong  MS: no gross musculoskeletal defects noted, no edema  PSYCH: mentation appears normal, affect normal/bright    Results for orders placed or performed in visit on 05/25/22   XR Chest 2 Views     Status: None    Narrative    XR CHEST 2 VW 5/25/2022 3:16 PM    HISTORY: Cough    COMPARISON: 6/28/2019      Impression    IMPRESSION: No focal infiltrate, pleural effusion or pneumothorax. The  cardiac and mediastinal silhouettes are within normal limits..    KENA BETTS MD         SYSTEM ID:  [id number]   Results for orders placed or performed in visit on 05/25/22   Symptomatic; Yes; 5/21/2022 COVID-19 Virus (Coronavirus) by PCR Nose     Status: Normal    Specimen: Nose; Swab   Result Value Ref Range    SARS CoV2 PCR Negative Negative, Testing sent to reference lab. Results will be returned via unsolicited result    Narrative    Testing was performed using the shabnam SARS-CoV-2 assay on the [employer]abnam  [employer] System. This test should be ordered for the detection of  SARS-CoV-2 in individuals who meet SARS-CoV-2 clinical and/or  epidemiological criteria. Test performance is unknown in asymptomatic  patients. This test is for in vitro diagnostic use under the FDA EUA  for laboratories certified under CLIA to perform high and/or " moderate  complexity testing. This test has not been FDA cleared or approved. A  negative result does not rule out the presence of PCR inhibitors in  the specimen or target RNA in concentration below the limit of  detection for the assay. The possibility of a false negative should  be considered if the patient's recent exposure or clinical  presentation suggests COVID-19. This test was validated by the [facility]. This  laboratory is certified under the Clinical Laboratory Improvement  Amendments of 1988 (CLIA-88) as qualified to perform high and/or  moderate complexity laboratory testing.   D dimer, quantitative     Status: Normal   Result Value Ref Range    D-Dimer Quantitative <0.27 0.00 - 0.50 ug/mL FEU    Narrative    This D-dimer assay is intended for use in conjunction with a clinical pretest probability assessment model to exclude pulmonary embolism (PE) and deep venous thrombosis (DVT) in outpatients suspected of PE or DVT. The cut-off value is 0.50 ug/mL FEU.   TSH with free T4 reflex     Status: Normal   Result Value Ref Range    TSH 0.58 0.40 - 4.00 mU/L   CBC with platelets and differential     Status: Abnormal   Result Value Ref Range    WBC Count 8.7 4.0 - 11.0 10e3/uL    RBC Count 5.32 (H) 3.80 - 5.20 10e6/uL    Hemoglobin 15.8 (H) 11.7 - 15.7 g/dL    Hematocrit 48.0 (H) 35.0 - 47.0 %    MCV 90 78 - 100 fL    MCH 29.7 26.5 - 33.0 pg    MCHC 32.9 31.5 - 36.5 g/dL    RDW 12.9 10.0 - 15.0 %    Platelet Count 237 150 - 450 10e3/uL    % Neutrophils 56 %    % Lymphocytes 35 %    % Monocytes 7 %    % Eosinophils 1 %    % Basophils 1 %    % Immature Granulocytes 0 %    Absolute Neutrophils 4.9 1.6 - 8.3 10e3/uL    Absolute Lymphocytes 3.1 0.8 - 5.3 10e3/uL    Absolute Monocytes 0.6 0.0 - 1.3 10e3/uL    Absolute Eosinophils 0.1 0.0 - 0.7 10e3/uL    Absolute Basophils 0.1 0.0 - 0.2 10e3/uL    Absolute Immature Granulocytes 0.0 <=0.4 10e3/uL   Streptococcus A  Rapid Screen w/Reflex to PCR - Clinic Collect     Status: Normal    Specimen: Throat; Swab   Result Value Ref Range    Group A Strep antigen Negative Negative   Group A Streptococcus PCR Throat Swab     Status: Normal    Specimen: Throat; Swab   Result Value Ref Range    Group A strep by PCR Not Detected Not Detected    Narrative    The Xpert Xpress Strep A test, performed on the [employer], is a rapid, qualitative in vitro diagnostic test for the detection of Streptococcus pyogenes (Group A ß-hemolytic Streptococcus, Strep A) in throat swab specimens from patients with signs and symptoms of pharyngitis. The Xpert Xpress Strep A test can be used as an aid in the diagnosis of Group A Streptococcal pharyngitis. The assay is not intended to monitor treatment for Group A Streptococcus infections. The Xpert Xpress Strep A test utilizes an automated real-time polymerase chain reaction (PCR) to detect Streptococcus pyogenes DNA.   CBC with platelets and differential     Status: Abnormal    Narrative    The following orders were created for panel order CBC with platelets and differential.  Procedure                               Abnormality         Status                     ---------                               -----------         ------                     CBC with platelets and d...[[id number]]  Abnormal            Final result                 Please view results for these tests on the individual orders.               Answers for HPI/ROS submitted by the patient on 5/25/2022  If you checked off any problems, how difficult have these problems made it for you to do your work, take care of things at home, or get along with other people?: Extremely difficult  PHQ9 TOTAL SCORE: 12

## 2022-05-25 NOTE — RESULT ENCOUNTER NOTE
Dear Deanne  Your white blood cell count and blood counts look normal.  Please call or MyChart my office with any questions or concerns.   Vee Tao, PAC

## 2022-05-26 LAB — SARS-COV-2 RNA RESP QL NAA+PROBE: NEGATIVE

## 2022-05-26 NOTE — TELEPHONE ENCOUNTER
I have attempted to reach patient multiple times this morning to discuss covid treatment  No answer and left messages  I have sent prescription over to [facility]  [phone]- she will need to contact pharmacy (only certain pharmacies are carrying prescription)   paxlovid twice a day for 5 days   Discontinue lovastatin 12 hours prior to taking paxlovid and additional five days after completing treatment   Discontinue flonase while taking paxlovid  Decrease trazodone dose while taking paxlovid  Reduce oxycodone by 75% while taking paxlovid

## 2022-05-26 NOTE — RESULT ENCOUNTER NOTE
Dear Deanne  Your PCR test for covid that we did in clinic was negative but given all of your symptoms and rapid positive covid test I would treat.  Please call or MyChart my office with any questions or concerns.   Vee Tao, PAC

## 2022-05-26 NOTE — RESULT ENCOUNTER NOTE
Your current thyroid testing is normal indicating you are on the correct dosage of medication.    Please call or MyChart message me if you have any questions.      KAROLYNK

## 2022-05-26 NOTE — TELEPHONE ENCOUNTER
Reason for Call:  Patient tested positive to COVID Test and is wanting to speak about treatment options     Name of test or procedure: Antigen Self Test    Date of test of procedure: 05/25/2022    Location of the test or procedure: Home    OK to leave the result message on voice mail or with a family member? YES    Phone number Patient can be reached at:  Cell number on file:    Telephone Information:   Mobile [phone]       Additional comments: N/A    Call taken on 5/25/2022 at 8:17 PM by Catherine Abraham

## 2022-05-26 NOTE — TELEPHONE ENCOUNTER
Called patient.  She states she did not get the messages due to she was up for most of the night and finally went back to sleep.    Gave the patient the message regarding directions from Vee Tao PA-C.    Patient read back the message and verbalized understanding and agreement to plan.    Patient will call back with any worsening symptoms or new concerns.    Patient will have someone else  the Rx this morning and get 2 doses in today as close to 12 hours apart as possible.      Sophia Carrizales RN, [facility]

## 2022-05-31 ENCOUNTER — TELEPHONE (OUTPATIENT)
Dept: BEHAVIORAL HEALTH | Facility: CLINIC | Age: 62
End: 2022-05-31
Payer: COMMERCIAL

## 2022-05-31 DIAGNOSIS — F41.1 GAD (GENERALIZED ANXIETY DISORDER): ICD-10-CM

## 2022-05-31 DIAGNOSIS — F33.1 MODERATE EPISODE OF RECURRENT MAJOR DEPRESSIVE DISORDER (H): ICD-10-CM

## 2022-05-31 RX ORDER — ESCITALOPRAM OXALATE 5 MG/1
5 TABLET ORAL DAILY
Qty: 30 TABLET | Refills: 1 | Status: SHIPPED | OUTPATIENT
Start: 2022-05-31 | End: 2022-06-10

## 2022-05-31 RX ORDER — ESCITALOPRAM OXALATE 10 MG/1
10 TABLET ORAL DAILY
Qty: 30 TABLET | Refills: 1 | Status: SHIPPED | OUTPATIENT
Start: 2022-05-31 | End: 2022-06-10

## 2022-05-31 NOTE — TELEPHONE ENCOUNTER
Reason for Call:  Other call back    Detailed comments: Patient called to cancel appintment for today at 3:30 pm as pt is sick. Patient requested a call back    Phone Number Patient can be reached at: Cell number on file:    Telephone Information:   Mobile [phone]       Best Time: Please call pt tomorrow 06/01 between 11 am- 1 pm    Can we leave a detailed message on this number? YES    Call taken on 5/31/2022 at 11:42 AM by Sallie Hernandez

## 2022-05-31 NOTE — TELEPHONE ENCOUNTER
Date of Last Office Visit:  4/5/22.  Had appointment 5/31/22 but had to cancel due to illness. Patient reports she will run out of Lexapro before newly rescheduled appointment.      Date of Next Office Visit: Rescheduled for  6/10/22   No shows since last visit: 0  Cancellations since last visit: 1 today due to illness.      Medication requested: Lexapro 15 mg PO daily Date last ordered: 4/5/22 Qty: 30 Refills: 1.       Review of [place] ?: NA    Lapse in medication adherence greater than 5 days?: NO  If yes, call patient and gather details: NA  Medication refill request verified as identical to current order?: yes  Result of Last DAM, VPA, Li+ Level, CBC, or Carbamazepine Level (at or since last visit): N/A      Instructions       Return in about 8 weeks (around 5/31/2022) for Follow up, with me, in person.  Start:  Duloxetine for 14 day increments:  -90 mg  -60 mg  ... start Escitalopram 5 mg  -30 mg .... Escitalopram 10 mg  -20 mg .... Escitalopram 15 mg daily thereafter  -Then discontinue      Escitalopram/Lexapro in 14 day increments:  -5 mg  -10 mg  -15 mg (10 mg + 5 mg) daily thereafter        Hydroxyzine 10 to 30 mg (1 to 3 tabs) every 6 hours as needed for anxiety         Continue:  Prazosin 5 mg at bedtime     Trazodone 50 to 150 mg as needed at bedtime                Last visit treatment plan:     []Medication refilled per  Medication Refill in Ambulatory Care  policy.  [x]Medication unable to be refilled by RN due to criteria not met as indicated below:    []Eligibility - not seen in the last year   []Supervision - no future appointment   []Compliance - no shows, cancellations or lapse in therapy   []Verification - order discrepancy   []Controlled medication   [x]Medication not included in policy   []90-day supply request   []Other

## 2022-06-04 ENCOUNTER — MYC MEDICAL ADVICE (OUTPATIENT)
Dept: BEHAVIORAL HEALTH | Facility: CLINIC | Age: 62
End: 2022-06-04
Payer: COMMERCIAL

## 2022-06-04 DIAGNOSIS — F41.1 GAD (GENERALIZED ANXIETY DISORDER): Primary | ICD-10-CM

## 2022-06-06 RX ORDER — HYDROXYZINE HYDROCHLORIDE 25 MG/1
25-50 TABLET, FILM COATED ORAL 3 TIMES DAILY PRN
Qty: 90 TABLET | Refills: 0 | Status: SHIPPED | OUTPATIENT
Start: 2022-06-06 | End: 2022-06-10

## 2022-06-06 NOTE — TELEPHONE ENCOUNTER
TC RN attempted to phone this patient however, there was no answer.  RN left a message for the patient to check her MyChart for a response from her Provider Gina Berumen. Patient instructed to call [facility] at [phone] with any questions.

## 2022-06-09 ENCOUNTER — NURSE TRIAGE (OUTPATIENT)
Dept: NURSING | Facility: CLINIC | Age: 62
End: 2022-06-09
Payer: COMMERCIAL

## 2022-06-09 ENCOUNTER — APPOINTMENT (OUTPATIENT)
Dept: CT IMAGING | Facility: CLINIC | Age: 62
End: 2022-06-09
Attending: FAMILY MEDICINE
Payer: COMMERCIAL

## 2022-06-09 ENCOUNTER — HOSPITAL ENCOUNTER (EMERGENCY)
Facility: CLINIC | Age: 62
Discharge: HOME | End: 2022-06-09
Attending: FAMILY MEDICINE | Admitting: FAMILY MEDICINE
Payer: COMMERCIAL

## 2022-06-09 VITALS
HEART RATE: 71 BPM | OXYGEN SATURATION: 97 % | HEIGHT: 68 IN | RESPIRATION RATE: 18 BRPM | SYSTOLIC BLOOD PRESSURE: 145 MMHG | WEIGHT: 212 LBS | BODY MASS INDEX: 32.13 KG/M2 | TEMPERATURE: 97.7 F | DIASTOLIC BLOOD PRESSURE: 86 MMHG

## 2022-06-09 DIAGNOSIS — R10.9 ABDOMINAL PAIN, UNSPECIFIED ABDOMINAL LOCATION: ICD-10-CM

## 2022-06-09 DIAGNOSIS — R30.0 DYSURIA: ICD-10-CM

## 2022-06-09 LAB
ALBUMIN SERPL-MCNC: 4.2 G/DL (ref 3.4–5)
ALBUMIN UR-MCNC: NEGATIVE MG/DL
ALP SERPL-CCNC: 99 U/L (ref 40–150)
ALT SERPL W P-5'-P-CCNC: 26 U/L (ref 0–50)
ANION GAP SERPL CALCULATED.3IONS-SCNC: 5 MMOL/L (ref 3–14)
APPEARANCE UR: CLEAR
AST SERPL W P-5'-P-CCNC: 13 U/L (ref 0–45)
BASOPHILS # BLD AUTO: 0.1 10E3/UL (ref 0–0.2)
BASOPHILS NFR BLD AUTO: 1 %
BILIRUB SERPL-MCNC: 0.6 MG/DL (ref 0.2–1.3)
BILIRUB UR QL STRIP: NEGATIVE
BUN SERPL-MCNC: 24 MG/DL (ref 7–30)
CALCIUM SERPL-MCNC: 9.5 MG/DL (ref 8.5–10.1)
CHLORIDE BLD-SCNC: 106 MMOL/L (ref 94–109)
CO2 SERPL-SCNC: 29 MMOL/L (ref 20–32)
COLOR UR AUTO: ABNORMAL
CREAT SERPL-MCNC: 0.96 MG/DL (ref 0.52–1.04)
EOSINOPHIL # BLD AUTO: 0.1 10E3/UL (ref 0–0.7)
EOSINOPHIL NFR BLD AUTO: 1 %
ERYTHROCYTE [DISTWIDTH] IN BLOOD BY AUTOMATED COUNT: 12.9 % (ref 10–15)
GFR SERPL CREATININE-BSD FRML MDRD: 66 ML/MIN/1.73M2
GLUCOSE BLD-MCNC: 90 MG/DL (ref 70–99)
GLUCOSE UR STRIP-MCNC: NEGATIVE MG/DL
HCT VFR BLD AUTO: 49 % (ref 35–47)
HGB BLD-MCNC: 16.6 G/DL (ref 11.7–15.7)
HGB UR QL STRIP: NEGATIVE
HOLD SPECIMEN: NORMAL
HOLD SPECIMEN: NORMAL
IMM GRANULOCYTES # BLD: 0 10E3/UL
IMM GRANULOCYTES NFR BLD: 0 %
KETONES UR STRIP-MCNC: NEGATIVE MG/DL
LEUKOCYTE ESTERASE UR QL STRIP: NEGATIVE
LIPASE SERPL-CCNC: 126 U/L (ref 73–393)
LYMPHOCYTES # BLD AUTO: 4.7 10E3/UL (ref 0.8–5.3)
LYMPHOCYTES NFR BLD AUTO: 41 %
MCH RBC QN AUTO: 30.1 PG (ref 26.5–33)
MCHC RBC AUTO-ENTMCNC: 33.9 G/DL (ref 31.5–36.5)
MCV RBC AUTO: 89 FL (ref 78–100)
MONOCYTES # BLD AUTO: 0.8 10E3/UL (ref 0–1.3)
MONOCYTES NFR BLD AUTO: 7 %
MUCOUS THREADS #/AREA URNS LPF: PRESENT /LPF
NEUTROPHILS # BLD AUTO: 5.7 10E3/UL (ref 1.6–8.3)
NEUTROPHILS NFR BLD AUTO: 50 %
NITRATE UR QL: POSITIVE
NRBC # BLD AUTO: 0 10E3/UL
NRBC BLD AUTO-RTO: 0 /100
PH UR STRIP: 5 [PH] (ref 5–7)
PLATELET # BLD AUTO: 250 10E3/UL (ref 150–450)
POTASSIUM BLD-SCNC: 3.9 MMOL/L (ref 3.4–5.3)
PROT SERPL-MCNC: 7.7 G/DL (ref 6.8–8.8)
RBC # BLD AUTO: 5.51 10E6/UL (ref 3.8–5.2)
RBC URINE: 1 /HPF
SODIUM SERPL-SCNC: 140 MMOL/L (ref 133–144)
SP GR UR STRIP: 1.03 (ref 1–1.03)
SQUAMOUS EPITHELIAL: 1 /HPF
UROBILINOGEN UR STRIP-MCNC: 4 MG/DL
WBC # BLD AUTO: 11.5 10E3/UL (ref 4–11)
WBC URINE: 1 /HPF

## 2022-06-09 PROCEDURE — 81001 URINALYSIS AUTO W/SCOPE: CPT | Performed by: FAMILY MEDICINE

## 2022-06-09 PROCEDURE — 74177 CT ABD & PELVIS W/CONTRAST: CPT

## 2022-06-09 PROCEDURE — 85025 COMPLETE CBC W/AUTO DIFF WBC: CPT | Performed by: FAMILY MEDICINE

## 2022-06-09 PROCEDURE — 99285 EMERGENCY DEPT VISIT HI MDM: CPT | Performed by: FAMILY MEDICINE

## 2022-06-09 PROCEDURE — 96375 TX/PRO/DX INJ NEW DRUG ADDON: CPT

## 2022-06-09 PROCEDURE — 87086 URINE CULTURE/COLONY COUNT: CPT | Performed by: FAMILY MEDICINE

## 2022-06-09 PROCEDURE — 80053 COMPREHEN METABOLIC PANEL: CPT | Performed by: FAMILY MEDICINE

## 2022-06-09 PROCEDURE — 36415 COLL VENOUS BLD VENIPUNCTURE: CPT | Performed by: FAMILY MEDICINE

## 2022-06-09 PROCEDURE — 99285 EMERGENCY DEPT VISIT HI MDM: CPT | Mod: 25

## 2022-06-09 PROCEDURE — 96361 HYDRATE IV INFUSION ADD-ON: CPT

## 2022-06-09 PROCEDURE — 250N000009 HC RX 250: Performed by: FAMILY MEDICINE

## 2022-06-09 PROCEDURE — 250N000011 HC RX IP 250 OP 636: Performed by: FAMILY MEDICINE

## 2022-06-09 PROCEDURE — 258N000003 HC RX IP 258 OP 636: Performed by: FAMILY MEDICINE

## 2022-06-09 PROCEDURE — 83690 ASSAY OF LIPASE: CPT | Performed by: FAMILY MEDICINE

## 2022-06-09 PROCEDURE — 96374 THER/PROPH/DIAG INJ IV PUSH: CPT | Mod: 59

## 2022-06-09 RX ORDER — MORPHINE SULFATE 4 MG/ML
4 INJECTION, SOLUTION INTRAMUSCULAR; INTRAVENOUS ONCE
Status: COMPLETED | OUTPATIENT
Start: 2022-06-09 | End: 2022-06-09

## 2022-06-09 RX ORDER — IOPAMIDOL 755 MG/ML
100 INJECTION, SOLUTION INTRAVASCULAR ONCE
Status: COMPLETED | OUTPATIENT
Start: 2022-06-09 | End: 2022-06-09

## 2022-06-09 RX ORDER — ONDANSETRON 2 MG/ML
4 INJECTION INTRAMUSCULAR; INTRAVENOUS ONCE
Status: COMPLETED | OUTPATIENT
Start: 2022-06-09 | End: 2022-06-09

## 2022-06-09 RX ORDER — NITROFURANTOIN 25; 75 MG/1; MG/1
100 CAPSULE ORAL 2 TIMES DAILY
Qty: 14 CAPSULE | Refills: 0 | Status: SHIPPED | OUTPATIENT
Start: 2022-06-09 | End: 2022-08-08

## 2022-06-09 RX ORDER — METOCLOPRAMIDE 10 MG/1
10 TABLET ORAL 4 TIMES DAILY PRN
Qty: 20 TABLET | Refills: 0 | Status: SHIPPED | OUTPATIENT
Start: 2022-06-09 | End: 2022-09-02

## 2022-06-09 RX ADMIN — SODIUM CHLORIDE, POTASSIUM CHLORIDE, SODIUM LACTATE AND CALCIUM CHLORIDE 1000 ML: 600; 310; 30; 20 INJECTION, SOLUTION INTRAVENOUS at 20:11

## 2022-06-09 RX ADMIN — IOPAMIDOL 100 ML: 755 INJECTION, SOLUTION INTRAVENOUS at 20:56

## 2022-06-09 RX ADMIN — SODIUM CHLORIDE 66 ML: 9 INJECTION, SOLUTION INTRAVENOUS at 20:57

## 2022-06-09 RX ADMIN — ONDANSETRON 4 MG: 2 INJECTION INTRAMUSCULAR; INTRAVENOUS at 20:17

## 2022-06-09 RX ADMIN — MORPHINE SULFATE 4 MG: 4 INJECTION, SOLUTION INTRAMUSCULAR; INTRAVENOUS at 20:19

## 2022-06-09 NOTE — TELEPHONE ENCOUNTER
"\"Feeling so awful\"    Stomach bloats whenever she eats. Very painful and heavy x 3-4 days.  Pain is constant. 7/10.    Instructed go to ER.  Caller stated understanding and agreement.    Also  Felt the need to pee but couldn't all night last night.   went and got pyridium this a.m.  Was able to pee around 10a.m.    Isn't sure which ER she'll go to.  Has .        Reason for Disposition    SEVERE abdominal pain (e.g., excruciating)    Additional Information    Negative: Passed out (i.e., fainted, collapsed and was not responding)    Negative: Shock suspected (e.g., cold/pale/clammy skin, too weak to stand, low BP, rapid pulse)    Negative: Sounds like a life-threatening emergency to the triager    Protocols used: ABDOMINAL PAIN - FEMALE-A-OH      "

## 2022-06-09 NOTE — ED TRIAGE NOTES
"Pt reports epigastric pain that started about 4 days ago, pt reports pain worsens with eating, pt reports she feels incredibly bloated after eating. Pt reports intermittent nausea. Pt reports feeling \"pressure\" on her bladder, did not void much all night. Pt reports some burning with urination, denies fevers, loose stools.       "

## 2022-06-10 ENCOUNTER — VIRTUAL VISIT (OUTPATIENT)
Dept: BEHAVIORAL HEALTH | Facility: CLINIC | Age: 62
End: 2022-06-10
Payer: COMMERCIAL

## 2022-06-10 DIAGNOSIS — F43.10 PTSD (POST-TRAUMATIC STRESS DISORDER): ICD-10-CM

## 2022-06-10 DIAGNOSIS — F41.1 GAD (GENERALIZED ANXIETY DISORDER): Primary | ICD-10-CM

## 2022-06-10 DIAGNOSIS — F33.1 MODERATE EPISODE OF RECURRENT MAJOR DEPRESSIVE DISORDER (H): ICD-10-CM

## 2022-06-10 PROCEDURE — 99215 OFFICE O/P EST HI 40 MIN: CPT | Mod: 95 | Performed by: NURSE PRACTITIONER

## 2022-06-10 RX ORDER — BUPROPION HYDROCHLORIDE 200 MG/1
200 TABLET, EXTENDED RELEASE ORAL EVERY MORNING
Qty: 30 TABLET | Refills: 1 | Status: SHIPPED | OUTPATIENT
Start: 2022-06-10 | End: 2022-09-02

## 2022-06-10 RX ORDER — BUPROPION HYDROCHLORIDE 100 MG/1
100 TABLET, EXTENDED RELEASE ORAL EVERY MORNING
Qty: 14 TABLET | Refills: 0 | Status: SHIPPED | OUTPATIENT
Start: 2022-06-10 | End: 2022-08-08 | Stop reason: DRUGHIGH

## 2022-06-10 RX ORDER — ESCITALOPRAM OXALATE 10 MG/1
10 TABLET ORAL DAILY
Qty: 90 TABLET | Refills: 0 | Status: SHIPPED | OUTPATIENT
Start: 2022-06-10 | End: 2022-09-02

## 2022-06-10 RX ORDER — HYDROXYZINE HYDROCHLORIDE 25 MG/1
25-50 TABLET, FILM COATED ORAL 3 TIMES DAILY PRN
Qty: 90 TABLET | Refills: 1 | Status: SHIPPED | OUTPATIENT
Start: 2022-06-10 | End: 2022-08-04

## 2022-06-10 RX ORDER — ESCITALOPRAM OXALATE 5 MG/1
5 TABLET ORAL DAILY
Qty: 90 TABLET | Refills: 0 | Status: SHIPPED | OUTPATIENT
Start: 2022-06-10 | End: 2022-09-02

## 2022-06-10 ASSESSMENT — ANXIETY QUESTIONNAIRES
3. WORRYING TOO MUCH ABOUT DIFFERENT THINGS: NEARLY EVERY DAY
GAD7 TOTAL SCORE: 16
1. FEELING NERVOUS, ANXIOUS, OR ON EDGE: NEARLY EVERY DAY
7. FEELING AFRAID AS IF SOMETHING AWFUL MIGHT HAPPEN: MORE THAN HALF THE DAYS
6. BECOMING EASILY ANNOYED OR IRRITABLE: MORE THAN HALF THE DAYS
GAD7 TOTAL SCORE: 16
2. NOT BEING ABLE TO STOP OR CONTROL WORRYING: NEARLY EVERY DAY
5. BEING SO RESTLESS THAT IT IS HARD TO SIT STILL: NOT AT ALL
IF YOU CHECKED OFF ANY PROBLEMS ON THIS QUESTIONNAIRE, HOW DIFFICULT HAVE THESE PROBLEMS MADE IT FOR YOU TO DO YOUR WORK, TAKE CARE OF THINGS AT HOME, OR GET ALONG WITH OTHER PEOPLE: VERY DIFFICULT

## 2022-06-10 ASSESSMENT — PATIENT HEALTH QUESTIONNAIRE - PHQ9
SUM OF ALL RESPONSES TO PHQ QUESTIONS 1-9: 14
5. POOR APPETITE OR OVEREATING: NEARLY EVERY DAY

## 2022-06-10 NOTE — DISCHARGE INSTRUCTIONS
ICD-10-CM    1. Abdominal pain, unspecified abdominal location  R10.9     trial on bowel regimen.  reglan before meals.  stay hydrated with 64 oz fluid per day.  avoid fodmaps.  once better stay on bowel regimen inteneded for chronic opiod use.  follow-up clinic   2. Dysuria  R30.0     take macrobid twice daily abnd await yurine culture         Bowel Regimen for Constipation:  Maintain 64 oz clear fluid per day indefinitely  Start with Fleets Enema x1 and hold for as long as possible (20-30 minutes)  Next take magnesium citrate 300 ml (one bottle) and stay close to bathroom for 6 hours  Next, on the next day start miralax 1 capful in 8 oz fluid daily for 7 days  Next, when better and while maintaining 64 oz fluid pre day, start fiber supplement, Citrucel or metamucil daily.     Avoid FODMAPS

## 2022-06-10 NOTE — PROGRESS NOTES
[facility]      Mental Health & Addiction Service Line    Transition Clinic: Psychiatry Progress Note  Medication Management                Charts/documentation read prior to the appointment:  -2022        VISIT INFORMATION      Date:  2022       Number:  -2nd      Referral source:  -PCP  -Bridging to long term outpatient psychiatry      Patient Identifying Information:  Legal name: Ilsa Yusuf  Preferred name: Deanne  : 1960  Preferred pronouns:       Participants:   -Patient  -Provider        Telehealth visit details:  Type of service:   Video  Patient location:   At home  Provider Location: [facility]  Platform utilized: FRM Study Course    Start time: 3:38 pm  End time:  4:15 pm          INTERIM HX    -On 2022 was Covid-19+  -Having some fatigue, brain-fog       -Went to the ED yesterday = 2022  -Had a UTI and was given a regimen to help my colon  -Can't eat more than 1/2 cup of spaghetti at a time  -Also trying to increase activity, don't move around much  -Hoping to start getting outside and gardening       PSYCHIATRIC ROS      Sleep:  -Waking up 2x per night since , often hard to get back to sleep;  Seems to coincide with when [employer] invaded [place]  -Trazodone does help to fall asleep        Trauma and or PTSD:  -P/E/S in childhood and adult relationships     --------------------------------------    -Did not discuss in detail during the appointment      Mood/Anxiety:    -See PHQ-9 score    -See DONELL-7 score      Suicidal ideations:  -Denies passive or active thoughts at this time      SIB:  -Denies engaging in self harm         Side effects:  -None reported         PSYCHIATRIC HISTORY    Individual therapy or IOP:   -DBT  -Individual therapy: on/off throughout adulthood         Suicide attempts:  -None reported, but had a detailed plan in        Inpatient psychiatric hospitalizations:  -None  reported  -No hx of commitments         ECT:  -None reported            Medication Trials:     SSRIs:  -Zoloft    SNRIs:  -Cymbalta 120 mg: ineffective/plateau     TCAs:  -Doxepin     Other antidepressants:  -Mirtazapine     Mood stabilizers:  -Depakote     Antipsychotics:  -Abilify  -Seroquel  -Zyprexa     ADHD meds:  -Adderall 20 mg: stopped in Feb/2022 due to tachycardia, elevated bp, SOB, and tiredness   -Vyvanse  -Nuvigil     Benzodiazepines:  -Clonazepam  -Temazepam  -Xanax     Sleep aides:  -Ambien  -Lunesta   -Sonata        MEDICAL HISTORY    -The problem list was reviewed via the EMR prior to the appointment    -The patient denies any concerning physical and or medical symptoms during the interviewing process        MEDICATIONS      Current Outpatient Medications:      albuterol (PROAIR HFA/PROVENTIL HFA/VENTOLIN HFA) 108 (90 Base) MCG/ACT inhaler, Inhale 1-2 puffs into the lungs every 4 hours as needed for shortness of breath / dyspnea or wheezing, Disp: 18 g, Rfl: 0     cholecalciferol (VITAMIN D3) 5000 UNITS CAPS capsule, Take 1 capsule (5,000 Units) by mouth daily Take 1 per day, Disp: 100 capsule, Rfl: 2     DULoxetine (CYMBALTA) 20 MG capsule, Take 1 capsule (20 mg) by mouth 2 times daily, Disp: 14 capsule, Rfl: 0     DULoxetine (CYMBALTA) 30 MG capsule, Take 1 capsule (30 mg) by mouth daily Per tapering schedule, Disp: 28 capsule, Rfl: 0     DULoxetine (CYMBALTA) 60 MG capsule, Take 2 capsules (120 mg) by mouth At Bedtime Only in evening, Disp: 180 capsule, Rfl: 1     escitalopram (LEXAPRO) 10 MG tablet, Take 1 tablet (10 mg) by mouth daily In addition to 5 mg for ttl: 15 mg/day, Disp: 30 tablet, Rfl: 1     escitalopram (LEXAPRO) 5 MG tablet, Take 1 tablet (5 mg) by mouth daily In addition to 10 mg for ttl: 15 mg/day, Disp: 30 tablet, Rfl: 1     estradiol (ESTRACE VAGINAL) 0.1 MG/GM vaginal cream, Apply small amount to the vaginal opening and urethra M, W, F @ h.s., Disp: 42.5 g, Rfl: 3      fluticasone (FLONASE) 50 MCG/ACT nasal spray, Spray 2 sprays into both nostrils 2 times daily, Disp: 48 g, Rfl: 1     hydrOXYzine (ATARAX) 25 MG tablet, Take 1-2 tablets (25-50 mg) by mouth 3 times daily as needed for anxiety, Disp: 90 tablet, Rfl: 0     levothyroxine (SYNTHROID/LEVOTHROID) 175 MCG tablet, TAKE 1 TABLET(175 MCG) BY MOUTH DAILY, Disp: 90 tablet, Rfl: 2     lovastatin (MEVACOR) 20 MG tablet, Take 1 tablet (20 mg) by mouth At Bedtime, Disp: 90 tablet, Rfl: 3     metoclopramide (REGLAN) 10 MG tablet, Take 1 tablet (10 mg) by mouth 4 times daily as needed (pre-treatment before eating.), Disp: 20 tablet, Rfl: 0     multivitamin w/minerals (THERA-VIT-M) tablet, Take 1 tablet by mouth daily, Disp: , Rfl:      nitroFURantoin macrocrystal-monohydrate (MACROBID) 100 MG capsule, Take 1 capsule (100 mg) by mouth 2 times daily, Disp: 14 capsule, Rfl: 0     Omega-3 Fatty Acids (OMEGA 3 PO), Take 2 g by mouth daily Takes 1 in am and 1 at bedtime, Disp: , Rfl:      omeprazole (PRILOSEC) 40 MG DR capsule, Take 1 capsule (40 mg) by mouth daily, Disp: 90 capsule, Rfl: 1     ondansetron (ZOFRAN) 4 MG tablet, Take 1 tablet (4 mg) by mouth every 6 hours as needed for nausea, Disp: 30 tablet, Rfl: 1     oxyCODONE IR (ROXICODONE) 10 MG tablet, TK 1 TO 2 TABLETS PO EVERY FOUR TO SIX HOURS AS NEEDED FOR PAIN MAX 6 TABLETS A DAY, Disp: , Rfl:      prazosin (MINIPRESS) 5 MG capsule, TAKE 1 CAPSULE(5 MG) BY MOUTH AT BEDTIME, Disp: 90 capsule, Rfl: 0     tiZANidine (ZANAFLEX) 4 MG tablet, Take 1-3 tablets (4-12 mg) by mouth 3 times daily as needed for muscle spasms, Disp: 210 tablet, Rfl: 3     topiramate (TOPAMAX) 25 MG tablet, Take 25 mg by mouth daily, Disp: , Rfl:      traZODone (DESYREL) 50 MG tablet, TAKE 1 TO 3 TABLETS(50  MG) BY MOUTH EVERY NIGHT AS NEEDED FOR SLEEP, Disp: 90 tablet, Rfl: 2     UNABLE TO FIND, MEDICATION NAME: medical yomaira, Disp: , Rfl:   No current facility-administered medications for this  visit.      If a controlled substance is prescribed during today's appointment:    -The [place] Prescription Monitoring Program has been reviewed and there are no current concerns with: diversionary activity, early refill requests, and or obtaining the medication from multiple providers.        VITALS    BP Readings from Last 3 Encounters:   06/09/22 (!) 145/86   05/25/22 112/70   04/05/22 136/77       Pulse Readings from Last 3 Encounters:   06/09/22 71   05/25/22 71   04/05/22 74       Wt Readings from Last 3 Encounters:   06/09/22 96.2 kg (212 lb)   05/25/22 102.1 kg (225 lb)   04/05/22 100.7 kg (222 lb)         LABS    The following labs were reviewed prior to or during the appointment:  -5/25 and 6/9/2022        SCALES    PHQ 10/18/2021 4/5/2022 5/25/2022   PHQ-9 Total Score 3 14 12   Q9: Thoughts of better off dead/self-harm past 2 weeks Not at all Not at all Not at all        DONELL-7 SCORE 11/6/2018 10/18/2021 4/5/2022   Total Score - - -   Total Score - 7 (mild anxiety) -   Total Score 8 7 14   Total Score - - -           MENTAL STATUS EXAMINATION    General Behavior:  Cooperative, Direct Eye Contact  Speech: Fluent, Normal rate and volume  Musculoskeletal:    -Gait not observed during t.h. visit  -No facial tics/tremors observed   -Motor coordination is grossly intact   Mood: Depressed, Anxious  Affect: Mildly subdued   Attention: Intact  Orientation:  Person, Place, Time, Situation  Thought Associations:  Intact  Thought Content: Reality based   Thought Processes: Organized, Normal rate  Memory: No overt impairment; no screenings or formal testing performed  Language: Intact  Intellect/Fund of Knowledge: Average; knowledge of current events  Judgement: Good  Insight: Fair to Good        ASSESSMENT/CLINICAL IMPRESSIONS    Summary:    Mallory Yusuf is a 60 y/o female with a history of: MDD, DONELL, PTSD,  and questionable dx of ADHD.    She initiated care at the [facility] on 4/[facility]/2022 for  bridging to long term outpatient psychiatry services at which time Cymbalta was cross tapered onto Lexapro.    ----------------------    During today's interview Deanne outlines numerous residual depressive and anxiety symptoms.  Current mental health symptoms are impacted in part to laura Covid-19 at the end of May/2022 as well as ongoing physical/medical conditions (went to the ED on 6/9/2022 in the context of abdominal pain).    Deanne agrees a trial of adding Wellbutrin with titration up to 200 mg in the AM + Lexapro 15 mg/day.  Additionally, endorses Hydroxyzine 25 to 50 mg does a better job than 10 mg tabs at addressing break through symptoms of being overwhelmed and calming things down.    Denies any immediate safety concerns towards self or others.         DSM-V and or working diagnosis:    1.  DONELL     2.  Moderate episode of recurrent major depressive disorder (H)     3.  PTSD        Rule outs:     4. Cluster B Traits versus Personality Disorders           SAFETY EVALUATION:  Suicidal ideations:  -Denies  Homicidal ideations:  -Denies   Risk factors:  -Age  -Ongoing mental health symptoms  Protective and mitigating factors:  -Significant other  -Re-establishing outpatient mental health services  -No prior attempt  Risk assessment:  -Low        TREATMENT PLAN      Medications:  Start:  -Bupropion/Wellbutrin  mg in the AM for 14 days then 200 mg in the AM thereafter    Continue:  -Escitalopram/Lexapro 10 mg + 5 mg daily thereafter    -Hydroxyzine/Atarax 25 to 50 mg (1 to 2 tabs) up to 3x daily as needed for anxiety      -Prazosin 5 mg at bedtime     -Trazodone 50 to 150 mg (1 to 3 tabs) as needed at bedtime      Labs:  -None obtained          Therapy:  -Recommended in addition to medications        Non-pharmacological modalities:  -Make efforts to get outside/garden and increase activity        Return to Clinic or Referrals:  -Please follow up at the [facility] for medication management in:   7 weeks          Total time: 48 minutes per:    -Review of EMR  -Appointment time   -Documentation          Gina Berumen APRN-CNP, [facility]    --------------------------------------------------------------------------------------------------------------------------        TREATMENT RISK STATEMENT    The risks, benefits, alternatives, and potential adverse effects have been explained and are understood by the patient.  The patient agrees to the treatment plan with their ability to do so.      The patient knows to call the clinic: [phone]  for any problems or concerns until the next psychiatry visit, regardless if it is within or outside of the [employer] system.     If unable to reach clinic staff (via phone call or medical messaging) during the normal business hours: 8:00 am to 4:30 pm then it is recommended accessing the nearest: emergency department, urgent care facility, or utilizing local (varies based on county of residence) and national crisis #'s or text messaging services for immediate assistance.          --------------------------------------------------------------------------------------------------------------------------        If applicable the following has been discussed with the patient, parent/guardian, and or attending family member during the appointment:      1. Risks of polypharmacy and possible drug interactions with current medication list + common OTC products, herbs, and supplements.    Moving forward, it is suggested to intermittently check-in with a clinic or retail pharmacist whenever new medications or OTC/h/s are consumed.    2. Recommendation to adhere to CDC guidelines as it relates alcohol consumption.  If taking benzodiazepines, you should abstain from alcohol intake due to increased risks of CNS and respiratory depression, as well as psychomotor impairment.    3. If possible, it is recommended to avoid concurrent use of prescribed:  opioids  +  benzodiazepines due to  increased risks of CNS and respiratory depression, as well as the increased risk of overdose.     4. Recommendation to minimize and or abstain from THC use (unless the pt. is prescribed medical marijuana).    5. Recommendation to abstain from illicit substances including but not limited to the following: heroin, street fentanyl, cocaine, methamphetamines, and bath salts.    6. Do not take opioids, stimulants, and or other prescription medications unless they are specifically prescribed for you.    7. Recommendation to abstain from tobacco/smoking, alcohol, THC, and all illicit substances if trying to become or are pregnant.    8. Black Box Warnings associated with the prescribed psychotropic(s).    9. Potential adverse effects of antipsychotics including but not limited to the following: weight gain, metabolic syndrome, EPS/Tardive Dyskinesias.    10. Potential CV and neurological adverse effects of stimulants including but not limited to the following:  sudden death, MI, stroke, HTN, cardiomyopathy (long term use) as well as seizures.

## 2022-06-10 NOTE — PATIENT INSTRUCTIONS
Start:  -Bupropion/Wellbutrin  mg in the AM for 14 days then 200 mg in the AM thereafter    Continue:  -Escitalopram/Lexapro 10 mg + 5 mg daily thereafter    -Hydroxyzine/Atarax 25 to 50 mg (1 to 2 tabs) up to 3x daily as needed for anxiety      -Prazosin 5 mg at bedtime     -Trazodone 50 to 150 mg (1 to 3 tabs) as needed at bedtime

## 2022-06-10 NOTE — ED PROVIDER NOTES
"  History     Chief Complaint   Patient presents with     Abdominal Pain     Pt reports epigastric pain that started about 4 days ago, pt reports pain worsens with eating, pt reports she feels incredibly bloated after eating. Pt reports intermittent nausea. Pt reports feeling \"pressure\" on her bladder, did not void much all night. Pt reports some burning with urination, denies fevers, loose stools.      HPI  Ilsa Yusuf is a 61 year old female, past medical history is significant for osteoarthritis, obstructive sleep apnea, urolithiasis, ABDOUL, ADHD, obesity, primary insomnia, GERD, hyperlipidemia, depression, PTSD, acquired hypothyroidism, presents the emergency department with concerns of epigastric area abdominal pain beginning 2 days prior to presentation.  History is obtained from the patient who presents with her significant other.  The patient identifies epigastric area abdominal pain without radiation or penetration through to the back to began about 2 days ago.  She is not sure what she was doing at the time.  If she goes for long periods of time without eating it tends to get worse but then if she eats or drinks anything she feels bloated and becomes more nauseated.  She notes no change in bowel habit recently and specifically denies any constipation but feels that she reports diarrhea over the last couple of days.  She notes some urinary urgency and dysuria.  No hematuria or cloudy appearing or foul-smelling urine.  She reports using the 10 mg oxycodones that she has for her chronic back pain through pain clinic but that this has very little effect on the pain.  She has had nausea but denies vomiting.  She is status postcholecystectomy.  Still has appendix and uterus and ovaries.      Allergies:  Allergies   Allergen Reactions     Gabapentin Other (See Comments)     Patient states she gets \"horrific nightmares\" with this medication     Dilaudid [Hydromorphone Hcl]      Made her feel like her skin " "was crawling     Nsaids Other (See Comments)     Kidney failure     Compazine [Prochlorperazine] Other (See Comments)     \"Makes my skin crawl, I was going nuts.\"       Problem List:    Patient Active Problem List    Diagnosis Date Noted     Osteoarthritis of carpometacarpal (CMC) joint of both thumbs 04/18/2022     Priority: Medium     Bilateral thumb pain 04/18/2022     Priority: Medium     ADBOUL (obstructive sleep apnea) 07/19/2019     Priority: Medium     Cervical high risk HPV (human papillomavirus) test positive 03/19/2019     Priority: Medium     TORRI Exposure, needs annual Pap's for life   3/19/19 NIL pap, + HR HPV (not 16, 18). Plan: cotest 1 year.  CCT tracking.   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 07/23/2018     Priority: Medium     Calculus of left kidney 08/26/2017     Priority: Medium     Primary osteoarthritis of right knee 12/26/2016     Priority: Medium     Peripheral tear of medial meniscus of right knee, unspecified whether old or current tear, initial encounter 12/26/2016     Priority: Medium     Cervical radiculopathy 10/28/2016     Priority: Medium     Herniated disc C5       Neck pain 09/29/2016     Priority: Medium     Attention-deficit hyperactivity disorder, predominantly hyperactive type 04/19/2016     Priority: Medium     Obesity 06/21/2015     Priority: Medium     Primary insomnia 06/02/2015     Priority: Medium     Esophageal reflux 04/13/2015     Priority: Medium     CKD (chronic kidney disease) stage 3, GFR 30-59 ml/min (H) 03/09/2015     Priority: Medium     Hyperlipidemia LDL goal <130 01/30/2012     Priority: Medium     Major depressive disorder, recurrent episode, moderate (H) 01/18/2012     Priority: Medium     PTSD (post-traumatic stress disorder) 01/18/2012     Priority: Medium     Acquired hypothyroidism 01/18/2012     Priority: Medium        Past Medical History:  "   Past Medical History:   Diagnosis Date     Arthritis      Cervical high risk HPV (human papillomavirus) test positive 2019     Depressive disorder      Depressive disorder, not elsewhere classified 12     Hyperlipidemia LDL goal < 130      Hypothyroid      Moderate major depression (H)      Mumps      ABDOUL (obstructive sleep apnea)      PTSD (post-traumatic stress disorder)      Seizure (H) 2011       Past Surgical History:    Past Surgical History:   Procedure Laterality Date     ABDOMEN SURGERY       BLADDER SURGERY       CHOLECYSTECTOMY       COLONOSCOPY       CYSTOSCOPY       ORTHOPEDIC SURGERY  left knee     renal artery surgery  child    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     TONSILLECTOMY       ureteral reimplantation       ZZC PARTIAL EXCISION THYROID,UNILAT      rt, negative path then, treated with synthroid.       Family History:    Family History   Problem Relation Age of Onset     C.A.D. Father 58         at 58, heart disease     Mental Illness Father      Coronary Artery Disease Father      Hyperlipidemia Father      Diverticulitis Father      Alzheimer Disease Mother         total care now     Depression Mother      Anxiety Disorder Mother      Diabetes Sister         adult onset     Melanoma Sister      Breast Cancer Sister      Thyroid Disease Sister      Diabetes Maternal Grandmother        Social History:  Marital Status:   [2]  Social History     Tobacco Use     Smoking status: Former Smoker     Packs/day: 0.30     Quit date: 2015     Years since quittin.3     Smokeless tobacco: Never Used     Tobacco comment: recreational   Vaping Use     Vaping Use: Never used   Substance Use Topics     Alcohol use: Not Currently     Alcohol/week: 0.0 standard drinks     Drug use: Yes     Comment: medical marijuana        Medications:    metoclopramide (REGLAN) 10 MG tablet  nitroFURantoin macrocrystal-monohydrate (MACROBID) 100 MG capsule  albuterol  "(PROAIR HFA/PROVENTIL HFA/VENTOLIN HFA) 108 (90 Base) MCG/ACT inhaler  buPROPion (WELLBUTRIN SR) 100 MG 12 hr tablet  buPROPion (WELLBUTRIN SR) 200 MG 12 hr tablet  cholecalciferol (VITAMIN D3) 5000 UNITS CAPS capsule  escitalopram (LEXAPRO) 10 MG tablet  escitalopram (LEXAPRO) 5 MG tablet  estradiol (ESTRACE VAGINAL) 0.1 MG/GM vaginal cream  fluticasone (FLONASE) 50 MCG/ACT nasal spray  hydrOXYzine (ATARAX) 25 MG tablet  levothyroxine (SYNTHROID/LEVOTHROID) 175 MCG tablet  lovastatin (MEVACOR) 20 MG tablet  multivitamin w/minerals (THERA-VIT-M) tablet  Omega-3 Fatty Acids (OMEGA 3 PO)  omeprazole (PRILOSEC) 40 MG DR capsule  ondansetron (ZOFRAN) 4 MG tablet  oxyCODONE IR (ROXICODONE) 10 MG tablet  prazosin (MINIPRESS) 5 MG capsule  tiZANidine (ZANAFLEX) 4 MG tablet  topiramate (TOPAMAX) 25 MG tablet  traZODone (DESYREL) 50 MG tablet  UNABLE TO FIND          Review of Systems   All other systems reviewed and are negative.      Physical Exam   BP: 135/71  Pulse: 71  Temp: 97.7  F (36.5  C)  Resp: 18  Height: 172.7 cm (5' 8\")  Weight: 96.2 kg (212 lb)  SpO2: 97 %      Physical Exam  Vitals and nursing note reviewed.   Constitutional:       General: She is not in acute distress.     Appearance: She is well-developed and normal weight. She is not ill-appearing.   HENT:      Head: Normocephalic and atraumatic.      Mouth/Throat:      Mouth: Mucous membranes are moist.      Pharynx: Oropharynx is clear.   Eyes:      Extraocular Movements: Extraocular movements intact.      Pupils: Pupils are equal, round, and reactive to light.   Cardiovascular:      Rate and Rhythm: Normal rate and regular rhythm.      Heart sounds: Normal heart sounds.   Pulmonary:      Effort: Pulmonary effort is normal.      Breath sounds: Normal breath sounds.   Abdominal:      Comments: Obese abdomen tender epigastrium and suprapubic area with voluntary guarding no rebound no referred pain.  Active bowel sounds.  No CVA tenderness   Skin:     " General: Skin is warm and dry.      Capillary Refill: Capillary refill takes less than 2 seconds.   Neurological:      General: No focal deficit present.      Mental Status: She is alert.   Psychiatric:         Mood and Affect: Mood normal.         Behavior: Behavior normal.         ED Course              ED Course as of 06/12/22 1951   Thu Jun 09, 2022 2059 Anion Gap: 5     Procedures              Critical Care time:  none               No results found for this or any previous visit (from the past 24 hour(s)).    Medications   lactated ringers BOLUS 1,000 mL (0 mLs Intravenous Stopped 6/9/22 2242)   morphine (PF) injection 4 mg (4 mg Intravenous Given 6/9/22 2019)   ondansetron (ZOFRAN) injection 4 mg (4 mg Intravenous Given 6/9/22 2017)   iopamidol (ISOVUE-370) solution 100 mL (100 mLs Intravenous Given 6/9/22 2056)   sodium chloride 0.9 % bag 500mL for CT scan flush use (66 mLs As instructed Given 6/9/22 2057)   9:06 PM  Urinalysis is positive for nitrite otherwise negative, CBC notable for white cell count of 11.5 hemoglobin of 16.6.  CMP within normal limits.  The patient has just gone down to CT. patient is signed out at shift change to Dr. Antonio Moyer.          Assessments & Plan (with Medical Decision Making)     I have reviewed the nursing notes.    I have reviewed the findings, diagnosis, plan and need for follow up with the patient.          Discharge Medication List as of 6/9/2022 10:43 PM      START taking these medications    Details   metoclopramide (REGLAN) 10 MG tablet Take 1 tablet (10 mg) by mouth 4 times daily as needed (pre-treatment before eating.), Disp-20 tablet, R-0, E-Prescribe      nitroFURantoin macrocrystal-monohydrate (MACROBID) 100 MG capsule Take 1 capsule (100 mg) by mouth 2 times daily, Disp-14 capsule, R-0, E-Prescribe             Final diagnoses:   Abdominal pain, unspecified abdominal location - trial on bowel regimen.  reglan before meals.  stay hydrated with 64 oz fluid per  day.  avoid fodmaps.  once better stay on bowel regimen inteneded for chronic opiod use.  follow-up clinic   Dysuria - take macrobid twice daily abnd await giovanny culture       6/9/2022   [facility] EMERGENCY DEPT     Willy Xiong MD  06/12/22 1952

## 2022-06-10 NOTE — ED PROVIDER NOTES
"     Emergency Department Patient Sign-out       Brief HPI:  This is a 61 year old female signed out to me by Dr. Xiong .  See initial ED Provider note for details of the presentation.              Significant Events prior to my assuming care: none      Exam:   Patient Vitals for the past 24 hrs:   BP Temp Temp src Pulse Resp SpO2 Height Weight   06/09/22 1627 135/71 97.7  F (36.5  C) Tympanic 71 18 97 % 1.727 m (5' 8\") 96.2 kg (212 lb)           ED RESULTS:   Results for orders placed or performed during the hospital encounter of 06/09/22 (from the past 24 hour(s))   UA with Microscopic reflex to Culture     Status: Abnormal    Collection Time: 06/09/22  5:05 PM    Specimen: Urine, Midstream   Result Value Ref Range    Color Urine Valentina (A) Colorless, Straw, Light Yellow, Yellow    Appearance Urine Clear Clear    Glucose Urine Negative Negative mg/dL    Bilirubin Urine Negative Negative    Ketones Urine Negative Negative mg/dL    Specific Gravity Urine 1.026 1.003 - 1.035    Blood Urine Negative Negative    pH Urine 5.0 5.0 - 7.0    Protein Albumin Urine Negative Negative mg/dL    Urobilinogen Urine 4.0 (A) Normal, 2.0 mg/dL    Nitrite Urine Positive (A) Negative    Leukocyte Esterase Urine Negative Negative    Mucus Urine Present (A) None Seen /LPF    RBC Urine 1 <=2 /HPF    WBC Urine 1 <=5 /HPF    Squamous Epithelials Urine 1 <=1 /HPF    Narrative    Urine Culture ordered based on laboratory criteria   CBC with platelets, differential     Status: Abnormal    Collection Time: 06/09/22  8:08 PM    Narrative    The following orders were created for panel order CBC with platelets, differential.  Procedure                               Abnormality         Status                     ---------                               -----------         ------                     CBC with platelets and d...[[id number]]  Abnormal            Final result                 Please view results for these tests on the individual orders. "   Comprehensive metabolic panel     Status: Normal    Collection Time: 06/09/22  8:08 PM   Result Value Ref Range    Sodium 140 133 - 144 mmol/L    Potassium 3.9 3.4 - 5.3 mmol/L    Chloride 106 94 - 109 mmol/L    Carbon Dioxide (CO2) 29 20 - 32 mmol/L    Anion Gap 5 3 - 14 mmol/L    Urea Nitrogen 24 7 - 30 mg/dL    Creatinine 0.96 0.52 - 1.04 mg/dL    Calcium 9.5 8.5 - 10.1 mg/dL    Glucose 90 70 - 99 mg/dL    Alkaline Phosphatase 99 40 - 150 U/L    AST 13 0 - 45 U/L    ALT 26 0 - 50 U/L    Protein Total 7.7 6.8 - 8.8 g/dL    Albumin 4.2 3.4 - 5.0 g/dL    Bilirubin Total 0.6 0.2 - 1.3 mg/dL    GFR Estimate 66 >60 mL/min/1.73m2   Lipase     Status: Normal    Collection Time: 06/09/22  8:08 PM   Result Value Ref Range    Lipase 126 73 - 393 U/L   CBC with platelets and differential     Status: Abnormal    Collection Time: 06/09/22  8:08 PM   Result Value Ref Range    WBC Count 11.5 (H) 4.0 - 11.0 10e3/uL    RBC Count 5.51 (H) 3.80 - 5.20 10e6/uL    Hemoglobin 16.6 (H) 11.7 - 15.7 g/dL    Hematocrit 49.0 (H) 35.0 - 47.0 %    MCV 89 78 - 100 fL    MCH 30.1 26.5 - 33.0 pg    MCHC 33.9 31.5 - 36.5 g/dL    RDW 12.9 10.0 - 15.0 %    Platelet Count 250 150 - 450 10e3/uL    % Neutrophils 50 %    % Lymphocytes 41 %    % Monocytes 7 %    % Eosinophils 1 %    % Basophils 1 %    % Immature Granulocytes 0 %    NRBCs per 100 WBC 0 <1 /100    Absolute Neutrophils 5.7 1.6 - 8.3 10e3/uL    Absolute Lymphocytes 4.7 0.8 - 5.3 10e3/uL    Absolute Monocytes 0.8 0.0 - 1.3 10e3/uL    Absolute Eosinophils 0.1 0.0 - 0.7 10e3/uL    Absolute Basophils 0.1 0.0 - 0.2 10e3/uL    Absolute Immature Granulocytes 0.0 <=0.4 10e3/uL    Absolute NRBCs 0.0 10e3/uL   [facility] Draw     Status: None    Collection Time: 06/09/22  8:09 PM    Narrative    The following orders were created for panel order [place] Draw.  Procedure                               Abnormality         Status                     ---------                               -----------          ------                     Extra Blue Top Tube[[id number]]                              Final result               Extra Red Top Tube[[id number]]                               Final result                 Please view results for these tests on the individual orders.   Extra Blue Top Tube     Status: None    Collection Time: 06/09/22  8:09 PM   Result Value Ref Range    Hold Specimen JIC    Extra Red Top Tube     Status: None    Collection Time: 06/09/22  8:09 PM   Result Value Ref Range    Hold Specimen JIC    CT Abdomen Pelvis w Contrast     Status: None    Collection Time: 06/09/22  9:05 PM    Narrative    EXAM: CT ABDOMEN PELVIS W CONTRAST  LOCATION: [facility]  DATE/TIME: 6/9/2022 8:56 PM    INDICATION: Epigastric area abdominal pain, nausea, vomiting.  COMPARISON: CT abdomen and pelvis 6/28/2021  TECHNIQUE: CT scan of the abdomen and pelvis was performed following injection of IV contrast. Multiplanar reformats were obtained. Dose reduction techniques were used.  CONTRAST: 100 mL Isovue 370    FINDINGS:   LOWER CHEST: Subsegmental atelectasis or scarring in the lingula.    HEPATOBILIARY: Cholecystectomy likely accounts for dilatation of the central intrahepatic bile ducts and the common bile duct as seen previously.    PANCREAS: Normal.    SPLEEN: Normal.    ADRENAL GLANDS: Normal.    KIDNEYS/BLADDER: Cortical scarring lower poles of both kidneys with a few tiny nonobstructing stones lower pole left kidney, unchanged.    BOWEL: Diverticula sigmoid colon, without evidence for diverticulitis or bowel obstruction. Normal appendix.    LYMPH NODES: Normal.    VASCULATURE: Unremarkable.    PELVIC ORGANS: Normal.    MUSCULOSKELETAL: Degenerative disc disease L5 level.      Impression    IMPRESSION:   1.  Cholecystectomy likely accounts for dilatation of the central intrahepatic bile ducts and the common bile duct, unchanged.   2.  No evidence for bowel obstruction.  3.  Sigmoid  diverticulosis, without evidence for diverticulitis.  4.  Cortical scarring both kidneys and tiny nonobstructing stones left kidney.       ED MEDICATIONS:   Medications   lactated ringers BOLUS 1,000 mL (1,000 mLs Intravenous New Bag 6/9/22 2011)   morphine (PF) injection 4 mg (4 mg Intravenous Given 6/9/22 2019)   ondansetron (ZOFRAN) injection 4 mg (4 mg Intravenous Given 6/9/22 2017)   iopamidol (ISOVUE-370) solution 100 mL (100 mLs Intravenous Given 6/9/22 2056)   sodium chloride 0.9 % bag 500mL for CT scan flush use (66 mLs As instructed Given 6/9/22 2057)         Impression:    ICD-10-CM    1. Abdominal pain, unspecified abdominal location  R10.9     trial on bowel regimen.  reglan before meals.  stay hydrated with 64 oz fluid per day.  avoid fodmaps.  once better stay on bowel regimen inteneded for chronic opiod use.  follow-up clinic   2. Dysuria  R30.0     take macrobid twice daily abnd await yurine culture       Plan:    Pending studies include CT of the abdomen pelvis.  She is already on chronic opioids for pain.  If the CT is negative plan treatment for UTI and symptomatic management.  There may be extensive stool on CT and could treat that.  No risks for ischemic bowel no current findings to suggest this.  Minimally elevated white count  Reviewed the imaging results with the patient.  Recommendations as above and precautions given for return.  I did also give her additional information on both bowel regimen as well as a chronic bowel regimen related to chronic opioid use      MD João Manuel Scott J, MD  06/10/22 2328

## 2022-06-10 NOTE — PROGRESS NOTES
" This video/telephone visit will be conducted virtually between you and your provider. We have found that certain health care needs can be provided without the need for an in-person physical exam. This service lets us provide the care you need with a video /telephone conversation. If a prescription is necessary we can send it directly to your pharmacy. If lab work is needed we can place an order for that and you can then stop by our lab to have the test done at a later time.\"   Just as we bill insurance for in-person visits, we also bill insurance for video/telephone visits. If you have questions about your insurance coverage, we recommend that you speak with your insurance company.      Patient/Parent has given verbal consent for video/Telephone visit? yes    Patient would like the video visit invitation sent by: [employer]    Patient verified allergies, medications and pharmacy via phone. Patient states they are ready for visit.         [facility] ([facility]):     Provides Patient Support While Waiting to Access Programmatic and Outpatient MH&A Care and Provides [facility] Assessment Services     FOLLOW-UP VISIT  ____________________________________________      \"The [facility] is a temporary psychiatry service that helps to bridge the time to your next appointment. It is not intended to be a long-term service and you are expected to attend your scheduled appointment with your next provider.\"  [x] Patient/Parent verbalized understanding    If you need support between appointments, please call [phone] and let them know you're seen by [facility]. You may also send a [employer] message to reach us.    General-     Most pressing needs at this time: having a lot of anxiety and intrusive thoughts     Mental Health currently:  \"i'm having a lot of depression and anxiety right now\"    Any changes to your physical health:  Was seen in ER yesterday for ABD pain.      "     Medications-     Injectable medications currently prescribed: no    If yes, do you need an appointment for the next injection: n/a     Any Controlled Substances that you are prescribed: oxycodone    Any refills you are aware that you'll need soon: yes, pended for provider to approve       Primary care provider: Crista Elder MD        DONELL-7 scores:    DONELL-7 SCORE 4/5/2022 6/10/2022   Total Score - -   Total Score - -   Total Score 14 16   Total Score - -       PHQ-9 scores:   PHQ-9 SCORE 5/25/2022 6/10/2022   PHQ-9 Total Score - -   PHQ-9 Total Score MyChart 12 (Moderate depression) -   PHQ-9 Total Score 12 14         Anything the provider should be aware of for today's appointment: not feeling well, dealing with covid symptoms    New (awaiting) Mental health provider or next programming: Marni Collins    Date of scheduled apt: 9/2/22      Vangie Fritz MA on Daniela 10, 2022 at 3:16 PM

## 2022-06-11 LAB — BACTERIA UR CULT: NORMAL

## 2022-06-28 ENCOUNTER — HOSPITAL ENCOUNTER (EMERGENCY)
Facility: CLINIC | Age: 62
Discharge: HOME | End: 2022-06-28
Attending: EMERGENCY MEDICINE | Admitting: EMERGENCY MEDICINE
Payer: COMMERCIAL

## 2022-06-28 ENCOUNTER — APPOINTMENT (OUTPATIENT)
Dept: CT IMAGING | Facility: CLINIC | Age: 62
End: 2022-06-28
Attending: EMERGENCY MEDICINE
Payer: COMMERCIAL

## 2022-06-28 VITALS
WEIGHT: 218 LBS | HEIGHT: 68 IN | RESPIRATION RATE: 18 BRPM | DIASTOLIC BLOOD PRESSURE: 97 MMHG | HEART RATE: 60 BPM | BODY MASS INDEX: 33.04 KG/M2 | SYSTOLIC BLOOD PRESSURE: 158 MMHG | TEMPERATURE: 98.3 F | OXYGEN SATURATION: 97 %

## 2022-06-28 DIAGNOSIS — R10.13 ABDOMINAL PAIN, EPIGASTRIC: ICD-10-CM

## 2022-06-28 LAB
ALBUMIN SERPL BCG-MCNC: 5.1 G/DL (ref 3.5–5.2)
ALBUMIN UR-MCNC: NEGATIVE MG/DL
ALP SERPL-CCNC: 82 U/L (ref 35–104)
ALT SERPL W P-5'-P-CCNC: 16 U/L (ref 10–35)
ANION GAP SERPL CALCULATED.3IONS-SCNC: 14 MMOL/L (ref 7–15)
APPEARANCE UR: CLEAR
AST SERPL W P-5'-P-CCNC: 24 U/L (ref 10–35)
BASOPHILS # BLD AUTO: 0.1 10E3/UL (ref 0–0.2)
BASOPHILS NFR BLD AUTO: 1 %
BILIRUB SERPL-MCNC: 0.5 MG/DL
BILIRUB UR QL STRIP: NEGATIVE
BUN SERPL-MCNC: 18.3 MG/DL (ref 8–23)
CALCIUM SERPL-MCNC: 9.7 MG/DL (ref 8.8–10.2)
CHLORIDE SERPL-SCNC: 103 MMOL/L (ref 98–107)
COLOR UR AUTO: ABNORMAL
CREAT SERPL-MCNC: 0.95 MG/DL (ref 0.51–0.95)
DEPRECATED HCO3 PLAS-SCNC: 23 MMOL/L (ref 22–29)
EOSINOPHIL # BLD AUTO: 0.2 10E3/UL (ref 0–0.7)
EOSINOPHIL NFR BLD AUTO: 2 %
ERYTHROCYTE [DISTWIDTH] IN BLOOD BY AUTOMATED COUNT: 12.8 % (ref 10–15)
GFR SERPL CREATININE-BSD FRML MDRD: 67 ML/MIN/1.73M2
GLUCOSE SERPL-MCNC: 96 MG/DL (ref 70–99)
GLUCOSE UR STRIP-MCNC: NEGATIVE MG/DL
HCT VFR BLD AUTO: 47 % (ref 35–47)
HGB BLD-MCNC: 16 G/DL (ref 11.7–15.7)
HGB UR QL STRIP: NEGATIVE
IMM GRANULOCYTES # BLD: 0 10E3/UL
IMM GRANULOCYTES NFR BLD: 0 %
KETONES UR STRIP-MCNC: NEGATIVE MG/DL
LEUKOCYTE ESTERASE UR QL STRIP: ABNORMAL
LIPASE SERPL-CCNC: 70 U/L (ref 13–60)
LYMPHOCYTES # BLD AUTO: 2.7 10E3/UL (ref 0.8–5.3)
LYMPHOCYTES NFR BLD AUTO: 26 %
MCH RBC QN AUTO: 30.4 PG (ref 26.5–33)
MCHC RBC AUTO-ENTMCNC: 34 G/DL (ref 31.5–36.5)
MCV RBC AUTO: 89 FL (ref 78–100)
MONOCYTES # BLD AUTO: 0.5 10E3/UL (ref 0–1.3)
MONOCYTES NFR BLD AUTO: 5 %
MUCOUS THREADS #/AREA URNS LPF: PRESENT /LPF
NEUTROPHILS # BLD AUTO: 7.1 10E3/UL (ref 1.6–8.3)
NEUTROPHILS NFR BLD AUTO: 66 %
NITRATE UR QL: NEGATIVE
NRBC # BLD AUTO: 0 10E3/UL
NRBC BLD AUTO-RTO: 0 /100
PH UR STRIP: 7.5 [PH] (ref 5–7)
PLATELET # BLD AUTO: 240 10E3/UL (ref 150–450)
POTASSIUM SERPL-SCNC: 4 MMOL/L (ref 3.4–5.3)
PROT SERPL-MCNC: 7.1 G/DL (ref 6.4–8.3)
RBC # BLD AUTO: 5.27 10E6/UL (ref 3.8–5.2)
RBC URINE: <1 /HPF
SODIUM SERPL-SCNC: 140 MMOL/L (ref 136–145)
SP GR UR STRIP: 1.01 (ref 1–1.03)
SQUAMOUS EPITHELIAL: 1 /HPF
TRANSITIONAL EPI: <1 /HPF
UROBILINOGEN UR STRIP-MCNC: NORMAL MG/DL
WBC # BLD AUTO: 10.6 10E3/UL (ref 4–11)
WBC URINE: 1 /HPF

## 2022-06-28 PROCEDURE — 99285 EMERGENCY DEPT VISIT HI MDM: CPT | Performed by: EMERGENCY MEDICINE

## 2022-06-28 PROCEDURE — 80053 COMPREHEN METABOLIC PANEL: CPT | Performed by: EMERGENCY MEDICINE

## 2022-06-28 PROCEDURE — 250N000009 HC RX 250: Performed by: EMERGENCY MEDICINE

## 2022-06-28 PROCEDURE — 96375 TX/PRO/DX INJ NEW DRUG ADDON: CPT | Performed by: EMERGENCY MEDICINE

## 2022-06-28 PROCEDURE — 74177 CT ABD & PELVIS W/CONTRAST: CPT

## 2022-06-28 PROCEDURE — 99285 EMERGENCY DEPT VISIT HI MDM: CPT | Mod: 25 | Performed by: EMERGENCY MEDICINE

## 2022-06-28 PROCEDURE — 250N000011 HC RX IP 250 OP 636: Performed by: EMERGENCY MEDICINE

## 2022-06-28 PROCEDURE — 74177 CT ABD & PELVIS W/CONTRAST: CPT | Mod: 26 | Performed by: RADIOLOGY

## 2022-06-28 PROCEDURE — 96361 HYDRATE IV INFUSION ADD-ON: CPT | Performed by: EMERGENCY MEDICINE

## 2022-06-28 PROCEDURE — 258N000003 HC RX IP 258 OP 636: Performed by: EMERGENCY MEDICINE

## 2022-06-28 PROCEDURE — 96374 THER/PROPH/DIAG INJ IV PUSH: CPT | Mod: 59 | Performed by: EMERGENCY MEDICINE

## 2022-06-28 PROCEDURE — 83690 ASSAY OF LIPASE: CPT | Performed by: EMERGENCY MEDICINE

## 2022-06-28 PROCEDURE — 81001 URINALYSIS AUTO W/SCOPE: CPT | Performed by: EMERGENCY MEDICINE

## 2022-06-28 PROCEDURE — 85025 COMPLETE CBC W/AUTO DIFF WBC: CPT | Performed by: EMERGENCY MEDICINE

## 2022-06-28 PROCEDURE — 36415 COLL VENOUS BLD VENIPUNCTURE: CPT | Performed by: EMERGENCY MEDICINE

## 2022-06-28 RX ORDER — IOPAMIDOL 755 MG/ML
134 INJECTION, SOLUTION INTRAVASCULAR ONCE
Status: COMPLETED | OUTPATIENT
Start: 2022-06-28 | End: 2022-06-28

## 2022-06-28 RX ORDER — SODIUM CHLORIDE 9 MG/ML
INJECTION, SOLUTION INTRAVENOUS CONTINUOUS
Status: DISCONTINUED | OUTPATIENT
Start: 2022-06-28 | End: 2022-06-28 | Stop reason: HOSPADM

## 2022-06-28 RX ORDER — MORPHINE SULFATE 4 MG/ML
4 INJECTION, SOLUTION INTRAMUSCULAR; INTRAVENOUS
Status: COMPLETED | OUTPATIENT
Start: 2022-06-28 | End: 2022-06-28

## 2022-06-28 RX ORDER — ONDANSETRON 2 MG/ML
4 INJECTION INTRAMUSCULAR; INTRAVENOUS EVERY 30 MIN PRN
Status: DISCONTINUED | OUTPATIENT
Start: 2022-06-28 | End: 2022-06-28 | Stop reason: HOSPADM

## 2022-06-28 RX ADMIN — SODIUM CHLORIDE 1000 ML: 9 INJECTION, SOLUTION INTRAVENOUS at 12:03

## 2022-06-28 RX ADMIN — IOPAMIDOL 134 ML: 755 INJECTION, SOLUTION INTRAVENOUS at 14:53

## 2022-06-28 RX ADMIN — ONDANSETRON 4 MG: 2 INJECTION INTRAMUSCULAR; INTRAVENOUS at 12:04

## 2022-06-28 RX ADMIN — SODIUM CHLORIDE, PRESERVATIVE FREE 83 ML: 5 INJECTION INTRAVENOUS at 14:53

## 2022-06-28 RX ADMIN — MORPHINE SULFATE 4 MG: 4 INJECTION INTRAVENOUS at 12:04

## 2022-06-28 NOTE — DISCHARGE INSTRUCTIONS
Please make an appointment to follow up with Your Primary Care Provider and Primary Care Center (phone: [phone]) as soon as possible as needed.

## 2022-06-28 NOTE — ED TRIAGE NOTES
Pt states yesterday she had lower abdominal pain that radiates into her lower back, bilaterally. Burning with urination, no urine discoloration. Loose stools and nausea with vomiting overnight. Very anxious.  States she has oxycodone 10mg prescription for her neck and lumbar herniations .     Triage Assessment     Row Name 06/28/22 1115       Triage Assessment (Adult)    Airway WDL WDL       Respiratory WDL    Respiratory WDL WDL       Skin Circulation/Temperature WDL    Skin Circulation/Temperature WDL WDL       Cardiac WDL    Cardiac WDL WDL       Peripheral/Neurovascular WDL    Peripheral Neurovascular WDL WDL       Cognitive/Neuro/Behavioral WDL    Cognitive/Neuro/Behavioral WDL WDL

## 2022-06-28 NOTE — ED PROVIDER NOTES
[place] EMERGENCY DEPARTMENT ([facility])  6/28/22    History   No chief complaint on file.    HPI  Ilsa Yusuf is a 61 year old female with a history of CKD stage 3 and HLD who presents to the Emergency Department with abdominal pain and back pain. Patient reports she woke up this morning around 5 am with bilateral lower abdominal pain radiating to the low back. Pain is constant. Patient reports she was diagnosed with a UTI about 2 weeks ago and was placed on Macrobid. She states she finished the course of antibiotics 6-7 days and has had continued dysuria since that time. No hematuria. Patient reports she has a known kidney stone. Patient reports dry heaves this morning. She vomited a clear yellow liquid. She notes she temperature has been in the 99s. Patient reports loose stools since yesterday morning. Patient denies chest pain, cough, or shortness of breath.    Past Medical History  Past Medical History:   Diagnosis Date     Arthritis 2012    both knees; hands     Cervical high risk HPV (human papillomavirus) test positive 03/19/2019    see problem list     Depressive disorder      Depressive disorder, not elsewhere classified 08/28/12    [facility] 10/05/12-[facility]     Hyperlipidemia LDL goal < 130     mevacor     Hypothyroid      Moderate major depression (H)     abilify, cymbalta, seroquel, and nuvigibrigida, Dr Alvarez Persamy     Mumps      ABDOUL (obstructive sleep apnea)      PTSD (post-traumatic stress disorder)      Seizure (H) 03/2011    one episode, was on Keppra, EEG negative     Past Surgical History:   Procedure Laterality Date     ABDOMEN SURGERY       BLADDER SURGERY       CHOLECYSTECTOMY       COLONOSCOPY  2012     CYSTOSCOPY       ORTHOPEDIC SURGERY  left knee     renal artery surgery  child    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     TONSILLECTOMY       ureteral reimplantation       ZZC PARTIAL EXCISION THYROID,UNILAT  1991    rt, negative path then, treated with synthroid.  "    albuterol (PROAIR HFA/PROVENTIL HFA/VENTOLIN HFA) 108 (90 Base) MCG/ACT inhaler  buPROPion (WELLBUTRIN SR) 100 MG 12 hr tablet  buPROPion (WELLBUTRIN SR) 200 MG 12 hr tablet  cholecalciferol (VITAMIN D3) 5000 UNITS CAPS capsule  escitalopram (LEXAPRO) 10 MG tablet  escitalopram (LEXAPRO) 5 MG tablet  estradiol (ESTRACE VAGINAL) 0.1 MG/GM vaginal cream  fluticasone (FLONASE) 50 MCG/ACT nasal spray  hydrOXYzine (ATARAX) 25 MG tablet  levothyroxine (SYNTHROID/LEVOTHROID) 175 MCG tablet  lovastatin (MEVACOR) 20 MG tablet  metoclopramide (REGLAN) 10 MG tablet  multivitamin w/minerals (THERA-VIT-M) tablet  nitroFURantoin macrocrystal-monohydrate (MACROBID) 100 MG capsule  Omega-3 Fatty Acids (OMEGA 3 PO)  omeprazole (PRILOSEC) 40 MG DR capsule  ondansetron (ZOFRAN) 4 MG tablet  oxyCODONE IR (ROXICODONE) 10 MG tablet  prazosin (MINIPRESS) 5 MG capsule  tiZANidine (ZANAFLEX) 4 MG tablet  topiramate (TOPAMAX) 25 MG tablet  traZODone (DESYREL) 50 MG tablet  UNABLE TO FIND      Allergies   Allergen Reactions     Gabapentin Other (See Comments)     Patient states she gets \"horrific nightmares\" with this medication     Dilaudid [Hydromorphone Hcl]      Made her feel like her skin was crawling     Nsaids Other (See Comments)     Kidney failure     Compazine [Prochlorperazine] Other (See Comments)     \"Makes my skin crawl, I was going nuts.\"     Family History  Family History   Problem Relation Age of Onset     C.A.D. Father 58         at 58, heart disease     Mental Illness Father      Coronary Artery Disease Father      Hyperlipidemia Father      Diverticulitis Father      Alzheimer Disease Mother         total care now     Depression Mother      Anxiety Disorder Mother      Diabetes Sister         adult onset     Melanoma Sister      Breast Cancer Sister      Thyroid Disease Sister      Diabetes Maternal Grandmother      Social History   Social History     Tobacco Use     Smoking status: Former Smoker     Packs/day: " "0.30     Quit date: 2015     Years since quittin.3     Smokeless tobacco: Never Used     Tobacco comment: recreational   Vaping Use     Vaping Use: Never used   Substance Use Topics     Alcohol use: Not Currently     Alcohol/week: 0.0 standard drinks     Drug use: Yes     Comment: medical marijuana      Past medical history, past surgical history, medications, allergies, family history, and social history were reviewed with the patient. No additional pertinent items.       Review of Systems  A complete review of systems was performed with pertinent positives and negatives noted in the HPI, and all other systems negative.    Physical Exam   BP: 136/81  Pulse: 68  Temp: 98.2  F (36.8  C)  Resp: 20  Height: 172.7 cm (5' 8\")  Weight: 98.9 kg (218 lb)  SpO2: 98 %  Physical Exam  Constitutional:       General: She is not in acute distress.     Appearance: Normal appearance.   HENT:      Head: Normocephalic.      Nose: Nose normal.      Mouth/Throat:      Mouth: Mucous membranes are moist.   Eyes:      Extraocular Movements: Extraocular movements intact.      Pupils: Pupils are equal, round, and reactive to light.   Cardiovascular:      Rate and Rhythm: Normal rate and regular rhythm.      Pulses: Normal pulses.   Pulmonary:      Effort: Pulmonary effort is normal. No respiratory distress.      Breath sounds: No wheezing or rales.   Abdominal:      General: Abdomen is flat.      Palpations: Abdomen is soft.      Tenderness: There is abdominal tenderness (Epigastric and bilateral lower quadrant). There is no guarding or rebound.   Musculoskeletal:         General: Normal range of motion.      Cervical back: Normal range of motion.   Neurological:      General: No focal deficit present.      Mental Status: She is alert and oriented to person, place, and time.       ED Course   11:30 AM  The patient was seen and examined by Lázaro Nice MD in Room ED08.        Procedures          Results for orders placed or " performed during the hospital encounter of 06/28/22   CT Abdomen Pelvis w Contrast     Status: None    Narrative    EXAMINATION: CT ABDOMEN PELVIS W CONTRAST 6/28/2022 3:04 PM      HISTORY: Abd pain, b/l flank pain and dysuria, tenderness in  epigastric region    COMPARISON: CT abdomen and pelvis 6/9/2022    PROCEDURE COMMENTS: CT of the abdomen and pelvis was performed with  134 mL ISOVUE 370 intravenous contrast. Coronal and sagittal  reformatted images were obtained.    FINDINGS:    Lower thorax:   Bibasilar dependent atelectasis.    Abdomen and pelvis:  Hepatomegaly with patchy areas of hypoattenuating liver parenchyma,  suggestive of hepatic steatosis. Mild intrahepatic biliary dilatation,  likely secondary to reservoir effect from cholecystectomy. Mild fatty  atrophy of the pancreas. No pancreatic ductal dilatation. The spleen  and adrenal glands are normal in appearance. Symmetric renal cortical  enhancement with unchanged scarring along the lower poles of the  kidneys. Multiple nonobstructing left lower pole nephrolithiasis  versus cortical calcifications. No hydronephrosis. The urinary bladder  is well-distended and appears unremarkable. No pelvic masses.     No significant free fluid within the pelvis. No free intraperitoneal  air. No abnormally dilated loops of large or small bowel. Small 1.8 cm  duodenal diverticulum arising from the inferior aspect of the third  portion of the duodenum. Mild wall thickening along the gastric  antrum. Colonic diverticulosis without evidence of acute  diverticulitis. No inguinal lymphadenopathy. No abdominal or pelvic  lymphadenopathy by size criteria. The major intra-abdominal  vasculature is widely patent. The infrarenal aorta is normal in  caliber. Mild aortoiliac atherosclerotic calcifications. Small  fat-containing umbilical hernia.    Bones and soft tissues:  Multilevel degenerative changes of the spine. No suspicious or  aggressive appearing bone lesions. Small  lipoma between the right  gluteus medius and minimus muscles.        Impression    IMPRESSION:  1. Mild gastric antral wall thickening, a nonspecific finding, but can  be seen in the setting of gastritis.  2. Unchanged appearance of the kidneys with inferior pole cortical  scarring and multiple punctate nonobstructing left nephrolithiasis  versus cortical calcifications.  3. Colonic diverticulosis without evidence of acute diverticulitis.  Small duodenal diverticulum.  4. Mild intrahepatic biliary dilatation, likely due to reservoir  effect from prior cholecystectomy.  5. Hepatomegaly with hepatic steatosis.    I have personally reviewed the examination and initial interpretation  and I agree with the findings.    SHANT LUGO MD         SYSTEM ID:  [id number]   UA with Microscopic reflex to Culture     Status: Abnormal    Specimen: Urine, Clean Catch   Result Value Ref Range    Color Urine Light Yellow Colorless, Straw, Light Yellow, Yellow    Appearance Urine Clear Clear    Glucose Urine Negative Negative mg/dL    Bilirubin Urine Negative Negative    Ketones Urine Negative Negative mg/dL    Specific Gravity Urine 1.010 1.003 - 1.035    Blood Urine Negative Negative    pH Urine 7.5 (H) 5.0 - 7.0    Protein Albumin Urine Negative Negative mg/dL    Urobilinogen Urine Normal Normal, 2.0 mg/dL    Nitrite Urine Negative Negative    Leukocyte Esterase Urine Trace (A) Negative    Mucus Urine Present (A) None Seen /LPF    RBC Urine <1 <=2 /HPF    WBC Urine 1 <=5 /HPF    Squamous Epithelials Urine 1 <=1 /HPF    Transitional Epithelials Urine <1 <=1 /HPF    Narrative    Urine Culture not indicated   Comprehensive metabolic panel     Status: Normal   Result Value Ref Range    Sodium 140 136 - 145 mmol/L    Potassium 4.0 3.4 - 5.3 mmol/L    Creatinine 0.95 0.51 - 0.95 mg/dL    Urea Nitrogen 18.3 8.0 - 23.0 mg/dL    Chloride 103 98 - 107 mmol/L    Carbon Dioxide (CO2) 23 22 - 29 mmol/L    Anion Gap 14 7 - 15 mmol/L    Glucose  96 70 - 99 mg/dL    Calcium 9.7 8.8 - 10.2 mg/dL    Protein Total 7.1 6.4 - 8.3 g/dL    Albumin 5.1 3.5 - 5.2 g/dL    Bilirubin Total 0.5 <=1.2 mg/dL    Alkaline Phosphatase 82 35 - 104 U/L    AST 24 10 - 35 U/L    ALT 16 10 - 35 U/L    GFR Estimate 67 >60 mL/min/1.73m2   Lipase     Status: Abnormal   Result Value Ref Range    Lipase 70 (H) 13 - 60 U/L   CBC with platelets and differential     Status: Abnormal   Result Value Ref Range    WBC Count 10.6 4.0 - 11.0 10e3/uL    RBC Count 5.27 (H) 3.80 - 5.20 10e6/uL    Hemoglobin 16.0 (H) 11.7 - 15.7 g/dL    Hematocrit 47.0 35.0 - 47.0 %    MCV 89 78 - 100 fL    MCH 30.4 26.5 - 33.0 pg    MCHC 34.0 31.5 - 36.5 g/dL    RDW 12.8 10.0 - 15.0 %    Platelet Count 240 150 - 450 10e3/uL    % Neutrophils 66 %    % Lymphocytes 26 %    % Monocytes 5 %    % Eosinophils 2 %    % Basophils 1 %    % Immature Granulocytes 0 %    NRBCs per 100 WBC 0 <1 /100    Absolute Neutrophils 7.1 1.6 - 8.3 10e3/uL    Absolute Lymphocytes 2.7 0.8 - 5.3 10e3/uL    Absolute Monocytes 0.5 0.0 - 1.3 10e3/uL    Absolute Eosinophils 0.2 0.0 - 0.7 10e3/uL    Absolute Basophils 0.1 0.0 - 0.2 10e3/uL    Absolute Immature Granulocytes 0.0 <=0.4 10e3/uL    Absolute NRBCs 0.0 10e3/uL   CBC with platelets differential     Status: Abnormal    Narrative    The following orders were created for panel order CBC with platelets differential.  Procedure                               Abnormality         Status                     ---------                               -----------         ------                     CBC with platelets and d...[[id number]]  Abnormal            Final result                 Please view results for these tests on the individual orders.     Medications   0.9% sodium chloride BOLUS (0 mLs Intravenous Stopped 6/28/22 4927)     Followed by   sodium chloride 0.9% infusion (has no administration in time range)   ondansetron (ZOFRAN) injection 4 mg (4 mg Intravenous Given 6/28/22 1759)   morphine  (PF) injection 4 mg (4 mg Intravenous Given 6/28/22 1204)   iopamidol (ISOVUE-370) solution 134 mL (134 mLs Intravenous Given 6/28/22 1453)   sodium chloride 0.9 % bag 500mL for CT scan flush use (83 mLs Intravenous Given 6/28/22 1453)        Assessments & Plan (with Medical Decision Making)   Patient presents for abdominal pain she states its in her bilateral flanks rating to her front lower abdomen stating that it is in her flanks and radiates to her bilateral lower abdomen.  She feels that her UTI symptoms have not cleared after being treated with antibiotics.  On exam she has tenderness in the epigastric region along with her bilateral lower quadrants.  She denies any alcohol use or history of pancreatitis.  Discussed with the patient symptomatic control with fluids antiemetics and morphine.  Will obtain laboratory work including LFTs and lipase.  She has had previous cholecystectomy.  Lipase was slightly elevated at 70 but not enough for pancreatitis.  Remainder of labs are benign.  Urine does not appear infected.  CT of the abdomen was obtained to evaluate for any signs of inflammation pancreatitis kidney stone.  Discussed the incidental findings on the CT with the patient.  I discussed that her symptoms may be an early pancreatitis picture given the slightly elevated lipase or possibly gastritis given the imaging.  Advised her to try Maalox and continue her antiacid medication and also advised her of a clear liquid diet in case this is an early pancreatitis.  Discussed with the patient that I do not feel that this would cause her lower pain and dysuria, and that the other incidental findings of diverticula and other sequela do not give a definitive diagnosis for her discomfort.  Advised her that her urine does not appear infected and I do not feel that she needs further antibiotics.  She can follow-up with her urologist for continued lower abdominal pain and dysuria for further evaluation.  I advised her to  follow-up with her primary care physician in regards to the epigastric pain and possible gastritis versus early pancreatitis as an early diagnosis at this time.  She will continue her medications and try a clear liquid diet and avoid any alcohol to see if this helps alleviate her symptoms.  Discussed signs and symptoms to return such as developing fevers or increased epigastric pain nausea vomiting or unable to tolerate orals.  If this should recur she may need to come back for reevaluation as if this is related to pancreatitis and may be early we may need to repeat labs to see if this is elevating.  Patient understands the plan signs and symptoms to return and is discharged in stable condition    I have reviewed the nursing notes. I have reviewed the findings, diagnosis, plan and need for follow up with the patient.    New Prescriptions    No medications on file       Final diagnoses:   Abdominal pain, epigastric     IDominique, am serving as a trained medical scribe to document services personally performed by Lázaro Nice MD, based on the provider's statements to me.      ILázaro MD, was physically present and have reviewed and verified the accuracy of this note documented by Dominique Paige.     --  Lázaro Nice MD  [facility] EMERGENCY DEPARTMENT  6/28/2022     Lázaro Nice MD  06/28/22 1656       Lázaro Nice MD  06/28/22 5564

## 2022-07-15 ENCOUNTER — MYC MEDICAL ADVICE (OUTPATIENT)
Dept: SLEEP MEDICINE | Facility: CLINIC | Age: 62
End: 2022-07-15

## 2022-07-18 ENCOUNTER — OFFICE VISIT (OUTPATIENT)
Dept: FAMILY MEDICINE | Facility: CLINIC | Age: 62
End: 2022-07-18
Payer: COMMERCIAL

## 2022-07-18 VITALS
HEART RATE: 70 BPM | TEMPERATURE: 96.8 F | RESPIRATION RATE: 14 BRPM | DIASTOLIC BLOOD PRESSURE: 66 MMHG | OXYGEN SATURATION: 97 % | HEIGHT: 69 IN | BODY MASS INDEX: 32.66 KG/M2 | WEIGHT: 220.5 LBS | SYSTOLIC BLOOD PRESSURE: 106 MMHG

## 2022-07-18 DIAGNOSIS — J01.90 ACUTE NON-RECURRENT SINUSITIS, UNSPECIFIED LOCATION: Primary | ICD-10-CM

## 2022-07-18 DIAGNOSIS — J02.9 VIRAL PHARYNGITIS: ICD-10-CM

## 2022-07-18 LAB
DEPRECATED S PYO AG THROAT QL EIA: NEGATIVE
GROUP A STREP BY PCR: NOT DETECTED

## 2022-07-18 PROCEDURE — U0003 INFECTIOUS AGENT DETECTION BY NUCLEIC ACID (DNA OR RNA); SEVERE ACUTE RESPIRATORY SYNDROME CORONAVIRUS 2 (SARS-COV-2) (CORONAVIRUS DISEASE [COVID-19]), AMPLIFIED PROBE TECHNIQUE, MAKING USE OF HIGH THROUGHPUT TECHNOLOGIES AS DESCRIBED BY CMS-2020-01-R: HCPCS | Performed by: PHYSICIAN ASSISTANT

## 2022-07-18 PROCEDURE — 99213 OFFICE O/P EST LOW 20 MIN: CPT | Mod: CS | Performed by: PHYSICIAN ASSISTANT

## 2022-07-18 PROCEDURE — 87651 STREP A DNA AMP PROBE: CPT | Performed by: PHYSICIAN ASSISTANT

## 2022-07-18 PROCEDURE — U0005 INFEC AGEN DETEC AMPLI PROBE: HCPCS | Performed by: PHYSICIAN ASSISTANT

## 2022-07-18 ASSESSMENT — PAIN SCALES - GENERAL: PAINLEVEL: NO PAIN (0)

## 2022-07-18 NOTE — PATIENT INSTRUCTIONS
Your symptoms are concerning for COVID-19 or other viral illness.  Your strep test is negative.  A COVID-19 test has been completed.  After completing the test, results should be available within 1-2 days and will be sent to your MyChart.     For symptom management:  -Stop motrin and start Tylenol.  Motrin may be making your stomach distress worse.  -Use Pepto Bismol for nausea and stomach distress   -Continue Nasacort   -For possible sinus infection, you have been prescribed Augmentin      If you have severe symptoms such as chest pain, wheezing, coughing up blood, shortness of breath, or fevers that you cannot control, please go to the emergency department.    Please reach out with any questions or concerns.  Take care,  Marisa Jordan PA-C

## 2022-07-18 NOTE — TELEPHONE ENCOUNTER
LOV: 08/09/2021    Patient currently on wait list for new CPAP device starting 04/19/2022.     Message sent back to patient advising her on current status and plan.     Sharlene Talavera RN on 7/18/2022 at 8:40 AM

## 2022-07-18 NOTE — PROGRESS NOTES
Assessment & Plan     Viral pharyngitis  Acute non-recurrent sinusitis, unspecified location  Patient is a 61-year-old female who presents to clinic due to 2.5 weeks of sore throat, facial pain, headache, chills, congestion, nausea, fatigue, and low-grade elevated temperature.  Vital signs normal.  Physical exam significant for congestion, rhinorrhea, and posterior oropharyngeal erythema.  Rapid strep negative.  Given significant facial pain and congestion, will treat for possible bacterial sinusitis as symptoms have been ongoing for 2.5 weeks.  Patient prescribed Augmentin.  Discussed that symptoms could also be due to viral URI including possibly COVID-19.  We will complete COVID-19 screening.  Discussed OTC symptom management.  Return/follow-up precautions provided.  - Streptococcus A Rapid Screen w/Reflex to PCR - Clinic Collect  - Symptomatic; Yes; 7/4/2022 COVID-19 Virus (Coronavirus) by PCR Nose  - Group A Streptococcus PCR Throat Swab  - amoxicillin-clavulanate (AUGMENTIN) 875-125 MG tablet; Take 1 tablet by mouth 2 times daily for 7 days       See Patient Instructions    Return in about 2 weeks (around 8/1/2022), or if symptoms worsen or fail to improve.    Marisa Jordan PA-C  [facility] LEILANI Alciea is a 61 year old accompanied by her spouse, presenting for the following health issues:  Sick      HPI     Acute Illness  Acute illness concerns: Sore throat with white patches. Patient notes facial pain and throbbing of the face with bending over.  Onset/Duration: 2.5 weeks  Symptoms:  Fever: No, LOW GRADE-99's  Chills/Sweats: YES  Headache (location?): YES  Sinus Pressure: YES  Conjunctivitis:  YES  Ear Pain: YES- no pain but ringing   Rhinorrhea: YES  Congestion: YES  Sore Throat: YES  Cough: no  Wheeze: No  Decreased Appetite: YES  Nausea: YES  Vomiting: YES  Diarrhea: No  Dysuria/Freq.: No  Dysuria or Hematuria: No  Fatigue/Achiness: YES  Sick/Strep Exposure: No  Therapies  "tried and outcome: some improvement with Motrin, green tea and honey.         Objective    /66   Pulse 70   Temp 96.8  F (36  C) (Temporal)   Resp 14   Ht 5' 8.5\" (1.74 m)   Wt 220 lb 8 oz (100 kg)   LMP 08/15/2016 (Approximate)   SpO2 97%   Breastfeeding No   BMI 33.04 kg/m    Body mass index is 33.04 kg/m .  Physical Exam  Vitals and nursing note reviewed.   Constitutional:       General: She is not in acute distress.     Appearance: Normal appearance.   HENT:      Head: Normocephalic and atraumatic.      Nose: Congestion and rhinorrhea present.      Mouth/Throat:      Mouth: Mucous membranes are moist.      Pharynx: Oropharynx is clear. Posterior oropharyngeal erythema present. No oropharyngeal exudate.   Eyes:      Extraocular Movements: Extraocular movements intact.      Pupils: Pupils are equal, round, and reactive to light.   Cardiovascular:      Rate and Rhythm: Normal rate and regular rhythm.      Heart sounds: Normal heart sounds.   Pulmonary:      Effort: Pulmonary effort is normal. No respiratory distress.      Breath sounds: Normal breath sounds. No wheezing, rhonchi or rales.   Musculoskeletal:         General: Normal range of motion.      Cervical back: Normal range of motion.   Lymphadenopathy:      Cervical: No cervical adenopathy.   Skin:     General: Skin is warm and dry.   Neurological:      General: No focal deficit present.      Mental Status: She is alert.   Psychiatric:         Mood and Affect: Mood normal.         Behavior: Behavior normal.            Strep: Negative                 .  ..  "

## 2022-07-19 LAB — SARS-COV-2 RNA RESP QL NAA+PROBE: NEGATIVE

## 2022-07-22 ENCOUNTER — MYC MEDICAL ADVICE (OUTPATIENT)
Dept: BEHAVIORAL HEALTH | Facility: CLINIC | Age: 62
End: 2022-07-22

## 2022-07-29 ENCOUNTER — IMMUNIZATION (OUTPATIENT)
Dept: NURSING | Facility: CLINIC | Age: 62
End: 2022-07-29
Payer: COMMERCIAL

## 2022-07-29 DIAGNOSIS — Z23 HIGH PRIORITY FOR 2019-NCOV VACCINE: Primary | ICD-10-CM

## 2022-07-29 PROCEDURE — 91305 COVID-19,PF,PFIZER (12+ YRS): CPT

## 2022-07-29 PROCEDURE — 0054A COVID-19,PF,PFIZER (12+ YRS): CPT

## 2022-08-02 ENCOUNTER — TRANSFERRED RECORDS (OUTPATIENT)
Dept: HEALTH INFORMATION MANAGEMENT | Facility: CLINIC | Age: 62
End: 2022-08-02

## 2022-08-02 DIAGNOSIS — B37.0 THRUSH: ICD-10-CM

## 2022-08-02 DIAGNOSIS — R39.15 URGENCY OF URINATION: Primary | ICD-10-CM

## 2022-08-04 ENCOUNTER — MYC MEDICAL ADVICE (OUTPATIENT)
Dept: BEHAVIORAL HEALTH | Facility: CLINIC | Age: 62
End: 2022-08-04

## 2022-08-04 ENCOUNTER — TELEPHONE (OUTPATIENT)
Dept: BEHAVIORAL HEALTH | Facility: CLINIC | Age: 62
End: 2022-08-04

## 2022-08-04 DIAGNOSIS — F41.1 GAD (GENERALIZED ANXIETY DISORDER): ICD-10-CM

## 2022-08-04 RX ORDER — HYDROXYZINE HYDROCHLORIDE 25 MG/1
25-50 TABLET, FILM COATED ORAL 3 TIMES DAILY PRN
Qty: 90 TABLET | Refills: 1 | Status: SHIPPED | OUTPATIENT
Start: 2022-08-04 | End: 2022-09-02

## 2022-08-04 NOTE — TELEPHONE ENCOUNTER
Date of Last Office Visit: 6/10/22  Date of Next Office Visit: 9/2/22 Marni Collins  No shows since last visit: no  Cancellations since last visit:     Medication requested: Hydroxyzine 25 Date last ordered: 6/10/22 Qty: 90 Refills: 1       Lapse in medication adherence greater than 5 days?: no  If yes, call patient and gather details: No  Medication refill request verified as identical to current order?: yes  Result of Last DAM, VPA, Li+ Level, CBC, or Carbamazepine Level (at or since last visit): N/A    Last visit treatment plan:   TREATMENT PLAN        Medications:  Start:  -Bupropion/Wellbutrin  mg in the AM for 14 days then 200 mg in the AM thereafter     Continue:  -Escitalopram/Lexapro 10 mg + 5 mg daily thereafter     -Hydroxyzine/Atarax 25 to 50 mg (1 to 2 tabs) up to 3x daily as needed for anxiety      -Prazosin 5 mg at bedtime     -Trazodone 50 to 150 mg (1 to 3 tabs) as needed at bedtime        Labs:  -None obtained              Therapy:  -Recommended in addition to medications           Non-pharmacological modalities:  -Make efforts to get outside/garden and increase activity           Return to Clinic or Referrals:  -[facility] for [facility] in:  7 weeks              Total time: 48 minutes per:     -Review of EMR  -Appointment time   -Documentation              Gina CANTU-CNP, [facility]-BC    []Medication refilled per  Medication Refill in Ambulatory Care  policy.  [x]Medication unable to be refilled by RN due to criteria not met as indicated below:    []Eligibility - not seen in the last year   []Supervision - no future appointment   []Compliance - no shows, cancellations or lapse in therapy   []Verification - order discrepancy   []Controlled medication   [x]Medication not included in policy   []90-day supply request   []Other

## 2022-08-05 ENCOUNTER — LAB (OUTPATIENT)
Dept: LAB | Facility: CLINIC | Age: 62
End: 2022-08-05
Payer: COMMERCIAL

## 2022-08-05 DIAGNOSIS — R39.15 URGENCY OF URINATION: ICD-10-CM

## 2022-08-05 DIAGNOSIS — B37.0 THRUSH: ICD-10-CM

## 2022-08-05 LAB
ALBUMIN UR-MCNC: NEGATIVE MG/DL
APPEARANCE UR: CLEAR
BILIRUB UR QL STRIP: NEGATIVE
COLOR UR AUTO: YELLOW
GLUCOSE UR STRIP-MCNC: NEGATIVE MG/DL
HGB UR QL STRIP: NEGATIVE
KETONES UR STRIP-MCNC: NEGATIVE MG/DL
LEUKOCYTE ESTERASE UR QL STRIP: NEGATIVE
NITRATE UR QL: NEGATIVE
PH UR STRIP: 6 [PH] (ref 5–7)
SP GR UR STRIP: 1.01 (ref 1–1.03)
UROBILINOGEN UR STRIP-ACNC: 0.2 E.U./DL

## 2022-08-05 PROCEDURE — 87088 URINE BACTERIA CULTURE: CPT

## 2022-08-05 PROCEDURE — 87102 FUNGUS ISOLATION CULTURE: CPT

## 2022-08-05 PROCEDURE — 87086 URINE CULTURE/COLONY COUNT: CPT

## 2022-08-05 PROCEDURE — 81003 URINALYSIS AUTO W/O SCOPE: CPT | Mod: QW

## 2022-08-06 LAB
BACTERIA UR CULT: ABNORMAL
BACTERIA UR CULT: ABNORMAL

## 2022-08-08 ENCOUNTER — MYC MEDICAL ADVICE (OUTPATIENT)
Dept: UROLOGY | Facility: CLINIC | Age: 62
End: 2022-08-08

## 2022-08-08 DIAGNOSIS — N39.0 URINARY TRACT INFECTION: Primary | ICD-10-CM

## 2022-08-08 RX ORDER — CEPHALEXIN 500 MG/1
500 CAPSULE ORAL 3 TIMES DAILY
Qty: 21 CAPSULE | Refills: 0 | Status: SHIPPED | OUTPATIENT
Start: 2022-08-08 | End: 2022-08-15

## 2022-08-09 LAB — BACTERIA UR CULT: NO GROWTH

## 2022-08-30 DIAGNOSIS — N39.0 URINARY TRACT INFECTION: Primary | ICD-10-CM

## 2022-08-31 DIAGNOSIS — R39.15 URGENCY OF URINATION: Primary | ICD-10-CM

## 2022-08-31 RX ORDER — PHENAZOPYRIDINE HYDROCHLORIDE 200 MG/1
200 TABLET, FILM COATED ORAL 3 TIMES DAILY PRN
Qty: 21 TABLET | Refills: 0 | Status: SHIPPED | OUTPATIENT
Start: 2022-08-31 | End: 2023-01-17

## 2022-09-01 ASSESSMENT — ANXIETY QUESTIONNAIRES
GAD7 TOTAL SCORE: 13
3. WORRYING TOO MUCH ABOUT DIFFERENT THINGS: MORE THAN HALF THE DAYS
4. TROUBLE RELAXING: NEARLY EVERY DAY
7. FEELING AFRAID AS IF SOMETHING AWFUL MIGHT HAPPEN: MORE THAN HALF THE DAYS
IF YOU CHECKED OFF ANY PROBLEMS ON THIS QUESTIONNAIRE, HOW DIFFICULT HAVE THESE PROBLEMS MADE IT FOR YOU TO DO YOUR WORK, TAKE CARE OF THINGS AT HOME, OR GET ALONG WITH OTHER PEOPLE: VERY DIFFICULT
5. BEING SO RESTLESS THAT IT IS HARD TO SIT STILL: NOT AT ALL
6. BECOMING EASILY ANNOYED OR IRRITABLE: SEVERAL DAYS
GAD7 TOTAL SCORE: 13
2. NOT BEING ABLE TO STOP OR CONTROL WORRYING: MORE THAN HALF THE DAYS
8. IF YOU CHECKED OFF ANY PROBLEMS, HOW DIFFICULT HAVE THESE MADE IT FOR YOU TO DO YOUR WORK, TAKE CARE OF THINGS AT HOME, OR GET ALONG WITH OTHER PEOPLE?: VERY DIFFICULT
7. FEELING AFRAID AS IF SOMETHING AWFUL MIGHT HAPPEN: MORE THAN HALF THE DAYS
GAD7 TOTAL SCORE: 13
1. FEELING NERVOUS, ANXIOUS, OR ON EDGE: NEARLY EVERY DAY

## 2022-09-01 ASSESSMENT — PATIENT HEALTH QUESTIONNAIRE - PHQ9
SUM OF ALL RESPONSES TO PHQ QUESTIONS 1-9: 7
SUM OF ALL RESPONSES TO PHQ QUESTIONS 1-9: 7
10. IF YOU CHECKED OFF ANY PROBLEMS, HOW DIFFICULT HAVE THESE PROBLEMS MADE IT FOR YOU TO DO YOUR WORK, TAKE CARE OF THINGS AT HOME, OR GET ALONG WITH OTHER PEOPLE: SOMEWHAT DIFFICULT

## 2022-09-02 ENCOUNTER — VIRTUAL VISIT (OUTPATIENT)
Dept: PSYCHIATRY | Facility: CLINIC | Age: 62
End: 2022-09-02
Attending: INTERNAL MEDICINE
Payer: COMMERCIAL

## 2022-09-02 VITALS — WEIGHT: 212 LBS | BODY MASS INDEX: 31.77 KG/M2

## 2022-09-02 DIAGNOSIS — F33.1 MAJOR DEPRESSIVE DISORDER, RECURRENT EPISODE, MODERATE (H): ICD-10-CM

## 2022-09-02 DIAGNOSIS — F90.1 ATTENTION-DEFICIT HYPERACTIVITY DISORDER, PREDOMINANTLY HYPERACTIVE TYPE: ICD-10-CM

## 2022-09-02 DIAGNOSIS — F43.10 PTSD (POST-TRAUMATIC STRESS DISORDER): Primary | ICD-10-CM

## 2022-09-02 DIAGNOSIS — F33.1 MODERATE EPISODE OF RECURRENT MAJOR DEPRESSIVE DISORDER (H): ICD-10-CM

## 2022-09-02 DIAGNOSIS — F41.1 GAD (GENERALIZED ANXIETY DISORDER): ICD-10-CM

## 2022-09-02 PROCEDURE — 99205 OFFICE O/P NEW HI 60 MIN: CPT | Mod: 95 | Performed by: NURSE PRACTITIONER

## 2022-09-02 RX ORDER — BUPROPION HYDROCHLORIDE 200 MG/1
200 TABLET, EXTENDED RELEASE ORAL EVERY MORNING
Qty: 30 TABLET | Refills: 1 | Status: SHIPPED | OUTPATIENT
Start: 2022-09-02 | End: 2022-11-04

## 2022-09-02 RX ORDER — ESCITALOPRAM OXALATE 10 MG/1
10 TABLET ORAL DAILY
Qty: 90 TABLET | Refills: 0 | Status: SHIPPED | OUTPATIENT
Start: 2022-09-02 | End: 2022-11-04

## 2022-09-02 RX ORDER — PRAZOSIN HYDROCHLORIDE 5 MG/1
CAPSULE ORAL
Qty: 90 CAPSULE | Refills: 0 | Status: SHIPPED | OUTPATIENT
Start: 2022-09-02 | End: 2022-10-04

## 2022-09-02 RX ORDER — ESCITALOPRAM OXALATE 5 MG/1
5 TABLET ORAL DAILY
Qty: 90 TABLET | Refills: 0 | Status: SHIPPED | OUTPATIENT
Start: 2022-09-02 | End: 2022-11-04

## 2022-09-02 RX ORDER — HYDROXYZINE HYDROCHLORIDE 25 MG/1
25 TABLET, FILM COATED ORAL 3 TIMES DAILY PRN
Qty: 90 TABLET | Refills: 1 | Status: SHIPPED | OUTPATIENT
Start: 2022-09-02 | End: 2022-10-04

## 2022-09-02 NOTE — PATIENT INSTRUCTIONS
Continue medications as prescribed  Have your pharmacy contact us for a refill if you are running low on medications (We may ask you to come into clinic to get a refill from the nurse  No Alcohol or drug use  No driving if sedated  Call the clinic with any questions or concerns   Reach out for help if you feel like hurting yourself or others ([facility] [phone]: [address] [facility], [phone] or [facility] Suicide Hotline   [phone] , call 911 or go to nearest Emergency room     Crisis Resources:    Present to the Emergency Department as needed or call after hours crisis line at [phone] or [phone].   [place] Crisis Text Line: Text [place] to 069370.  Suicide LifeLine Chat: suicidepreventionlifeline.org/chat/.  National Suicide Prevention Lifeline: 782.477.3460 (TTY: 845.293.8173). Call anytime for help.  (www.suicidepreventionlifeline.org)  National [place] on Mental Illness (www.ellie.org): 519.441.2676 or 530-338-6564.  Mental Health Association (www.mentalhealth.org): 988.648.6742 or 851-048-1621.       Follow up as directed, for your appointments, per your After Visit Summary Form.

## 2022-09-02 NOTE — NURSING NOTE
" This video/telephone visit will be conducted via a call between you and your physician/provider. We have found that certain health care needs can be provided without the need for an in-person physical exam. This service lets us provide the care you need with a video /telephone conversation. If a prescription is necessary we can send it directly to your pharmacy. If lab work is needed we can place an order for that and you can then stop by our lab to have the test done at a later time.    Just as we bill insurance for in-person visits, we also bill insurance for video/telephone visits. If you have questions about your insurance coverage, we recommend that you speak with your insurance company.    Patient has given verbal consent for video/Telephone visit? yes    Patient would like a video visit, please connect/call : fran  Reason for visit: new pt/intake  Anything the provider should be aware of for today's appointment: pt stated, \"No\" when asked if she had any concerns today or if she had any questions for provider.     Patient verified allergies, medications and pharmacy via phone.     DONELL-7 scores:    DONELL-7 SCORE 6/10/2022 9/1/2022   Total Score - -   Total Score - 13 (moderate anxiety)   Total Score 16 13   Total Score - -       PHQ-9 scores:   PHQ-9 SCORE 6/10/2022 9/1/2022   PHQ-9 Total Score - -   PHQ-9 Total Score MyChart - 7 (Mild depression)   PHQ-9 Total Score 14 7       Patient states they are ready for visit.    Vangie Fritz MA September 2, 2022 9:38 AM    "

## 2022-09-02 NOTE — PROGRESS NOTES
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 capsule    hydrOXYzine (ATARAX) 25 MG tablet    Attention-deficit hyperactivity disorder, predominantly hyperactive type      Other Visit Diagnoses     Moderate episode of recurrent major depressive disorder (H)        Relevant Medications    buPROPion (WELLBUTRIN SR) 200 MG 12 hr tablet    escitalopram (LEXAPRO) 10 MG tablet    escitalopram (LEXAPRO) 5 MG tablet    hydrOXYzine (ATARAX) 25 MG tablet    DONELL (generalized anxiety disorder)        Relevant Medications    buPROPion (WELLBUTRIN SR) 200 MG 12 hr tablet    escitalopram (LEXAPRO) 10 MG tablet    escitalopram (LEXAPRO) 5 MG tablet    prazosin (MINIPRESS) 5 MG capsule    hydrOXYzine (ATARAX) 25 MG tablet                 CONSULTS/REFERRALS:   Recommend therapy. Referral placed for [facility].  Call [facility] at [phone] if you do not hear from them soon  Coordinate care with therapist as needed    MEDICAL:   None at this time  Coordinate care with PCP (Crista Elder) as needed  Follow up with primary care provider as planned or for acute medical concerns.    PSYCHOEDUCATION:  Medication side effects and alternatives reviewed. Health promotion activities  recommended and reviewed today. All questions addressed. Education and counseling completed regarding risks and benefits of medications and psychotherapy options.  Consent provided by patient/guardian  Call the psychiatric nurse line with medication questions or concerns at [phone].  GoPollGohart may be used to communicate with your provider, but this is not intended to be used for emergencies.  BLACK BOX WARNING: Discussed the Food and Drug Administration (FDA) requires that all antidepressants carry a warning that some children, adolescents and young adults may be at increased risk of suicide when taking antidepressants. Anyone taking an antidepressant should be watched closely for worsening depression or unusual behavior especially in the first few weeks after starting an SSRI. Keep in mind, antidepressants are more likely to reduce suicide risk in the long run by improving mood.   SEROTONIN SYNDROME:  Discussed risks of Serotonin syndrome (ie, serotonin toxicity) which is a potentially life-threatening condition associated with increased serotonergic activity in the central nervous system (CNS). It is seen with therapeutic medication use, inadvertent interactions between drugs, and intentional self-poisoning. Serotonin syndrome may involve a spectrum of clinical findings, which often include mental status changes, autonomic hyperactivity, and neuromuscular abnormalities.    BENZODIAZEPINE:  discussion on how benzos work and the need to use them short term due to potential of anxiety getting.  This is a controlled substance with risk for abuse, need to keep in a safe keep place and cannot replace lost scripts.    HARM REDUCTION:  Discussions regarding effects of mood altering substances, alcohol and cannabis, on mood and that approach is harm reduction, will continue to prescribe meds as they work to cut back use.    FIRST GENERATION ANTIPSYCHOTIC/ SECOND GENERATION ANTIPSYCHOTIC USE:  Atypical need for  cardiometabolic monitoring with medication- B/P, weight, blood sugar, cholesterol.  Need to monitor for abnormal movements taught  SAFETY:  We all care about your loved one's safety. To reduce the risk of self-harm, remove access to all:  Firearms, Medicines (both prescribed and over-the-counter), Knives and other sharp objects, Ropes and like materials, and Alcohol  SLEEP HYGIENE: establish a sleep routine, limit screen time 1 hour prior to bed, use bed for sleep only, take sleep/medications on time (including sleepy time tea, trazadone or herbal treatments such as melatonin), aroma therapy, limit caffeine/sugar, yoga, guided imagery, stretch, meditation, limit naps to 20 minutes, make a temperature change in the room, white noise, be mindful of slowing down breathing, take a warm bath/shower, frequently wash sheets, and journaling.   Medlineplus.gov is information for patients.  It is run by the [employer] of Medicine and it contains information about all disorders, diseases and all medications.      COMMUNITY RESOURCES:    CRISIS NUMBERS: Provided in AVS 9/2/2022  National Suicide Prevention Lifeline: 3-846-136-TALK (678-022-9171)  Justyle/resources for a list of additional resources (SOS)            [facility] - [phone]   Urgent Care Adult Mental [place]-[phone] mobile unit/ 24/7 crisis line  [facility] -[phone]   COPE 24/7 [place] Mobile Team -[phone] (adults)/ [phone] (child)  Poison Control Center - 1-696.422.7751    OR  go to nearest ER  Crisis Text Line for any crisis 24/7 send this-   To: 037563   [facility] ([facility]) [facility]  [phone]  National Suicide Prevention Lifeline: 894.999.7994 (TTY: 770.356.2584). Call anytime for help.  (www.suicidepreventionlifeline.org)  National [place] on Mental Illness (www.ellie.org): 642.407.8066 or 013-653-4603.   Mental Health Association (www.mentalhealth.org): 142.481.6250 or  442-915-1568.  [place] Crisis Text Line: Text [place] to 585325  Suicide LifeLine Chat: suicidepreventionlifeline.org/chat    ADMINISTRATIVE BILLIN min spent interviewing patient, reviewing referral documents, obtaining and reviewing outside records, communication with other health specialists, and preparing this report on today's date: 2022  Video/Phone Start Time: 1000  Video/Phone End Time:       Signed:     Marni Collins, MSN, APRN, PMHNP-BC     Chart documentation done in part with Dragon Voice Recognition software.  Although reviewed after completion, some word and grammatical errors may remain.  Answers for HPI/ROS submitted by the patient on 2022  If you checked off any problems, how difficult have these problems made it for you to do your work, take care of things at home, or get along with other people?: Somewhat difficult  PHQ9 TOTAL SCORE: 7  DONELL 7 TOTAL SCORE: 13

## 2022-09-03 ENCOUNTER — HEALTH MAINTENANCE LETTER (OUTPATIENT)
Age: 62
End: 2022-09-03

## 2022-09-07 ENCOUNTER — TRANSFERRED RECORDS (OUTPATIENT)
Dept: HEALTH INFORMATION MANAGEMENT | Facility: CLINIC | Age: 62
End: 2022-09-07

## 2022-09-13 ENCOUNTER — MYC MEDICAL ADVICE (OUTPATIENT)
Dept: PSYCHIATRY | Facility: CLINIC | Age: 62
End: 2022-09-13

## 2022-09-13 DIAGNOSIS — F33.1 MAJOR DEPRESSIVE DISORDER, RECURRENT EPISODE, MODERATE (H): Primary | ICD-10-CM

## 2022-09-14 ENCOUNTER — PATIENT OUTREACH (OUTPATIENT)
Dept: CARE COORDINATION | Facility: CLINIC | Age: 62
End: 2022-09-14
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
## 2022-09-14 NOTE — LETTER
[facility] CARE COORDINATION  September 14, 2022      Dear Ilsa,        I am a clinic care coordinator who works with Crista Elder MD with the [facility]. I wanted to thank you for spending the time to talk with me.  Below is a list of the resources we spoke about on the phone.      [facility]:  -Emergency shelter  -website: https://OZ SafeRooms.org/about/  -Phone: [phone]    Housing Link:  -place where you can look up housing options. You can use the filter to look at places in a specific area, price point, etc  -website: https://www.housinglink.org/     Senior Linkage Line:  -resources through [facility]  -website: [place]://[place]age[place]/older-adults/housing/    -also on that website is a link to another filter for housing called   [employer]: https://www.HubHuman.Dizzion/        Take a look at these websites by copying and pasting the links to your internet search. I will give you a call tomorrow and we can go through them as well if you have questions. Thank you again for telling me about your situation.    Sincerely,     ALEXI Paiz? Social Work Care Coordinator   [facility]  [email or web address]? [email or web address]    Phone: [phone]  she/her

## 2022-09-15 ENCOUNTER — PATIENT OUTREACH (OUTPATIENT)
Dept: CARE COORDINATION | Facility: CLINIC | Age: 62
End: 2022-09-15

## 2022-09-15 NOTE — PROGRESS NOTES
Clinic Care Coordination Contact    Follow Up Progress Note      Assessment: ARABELLA RENNER spoke to pt to see if she received housing resources. Pt reported that she could not see the message in [employer]. ARABELLA RENNER asked if pt would like them sent via email and pt verbally agreed to this. ARABELLA RENNER sent resources via email and pt was able to see them. ARABELLA RENNER went over each resource with pt and spoke about how to use the housing filters. ARABELLA RENNER advised pt speak to [facility] to see if they have immediate openings or how long their waitlist is. ARABELLA RENNER also advised pt look into the housing filters and then she could follow up with her to see how it went. Pt said she would like this and asked ARABELLA RENNER to call her on Monday (9/19).    Care Gaps:    Health Maintenance Due   Topic Date Due     ZOSTER IMMUNIZATION (1 of 2) Never done     LUNG CANCER SCREENING  Never done     MEDICARE ANNUAL WELLNESS VISIT  10/19/2017     URINE DRUG SCREEN  02/06/2018     ADVANCE CARE PLANNING  02/17/2020     PAP FOLLOW-UP  03/19/2020     HPV FOLLOW-UP  03/19/2020     INFLUENZA VACCINE (1) 09/01/2022     Care Plans      Intervention/Education provided during outreach: Housing resources     The patient consented via Verbal consent to have contact information and resources sent via email in an unencrypted manner.    Plan: Pt will call [facility] and look into housing resources  Care Coordinator will follow up in 2 days.    ALEXI Paiz? Social Work Care Coordinator   [facility]  [email or web address]? [email or web address]    Phone: [phone]  she/her

## 2022-09-19 ENCOUNTER — PATIENT OUTREACH (OUTPATIENT)
Dept: CARE COORDINATION | Facility: CLINIC | Age: 62
End: 2022-09-19

## 2022-09-19 NOTE — PROGRESS NOTES
Clinic Care Coordination Contact  [facility]/Voicemail       Clinical Data: Care Coordinator Outreach  Outreach attempted x 2.  Left message on patient's voicemail with call back information and requested return call.  Plan:Care Coordinator will try to reach patient again in 1-2 business days.    ALEXI Paiz? Social Work Care Coordinator   [facility]  [email or web address]? [email or web address]    Phone: [phone]  she/her

## 2022-09-21 ENCOUNTER — PATIENT OUTREACH (OUTPATIENT)
Dept: CARE COORDINATION | Facility: CLINIC | Age: 62
End: 2022-09-21

## 2022-09-21 DIAGNOSIS — R30.0 DYSURIA: ICD-10-CM

## 2022-09-21 DIAGNOSIS — N39.0 URINARY TRACT INFECTION: Primary | ICD-10-CM

## 2022-09-21 NOTE — PROGRESS NOTES
Patient called nurse line and informed this nurse that she is having pain in urethral area with urination. She also states that when she has a catheter it is usually excrucating. She is going to leave a UAUC at a [facility] lab. Orders have been placed and patient was informed that results take 2-3 days. She is aware to try AZO in the meantime for symptoms. Patient is going to reschedule her cystoscopy from today. Transferred patient's call to scheduling.     Stefany Gallo LPN

## 2022-09-21 NOTE — PROGRESS NOTES
Clinic Care Coordination Contact    Follow Up Progress Note      Assessment: ARABELLA RENNER spoke to pt about update on housing. Pt reported that she has been feeling more stressed over her financial situation due to some conflict that happened over the weekend. ARABELLA RENNER validated the feeling of being overhwelmed and discussed other conversations she had with family this past weekend. Pt reported she also spoke to her sister about possible apartments and pt had options to look into. ARABELLA RENNER asked if pt had also been able to look over resources sent by her and pt reported yes. Pt stated that she was also able to apply for section 8 and made an account for housing link to look at possible options. ARABELLA RENNER validated pt that this was a positive step she made and to continue focusing on one step at a time. SW asked if there were any further resources pt was looking for at this time while looking for housing and pt stated no. Pt asked for follow up in one week.     Care Gaps:    Health Maintenance Due   Topic Date Due     ZOSTER IMMUNIZATION (1 of 2) Never done     LUNG CANCER SCREENING  Never done     MEDICARE ANNUAL WELLNESS VISIT  10/19/2017     URINE DRUG SCREEN  02/06/2018     ADVANCE CARE PLANNING  02/17/2020     PAP FOLLOW-UP  03/19/2020     HPV FOLLOW-UP  03/19/2020     INFLUENZA VACCINE (1) 09/01/2022     COVID-19 Vaccine (5 - Booster for Pfizer series) 09/23/2022     LIPID  10/18/2022       Care Plans  Care Plan: Housing     Problem: LACK OF STABLE HOUSING     Goal: Establish Stable Housing     Start Date: 9/15/2022 Expected End Date: 12/15/2022    Priority: High    Note:     Barriers: Lack of availability of housing   Strengths: Strong advocate of self, knows the steps to take, follows through on resources  Patient expressed understanding of goal: Yes  Action steps to achieve this goal:  1. I will look through resources sent by ARABELLA RENNER in the next following weeks in order to look at housing options available.  2. I will apply to  section 8 voucher in the next month in order to get on the wait list  3. I will look into housing link and apply to places that fit my budget and other needs  4. I will follow up with ARABELLA RENNER and ask questions or express concerns when needed                          Intervention/Education provided during outreach: Housing options     Outreach Frequency: weekly    Plan: Pt will continue looking into housing options   Care Coordinator will follow up in 1 week    ALEXI Paiz? Social Work Care Coordinator   [facility]  [email or web address]? [email or web address]    Phone: [phone]  she/her

## 2022-09-26 ENCOUNTER — TELEPHONE (OUTPATIENT)
Dept: UROLOGY | Facility: CLINIC | Age: 62
End: 2022-09-26

## 2022-09-26 NOTE — TELEPHONE ENCOUNTER
Patient spoke with scheduling this morning and was transferred to this nurse. She is not sure she can do the Cystoscopy in the office. Especially if she is symptomatic. Patient was advised to leave a UAUC last week, but did not drop one off. She plans to go today and will wait for culture results. Explained that this may take 2-3 days. She was instructed to keep taking AZO for urinary relief and can also take Tylenol Extra Strength. Will look at possibly moving appointment or if patient would like to have cysto done in the OR.     Stefany Gallo LPN

## 2022-09-27 ENCOUNTER — LAB (OUTPATIENT)
Dept: LAB | Facility: CLINIC | Age: 62
End: 2022-09-27
Payer: COMMERCIAL

## 2022-09-27 DIAGNOSIS — N39.0 URINARY TRACT INFECTION: ICD-10-CM

## 2022-09-27 DIAGNOSIS — R30.0 DYSURIA: ICD-10-CM

## 2022-09-27 PROCEDURE — 81003 URINALYSIS AUTO W/O SCOPE: CPT | Mod: QW

## 2022-09-28 ENCOUNTER — PATIENT OUTREACH (OUTPATIENT)
Dept: CARE COORDINATION | Facility: CLINIC | Age: 62
End: 2022-09-28

## 2022-09-28 NOTE — PROGRESS NOTES
Clinic Care Coordination Contact  [facility]/Voicemail       Clinical Data: Care Coordinator Outreach  Outreach attempted x 2.  Left message on patient's voicemail with call back information and requested return call.  Plan: Care Coordinator will try to reach patient again in 3-5 business days.    ALEXI Paiz? Social Work Care Coordinator   [facility]  [email or web address]? [email or web address]    Phone: [phone]  she/her

## 2022-09-29 NOTE — TELEPHONE ENCOUNTER
Returned phone call to patient and she was informed that a urine culture was not done at recent lab visit where she dropped off her specimen. The order was there, however, it was never released. Patient was offered to go back to a [facility] lab and have them do a urine culture as order is still in the computer. She did not want to do this as UA was completely negative. Patient requests that Cystoscopy be done under sedation as she really has excruciating pain down in that area. Patient does not want this done in office. Informed patient that I would send a message to MD. Stefany Gallo LPN

## 2022-10-04 ENCOUNTER — VIRTUAL VISIT (OUTPATIENT)
Dept: PSYCHIATRY | Facility: CLINIC | Age: 62
End: 2022-10-04
Payer: COMMERCIAL

## 2022-10-04 ENCOUNTER — TRANSFERRED RECORDS (OUTPATIENT)
Dept: HEALTH INFORMATION MANAGEMENT | Facility: CLINIC | Age: 62
End: 2022-10-04

## 2022-10-04 DIAGNOSIS — F41.1 GAD (GENERALIZED ANXIETY DISORDER): ICD-10-CM

## 2022-10-04 DIAGNOSIS — F43.10 PTSD (POST-TRAUMATIC STRESS DISORDER): ICD-10-CM

## 2022-10-04 PROCEDURE — 99214 OFFICE O/P EST MOD 30 MIN: CPT | Mod: 95 | Performed by: NURSE PRACTITIONER

## 2022-10-04 RX ORDER — HYDROXYZINE HYDROCHLORIDE 25 MG/1
25 TABLET, FILM COATED ORAL 3 TIMES DAILY PRN
Qty: 90 TABLET | Refills: 1 | Status: SHIPPED | OUTPATIENT
Start: 2022-10-04 | End: 2022-11-04

## 2022-10-04 RX ORDER — PRAZOSIN HYDROCHLORIDE 5 MG/1
CAPSULE ORAL
Qty: 90 CAPSULE | Refills: 0 | Status: SHIPPED | OUTPATIENT
Start: 2022-10-04 | End: 2022-11-04

## 2022-10-04 NOTE — PATIENT INSTRUCTIONS
Continue medications as prescribed  Have your pharmacy contact us for a refill if you are running low on medications (We may ask you to come into clinic to get a refill from the nurse  No Alcohol or drug use  No driving if sedated  Call the clinic with any questions or concerns   Reach out for help if you feel like hurting yourself or others ([facility] [phone]: [address], [address] or [facility] Suicide Hotline   [phone] , call 911 or go to nearest Emergency room     Crisis Resources:    Present to the Emergency Department as needed or call after hours crisis line at [phone] or [phone].   [place] Crisis Text Line: Text [place] to 235820.  Suicide LifeLine Chat: suicidepreventionlifeline.org/chat/.  National Suicide Prevention Lifeline: 438.931.2904 (TTY: 738.128.6639). Call anytime for help.  (www.suicidepreventionlifeline.org)  National [place] on Mental Illness (www.ellie.org): 641.820.4238 or 279-255-4659.  Mental Health Association (www.mentalhealth.org): 195.806.1503 or 291-926-7086.       Follow up as directed, for your appointments, per your After Visit Summary Form.

## 2022-10-04 NOTE — NURSING NOTE
" This video/telephone visit will be conducted via a call between you and your physician/provider. We have found that certain health care needs can be provided without the need for an in-person physical exam. This service lets us provide the care you need with a video /telephone conversation. If a prescription is necessary we can send it directly to your pharmacy. If lab work is needed we can place an order for that and you can then stop by our lab to have the test done at a later time.    Just as we bill insurance for in-person visits, we also bill insurance for video/telephone visits. If you have questions about your insurance coverage, we recommend that you speak with your insurance company.    Patient has given verbal consent for video/Telephone visit? yes    Patient would like a video visit, please connect/call : fran  Reason for visit: follow up  Anything the provider should be aware of for today's appointment: having a hard time right now, feels like she has to constantly remind herself that \"everything is okay\", \"you're going to be ok\". Worries a lot and feels horrible about herself. Wondering when is the best time to take her escitalopram and wellbutrin. Takes the wellbutrin first thing in the morning upon waking up and the escitalopram during the afternoon when she is on lunch.    Patient verified allergies, medications and pharmacy via phone.     DONELL-7 scores:    DONELL-7 SCORE 6/10/2022 9/1/2022   Total Score - -   Total Score - 13 (moderate anxiety)   Total Score 16 13   Total Score - -       PHQ-9 scores:   PHQ-9 SCORE 6/10/2022 9/1/2022   PHQ-9 Total Score - -   PHQ-9 Total Score MyChart - 7 (Mild depression)   PHQ-9 Total Score 14 7       Patient states they are ready for visit.    Vangie Fritz MA October 4, 2022 11:12 AM    "

## 2022-10-04 NOTE — PROGRESS NOTES
PSYCHIATRIC MEDICATION FOLLOW UP APPT     Name:  Ilsa Yusuf  : 1960    Ilsa Yusuf is a 62 year old female who is being evaluated via a billable telemedicine  visit.      Telemedicine Visit: The patient's condition can be safely assessed and treated via synchronous audio and visual telemedicine encounter.      Reason for Telemedicine Visit: COVID 19 pandemic and the social and physical recommendations by the CDC and MD.      Originating Site (Patient Location): Patient's home    Distant Site (Provider Location): Provider Remote Setting    Consent:  The patient/guardian has verbally consented to: the potential risks and benefits of telemedicine (video visit or phone) versus in person care; bill my insurance or make self-payment for services provided; and responsibility for payment of non-covered services.     Mode of Communication:  [employer] platform     As the provider I attest to compliance with applicable laws and regulations related to telemedicine.    IDENTIFICATION   Patient is a 62 year old female with a history of MDD DONELL and PTSD     Patient attended the phone/video session alone.    Last seen for outpatient psychiatry Consultation on 2022      FOLLOWING PLAN PUT INTO PLACE:   Ilsa Yusuf is a 62 year old White Not  or  female here today to establish long-term psychiatric care for ongoing symptoms and medication management.  Patient reports history of depression anxiety and PTSD.  She has been taking medication to manage symptoms.  She is also tried therapy off and on in the past.  She was seen at the [facility] and had change of medication.  Today she reports symptoms of gotten better with a change of medications.  Today she reports her big concern is bladder pain and gastritis.  She reports poor quality of life due to pain.  She reports high motivation and increased energy but she is not able to do anything due to pain.  She is being  followed by a urologist that she is not happy in their practice.  She reports it is hard to get hold of them and they never returned calls or respond to messages.  Patient reports taking hydroxyzine for anxiety.  She reports it makes her sleepy especially during the day.  She feels he does not help with anxiety.  Patient will take 25 mg instead of 50 mg due to drowsiness during the day.  Patient will also benefit from therapy.  We will put a referral for psychotherapy.  Patient denies negative thoughts.  She reports feeling safe at home.  She reports she would like to do a lot but she is limited due to pain.  She spends all day reading novels.      Patient and I reviewed diagnosis and treatment plan and patient agrees with following recommendations:     PLAN AND RECOMMENDATIONS:  1.  Continue to take Wellbutrin  mg every morning    .  2.  Continue to take trazodone 50 mg 1 to 3 tablets ( mg) at bedtime as needed  3.  Continue to take Lexapro 15 mg every day  4.  Take hydroxyzine 25 mg instead of 50 mg 3 times daily as needed for anxiety    5.  Continue to take prazosin 5 mg at bedtime  6. Follow up again in 4-6 weeks for medication review and evaluation.         INTERIM HISTORY       RECORDS AVAILABLE FOR REVIEW: EHR records through [facility] .        FAMILY, MEDICAL, SURGICAL HISTORY REVIEWED.  MEDICATION HAVE BEEN REVIEWED AND ARE CURRENT TO THE BEST OF MY KNOWLEDGE AND ABILITY.    MEDICATIONS                                                                                                Current Outpatient Medications   Medication Sig     albuterol (PROAIR HFA/PROVENTIL HFA/VENTOLIN HFA) 108 (90 Base) MCG/ACT inhaler Inhale 1-2 puffs into the lungs every 4 hours as needed for shortness of breath / dyspnea or wheezing     buPROPion (WELLBUTRIN SR) 200 MG 12 hr tablet Take 1 tablet (200 mg) by mouth every morning     cholecalciferol (VITAMIN D3) 5000 UNITS CAPS capsule Take 1 capsule (5,000 Units) by mouth  daily Take 1 per day     escitalopram (LEXAPRO) 10 MG tablet Take 1 tablet (10 mg) by mouth daily In addition to 5 mg for ttl: 15 mg/day     escitalopram (LEXAPRO) 5 MG tablet Take 1 tablet (5 mg) by mouth daily In addition to 10 mg for ttl: 15 mg/day     fluticasone (FLONASE) 50 MCG/ACT nasal spray Spray 2 sprays into both nostrils 2 times daily     hydrOXYzine (ATARAX) 25 MG tablet Take 1 tablet (25 mg) by mouth 3 times daily as needed for anxiety     levothyroxine (SYNTHROID/LEVOTHROID) 175 MCG tablet TAKE 1 TABLET(175 MCG) BY MOUTH DAILY     lovastatin (MEVACOR) 20 MG tablet TAKE 1 TABLET(20 MG) BY MOUTH AT BEDTIME     multivitamin w/minerals (THERA-VIT-M) tablet Take 1 tablet by mouth daily     Omega-3 Fatty Acids (OMEGA 3 PO) Take 2 g by mouth daily Takes 1 in am and 1 at bedtime     omeprazole (PRILOSEC) 40 MG DR capsule Take 1 capsule (40 mg) by mouth daily     ondansetron (ZOFRAN) 4 MG tablet Take 1 tablet (4 mg) by mouth every 6 hours as needed for nausea     oxyCODONE IR (ROXICODONE) 10 MG tablet TK 1 TO 2 TABLETS PO EVERY FOUR TO SIX HOURS AS NEEDED FOR PAIN MAX 6 TABLETS A DAY     phenazopyridine (PYRIDIUM) 200 MG tablet Take 1 tablet (200 mg) by mouth 3 times daily as needed for irritation     prazosin (MINIPRESS) 5 MG capsule TAKE 1 CAPSULE(5 MG) BY MOUTH AT BEDTIME     tiZANidine (ZANAFLEX) 4 MG tablet Take 1-3 tablets (4-12 mg) by mouth 3 times daily as needed for muscle spasms     traZODone (DESYREL) 50 MG tablet TAKE 1 TO 3 TABLETS(50  MG) BY MOUTH EVERY NIGHT AS NEEDED FOR SLEEP     UNABLE TO FIND MEDICATION NAME: medical cannibus     No current facility-administered medications for this visit.        PAST  MEDICATIONS TRIALS:  SSRIs:  -Zoloft     TCAs:  -Doxepin     Other antidepressants:  -Mirtazapine        Mood stabilizers:  -Depakote        Antipsychotics:  -Abilify  -Seroquel  -Zyprexa     ADHD meds:  -Adderall 20 mg: stopped in Feb/2022 due to tachycardia, elevated bp, SOB, and  tiredness   -Vyvanse  -Nuvigil     Benzodiazepines:  -Clonazepam  -Temazepam  -Xanax     Sleep aides:  -Ambien  -Lunesta   -Sonata     TODAY PATIENT REPORTS THE FOLLOWING PSYCHIATRIC ROS:     CURRENT STRESSORS: Family dynamics  COPING MECHANISMS AND SUPPORTS: Limited Perceived Supports  SLEEP: Reports sleep disturbance  DIET:  Adequate  EXERCISE: No regular exercise program  SIDE EFFECTS:  Tolerating medications without reported side effects  SUBSTANCE USE: Denies  COMPLIANCE: States Adherent to medication regimen  REPORTS THE FOLLOWING NEW MEDICAL ISSUES: None      PROBLEM: DEPRESSION:   Last PHQ-9 9/1/2022   1.  Little interest or pleasure in doing things 2   2.  Feeling down, depressed, or hopeless 1   3.  Trouble falling or staying asleep, or sleeping too much 0   4.  Feeling tired or having little energy 2   5.  Poor appetite or overeating 0   6.  Feeling bad about yourself 1   7.  Trouble concentrating 1   8.  Moving slowly or restless 0   Q9: Thoughts of better off dead/self-harm past 2 weeks 0   PHQ-9 Total Score 7   Difficulty at work, home, or with people -     PHQ-9 SCORE 5/25/2022 6/10/2022 9/1/2022   PHQ-9 Total Score - - -   PHQ-9 Total Score MyChart 12 (Moderate depression) - 7 (Mild depression)   PHQ-9 Total Score 12 14 7     PHQ9 score is Not completed today  Suicidal ideation:  No     PROBLEM: ANXIETY:   DONELL-7 scores:   DONELL-7 Results 10/18/2021 9/1/2022   DONELL 7 TOTAL SCORE 7 (mild anxiety) 13 (moderate anxiety)   Some recent data might be hidden     GAD7 score is Not completed today  DONELL-7 SCORE 4/5/2022 6/10/2022 9/1/2022   Total Score - - -   Total Score - - 13 (moderate anxiety)   Total Score 14 16 13   Total Score - - -       PROBLEM: CHRONIC SUICIDAL IDEATIONS: current: No     PROBLEM: SLEEP/INSOMNIA: No change  Trouble falling asleep Trouble staying asleep.     PERTINENT PAST MEDICAL AND SURGICAL HISTORY     Past Medical History:   Diagnosis Date     Arthritis 2012    both knees; hands      "Cervical high risk HPV (human papillomavirus) test positive 03/19/2019    see problem list     Depressive disorder      Depressive disorder, not elsewhere classified 08/28/12    DC 10/05/12-[facility]     Hyperlipidemia LDL goal < 130     mevacor     Hypothyroid      Moderate major depression (H)     abilify, cymbalta, seroquel, and nuvigil, Dr Antonio Perales     Mumps      ABDOUL (obstructive sleep apnea)      PTSD (post-traumatic stress disorder)      Seizure (H) 03/2011    one episode, was on Keppra, EEG negative       VITALS     BP Readings from Last 1 Encounters:   07/18/22 106/66     Pulse Readings from Last 1 Encounters:   07/18/22 70     Wt Readings from Last 1 Encounters:   09/02/22 96.2 kg (212 lb)     Ht Readings from Last 1 Encounters:   07/18/22 1.74 m (5' 8.5\")     Estimated body mass index is 31.77 kg/m  as calculated from the following:    Height as of 7/18/22: 1.74 m (5' 8.5\").    Weight as of 9/2/22: 96.2 kg (212 lb).    LABS & IMAGING                                                                                                                  Recent Labs   Lab Test 06/28/22  1156 10/18/21  1158 01/04/21  1413   WBC 10.6   < > 8.7   HGB 16.0*   < > 16.4*   HCT 47.0   < > 50.5*   MCV 89   < > 90      < > 234   ANEU  --   --  5.4    < > = values in this interval not displayed.     Recent Labs   Lab Test 06/28/22  1156 05/17/16  1406 04/14/16  0735      < > 140   POTASSIUM 4.0   < > 3.5   CHLORIDE 103   < > 111*   CO2 23   < > 23   GLC 96   < > 84   MICHAEL 9.7   < > 7.6*   MAG  --   --  2.2   BUN 18.3   < > 10   CR 0.95   < > 0.80   GFRESTIMATED 67   < > 74   ALBUMIN 5.1   < > 2.9*   PROTTOTAL 7.1   < > 6.0*   AST 24   < > 30   ALT 16   < > 54*   ALKPHOS 82   < > 64   BILITOTAL 0.5   < > 0.3    < > = values in this interval not displayed.     Recent Labs   Lab Test 10/18/21  1158   CHOL 185   *   HDL 38*   TRIG 196*     Recent Labs   Lab Test 05/25/22  1523 02/28/22  1103   TSH 0.58 " "0.08*   T4  --  1.66*     No results found for: NBE829, JNUW045, OAWB33VKFRM, VITD3, D2VIT, D3VIT, DTOT, [id number], [id number], [id number], [id number], [id number], [id number]     ALLERGY & IMMUNIZATIONS       Allergies   Allergen Reactions     Gabapentin Other (See Comments)     Patient states she gets \"horrific nightmares\" with this medication     Dilaudid [Hydromorphone Hcl]      Made her feel like her skin was crawling     Nsaids Other (See Comments)     Kidney failure     Compazine [Prochlorperazine] Other (See Comments)     \"Makes my skin crawl, I was going nuts.\"       MEDICAL REVIEW OF SYSTEMS:   Ten system review was completed with pertinent positives noted     MENTAL STATUS EXAM:   The patient is awake, alert and oriented. Normal psychomotor activity, no abnormal involuntary movements, good impulse control. Mood appears depressed, affect is sad. Thought process is goal directed. Thought content is without delusions: without suicidal thinking,plan or intent; without homicidal or aggressive plan or intent. Speech is not pressured, is adequate with normal rate and volume. Perception is without hallucinations; patient is not responding to internal stimuli. Cognition appears intact. Insight is fair. Reliability is fair.    SAFETY ASSESSMENT:   Based on all available evidence including the factors cited above, overall Risk for harm is low and is appropriate for outpatient level of care.   Recommended that patient call 911 or go to the local ED should there be a change in any of these risk factors.           DSM 5 DIAGNOSIS:    296.32 (F33.1) Major Depressive Disorder, Recurrent Episode, Moderate _  300.02 (F41.1) Generalized Anxiety Disorder  309.81 (F43.10) Posttraumatic Stress Disorder         MEDICAL COMORBIDITY IMPACTING CLINICAL PICTURE: None noted and Gastritis and IBS           ASSESSMENT AND PLAN    Patient reports situational anxiety.  She is reporting family dynamics.  Reports she is using 85% of her " energy trying to reassure herself and tell herself that she is not horrible.  She reports older sister believes she is the best and looks down upon patient.  She is also having issues with partner who told her to leave the house.  Patient reports her partner lost intimacy with her.  She is trying to find a place to move to.  She is looking for income based housing.  She has applied in a couple of places and she is on a waiting list.  She is also hoping to connect with social work to help her find placement.  She left her a voicemail hoping to hear from her soon.  Reports partner is not pushing her out rather he  is patient with her till she finds placement.  He has not given her a deadline.  We will not make any adjustment to medications today as symptoms are situational.  Patient will be starting psychotherapy.  She denies negative thoughts of SI HI and reports feeling safe at home.    Patient and I reviewed diagnosis and treatment plan and patient agrees with following recommendations:    PLAN AND RECOMMENDATIONS:  1.  Continue to take Wellbutrin  mg every morning    .  2.  Continue to take trazodone 50 mg 1 to 3 tablets ( mg) at bedtime as needed  3.  Continue to take Lexapro 15 mg every day  4.  Take hydroxyzine 25 mg instead of 50 mg 3 times daily as needed for anxiety    5.  Continue to take prazosin 5 mg at bedtime  6. Follow up again in 4-6 weeks for medication review and evaluation.       Patient and I revieweddiagnosis and treatment plan and patient agrees with following recommendations:    1.Patient will take the medications as prescribed. Patient will not stop taking medications or adjust them without consulting with theprovider.  2.Patient will call with any problems between 2 visits.  3.Patient will go to the emergency room if not feeling safe , unable to function in the community, or if suicidal, homicidal or hearing voices orhaving paranoia.  4.Patient will abstain from drugs and  alcohol.  5.Patient will not drive if sedated on medications or under influence of any substance.   6.Patient will not mix psychiatric medications with drugs andalcohol.   7.Patient will watch his diet and exercise.  8.Patient will see non psychiatric providers for non psychiatric disorders.      We have discussed his/her diagnosis, prognosis, differential diagnosis and side effects and benefits of medications.         Problem List as of 10/4/2022 Reviewed: 2/2/2022  5:02 PM by Werner Holly PA-C   None          CONSULTS/REFERRALS:   Continue therapy   Coordinate care with therapist as needed    MEDICAL:   None at this time  Coordinate care with PCP (Crista Elder) as needed  Follow up with primary care provider as planned or for acute medical concerns.    PSYCHOEDUCATION:  Medication side effects and alternatives reviewed. Health promotion activities recommended and reviewed today. All questions addressed. Education and counseling completed regarding risks and benefits of medications and psychotherapy options.  Consent provided by patient/guardian  Call the psychiatric nurse line with medication questions or concerns at [phone].  [place] may be used to communicate with your provider, but this is not intended to be used for emergencies.  BLACK BOX WARNING: Discussed the Food and Drug Administration (FDA) requires that all antidepressants carry a warning that some children, adolescents and young adults may be at increased risk of suicide when taking antidepressants. Anyone taking an antidepressant should be watched closely for worsening depression or unusual behavior especially in the first few weeks after starting an SSRI. Keep in mind, antidepressants are more likely to reduce suicide risk in the long run by improving mood.   SEROTONIN SYNDROME:  Discussed risks of Serotonin syndrome (ie, serotonin toxicity) which is a potentially life-threatening condition associated with increased serotonergic  activity in the central nervous system (CNS). It is seen with therapeutic medication use, inadvertent interactions between drugs, and intentional self-poisoning. Serotonin syndrome may involve a spectrum of clinical findings, which often include mental status changes, autonomic hyperactivity, and neuromuscular abnormalities.    BENZODIAZEPINE:  discussion on how benzos work and the need to use them short term due to potential of anxiety getting.  This is a controlled substance with risk for abuse, need to keep in a safe keep place and cannot replace lost scripts.    HYPNOTIC USE: Hypnotic use, risk for CNS depression, sleep-walking, not to mix with ETOH or other CNS depressant, need for six hours of sleep, stop if change in mood.  This is a controlled substance with risk for abuse, need to keep in a safe keep place and cannot replace lost scripts.  HARM REDUCTION:  Discussions regarding effects of mood altering substances, alcohol and cannabis, on mood and that approach is harm reduction, will continue to prescribe meds as they work to cut back use.    SAFETY:  We all care about your loved one's safety. To reduce the risk of self-harm, remove access to all:  Firearms, Medicines (both prescribed and over-the-counter), Knives and other sharp objects, Ropes and like materials, and Alcohol  SLEEP HYGIENE: establish a sleep routine, limit screen time 1 hour prior to bed, use bed for sleep only, take sleep/medications on time (including sleepy time tea, trazadone or herbal treatments such as melatonin), aroma therapy, limit caffeine/sugar, yoga, guided imagery, stretch, meditation, limit naps to 20 minutes, make a temperature change in the room, white noise, be mindful of slowing down breathing, take a warm bath/shower, frequently wash sheets, and journaling.   Medlineplus.gov is information for patients.  It is run by the [employer] Library of Medicine and it contains information about all disorders, diseases and all  medications.      COMMUNITY RESOURCES:    CRISIS NUMBERS: Provided in AVS 10/4/2022  National Suicide Prevention Lifeline: 0-488-091-TALK (745-488-9534)  Socialeyes App/resources for a list of additional resources (SOS)            [facility] - [phone]   Urgent Care Adult Mental Rid[facility]-[phone] mobile unit/  crisis line  [facility] -[phone]   COPE  [place] Mobile Team -[phone] (adults)/ [phone] (child)  Poison Control Center - 1-500.659.3657    OR  go to nearest ER  Crisis Text Line for any crisis  send this-   To: 729628   [facility]) [facility]  [phone]  National Suicide Prevention Lifeline: 341.236.6559 (TTY: 396.622.1654). Call anytime for help.  (www.suicidepreventionlifeline.org)  [employer]ness (www.ellie.org): 652-133-0618 or 058-602-2957.   Mental Health Association (www.mentalhealth.org): 267.410.5379 or 198-796-9721.  [place] Crisis Text Line: Text [place] to 439556  Suicide LifeLine Chat: suicideDatabraidline.org/chat        ADMINISTRATIVE BILLIN min spent interviewing patient, reviewing referral documents, obtaining and reviewing outside records, communication with other health specialists, and preparing this report on today's date: 10/4/2022    Video/Phone Start Time: 11:07  Video/Phone End Time: 12:06      Signed:   Marni Collins, MSN, APRN, MHNP-[facility]      Chart documentation done in part with Dragon Voice Recognition software.  Although reviewed after completion, some word and grammatical errors may remain.

## 2022-10-05 ENCOUNTER — PATIENT OUTREACH (OUTPATIENT)
Dept: CARE COORDINATION | Facility: CLINIC | Age: 62
End: 2022-10-05

## 2022-10-05 NOTE — PROGRESS NOTES
Clinic Care Coordination Contact  [facility]/Voicemail       Clinical Data: Care Coordinator Outreach  Outreach attempted x 2.  Left message on patient's voicemail with call back information and requested return call.  Plan: Care Coordinator will try to reach patient again in 3-5 business days.    ALEXI Paiz? Social Work Care Coordinator   [facility]  [email or web address]? [email or web address]    Phone: [phone]  she/her

## 2022-10-06 ENCOUNTER — VIRTUAL VISIT (OUTPATIENT)
Dept: BEHAVIORAL HEALTH | Facility: CLINIC | Age: 62
End: 2022-10-06
Attending: NURSE PRACTITIONER
Payer: COMMERCIAL

## 2022-10-06 DIAGNOSIS — F41.1 GAD (GENERALIZED ANXIETY DISORDER): Primary | ICD-10-CM

## 2022-10-06 DIAGNOSIS — F33.3 SEVERE RECURRENT MAJOR DEPRESSIVE DISORDER WITH PSYCHOTIC FEATURES (H): ICD-10-CM

## 2022-10-06 DIAGNOSIS — F43.10 PTSD (POST-TRAUMATIC STRESS DISORDER): ICD-10-CM

## 2022-10-06 PROCEDURE — 90837 PSYTX W PT 60 MINUTES: CPT | Mod: 95 | Performed by: SOCIAL WORKER

## 2022-10-06 ASSESSMENT — ANXIETY QUESTIONNAIRES
7. FEELING AFRAID AS IF SOMETHING AWFUL MIGHT HAPPEN: NEARLY EVERY DAY
2. NOT BEING ABLE TO STOP OR CONTROL WORRYING: NEARLY EVERY DAY
3. WORRYING TOO MUCH ABOUT DIFFERENT THINGS: MORE THAN HALF THE DAYS
IF YOU CHECKED OFF ANY PROBLEMS ON THIS QUESTIONNAIRE, HOW DIFFICULT HAVE THESE PROBLEMS MADE IT FOR YOU TO DO YOUR WORK, TAKE CARE OF THINGS AT HOME, OR GET ALONG WITH OTHER PEOPLE: VERY DIFFICULT
GAD7 TOTAL SCORE: 15
1. FEELING NERVOUS, ANXIOUS, OR ON EDGE: NEARLY EVERY DAY
6. BECOMING EASILY ANNOYED OR IRRITABLE: SEVERAL DAYS
8. IF YOU CHECKED OFF ANY PROBLEMS, HOW DIFFICULT HAVE THESE MADE IT FOR YOU TO DO YOUR WORK, TAKE CARE OF THINGS AT HOME, OR GET ALONG WITH OTHER PEOPLE?: VERY DIFFICULT
GAD7 TOTAL SCORE: 15
7. FEELING AFRAID AS IF SOMETHING AWFUL MIGHT HAPPEN: NEARLY EVERY DAY
5. BEING SO RESTLESS THAT IT IS HARD TO SIT STILL: NOT AT ALL
4. TROUBLE RELAXING: NEARLY EVERY DAY
GAD7 TOTAL SCORE: 15

## 2022-10-06 ASSESSMENT — COLUMBIA-SUICIDE SEVERITY RATING SCALE - C-SSRS
1. IN THE PAST MONTH, HAVE YOU WISHED YOU WERE DEAD OR WISHED YOU COULD GO TO SLEEP AND NOT WAKE UP?: YES
1. HAVE YOU WISHED YOU WERE DEAD OR WISHED YOU COULD GO TO SLEEP AND NOT WAKE UP?: YES

## 2022-10-06 ASSESSMENT — PATIENT HEALTH QUESTIONNAIRE - PHQ9
SUM OF ALL RESPONSES TO PHQ QUESTIONS 1-9: 8
10. IF YOU CHECKED OFF ANY PROBLEMS, HOW DIFFICULT HAVE THESE PROBLEMS MADE IT FOR YOU TO DO YOUR WORK, TAKE CARE OF THINGS AT HOME, OR GET ALONG WITH OTHER PEOPLE: VERY DIFFICULT
SUM OF ALL RESPONSES TO PHQ QUESTIONS 1-9: 8

## 2022-10-06 NOTE — PROGRESS NOTES
"      [facility]   Mental Health & Addiction Services     Progress Note - Initial Visit    Patient  Name:  Ilsa Yusuf Date: 2022           Service Type: Individual     Visit Start Time: 1:37 PM Visit End Time: 2:33 PM     Visit #: 1    Attendees: Client attended alone    Service Modality:  Video Visit:      Provider verified identity through the following two step process.  Patient provided:  Patient photo, Patient  and Patient address    Telemedicine Visit: The patient's condition can be safely assessed and treated via synchronous audio and visual telemedicine encounter.      Reason for Telemedicine Visit: Patient convenience (e.g. access to timely appointments / distance to available provider)    Originating Site (Patient Location): Patient's home    Distant Site (Provider Location): [facility] MENTAL HEALTH & ADDICTION [facility]    Consent:  The patient/guardian has verbally consented to: the potential risks and benefits of telemedicine (video visit) versus in person care; bill my insurance or make self-payment for services provided; and responsibility for payment of non-covered services.     Patient would like the video invitation sent by:  My Chart    Mode of Communication:  Video Conference via CannaBuild    As the provider I attest to compliance with applicable laws and regulations related to telemedicine.       DATA:   Interactive Complexity: No   Crisis: No     Presenting Concerns/  Current Stressors:   Patient states: \"I have a diagnosis of depression or anxiety.\" Patient reports that she was previously in a day treatment program for depression and anxiety at a hospital in [place]. The patient states: \"I have been accumulating symptoms one on top of the other. I can barely function.\" The patient reports that her symptoms started worsening 3 years ago. The patient reports that she and her significant other recently . The patient reports that " "her significant other had a stroke in July of 2020. The patient reports that after the stroke her significant other's personality significantly changed.The patient reports that in September her partner had his son over to tell her that she had to find a different place to live. The patient reports that she \"was completely blind sided.\" The patient reports that she does not have another place to live and reports that she needs help finding low-income housing. The patient reports that she has limited support system. The patient identified the following stressors: interpersonal, financial, housing insecurity, and the recent end of a relationship.       ASSESSMENT:  Mental Status Assessment:  Appearance:   Appropriate   Eye Contact:   Good   Psychomotor Behavior: Normal   Attitude:   Attentive  Orientation:   Person Place Time Situation All  Speech   Rate / Production: Normal/ Responsive Emotional   Volume:  Normal   Mood:    Anxious  Depressed  Overwhelmed  Affect:    Appropriate   Thought Content:  Clear   Thought Form:  Coherent  Logical   Insight:    Good , External locus and Intellectual Insight     Answers for HPI/ROS submitted by the patient on 10/6/2022  If you checked off any problems, how difficult have these problems made it for you to do your work, take care of things at home, or get along with other people?: Very difficult  PHQ9 TOTAL SCORE: 8  DONELL 7 TOTAL SCORE: 15      Safety Issues and Plan for Safety and Risk Management:     [place] Suicide Severity Rating Scale (Lifetime/Recent)  [place] Suicide Severity Rating (Lifetime/Recent) 10/6/2022   1. Wish to be Dead (Lifetime) 1   Wish to be Dead Description (Lifetime) (No Data)   1. Wish to be Dead (Past 1 Month) 1   Calculated C-SSRS Risk Score (Lifetime/Recent) Low Risk     Patient denies current fears or concerns for personal safety.     Patient reports the following current or recent suicidal ideation or behaviors: Passive SI without intent. Patient " does however report that she does have a plan that she does not plan to execute. .  Patient denies current or recent homicidal ideation or behaviors.  Patient denies current or recent self injurious behavior or ideation.  Patient denies other safety concerns.  A safety and risk management plan has been developed including:  The following is recommended:   Per clinical judgment, provider is making the following recommendations Patient agreed to call the [facility]. Patient agreed to go the [facility] or the nearest emergency room if she feels unsafe.     Safety Plan: Patient agreed to call the [facility] Crisis [facility]. Patient agreed to go the [facility] or the nearest emergency room if she feels unsafe.   Patient consented to co-developed safety plan.  Safety and risk management plan was completed.  Patient agreed to use safety plan should any safety concerns arise.  A copy was given to the patient.  Patient reports there are no firearms in the house.     Diagnostic Criteria:  Generalized Anxiety Disorder  A. Excessive anxiety and worry about a number of events or activities (such as work or school performance).   B. The person finds it difficult to control the worry.   - Restlessness or feeling keyed up or on edge.    - Being easily fatigued.    - Difficulty concentrating or mind going blank.    - Irritability.    - Sleep disturbance (difficulty falling or staying asleep, or restless unsatisfying sleep).   D. The focus of the anxiety and worry is not confined to features of an Axis I disorder.  E. The anxiety, worry, or physical symptoms cause clinically significant distress or impairment in social, occupational, or other important areas of functioning.   F. The disturbance is not due to the direct physiological effects of a substance (e.g., a drug of abuse, a medication) or a general medical condition (e.g., hyperthyroidism) and does not occur  exclusively during a Mood Disorder, a Psychotic Disorder, or a Pervasive Developmental Disorder.  Major Depressive Disorder  A) Recurrent episode(s) - symptoms have been present during the same 2-week period and represent a change from previous functioning 5 or more symptoms (required for diagnosis)   - Depressed mood. Note: In children and adolescents, can be irritable mood.     - Diminished interest or pleasure in all, or almost all, activities.    - Decreased sleep.    - Fatigue or loss of energy.    - Feelings of worthlessness or guilt.    - Diminished ability to think or concentrate, or indecisiveness.    - Recurrent thoughts of death (not just fear of dying), recurrent suicidal ideation without a specific plan, or a suicide attempt or a specific plan for committing suicide.   B) The symptoms cause clinically significant distress or impairment in social, occupational, or other important areas of functioning  C) The episode is not attributable to the physiological effects of a substance or to another medical condition  D) The occurence of major depressive episode is not better explained by other thought / psychotic disorders  E) There has never been a manic episode or hypomanic episode      DSM5 Diagnoses: (Sustained by DSM5 Criteria Listed Above)  Diagnoses: 296.33 (F33.2) Major Depressive Disorder, Recurrent Episode, Severe _ and With anxious distress  300.02 (F41.1) Generalized Anxiety Disorder  Psychosocial & Contextual Factors:   WHODAS 2.0 (12 item):   WHODAS 2.0 Total Score 10/6/2022   Total Score 33   Total Score MyChart 33     Intervention:   Psychodynamic- Patient processed internal experiences , Completed through review of safety issues and safety interventions and Completed Safety plan  Collateral Reports Completed:  Routed note to PCP      PLAN: (Homework, other):  1. Provider will continue Diagnostic Assessment.  Patient was given the following to do until next session: Call the [facility]  Response line at [phone] and follow the brief safety plan below.     2. Provider recommended the following referrals: higher level of care such as: an intensive outpatient program, Partial Hospitalization Program, or Adult Day Treatment.      3.  Suicide Risk and Safety Concerns were assessed for Ilsa Yusuf.Patient was to distressed to complete full safety plan. Patient agreed to the following:  See safety plan below.     4. Return for follow up: 10/14/2022        HARRIS Hillman  October 6, 2022              [facility]                                       Ilsa Yusuf     SAFETY PLAN:   Professionals or agencies I can contact during a crisis:    Suicide Prevention Lifeline: Call or Text 988   Local Crisis Services: [facility], [place], operated by [facility] Crisis Response line at [phone].     [place] Warmline   -Open Seven Days a Week, 9 AM to 9 PM  - [phone]  - Toll Free 855-WARMLINE  - Or text  Support  to 46755      Call 911 or go to my nearest emergency department or [facility] EMPATH UNIT  Location:   [address], [address]  Get Directions  [phone]     I helped develop this safety plan and agree to use it when needed.  I have been given a copy of this plan.      Client signature ____________Patient agreed to follow safety plan. _____________________________________________________  Today s date:  10/6/2022  Completed by Provider Name/ Credentials:  Sruthi Corona, HARRIS    October 6, 2022  Adapted from Safety Plan Template 2008 Anya Rahman and Kurtis Escobar is reprinted with the express permission of the authors.  No portion of the Safety Plan Template may be reproduced without the express, written permission.  You can contact the authors at [email or web address] or [email or web address].

## 2022-10-11 ENCOUNTER — VIRTUAL VISIT (OUTPATIENT)
Dept: UROLOGY | Facility: CLINIC | Age: 62
End: 2022-10-11
Payer: COMMERCIAL

## 2022-10-11 VITALS — HEIGHT: 69 IN | WEIGHT: 215 LBS | BODY MASS INDEX: 31.84 KG/M2

## 2022-10-11 DIAGNOSIS — R39.15 URGENCY OF URINATION: ICD-10-CM

## 2022-10-11 DIAGNOSIS — Z87.440 HISTORY OF UTI: Primary | ICD-10-CM

## 2022-10-11 DIAGNOSIS — R30.0 DYSURIA: ICD-10-CM

## 2022-10-11 DIAGNOSIS — R39.89 BLADDER PAIN: ICD-10-CM

## 2022-10-11 PROCEDURE — 99213 OFFICE O/P EST LOW 20 MIN: CPT | Mod: 95 | Performed by: UROLOGY

## 2022-10-11 ASSESSMENT — PAIN SCALES - GENERAL: PAINLEVEL: MODERATE PAIN (4)

## 2022-10-11 NOTE — LETTER
10/11/2022       RE: Ilsa Yusuf  [address] [facility] [address]     Dear Colleague,    Thank you for referring your patient, Ilsa Yusuf, to the [facility] UROLOGY CLINIC [place] at [facility]. Please see a copy of my visit note below.                          * PLEASE MEET IN MY CHART *      Deanne is a 62 year old who is being evaluated via a billable video visit.      How would you like to obtain your AVS? MyChart  If the video visit is dropped, the invitation should be resent by: Text to cell phone: [phone]  Will anyone else be joining your video visit? No      Assessment & Plan     Dysuria/History of UTI  Needs to have cystoscopy for further evaluation, she requests for it to be done under anesthesia    - Case Request: EXAM UNDER ANESTHESIA, CYSTOSCOPY WITH POSSIBLE BLADDER BIOPSY; Standing  - Case Request: EXAM UNDER ANESTHESIA, CYSTOSCOPY WITH POSSIBLE BLADDER BIOPSY    Urgency of urination  Not improved with many other treatments so she needs a cystoscopy  - Case Request: EXAM UNDER ANESTHESIA, CYSTOSCOPY WITH POSSIBLE BLADDER BIOPSY; Standing  - Case Request: EXAM UNDER ANESTHESIA, CYSTOSCOPY WITH POSSIBLE BLADDER BIOPSY    Bladder pain  Not improved with many other treatments so she needs a cystoscopy  - Case Request: EXAM UNDER ANESTHESIA, CYSTOSCOPY WITH POSSIBLE BLADDER BIOPSY; Standing  - Case Request: EXAM UNDER ANESTHESIA, CYSTOSCOPY WITH POSSIBLE BLADDER BIOPSY    At this time she requests an exam under anesthesia so we discussed if needed that we would also prepare to take a bladder biopsy and she is agreeable to this.    We discussed that the risks to the procedure include but not limited to cardiovascular risks of anesthesia, nerve injury, bleeding, infection, need for further procedures depending on findings.  Patient expressed understanding and agreeable to proceed.    21 minutes were spent today on the date of the  encounter in reviewing the EMR including reviewing Breanne Rhonda's notes, direct patient care including surgical counseling, coordination of care, and documentation.    Terrie Melton MD MPH  (she/her/hers)   of Urology  [facility]  Patient Care Team:  Crista Elder MD as PCP - General (Family Medicine)  Breanne Ellis PA-C as Physician Assistant (Physician Assistant - Medical)  Carie Nuñez (Internal Medicine)  Breanne Ellis PA-C as Assigned OBGYN Provider  Crista Elder MD as Assigned PCP  Arnaldo Perez MD as MD (Orthopaedic Surgery Hand Surgery)  Gina Berumen, ALONDRA FINNEGAN as Assigned Behavioral Health Provider  Arnaldo Perez MD as Assigned Musculoskeletal Provider  Maryuri Jackson LSW as Specialty Care Coordinator  Sruthi Corona LICSW as Licensed Mental Health Professional        Yair Alicea is a 62 year old, presenting for the following health issues:  No chief complaint on file.      HPI     Here today to discuss cystoscopy in OR.  She saw Breanne Ellis recently and given patient's long standing history of bladder pain and urinary urgency a cystoscopy was recommended.  Patient has had a long history of urologic care with having worked with Dr Sosa in 2010.  Patient states that the last office cystoscopy she had was such a bad experience that she wants it done this time in the OR    She does have a history of urinary retention that spontaneously resolved as well as ABDOUL well controlled with CPAP       Objective         Vitals:  No vitals were obtained today due to virtual visit.    Physical Exam   GENERAL: Healthy, alert and no distress  EYES: Eyes grossly normal to inspection.  No discharge or erythema, or obvious scleral/conjunctival abnormalities.  RESP: No audible wheeze, cough, or visible cyanosis.  No visible retractions or increased work of breathing.    SKIN: Visible skin clear. No significant rash, abnormal pigmentation or lesions.  NEURO:  Cranial nerves grossly intact.  Mentation and speech appropriate for age.  PSYCH: Mentation appears normal, affect normal/bright, judgement and insight intact, normal speech and appearance well-groomed.      Video-Visit Details    Video Start Time: 12:17 PM    Type of service:  Video Visit    Video End Time:12:23 PM    Originating Location (pt. Location): Home    Distant Location (provider location):  [facility] UROLOGY CLINIC [place]     Platform used for Video Visit: Jobydu

## 2022-10-11 NOTE — PATIENT INSTRUCTIONS
Websites with free information:    American Urogynec[employer] Society patient website: www.voicesforpfd.org    Total Control Program: www.totalcontrolprogram.com    Raiza SMALLWOOD Care Coordinator [phone]    Surgery Scheduling [phone]    It was a pleasure meeting with you today.  Thank you for allowing me and my team the privilege of caring for you today.  YOU are the reason we are here, and I truly hope we provided you with the excellent service you deserve.  Please let us know if there is anything else we can do for you so that we can be sure you are leaving completely satisfied with your care experience.

## 2022-10-11 NOTE — PROGRESS NOTES
* PLEASE MEET IN MY CHART *      Deanne is a 62 year old who is being evaluated via a billable video visit.      How would you like to obtain your AVS? MyChart  If the video visit is dropped, the invitation should be resent by: Text to cell phone: [phone]  Will anyone else be joining your video visit? No      Assessment & Plan     Dysuria/History of UTI  Needs to have cystoscopy for further evaluation, she requests for it to be done under anesthesia    - Case Request: EXAM UNDER ANESTHESIA, CYSTOSCOPY WITH POSSIBLE BLADDER BIOPSY; Standing  - Case Request: EXAM UNDER ANESTHESIA, CYSTOSCOPY WITH POSSIBLE BLADDER BIOPSY    Urgency of urination  Not improved with many other treatments so she needs a cystoscopy  - Case Request: EXAM UNDER ANESTHESIA, CYSTOSCOPY WITH POSSIBLE BLADDER BIOPSY; Standing  - Case Request: EXAM UNDER ANESTHESIA, CYSTOSCOPY WITH POSSIBLE BLADDER BIOPSY    Bladder pain  Not improved with many other treatments so she needs a cystoscopy  - Case Request: EXAM UNDER ANESTHESIA, CYSTOSCOPY WITH POSSIBLE BLADDER BIOPSY; Standing  - Case Request: EXAM UNDER ANESTHESIA, CYSTOSCOPY WITH POSSIBLE BLADDER BIOPSY    At this time she requests an exam under anesthesia so we discussed if needed that we would also prepare to take a bladder biopsy and she is agreeable to this.    We discussed that the risks to the procedure include but not limited to cardiovascular risks of anesthesia, nerve injury, bleeding, infection, need for further procedures depending on findings.  Patient expressed understanding and agreeable to proceed.    21 minutes were spent today on the date of the encounter in reviewing the EMR including reviewing Breanne Ellis's notes, direct patient care including surgical counseling, coordination of care, and documentation.    Terrie Melton MD MPH  (she/her/hers)   of Urology  [facility]      CC  Patient Care Team:  Crista Elder MD as  PCP - General (Family Medicine)  Breanne Ellis PA-C as Physician Assistant (Physician Assistant - Medical)  Carie Nuñez (Internal Medicine)  Breanne Ellis PA-C as Assigned OBGYN Provider  Crista Elder MD as Assigned PCP  Arnaldo Perez MD as MD (Orthopaedic Surgery Hand Surgery)  Gina Berumen, APRN CNP as Assigned Behavioral Health Provider  Arnaldo Perez MD as Assigned Musculoskeletal Provider  Maryuri Jackson LSW as Specialty Care Coordinator  Sruthi Corona LICSW as Licensed Mental Health Professional        Yair Alicea is a 62 year old, presenting for the following health issues:  No chief complaint on file.      HPI     Here today to discuss cystoscopy in OR.  She saw Breanne Ellis recently and given patient's long standing history of bladder pain and urinary urgency a cystoscopy was recommended.  Patient has had a long history of urologic care with having worked with Dr Sosa in 2010.  Patient states that the last office cystoscopy she had was such a bad experience that she wants it done this time in the OR    She does have a history of urinary retention that spontaneously resolved as well as ABDOUL well controlled with CPAP        Objective         Vitals:  No vitals were obtained today due to virtual visit.    Physical Exam   GENERAL: Healthy, alert and no distress  EYES: Eyes grossly normal to inspection.  No discharge or erythema, or obvious scleral/conjunctival abnormalities.  RESP: No audible wheeze, cough, or visible cyanosis.  No visible retractions or increased work of breathing.    SKIN: Visible skin clear. No significant rash, abnormal pigmentation or lesions.  NEURO: Cranial nerves grossly intact.  Mentation and speech appropriate for age.  PSYCH: Mentation appears normal, affect normal/bright, judgement and insight intact, normal speech and appearance well-groomed.      Video-Visit Details    Video Start Time: 12:17 PM    Type of service:  Video Visit    Video End  Time:12:23 PM    Originating Location (pt. Location): Home    Distant Location (provider location):  [facility] UROLOGY CLINIC [place]     Platform used for Video Visit: Luna

## 2022-10-12 ENCOUNTER — PATIENT OUTREACH (OUTPATIENT)
Dept: CARE COORDINATION | Facility: CLINIC | Age: 62
End: 2022-10-12

## 2022-10-12 NOTE — PROGRESS NOTES
Clinic Care Coordination Contact    Follow Up Progress Note      Assessment: ARABELLA RENNER spoke to pt about housing update. Pt reported that she has not heard back from section 8 or other resources she contacted yet. Pt reports that she did connect with [facility]  and they said they can also provide some help. Pt was hopeful of thi connection and thinks they will be able to provide assistance. ARABELLA RENNER asked if there were any other questions or resources needed at this time. Pt said no as she was waiting for responses from resources she has contacted. ARABELLA RENNER reassured her she was taking the right steps towards getting housing and validated that pt was strong advocate of self.     Care Gaps:    Health Maintenance Due   Topic Date Due     HEPATITIS B IMMUNIZATION (1 of 3 - 3-dose series) Never done     ZOSTER IMMUNIZATION (1 of 2) Never done     LUNG CANCER SCREENING  Never done     MEDICARE ANNUAL WELLNESS VISIT  10/19/2017     URINE DRUG SCREEN  02/06/2018     ADVANCE CARE PLANNING  02/17/2020     PAP FOLLOW-UP  03/19/2020     HPV FOLLOW-UP  03/19/2020     INFLUENZA VACCINE (1) 09/01/2022     COVID-19 Vaccine (5 - Booster for Pfizer series) 09/23/2022     LIPID  10/18/2022         Care Plans  Care Plan: Housing     Problem: LACK OF STABLE HOUSING     Goal: Establish Stable Housing     Start Date: 9/15/2022 Expected End Date: 12/15/2022    This Visit's Progress: 20%    Priority: High    Note:     Barriers: Lack of availability of housing   Strengths: Strong advocate of self, knows the steps to take, follows through on resources  Patient expressed understanding of goal: Yes  Action steps to achieve this goal:  1. I will look through resources sent by ARABELLA RENNER in the next following weeks in order to look at housing options available.  2. I will apply to section 8 voucher in the next month in order to get on the wait list  3. I will look into housing link and apply to places that fit my budget and other needs  4. I will  follow up with ARABELLA RENNER and ask questions or express concerns when needed                          Intervention/Education provided during outreach: Housing navigation      Outreach Frequency: monthly       Plan:   Care Coordinator will follow up in 1 month     ALEXI Paiz? Social Work Care Coordinator   [facility]  [email or web address]? [email or web address]    Phone: [phone]  she/her

## 2022-10-14 ENCOUNTER — VIRTUAL VISIT (OUTPATIENT)
Dept: BEHAVIORAL HEALTH | Facility: CLINIC | Age: 62
End: 2022-10-14
Payer: COMMERCIAL

## 2022-10-14 DIAGNOSIS — F33.3 SEVERE RECURRENT MAJOR DEPRESSIVE DISORDER WITH PSYCHOTIC FEATURES (H): Primary | ICD-10-CM

## 2022-10-14 DIAGNOSIS — F41.1 GAD (GENERALIZED ANXIETY DISORDER): ICD-10-CM

## 2022-10-14 PROCEDURE — 90837 PSYTX W PT 60 MINUTES: CPT | Mod: 95 | Performed by: SOCIAL WORKER

## 2022-10-14 NOTE — PROGRESS NOTES
"[facility]   Mental Health & Addiction Services     Progress Note - Initial Visit    Patient  Name:  Ilsa Yusuf Date: 2022           Service Type: Individual     Visit Start Time: 1:37 PM Visit End Time: 2:33 PM     Visit #: 2    Attendees: Client attended alone    Service Modality:  Video Visit:      Provider verified identity through the following two step process.  Patient provided:  Patient photo, Patient  and Patient address    Telemedicine Visit: The patient's condition can be safely assessed and treated via synchronous audio and visual telemedicine encounter.      Reason for Telemedicine Visit: Patient convenience (e.g. access to timely appointments / distance to available provider)    Originating Site (Patient Location): Patient's home    Distant Site (Provider Location): Provider Remote Setting- Home Office    Consent:  The patient/guardian has verbally consented to: the potential risks and benefits of telemedicine (video visit) versus in person care; bill my insurance or make self-payment for services provided; and responsibility for payment of non-covered services.     Patient would like the video invitation sent by:  My Chart    Mode of Communication:  Video Conference via well    As the provider I attest to compliance with applicable laws and regulations related to telemedicine.       DATA:   Interactive Complexity: No   Crisis: No     Presenting Concerns/  Current Stressors:   Patient presents with depression and anxiety symptoms. The patient identifies the following stressors: limited support system, interpersonal, financial stress, housing insecurity and climate change. Patient reports that her \"anxiety level is 35%.\" The patient reports that she is still motivated to find her own place to live. The patient reports that she has not been able to do much in regards to housing because she has not been feeling well. he patient reports that she still " needs assistance finding affordable housing. Provider gave patient the phone number for [facility]'s Coordinated Entry Assessment Team.Patient and provider discussed the patient's current stressors and higher level of care opportunities. The patient reports that she is interested in participating in an adult day treatment program. The patient reports that she is not interested in virtual mental health services. Patient reports that she is not satisfied with the virtual individual therapy services she has received thus far and requested to speak to a . Provider informed patient that she will request that a  call the patient to discuss her concerns and care needs.       ASSESSMENT:  Mental Status Assessment:  Appearance:   Appropriate   Eye Contact:   Good   Psychomotor Behavior: Normal   Attitude:   Attentive  Orientation:   Person Place Time Situation All  Speech   Rate / Production: Normal/ Responsive   Volume:  Normal   Mood:    Anxious  Depressed  Frustrated  Affect:    Appropriate   Thought Content:  Clear   Thought Form:  Coherent   Insight:    Good  and External locus     Safety Issues and Plan for Safety and Risk Management:     [place] Suicide Severity Rating Scale (Lifetime/Recent)  [place] Suicide Severity Rating (Lifetime/Recent) 10/6/2022   1. Wish to be Dead (Lifetime) 1   Wish to be Dead Description (Lifetime) (No Data)   1. Wish to be Dead (Past 1 Month) 1   Calculated C-SSRS Risk Score (Lifetime/Recent) Low Risk     Patient denies current fears or concerns for personal safety.     Patient reports the following current or recent suicidal ideation or behaviors: Passive SI without intent. .  Patient denies current or recent homicidal ideation or behaviors.  Patient denies current or recent self injurious behavior or ideation.  Patient denies other safety concerns.  A safety and risk management plan has been developed including:  The following is recommended:   Per  clinical judgment, provider is making the following recommendations Patient agreed to call the [facility]. Patient agreed to go the [facility] or the nearest emergency room if she feels unsafe.     Safety Plan: Patient agreed to call the [facility]. Patient agreed to go the [facility] or the nearest emergency room if she feels unsafe.   Patient consented to co-developed safety plan.  Safety and risk management plan was completed.  Patient agreed to use safety plan should any safety concerns arise.  A copy was given to the patient.  Patient reports there are no firearms in the house.     Diagnostic Criteria:  Generalized Anxiety Disorder  A. Excessive anxiety and worry about a number of events or activities (such as work or school performance).   B. The person finds it difficult to control the worry.   - Restlessness or feeling keyed up or on edge.    - Being easily fatigued.    - Difficulty concentrating or mind going blank.    - Irritability.    - Sleep disturbance (difficulty falling or staying asleep, or restless unsatisfying sleep).   D. The focus of the anxiety and worry is not confined to features of an Axis I disorder.  E. The anxiety, worry, or physical symptoms cause clinically significant distress or impairment in social, occupational, or other important areas of functioning.   F. The disturbance is not due to the direct physiological effects of a substance (e.g., a drug of abuse, a medication) or a general medical condition (e.g., hyperthyroidism) and does not occur exclusively during a Mood Disorder, a Psychotic Disorder, or a Pervasive Developmental Disorder.    Major Depressive Disorder  A) Recurrent episode(s) - symptoms have been present during the same 2-week period and represent a change from previous functioning 5 or more symptoms (required for diagnosis)   - Depressed mood. Note: In children and adolescents, can  be irritable mood.     - Diminished interest or pleasure in all, or almost all, activities.    - Decreased sleep.    - Fatigue or loss of energy.    - Feelings of worthlessness or guilt.    - Diminished ability to think or concentrate, or indecisiveness.    - Recurrent thoughts of death (not just fear of dying), recurrent suicidal ideation without a specific plan, or a suicide attempt or a specific plan for committing suicide.   B) The symptoms cause clinically significant distress or impairment in social, occupational, or other important areas of functioning  C) The episode is not attributable to the physiological effects of a substance or to another medical condition  D) The occurence of major depressive episode is not better explained by other thought / psychotic disorders  E) There has never been a manic episode or hypomanic episode      DSM5 Diagnoses: (Sustained by DSM5 Criteria Listed Above)  Diagnoses: 296.33 (F33.2) Major Depressive Disorder, Recurrent Episode, Severe _ and With anxious distress  300.02 (F41.1) Generalized Anxiety Disorder     Psychosocial & Contextual Factors: Patient has a limited support system. Current stressors: interpersonal (recent ending of a relationship with her significant other and strained relationships with family members), financial, and housing insecurity.    Intervention:   Psychodynamic- Patient processed internal experiences , Completed through review of safety issues and safety interventions and Client Centered     Collateral Reports Completed:  Not Applicable      PLAN: (Homework, other):  Provider will reach out to Patient's Care Coordinator.    Provider will request that her supervisor will contact the patient.    Patient will call [facility] Adult Coordinated Entry System to request a Coordinated Entry Assessment.    Connect patient to in-person higher level of care: Adult Day Treatment Program, Partial Hospitalization Program, Intensive Outpatient Program.      Provider recommended the following referrals: higher level of care such as: an intensive outpatient program, Partial Hospitalization Program, or Adult Day Treatment.      Suicide Risk and Safety Concerns were assessed for Ilsa Yusuf.Patient agreed to the following:  See safety plan below.         Sruthi Corona, [facility]  October 14, 2022      _________________________________________________________________________________________________________________________________________________________________________________________________________          [facility]                                       Ilsa Yusuf     SAFETY PLAN:   Professionals or agencies I can contact during a crisis:    Suicide Prevention Lifeline: Call or Text 728   Local Crisis Services: [facility] Crisis Response line at [phone].     [place] Warmline   -Open Seven Days a Week, 9 AM to 9 PM  - [phone]  - Toll Free 855-WARMLINE  - Or text  Support  to 32301      Call 911 or go to my nearest emergency department or [facility] s EMPATH UNIT  Location:   [place] [address] [place], [address]  Get Directions  [phone]     I helped develop this safety plan and agree to use it when needed.  I have been given a copy of this plan.      Client signature ____________Patient agreed to follow safety plan. _____________________________________________________  Today s date:  10/6/2022  Completed by Provider Name/ Credentials:  Sruthi Corona, [facility]    October 6, 2022  Adapted from Safety Plan Template 2008 Anya Rahman and Kurtis Escobar is reprinted with the express permission of the authors.  No portion of the Safety Plan Template may be reproduced without the express, written permission.  You can contact the authors at [email or web address] or [email or web address].

## 2022-10-17 ENCOUNTER — TELEPHONE (OUTPATIENT)
Dept: UROLOGY | Facility: CLINIC | Age: 62
End: 2022-10-17

## 2022-10-17 ENCOUNTER — TELEPHONE (OUTPATIENT)
Dept: PSYCHOLOGY | Facility: CLINIC | Age: 62
End: 2022-10-17

## 2022-10-17 PROBLEM — R39.89 BLADDER PAIN: Status: ACTIVE | Noted: 2022-10-17

## 2022-10-17 PROBLEM — R39.15 URGENCY OF URINATION: Status: ACTIVE | Noted: 2022-10-17

## 2022-10-17 PROBLEM — Z87.440 HISTORY OF UTI: Status: ACTIVE | Noted: 2022-10-17

## 2022-10-17 PROBLEM — R30.0 DYSURIA: Status: ACTIVE | Noted: 2022-10-17

## 2022-10-17 NOTE — TELEPHONE ENCOUNTER
Patient is scheduled for surgery with Dr. Melton     Spoke with: Patient via phone     Date of Surgery: Tuesday November 01, 2022     Location: [facility] OR      Informed patient they will need an adult : Yes     Pre-op: Yes      PAC EVAL: Thursday October 20, 2022     Pre-procedure COVID-19 Test: Friday October 28, 2022 at [facility]     Post-op: Wednesday November 09, 2022    Additional imaging/appointments:     Additional comments:      Surgery packet: Not sent     Patient is aware that surgery time is tentative to change and to expect a call 3-1 business days from Pre Admission Nursing for instructions and arrival time

## 2022-10-17 NOTE — TELEPHONE ENCOUNTER
FUTURE VISIT INFORMATION      SURGERY INFORMATION:    Date: 11/1/22    [facility]    Surgeon:  Terrie Melton MD    Anesthesia Type:  choice    Procedure: EXAM UNDER ANESTHESIA, CYSTOSCOPY WITH POSSIBLE BLADDER BIOPSY    Consult: virtual visit 10/11    RECORDS REQUESTED FROM:       Primary Care Provider: [facility]    Pertinent Medical History: ABDOUL    Most recent EKG+ Tracing: 3/15/22    Most recent ECHO: 4/27/22    Most recent Sleep Study:  1/28/16

## 2022-10-18 NOTE — TELEPHONE ENCOUNTER
This therapist called patient at request of patient. Patient indicated wanting to work with a different provider. Patient reported wanting one in-person and that she lives in [place]. There were connection problems with results in patient getting disconnected multiple times. This therapist will staff with other supervisor's to work on a transfer plan.

## 2022-10-20 ENCOUNTER — ANESTHESIA EVENT (OUTPATIENT)
Dept: SURGERY | Facility: AMBULATORY SURGERY CENTER | Age: 62
End: 2022-10-20
Payer: COMMERCIAL

## 2022-10-20 ENCOUNTER — PRE VISIT (OUTPATIENT)
Dept: SURGERY | Facility: CLINIC | Age: 62
End: 2022-10-20

## 2022-10-20 ENCOUNTER — VIRTUAL VISIT (OUTPATIENT)
Dept: SURGERY | Facility: CLINIC | Age: 62
End: 2022-10-20
Payer: COMMERCIAL

## 2022-10-20 DIAGNOSIS — Z87.440 HISTORY OF UTI: ICD-10-CM

## 2022-10-20 DIAGNOSIS — Z01.818 PREOP EXAMINATION: Primary | ICD-10-CM

## 2022-10-20 DIAGNOSIS — R39.15 URINARY URGENCY: ICD-10-CM

## 2022-10-20 DIAGNOSIS — R30.0 DYSURIA: ICD-10-CM

## 2022-10-20 PROCEDURE — 99204 OFFICE O/P NEW MOD 45 MIN: CPT | Mod: 95 | Performed by: PHYSICIAN ASSISTANT

## 2022-10-20 RX ORDER — LIDOCAINE 40 MG/G
CREAM TOPICAL
Status: CANCELLED | OUTPATIENT
Start: 2022-10-20

## 2022-10-20 RX ORDER — SODIUM CHLORIDE, SODIUM LACTATE, POTASSIUM CHLORIDE, CALCIUM CHLORIDE 600; 310; 30; 20 MG/100ML; MG/100ML; MG/100ML; MG/100ML
INJECTION, SOLUTION INTRAVENOUS CONTINUOUS
Status: CANCELLED | OUTPATIENT
Start: 2022-10-20

## 2022-10-20 ASSESSMENT — PAIN SCALES - GENERAL: PAINLEVEL: SEVERE PAIN (7)

## 2022-10-20 ASSESSMENT — LIFESTYLE VARIABLES: TOBACCO_USE: 1

## 2022-10-20 NOTE — H&P (VIEW-ONLY)
Pre-Operative H & P     CC:  Preoperative exam to assess for increased cardiopulmonary risk while undergoing surgery and anesthesia.    Date of Encounter: 10/20/2022  Primary Care Physician:  Crista Elder     Reason for visit:   Encounter Diagnoses   Name Primary?     Preop examination Yes     Urinary urgency      Dysuria      History of UTI        HPI  Ilsa Yusuf is a 62 year old female who presents for pre-operative H & P in preparation for  Procedure Information     Case: [id number] Date/Time: 11/01/22 1130    Procedure: EXAM UNDER ANESTHESIA, CYSTOSCOPY WITH POSSIBLE BLADDER BIOPSY (Urethra)    Anesthesia type: Choice    Diagnosis:       Urgency of urination [R39.15]      Dysuria [R30.0]      History of UTI [Z87.440]      Bladder pain [R39.89]    Pre-op diagnosis:       Urgency of urination [R39.15]      Dysuria [R30.0]      History of UTI [Z87.440]      Bladder pain [R39.89]    Location: [place] [address] / [facility]    Providers: Terrie Melton MD          Deanne is a 62-year-old female with past medical history significant for allergic rhinitis, ABDOUL on CPAP, hypothyroidism, GERD, obesity, history of kidney stones, depression, anxiety, PTSD, ADHD.  Love has a longstanding history of urinary urgency and bladder pain.  Today she states it feels like there are hot [place] in her bladder.  She has undergone several cystoscopies in the past however, she has asked for cystoscopy in the OR this time because her pain was intolerable.    History is obtained from the patient and chart review    Hx of abnormal bleeding or anti-platelet use: denies    Menstrual history: Patient's last menstrual period was 08/15/2016 (approximate).:      Past Medical History  Past Medical History:   Diagnosis Date     Arthritis 2012    both knees; hands     Cervical high risk HPV (human papillomavirus) test positive 03/19/2019    see problem list     Depressive disorder       Depressive disorder, not elsewhere classified 08/28/12    DC 10/05/12-[facility]     Hyperlipidemia LDL goal < 130     mevacor     Hypothyroid      Moderate major depression (H)     abilify, cymbalta, seroquel, and sofya, Dr Antonio Yoon      ABDOUL (obstructive sleep apnea)      PTSD (post-traumatic stress disorder)      Seizure (H) 03/2011    one episode, was on Keppra, EEG negative       Past Surgical History  Past Surgical History:   Procedure Laterality Date     ABDOMEN SURGERY       BLADDER SURGERY       CHOLECYSTECTOMY       COLONOSCOPY  2012     CYSTOSCOPY       ORTHOPEDIC SURGERY  left knee     renal artery surgery  child    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     TONSILLECTOMY       ureteral reimplantation       ZZC PARTIAL EXCISION THYROID,UNILAT  1991    rt, negative path then, treated with synthroid.       Prior to Admission Medications  Current Outpatient Medications   Medication Sig Dispense Refill     albuterol (PROAIR HFA/PROVENTIL HFA/VENTOLIN HFA) 108 (90 Base) MCG/ACT inhaler Inhale 1-2 puffs into the lungs every 4 hours as needed for shortness of breath / dyspnea or wheezing 18 g 0     buPROPion (WELLBUTRIN SR) 200 MG 12 hr tablet Take 1 tablet (200 mg) by mouth every morning 30 tablet 1     cholecalciferol (VITAMIN D3) 5000 UNITS CAPS capsule Take 1 capsule (5,000 Units) by mouth daily Take 1 per day (Patient taking differently: Take 5,000 Units by mouth At Bedtime Take 1 per day) 100 capsule 2     escitalopram (LEXAPRO) 10 MG tablet Take 1 tablet (10 mg) by mouth daily In addition to 5 mg for ttl: 15 mg/day (Patient taking differently: Take 10 mg by mouth every morning In addition to 5 mg for ttl: 15 mg/day) 90 tablet 0     escitalopram (LEXAPRO) 5 MG tablet Take 1 tablet (5 mg) by mouth daily In addition to 10 mg for ttl: 15 mg/day (Patient taking differently: Take 5 mg by mouth every morning In addition to 10 mg for ttl: 15 mg/day) 90 tablet 0     fluticasone (FLONASE) 50  MCG/ACT nasal spray SHAKE LIQUID AND USE 2 SPRAYS IN EACH NOSTRIL TWICE DAILY (Patient taking differently: Spray 2 sprays into both nostrils 2 times daily) 48 g 1     hydrOXYzine (ATARAX) 25 MG tablet Take 1 tablet (25 mg) by mouth 3 times daily as needed for anxiety 90 tablet 1     levothyroxine (SYNTHROID/LEVOTHROID) 175 MCG tablet TAKE 1 TABLET(175 MCG) BY MOUTH DAILY (Patient taking differently: Take 175 mcg by mouth every morning) 90 tablet 2     lovastatin (MEVACOR) 20 MG tablet TAKE 1 TABLET(20 MG) BY MOUTH AT BEDTIME (Patient taking differently: Take 20 mg by mouth At Bedtime) 90 tablet 0     multivitamin w/minerals (THERA-VIT-M) tablet Take 1 tablet by mouth daily (with lunch)       Omega-3 Fatty Acids (OMEGA 3 PO) Take 2 g by mouth 2 times daily Takes 1 in am and 1 at bedtime       omeprazole (PRILOSEC) 40 MG DR capsule TAKE 1 CAPSULE(40 MG) BY MOUTH DAILY (Patient taking differently: 40 mg every morning) 90 capsule 0     oxyCODONE IR (ROXICODONE) 10 MG tablet as needed       phenazopyridine (PYRIDIUM) 200 MG tablet Take 1 tablet (200 mg) by mouth 3 times daily as needed for irritation 21 tablet 0     prazosin (MINIPRESS) 5 MG capsule TAKE 1 CAPSULE(5 MG) BY MOUTH AT BEDTIME (Patient taking differently: Take 5 mg by mouth At Bedtime TAKE 1 CAPSULE(5 MG) BY MOUTH AT BEDTIME) 90 capsule 0     tiZANidine (ZANAFLEX) 4 MG tablet Take 1-3 tablets (4-12 mg) by mouth 3 times daily as needed for muscle spasms 210 tablet 3     traZODone (DESYREL) 50 MG tablet TAKE 1 TO 3 TABLETS(50  MG) BY MOUTH EVERY NIGHT AS NEEDED FOR SLEEP (Patient taking differently: Take 50 mg by mouth At Bedtime TAKE 1 TO 3 TABLETS(50  MG) BY MOUTH EVERY NIGHT AS NEEDED FOR SLEEP) 90 tablet 2     UNABLE TO FIND MEDICATION NAME: medical cannibus       ondansetron (ZOFRAN) 4 MG tablet Take 1 tablet (4 mg) by mouth every 6 hours as needed for nausea (Patient not taking: Reported on 10/20/2022) 30 tablet 1       Allergies  Allergies  "  Allergen Reactions     Gabapentin Other (See Comments)     Patient states she gets \"horrific nightmares\" with this medication     Dilaudid [Hydromorphone Hcl]      Made her feel like her skin was crawling     Nsaids Other (See Comments)     Kidney failure     Compazine [Prochlorperazine] Other (See Comments)     \"Makes my skin crawl, I was going nuts.\"       Social History  Social History     Socioeconomic History     Marital status:      Spouse name: Not on file     Number of children: Not on file     Years of education: Not on file     Highest education level: Not on file   Occupational History     Not on file   Tobacco Use     Smoking status: Former     Packs/day: 0.30     Types: Cigarettes     Quit date: 2015     Years since quittin.6     Smokeless tobacco: Never     Tobacco comments:     recreational   Vaping Use     Vaping Use: Never used   Substance and Sexual Activity     Alcohol use: Not Currently     Alcohol/week: 0.0 standard drinks     Drug use: Yes     Types: Marijuana     Comment: medical marijuana     Sexual activity: Yes     Partners: Male     Birth control/protection: None   Other Topics Concern     Parent/sibling w/ CABG, MI or angioplasty before 65F 55M? 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       Social Determinants of Health     Financial Resource Strain: Not on file   Food Insecurity: Not on file   Transportation Needs: Not on file   Physical Activity: Not on file   Stress: Not on file   Social Connections: Not on file   Intimate Partner Violence: Not on file   Housing Stability: Not on file       Family History  Family History   Problem Relation Age of Onset     Alzheimer Disease Mother         total care now     Depression Mother      Anxiety Disorder Mother      C.A.D. Father 58         at 58, heart disease     Mental Illness Father      Coronary Artery Disease Father      Hyperlipidemia Father      Diverticulitis Father      Diabetes Sister      "    adult onset     Melanoma Sister      Breast Cancer Sister      Thyroid Disease Sister      Diabetes Maternal Grandmother      Anesthesia Reaction No family hx of      Bleeding Disorder No family hx of      Venous thrombosis No family hx of        Review of Systems  The complete review of systems is negative other than noted in the HPI or here.   Anesthesia Evaluation   Pt has had prior anesthetic.     No history of anesthetic complications       ROS/MED HX  ENT/Pulmonary:     (+) sleep apnea, uses CPAP, tobacco use, Past use,     Neurologic: Comment: ADHD      Cardiovascular:     (+) Dyslipidemia -----Previous cardiac testing   Echo: Date: 4/27/22 Results:  Interpretation Summary     Global and regional left ventricular function is normal with an EF of 60-  65%.Relative wall thickness is increased consistent with concentric  remodeling.  Global right ventricular function and size is normal.  No significant valvular abnormalities  No pericardial effusion is present.  IVC diameter <2.1 cm collapsing >50% with sniff suggests a normal RA pressure  of 3 mmHg.  Stress Test: Date: Results:    ECG Reviewed: Date: Results:    Cath: Date: Results:      METS/Exercise Tolerance: 4 - Raking leaves, gardening    Hematologic:  - neg hematologic  ROS  (-) history of blood clots and history of blood transfusion   Musculoskeletal:   (+) arthritis,     GI/Hepatic:     (+) GERD, Asymptomatic on medication,     Renal/Genitourinary:     (+) renal disease, type: CRI, Nephrolithiasis ,     Endo:     (+) thyroid problem, hypothyroidism, Obesity,     Psychiatric/Substance Use:     (+) psychiatric history depression and anxiety (PTSD) Recreational drug usage: Cannabis (medical cannabis).    Infectious Disease:       Malignancy:  - neg malignancy ROS     Other:      (+) , H/O Chronic Pain,        Virtual visit -  No vitals were obtained    Physical Exam  Constitutional: Awake, alert, cooperative, no apparent distress, and appears stated  age.  HENT: Normocephalic  Respiratory: non labored breathing   Neurologic: Awake, alert, oriented to name, place and time.   Neuropsychiatric: Calm, cooperative. Normal affect.      Prior Labs/Diagnostic Studies   All labs and imaging personally reviewed     Component      Latest Ref Rng & Units 6/28/2022   Sodium      136 - 145 mmol/L 140   Potassium      3.4 - 5.3 mmol/L 4.0   Creatinine      0.51 - 0.95 mg/dL 0.95   Urea Nitrogen      8.0 - 23.0 mg/dL 18.3   Chloride      98 - 107 mmol/L 103   Carbon Dioxide (CO2)      22 - 29 mmol/L 23   Anion Gap      7 - 15 mmol/L 14   Glucose      70 - 99 mg/dL 96   Calcium      8.8 - 10.2 mg/dL 9.7   Protein Total      6.4 - 8.3 g/dL 7.1   Albumin      3.5 - 5.2 g/dL 5.1   Bilirubin Total      <=1.2 mg/dL 0.5   Alkaline Phosphatase      35 - 104 U/L 82   AST      10 - 35 U/L 24   ALT      10 - 35 U/L 16   GFR Estimate      >60 mL/min/1.73m2 67     Component      Latest Ref Rng & Units 6/28/2022   WBC      4.0 - 11.0 10e3/uL 10.6   RBC Count      3.80 - 5.20 10e6/uL 5.27 (H)   Hemoglobin      11.7 - 15.7 g/dL 16.0 (H)   Hematocrit      35.0 - 47.0 % 47.0   MCV      78 - 100 fL 89   MCH      26.5 - 33.0 pg 30.4   MCHC      31.5 - 36.5 g/dL 34.0   RDW      10.0 - 15.0 % 12.8   Platelet Count      150 - 450 10e3/uL 240   % Neutrophils      % 66   % Lymphocytes      % 26   % Monocytes      % 5   % Eosinophils      % 2   % Basophils      % 1   % Immature Granulocytes      % 0   NRBCs per 100 WBC      <1 /100 0   Absolute Neutrophils      1.6 - 8.3 10e3/uL 7.1   Absolute Lymphocytes      0.8 - 5.3 10e3/uL 2.7   Absolute Monocytes      0.0 - 1.3 10e3/uL 0.5   Absolute Eosinophils      0.0 - 0.7 10e3/uL 0.2   Absolute Basophils      0.0 - 0.2 10e3/uL 0.1   Absolute Immature Granulocytes      <=0.4 10e3/uL 0.0   Absolute NRBCs      10e3/uL 0.0       EKG/ stress test - if available please see in ROS above   Echo result w/o MOPS: Interpretation Summary Global and regional left  ventricular function is normal with an EF of 60-65%.Relative wall thickness is increased consistent with concentricremodeling.Global right ventricular function and size is normal.No significant valvular abnormalitiesNo pericardial effusion is present.IVC diameter <2.1 cm collapsing >50% with sniff suggests a normal RA pressureof 3 mmHg. There is no prior study for direct comparison.       The patient's records and results personally reviewed by this provider.     Outside records reviewed from: Care Everywhere      Assessment      Ilsa Yusuf is a 62 year old female seen as a PAC referral for risk assessment and optimization for anesthesia.    Plan/Recommendations  Pt will be optimized for the proposed procedure.  See below for details on the assessment, risk, and preoperative recommendations    NEUROLOGY  - No history of TIA, CVA or seizure  - taking oxycodone 10mg, followed by outside pain clinic  - ADHD, no medications at this time  - Post Op delirium risk factors:  No risk identified    ENT  - No current airway concerns.  Will need to be reassessed day of surgery.  Mallampati: Unable to assess  TM: Unable to assess    CARDIAC  - Hyperlipidemia, continue lovastatin  - underwent echo 4/2022 due to feeling winded at top of flight of stairs with EF 60-65%.    - denies chest pain, chest pressure  - Low risk RCRI, no further testing required    - METS (Metabolic Equivalents)  Patient performs 4 or more METS exercise without symptoms            Total Score: 0      RCRI-Very low risk: Class 1 0.4% complication rate            Total Score: 0        PULMONARY  - Obstructive Sleep Apnea  ABDOUL with home CPAP.      - uses albuterol inhaler prn.  Given originally for pna years ago   - denies asthma  - notices increased use with change of weather into colder temps  - notes chronic sinus problems with use of Flonase daily    - Tobacco History      History   Smoking Status     Former     Packs/day: 0.30     Types:  "Cigarettes     Quit date: 2/17/2015   Smokeless Tobacco     Never       GI  - GERD  Controlled on medications: Proton Pump Inhibitor  PONV Medium Risk  Total Score: 2           1 AN PONV: Pt is Female    1 AN PONV: Patient is not a current smoker        /RENAL  - Baseline Creatinine 0.95  - urgency, dysuria  - procedure as above    ENDOCRINE    - BMI: Estimated body mass index is 32.22 kg/m  as calculated from the following:    Height as of 10/11/22: 1.74 m (5' 8.5\").    Weight as of 10/11/22: 97.5 kg (215 lb).  Obesity (BMI >30)  - No history of Diabetes Mellitus    HEME  VTE Low Risk 0.26%            Total Score: 1    VTE: Greater than 59 yrs old      - No history of abnormal bleeding or antiplatelet use.  - elevated H/H noted over last year or so.  No longer smoking.  Will follow up with PCP      PSYCH  - continue wellbutrin, lexapro, atarax as directed  - prazosin and trazodone at HS        The patient is optimized for their procedure. AVS with information on surgery time/arrival time, meds and NPO status given by nursing staff. No further diagnostic testing indicated.    Please refer to the physical examination documented by the anesthesiologist in the anesthesia record on the day of surgery.    Video-Visit Details    Type of service:  Video Visit    Provider received verbal consent for a Video Visit from the patient? Yes    Video Call Time: 18 min    Originating Location (pt. Location): Home    Distant Location (provider location): Off-site    Mode of Communication:  Video Conference via [employer]    On the day of service:     Prep time: 14 minutes  Visit time: 18 minutes  Documentation time: 13 minutes  ------------------------------------------  Total time: 45 minutes      Akua Yeboah PA-C  Preoperative Assessment Center  [facility]  Clinic and Surgery Center  Phone: [phone]  Fax: [phone]  "

## 2022-10-20 NOTE — PATIENT INSTRUCTIONS
Preparing for Your Surgery      Name:  Ilsa Yusuf   MRN:  [id number]   :  1960   Today's Date:  10/20/2022         Arriving for surgery:  Surgery date:  2022  Arrival time:  10:00 am    Restrictions due to COVID 19:    Effective 2022:  2 visitors may accompany patient and wait in the Surgery Waiting Room.  All visitors must wear a mask and social distance.      parking is available for anyone with mobility limitations or disabilities. (Monday- Friday 7 am- 5 pm)    Please come to:    [email or web address] and Surgery Center  [address] [place] [id number]    Please check in on the 5th floor at the Ambulatory Surgery Center.      What can I eat or drink?    -  You may eat and drink normally until 8 hours prior to surgery time. (Until 3 am)  -  You may have clear liquids until 4 hours prior to surgery time. (Until 7 am)    Examples of clear liquids:  Water  Clear broth  Juices (apple, white grape, white cranberry  and cider) without pulp  Noncarbonated, powder based beverages  (lemonade and Varun-Aid)  Sodas (Sprite, 7-Up, ginger ale and seltzer)  Coffee or tea (without milk or cream)  Gatorade    --No alcohol for at least 24 hours before surgery.    Which medicines can I take?    Hold Aspirin for 7 days before surgery.   Hold Multivitamins for 7 days before surgery.  Hold Supplements for 7 days before surgery. (Omega 3)  Hold Ibuprofen (Advil, Motrin) for 1 day before surgery--unless otherwise directed by surgeon.  Hold Naproxen (Aleve) for 4 days before surgery.      Hold Cannibus for 24 hours before surgery         -  PLEASE TAKE the following medications the day of surgery:   Inhaler as usual and bring to surgery center  Buspirone  Escitalopram  Flonase  Hydroxyzine if needed  Levothyroxine  Omeprazole  Oxycodone if needed  Phenazopyridine if needed  Tizanidine if needed       How do I prepare myself?  - Please take 2 showers before surgery using Scrubcare or Hibiclens  soap.    Use this soap only from the neck to your toes.     Leave the soap on your skin for one minute--then rinse thoroughly.      You may use your own shampoo and conditioner. No other hair products.   - Please remove all jewelry and body piercings.  - No lotions, deodorants or fragrance.  - No makeup or fingernail polish.   - Bring your ID and insurance card.    -If you have a Deep Brain Stimulator, a Spinal Cord Stimulator, or any implanted Neuro Device, you must bring the remote to the Surgery Center.         ALL PATIENTS ARE REQUIRED TO HAVE A RESPONSIBLE ADULT TO DRIVE AND BE IN ATTENDANCE WITH THEM FOR 24 HOURS FOLLOWING SURGERY.       Questions or Concerns:    -For questions regarding the day of surgery, please contact the Ambulatory Surgery Center at [phone].    -If you have health changes between today and your surgery, please contact your surgeon.     - For questions after surgery, please contact your surgeon's office.

## 2022-10-20 NOTE — H&P
Pre-Operative H & P     CC:  Preoperative exam to assess for increased cardiopulmonary risk while undergoing surgery and anesthesia.    Date of Encounter: 10/20/2022  Primary Care Physician:  Crista Elder     Reason for visit:   Encounter Diagnoses   Name Primary?     Preop examination Yes     Urinary urgency      Dysuria      History of UTI        HPI  Ilsa Yusuf is a 62 year old female who presents for pre-operative H & P in preparation for  Procedure Information     Case: [id number] Date/Time: 11/01/22 1130    Procedure: EXAM UNDER ANESTHESIA, CYSTOSCOPY WITH POSSIBLE BLADDER BIOPSY (Urethra)    Anesthesia type: Choice    Diagnosis:       Urgency of urination [R39.15]      Dysuria [R30.0]      History of UTI [Z87.440]      Bladder pain [R39.89]    Pre-op diagnosis:       Urgency of urination [R39.15]      Dysuria [R30.0]      History of UTI [Z87.440]      Bladder pain [R39.89]    Location: [place] [address] / [facility]    Providers: Terrie Melton MD          Deanne is a 62-year-old female with past medical history significant for allergic rhinitis, ABDOUL on CPAP, hypothyroidism, GERD, obesity, history of kidney stones, depression, anxiety, PTSD, ADHD.  Love has a longstanding history of urinary urgency and bladder pain.  Today she states it feels like there are hot [place] in her bladder.  She has undergone several cystoscopies in the past however, she has asked for cystoscopy in the OR this time because her pain was intolerable.    History is obtained from the patient and chart review    Hx of abnormal bleeding or anti-platelet use: denies    Menstrual history: Patient's last menstrual period was 08/15/2016 (approximate).:      Past Medical History  Past Medical History:   Diagnosis Date     Arthritis 2012    both knees; hands     Cervical high risk HPV (human papillomavirus) test positive 03/19/2019    see problem list     Depressive disorder       Depressive disorder, not elsewhere classified 08/28/12    DC 10/05/12-[facility]     Hyperlipidemia LDL goal < 130     mevacor     Hypothyroid      Moderate major depression (H)     abilify, cymbalta, seroquel, and sofya, Dr Antonio Yoon      ABDOUL (obstructive sleep apnea)      PTSD (post-traumatic stress disorder)      Seizure (H) 03/2011    one episode, was on Keppra, EEG negative       Past Surgical History  Past Surgical History:   Procedure Laterality Date     ABDOMEN SURGERY       BLADDER SURGERY       CHOLECYSTECTOMY       COLONOSCOPY  2012     CYSTOSCOPY       ORTHOPEDIC SURGERY  left knee     renal artery surgery  child    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     TONSILLECTOMY       ureteral reimplantation       ZZC PARTIAL EXCISION THYROID,UNILAT  1991    rt, negative path then, treated with synthroid.       Prior to Admission Medications  Current Outpatient Medications   Medication Sig Dispense Refill     albuterol (PROAIR HFA/PROVENTIL HFA/VENTOLIN HFA) 108 (90 Base) MCG/ACT inhaler Inhale 1-2 puffs into the lungs every 4 hours as needed for shortness of breath / dyspnea or wheezing 18 g 0     buPROPion (WELLBUTRIN SR) 200 MG 12 hr tablet Take 1 tablet (200 mg) by mouth every morning 30 tablet 1     cholecalciferol (VITAMIN D3) 5000 UNITS CAPS capsule Take 1 capsule (5,000 Units) by mouth daily Take 1 per day (Patient taking differently: Take 5,000 Units by mouth At Bedtime Take 1 per day) 100 capsule 2     escitalopram (LEXAPRO) 10 MG tablet Take 1 tablet (10 mg) by mouth daily In addition to 5 mg for ttl: 15 mg/day (Patient taking differently: Take 10 mg by mouth every morning In addition to 5 mg for ttl: 15 mg/day) 90 tablet 0     escitalopram (LEXAPRO) 5 MG tablet Take 1 tablet (5 mg) by mouth daily In addition to 10 mg for ttl: 15 mg/day (Patient taking differently: Take 5 mg by mouth every morning In addition to 10 mg for ttl: 15 mg/day) 90 tablet 0     fluticasone (FLONASE) 50  MCG/ACT nasal spray SHAKE LIQUID AND USE 2 SPRAYS IN EACH NOSTRIL TWICE DAILY (Patient taking differently: Spray 2 sprays into both nostrils 2 times daily) 48 g 1     hydrOXYzine (ATARAX) 25 MG tablet Take 1 tablet (25 mg) by mouth 3 times daily as needed for anxiety 90 tablet 1     levothyroxine (SYNTHROID/LEVOTHROID) 175 MCG tablet TAKE 1 TABLET(175 MCG) BY MOUTH DAILY (Patient taking differently: Take 175 mcg by mouth every morning) 90 tablet 2     lovastatin (MEVACOR) 20 MG tablet TAKE 1 TABLET(20 MG) BY MOUTH AT BEDTIME (Patient taking differently: Take 20 mg by mouth At Bedtime) 90 tablet 0     multivitamin w/minerals (THERA-VIT-M) tablet Take 1 tablet by mouth daily (with lunch)       Omega-3 Fatty Acids (OMEGA 3 PO) Take 2 g by mouth 2 times daily Takes 1 in am and 1 at bedtime       omeprazole (PRILOSEC) 40 MG DR capsule TAKE 1 CAPSULE(40 MG) BY MOUTH DAILY (Patient taking differently: 40 mg every morning) 90 capsule 0     oxyCODONE IR (ROXICODONE) 10 MG tablet as needed       phenazopyridine (PYRIDIUM) 200 MG tablet Take 1 tablet (200 mg) by mouth 3 times daily as needed for irritation 21 tablet 0     prazosin (MINIPRESS) 5 MG capsule TAKE 1 CAPSULE(5 MG) BY MOUTH AT BEDTIME (Patient taking differently: Take 5 mg by mouth At Bedtime TAKE 1 CAPSULE(5 MG) BY MOUTH AT BEDTIME) 90 capsule 0     tiZANidine (ZANAFLEX) 4 MG tablet Take 1-3 tablets (4-12 mg) by mouth 3 times daily as needed for muscle spasms 210 tablet 3     traZODone (DESYREL) 50 MG tablet TAKE 1 TO 3 TABLETS(50  MG) BY MOUTH EVERY NIGHT AS NEEDED FOR SLEEP (Patient taking differently: Take 50 mg by mouth At Bedtime TAKE 1 TO 3 TABLETS(50  MG) BY MOUTH EVERY NIGHT AS NEEDED FOR SLEEP) 90 tablet 2     UNABLE TO FIND MEDICATION NAME: medical cannibus       ondansetron (ZOFRAN) 4 MG tablet Take 1 tablet (4 mg) by mouth every 6 hours as needed for nausea (Patient not taking: Reported on 10/20/2022) 30 tablet 1       Allergies  Allergies  "  Allergen Reactions     Gabapentin Other (See Comments)     Patient states she gets \"horrific nightmares\" with this medication     Dilaudid [Hydromorphone Hcl]      Made her feel like her skin was crawling     Nsaids Other (See Comments)     Kidney failure     Compazine [Prochlorperazine] Other (See Comments)     \"Makes my skin crawl, I was going nuts.\"       Social History  Social History     Socioeconomic History     Marital status:      Spouse name: Not on file     Number of children: Not on file     Years of education: Not on file     Highest education level: Not on file   Occupational History     Not on file   Tobacco Use     Smoking status: Former     Packs/day: 0.30     Types: Cigarettes     Quit date: 2015     Years since quittin.6     Smokeless tobacco: Never     Tobacco comments:     recreational   Vaping Use     Vaping Use: Never used   Substance and Sexual Activity     Alcohol use: Not Currently     Alcohol/week: 0.0 standard drinks     Drug use: Yes     Types: Marijuana     Comment: medical marijuana     Sexual activity: Yes     Partners: Male     Birth control/protection: None   Other Topics Concern     Parent/sibling w/ CABG, MI or angioplasty before 65F 55M? 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       Social Determinants of Health     Financial Resource Strain: Not on file   Food Insecurity: Not on file   Transportation Needs: Not on file   Physical Activity: Not on file   Stress: Not on file   Social Connections: Not on file   Intimate Partner Violence: Not on file   Housing Stability: Not on file       Family History  Family History   Problem Relation Age of Onset     Alzheimer Disease Mother         total care now     Depression Mother      Anxiety Disorder Mother      C.A.D. Father 58         at 58, heart disease     Mental Illness Father      Coronary Artery Disease Father      Hyperlipidemia Father      Diverticulitis Father      Diabetes Sister      "    adult onset     Melanoma Sister      Breast Cancer Sister      Thyroid Disease Sister      Diabetes Maternal Grandmother      Anesthesia Reaction No family hx of      Bleeding Disorder No family hx of      Venous thrombosis No family hx of        Review of Systems  The complete review of systems is negative other than noted in the HPI or here.   Anesthesia Evaluation   Pt has had prior anesthetic.     No history of anesthetic complications       ROS/MED HX  ENT/Pulmonary:     (+) sleep apnea, uses CPAP, tobacco use, Past use,     Neurologic: Comment: ADHD      Cardiovascular:     (+) Dyslipidemia -----Previous cardiac testing   Echo: Date: 4/27/22 Results:  Interpretation Summary     Global and regional left ventricular function is normal with an EF of 60-  65%.Relative wall thickness is increased consistent with concentric  remodeling.  Global right ventricular function and size is normal.  No significant valvular abnormalities  No pericardial effusion is present.  IVC diameter <2.1 cm collapsing >50% with sniff suggests a normal RA pressure  of 3 mmHg.  Stress Test: Date: Results:    ECG Reviewed: Date: Results:    Cath: Date: Results:      METS/Exercise Tolerance: 4 - Raking leaves, gardening    Hematologic:  - neg hematologic  ROS  (-) history of blood clots and history of blood transfusion   Musculoskeletal:   (+) arthritis,     GI/Hepatic:     (+) GERD, Asymptomatic on medication,     Renal/Genitourinary:     (+) renal disease, type: CRI, Nephrolithiasis ,     Endo:     (+) thyroid problem, hypothyroidism, Obesity,     Psychiatric/Substance Use:     (+) psychiatric history depression and anxiety (PTSD) Recreational drug usage: Cannabis (medical cannabis).    Infectious Disease:       Malignancy:  - neg malignancy ROS     Other:      (+) , H/O Chronic Pain,        Virtual visit -  No vitals were obtained    Physical Exam  Constitutional: Awake, alert, cooperative, no apparent distress, and appears stated  age.  HENT: Normocephalic  Respiratory: non labored breathing   Neurologic: Awake, alert, oriented to name, place and time.   Neuropsychiatric: Calm, cooperative. Normal affect.      Prior Labs/Diagnostic Studies   All labs and imaging personally reviewed     Component      Latest Ref Rng & Units 6/28/2022   Sodium      136 - 145 mmol/L 140   Potassium      3.4 - 5.3 mmol/L 4.0   Creatinine      0.51 - 0.95 mg/dL 0.95   Urea Nitrogen      8.0 - 23.0 mg/dL 18.3   Chloride      98 - 107 mmol/L 103   Carbon Dioxide (CO2)      22 - 29 mmol/L 23   Anion Gap      7 - 15 mmol/L 14   Glucose      70 - 99 mg/dL 96   Calcium      8.8 - 10.2 mg/dL 9.7   Protein Total      6.4 - 8.3 g/dL 7.1   Albumin      3.5 - 5.2 g/dL 5.1   Bilirubin Total      <=1.2 mg/dL 0.5   Alkaline Phosphatase      35 - 104 U/L 82   AST      10 - 35 U/L 24   ALT      10 - 35 U/L 16   GFR Estimate      >60 mL/min/1.73m2 67     Component      Latest Ref Rng & Units 6/28/2022   WBC      4.0 - 11.0 10e3/uL 10.6   RBC Count      3.80 - 5.20 10e6/uL 5.27 (H)   Hemoglobin      11.7 - 15.7 g/dL 16.0 (H)   Hematocrit      35.0 - 47.0 % 47.0   MCV      78 - 100 fL 89   MCH      26.5 - 33.0 pg 30.4   MCHC      31.5 - 36.5 g/dL 34.0   RDW      10.0 - 15.0 % 12.8   Platelet Count      150 - 450 10e3/uL 240   % Neutrophils      % 66   % Lymphocytes      % 26   % Monocytes      % 5   % Eosinophils      % 2   % Basophils      % 1   % Immature Granulocytes      % 0   NRBCs per 100 WBC      <1 /100 0   Absolute Neutrophils      1.6 - 8.3 10e3/uL 7.1   Absolute Lymphocytes      0.8 - 5.3 10e3/uL 2.7   Absolute Monocytes      0.0 - 1.3 10e3/uL 0.5   Absolute Eosinophils      0.0 - 0.7 10e3/uL 0.2   Absolute Basophils      0.0 - 0.2 10e3/uL 0.1   Absolute Immature Granulocytes      <=0.4 10e3/uL 0.0   Absolute NRBCs      10e3/uL 0.0       EKG/ stress test - if available please see in ROS above   Echo result w/o MOPS: Interpretation Summary Global and regional left  ventricular function is normal with an EF of 60-65%.Relative wall thickness is increased consistent with concentricremodeling.Global right ventricular function and size is normal.No significant valvular abnormalitiesNo pericardial effusion is present.IVC diameter <2.1 cm collapsing >50% with sniff suggests a normal RA pressureof 3 mmHg. There is no prior study for direct comparison.       The patient's records and results personally reviewed by this provider.     Outside records reviewed from: Care Everywhere      Assessment      Ilsa Yusuf is a 62 year old female seen as a PAC referral for risk assessment and optimization for anesthesia.    Plan/Recommendations  Pt will be optimized for the proposed procedure.  See below for details on the assessment, risk, and preoperative recommendations    NEUROLOGY  - No history of TIA, CVA or seizure  - taking oxycodone 10mg, followed by outside pain clinic  - ADHD, no medications at this time  - Post Op delirium risk factors:  No risk identified    ENT  - No current airway concerns.  Will need to be reassessed day of surgery.  Mallampati: Unable to assess  TM: Unable to assess    CARDIAC  - Hyperlipidemia, continue lovastatin  - underwent echo 4/2022 due to feeling winded at top of flight of stairs with EF 60-65%.    - denies chest pain, chest pressure  - Low risk RCRI, no further testing required    - METS (Metabolic Equivalents)  Patient performs 4 or more METS exercise without symptoms            Total Score: 0      RCRI-Very low risk: Class 1 0.4% complication rate            Total Score: 0        PULMONARY  - Obstructive Sleep Apnea  ABDOUL with home CPAP.      - uses albuterol inhaler prn.  Given originally for pna years ago   - denies asthma  - notices increased use with change of weather into colder temps  - notes chronic sinus problems with use of Flonase daily    - Tobacco History      History   Smoking Status     Former     Packs/day: 0.30     Types:  "Cigarettes     Quit date: 2/17/2015   Smokeless Tobacco     Never       GI  - GERD  Controlled on medications: Proton Pump Inhibitor  PONV Medium Risk  Total Score: 2           1 AN PONV: Pt is Female    1 AN PONV: Patient is not a current smoker        /RENAL  - Baseline Creatinine 0.95  - urgency, dysuria  - procedure as above    ENDOCRINE    - BMI: Estimated body mass index is 32.22 kg/m  as calculated from the following:    Height as of 10/11/22: 1.74 m (5' 8.5\").    Weight as of 10/11/22: 97.5 kg (215 lb).  Obesity (BMI >30)  - No history of Diabetes Mellitus    HEME  VTE Low Risk 0.26%            Total Score: 1    VTE: Greater than 59 yrs old      - No history of abnormal bleeding or antiplatelet use.  - elevated H/H noted over last year or so.  No longer smoking.  Will follow up with PCP      PSYCH  - continue wellbutrin, lexapro, atarax as directed  - prazosin and trazodone at HS        The patient is optimized for their procedure. AVS with information on surgery time/arrival time, meds and NPO status given by nursing staff. No further diagnostic testing indicated.    Please refer to the physical examination documented by the anesthesiologist in the anesthesia record on the day of surgery.    Video-Visit Details    Type of service:  Video Visit    Provider received verbal consent for a Video Visit from the patient? Yes    Video Call Time: 18 min    Originating Location (pt. Location): Home    Distant Location (provider location): Off-site    Mode of Communication:  Video Conference via AmWell    On the day of service:     Prep time: 14 minutes  Visit time: 18 minutes  Documentation time: 13 minutes  ------------------------------------------  Total time: 45 minutes      Akua Yeboah PA-C  Preoperative Assessment Center  [facility]  Cl[facility] and [facility] Center  Phone: [phone]  Fax: [phone]  "

## 2022-10-20 NOTE — PROGRESS NOTES
Deanne is a 62 year old who is being evaluated via a billable video visit.      How would you like to obtain your AVS? MyChart        HPI         Review of Systems         Objective    Vitals - Patient Reported  Pain Score: Severe Pain (7)        Physical Exam       [facility] Live GEORGE

## 2022-10-22 DIAGNOSIS — F41.1 GAD (GENERALIZED ANXIETY DISORDER): ICD-10-CM

## 2022-10-24 ENCOUNTER — TELEPHONE (OUTPATIENT)
Dept: PSYCHOLOGY | Facility: CLINIC | Age: 62
End: 2022-10-24

## 2022-10-24 NOTE — TELEPHONE ENCOUNTER
This therapist attempted to contact patient to discuss transferring to a different therapist. Phone call continued to ring. Will attempted to contact patient later in the week.

## 2022-10-25 ENCOUNTER — LAB (OUTPATIENT)
Dept: LAB | Facility: CLINIC | Age: 62
End: 2022-10-25
Payer: COMMERCIAL

## 2022-10-25 DIAGNOSIS — R30.0 DYSURIA: ICD-10-CM

## 2022-10-25 PROCEDURE — 87086 URINE CULTURE/COLONY COUNT: CPT

## 2022-10-25 RX ORDER — HYDROXYZINE HYDROCHLORIDE 25 MG/1
TABLET, FILM COATED ORAL
Qty: 90 TABLET | Refills: 1 | OUTPATIENT
Start: 2022-10-25

## 2022-10-27 ENCOUNTER — VIRTUAL VISIT (OUTPATIENT)
Dept: BEHAVIORAL HEALTH | Facility: CLINIC | Age: 62
End: 2022-10-27
Payer: COMMERCIAL

## 2022-10-27 DIAGNOSIS — F41.1 GAD (GENERALIZED ANXIETY DISORDER): ICD-10-CM

## 2022-10-27 DIAGNOSIS — F33.3 SEVERE RECURRENT MAJOR DEPRESSIVE DISORDER WITH PSYCHOTIC FEATURES (H): Primary | ICD-10-CM

## 2022-10-27 LAB — BACTERIA UR CULT: NO GROWTH

## 2022-10-27 PROCEDURE — 90834 PSYTX W PT 45 MINUTES: CPT | Mod: 95 | Performed by: SOCIAL WORKER

## 2022-10-27 NOTE — NURSING NOTE
"Chief Complaint   Patient presents with     Pain     follow up       Initial /87  Pulse 86  Wt 99.3 kg (219 lb)  SpO2 98%  BMI 32.34 kg/m2 Estimated body mass index is 32.34 kg/(m^2) as calculated from the following:    Height as of 12/23/16: 1.753 m (5' 9\").    Weight as of this encounter: 99.3 kg (219 lb).  Medication Reconciliation: nitza Vazquez [facility]        "
The patient is a 24y Male complaining of

## 2022-10-27 NOTE — PROGRESS NOTES
[facility]   Mental Health & Addiction Services     Progress Note - Initial Visit    Patient  Name:  Ilsa Yusuf Date: 10/27/22           Service Type: Individual     Visit Start Time: 1400  Visit End Time:     Visit #: 1    Attendees: Client    Service Modality:  Video Visit:      Provider verified identity through the following two step process.  Patient provided:  Patient     Telemedicine Visit: The patient's condition can be safely assessed and treated via synchronous audio and visual telemedicine encounter.      Reason for Telemedicine Visit: Patient has requested telehealth visit    Originating Site (Patient Location): Patient's home    Distant Site (Provider Location): Provider Remote Setting- Home Office    Consent:  The patient/guardian has verbally consented to: the potential risks and benefits of telemedicine (video visit) versus in person care; bill my insurance or make self-payment for services provided; and responsibility for payment of non-covered services.     Patient would like the video invitation sent by:  Send to e-mail at: [email or web address]    Mode of Communication:  Video Conference via Amwell    Distant Location (Provider):  Off-site    As the provider I attest to compliance with applicable laws and regulations related to telemedicine.       DATA:   Interactive Complexity: No   Crisis: No     Presenting Concerns/  Current Stressors:  Patient is a 62-year-old female who transferred her care to this provider.  Patient reports a history of anxiety and depression symptoms. Patient reports having multiple stressors such as, family of origin issues relationship concerns housing and financial issues. She is hoping to develop strategies and techniques by participating in psychotherapy in order to improve her overall health and wellbeing.      ASSESSMENT:  Mental Status Assessment:  Appearance:   Appropriate   Eye Contact:   Good   Psychomotor  Behavior: Normal   Attitude:   Cooperative   Orientation:   All  Speech   Rate / Production: Normal/ Responsive   Volume:  Normal   Mood:    Sad   Affect:    Appropriate   Thought Content:  Clear   Thought Form:  Coherent   Insight:    Good     DONELL-7 SCORE 6/10/2022 9/1/2022 10/6/2022   Total Score - - -   Total Score - 13 (moderate anxiety) 15 (severe anxiety)   Total Score 16 13 15   Total Score - - -       PHQ 6/10/2022 9/1/2022 10/6/2022   PHQ-9 Total Score 14 7 8   Q9: Thoughts of better off dead/self-harm past 2 weeks Not at all Not at all Not at all         Safety Issues and Plan for Safety and Risk Management:     [place] Suicide Severity Rating Scale (Lifetime/Recent)  [place] Suicide Severity Rating (Lifetime/Recent) 10/6/2022   1. Wish to be Dead (Lifetime) 1   Wish to be Dead Description (Lifetime) (No Data)   1. Wish to be Dead (Past 1 Month) 1   Calculated C-SSRS Risk Score (Lifetime/Recent) Low Risk     Patient denies current fears or concerns for personal safety.  Patient denies current or recent suicidal ideation or behaviors.  Patient denies current or recent homicidal ideation or behaviors.  Patient denies current or recent self injurious behavior or ideation.  Patient denies other safety concerns.  Recommended that patient call 911 or go to the local ED should there be a change in any of these risk factors.  Patient reports there are no firearms in the house.     Diagnostic Criteria:  Generalized Anxiety Disorder  A. Excessive anxiety and worry about a number of events or activities (such as work or school performance).   B. The person finds it difficult to control the worry.   - Restlessness or feeling keyed up or on edge.    - Being easily fatigued.    - Difficulty concentrating or mind going blank.    - Irritability.    - Sleep disturbance (difficulty falling or staying asleep, or restless unsatisfying sleep).   D. The focus of the anxiety and worry is not confined to features of an Axis I  disorder.  E. The anxiety, worry, or physical symptoms cause clinically significant distress or impairment in social, occupational, or other important areas of functioning.   F. The disturbance is not due to the direct physiological effects of a substance (e.g., a drug of abuse, a medication) or a general medical condition (e.g., hyperthyroidism) and does not occur exclusively during a Mood Disorder, a Psychotic Disorder, or a Pervasive Developmental Disorder.     Major Depressive Disorder  A) Recurrent episode(s) - symptoms have been present during the same 2-week period and represent a change from previous functioning 5 or more symptoms (required for diagnosis)   - Depressed mood. Note: In children and adolescents, can be irritable mood.     - Diminished interest or pleasure in all, or almost all, activities.    - Decreased sleep.    - Fatigue or loss of energy.    - Feelings of worthlessness or guilt.    - Diminished ability to think or concentrate, or indecisiveness.    - Recurrent thoughts of death (not just fear of dying), recurrent suicidal ideation without a specific plan, or a suicide attempt or a specific plan for committing suicide.   B) The symptoms cause clinically significant distress or impairment in social, occupational, or other important areas of functioning  C) The episode is not attributable to the physiological effects of a substance or to another medical condition  D) The occurence of major depressive episode is not better explained by other thought / psychotic disorders  E) There has never been a manic episode or hypomanic episode         DSM5 Diagnoses: (Sustained by DSM5 Criteria Listed Above)  Diagnoses: 296.33 (F33.2) Major Depressive Disorder, Recurrent Episode, Severe _ and With anxious distress  300.02 (F41.1) Generalized Anxiety Disorder      Psychosocial & Contextual Factors: Patient has limited support system.  Current stressors include relationship issues family stressors and  financial concerns      WHODAS 2.0 (12 item):   WHODAS 2.0 Total Score 10/6/2022   Total Score 33   Total Score MyChart 33     Intervention:  Psychodynamic patient processed internal experiences completed through review of safety issues and can safety interventions, client-centered and ACT therapy  Collateral Reports Completed:  Not Applicable      PLAN: (Homework, other):  1. Provider will continue Diagnostic Assessment.  Patient was given the following to do until next session:          Patient will explore housing options   Resources include Makenna Nance (woman with ADHD) podcast on guilt shame and vulnerability by    Thor Escobar    2. Provider recommended the following referrals: None at this time    3.  Suicide Risk and Safety Concerns were assessed for Ilsa Yusuf.      Martha Li [facility] October 27, 2022

## 2022-10-28 ENCOUNTER — LAB (OUTPATIENT)
Dept: LAB | Facility: CLINIC | Age: 62
End: 2022-10-28
Payer: COMMERCIAL

## 2022-10-28 DIAGNOSIS — Z20.822 ENCOUNTER FOR LABORATORY TESTING FOR COVID-19 VIRUS: ICD-10-CM

## 2022-10-28 DIAGNOSIS — N39.0 URINARY TRACT INFECTION: ICD-10-CM

## 2022-10-28 PROCEDURE — U0005 INFEC AGEN DETEC AMPLI PROBE: HCPCS

## 2022-10-28 PROCEDURE — U0003 INFECTIOUS AGENT DETECTION BY NUCLEIC ACID (DNA OR RNA); SEVERE ACUTE RESPIRATORY SYNDROME CORONAVIRUS 2 (SARS-COV-2) (CORONAVIRUS DISEASE [COVID-19]), AMPLIFIED PROBE TECHNIQUE, MAKING USE OF HIGH THROUGHPUT TECHNOLOGIES AS DESCRIBED BY CMS-2020-01-R: HCPCS

## 2022-10-29 LAB — SARS-COV-2 RNA RESP QL NAA+PROBE: NEGATIVE

## 2022-10-31 RX ORDER — FENTANYL CITRATE 50 UG/ML
25 INJECTION, SOLUTION INTRAMUSCULAR; INTRAVENOUS
Status: CANCELLED | OUTPATIENT
Start: 2022-10-31

## 2022-10-31 RX ORDER — SODIUM CHLORIDE, SODIUM LACTATE, POTASSIUM CHLORIDE, CALCIUM CHLORIDE 600; 310; 30; 20 MG/100ML; MG/100ML; MG/100ML; MG/100ML
INJECTION, SOLUTION INTRAVENOUS CONTINUOUS
Status: CANCELLED | OUTPATIENT
Start: 2022-10-31

## 2022-11-01 ENCOUNTER — HOSPITAL ENCOUNTER (OUTPATIENT)
Facility: AMBULATORY SURGERY CENTER | Age: 62
Discharge: HOME | End: 2022-11-01
Attending: UROLOGY
Payer: COMMERCIAL

## 2022-11-01 ENCOUNTER — ANESTHESIA (OUTPATIENT)
Dept: SURGERY | Facility: AMBULATORY SURGERY CENTER | Age: 62
End: 2022-11-01
Payer: COMMERCIAL

## 2022-11-01 VITALS
BODY MASS INDEX: 30.46 KG/M2 | SYSTOLIC BLOOD PRESSURE: 124 MMHG | WEIGHT: 201 LBS | DIASTOLIC BLOOD PRESSURE: 80 MMHG | OXYGEN SATURATION: 100 % | TEMPERATURE: 96.9 F | HEIGHT: 68 IN | RESPIRATION RATE: 16 BRPM | HEART RATE: 56 BPM

## 2022-11-01 DIAGNOSIS — Z87.440 HISTORY OF UTI: ICD-10-CM

## 2022-11-01 DIAGNOSIS — R39.15 URGENCY OF URINATION: ICD-10-CM

## 2022-11-01 DIAGNOSIS — R30.0 DYSURIA: ICD-10-CM

## 2022-11-01 DIAGNOSIS — R39.89 BLADDER PAIN: ICD-10-CM

## 2022-11-01 PROCEDURE — 52000 CYSTOURETHROSCOPY: CPT

## 2022-11-01 PROCEDURE — 52000 CYSTOURETHROSCOPY: CPT | Mod: GC | Performed by: UROLOGY

## 2022-11-01 RX ORDER — FENTANYL CITRATE 50 UG/ML
25 INJECTION, SOLUTION INTRAMUSCULAR; INTRAVENOUS EVERY 5 MIN PRN
Status: DISCONTINUED | OUTPATIENT
Start: 2022-11-01 | End: 2022-11-01 | Stop reason: HOSPADM

## 2022-11-01 RX ORDER — ONDANSETRON 2 MG/ML
4 INJECTION INTRAMUSCULAR; INTRAVENOUS EVERY 30 MIN PRN
Status: DISCONTINUED | OUTPATIENT
Start: 2022-11-01 | End: 2022-11-02 | Stop reason: HOSPADM

## 2022-11-01 RX ORDER — GLYCOPYRROLATE 0.2 MG/ML
INJECTION, SOLUTION INTRAMUSCULAR; INTRAVENOUS PRN
Status: DISCONTINUED | OUTPATIENT
Start: 2022-11-01 | End: 2022-11-01

## 2022-11-01 RX ORDER — PROPOFOL 10 MG/ML
INJECTION, EMULSION INTRAVENOUS PRN
Status: DISCONTINUED | OUTPATIENT
Start: 2022-11-01 | End: 2022-11-01

## 2022-11-01 RX ORDER — SODIUM CHLORIDE, SODIUM LACTATE, POTASSIUM CHLORIDE, CALCIUM CHLORIDE 600; 310; 30; 20 MG/100ML; MG/100ML; MG/100ML; MG/100ML
INJECTION, SOLUTION INTRAVENOUS CONTINUOUS
Status: DISCONTINUED | OUTPATIENT
Start: 2022-11-01 | End: 2022-11-02 | Stop reason: HOSPADM

## 2022-11-01 RX ORDER — MEPERIDINE HYDROCHLORIDE 25 MG/ML
12.5 INJECTION INTRAMUSCULAR; INTRAVENOUS; SUBCUTANEOUS
Status: DISCONTINUED | OUTPATIENT
Start: 2022-11-01 | End: 2022-11-02 | Stop reason: HOSPADM

## 2022-11-01 RX ORDER — ONDANSETRON 4 MG/1
4 TABLET, ORALLY DISINTEGRATING ORAL EVERY 30 MIN PRN
Status: DISCONTINUED | OUTPATIENT
Start: 2022-11-01 | End: 2022-11-02 | Stop reason: HOSPADM

## 2022-11-01 RX ORDER — FENTANYL CITRATE 50 UG/ML
INJECTION, SOLUTION INTRAMUSCULAR; INTRAVENOUS PRN
Status: DISCONTINUED | OUTPATIENT
Start: 2022-11-01 | End: 2022-11-01

## 2022-11-01 RX ORDER — OXYCODONE HYDROCHLORIDE 5 MG/1
5 TABLET ORAL EVERY 4 HOURS PRN
Status: CANCELLED | OUTPATIENT
Start: 2022-11-01

## 2022-11-01 RX ORDER — OXYCODONE HYDROCHLORIDE 5 MG/1
5 TABLET ORAL ONCE
Status: COMPLETED | OUTPATIENT
Start: 2022-11-01 | End: 2022-11-01

## 2022-11-01 RX ORDER — LIDOCAINE 40 MG/G
CREAM TOPICAL
Status: DISCONTINUED | OUTPATIENT
Start: 2022-11-01 | End: 2022-11-01 | Stop reason: HOSPADM

## 2022-11-01 RX ORDER — CEFAZOLIN SODIUM 2 G/50ML
2 SOLUTION INTRAVENOUS SEE ADMIN INSTRUCTIONS
Status: DISCONTINUED | OUTPATIENT
Start: 2022-11-01 | End: 2022-11-01 | Stop reason: HOSPADM

## 2022-11-01 RX ORDER — ACETAMINOPHEN 325 MG/1
975 TABLET ORAL ONCE
Status: COMPLETED | OUTPATIENT
Start: 2022-11-01 | End: 2022-11-01

## 2022-11-01 RX ORDER — CEFAZOLIN SODIUM 2 G/50ML
2 SOLUTION INTRAVENOUS
Status: COMPLETED | OUTPATIENT
Start: 2022-11-01 | End: 2022-11-01

## 2022-11-01 RX ORDER — LIDOCAINE HYDROCHLORIDE 20 MG/ML
JELLY TOPICAL PRN
Status: DISCONTINUED | OUTPATIENT
Start: 2022-11-01 | End: 2022-11-01 | Stop reason: HOSPADM

## 2022-11-01 RX ORDER — SODIUM CHLORIDE, SODIUM LACTATE, POTASSIUM CHLORIDE, CALCIUM CHLORIDE 600; 310; 30; 20 MG/100ML; MG/100ML; MG/100ML; MG/100ML
INJECTION, SOLUTION INTRAVENOUS CONTINUOUS
Status: DISCONTINUED | OUTPATIENT
Start: 2022-11-01 | End: 2022-11-01 | Stop reason: HOSPADM

## 2022-11-01 RX ORDER — ONDANSETRON 2 MG/ML
INJECTION INTRAMUSCULAR; INTRAVENOUS PRN
Status: DISCONTINUED | OUTPATIENT
Start: 2022-11-01 | End: 2022-11-01

## 2022-11-01 RX ORDER — PROPOFOL 10 MG/ML
INJECTION, EMULSION INTRAVENOUS CONTINUOUS PRN
Status: DISCONTINUED | OUTPATIENT
Start: 2022-11-01 | End: 2022-11-01

## 2022-11-01 RX ADMIN — FENTANYL CITRATE 25 MCG: 50 INJECTION, SOLUTION INTRAMUSCULAR; INTRAVENOUS at 13:00

## 2022-11-01 RX ADMIN — FENTANYL CITRATE 50 MCG: 50 INJECTION, SOLUTION INTRAMUSCULAR; INTRAVENOUS at 11:59

## 2022-11-01 RX ADMIN — CEFAZOLIN SODIUM 2 G: 2 SOLUTION INTRAVENOUS at 11:41

## 2022-11-01 RX ADMIN — OXYCODONE HYDROCHLORIDE 5 MG: 5 TABLET ORAL at 12:58

## 2022-11-01 RX ADMIN — FENTANYL CITRATE 25 MCG: 50 INJECTION, SOLUTION INTRAMUSCULAR; INTRAVENOUS at 12:47

## 2022-11-01 RX ADMIN — FENTANYL CITRATE 25 MCG: 50 INJECTION, SOLUTION INTRAMUSCULAR; INTRAVENOUS at 12:37

## 2022-11-01 RX ADMIN — GLYCOPYRROLATE 0.2 MG: 0.2 INJECTION, SOLUTION INTRAMUSCULAR; INTRAVENOUS at 12:09

## 2022-11-01 RX ADMIN — PROPOFOL 50 MG: 10 INJECTION, EMULSION INTRAVENOUS at 11:51

## 2022-11-01 RX ADMIN — ACETAMINOPHEN 975 MG: 325 TABLET ORAL at 11:06

## 2022-11-01 RX ADMIN — ONDANSETRON 4 MG: 2 INJECTION INTRAMUSCULAR; INTRAVENOUS at 12:01

## 2022-11-01 RX ADMIN — SODIUM CHLORIDE, SODIUM LACTATE, POTASSIUM CHLORIDE, CALCIUM CHLORIDE: 600; 310; 30; 20 INJECTION, SOLUTION INTRAVENOUS at 11:07

## 2022-11-01 RX ADMIN — PROPOFOL 150 MCG/KG/MIN: 10 INJECTION, EMULSION INTRAVENOUS at 11:50

## 2022-11-01 NOTE — BRIEF OP NOTE
[facility] Brief Operative Note    Pre-operative diagnosis: Urgency of urination [R39.15]  Dysuria [R30.0]  History of UTI [Z87.440]  Bladder pain [R39.89]   Post-operative diagnosis Same   Procedure: Procedure(s):  EXAM UNDER ANESTHESIA, CYSTOSCOPY   Surgeon(s): Surgeon(s) and Role:     * Terrie Melton MD - Primary     * Tucker Sherman MD - Resident - Assisting   Estimated blood loss: 0cc     Specimens: * No specimens in log *   Findings: Benign pelvic exam, hypervascular bladder, orthotopic ureteral orifices with brisk reflux

## 2022-11-01 NOTE — INTERVAL H&P NOTE
"I have reviewed the surgical (or preoperative) H&P that is linked to this encounter, and examined the patient. There are no significant changes    Clinical Conditions Present on Arrival:  Clinically Significant Risk Factors Present on Admission                    # Obesity: Estimated body mass index is 30.56 kg/m  as calculated from the following:    Height as of this encounter: 1.727 m (5' 8\").    Weight as of this encounter: 91.2 kg (201 lb).       "

## 2022-11-01 NOTE — ANESTHESIA POSTPROCEDURE EVALUATION
Patient: Ilsa Yusuf    Procedure: Procedure(s):  EXAM UNDER ANESTHESIA, CYSTOSCOPY       Anesthesia Type:  MAC    Note:  Anesthesia Post Evaluation    Last vitals:  Vitals Value Taken Time   /80 11/01/22 1315   Temp 36.1  C (96.9  F) 11/01/22 1300   Pulse 56 11/01/22 1315   Resp 16 11/01/22 1315   SpO2 100 % 11/01/22 1315       Electronically Signed By: Nathan Cardenas MD, MD  November 1, 2022  1:28 PM

## 2022-11-01 NOTE — ANESTHESIA CARE TRANSFER NOTE
Patient: Ilsa Yusuf    Procedure: Procedure(s):  EXAM UNDER ANESTHESIA, CYSTOSCOPY       Diagnosis: Urgency of urination [R39.15]  Dysuria [R30.0]  History of UTI [Z87.440]  Bladder pain [R39.89]  Diagnosis Additional Information: No value filed.    Anesthesia Type:   MAC     Note:    Oropharynx: oropharynx clear of all foreign objects  Level of Consciousness: awake              Patient transferred to: Phase II    Handoff Report: Identifed the Patient, Identified the Reponsible Provider, Reviewed the pertinent medical history, Discussed the surgical course, Reviewed Intra-OP anesthesia mangement and issues during anesthesia, Set expectations for post-procedure period and Allowed opportunity for questions and acknowledgement of understanding      Vitals:  Vitals Value Taken Time   /80 11/01/22 1315   Temp 36.1  C (96.9  F) 11/01/22 1300   Pulse 56 11/01/22 1315   Resp 16 11/01/22 1315   SpO2 100 % 11/01/22 1315       Electronically Signed By: Nathan Cardenas MD, MD  November 1, 2022  1:30 PM

## 2022-11-01 NOTE — DISCHARGE INSTRUCTIONS
[facility]  Home Care Following Anesthesia  For 24 hours after surgery:  Get plenty of rest.  A responsible adult must stay with you for at least 24 hours after you leave the surgery center.  Do not drive or use heavy equipment.  If you have weakness or tingling, don't drive or use heavy equipment until this feeling goes away.   Do not drink alcohol.   Avoid strenuous or risky activities.  Ask for help when climbing stairs.  You may feel lightheaded.  IF so, sit for a few minutes before standing.  Have someone help you get up.   If you have nausea (feel sick to your stomach): Drink only clear liquids such as apple juice, ginger ale, broth or 7-Up.  Rest may also help.  Be sure to drink enough fluids.  Move to a regular diet as you feel able.   You may have a slight fever.  Call the doctor if your fever is over 100 F (37.7 C) (taken under the tongue) or lasts longer than 24 hours.  You may have a dry mouth, a sore throat, muscle aches or trouble sleeping. These should go away after 24 hours.  Do not make important or legal decisions.   It is recommended to avoid smoking.               Tips for taking pain medications  To get the best pain relief possible, remember these points:  Take pain medications as directed, before pain becomes severe.  Pain medication can upset your stomach: taking it with food may help.  Constipation is a common side effect of pain medication. Drink plenty of  fluids.  Eat foods high in fiber. Take a stool softener if recommended by your doctor or pharmacist.  Do not drink alcohol, drive or operate machinery while taking pain medications.  Ask about other ways to control pain, such as with heat, ice or relaxation.    Tylenol/Acetaminophen Consumption  To help encourage the safe use of acetaminophen, the makers of TYLENOL  have lowered the maximum daily dose for single-ingredient Extra Strength TYLENOL  (acetaminophen) products sold in the [place] from 8 pills  per day (4,000 mg) to 6 pills per day (3,000 mg). The dosing interval has also changed from 2 pills every 4-6 hours to 2 pills every 6 hours.  If you feel your pain relief is insufficient, you may take Tylenol/Acetaminophen in addition to your narcotic pain medication.   Be careful not to exceed 3,000 mg of Tylenol/Acetaminophen in a 24 hour period from all sources.  If you are taking extra strength Tylenol/acetaminophen (500 mg), the maximum dose is 6 tablets in 24 hours.  If you are taking regular strength acetaminophen (325 mg), the maximum dose is 9 tablets in 24 hours.    Call a doctor for any of the following:  Signs of infection (fever, growing tenderness at the surgery site, a large amount of drainage or bleeding, severe pain, foul-smelling drainage, redness, swelling).  It has been over 8 to 10 hours since surgery and you are still not able to urinate (pass water).  Headache for over 24 hours.  Numbness, tingling or weakness the day after surgery (if you had spinal anesthesia).  Signs of Covid-19 infection (temperature over 100 degrees, shortness of breath, cough, loss of taste/smell, generalized body aches, persistent headache, chills, sore throat, nausea/vomiting/diarrhea)  Your doctor is:  Dr. Terrie Melton, Prostate and Urology: [phone]  Or dial [phone] and ask for the resident on call for:  Prostate Urology  For emergency care, call the:  [facility]:  [phone] (TTY for hearing impaired: [phone])  Tylenol 975 mg given at 10 am.  Next available dose at 4 pm.

## 2022-11-01 NOTE — OP NOTE
OPERATIVE REPORT  November 1st, 2022    PREOPERATIVE DIAGNOSIS: dysuria, urinary urgency, suprapubic pain     POSTOPERATIVE DIAGNOSIS: Same    PROCEDURES PERFORMED:   1. Cystourethroscopy  2. EUA    STAFF SURGEON: Terrie Melton MD  RESIDENT(S):  Tucker Sherman MD    ANESTHESIA: MAC    ESTIMATED BLOOD LOSS: 0 mL.     DRAINS: None    OPERATIVE INDICATIONS:   Ilsa Yusuf is a(n) 62 year old female with a long urologic history of dysuria, urinary urgency and suprapubic pain. She had seen Dr. Sosa in the past who was helping to manage her significant bladder pain and urinary urgency. She had an office cystoscopy in the past which was very painful so she requests to have further evaluation in the OR. A CT performed 6/2022 demonstrated no  abnormalities. The patient elected for cystoscopy with possible biopsyafter a discussion of all risks, benefits, and alternatives.    DESCRIPTION OF PROCEDURE:   After verification of informed consent was obtained, the patient was brought to the operating room, and moved to the operating table. After adequate anesthesia was induced, the patient was repositioned in dorsal lithotomy position in supportive yellow fin stirrups with care in neural safety in positioning of all four extremities.  She was then prepped and draped in the usual sterile fashion. A formal timeout was performed to confirm the correct patient, procedure and operative site.     The procedure began with a pelvic exam that demonstrated not focal abnormalities or prolapse. There was slight pain/pressure during the exam.    A 22-Korean rigid cystoscope was inserted into a well lubricated urethra. We began by performing a white light cystourethroscopy. The urethra was unremarkable. The ureteral orifices were in their orthotopic positions bilaterally and briskly effluxing. The bladder was diffusely vascular. There were no intravesical stones or diverticuli and the bladder was not trabeculated. The bladder was  drained and the cystoscope withdrawn.    The procedure was then concluded. The patient was transferred to the postanesthesia care unit in stable condition and tolerated the procedure well.    POSTOP PLAN:  1. PACU then discharge home  2. Follow-up with Dr. Melton 11/9/22    Addendum:    I, Terrie Melton, was present for the entire case.  I agree with the note as above with changes made as needed.  I spoke to her  in the waiting room at the end of the case    Terrie Melton MD MPH  (she/her/hers)   of Urology  [facility]

## 2022-11-01 NOTE — ANESTHESIA PREPROCEDURE EVALUATION
"Anesthesia Pre-Procedure Evaluation    Patient: Ilsa Yusuf   MRN: [id number] : 1960        Procedure : Procedure(s):  EXAM UNDER ANESTHESIA, CYSTOSCOPY WITH POSSIBLE BLADDER BIOPSY          Past Medical History:   Diagnosis Date     Arthritis     both knees; hands     Cervical high risk HPV (human papillomavirus) test positive 2019    see problem list     Depressive disorder      Depressive disorder, not elsewhere classified 12    DC 10/05/12-[facility]     Hyperlipidemia LDL goal < 130     mevacor     Hypothyroid      Moderate major depression (H)     abilify, cymbalta, seroquel, and nuvigil, Dr Alvarez Persamy     Mumps      ABDOUL (obstructive sleep apnea)      PTSD (post-traumatic stress disorder)      Seizure (H) 2011    one episode, was on Keppra, EEG negative      Past Surgical History:   Procedure Laterality Date     ABDOMEN SURGERY       BLADDER SURGERY       CHOLECYSTECTOMY       COLONOSCOPY       CYSTOSCOPY       ORTHOPEDIC SURGERY  left knee     renal artery surgery  child    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     TONSILLECTOMY       ureteral reimplantation       ZZC PARTIAL EXCISION THYROID,UNILAT      rt, negative path then, treated with synthroid.      Allergies   Allergen Reactions     Gabapentin Other (See Comments)     Patient states she gets \"horrific nightmares\" with this medication     Dilaudid [Hydromorphone Hcl]      Made her feel like her skin was crawling     Nsaids Other (See Comments)     Kidney failure     Compazine [Prochlorperazine] Other (See Comments)     \"Makes my skin crawl, I was going nuts.\"      Social History     Tobacco Use     Smoking status: Former     Packs/day: 0.30     Types: Cigarettes     Quit date: 2015     Years since quittin.7     Smokeless tobacco: Never     Tobacco comments:     recreational   Substance Use Topics     Alcohol use: Not Currently     Alcohol/week: 0.0 standard drinks      Wt Readings from " Last 1 Encounters:   11/01/22 91.2 kg (201 lb)        Anesthesia Evaluation            ROS/MED HX  ENT/Pulmonary:     (+) sleep apnea,     Neurologic:       Cardiovascular:       METS/Exercise Tolerance:     Hematologic:       Musculoskeletal:       GI/Hepatic:     (+) GERD,     Renal/Genitourinary:     (+) renal disease, type: CRI,     Endo:     (+) thyroid problem, hypothyroidism, Obesity,     Psychiatric/Substance Use:     (+) psychiatric history other (comment)     Infectious Disease:       Malignancy:       Other:               OUTSIDE LABS:  CBC:   Lab Results   Component Value Date    WBC 10.6 06/28/2022    WBC 11.5 (H) 06/09/2022    HGB 16.0 (H) 06/28/2022    HGB 16.6 (H) 06/09/2022    HCT 47.0 06/28/2022    HCT 49.0 (H) 06/09/2022     06/28/2022     06/09/2022     BMP:   Lab Results   Component Value Date     06/28/2022     06/09/2022    POTASSIUM 4.0 06/28/2022    POTASSIUM 3.9 06/09/2022    CHLORIDE 103 06/28/2022    CHLORIDE 106 06/09/2022    CO2 23 06/28/2022    CO2 29 06/09/2022    BUN 18.3 06/28/2022    BUN 24 06/09/2022    CR 0.95 06/28/2022    CR 0.96 06/09/2022    GLC 96 06/28/2022    GLC 90 06/09/2022     COAGS:   Lab Results   Component Value Date    INR 0.92 09/03/2017     POC:   Lab Results   Component Value Date    BGM 96 04/13/2016    HCG Negative 08/09/2017    HCGS Negative 04/13/2016     HEPATIC:   Lab Results   Component Value Date    ALBUMIN 5.1 06/28/2022    PROTTOTAL 7.1 06/28/2022    ALT 16 06/28/2022    AST 24 06/28/2022    ALKPHOS 82 06/28/2022    BILITOTAL 0.5 06/28/2022     OTHER:   Lab Results   Component Value Date    LACT 1.0 06/28/2019    MICHAEL 9.7 06/28/2022    PHOS 2.1 (L) 04/14/2016    MAG 2.2 04/14/2016    LIPASE 70 (H) 06/28/2022    TSH 0.58 05/25/2022    T4 1.66 (H) 02/28/2022    CRP 10.0 (H) 01/04/2021    SED 5 01/04/2021       Anesthesia Plan    ASA Status:  2      Anesthesia Type: MAC.     - Reason for MAC: immobility needed               Consents    Anesthesia Plan(s) and associated risks, benefits, and realistic alternatives discussed. Questions answered and patient/representative(s) expressed understanding.     - Discussed: Risks, Benefits and Alternatives for BOTH SEDATION and the PROCEDURE were discussed     - Discussed with:  Patient      - Extended Intubation/Ventilatory Support Discussed: No.      - Patient is DNR/DNI Status: No    Use of blood products discussed: No .     Postoperative Care       PONV prophylaxis: Ondansetron (or other 5HT-3)     Comments:                Nathan Cardenas MD, MD

## 2022-11-02 ENCOUNTER — TRANSFERRED RECORDS (OUTPATIENT)
Dept: HEALTH INFORMATION MANAGEMENT | Facility: CLINIC | Age: 62
End: 2022-11-02

## 2022-11-02 DIAGNOSIS — G47.00 INSOMNIA, UNSPECIFIED TYPE: ICD-10-CM

## 2022-11-02 RX ORDER — TRAZODONE HYDROCHLORIDE 50 MG/1
TABLET, FILM COATED ORAL
Qty: 90 TABLET | Refills: 2 | Status: SHIPPED | OUTPATIENT
Start: 2022-11-02 | End: 2022-11-04

## 2022-11-04 ENCOUNTER — VIRTUAL VISIT (OUTPATIENT)
Dept: PSYCHIATRY | Facility: CLINIC | Age: 62
End: 2022-11-04
Payer: COMMERCIAL

## 2022-11-04 DIAGNOSIS — F41.1 GAD (GENERALIZED ANXIETY DISORDER): ICD-10-CM

## 2022-11-04 DIAGNOSIS — F43.10 PTSD (POST-TRAUMATIC STRESS DISORDER): ICD-10-CM

## 2022-11-04 DIAGNOSIS — F33.1 MODERATE EPISODE OF RECURRENT MAJOR DEPRESSIVE DISORDER (H): ICD-10-CM

## 2022-11-04 DIAGNOSIS — G47.00 INSOMNIA, UNSPECIFIED TYPE: ICD-10-CM

## 2022-11-04 PROCEDURE — 99214 OFFICE O/P EST MOD 30 MIN: CPT | Mod: 95 | Performed by: NURSE PRACTITIONER

## 2022-11-04 RX ORDER — ESCITALOPRAM OXALATE 10 MG/1
10 TABLET ORAL DAILY
Qty: 90 TABLET | Refills: 0 | Status: SHIPPED | OUTPATIENT
Start: 2022-11-04 | End: 2023-03-03

## 2022-11-04 RX ORDER — HYDROXYZINE HYDROCHLORIDE 25 MG/1
25 TABLET, FILM COATED ORAL 3 TIMES DAILY PRN
Qty: 90 TABLET | Refills: 1 | Status: SHIPPED | OUTPATIENT
Start: 2022-11-04 | End: 2023-03-03

## 2022-11-04 RX ORDER — BUPROPION HYDROCHLORIDE 200 MG/1
200 TABLET, EXTENDED RELEASE ORAL EVERY MORNING
Qty: 30 TABLET | Refills: 3 | Status: SHIPPED | OUTPATIENT
Start: 2022-11-04 | End: 2023-03-27

## 2022-11-04 RX ORDER — TRAZODONE HYDROCHLORIDE 50 MG/1
TABLET, FILM COATED ORAL
Qty: 90 TABLET | Refills: 2 | Status: SHIPPED | OUTPATIENT
Start: 2022-11-04 | End: 2023-04-14

## 2022-11-04 RX ORDER — PRAZOSIN HYDROCHLORIDE 5 MG/1
CAPSULE ORAL
Qty: 90 CAPSULE | Refills: 1 | Status: SHIPPED | OUTPATIENT
Start: 2022-11-04 | End: 2023-12-06

## 2022-11-04 RX ORDER — ESCITALOPRAM OXALATE 5 MG/1
5 TABLET ORAL DAILY
Qty: 90 TABLET | Refills: 0 | Status: SHIPPED | OUTPATIENT
Start: 2022-11-04 | End: 2023-03-03 | Stop reason: DRUGHIGH

## 2022-11-04 ASSESSMENT — ANXIETY QUESTIONNAIRES
6. BECOMING EASILY ANNOYED OR IRRITABLE: MORE THAN HALF THE DAYS
2. NOT BEING ABLE TO STOP OR CONTROL WORRYING: NEARLY EVERY DAY
GAD7 TOTAL SCORE: 16
IF YOU CHECKED OFF ANY PROBLEMS ON THIS QUESTIONNAIRE, HOW DIFFICULT HAVE THESE PROBLEMS MADE IT FOR YOU TO DO YOUR WORK, TAKE CARE OF THINGS AT HOME, OR GET ALONG WITH OTHER PEOPLE: EXTREMELY DIFFICULT
GAD7 TOTAL SCORE: 16
7. FEELING AFRAID AS IF SOMETHING AWFUL MIGHT HAPPEN: MORE THAN HALF THE DAYS
1. FEELING NERVOUS, ANXIOUS, OR ON EDGE: NEARLY EVERY DAY
3. WORRYING TOO MUCH ABOUT DIFFERENT THINGS: NEARLY EVERY DAY
5. BEING SO RESTLESS THAT IT IS HARD TO SIT STILL: NOT AT ALL

## 2022-11-04 ASSESSMENT — PATIENT HEALTH QUESTIONNAIRE - PHQ9
SUM OF ALL RESPONSES TO PHQ QUESTIONS 1-9: 12
5. POOR APPETITE OR OVEREATING: NEARLY EVERY DAY

## 2022-11-04 NOTE — PATIENT INSTRUCTIONS
Continue medications as prescribed  Have your pharmacy contact us for a refill if you are running low on medications (We may ask you to come into clinic to get a refill from the nurse  No Alcohol or drug use  No driving if sedated  Call the clinic with any questions or concerns   Reach out for help if you feel like hurting yourself or others ([facility] [phone]: [address] [facility] [address] [facility] [address] [facility], [address] or [facility] Suicide Hotline   [phone] , call 911 or go to nearest Emergency room     Crisis Resources:    Present to the Emergency Department as needed or call after hours crisis line at [phone] or [phone].   [place] Crisis Text Line: Text [place] to 900290.  Suicide LifeLine Chat: suicidepreventionlifeline.org/chat/.  National Suicide Prevention Lifeline: 674.181.6175 (TTY: 604.417.9432). Call anytime for help.  (www.suicidepreventionlifeline.org)  National [place] on Mental Illness (www.ellie.org): 990.844.5931 or 920-520-1671.  Mental Health Association (www.mentalhealth.org): 672.256.9815 or 379-082-8640.       Follow up as directed, for your appointments, per your After Visit Summary Form.

## 2022-11-04 NOTE — PROGRESS NOTES
PSYCHIATRIC MEDICATION FOLLOW UP APPT     Name:  Ilsa Yusuf  : 1960    Ilsa Yusuf is a 62 year old female who is being evaluated via a billable telemedicine  visit.      Telemedicine Visit: The patient's condition can be safely assessed and treated via synchronous audio and visual telemedicine encounter.      Reason for Telemedicine Visit: COVID 19 pandemic and the social and physical recommendations by the CDC and [facility].      Originating Site (Patient Location): Patient's home    Distant Site (Provider Location): Provider Remote Setting    Consent:  The patient/guardian has verbally consented to: the potential risks and benefits of telemedicine (video visit or phone) versus in person care; bill my insurance or make self-payment for services provided; and responsibility for payment of non-covered services.     Mode of Communication:  [employer] platform     As the provider I attest to compliance with applicable laws and regulations related to telemedicine.    IDENTIFICATION   Patient is a 62 year old female with a history of MDD DONELL and PTSD     Patient attended the phone/video session alone.    Last seen for outpatient psychiatry Return Visit on 10/4/2022     FOLLOWING PLAN PUT INTO PLACE:   Patient reports situational anxiety.  She is reporting family dynamics.  Reports she is using 85% of her energy trying to reassure herself and tell herself that she is not horrible.  She reports older sister believes she is the best and looks down upon patient.  She is also having issues with partner who told her to leave the house.  Patient reports her partner lost intimacy with her.  She is trying to find a place to move to.  She is looking for income based housing.  She has applied in a couple of places and she is on a waiting list.  She is also hoping to connect with social work to help her find placement.  She left her a voicemail hoping to hear from her soon.  Reports partner is not  pushing her out rather he  is patient with her till she finds placement.  He has not given her a deadline.  We will not make any adjustment to medications today as symptoms are situational.  Patient will be starting psychotherapy.  She denies negative thoughts of SI HI and reports feeling safe at home.    Patient and I reviewed diagnosis and treatment plan and patient agrees with following recommendations:    PLAN AND RECOMMENDATIONS:  1.  Continue to take Wellbutrin  mg every morning    .  2.  Continue to take trazodone 50 mg 1 to 3 tablets ( mg) at bedtime as needed  3.  Continue to take Lexapro 15 mg every day  4.  Take hydroxyzine 25 mg instead of 50 mg 3 times daily as needed for anxiety    5.  Continue to take prazosin 5 mg at bedtime  6. Follow up again in 4-6 weeks for medication review and evaluation.         INTERIM HISTORY       RECORDS AVAILABLE FOR REVIEW: EHR records through [facility] .        FAMILY, MEDICAL, SURGICAL HISTORY REVIEWED.  MEDICATION HAVE BEEN REVIEWED AND ARE CURRENT TO THE BEST OF MY KNOWLEDGE AND ABILITY.    MEDICATIONS                                                                                                Current Outpatient Medications   Medication Sig     albuterol (PROAIR HFA/PROVENTIL HFA/VENTOLIN HFA) 108 (90 Base) MCG/ACT inhaler Inhale 1-2 puffs into the lungs every 4 hours as needed for shortness of breath / dyspnea or wheezing     buPROPion (WELLBUTRIN SR) 200 MG 12 hr tablet Take 1 tablet (200 mg) by mouth every morning     cholecalciferol (VITAMIN D3) 5000 UNITS CAPS capsule Take 1 capsule (5,000 Units) by mouth daily Take 1 per day (Patient taking differently: Take 5,000 Units by mouth At Bedtime Take 1 per day)     escitalopram (LEXAPRO) 10 MG tablet Take 1 tablet (10 mg) by mouth daily In addition to 5 mg for ttl: 15 mg/day (Patient taking differently: Take 10 mg by mouth every morning In addition to 5 mg for ttl: 15 mg/day)     escitalopram (LEXAPRO) 5 MG  tablet Take 1 tablet (5 mg) by mouth daily In addition to 10 mg for ttl: 15 mg/day (Patient taking differently: Take 5 mg by mouth every morning In addition to 10 mg for ttl: 15 mg/day)     fluticasone (FLONASE) 50 MCG/ACT nasal spray SHAKE LIQUID AND USE 2 SPRAYS IN EACH NOSTRIL TWICE DAILY (Patient taking differently: Spray 2 sprays into both nostrils 2 times daily)     hydrOXYzine (ATARAX) 25 MG tablet Take 1 tablet (25 mg) by mouth 3 times daily as needed for anxiety     levothyroxine (SYNTHROID/LEVOTHROID) 175 MCG tablet TAKE 1 TABLET(175 MCG) BY MOUTH DAILY (Patient taking differently: Take 175 mcg by mouth every morning)     lovastatin (MEVACOR) 20 MG tablet TAKE 1 TABLET(20 MG) BY MOUTH AT BEDTIME (Patient taking differently: Take 20 mg by mouth At Bedtime)     multivitamin w/minerals (THERA-VIT-M) tablet Take 1 tablet by mouth daily (with lunch)     Omega-3 Fatty Acids (OMEGA 3 PO) Take 2 g by mouth 2 times daily Takes 1 in am and 1 at bedtime     omeprazole (PRILOSEC) 40 MG DR capsule TAKE 1 CAPSULE(40 MG) BY MOUTH DAILY (Patient taking differently: 40 mg every morning)     oxyCODONE IR (ROXICODONE) 10 MG tablet as needed     phenazopyridine (PYRIDIUM) 200 MG tablet Take 1 tablet (200 mg) by mouth 3 times daily as needed for irritation     prazosin (MINIPRESS) 5 MG capsule TAKE 1 CAPSULE(5 MG) BY MOUTH AT BEDTIME (Patient taking differently: Take 5 mg by mouth At Bedtime TAKE 1 CAPSULE(5 MG) BY MOUTH AT BEDTIME)     tiZANidine (ZANAFLEX) 4 MG tablet Take 1-3 tablets (4-12 mg) by mouth 3 times daily as needed for muscle spasms     traZODone (DESYREL) 50 MG tablet TAKE 1 TO 3 TABLETS(50  MG) BY MOUTH EVERY NIGHT AS NEEDED FOR SLEEP     UNABLE TO FIND MEDICATION NAME: medical cannibus     No current facility-administered medications for this visit.        PAST  MEDICATIONS TRIALS:  SSRIs:  -Zoloft     TCAs:  -Doxepin     Other antidepressants:  -Mirtazapine        Mood  stabilizers:  -Depakote        Antipsychotics:  -Abilify  -Seroquel  -Zyprexa     ADHD meds:  -Adderall 20 mg: stopped in Feb/2022 due to tachycardia, elevated bp, SOB, and tiredness   -Vyvanse  -Nuvigil     Benzodiazepines:  -Clonazepam  -Temazepam  -Xanax     Sleep aides:  -Ambien  -Lunesta   -Sonata     TODAY PATIENT REPORTS THE FOLLOWING PSYCHIATRIC ROS:     CURRENT STRESSORS: Family dynamics  COPING MECHANISMS AND SUPPORTS: Limited Perceived Supports  SLEEP: Reports sleep disturbance  DIET:  Adequate  EXERCISE: No regular exercise program  SIDE EFFECTS:  Tolerating medications without reported side effects  SUBSTANCE USE: Denies  COMPLIANCE: States Adherent to medication regimen  REPORTS THE FOLLOWING NEW MEDICAL ISSUES: None      PROBLEM: DEPRESSION:   Last PHQ-9 11/4/2022   1.  Little interest or pleasure in doing things 2   2.  Feeling down, depressed, or hopeless 2   3.  Trouble falling or staying asleep, or sleeping too much 0   4.  Feeling tired or having little energy 3   5.  Poor appetite or overeating 0   6.  Feeling bad about yourself 2   7.  Trouble concentrating 2   8.  Moving slowly or restless 0   Q9: Thoughts of better off dead/self-harm past 2 weeks 1   PHQ-9 Total Score 12   Difficulty at work, home, or with people Extremely dIfficult     PHQ-9 SCORE 9/1/2022 10/6/2022 11/4/2022   PHQ-9 Total Score - - -   PHQ-9 Total Score MyChart 7 (Mild depression) 8 (Mild depression) -   PHQ-9 Total Score 7 8 12     PHQ9 score is Not completed today  Suicidal ideation:  No     PROBLEM: ANXIETY:   DONELL-7 scores:   DONELL-7 Results 10/18/2021 9/1/2022 10/6/2022   DONELL 7 TOTAL SCORE 7 (mild anxiety) 13 (moderate anxiety) 15 (severe anxiety)   Some recent data might be hidden     GAD7 score is Not completed today  DONELL-7 SCORE 9/1/2022 10/6/2022 11/4/2022   Total Score - - -   Total Score 13 (moderate anxiety) 15 (severe anxiety) -   Total Score 13 15 16   Total Score - - -       PROBLEM: CHRONIC SUICIDAL IDEATIONS:  "current: No     PROBLEM: SLEEP/INSOMNIA: No change  Trouble falling asleep Trouble staying asleep.     PERTINENT PAST MEDICAL AND SURGICAL HISTORY     Past Medical History:   Diagnosis Date     Arthritis 2012    both knees; hands     Cervical high risk HPV (human papillomavirus) test positive 03/19/2019    see problem list     Depressive disorder      Depressive disorder, not elsewhere classified 08/28/12    [facility]05/[facility]     Hyperlipidemia LDL goal < 130     mevacor     Hypothyroid      Moderate major depression (H)     abilify, cymbalta, seroquel, and sofya, Dr Antonio Perales     Mumps      ABDOUL (obstructive sleep apnea)      PTSD (post-traumatic stress disorder)      Seizure (H) 03/2011    one episode, was on Keppra, EEG negative       VITALS     BP Readings from Last 1 Encounters:   11/01/22 124/80     Pulse Readings from Last 1 Encounters:   11/01/22 56     Wt Readings from Last 1 Encounters:   11/01/22 91.2 kg (201 lb)     Ht Readings from Last 1 Encounters:   11/01/22 1.727 m (5' 8\")     Estimated body mass index is 30.56 kg/m  as calculated from the following:    Height as of 11/1/22: 1.727 m (5' 8\").    Weight as of 11/1/22: 91.2 kg (201 lb).    LABS & IMAGING                                                                                                                  Recent Labs   Lab Test 06/28/22  1156 10/18/21  1158 01/04/21  1413   WBC 10.6   < > 8.7   HGB 16.0*   < > 16.4*   HCT 47.0   < > 50.5*   MCV 89   < > 90      < > 234   ANEU  --   --  5.4    < > = values in this interval not displayed.     Recent Labs   Lab Test 06/28/22  1156 05/17/16  1406 04/14/16  0735      < > 140   POTASSIUM 4.0   < > 3.5   CHLORIDE 103   < > 111*   CO2 23   < > 23   GLC 96   < > 84   MICHAEL 9.7   < > 7.6*   MAG  --   --  2.2   BUN 18.3   < > 10   CR 0.95   < > 0.80   GFRESTIMATED 67   < > 74   ALBUMIN 5.1   < > 2.9*   PROTTOTAL 7.1   < > 6.0*   AST 24   < > 30   ALT 16   < > 54*   ALKPHOS 82   < " "> 64   BILITOTAL 0.5   < > 0.3    < > = values in this interval not displayed.     Recent Labs   Lab Test 10/18/21  1158   CHOL 185   *   HDL 38*   TRIG 196*     Recent Labs   Lab Test 05/25/22  1523 02/28/22  1103   TSH 0.58 0.08*   T4  --  1.66*     No results found for: GLA211, WMMV369, [id number], VITD3, D2VIT, D3VIT, DTOT, [id number], [id number], [id number], [id number], [id number], [id number]     ALLERGY & IMMUNIZATIONS       Allergies   Allergen Reactions     Gabapentin Other (See Comments)     Patient states she gets \"horrific nightmares\" with this medication     Dilaudid [Hydromorphone Hcl]      Made her feel like her skin was crawling     Nsaids Other (See Comments)     Kidney failure     Compazine [Prochlorperazine] Other (See Comments)     \"Makes my skin crawl, I was going nuts.\"       MEDICAL REVIEW OF SYSTEMS:   Ten system review was completed with pertinent positives noted     MENTAL STATUS EXAM:   The patient is awake, alert and oriented. Normal psychomotor activity, no abnormal involuntary movements, good impulse control. Mood appears depressed, affect is sad. Thought process is goal directed. Thought content is without delusions: without suicidal thinking,plan or intent; without homicidal or aggressive plan or intent. Speech is not pressured, is adequate with normal rate and volume. Perception is without hallucinations; patient is not responding to internal stimuli. Cognition appears intact. Insight is fair. Reliability is fair.    SAFETY ASSESSMENT:   Based on all available evidence including the factors cited above, overall Risk for harm is low and is appropriate for outpatient level of care.   Recommended that patient call 911 or go to the local ED should there be a change in any of these risk factors.       DSM 5 DIAGNOSIS:    296.32 (F33.1) Major Depressive Disorder, Recurrent Episode, Moderate _  300.02 (F41.1) Generalized Anxiety Disorder  309.81 (F43.10) Posttraumatic Stress Disorder " "        MEDICAL COMORBIDITY IMPACTING CLINICAL PICTURE: None noted and Gastritis and IBS       ASSESSMENT AND PLAN    Patient reports she is doing much better since last visit.  Reports she was not taking her medications consistently  Now that she is taking her medication consistently her thoughts are clear and she is having a better thought process  Continues to have issues with her partner.  \"Sleeping with an enemy is what I feel in this environment\"  Reports after her partner has a stroke he operates at a fifth-[employer]  Reports she never knows when her partner temper is going to flare and it might not  Reports when she talks to the social workers and tell them that she lives in a home the right off.  She has tried to explain her situation but they feel she is better off than others who live in the streets  Talk to a . She feels positive she might get the apartment sooner. [facility] in [place]   Reports enough support and she cannot wait to find a place of her own  Denies other concerns at this time.  Reports feeling safe at home.  Denies negative thoughts of SI HI    Informed patient she will be seen by a different provider as this provider is leaving. Mental Health and Addiction scheduling team will be reaching out to provide assistance in scheduling future appointments with another provider.    3 months refills of all medications have been sent to the pharmacy    Patient and I reviewed diagnosis and treatment plan and patient agrees with following recommendations:    PLAN AND RECOMMENDATIONS:  1.  Continue to take Wellbutrin  mg every morning    .  2.  Continue to take trazodone 50 mg 1 to 3 tablets ( mg) at bedtime as needed  3.  Continue to take Lexapro 15 mg every day  4.  Take hydroxyzine 25 mg instead of 50 mg 3 times daily as needed for anxiety    5.  Continue to take prazosin 5 mg at bedtime    Follow up again in 3 months  for medication review and evaluation. " Mental Health and Addiction scheduling team will be reaching out to provide assistance in scheduling future appointments with another provider.       Patient and I revieweddiagnosis and treatment plan and patient agrees with following recommendations:    1.Patient will take the medications as prescribed. Patient will not stop taking medications or adjust them without consulting with theprovider.  2.Patient will call with any problems between 2 visits.  3.Patient will go to the emergency room if not feeling safe , unable to function in the community, or if suicidal, homicidal or hearing voices orhaving paranoia.  4.Patient will abstain from drugs and alcohol.  5.Patient will not drive if sedated on medications or under influence of any substance.   6.Patient will not mix psychiatric medications with drugs andalcohol.   7.Patient will watch his diet and exercise.  8.Patient will see non psychiatric providers for non psychiatric disorders.      We have discussed his/her diagnosis, prognosis, differential diagnosis and side effects and benefits of medications.         Problem List as of 11/4/2022 Reviewed: 11/1/2022 10:41 AM by Nathan Cardenas MD          Noted       Behavioral    1. PTSD (post-traumatic stress disorder) 1/18/2012        CONSULTS/REFERRALS:   Continue therapy   Coordinate care with therapist as needed    MEDICAL:   None at this time  Coordinate care with PCP (Crista Elder) as needed  Follow up with primary care provider as planned or for acute medical concerns.    PSYCHOEDUCATION:  Medication side effects and alternatives reviewed. Health promotion activities recommended and reviewed today. All questions addressed. Education and counseling completed regarding risks and benefits of medications and psychotherapy options.  Consent provided by patient/guardian  Call the psychiatric nurse line with medication questions or concerns at [phone].  [employer] may be used to communicate with your  provider, but this is not intended to be used for emergencies.  BLACK BOX WARNING: Discussed the Food and Drug Administration (FDA) requires that all antidepressants carry a warning that some children, adolescents and young adults may be at increased risk of suicide when taking antidepressants. Anyone taking an antidepressant should be watched closely for worsening depression or unusual behavior especially in the first few weeks after starting an SSRI. Keep in mind, antidepressants are more likely to reduce suicide risk in the long run by improving mood.   SEROTONIN SYNDROME:  Discussed risks of Serotonin syndrome (ie, serotonin toxicity) which is a potentially life-threatening condition associated with increased serotonergic activity in the central nervous system (CNS). It is seen with therapeutic medication use, inadvertent interactions between drugs, and intentional self-poisoning. Serotonin syndrome may involve a spectrum of clinical findings, which often include mental status changes, autonomic hyperactivity, and neuromuscular abnormalities.    BENZODIAZEPINE:  discussion on how benzos work and the need to use them short term due to potential of anxiety getting.  This is a controlled substance with risk for abuse, need to keep in a safe keep place and cannot replace lost scripts.    HYPNOTIC USE: Hypnotic use, risk for CNS depression, sleep-walking, not to mix with ETOH or other CNS depressant, need for six hours of sleep, stop if change in mood.  This is a controlled substance with risk for abuse, need to keep in a safe keep place and cannot replace lost scripts.  HARM REDUCTION:  Discussions regarding effects of mood altering substances, alcohol and cannabis, on mood and that approach is harm reduction, will continue to prescribe meds as they work to cut back use.    SAFETY:  We all care about your loved one's safety. To reduce the risk of self-harm, remove access to all:  Firearms, Medicines (both prescribed  and over-the-counter), Knives and other sharp objects, Ropes and like materials, and Alcohol  SLEEP HYGIENE: establish a sleep routine, limit screen time 1 hour prior to bed, use bed for sleep only, take sleep/medications on time (including sleepy time tea, trazadone or herbal treatments such as melatonin), aroma therapy, limit caffeine/sugar, yoga, guided imagery, stretch, meditation, limit naps to 20 minutes, make a temperature change in the room, white noise, be mindful of slowing down breathing, take a warm bath/shower, frequently wash sheets, and journaling.   Medlineplus.gov is information for patients.  It is run by the [employer] and it contains information about all disorders, diseases and all medications.      COMMUNITY RESOURCES:    CRISIS NUMBERS: Provided in AVS 10/4/2022  National Suicide Prevention Lifeline: 1-558-045-TALK (856-238-3345)  Blastbeat/resources for a list of additional resources (SOS)            [facility] - [phone]   Urgent Care Adult Mental Pulhbl-[phone] mobile unit/  crisis line  [facility] -[phone]   COPE  [place] Mobile Team -[phone] (adults)/ [phone] (child)  Poison Control Center - 1-438.517.7020    OR  go to nearest ER  Crisis Text Line for any crisis  send this-   To: 364109   [facility] ([facility]  [phone]  National Suicide Prevention Lifeline: 968.367.1412 (TTY: 437.650.2344). Call anytime for help.  (www.suicidepreventionlifeline.org)  National [place] on Mental Illness (www.ellie.org): 990.111.9974 or 428-352-4359.   Mental Health Association (www.mentalhealth.org): 780.639.2606 or 842-574-1236.  [place] Crisis Text Line: Text [place] to 748047  Suicide LifeLine Chat: suicideRelative.ai.org/chat        ADMINISTRATIVE BILLIN min spent interviewing patient, reviewing referral documents, obtaining and reviewing outside records, communication  with other health specialists, and preparing this report on today's date: 11/04/2022    Video/Phone Start Time: 11:26  Video/Phone End Time: 11:48      Signed:   Marni Collins, MSN, APRN, MHNP-[facility]      Chart documentation done in part with Dragon Voice Recognition software.  Although reviewed after completion, some word and grammatical errors may remain.

## 2022-11-04 NOTE — NURSING NOTE
This video/telephone visit will be conducted via a call between you and your physician/provider. We have found that certain health care needs can be provided without the need for an in-person physical exam. This service lets us provide the care you need with a video /telephone conversation. If a prescription is necessary we can send it directly to your pharmacy. If lab work is needed we can place an order for that and you can then stop by our lab to have the test done at a later time.    Just as we bill insurance for in-person visits, we also bill insurance for video/telephone visits. If you have questions about your insurance coverage, we recommend that you speak with your insurance company.    Patient has given verbal consent for video/Telephone visit? yes    Patient would like a video visit, please connect/call : fran  Reason for visit: follow up  Anything the provider should be aware of for today's appointment: pt stated that since her last appt, she has had about 3 episodes of thoughts of harming herself and had a plan. These lasted for 10 seconds.     Patient verified allergies, medications and pharmacy via phone.     DONELL-7 scores:    DONELL-7 SCORE 10/6/2022 11/4/2022   Total Score - -   Total Score 15 (severe anxiety) -   Total Score 15 16   Total Score - -       PHQ-9 scores:   PHQ-9 SCORE 10/6/2022 11/4/2022   PHQ-9 Total Score - -   PHQ-9 Total Score Anitahart 8 (Mild depression) -   PHQ-9 Total Score 8 12       Patient states they are ready for visit.    Vangie Fritz MA November 4, 2022 11:07 AM

## 2022-11-09 ENCOUNTER — PATIENT OUTREACH (OUTPATIENT)
Dept: CARE COORDINATION | Facility: CLINIC | Age: 62
End: 2022-11-09

## 2022-11-09 ENCOUNTER — OFFICE VISIT (OUTPATIENT)
Dept: UROLOGY | Facility: CLINIC | Age: 62
End: 2022-11-09
Payer: COMMERCIAL

## 2022-11-09 VITALS — BODY MASS INDEX: 30.07 KG/M2 | HEIGHT: 69 IN | WEIGHT: 203 LBS | OXYGEN SATURATION: 97 % | HEART RATE: 72 BPM

## 2022-11-09 DIAGNOSIS — R10.2 SUPRAPUBIC ABDOMINAL PAIN: ICD-10-CM

## 2022-11-09 DIAGNOSIS — R30.0 DYSURIA: ICD-10-CM

## 2022-11-09 DIAGNOSIS — Z87.440 HISTORY OF UTI: Primary | ICD-10-CM

## 2022-11-09 DIAGNOSIS — N32.81 URGENCY-FREQUENCY SYNDROME: ICD-10-CM

## 2022-11-09 LAB
ALBUMIN UR-MCNC: NEGATIVE MG/DL
APPEARANCE UR: CLEAR
BILIRUB UR QL STRIP: NEGATIVE
COLOR UR AUTO: YELLOW
GLUCOSE UR STRIP-MCNC: NEGATIVE MG/DL
HGB UR QL STRIP: NEGATIVE
KETONES UR STRIP-MCNC: NEGATIVE MG/DL
LEUKOCYTE ESTERASE UR QL STRIP: NEGATIVE
NITRATE UR QL: NEGATIVE
PH UR STRIP: 6 [PH] (ref 5–7)
SP GR UR STRIP: 1.02 (ref 1–1.03)
UROBILINOGEN UR STRIP-ACNC: 0.2 E.U./DL

## 2022-11-09 PROCEDURE — 99213 OFFICE O/P EST LOW 20 MIN: CPT | Performed by: UROLOGY

## 2022-11-09 PROCEDURE — 81003 URINALYSIS AUTO W/O SCOPE: CPT | Mod: QW | Performed by: UROLOGY

## 2022-11-09 ASSESSMENT — PAIN SCALES - GENERAL: PAINLEVEL: MODERATE PAIN (5)

## 2022-11-09 NOTE — PROGRESS NOTES
Chief Complaint   Patient presents with     UTI     bladder pain     Here for post op after cystoscopy, also having some issues with starting urine stream, low output      Sherley Hernandez, CMA

## 2022-11-09 NOTE — LETTER
"11/9/2022       RE: Ilsa Yusuf  [id number] [facility] [address]     Dear Colleague,    Thank you for referring your patient, Ilsa Yusuf, to the [facility] [place] at [facility]. Please see a copy of my visit note below.    Chief Complaint   Patient presents with     UTI     bladder pain     Here for post op after cystoscopy, also having some issues with starting urine stream, low output      Sherley Hernandez, TANG      November 9, 2022    Ilsa was seen today for uti and bladder pain.    Diagnoses and all orders for this visit:    History of UTI  -     UA without Microscopic [[id number]]; Future  -     UA without Microscopic [[id number]]  -     Physical Therapy Referral; Future    Dysuria  -     UA without Microscopic [[id number]]; Future  -     UA without Microscopic [[id number]]  -     Physical Therapy Referral; Future    Urgency-frequency syndrome  -     Physical Therapy Referral; Future    Suprapubic abdominal pain  -     Physical Therapy Referral; Future    We discussed how her pelvic floor symptoms are related to the physical exam findings and her pelvic floor myofascial dysfunction.  We discussed how the recommended treatment is dedicated pelvic floor therapy.  We discussed how the pelvic floor physical therapy works and patient is agreeable.  Referral was placed.       RTC 6 months, sooner if needed    5 minutes were spent today on the day of the encounter in reviewing the EMR, direct patient care, coordination of care and documentation    Terrie Melton MD MPH  (she/her/hers)   of Urology  [facility]      Subjective    Here today to discuss her recent cystoscopy.  She denies any changes in health since last visit    Pulse 72   Ht 1.74 m (5' 8.5\")   Wt 92.1 kg (203 lb)   LMP 08/15/2016 (Approximate)   SpO2 97%   BMI 30.42 kg/m    GENERAL: healthy, alert and no distress  EYES: Eyes grossly " normal to inspection, conjunctivae and sclerae normal  HENT: normal cephalic/atraumatic.  External ears, nose and mouth without ulcers or lesions.  RESP: no audible wheeze, cough, or visible cyanosis.  No visible retractions or increased work of breathing.  Able to speak fully in complete sentences.  NEURO: Cranial nerves grossly intact, mentation intact and speech normal  PSYCH: mentation appears normal, affect normal/bright, judgement and insight intact, normal speech and appearance well-groomed    CC  Patient Care Team:  Crista North MD as PCP - General (Family Medicine)  Breanne Ellis PA-C as Physician Assistant (Physician Assistant - Medical)  Carie Nuñez (Internal Medicine)  Breanne Ellis PA-C as Assigned OBGYN Provider  Crista North MD as Assigned PCP  Arnaldo Perez MD as MD (Orthopaedic Surgery Hand Surgery)  Arnaldo Perez MD as Assigned Musculoskeletal Provider  Maryuri Jackson LSW as Specialty Care Coordinator  Sruthi Corona [facility] as Licensed Mental Health Professional  Marni Collins NP as Assigned Behavioral Health Provider  Terrie Melton MD as Assigned Surgical Provider  CRISTA NORTH

## 2022-11-09 NOTE — PROGRESS NOTES
"November 9, 2022    Ilsa was seen today for uti and bladder pain.    Diagnoses and all orders for this visit:    History of UTI  -     UA without Microscopic [[id number]]; Future  -     UA without Microscopic [[id number]]  -     Physical Therapy Referral; Future    Dysuria  -     UA without Microscopic [[id number]]; Future  -     UA without Microscopic [[id number]]  -     Physical Therapy Referral; Future    Urgency-frequency syndrome  -     Physical Therapy Referral; Future    Suprapubic abdominal pain  -     Physical Therapy Referral; Future    We discussed how her pelvic floor symptoms are related to the physical exam findings and her pelvic floor myofascial dysfunction.  We discussed how the recommended treatment is dedicated pelvic floor therapy.  We discussed how the pelvic floor physical therapy works and patient is agreeable.  Referral was placed.       RTC 6 months, sooner if needed    5 minutes were spent today on the day of the encounter in reviewing the EMR, direct patient care, coordination of care and documentation    Terrie Melton MD MPH  (she/her/hers)   of Urology  [facility]      Subjective    Here today to discuss her recent cystoscopy.  She denies any changes in health since last visit    Pulse 72   Ht 1.74 m (5' 8.5\")   Wt 92.1 kg (203 lb)   LMP 08/15/2016 (Approximate)   SpO2 97%   BMI 30.42 kg/m    GENERAL: healthy, alert and no distress  EYES: Eyes grossly normal to inspection, conjunctivae and sclerae normal  HENT: normal cephalic/atraumatic.  External ears, nose and mouth without ulcers or lesions.  RESP: no audible wheeze, cough, or visible cyanosis.  No visible retractions or increased work of breathing.  Able to speak fully in complete sentences.  NEURO: Cranial nerves grossly intact, mentation intact and speech normal  PSYCH: mentation appears normal, affect normal/bright, judgement and insight intact, normal speech and appearance well-groomed    CC  Patient " Care Team:  Crista North MD as PCP - General (Family Medicine)  Breanne Ellis PA-C as Physician Assistant (Physician Assistant - Medical)  Carie Nuñez (Internal Medicine)  Breanne Ellis PA-C as Assigned OBGYN Provider  Crista North MD as Assigned PCP  Arnaldo Perez MD as MD (Orthopaedic Surgery Hand Surgery)  Arnaldo Perez MD as Assigned Musculoskeletal Provider  Maryuri Jackson LSW as Specialty Care Coordinator  Sruthi Corona LICSW as Licensed Mental Health Professional  Marni Collins NP as Assigned Behavioral Health Provider  Terrie Melton MD as Assigned Surgical Provider  CRISTA NORTH

## 2022-11-09 NOTE — PROGRESS NOTES
Clinic Care Coordination Contact  [facility]       Clinical Data: Care Coordinator Outreach  Outreach attempted x 2.    SW CC spoke to pt briefly who reported she was in the car and could not talk yet. Pt said she would call ARABELLA CC back soon. ARABELLA CC did not hear back from pt and did another phoen attempt and was not connected to pt.    Plan: Care Coordinator will try to reach patient again in 1-2 business days.    ALEXI Paiz? Social Work Care Coordinator   [facility]  [email or web address]? [email or web address]    Phone: [phone]  she/her

## 2022-11-09 NOTE — PATIENT INSTRUCTIONS
Websites with free information:    [employer] patient website: www.voicesforpfd.org    Total Control Program: www.totalcontrolprogram.com    Please see one of the dedicated pelvic floor physical therapist. If you have not heard from the scheduling office within 2 business days, please call [phone] for [email or web address] [place]    Please return to see us in 6 months, sooner if needed    It was a pleasure meeting with you today.  Thank you for allowing me and my team the privilege of caring for you today.  YOU are the reason we are here, and I truly hope we provided you with the excellent service you deserve.  Please let us know if there is anything else we can do for you so that we can be sure you are leaving completely satisfied with your care experience.

## 2022-11-10 ENCOUNTER — PATIENT OUTREACH (OUTPATIENT)
Dept: CARE COORDINATION | Facility: CLINIC | Age: 62
End: 2022-11-10

## 2022-11-10 ENCOUNTER — VIRTUAL VISIT (OUTPATIENT)
Dept: BEHAVIORAL HEALTH | Facility: CLINIC | Age: 62
End: 2022-11-10
Payer: COMMERCIAL

## 2022-11-10 DIAGNOSIS — F41.1 GAD (GENERALIZED ANXIETY DISORDER): Primary | ICD-10-CM

## 2022-11-10 PROCEDURE — 90832 PSYTX W PT 30 MINUTES: CPT | Mod: 95 | Performed by: SOCIAL WORKER

## 2022-11-10 NOTE — PROGRESS NOTES
Clinic Care Coordination Contact    Follow Up Progress Note      Assessment: ARABELLA RENNER spoke to pt about current housing updates. Pt reports that she has found a resource helpful to her called [employer] that she has been using. Pt feels confident in her search and feels she is in a better place than she was. ARABELLA RENNER reported that pt's provider spoke about a possible day treatment and pt reports she feels she no longer needs this. Pt reports that when she was overwhelmed with changes she needed that help but feels she is now in a good place. Pt said she did not need any additional resources at this time and asked for follow up in one month     Care Gaps:    Health Maintenance Due   Topic Date Due     ZOSTER IMMUNIZATION (1 of 2) Never done     LUNG CANCER SCREENING  Never done     MEDICARE ANNUAL WELLNESS VISIT  10/19/2017     URINE DRUG SCREEN  02/06/2018     ADVANCE CARE PLANNING  02/17/2020     PAP FOLLOW-UP  03/19/2020     HPV FOLLOW-UP  03/19/2020     INFLUENZA VACCINE (1) 09/01/2022     COVID-19 Vaccine (5 - Booster for Pfizer series) 09/23/2022     LIPID  10/18/2022         Care Plans  Care Plan: Housing     Problem: LACK OF STABLE HOUSING     Goal: Establish Stable Housing     Start Date: 9/15/2022 Expected End Date: 12/15/2022    This Visit's Progress: 50% Recent Progress: 20%    Priority: High    Note:     Barriers: Lack of availability of housing   Strengths: Strong advocate of self, knows the steps to take, follows through on resources  Patient expressed understanding of goal: Yes  Action steps to achieve this goal:  1. I will look through resources sent by ARABELLA RENNER in the next following weeks in order to look at housing options available.  2. I will apply to section 8 voucher in the next month in order to get on the wait list  3. I will look into housing link and apply to places that fit my budget and other needs  4. I will follow up with ARABELLA RENNER and ask questions or express concerns when needed                           Intervention/Education provided during outreach: Options for treatment if needed     Outreach Frequency: monthly      Plan:   Care Coordinator will follow up in one month     ALEXI Paiz? Social Work Care Coordinator   [facility]  [email or web address]? [email or web address]    Phone: [phone]  she/her

## 2022-11-10 NOTE — PROGRESS NOTES
[facility]                                     Progress Note    Patient Name: Ilsa Yusuf  Date: 11/9/22         Service Type: Individual      Session Start Time: 1300  Session End Time: 1325     Session Length: 25    Session #: 2    Attendees: Client    Service Modality:  Video Visit:      Provider verified identity through the following two step process.  Patient provided:  Patient is known previously to provider    Telemedicine Visit: The patient's condition can be safely assessed and treated via synchronous audio and visual telemedicine encounter.      Reason for Telemedicine Visit: Patient has requested telehealth visit    Originating Site (Patient Location): Patient's home    Distant Site (Provider Location): Provider Remote Setting- Home Office    Consent:  The patient/guardian has verbally consented to: the potential risks and benefits of telemedicine (video visit) versus in person care; bill my insurance or make self-payment for services provided; and responsibility for payment of non-covered services.     Patient would like the video invitation sent by: Changed to phone today due to patient patient having issues with computer.    Mode of Communication: Telephone    Distant Location (Provider):  Off-site    As the provider I attest to compliance with applicable laws and regulations related to telemedicine.    DATA  Interactive Complexity: No  Crisis: No        Progress Since Last Session (Related to Symptoms / Goals / Homework):   Symptoms: No change Continues to have medical issues which also are impacting her mental health.  Continues to worry and feels overwhelmed primarily due to a lot of issues up in the year.    Homework: Partially completed      Episode of Care Goals: Minimal progress - CONTEMPLATION (Considering change and yet undecided); Intervened by assessing the negative and positive thinking (ambivalence) about behavior change     Current / Ongoing Stressors and  Concerns:   Currently experiencing the stress of managing her medical issues as well as recently being concerned that her computer might be hacked.  Feeling overwhelmed and frustrated.     Treatment Objective(s) Addressed in This Session:   Today's session was fairly brief being that patient was overwhelmed and worried that her computer was being compromised.  Thus far has not had time to complete the tasks discussed during last session.     Intervention:   Psychodynamic patient processed internal experiences completed through review of safety issues and can safety interventions, client-centered and ACT therapy    Assessments completed prior to visit:  The following assessments were completed by patient for this visit:  PHQ9:   PHQ-9 SCORE 10/18/2021 4/5/2022 5/25/2022 6/10/2022 9/1/2022 10/6/2022 11/4/2022   PHQ-9 Total Score - - - - - - -   PHQ-9 Total Score MyChart 3 (Minimal depression) - 12 (Moderate depression) - 7 (Mild depression) 8 (Mild depression) -   PHQ-9 Total Score 3 14 12 14 7 8 12     GAD7:   DONELL-7 SCORE 11/6/2018 10/18/2021 4/5/2022 6/10/2022 9/1/2022 10/6/2022 11/4/2022   Total Score - - - - - - -   Total Score - 7 (mild anxiety) - - 13 (moderate anxiety) 15 (severe anxiety) -   Total Score 8 7 14 16 13 15 16   Total Score - - - - - - -     PROMIS 10-Global Health (all questions and answers displayed):   PROMIS 10 10/6/2022   In general, would you say your health is: Fair   In general, would you say your quality of life is: Fair   In general, how would you rate your physical health? Good   In general, how would you rate your mental health, including your mood and your ability to think? Poor   In general, how would you rate your satisfaction with your social activities and relationships? Poor   In general, please rate how well you carry out your usual social activities and roles Fair   To what extent are you able to carry out your everyday physical activities such as walking, climbing stairs,  carrying groceries, or moving a chair? Moderately   How often have you been bothered by emotional problems such as feeling anxious, depressed or irritable? Often   How would you rate your fatigue on average? Mild   How would you rate your pain on average?   0 = No Pain  to  10 = Worst Imaginable Pain 7   In general, would you say your health is: 2   In general, would you say your quality of life is: 2   In general, how would you rate your physical health? 3   In general, how would you rate your mental health, including your mood and your ability to think? 1   In general, how would you rate your satisfaction with your social activities and relationships? 1   In general, please rate how well you carry out your usual social activities and roles. (This includes activities at home, at work and in your community, and responsibilities as a parent, child, spouse, employee, friend, etc.) 2   To what extent are you able to carry out your everyday physical activities such as walking, climbing stairs, carrying groceries, or moving a chair? 3   In the past 7 days, how often have you been bothered by emotional problems such as feeling anxious, depressed, or irritable? 4   In the past 7 days, how would you rate your fatigue on average? 2   In the past 7 days, how would you rate your pain on average, where 0 means no pain, and 10 means worst imaginable pain? 7   Global Mental Health Score 6   Global Physical Health Score 12   PROMIS TOTAL - SUBSCORES 18   Some recent data might be hidden         ASSESSMENT: Current Emotional / Mental Status (status of significant symptoms):   Risk status (Self / Other harm or suicidal ideation)   Patient denies current fears or concerns for personal safety.   Patient denies current or recent suicidal ideation or behaviors.   Patient denies current or recent homicidal ideation or behaviors.   Patient denies current or recent self injurious behavior or ideation.   Patient denies other safety  concerns.   Patient reports there has been no change in risk factors since their last session.     Patient reports there has been no change in protective factors since their last session.     Recommended that patient call 911 or go to the local ED should there be a change in any of these risk factors.     Appearance:   Unable to assess via phone   Eye Contact:   Unable to assess via phone   Psychomotor Behavior: Unable to assess via phone   Attitude:   Cooperative    Orientation:   All   Speech    Rate / Production: Normal     Volume:  Normal    Mood:    Normal   Affect:    Appropriate    Thought Content:  Clear    Thought Form:  Coherent  Logical    Insight:    Good      Medication Review:   Changes to psychiatric medications, see updated Medication List in EPIC.      Medication Compliance:   No     Changes in Health Issues:   Continues to experience physical issues which are impacting her mental health issues     Chemical Use Review:   Substance Use: Chemical use reviewed, no active concerns identified      Tobacco Use: No current tobacco use.      Diagnosis:  Generalized anxiety disorder    Collateral Reports Completed:   Routed note to PCP    PLAN: (Patient Tasks / Therapist Tasks / Other)  1. Provider will continue Diagnostic Assessment.  Patient was given the following to do until next session:  Patient will explore housing options   Resources include Makenna Nance (woman with ADHD) podcast on guilt shame and vulnerability by    Thor Escobar     2. Provider recommended the following referrals: None at this time     3.  Suicide Risk and Safety Concerns were assessed for Ilsa Yusuf.           Martha Li [facility]  11/9/22

## 2022-11-22 ENCOUNTER — VIRTUAL VISIT (OUTPATIENT)
Dept: BEHAVIORAL HEALTH | Facility: CLINIC | Age: 62
End: 2022-11-22
Payer: COMMERCIAL

## 2022-11-22 DIAGNOSIS — F33.3 SEVERE RECURRENT MAJOR DEPRESSIVE DISORDER WITH PSYCHOTIC FEATURES (H): ICD-10-CM

## 2022-11-22 DIAGNOSIS — F33.9 MAJOR DEPRESSIVE DISORDER, RECURRENT EPISODE WITH ANXIOUS DISTRESS (H): Primary | ICD-10-CM

## 2022-11-22 PROCEDURE — 90834 PSYTX W PT 45 MINUTES: CPT | Mod: 95 | Performed by: SOCIAL WORKER

## 2022-11-22 ASSESSMENT — ANXIETY QUESTIONNAIRES
GAD7 TOTAL SCORE: 13
7. FEELING AFRAID AS IF SOMETHING AWFUL MIGHT HAPPEN: MORE THAN HALF THE DAYS
8. IF YOU CHECKED OFF ANY PROBLEMS, HOW DIFFICULT HAVE THESE MADE IT FOR YOU TO DO YOUR WORK, TAKE CARE OF THINGS AT HOME, OR GET ALONG WITH OTHER PEOPLE?: EXTREMELY DIFFICULT
2. NOT BEING ABLE TO STOP OR CONTROL WORRYING: MORE THAN HALF THE DAYS
7. FEELING AFRAID AS IF SOMETHING AWFUL MIGHT HAPPEN: MORE THAN HALF THE DAYS
GAD7 TOTAL SCORE: 13
6. BECOMING EASILY ANNOYED OR IRRITABLE: MORE THAN HALF THE DAYS
3. WORRYING TOO MUCH ABOUT DIFFERENT THINGS: MORE THAN HALF THE DAYS
IF YOU CHECKED OFF ANY PROBLEMS ON THIS QUESTIONNAIRE, HOW DIFFICULT HAVE THESE PROBLEMS MADE IT FOR YOU TO DO YOUR WORK, TAKE CARE OF THINGS AT HOME, OR GET ALONG WITH OTHER PEOPLE: EXTREMELY DIFFICULT
1. FEELING NERVOUS, ANXIOUS, OR ON EDGE: MORE THAN HALF THE DAYS
5. BEING SO RESTLESS THAT IT IS HARD TO SIT STILL: NOT AT ALL
GAD7 TOTAL SCORE: 13
4. TROUBLE RELAXING: NEARLY EVERY DAY

## 2022-11-22 ASSESSMENT — PATIENT HEALTH QUESTIONNAIRE - PHQ9
SUM OF ALL RESPONSES TO PHQ QUESTIONS 1-9: 11
SUM OF ALL RESPONSES TO PHQ QUESTIONS 1-9: 11
10. IF YOU CHECKED OFF ANY PROBLEMS, HOW DIFFICULT HAVE THESE PROBLEMS MADE IT FOR YOU TO DO YOUR WORK, TAKE CARE OF THINGS AT HOME, OR GET ALONG WITH OTHER PEOPLE: EXTREMELY DIFFICULT

## 2022-11-23 NOTE — PROGRESS NOTES
[facility]                                     Progress Note    Patient Name: Ilsa Yusuf  Date: 11/22/22         Service Type: Individual      Session Start Time: 1400  Session End Time: 1452     Session Length: 52    Session #: 3    Attendees: Client    Service Modality:  Video Visit:      Provider verified identity through the following two step process.  Patient provided:  Patient is known previously to provider    Telemedicine Visit: The patient's condition can be safely assessed and treated via synchronous audio and visual telemedicine encounter.      Reason for Telemedicine Visit: Patient has requested telehealth visit    Originating Site (Patient Location): Patient's home    Distant Site (Provider Location): Provider Remote Setting- Home Office    Consent:  The patient/guardian has verbally consented to: the potential risks and benefits of telemedicine (video visit) versus in person care; bill my insurance or make self-payment for services provided; and responsibility for payment of non-covered services.     Patient would like the video invitation sent by:  Send to e-mail at: [email or web address]    Mode of Communication:  Video Conference via [employer]    Distant Location (Provider):  Off-site    As the provider I attest to compliance with applicable laws and regulations related to telemedicine.    DATA  Interactive Complexity: No  Crisis: No        Progress Since Last Session (Related to Symptoms / Goals / Homework):   Symptoms: Worsening Indicated she is not feeling any better since last visit.    Homework: Did not complete Will need to complete the adult intake questionnaire      Episode of Care Goals: Minimal progress - CONTEMPLATION (Considering change and yet undecided); Intervened by assessing the negative and positive thinking (ambivalence) about behavior change     Current / Ongoing Stressors and Concerns:   Continues to experience stress managing medical issues as  well as, current living situation feeling overwhelmed and frustrated.     Treatment Objective(s) Addressed in This Session:            During today's session, patient verbalized her frustration with the traditional types of therapy offered asking if writer has knowledge of other types of treatment options provided through the [employer] system such as, Psilocybin and micro dosing.  Patient verbalized her frustration that writer was not familiar with those types of treatments and has had a previous history of receiving traditional therapy with inconsistent results.  Patient was asked if she would like to transfer to possibly a Psychologist that may have a broader knowledge base of what is offered within the  [facility] system.  Patient agreeable to explore other provider options.    Intervention:    Psychodynamic patient processed internal experiences completed through review of safety issues and can safety interventions, client-centered and ACT therapy       Assessments completed prior to visit:  The following assessments were completed by patient for this visit:  PHQ9:   PHQ-9 SCORE 4/5/2022 5/25/2022 6/10/2022 9/1/2022 10/6/2022 11/4/2022 11/22/2022   PHQ-9 Total Score - - - - - - -   PHQ-9 Total Score MyChart - 12 (Moderate depression) - 7 (Mild depression) 8 (Mild depression) - 11 (Moderate depression)   PHQ-9 Total Score 14 12 14 7 8 12 11     GAD7:   DONELL-7 SCORE 10/18/2021 4/5/2022 6/10/2022 9/1/2022 10/6/2022 11/4/2022 11/22/2022   Total Score - - - - - - -   Total Score 7 (mild anxiety) - - 13 (moderate anxiety) 15 (severe anxiety) - 13 (moderate anxiety)   Total Score 7 14 16 13 15 16 13   Total Score - - - - - - -     PROMIS 10-Global Health (all questions and answers displayed):   PROMIS 10 10/6/2022   In general, would you say your health is: Fair   In general, would you say your quality of life is: Fair   In general, how would you rate your physical health? Good   In general, how would you  rate your mental health, including your mood and your ability to think? Poor   In general, how would you rate your satisfaction with your social activities and relationships? Poor   In general, please rate how well you carry out your usual social activities and roles Fair   To what extent are you able to carry out your everyday physical activities such as walking, climbing stairs, carrying groceries, or moving a chair? Moderately   How often have you been bothered by emotional problems such as feeling anxious, depressed or irritable? Often   How would you rate your fatigue on average? Mild   How would you rate your pain on average?   0 = No Pain  to  10 = Worst Imaginable Pain 7   In general, would you say your health is: 2   In general, would you say your quality of life is: 2   In general, how would you rate your physical health? 3   In general, how would you rate your mental health, including your mood and your ability to think? 1   In general, how would you rate your satisfaction with your social activities and relationships? 1   In general, please rate how well you carry out your usual social activities and roles. (This includes activities at home, at work and in your community, and responsibilities as a parent, child, spouse, employee, friend, etc.) 2   To what extent are you able to carry out your everyday physical activities such as walking, climbing stairs, carrying groceries, or moving a chair? 3   In the past 7 days, how often have you been bothered by emotional problems such as feeling anxious, depressed, or irritable? 4   In the past 7 days, how would you rate your fatigue on average? 2   In the past 7 days, how would you rate your pain on average, where 0 means no pain, and 10 means worst imaginable pain? 7   Global Mental Health Score 6   Global Physical Health Score 12   PROMIS TOTAL - SUBSCORES 18   Some recent data might be hidden         ASSESSMENT: Current Emotional / Mental Status (status of  significant symptoms):   Risk status (Self / Other harm or suicidal ideation)   Patient denies current fears or concerns for personal safety.   Patient denies current or recent suicidal ideation or behaviors.  But is indicated thoughts in the past no actions implemented.   Patient denies current or recent homicidal ideation or behaviors.   Patient denies current or recent self injurious behavior or ideation.   Patient denies other safety concerns.   Patient reports there has been no change in risk factors since their last session.     Patient reports there has been no change in protective factors since their last session.     Recommended that patient call 911 or go to the local ED should there be a change in any of these risk factors.  Current safety plan reviewed.  Patient aware of services available.     Appearance:   Appropriate    Eye Contact:   Good    Psychomotor Behavior: Normal    Attitude:   Cooperative    Orientation:   All   Speech    Rate / Production: Normal/ Responsive Normal     Volume:  Normal    Mood:    Anxious  Depressed Frustrated   Affect:    Appropriate    Thought Content:  Clear    Thought Form:  Coherent  Logical    Insight:    Good      Medication Review:   No changes to current psychiatric medication(s)     Medication Compliance:   Yes     Changes in Health Issues:   Yes: Pain, Associated Psychological Distress     Chemical Use Review:   Substance Use: Chemical use reviewed, no active concerns identified      Tobacco Use: No current tobacco use.      Diagnosis:   296.33 (F33.2) Major Depressive Disorder, Recurrent Episode, Severe _ and With anxious distress  300.02 (F41.1) Generalized Anxiety Disorder    Collateral Reports Completed:   Routed note to PCP    1.  Provider will continue Diagnostic Assessment.  Patient to complete the adult intake questionnaire.    2. Suicide Risk and Safety Concerns were assessed for Ilsa Yusuf patient indicated that she has no suicidal thoughts or  intentions and is aware of emergency contact information if those thoughts occur. See safety plan below.     3.  Writer will  regarding transfer options to a  provider who would have a broader knowledge base of modalities and treatment options within the system.    Martha Li [facility]  11/22/22                                                                [facility]                                        Ilsa Yusuf                 SAFETY PLAN:   Professionals or agencies I can contact during a crisis:  ? Suicide Prevention Lifeline: Call or Text 988     Local Crisis Services: [facility] Crisis Response line at [phone].        [place] Warmline   -Open Seven Days a Week, 9 AM to 9 PM  - [phone]  - Toll Free 855-WARMLINE  - Or text  Support  to 81143       Call 911 or go to my nearest emergency department or [facility] s EMPATH UNIT  Location:   [address] [place], [address]  Get Directions  [phone]                   I helped develop this safety plan and agree to use it when needed.  I have been given a copy of this plan.       Client signature ____________Patient agreed to follow safety plan. _____________________________________________________  Today s date:  10/6/2022  Completed by Provider Name/ Credentials:  Sruthi Corona, [facility]                                October 6, 2022  Adapted from Safety Plan Template 2008 Anya Rahman and Kurtis Escobar is reprinted with the express permission of the authors.  No portion of the Safety Plan Template may be reproduced without the express, written permission.  You can contact the authors at [email or web address] or [email or web address].

## 2022-11-30 ENCOUNTER — TRANSFERRED RECORDS (OUTPATIENT)
Dept: HEALTH INFORMATION MANAGEMENT | Facility: CLINIC | Age: 62
End: 2022-11-30

## 2022-12-07 ENCOUNTER — TELEPHONE (OUTPATIENT)
Dept: SLEEP MEDICINE | Facility: CLINIC | Age: 62
End: 2022-12-07

## 2022-12-07 NOTE — TELEPHONE ENCOUNTER
CALLED PT TO LET HER KNOW WE DON'T HAVE TO SCHEDULE A TIME IF SHE COMES TO [facility]. SHE STATED SHE WILL MOST LIKELY COME ON 12/13. TOLD HER AS LONG AS SHE IS USING THE MACHINE EVERY NIGHT WE CAN GO AHEAD AND GET THE PA RIGHT THEN AND GET HER SUPPLIES TO HER. PT THANKED ME FOR THE CALL.

## 2022-12-12 ENCOUNTER — PATIENT OUTREACH (OUTPATIENT)
Dept: CARE COORDINATION | Facility: CLINIC | Age: 62
End: 2022-12-12

## 2022-12-12 NOTE — PROGRESS NOTES
Clinic Care Coordination Contact    Follow Up Progress Note      Assessment: ARABELLA RENNER briefly spoke to pt. Pt reports she was on her way to an appt but she did not have any new upates as of now Pt reports she does not need any additonal resources right now and request scall back in a few weeks    Care Gaps:    Health Maintenance Due   Topic Date Due     ZOSTER IMMUNIZATION (1 of 2) Never done     LUNG CANCER SCREENING  Never done     MEDICARE ANNUAL WELLNESS VISIT  10/19/2017     URINE DRUG SCREEN  02/06/2018     ADVANCE CARE PLANNING  02/17/2020     PAP FOLLOW-UP  03/19/2020     HPV FOLLOW-UP  03/19/2020     INFLUENZA VACCINE (1) 09/01/2022     COVID-19 Vaccine (5 - Booster for Pfizer series) 09/23/2022     LIPID  10/18/2022         Care Plans  Care Plan: Housing     Problem: LACK OF STABLE HOUSING     Goal: Establish Stable Housing     Start Date: 9/15/2022 Expected End Date: 12/15/2022    This Visit's Progress: 50% Recent Progress: 20%    Priority: High    Note:     Barriers: Lack of availability of housing   Strengths: Strong advocate of self, knows the steps to take, follows through on resources  Patient expressed understanding of goal: Yes  Action steps to achieve this goal:  1. I will look through resources sent by ARABELLA RENNER in the next following weeks in order to look at housing options available.  2. I will apply to section 8 voucher in the next month in order to get on the wait list  3. I will look into housing link and apply to places that fit my budget and other needs  4. I will follow up with ARABELLA RENNER and ask questions or express concerns when needed                          Intervention/Education provided during outreach: N/A     Outreach Frequency: monthly    Plan:   Care Coordinator will follow up in one month     ALEXI Paiz? Social Work Care Coordinator   [facility]  [email or web address]? [email or web address]    Phone: [phone]  she/her

## 2022-12-14 ENCOUNTER — OFFICE VISIT (OUTPATIENT)
Dept: FAMILY MEDICINE | Facility: CLINIC | Age: 62
End: 2022-12-14
Payer: COMMERCIAL

## 2022-12-14 VITALS
HEIGHT: 69 IN | SYSTOLIC BLOOD PRESSURE: 130 MMHG | HEART RATE: 71 BPM | DIASTOLIC BLOOD PRESSURE: 75 MMHG | OXYGEN SATURATION: 96 % | WEIGHT: 206.9 LBS | BODY MASS INDEX: 30.64 KG/M2 | TEMPERATURE: 99.3 F

## 2022-12-14 DIAGNOSIS — Z23 HIGH PRIORITY FOR 2019-NCOV VACCINE: ICD-10-CM

## 2022-12-14 DIAGNOSIS — K21.9 GASTROESOPHAGEAL REFLUX DISEASE, UNSPECIFIED WHETHER ESOPHAGITIS PRESENT: ICD-10-CM

## 2022-12-14 DIAGNOSIS — E78.5 HYPERLIPIDEMIA LDL GOAL <130: ICD-10-CM

## 2022-12-14 DIAGNOSIS — Z80.3 FAMILY HISTORY OF MALIGNANT NEOPLASM OF BREAST: ICD-10-CM

## 2022-12-14 DIAGNOSIS — N18.31 STAGE 3A CHRONIC KIDNEY DISEASE (H): ICD-10-CM

## 2022-12-14 DIAGNOSIS — Z11.3 ROUTINE SCREENING FOR STI (SEXUALLY TRANSMITTED INFECTION): ICD-10-CM

## 2022-12-14 DIAGNOSIS — Z23 NEED FOR PROPHYLACTIC VACCINATION AND INOCULATION AGAINST INFLUENZA: ICD-10-CM

## 2022-12-14 DIAGNOSIS — Z12.31 ENCOUNTER FOR SCREENING MAMMOGRAM FOR BREAST CANCER: ICD-10-CM

## 2022-12-14 DIAGNOSIS — H93.13 TINNITUS, BILATERAL: ICD-10-CM

## 2022-12-14 DIAGNOSIS — Z00.00 ENCOUNTER FOR MEDICARE ANNUAL WELLNESS EXAM: Primary | ICD-10-CM

## 2022-12-14 DIAGNOSIS — E03.9 ACQUIRED HYPOTHYROIDISM: ICD-10-CM

## 2022-12-14 DIAGNOSIS — Z12.4 SCREENING FOR CERVICAL CANCER: ICD-10-CM

## 2022-12-14 DIAGNOSIS — R09.81 NASAL CONGESTION: ICD-10-CM

## 2022-12-14 DIAGNOSIS — G47.33 OSA (OBSTRUCTIVE SLEEP APNEA): ICD-10-CM

## 2022-12-14 PROBLEM — R30.0 DYSURIA: Status: RESOLVED | Noted: 2022-10-17 | Resolved: 2022-12-14

## 2022-12-14 LAB
CLUE CELLS: ABNORMAL
TRICHOMONAS, WET PREP: ABNORMAL
WBC'S/HIGH POWER FIELD, WET PREP: ABNORMAL
YEAST, WET PREP: ABNORMAL

## 2022-12-14 PROCEDURE — 82043 UR ALBUMIN QUANTITATIVE: CPT | Performed by: FAMILY MEDICINE

## 2022-12-14 PROCEDURE — 87624 HPV HI-RISK TYP POOLED RSLT: CPT | Performed by: FAMILY MEDICINE

## 2022-12-14 PROCEDURE — 90682 RIV4 VACC RECOMBINANT DNA IM: CPT | Performed by: FAMILY MEDICINE

## 2022-12-14 PROCEDURE — 99214 OFFICE O/P EST MOD 30 MIN: CPT | Mod: 25 | Performed by: FAMILY MEDICINE

## 2022-12-14 PROCEDURE — G0008 ADMIN INFLUENZA VIRUS VAC: HCPCS | Performed by: FAMILY MEDICINE

## 2022-12-14 PROCEDURE — 80061 LIPID PANEL: CPT | Performed by: FAMILY MEDICINE

## 2022-12-14 PROCEDURE — G0439 PPPS, SUBSEQ VISIT: HCPCS | Performed by: FAMILY MEDICINE

## 2022-12-14 PROCEDURE — 0124A COVID-19 VACCINE BIVALENT BOOSTER 12+ (PFIZER): CPT | Performed by: FAMILY MEDICINE

## 2022-12-14 PROCEDURE — 36415 COLL VENOUS BLD VENIPUNCTURE: CPT | Performed by: FAMILY MEDICINE

## 2022-12-14 PROCEDURE — 84443 ASSAY THYROID STIM HORMONE: CPT | Performed by: FAMILY MEDICINE

## 2022-12-14 PROCEDURE — 80048 BASIC METABOLIC PNL TOTAL CA: CPT | Performed by: FAMILY MEDICINE

## 2022-12-14 PROCEDURE — G0145 SCR C/V CYTO,THINLAYER,RESCR: HCPCS | Performed by: FAMILY MEDICINE

## 2022-12-14 PROCEDURE — 84439 ASSAY OF FREE THYROXINE: CPT | Performed by: FAMILY MEDICINE

## 2022-12-14 PROCEDURE — 91312 COVID-19 VACCINE BIVALENT BOOSTER 12+ (PFIZER): CPT | Performed by: FAMILY MEDICINE

## 2022-12-14 PROCEDURE — 87210 SMEAR WET MOUNT SALINE/INK: CPT | Performed by: FAMILY MEDICINE

## 2022-12-14 PROCEDURE — 87591 N.GONORRHOEAE DNA AMP PROB: CPT | Performed by: FAMILY MEDICINE

## 2022-12-14 PROCEDURE — 90715 TDAP VACCINE 7 YRS/> IM: CPT | Performed by: FAMILY MEDICINE

## 2022-12-14 PROCEDURE — 87491 CHLMYD TRACH DNA AMP PROBE: CPT | Performed by: FAMILY MEDICINE

## 2022-12-14 PROCEDURE — 90472 IMMUNIZATION ADMIN EACH ADD: CPT | Performed by: FAMILY MEDICINE

## 2022-12-14 ASSESSMENT — PATIENT HEALTH QUESTIONNAIRE - PHQ9: SUM OF ALL RESPONSES TO PHQ QUESTIONS 1-9: 8

## 2022-12-14 ASSESSMENT — ENCOUNTER SYMPTOMS
JOINT SWELLING: 0
ARTHRALGIAS: 0
EYE PAIN: 0
NERVOUS/ANXIOUS: 1
HEADACHES: 1
COUGH: 0
HEARTBURN: 0
WEAKNESS: 0
SHORTNESS OF BREATH: 0
PARESTHESIAS: 0
BREAST MASS: 0
DIARRHEA: 0
HEMATOCHEZIA: 0
CONSTIPATION: 0
FREQUENCY: 1
PALPITATIONS: 0
ABDOMINAL PAIN: 1
DIZZINESS: 0
NAUSEA: 1
CHILLS: 0
HEMATURIA: 0
MYALGIAS: 0
DYSURIA: 0
SORE THROAT: 0
FEVER: 0

## 2022-12-14 ASSESSMENT — ACTIVITIES OF DAILY LIVING (ADL): CURRENT_FUNCTION: NO ASSISTANCE NEEDED

## 2022-12-14 ASSESSMENT — PAIN SCALES - GENERAL: PAINLEVEL: MODERATE PAIN (5)

## 2022-12-14 NOTE — LETTER
December 26, 2022      Deanne Yusuf  [id number] [facility] [address]          Ilsa,  It was a pleasure meeting you in the office this week.  1.  Your thyroid test is showing you are getting too much thyroid hormone.  I would recommend decreasing your levothyroxine dose slightly. Let's have you take 1/2 tablet of the levothyroxine one day per week and continue a full tablet the rest of the week. (For example, each Saturday take 1/2 tablet of levothyroxine 175 mcg and a full tablet all other days of the week).  Plan to recheck a thyroid blood test in 2 months to ensure this is the correct dose adjustment.  2. The kidney and electrolyte panel was normal.  The urine protein was also negative/normal indicating your kidneys are working well.  3. Your vaginal infection screening tests were all negative/normal including the wet prep (screens for yeast/bacteria/trichomonas infections), gonorrhea and chlamydia tests.   4. Your cholesterol panel is normal except for a slightly low HDL (good cholesterol). Exercise is the best way to increase this level.       Nothing was seen on your blood work to explain your current symptoms.  Make sure you are maximizing your bowel regimen for the opioid induced constipation and see if this helps the unevenness you are noting in the abdomen.       Certainly, be seen urgently if you are developing new or worsening abdominal pain, new fever, vomiting, etc.  Follow-up in the office for routine follow-up of your symptoms if they are ongoing and make sure to follow-up with urology to if you are noting persistent urinary concerns.     Please MyChart or call if you have any concerns or questions.      Sincerely,  Catrachita Nicolas MD    Resulted Orders   CHLAMYDIA TRACHOMATIS PCR   Result Value Ref Range    Chlamydia trachomatis Negative Negative      Comment:      A negative result by transcription mediated amplification does not preclude the presence of C. trachomatis infection  because results are dependent on proper and adequate collection, absence of inhibitors and sufficient rRNA to be detected.

## 2022-12-14 NOTE — PATIENT INSTRUCTIONS
06/26/20      Regarding :  Mounika Mena  [address]  [place] [address]        Due to recent provider schedule changes, your Annual Exam scheduled on July 14th will need to be rescheduled.    Please call our office to have this rescheduled.    We apologize for any inconvenience.        Sincerely,       Dr. [facility] - [address] [place] [address]  Phone: [phone]  Fax: [phone]                 Preventive Health Recommendations  Female Ages 50 - 64    Yearly exam: See your health care provider every year in order to  o Review health changes.   o Discuss preventive care.    o Review your medicines if your doctor has prescribed any.      Get a Pap test every three years (unless you have an abnormal result and your provider advises testing more often).    If you get Pap tests with HPV test, you only need to test every 5 years, unless you have an abnormal result.     You do not need a Pap test if your uterus was removed (hysterectomy) and you have not had cancer.    You should be tested each year for STDs (sexually transmitted diseases) if you're at risk.     Have a mammogram every 1 to 2 years.    Have a colonoscopy at age 50, or have a yearly FIT test (stool test). These exams screen for colon cancer.      Have a cholesterol test every 5 years, or more often if advised.    Have a diabetes test (fasting glucose) every three years. If you are at risk for diabetes, you should have this test more often.     If you are at risk for osteoporosis (brittle bone disease), think about having a bone density scan (DEXA).    Shots: Get a flu shot each year. Get a tetanus shot every 10 years.    Nutrition:     Eat at least 5 servings of fruits and vegetables each day.    Eat whole-grain bread, whole-wheat pasta and brown rice instead of white grains and rice.    Get adequate Calcium and Vitamin D.     Lifestyle    Exercise at least 150 minutes a week (30 minutes a day, 5 days a week). This will help you control your weight and prevent disease.    Limit alcohol to one drink per day.    No smoking.     Wear sunscreen to prevent skin cancer.     See your dentist every six months for an exam and cleaning.    See your eye doctor every 1 to 2 years.    Patient Education   Personalized Prevention Plan  You are due for the preventive services outlined below.  Your care team is available to assist you in scheduling these  services.  If you have already completed any of these items, please share that information with your care team to update in your medical record.  Health Maintenance Due   Topic Date Due     Zoster (Shingles) Vaccine (1 of 2) Never done     LUNG CANCER SCREENING  Never done     Annual Wellness Visit  10/19/2017     URINE DRUG SCREEN  02/06/2018     PAP  03/19/2020     HPV Follow Up  03/19/2020     Cholesterol Lab  10/18/2022     ANNUAL REVIEW OF HM ORDERS  10/22/2022     Your Health Risk Assessment indicates you feel you are not in good health    A healthy lifestyle helps keep the body fit and the mind alert. It helps protect you from disease, helps you fight disease, and helps prevent chronic disease (disease that doesn't go away) from getting worse. This is important as you get older and begin to notice twinges in muscles and joints and a decline in the strength and stamina you once took for granted. A healthy lifestyle includes good healthcare, good nutrition, weight control, recreation, and regular exercise. Avoid harmful substances and do what you can to keep safe. Another part of a healthy lifestyle is stay mentally active and socially involved.    Good healthcare     Have a wellness visit every year.     If you have new symptoms, let us know right away. Don't wait until the next checkup.     Take medicines exactly as prescribed and keep your medicines in a safe place. Tell us if your medicine causes problems.   Healthy diet and weight control     Eat 3 or 4 small, nutritious, low-fat, high-fiber meals a day. Include a variety of fruits, vegetables, and whole-grain foods.     Make sure you get enough calcium in your diet. Calcium, vitamin D, and exercise help prevent osteoporosis (bone thinning).     If you live alone, try eating with others when you can. That way you get a good meal and have company while you eat it.     Try to keep a healthy weight. If you eat more calories than your body uses for energy, it  will be stored as fat and you will gain weight.     Recreation   Recreation is not limited to sports and team events. It includes any activity that provides relaxation, interest, enjoyment, and exercise. Recreation provides an outlet for physical, mental, and social energy. It can give a sense of worth and achievement. It can help you stay healthy.    Mental Exercise and Social Involvement  Mental and emotional health is as important as physical health. Keep in touch with friends and family. Stay as active as possible. Continue to learn and challenge yourself.   Things you can do to stay mentally active are:    Learn something new, like a foreign language or musical instrument.     Play SCRABBLE or do crossword puzzles. If you cannot find people to play these games with you at home, you can play them with others on your computer through the Internet.     Join a games club--anything from card games to chess or checkers or lawn bowling.     Start a new hobby.     Go back to school.     Volunteer.     Read.   Keep up with world events.    Signs of Hearing Loss      Hearing much better with one ear can be a sign of hearing loss.   Hearing loss is a problem shared by many people. In fact, it is one of the most common health problems, particularly as people age. Most people age 65 and older have some hearing loss. By age 80, almost everyone does. Hearing loss often occurs slowly over the years. So you may not realize your hearing has gotten worse.  Have your hearing checked  Call your healthcare provider if you:    Have to strain to hear normal conversation    Have to watch other people s faces very carefully to follow what they re saying    Need to ask people to repeat what they ve said    Often misunderstand what people are saying    Turn the volume of the television or radio up so high that others complain    Feel that people are mumbling when they re talking to you    Find that the effort to hear leaves you feeling  tired and irritated    Notice, when using the phone, that you hear better with one ear than the other  [employer] last reviewed this educational content on 1/1/2020 2000-2021 [employer]. All rights reserved. This information is not intended as a substitute for professional medical care. Always follow your healthcare professional's instructions.        Your Health Risk Assessment indicates you feel you are not in good emotional health.    Recreation   Recreation is not limited to sports and team events. It includes any activity that provides relaxation, interest, enjoyment, and exercise. Recreation provides an outlet for physical, mental, and social energy. It can give a sense of worth and achievement. It can help you stay healthy.    Mental Exercise and Social Involvement  Mental and emotional health is as important as physical health. Keep in touch with friends and family. Stay as active as possible. Continue to learn and challenge yourself.   Things you can do to stay mentally active are:    Learn something new, like a foreign language or musical instrument.     Play SCRABBLE or do crossword puzzles. If you cannot find people to play these games with you at home, you can play them with others on your computer through the Internet.     Join a games club--anything from card games to chess or checkers or lawn bowling.     Start a new hobby.     Go back to school.     Volunteer.     Read.   Keep up with world events.    Depression and Suicide in Older Adults    Nearly 2 million older Americans have some type of depression. Some of them even take their own lives. Yet depression among older adults is often ignored. Learn the warning signs. You may help spare a loved one needless pain. You may also save a life.   What is depression?  Depression is a common and serious illness that affects the way you think and feel. It is not a normal part of aging, nor is it a sign of weakness, a character flaw, or  "something you can snap out of. Most people with depression need treatment to get better. The most common symptom is a feeling of deep sadness. People who are depressed also may seem tired and listless. And nothing seems to give them pleasure. It s normal to grieve or be sad sometimes. But sadness lessens or passes with time. Depression rarely goes away or improves on its own. A person with clinical depression can't \"snap out of it.\" Other symptoms of depression are:     Sleeping more or less than normal    Eating more or less than normal    Having headaches, stomachaches, or other pains that don t go away    Feeling nervous,  empty,  or worthless    Crying a great deal    Thinking or talking about suicide or death    Loss of interest in activities previously enjoyed    Social isolation    Feeling confused or forgetful  What causes it?  The causes of depression aren t fully known. But it is thought to result from a complex blend of these factors:     Biochemistry. Certain chemicals in the brain play a role.    Genes. Depression does run in families.    Life stress. Life stresses can also trigger depression in some people. Older adults often face many stressors, such as death of friends or a spouse, health problems, and financial concerns.    Chronic conditions. This includes conditions such as diabetes, heart disease, or cancer. These can cause symptoms of depression. Medicine side effects can cause changes in thoughts and behaviors.  How you can help  Often, depressed people may not want to ask for help. When they do, they may be ignored. Or, they may receive the wrong treatment. You can help by showing parents and older friends love and support. If they seem depressed, don t lecture the person, ignore the symptoms, or discount the symptoms as a  normal  part of aging -which they are not. Get involved, listen, and show interest and support.   Help them understand that depression is a treatable illness. Tell them you " can help them find the right treatment. Offer to go to their healthcare provider's appointment with them for support when the symptoms are discussed. With their approval, contact a local mental health center, social service agency, or hospital about services.   You can be an advocate for him or her at healthcare appointments. Many older adults have chronic illnesses that can cause symptoms of depression. Medicine side effects can change thoughts and behaviors. You can help make sure that the healthcare provider looks at all of these factors. He or she should refer your family member or friend to a mental healthcare provider when needed. in some cases, untreated depression can lead to a misdiagnosis. A person may be diagnosed with a brain disorder such as dementia. If the healthcare provider does not take the issue of depression seriously, help your family member or friend to find another provider.   Don't be afraid to ask  If you think an older person you care about could be suicidal, ask,  Have you thought about suicide?  Most people will tell you the truth. If they say  yes,  they may already have a plan for how and when they will attempt it. Find out as much as you can. The more detailed the plan, and the easier it is to carry out, the more danger the person is in right now. Tell the person you are there for them and do not want them to harm him or herself. Don't wait to get help for the person. Call the person's healthcare provider, local hospital, or emergency services.   To learn more    National Suicide Prevention Lifeline (crisis hotline) 176-224-XVDU (540-351-4369)    National [place] of Mental Fnjmka150-237-2309fsh.[facility].nih.gov    National [place] on Mental Ckmqqoa745-093-5282pey.ellie.org    Mental Health Pbdisbq540-919-8461mzn.[facility].org    National Suicide Cxvbpil215-AXSYEGH (751-326-3078)    Call 255  Never leave the person alone. A person who is actively suicidal needs psychiatric care right away.  They will need constant supervision. Never leave the person out of sight. Call 911 or the national 24-hour suicide crisis hotline at 800-273-talk (265.876.5468). You can also take the person to the closest emergency room.   [employer] last reviewed this educational content on 5/1/2020 2000-2021 [employer]. All rights reserved. This information is not intended as a substitute for professional medical care. Always follow your healthcare professional's instructions.
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
## 2022-12-15 LAB
ANION GAP SERPL CALCULATED.3IONS-SCNC: 4 MMOL/L (ref 3–14)
BUN SERPL-MCNC: 20 MG/DL (ref 7–30)
C TRACH DNA SPEC QL NAA+PROBE: NEGATIVE
CALCIUM SERPL-MCNC: 9.3 MG/DL (ref 8.5–10.1)
CHLORIDE BLD-SCNC: 106 MMOL/L (ref 94–109)
CHOLEST SERPL-MCNC: 157 MG/DL
CO2 SERPL-SCNC: 30 MMOL/L (ref 20–32)
CREAT SERPL-MCNC: 0.99 MG/DL (ref 0.52–1.04)
CREAT UR-MCNC: 104 MG/DL
FASTING STATUS PATIENT QL REPORTED: YES
GFR SERPL CREATININE-BSD FRML MDRD: 63 ML/MIN/1.73M2
GLUCOSE BLD-MCNC: 98 MG/DL (ref 70–99)
HDLC SERPL-MCNC: 43 MG/DL
LDLC SERPL CALC-MCNC: 92 MG/DL
MICROALBUMIN UR-MCNC: 6 MG/L
MICROALBUMIN/CREAT UR: 5.77 MG/G CR (ref 0–25)
N GONORRHOEA DNA SPEC QL NAA+PROBE: NEGATIVE
NONHDLC SERPL-MCNC: 114 MG/DL
POTASSIUM BLD-SCNC: 4 MMOL/L (ref 3.4–5.3)
SODIUM SERPL-SCNC: 140 MMOL/L (ref 133–144)
T4 FREE SERPL-MCNC: 1.49 NG/DL (ref 0.76–1.46)
TRIGL SERPL-MCNC: 111 MG/DL
TSH SERPL DL<=0.005 MIU/L-ACNC: 0.32 MU/L (ref 0.4–4)

## 2022-12-16 LAB
BKR LAB AP GYN ADEQUACY: NORMAL
BKR LAB AP GYN INTERPRETATION: NORMAL
BKR LAB AP HPV REFLEX: NORMAL
BKR LAB AP PREVIOUS ABNORMAL: NORMAL
PATH REPORT.COMMENTS IMP SPEC: NORMAL
PATH REPORT.COMMENTS IMP SPEC: NORMAL
PATH REPORT.RELEVANT HX SPEC: NORMAL

## 2022-12-16 NOTE — RESULT ENCOUNTER NOTE
Ilsa,  It was a pleasure meeting you in the office this week.  1.  Your thyroid test is showing you are getting too much thyroid hormone.  I would recommend decreasing your levothyroxine dose slightly. Let's have you take 1/2 tablet of the levothyroxine one day per week and continue a full tablet the rest of the week. (For example, each Saturday take 1/2 tablet of levothyroxine 175 mcg and a full tablet all other days of the week).  Plan to recheck a thyroid blood test in 2 months to ensure this is the correct dose adjustment.  2. The kidney and electrolyte panel was normal.  The urine protein was also negative/normal indicating your kidneys are working well.  3. Your vaginal infection screening tests were all negative/normal including the wet prep (screens for yeast/bacteria/trichomonas infections), gonorrhea and chlamydia tests.   4. Your cholesterol panel is normal except for a slightly low HDL (good cholesterol). Exercise is the best way to increase this level.      Nothing was seen on your blood work to explain your current symptoms.  Make sure you are maximizing your bowel regimen for the opioid induced constipation and see if this helps the unevenness you are noting in the abdomen.      Certainly, be seen urgently if you are developing new or worsening abdominal pain, new fever, vomiting, etc.  Follow-up in the office for routine follow-up of your symptoms if they are ongoing and make sure to follow-up with urology to if you are noting persistent urinary concerns.    Please MyChart or call if you have any concerns or questions.     Sincerely,  Catrachita Nicolas MD

## 2022-12-20 LAB
HUMAN PAPILLOMA VIRUS 16 DNA: NEGATIVE
HUMAN PAPILLOMA VIRUS 18 DNA: NEGATIVE
HUMAN PAPILLOMA VIRUS FINAL DIAGNOSIS: ABNORMAL
HUMAN PAPILLOMA VIRUS OTHER HR: POSITIVE

## 2022-12-21 ENCOUNTER — PATIENT OUTREACH (OUTPATIENT)
Dept: FAMILY MEDICINE | Facility: CLINIC | Age: 62
End: 2022-12-21

## 2022-12-21 DIAGNOSIS — R87.810 CERVICAL HIGH RISK HPV (HUMAN PAPILLOMAVIRUS) TEST POSITIVE: ICD-10-CM

## 2022-12-26 ENCOUNTER — TELEPHONE (OUTPATIENT)
Dept: OTOLARYNGOLOGY | Facility: CLINIC | Age: 62
End: 2022-12-26

## 2022-12-26 NOTE — TELEPHONE ENCOUNTER
[facility] Call Center    Phone Message    May a detailed message be left on voicemail: yes     Reason for Call: Appointment Intake    Referring Provider Name: Catrachita Nicolas MD in [facility]/IM/PEDS  Diagnosis and/or Symptoms: chronic nasal congestion, was told by Dr. Kern in 2020 she has inadequate nasal passages - was advised to see a plastic surgeon for nasal surgery but she said he wanted to do a surgery to widen her nostrils but she doesn't feel that would help as he issue is higher up in the nasal passage.     Please advise unsure who she should see    Action Taken: Message routed to:  [facility] ENT    Travel Screening: Not Applicable

## 2022-12-29 NOTE — TELEPHONE ENCOUNTER
"Phone call to pt.  Per Dr Nicolas's note:  \" Thinks she has chronically inflamed sinuses. Congestion at night. Headaches. Has seen ENT in the past.  Ears at times feel underwater. Ears are ringing x 8 weeks. Harder to hear the TV\"    Left message that pt can schedule with Dr Reno for evaluation of these symptoms. Scheduling phone number provided.  Maryuri Piña RN  "

## 2023-01-05 ENCOUNTER — PATIENT OUTREACH (OUTPATIENT)
Dept: CARE COORDINATION | Facility: CLINIC | Age: 63
End: 2023-01-05

## 2023-01-05 ENCOUNTER — TRANSFERRED RECORDS (OUTPATIENT)
Dept: HEALTH INFORMATION MANAGEMENT | Facility: CLINIC | Age: 63
End: 2023-01-05

## 2023-01-05 NOTE — PROGRESS NOTES
Clinic Care Coordination Contact  [facility]/Voicemail       Clinical Data: Care Coordinator Outreach  Outreach attempted x 1.  Left message on patient's voicemail with call back information and requested return call.  Plan: Care Coordinator will try to reach patient again in 3-5 business days.    ALEXI Paiz? Social Work Care Coordinator   [facility]  [email or web address]? [email or web address]    Phone: [phone]  she/her

## 2023-01-10 ENCOUNTER — THERAPY VISIT (OUTPATIENT)
Dept: PHYSICAL THERAPY | Facility: CLINIC | Age: 63
End: 2023-01-10
Payer: COMMERCIAL

## 2023-01-10 DIAGNOSIS — N32.81 URGENCY-FREQUENCY SYNDROME: ICD-10-CM

## 2023-01-10 DIAGNOSIS — Z87.440 HISTORY OF UTI: ICD-10-CM

## 2023-01-10 DIAGNOSIS — R10.2 SUPRAPUBIC ABDOMINAL PAIN: ICD-10-CM

## 2023-01-10 DIAGNOSIS — R30.0 DYSURIA: ICD-10-CM

## 2023-01-10 DIAGNOSIS — R10.2 PELVIC PAIN IN FEMALE: ICD-10-CM

## 2023-01-10 DIAGNOSIS — M99.05 SOMATIC DYSFUNCTION OF PELVIC REGION: ICD-10-CM

## 2023-01-10 PROCEDURE — 97110 THERAPEUTIC EXERCISES: CPT | Mod: GP

## 2023-01-10 PROCEDURE — 97535 SELF CARE MNGMENT TRAINING: CPT | Mod: GP

## 2023-01-10 PROCEDURE — 97161 PT EVAL LOW COMPLEX 20 MIN: CPT | Mod: GP

## 2023-01-10 NOTE — PROGRESS NOTES
[facility]    OUTPATIENT Physical Therapy ORTHOPEDIC EVALUATION  PLAN OF TREATMENT FOR OUTPATIENT REHABILITATION  (COMPLETE FOR INITIAL CLAIMS ONLY)  Patient's Last Name, First Name, M.I.  YOB: 1960  Ilsa Yusuf    Provider s Name:  [facility]   Medical Record No.  [id number]   Start of Care Date:  01/10/23   Onset Date:   11/09/22 (MD referral date)   Treatment Diagnosis:    Medical Diagnosis:     History of UTI  Dysuria  Urgency-frequency syndrome  Suprapubic abdominal pain  Somatic dysfunction of pelvic region  Pelvic pain in female       Goals:     01/10/23 0700   Voiding Patterns   Previous Functional Level No problems   Current Functional Level Number of times patient voids during night   Peformance level 3-4x w/ volume in small amts   STG Target Performance Reduce number of times patient voids at night   Performance Level <2x w/ volume in medium amt or greater   Rationale to establish restorative sleep pattern;work toward normal voiding intervals to focus on ADLS, work, or school   Due Date 02/07/23   LTG Target Performance Reduce number of times patient voids at night   Performance Level 0-1x w/ volume in avg amt   Rationale to establish restorative sleep pattern;work toward normal voiding intervals to focus on ADLS, work, or school   Due Date 04/04/23   Pelvic Pain   Previous Functional Level No restrictions   Current Functional Level Level of pelvic pain experienced with sitting;Level of pelvic pain experienced with standing   Performance Level 6/10 burning to vesitbule area   STG Target Performance Decrease level of pelvic pain with sitting to;Decrease level of pelvic pain with standing to   Performance Level <4/10 burning to vesitbule area   Rationale for personal hygiene and use of tampons;painfree sitting for allow rest from  standing;painfree sitting for community transportation;for pain free ADL's   Due Date 02/07/23   LTG Target Performance Decrease level of pelvic pain with sitting to;Decrease level of pelvic pain with standing to   Performance Level 0/10 burning to vesitbule area   Rationale painfree sitting for personal hygiene;painfree sitting for allow rest from standing;painfree sitting for community transportation;for pain free ADL's;for personal hygiene and use of tampons   Due Date 04/04/23         Therapy Frequency:  1x/wk for 4 wks, then 2x/mo for 2 mos  Predicted Duration of Therapy Intervention:  12 weeks    Akua Nolan, PT                 I CERTIFY THE NEED FOR THESE SERVICES FURNISHED UNDER        THIS PLAN OF TREATMENT AND WHILE UNDER MY CARE     (Physician co-signature of this document indicates review and certification of the therapy plan).                     Certification Date From:  01/10/23   Certification Date To:  04/09/23    Referring Provider:  Terrie Melton    Initial Assessment        See Epic Evaluation SOC Date: 01/10/23
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
## 2023-01-13 ENCOUNTER — PATIENT OUTREACH (OUTPATIENT)
Dept: CARE COORDINATION | Facility: CLINIC | Age: 63
End: 2023-01-13

## 2023-01-13 NOTE — LETTER
[facility] CARE COORDINATION  January 13, 2023    Dear Ilsa,    I have been attempting to reach you since our last contact. I would like to continue to work with you and provide any additional support you may need on achieving your health care related goals. I would appreciate if you would give me a call at [phone] to let me know if you would like to continue working together. I know that there are many things that can affect our ability to communicate and I hope we can continue to work together.    All [facility] us at [facility] are invested in your health and are here to assist you in meeting your goals.     Sincerely,    ALEXI Paiz

## 2023-01-13 NOTE — Clinical Note
Hello,   I just spoke to this pt today and wanted to make sure you were aware of her current state. I see you have an appt with her on 2/3 and assume you are now assigned her case. She spoke today about suicidal thoughts she has had recently in regards to her ongoing need for new housing (see previous notes). She is also looking into new treatments in order to help improve her mood. I told her about the suicide hotline and assured with her that she had no plans to follow through on thoughts. Pt states she feels safe and does not have any plans (noted in today's encounter). I plan to call her next week to see if she can schedule an appt with her  provider so she can talk about it.   Let me know if you have any questions/concerns,   ALEXI Paiz

## 2023-01-13 NOTE — PROGRESS NOTES
Clinic Care Coordination Contact    Follow Up Progress Note      Assessment: ARABELLA RENNER spoke to pt regarding follow up on housing. Pt reports she applied back in November to affordable housing and was upset to see she was not yet approved. ARABELLA RENNER explained the process and long waitlists for affordable housing right now but reassured that it was good she was on the list.     ARABELLA RENNER asked if there were any further resources pt was in need of. Pt reported she was curious to learn more about a treatment with microdosing she recently learned about. Pt reports she learned about microdosing with Psilocybin mushrooms but in a safe environment with a dr. ARABELLA RENNER asked if pt has spoken to dr about this and pt said no. Pt was curious to know if ARABELLA RENNER has heard of these treatment options and ARABELLA RENNER was not aware of anything like this. Pt reports she will continue to look into it and see if she can get in contact with drs who do this inpatient treatment.     Pt also reports that she has been feeling overwhelmed with the frustrations of housing and other things going on and sometimes has suicidal thoughts. ARABELLA RENNER asked if pt feels safe at home and if pt has plan to do anything. Pt reports she feels safe and has not had a plan. Pt reports it is sometimes a feeling she has but does not have the feeling to go through with it. ARABELLA RENNER went through suicidal hotlines pt could call if she does have these thoughts again or wants someone to talk to. Pt agreed and said she would call them if needed. ARABELLA RENNER encouraged pt to schedule appt with her  provider to talk about this and pt said she would do so when she can.     ARABELLA RENNER has routed chart to  [facility] making sure they are aware of pt's current state as they have future appt with her on 2/3.     Care Gaps:    Health Maintenance Due   Topic Date Due     ZOSTER IMMUNIZATION (1 of 2) Never done     LUNG CANCER SCREENING  Never done     URINE DRUG SCREEN  02/06/2018     COLPOSCOPY   03/16/2023         Care Plans  Care Plan: Housing     Problem: LACK OF STABLE HOUSING     Goal: Establish Stable Housing     Start Date: 9/15/2022 Expected End Date: 12/15/2022    This Visit's Progress: 10% Recent Progress: 50%    Priority: High    Note:     Barriers: Lack of availability of housing   Strengths: Strong advocate of self, knows the steps to take, follows through on resources  Patient expressed understanding of goal: Yes  Action steps to achieve this goal:  1. I will look through resources sent by ARABELLA RENNER in the next following weeks in order to look at housing options available.  2. I will apply to section 8 voucher in the next month in order to get on the wait list  3. I will look into housing link and apply to places that fit my budget and other needs  4. I will follow up with ARABELLA RENNER and ask questions or express concerns when needed                          Intervention/Education provided during outreach: Hotline for suicidal thoughts  500     Outreach Frequency: 2 weeks    Plan:   Care Coordinator will follow up in one week in order to help pt schedule appt if needed.     ALEXI Paiz? Social Work Care Coordinator   [facility]  [email or web address]? [email or web address]    Phone: [phone]  she/her

## 2023-01-16 NOTE — PROGRESS NOTES
INDICATIONS:                                                    Is a pregnancy test required: No.  Was a consent obtained?  Yes    Today's PHQ-2 Score:   PHQ-2 ( 1999 [employer]) 12/14/2022   Q1: Little interest or pleasure in doing things 1   Q2: Feeling down, depressed or hopeless 1   PHQ-2 Score 2   PHQ-2 Total Score (12-17 Years)- Positive if 3 or more points; Administer PHQ-A if positive -   Q1: Little interest or pleasure in doing things Several days   Q2: Feeling down, depressed or hopeless Several days   PHQ-2 Score 2     Today's PHQ-9 Score:    PHQ-9 SCORE 12/14/2022   PHQ-9 Total Score -   PHQ-9 Total Score MyChart -   PHQ-9 Total Score 8   Some encounter information is confidential and restricted. Go to Review Flowsheets activity to see all data.       Ilsa Yusuf, is a 62 year old female, who had a recent normal pap.  HPV Other positive Yes prior history of abnormal pap. Here today for colposcopy. Discussed indication, risks of infection and bleeding.    Additionally she reports bilateral labial irritation.  She is not currently using any treatment.  States she has been tested for both yeast and BV. Tried Vaseline with minimal help    Her last pap was   Lab Results   Component Value Date    GYNINTERP  12/14/2022     Negative for Intraepithelial Lesion or Malignancy (NILM)    PAP NIL/+HPV other 03/19/2019    .    PROCEDURE:                                                      Cervix, very anterior, is stained with acetic acid and viewed colposcopically. Squamocolumnar junction is not visualized in it's entirety. no visible lesions . Biopsy done No. Endocervical curretage Done    Labia bilaterally appear overall normal.  No distinct lesions noted     POST PROCEDURE:                                                      IMPRESSION: Patient tolerated procedure well    PLAN : Await the results of the biopsies.  She tolerated the procedure well. There were no complications. Patient was discharged in  stable condition.    Patient advised to call the clinic if excessive bleeding, pelvic pain, or fever.     Follow-up based on pathology results   Recommend trying daily aquaphor to labial area to see if symptoms improve.  If not, consider course of steriod.  Fay Reyes MD

## 2023-01-17 ENCOUNTER — THERAPY VISIT (OUTPATIENT)
Dept: PHYSICAL THERAPY | Facility: CLINIC | Age: 63
End: 2023-01-17
Payer: COMMERCIAL

## 2023-01-17 ENCOUNTER — OFFICE VISIT (OUTPATIENT)
Dept: OBGYN | Facility: CLINIC | Age: 63
End: 2023-01-17
Payer: COMMERCIAL

## 2023-01-17 VITALS — SYSTOLIC BLOOD PRESSURE: 126 MMHG | DIASTOLIC BLOOD PRESSURE: 72 MMHG | BODY MASS INDEX: 30.84 KG/M2 | WEIGHT: 206.4 LBS

## 2023-01-17 DIAGNOSIS — R39.15 URGENCY OF URINATION: ICD-10-CM

## 2023-01-17 DIAGNOSIS — R87.810 CERVICAL HIGH RISK HUMAN PAPILLOMAVIRUS (HPV) DNA TEST POSITIVE: Primary | ICD-10-CM

## 2023-01-17 DIAGNOSIS — M99.05 SOMATIC DYSFUNCTION OF PELVIC REGION: ICD-10-CM

## 2023-01-17 DIAGNOSIS — R10.2 PELVIC PAIN IN FEMALE: Primary | ICD-10-CM

## 2023-01-17 PROCEDURE — 57456 ENDOCERV CURETTAGE W/SCOPE: CPT | Performed by: OBSTETRICS & GYNECOLOGY

## 2023-01-17 PROCEDURE — 88360 TUMOR IMMUNOHISTOCHEM/MANUAL: CPT | Performed by: PATHOLOGY

## 2023-01-17 PROCEDURE — 88342 IMHCHEM/IMCYTCHM 1ST ANTB: CPT | Mod: 59 | Performed by: PATHOLOGY

## 2023-01-17 PROCEDURE — 97140 MANUAL THERAPY 1/> REGIONS: CPT | Mod: GP

## 2023-01-17 PROCEDURE — 97110 THERAPEUTIC EXERCISES: CPT | Mod: GP

## 2023-01-17 PROCEDURE — 88305 TISSUE EXAM BY PATHOLOGIST: CPT | Performed by: PATHOLOGY

## 2023-01-17 ASSESSMENT — ANXIETY QUESTIONNAIRES
7. FEELING AFRAID AS IF SOMETHING AWFUL MIGHT HAPPEN: SEVERAL DAYS
3. WORRYING TOO MUCH ABOUT DIFFERENT THINGS: MORE THAN HALF THE DAYS
2. NOT BEING ABLE TO STOP OR CONTROL WORRYING: MORE THAN HALF THE DAYS
GAD7 TOTAL SCORE: 10
5. BEING SO RESTLESS THAT IT IS HARD TO SIT STILL: NOT AT ALL
GAD7 TOTAL SCORE: 10
1. FEELING NERVOUS, ANXIOUS, OR ON EDGE: NEARLY EVERY DAY
6. BECOMING EASILY ANNOYED OR IRRITABLE: NOT AT ALL

## 2023-01-17 ASSESSMENT — PATIENT HEALTH QUESTIONNAIRE - PHQ9
SUM OF ALL RESPONSES TO PHQ QUESTIONS 1-9: 3
5. POOR APPETITE OR OVEREATING: MORE THAN HALF THE DAYS

## 2023-01-19 ENCOUNTER — PATIENT OUTREACH (OUTPATIENT)
Dept: CARE COORDINATION | Facility: CLINIC | Age: 63
End: 2023-01-19
Payer: MEDICARE

## 2023-01-19 NOTE — PROGRESS NOTES
"Clinic Care Coordination Contact    Follow Up Progress Note      Assessment: ARABELLA RENNER spoke to pt to follow up on housing and how pt was feeling. Pt reports she had an apartment in [place] set up but when she went to have a look through she did not like what she saw with management so she did not follow through. Pt reports she has another place to look at in [place] this week with a different housing management and hopes it will be better.     Pt reports her mood has been better and has an appt with  provider on 2/3 and will talk about her frustrations and emotions recently.     Pt and ARABELLA CC discussed pt's frustrations with finding housing and ARABELLA CC validated this. Pt spoke about how she continues to look into treatment for \"opening up\" as previously stated in encounter on 1/13. ARABELLA RENNER recommends talking to  provider about this.     Pt asked for follow up in a few weeks.     Care Gaps:    Health Maintenance Due   Topic Date Due     ZOSTER IMMUNIZATION (1 of 2) Never done     LUNG CANCER SCREENING  Never done     URINE DRUG SCREEN  02/06/2018     COLPOSCOPY  03/16/2023       Care Plans  Care Plan: Housing     Problem: LACK OF STABLE HOUSING     Goal: Establish Stable Housing     Start Date: 9/15/2022 Expected End Date: 12/15/2022    This Visit's Progress: 30% Recent Progress: 10%    Priority: High    Note:     Barriers: Lack of availability of housing   Strengths: Strong advocate of self, knows the steps to take, follows through on resources  Patient expressed understanding of goal: Yes  Action steps to achieve this goal:  1. I will look through resources sent by ARABELLA RENNER in the next following weeks in order to look at housing options available.  2. I will apply to section 8 voucher in the next month in order to get on the wait list  3. I will look into housing link and apply to places that fit my budget and other needs  4. I will follow up with ARABELLA RENNER and ask questions or express concerns when needed                 "          Intervention/Education provided during outreach: N/A     Outreach Frequency: monthly    Plan:   Care Coordinator will follow up in one month     ALEXI Paiz? Social Work Care Coordinator   [facility]  [email or web address]? [email or web address]    Phone: [phone]  she/her

## 2023-01-20 DIAGNOSIS — K21.9 GASTROESOPHAGEAL REFLUX DISEASE WITHOUT ESOPHAGITIS: ICD-10-CM

## 2023-01-20 LAB
PATH REPORT.COMMENTS IMP SPEC: NORMAL
PATH REPORT.COMMENTS IMP SPEC: NORMAL
PATH REPORT.FINAL DX SPEC: NORMAL
PATH REPORT.GROSS SPEC: NORMAL
PATH REPORT.MICROSCOPIC SPEC OTHER STN: NORMAL
PATH REPORT.MICROSCOPIC SPEC OTHER STN: NORMAL
PATH REPORT.RELEVANT HX SPEC: NORMAL
PHOTO IMAGE: NORMAL

## 2023-01-20 RX ORDER — OMEPRAZOLE 40 MG/1
CAPSULE, DELAYED RELEASE ORAL
Qty: 90 CAPSULE | Refills: 0 | Status: SHIPPED | OUTPATIENT
Start: 2023-01-20 | End: 2023-05-25

## 2023-01-24 ENCOUNTER — PATIENT OUTREACH (OUTPATIENT)
Dept: OBGYN | Facility: CLINIC | Age: 63
End: 2023-01-24

## 2023-01-24 NOTE — LETTER
"January 27, 2023      Ilsa Yusuf  [address] [facility] [place] [address]        Dear ,    This letter is regarding the recent colposcopy you had done.  Here is Dr. Reyes's message about those results:    \"Deanne,      Here are your recent results.   Your biopsy was normal  Plan to repeat pap/HPV in 1 year     Regards,   Fay Reyes MD\"    If you have additional questions regarding this result, please contact our office and we will be happy to assist you.      Sincerely,    Your [facility] Care Team  "

## 2023-01-27 ENCOUNTER — THERAPY VISIT (OUTPATIENT)
Dept: PHYSICAL THERAPY | Facility: CLINIC | Age: 63
End: 2023-01-27
Payer: COMMERCIAL

## 2023-01-27 DIAGNOSIS — R10.2 PELVIC PAIN IN FEMALE: Primary | ICD-10-CM

## 2023-01-27 PROCEDURE — 97110 THERAPEUTIC EXERCISES: CPT | Mod: GP

## 2023-01-27 PROCEDURE — 97140 MANUAL THERAPY 1/> REGIONS: CPT | Mod: GP

## 2023-02-01 ENCOUNTER — TRANSFERRED RECORDS (OUTPATIENT)
Dept: HEALTH INFORMATION MANAGEMENT | Facility: CLINIC | Age: 63
End: 2023-02-01

## 2023-02-03 ENCOUNTER — VIRTUAL VISIT (OUTPATIENT)
Dept: PSYCHIATRY | Facility: CLINIC | Age: 63
End: 2023-02-03
Payer: COMMERCIAL

## 2023-02-03 DIAGNOSIS — F43.10 PTSD (POST-TRAUMATIC STRESS DISORDER): ICD-10-CM

## 2023-02-03 DIAGNOSIS — F33.1 MODERATE EPISODE OF RECURRENT MAJOR DEPRESSIVE DISORDER (H): ICD-10-CM

## 2023-02-03 DIAGNOSIS — F90.1 ATTENTION-DEFICIT HYPERACTIVITY DISORDER, PREDOMINANTLY HYPERACTIVE TYPE: ICD-10-CM

## 2023-02-03 DIAGNOSIS — F41.1 GAD (GENERALIZED ANXIETY DISORDER): Primary | ICD-10-CM

## 2023-02-03 PROCEDURE — 90792 PSYCH DIAG EVAL W/MED SRVCS: CPT | Mod: 95 | Performed by: NURSE PRACTITIONER

## 2023-02-03 RX ORDER — BUSPIRONE HYDROCHLORIDE 10 MG/1
10 TABLET ORAL 2 TIMES DAILY
Qty: 60 TABLET | Refills: 1 | Status: SHIPPED | OUTPATIENT
Start: 2023-02-03 | End: 2023-03-03

## 2023-02-03 ASSESSMENT — ANXIETY QUESTIONNAIRES
GAD7 TOTAL SCORE: 6
6. BECOMING EASILY ANNOYED OR IRRITABLE: SEVERAL DAYS
2. NOT BEING ABLE TO STOP OR CONTROL WORRYING: SEVERAL DAYS
8. IF YOU CHECKED OFF ANY PROBLEMS, HOW DIFFICULT HAVE THESE MADE IT FOR YOU TO DO YOUR WORK, TAKE CARE OF THINGS AT HOME, OR GET ALONG WITH OTHER PEOPLE?: VERY DIFFICULT
GAD7 TOTAL SCORE: 6
IF YOU CHECKED OFF ANY PROBLEMS ON THIS QUESTIONNAIRE, HOW DIFFICULT HAVE THESE PROBLEMS MADE IT FOR YOU TO DO YOUR WORK, TAKE CARE OF THINGS AT HOME, OR GET ALONG WITH OTHER PEOPLE: VERY DIFFICULT
7. FEELING AFRAID AS IF SOMETHING AWFUL MIGHT HAPPEN: NOT AT ALL
3. WORRYING TOO MUCH ABOUT DIFFERENT THINGS: SEVERAL DAYS
7. FEELING AFRAID AS IF SOMETHING AWFUL MIGHT HAPPEN: NOT AT ALL
GAD7 TOTAL SCORE: 6
5. BEING SO RESTLESS THAT IT IS HARD TO SIT STILL: NOT AT ALL
4. TROUBLE RELAXING: MORE THAN HALF THE DAYS
1. FEELING NERVOUS, ANXIOUS, OR ON EDGE: SEVERAL DAYS

## 2023-02-03 ASSESSMENT — PATIENT HEALTH QUESTIONNAIRE - PHQ9
SUM OF ALL RESPONSES TO PHQ QUESTIONS 1-9: 3
10. IF YOU CHECKED OFF ANY PROBLEMS, HOW DIFFICULT HAVE THESE PROBLEMS MADE IT FOR YOU TO DO YOUR WORK, TAKE CARE OF THINGS AT HOME, OR GET ALONG WITH OTHER PEOPLE: VERY DIFFICULT
SUM OF ALL RESPONSES TO PHQ QUESTIONS 1-9: 3

## 2023-02-03 NOTE — PROGRESS NOTES
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 appetite is and sleep are at baseline, improved and stabilized.  She denies SI, SIB, and HI.  She also denies both auditory and visual hallucination.  She denies rk and psychosis.  Return to clinic in 4 weeks for follow-up.    Patient and I reviewed diagnosis and treatment plan and patient agrees with following recommendations:        PLAN AND RECOMMENDATIONS:    1.  Continue to take Wellbutrin  mg every morning .  2.  Continue to take trazodone 50 mg 1 to 3 tablets ( mg) at bedtime as needed  3.  Continue to take Lexapro 15 mg every day  4.  Take hydroxyzine 25 mg instead of 3 times daily as needed for anxiety   5.  Continue to take prazosin 5 mg at bedtime  6.  Take Buspar 10 mg cristiane daily for anxiety  7. Follow up again in 4 weeks for medication review and evaluation.      We have discussed his/her diagnosis, prognosis, differential diagnosis and side effects and benefits of medications.     Patient and I revieweddiagnosis and treatment plan and patient agrees with following recommendations:    1.Patient will take the medications as prescribed. Patient  will not stop taking medications or adjust them without consulting with theprovider.  2.Patient will call with any problems between 2 visits.  3.Patient will go to the emergency room if not feeling safe , unable to function in the community, or if suicidal, homicidal or hearing voices orhaving paranoia.  4.Patient will abstain from drugs and alcohol.  5.Patient will not drive if sedated on medications or under influence of any substance. 6.Patient will not mix psychiatric medications with drugs andalcohol.   7.Patient will watch his diet and exercise.  8.Patient will see non psychiatric providers for non psychiatric disorders.      Risk Assessment:     Deanne has notable risk factors for self-harm, including, single status, anxiety and passive SI. However, risk is mitigated by ability to volunteer a safety plan and history of seeking help when needed. Additional steps taken to minimize risk include making medication adjustment, asking patient to call 911 and go to the ER if not able to stay safe at home,  Therefore, based on all available evidence including the factors cited above, Deanne does not appear to be an imminent danger to self or others and does not meet criteria 72 hour hold. However, if patient uses substances or is non-adherent with medication, their risk of decompensation and SI/HI will be elevated. This was discussed with the patient as she verbalized good understanding.   CONSULTS/REFERRALS:   Recommend therapy. Referral placed for [facility].  Call [facility] at [phone] if you do not hear from them soon  Coordinate care with therapist as needed    MEDICAL:   None at this time  Coordinate care with PCP (Crista Elder) as needed  Follow up with primary care provider as planned or for acute medical concerns.    PSYCHOEDUCATION:  Medication side effects and alternatives reviewed. Health promotion activities recommended and reviewed today. All questions addressed. Education and counseling  completed regarding risks and benefits of medications and psychotherapy options.  Consent provided by patient/guardian  Call the psychiatric nurse line with medication questions or concerns at [phone].  MyChart may be used to communicate with your provider, but this is not intended to be used for emergencies.  BLACK BOX WARNING: Discussed the Food and Drug Administration (FDA) requires that all antidepressants carry a warning that some children, adolescents and young adults may be at increased risk of suicide when taking antidepressants. Anyone taking an antidepressant should be watched closely for worsening depression or unusual behavior especially in the first few weeks after starting an SSRI. Keep in mind, antidepressants are more likely to reduce suicide risk in the long run by improving mood.   SEROTONIN SYNDROME:  Discussed risks of Serotonin syndrome (ie, serotonin toxicity) which is a potentially life-threatening condition associated with increased serotonergic activity in the central nervous system (CNS). It is seen with therapeutic medication use, inadvertent interactions between drugs, and intentional self-poisoning. Serotonin syndrome may involve a spectrum of clinical findings, which often include mental status changes, autonomic hyperactivity, and neuromuscular abnormalities.    BENZODIAZEPINE:  discussion on how benzos work and the need to use them short term due to potential of anxiety getting.  This is a controlled substance with risk for abuse, need to keep in a safe keep place and cannot replace lost scripts.    HARM REDUCTION:  Discussions regarding effects of mood altering substances, alcohol and cannabis, on mood and that approach is harm reduction, will continue to prescribe meds as they work to cut back use.    FIRST GENERATION ANTIPSYCHOTIC/ SECOND GENERATION ANTIPSYCHOTIC USE:  Atypical need for cardiometabolic monitoring with medication- B/P, weight, blood sugar, cholesterol.  Need to  monitor for abnormal movements taught  SAFETY:  We all care about your loved one's safety. To reduce the risk of self-harm, remove access to all:  Firearms, Medicines (both prescribed and over-the-counter), Knives and other sharp objects, Ropes and like materials, and Alcohol  SLEEP HYGIENE: establish a sleep routine, limit screen time 1 hour prior to bed, use bed for sleep only, take sleep/medications on time (including sleepy time tea, trazadone or herbal treatments such as melatonin), aroma therapy, limit caffeine/sugar, yoga, guided imagery, stretch, meditation, limit naps to 20 minutes, make a temperature change in the room, white noise, be mindful of slowing down breathing, take a warm bath/shower, frequently wash sheets, and journaling.   Medlineplus.gov is information for patients.  It is run by the [employer] and it contains information about all disorders, diseases and all medications.      COMMUNITY RESOURCES:    CRISIS NUMBERS: Provided in AVS 2/3/2023  National Suicide Prevention Lifeline: 8-763-642-TALK (855-995-5563)  [employer]/resources for a list of additional resources (SOS)            [facility] - [phone]   Urgent Care Adult Mental [place]-[phone] mobile unit/ 24/7 crisis line  [facility] -[phone]   COPE 24/7 [place] Mobile Team -[phone] (adults)/ [phone] (child)  Poison Control Center - 1-980.406.4039    OR  go to nearest ER  Crisis Text Line for any crisis 24/7 send this-   To: 079861   [facility] ([facility]  [phone]  National Suicide Prevention Lifeline: 503.956.4142 (TTY: 843.839.7279). Call anytime for help.  (www.suicidepreventionlifeline.org)  National [place] on Mental Illness (www.ell[place].org): 974.293.4341 or 297-489-2688.   Mental Health Association (www.mentalhealth.org): 475.706.3454 or 707-494-3715.  [place] Crisis Text Line: Text [place] to 246590  Suicide LifeLine Chat:  suicidepreventionlifeline.org/ibabybox    ADMINISTRATIVE BILLIN min spent interviewing patient, reviewing referral documents, obtaining and reviewing outside records, communication with other health specialists, and preparing this report on today's date: 2/3/2023  Video/Phone Start Time: 1:06 pm  Video/Phone End Time:  1:32 pm      Signed:     Ezekiel Cheng, MSN, APRN, PMHNP-BC     Chart documentation done in part with Dragon Voice Recognition software.  Although reviewed after completion, some word and grammatical errors may remain.  Answers for HPI/ROS submitted by the patient on 2022  If you checked off any problems, how difficult have these problems made it for you to do your work, take care of things at home, or get along with other people?: Somewhat difficult  PHQ9 TOTAL SCORE: 7  DONELL 7 TOTAL SCORE: 13    Answers for HPI/ROS submitted by the patient on 2/3/2023  If you checked off any problems, how difficult have these problems made it for you to do your work, take care of things at home, or get along with other people?: Very difficult  PHQ9 TOTAL SCORE: 3  DONELL 7 TOTAL SCORE: 6

## 2023-02-03 NOTE — PROGRESS NOTES
Ilsa Yusuf is a 62 year old who is being evaluated via a billable video visit.      Pt will join video visit via: [employer]  If there are problems joining the visit, send backup video invite via: Text to preferred phone: [phone]    Reason for telehealth visit: Patient has requested telehealth visit    Originating location (patient location): Patient's home    Will anyone else be joining the visit? No      Answers for HPI/ROS submitted by the patient on 2/3/2023  If you checked off any problems, how difficult have these problems made it for you to do your work, take care of things at home, or get along with other people?: Very difficult  PHQ9 TOTAL SCORE: 3  DONELL 7 TOTAL SCORE: 6

## 2023-02-03 NOTE — PATIENT INSTRUCTIONS
PLAN AND RECOMMENDATIONS:    1.  Continue to take Wellbutrin  mg every morning .  2.  Continue to take trazodone 50 mg 1 to 3 tablets ( mg) at bedtime as needed  3.  Continue to take Lexapro 15 mg every day  4.  Take hydroxyzine 25 mg instead of 3 times daily as needed for anxiety   5.  Continue to take prazosin 5 mg at bedtime  6.  Take Buspar 10 mg cristiane daily for anxiety  7. Follow up again in 4 weeks for medication review and evaluation.      We have discussed his/her diagnosis, prognosis, differential diagnosis and side effects and benefits of medications.     Patient and I revieweddiagnosis and treatment plan and patient agrees with following recommendations:    1.Patient will take the medications as prescribed. Patient will not stop taking medications or adjust them without consulting with theprovider.  2.Patient will call with any problems between 2 visits.  3.Patient will go to the emergency room if not feeling safe , unable to function in the community, or if suicidal, homicidal or hearing voices orhaving paranoia.  4.Patient will abstain from drugs and alcohol.  5.Patient will not drive if sedated on medications or under influence of any substance. 6.Patient will not mix psychiatric medications with drugs andalcohol.   7.Patient will watch his diet and exercise.  8.Patient will see non psychiatric providers for non psychiatric disorders.

## 2023-02-21 ENCOUNTER — PATIENT OUTREACH (OUTPATIENT)
Dept: CARE COORDINATION | Facility: CLINIC | Age: 63
End: 2023-02-21

## 2023-02-21 NOTE — PROGRESS NOTES
Clinic Care Coordination Contact  [facility]/Voicemail       Clinical Data: Care Coordinator Outreach  Outreach attempted    ARABELLA RENNER briefly spoke to pt who reports things are going well and she has a new apartment and she feels really good about it. Pt reported she was almost home and would have to call ARABELLA RENNER back to talk more.    Plan: Care Coordinator will wait for call back     Clinic Care Coordination Contact  [facility]/Voicemail       Clinical Data: Care Coordinator Outreach  Outreach attempted x 1.  Left message on patient's voicemail with call back information and requested return call. Pt did not return call and ARABELLA RENNER attempted to get in contact with pt again.   Plan: Care Coordinator will try to reach patient again in 10 business days.    ALEXI Paiz? Social Work Care Coordinator   [facility]  [email or web address]? [email or web address]    Phone: [phone]  she/her

## 2023-03-01 ENCOUNTER — TRANSFERRED RECORDS (OUTPATIENT)
Dept: HEALTH INFORMATION MANAGEMENT | Facility: CLINIC | Age: 63
End: 2023-03-01

## 2023-03-03 ENCOUNTER — VIRTUAL VISIT (OUTPATIENT)
Dept: PSYCHIATRY | Facility: CLINIC | Age: 63
End: 2023-03-03
Payer: COMMERCIAL

## 2023-03-03 DIAGNOSIS — F33.1 MAJOR DEPRESSIVE DISORDER, RECURRENT EPISODE, MODERATE (H): Primary | ICD-10-CM

## 2023-03-03 DIAGNOSIS — F41.1 GAD (GENERALIZED ANXIETY DISORDER): ICD-10-CM

## 2023-03-03 DIAGNOSIS — F43.10 PTSD (POST-TRAUMATIC STRESS DISORDER): ICD-10-CM

## 2023-03-03 PROCEDURE — 99214 OFFICE O/P EST MOD 30 MIN: CPT | Mod: VID | Performed by: NURSE PRACTITIONER

## 2023-03-03 RX ORDER — BUSPIRONE HYDROCHLORIDE 10 MG/1
20 TABLET ORAL 2 TIMES DAILY
Qty: 120 TABLET | Refills: 1 | Status: SHIPPED | OUTPATIENT
Start: 2023-03-03 | End: 2023-05-30

## 2023-03-03 RX ORDER — ESCITALOPRAM OXALATE 10 MG/1
20 TABLET ORAL DAILY
Qty: 90 TABLET | Refills: 1 | Status: SHIPPED | OUTPATIENT
Start: 2023-03-03 | End: 2023-10-12

## 2023-03-03 RX ORDER — HYDROXYZINE HYDROCHLORIDE 25 MG/1
25 TABLET, FILM COATED ORAL 3 TIMES DAILY PRN
Qty: 90 TABLET | Refills: 1 | Status: SHIPPED | OUTPATIENT
Start: 2023-03-03 | End: 2023-04-14

## 2023-03-03 ASSESSMENT — ANXIETY QUESTIONNAIRES
8. IF YOU CHECKED OFF ANY PROBLEMS, HOW DIFFICULT HAVE THESE MADE IT FOR YOU TO DO YOUR WORK, TAKE CARE OF THINGS AT HOME, OR GET ALONG WITH OTHER PEOPLE?: VERY DIFFICULT
6. BECOMING EASILY ANNOYED OR IRRITABLE: SEVERAL DAYS
GAD7 TOTAL SCORE: 7
GAD7 TOTAL SCORE: 7
1. FEELING NERVOUS, ANXIOUS, OR ON EDGE: MORE THAN HALF THE DAYS
4. TROUBLE RELAXING: MORE THAN HALF THE DAYS
7. FEELING AFRAID AS IF SOMETHING AWFUL MIGHT HAPPEN: NOT AT ALL
GAD7 TOTAL SCORE: 7
2. NOT BEING ABLE TO STOP OR CONTROL WORRYING: SEVERAL DAYS
3. WORRYING TOO MUCH ABOUT DIFFERENT THINGS: SEVERAL DAYS
IF YOU CHECKED OFF ANY PROBLEMS ON THIS QUESTIONNAIRE, HOW DIFFICULT HAVE THESE PROBLEMS MADE IT FOR YOU TO DO YOUR WORK, TAKE CARE OF THINGS AT HOME, OR GET ALONG WITH OTHER PEOPLE: VERY DIFFICULT
7. FEELING AFRAID AS IF SOMETHING AWFUL MIGHT HAPPEN: NOT AT ALL
5. BEING SO RESTLESS THAT IT IS HARD TO SIT STILL: NOT AT ALL

## 2023-03-03 ASSESSMENT — PATIENT HEALTH QUESTIONNAIRE - PHQ9
10. IF YOU CHECKED OFF ANY PROBLEMS, HOW DIFFICULT HAVE THESE PROBLEMS MADE IT FOR YOU TO DO YOUR WORK, TAKE CARE OF THINGS AT HOME, OR GET ALONG WITH OTHER PEOPLE: VERY DIFFICULT
SUM OF ALL RESPONSES TO PHQ QUESTIONS 1-9: 7
SUM OF ALL RESPONSES TO PHQ QUESTIONS 1-9: 7

## 2023-03-03 NOTE — PATIENT INSTRUCTIONS
PLAN AND RECOMMENDATIONS:    1.  Continue to take Wellbutrin  mg every morning .  2.  Continue to take trazodone 50 mg 1 to 3 tablets ( mg) at bedtime as needed  3.  Take Lexapro 20 mg every day  4.  Take hydroxyzine 25 mg instead of 3 times daily as needed for anxiety   5.  Continue to take prazosin 5 mg at bedtime  6.  Take Buspar 20 mg twice daily for anxiety  7. Follow up again in 6 weeks for medication review and evaluation.      We have discussed his/her diagnosis, prognosis, differential diagnosis and side effects and benefits of medications.     Patient and I revieweddiagnosis and treatment plan and patient agrees with following recommendations:    1.Patient will take the medications as prescribed. Patient will not stop taking medications or adjust them without consulting with theprovider.  2.Patient will call with any problems between 2 visits.  3.Patient will go to the emergency room if not feeling safe , unable to function in the community, or if suicidal, homicidal or hearing voices orhaving paranoia.  4.Patient will abstain from drugs and alcohol.  5.Patient will not drive if sedated on medications or under influence of any substance. 6.Patient will not mix psychiatric medications with drugs andalcohol.   7.Patient will watch his diet and exercise.  8.Patient will see non psychiatric providers for non psychiatric disorders.

## 2023-03-09 ENCOUNTER — PATIENT OUTREACH (OUTPATIENT)
Dept: CARE COORDINATION | Facility: CLINIC | Age: 63
End: 2023-03-09
Payer: MEDICARE

## 2023-03-09 NOTE — PROGRESS NOTES
Clinic Care Coordination Contact    Assessment: Care Coordinator contacted patient for 2 month follow up.  Patient has continued to follow the plan of care and assessment is negative for any new needs or concerns.    Enrollment status: Graduated.      Plan: No further outreaches at this time.  Patient will continue to follow the plan of care.  If new needs arise a new Care Coordination referral may be placed.  FYI to PCP    ALEXI Paiz? Social Work Care Coordinator   [facility]  [email or web address]? [email or web address]    Phone: [phone]  she/her

## 2023-03-21 ENCOUNTER — ANCILLARY PROCEDURE (OUTPATIENT)
Dept: MAMMOGRAPHY | Facility: CLINIC | Age: 63
End: 2023-03-21
Attending: FAMILY MEDICINE
Payer: COMMERCIAL

## 2023-03-21 DIAGNOSIS — Z12.31 VISIT FOR SCREENING MAMMOGRAM: ICD-10-CM

## 2023-03-21 DIAGNOSIS — Z12.31 ENCOUNTER FOR SCREENING MAMMOGRAM FOR BREAST CANCER: ICD-10-CM

## 2023-03-21 PROCEDURE — 77063 BREAST TOMOSYNTHESIS BI: CPT | Mod: TC | Performed by: STUDENT IN AN ORGANIZED HEALTH CARE EDUCATION/TRAINING PROGRAM

## 2023-03-21 PROCEDURE — 77067 SCR MAMMO BI INCL CAD: CPT | Mod: TC | Performed by: STUDENT IN AN ORGANIZED HEALTH CARE EDUCATION/TRAINING PROGRAM

## 2023-03-22 NOTE — RESULT ENCOUNTER NOTE
Dear Deanne Nicolas is out of the office and I am reviewing your results.   Your mammogram was normal.  Please call or MyChart my office with any questions or concerns.   Vee Tao, PAC

## 2023-03-30 ENCOUNTER — TRANSFERRED RECORDS (OUTPATIENT)
Dept: HEALTH INFORMATION MANAGEMENT | Facility: CLINIC | Age: 63
End: 2023-03-30

## 2023-04-05 DIAGNOSIS — E78.5 HYPERLIPIDEMIA LDL GOAL <130: ICD-10-CM

## 2023-04-05 DIAGNOSIS — G47.00 INSOMNIA, UNSPECIFIED TYPE: ICD-10-CM

## 2023-04-09 RX ORDER — TRAZODONE HYDROCHLORIDE 50 MG/1
TABLET, FILM COATED ORAL
Qty: 90 TABLET | Refills: 0 | OUTPATIENT
Start: 2023-04-09

## 2023-04-09 RX ORDER — LOVASTATIN 20 MG
TABLET ORAL
Qty: 90 TABLET | Refills: 2 | Status: SHIPPED | OUTPATIENT
Start: 2023-04-09 | End: 2023-10-30

## 2023-04-14 ENCOUNTER — VIRTUAL VISIT (OUTPATIENT)
Dept: PSYCHIATRY | Facility: CLINIC | Age: 63
End: 2023-04-14
Payer: COMMERCIAL

## 2023-04-14 DIAGNOSIS — F43.10 PTSD (POST-TRAUMATIC STRESS DISORDER): ICD-10-CM

## 2023-04-14 DIAGNOSIS — F33.1 MAJOR DEPRESSIVE DISORDER, RECURRENT EPISODE, MODERATE (H): ICD-10-CM

## 2023-04-14 DIAGNOSIS — F41.1 GAD (GENERALIZED ANXIETY DISORDER): Primary | ICD-10-CM

## 2023-04-14 PROCEDURE — 99214 OFFICE O/P EST MOD 30 MIN: CPT | Mod: VID | Performed by: NURSE PRACTITIONER

## 2023-04-14 RX ORDER — HYDROXYZINE HYDROCHLORIDE 25 MG/1
25-50 TABLET, FILM COATED ORAL 3 TIMES DAILY PRN
Qty: 90 TABLET | Refills: 1 | Status: SHIPPED | OUTPATIENT
Start: 2023-04-14 | End: 2023-06-07

## 2023-04-14 ASSESSMENT — ANXIETY QUESTIONNAIRES
2. NOT BEING ABLE TO STOP OR CONTROL WORRYING: MORE THAN HALF THE DAYS
GAD7 TOTAL SCORE: 8
6. BECOMING EASILY ANNOYED OR IRRITABLE: SEVERAL DAYS
4. TROUBLE RELAXING: MORE THAN HALF THE DAYS
3. WORRYING TOO MUCH ABOUT DIFFERENT THINGS: MORE THAN HALF THE DAYS
1. FEELING NERVOUS, ANXIOUS, OR ON EDGE: SEVERAL DAYS
GAD7 TOTAL SCORE: 8
7. FEELING AFRAID AS IF SOMETHING AWFUL MIGHT HAPPEN: NOT AT ALL
GAD7 TOTAL SCORE: 8
5. BEING SO RESTLESS THAT IT IS HARD TO SIT STILL: NOT AT ALL
7. FEELING AFRAID AS IF SOMETHING AWFUL MIGHT HAPPEN: NOT AT ALL
IF YOU CHECKED OFF ANY PROBLEMS ON THIS QUESTIONNAIRE, HOW DIFFICULT HAVE THESE PROBLEMS MADE IT FOR YOU TO DO YOUR WORK, TAKE CARE OF THINGS AT HOME, OR GET ALONG WITH OTHER PEOPLE: SOMEWHAT DIFFICULT
8. IF YOU CHECKED OFF ANY PROBLEMS, HOW DIFFICULT HAVE THESE MADE IT FOR YOU TO DO YOUR WORK, TAKE CARE OF THINGS AT HOME, OR GET ALONG WITH OTHER PEOPLE?: SOMEWHAT DIFFICULT

## 2023-04-14 ASSESSMENT — PATIENT HEALTH QUESTIONNAIRE - PHQ9
SUM OF ALL RESPONSES TO PHQ QUESTIONS 1-9: 5
SUM OF ALL RESPONSES TO PHQ QUESTIONS 1-9: 5
10. IF YOU CHECKED OFF ANY PROBLEMS, HOW DIFFICULT HAVE THESE PROBLEMS MADE IT FOR YOU TO DO YOUR WORK, TAKE CARE OF THINGS AT HOME, OR GET ALONG WITH OTHER PEOPLE: SOMEWHAT DIFFICULT

## 2023-04-14 NOTE — PROGRESS NOTES
Virtual Visit Details    Type of service:  Video Visit     Colette Sidhu  VVF  Answers for HPI/ROS submitted by the patient on 4/14/2023  If you checked off any problems, how difficult have these problems made it for you to do your work, take care of things at home, or get along with other people?: Somewhat difficult  PHQ9 TOTAL SCORE: 5  DONELL 7 TOTAL SCORE: 8

## 2023-04-14 NOTE — NURSING NOTE
Is the patient currently in the state of [place]? YES    Visit mode:VIDEO    If the visit is dropped, the patient can be reconnected by: VIDEO VISIT: Text to cell phone:   Telephone Information:   Mobile [phone]       Will anyone else be joining the visit? No  (If patient encounters technical issues they should call [phone])    How would you like to obtain your AVS? MyChart    Are changes needed to the allergy or medication list? NO    Rooming Documentation: Care team has reviewed attendance agreement with patient. Patient advised that two failed appointments within 6 months may lead to termination of current episode of care.      Reason for visit:  Follow Up    SHIRLEY Okeefe

## 2023-04-14 NOTE — PATIENT INSTRUCTIONS
PLAN AND RECOMMENDATIONS:  Continue to take Wellbutrin  mg every morning .  Take Lexapro 20 mg every day  Take hydroxyzine 25 - 50 mg instead of 3 times daily as needed for anxiety   Continue to take prazosin 5 mg at bedtime  Continue Buspar 20 mg twice daily for anxiety   Follow up again in 6 weeks for medication review and evaluation.    We have discussed his/her diagnosis, prognosis, differential diagnosis and side effects and benefits of medications.     Patient and I revieweddiagnosis and treatment plan and patient agrees with following recommendations:    1.Patient will take the medications as prescribed. Patient will not stop taking medications or adjust them without consulting with theprovider.  2.Patient will call with any problems between 2 visits.  3.Patient will go to the emergency room if not feeling safe , unable to function in the community, or if suicidal, homicidal or hearing voices orhaving paranoia.  4.Patient will abstain from drugs and alcohol.  5.Patient will not drive if sedated on medications or under influence of any substance. 6.Patient will not mix psychiatric medications with drugs andalcohol.   7.Patient will watch his diet and exercise.  8.Patient will see non psychiatric providers for non psychiatric disorders.

## 2023-04-14 NOTE — PROGRESS NOTES
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 and benefits of medications and psychotherapy options.  Consent provided by patient/guardian  Call the psychiatric nurse line with medication questions or concerns at [phone].  [employer] may be used to communicate with your provider, but this is not intended to be used for emergencies.  BLACK BOX WARNING: Discussed the Food and Drug Administration (FDA) requires that all antidepressants carry a warning that some children, adolescents and young adults may be at increased risk of suicide when taking antidepressants. Anyone taking an antidepressant should be watched closely for worsening depression or unusual behavior especially in the first few weeks after starting an SSRI. Keep in mind, antidepressants are more likely to reduce suicide risk in the long run by improving mood.   SEROTONIN SYNDROME:  Discussed risks of Serotonin syndrome (ie, serotonin toxicity) which is a potentially life-threatening condition associated with increased serotonergic activity in the central nervous system (CNS). It is seen with therapeutic medication use, inadvertent interactions between drugs, and  intentional self-poisoning. Serotonin syndrome may involve a spectrum of clinical findings, which often include mental status changes, autonomic hyperactivity, and neuromuscular abnormalities.    BENZODIAZEPINE:  discussion on how benzos work and the need to use them short term due to potential of anxiety getting.  This is a controlled substance with risk for abuse, need to keep in a safe keep place and cannot replace lost scripts.    HARM REDUCTION:  Discussions regarding effects of mood altering substances, alcohol and cannabis, on mood and that approach is harm reduction, will continue to prescribe meds as they work to cut back use.    FIRST GENERATION ANTIPSYCHOTIC/ SECOND GENERATION ANTIPSYCHOTIC USE:  Atypical need for cardiometabolic monitoring with medication- B/P, weight, blood sugar, cholesterol.  Need to monitor for abnormal movements taught  SAFETY:  We all care about your loved one's safety. To reduce the risk of self-harm, remove access to all:  Firearms, Medicines (both prescribed and over-the-counter), Knives and other sharp objects, Ropes and like materials, and Alcohol  SLEEP HYGIENE: establish a sleep routine, limit screen time 1 hour prior to bed, use bed for sleep only, take sleep/medications on time (including sleepy time tea, trazadone or herbal treatments such as melatonin), aroma therapy, limit caffeine/sugar, yoga, guided imagery, stretch, meditation, limit naps to 20 minutes, make a temperature change in the room, white noise, be mindful of slowing down breathing, take a warm bath/shower, frequently wash sheets, and journaling.   Medlineplus.gov is information for patients.  It is run by the [employer] of Medicine and it contains information about all disorders, diseases and all medications.      COMMUNITY RESOURCES:    CRISIS NUMBERS: Provided in [facility] 4/14/2023  National Suicide Prevention Lifeline: 7-553-736-TALK (717-087-5019)  [employer]/resources for a list of  additional resources (SOS)            [facility] - [phone]   Urgent Care Adult [facility] [place]-[phone] mobile unit/  crisis line  [facility] -[phone]   COPE  [place] Mobile Team -[phone] (adults)/ [phone] (child)  Poison Control Center - 1-234.178.5123    OR  go to nearest ER  Crisis Text Line for any crisis  send this-   To: 708432   [facility] ([facility]) [facility]  [phone]  National Suicide Prevention Lifeline: 383.942.2865 (TTY: 914.702.4242). Call anytime for help.  (www.suicidepreventionlifeline.org)  National [place] [employer] Mental Illness (www.ellie.org): 399.600.1137 or 485-952-0635.   Mental Health Association (www.mentalhealth.org): 367.660.1461 or 373-813-0507.  [place] Crisis Text Line: Text [place] to 741366  Suicide LifeLine Chat: suicideTraditional Medicinals.org/chat    ADMINISTRATIVE BILLIN min spent interviewing patient, reviewing referral documents, obtaining and reviewing outside records, communication with other health specialists, and preparing this report on today's date: 2023  Video/Phone Start Time: 1:31 pm  Video/Phone End Time:  1: 50 pm       Signed:     Ezekiel Cheng, MSN, APRN, PMHNP-BC     Chart documentation done in part with Dragon Voice Recognition software.  Although reviewed after completion, some word and grammatical errors may remain.  Answers for HPI/ROS submitted by the patient on 2022  If you checked off any problems, how difficult have these problems made it for you to do your work, take care of things at home, or get along with other people?: Somewhat difficult  PHQ9 TOTAL SCORE: 7  DONELL 7 TOTAL SCORE: 13    Answers for HPI/ROS submitted by the patient on 2/3/2023  If you checked off any problems, how difficult have these problems made it for you to do your work, take care of things at home, or get along with other people?: Very difficult  PHQ9 TOTAL SCORE: 3  DONELL 7 TOTAL SCORE:  6  Answers for HPI/ROS submitted by the patient on 4/14/2023  If you checked off any problems, how difficult have these problems made it for you to do your work, take care of things at home, or get along with other people?: Somewhat difficult  PHQ9 TOTAL SCORE: 5  DONELL 7 TOTAL SCORE: 8

## 2023-04-21 DIAGNOSIS — E03.9 ACQUIRED HYPOTHYROIDISM: ICD-10-CM

## 2023-04-22 RX ORDER — LEVOTHYROXINE SODIUM 175 UG/1
175 TABLET ORAL DAILY
Qty: 30 TABLET | Refills: 0 | Status: SHIPPED | OUTPATIENT
Start: 2023-04-22 | End: 2023-06-07

## 2023-05-02 ENCOUNTER — TRANSFERRED RECORDS (OUTPATIENT)
Dept: HEALTH INFORMATION MANAGEMENT | Facility: CLINIC | Age: 63
End: 2023-05-02
Payer: MEDICARE

## 2023-05-15 ENCOUNTER — DOCUMENTATION ONLY (OUTPATIENT)
Dept: SLEEP MEDICINE | Facility: CLINIC | Age: 63
End: 2023-05-15
Payer: MEDICARE

## 2023-05-15 ENCOUNTER — TELEPHONE (OUTPATIENT)
Dept: SLEEP MEDICINE | Facility: CLINIC | Age: 63
End: 2023-05-15
Payer: MEDICARE

## 2023-05-15 DIAGNOSIS — G47.33 OBSTRUCTIVE SLEEP APNEA ON CPAP: Primary | ICD-10-CM

## 2023-05-15 NOTE — TELEPHONE ENCOUNTER
RETURNED PT CALL AND THE PT STATED SHE IS HAVING A HARD TIME REACHING [facility]. ASKED THE PT WHICH NUMBER SHE WAS CALLING AND SHE STATED THE MAIN NUMBER. INFORMED THE PT THAT IS CORRECT AND ALSO THAT THE PT HAS THE OPTION OF LVM TO HAVE A RETURN FOLLOW UP CALL. PT WAS NOT AWARE OF THIS OPTION. INFORM THE PT TO LISTEN TO THE PROMPTS AND SELECT OPTION TO LVM. PT EXPRESS UNDERSTANDING. ALSO INFORMED THE PT OF THE CONNECT AUTOMATED SYSTEM TO ORDER SUPPLIES AS WELL. CHECK PT HX AND INFORMED THE PT SHE IS SIGNED UP FOR THIS OPTION AND WAS EXPECTED TO HAVE A CALL ON 2023. PT STATED SHE DOES NOT ALWAYS HAVE HER PHONE ON HER WITH ALL THE RADIATION PUT OUT BY THE CELL PHONES. EXPRESS UNDERSTANDING BUT INFORMED THE PT IT WILL LVM AS WELL INFORMING THE PT OF THE MISS CALL. ALSO INFORMED THE PT THE RX ON FILE HAS  AND ARE ONLY VALID FOR ONE YEAR AT A TIME. LET THE PT KNOW I WILL PEND FOR AN UPDATED RX WITH HER PROVIDER DR YEN. ALSO INFORMED THE PT IF THE PROVIDER FAILS TO SIGN OFF PT WILL NEED A VISIT. ASKED THE PT WHAT SUPPLIES SHE NEEDED AND SHE STATED THE APPARATUS FOR THE MASK AS THE CURRENT MASK IS PULLING OUT HAIR. PT STATED SHE WOULD LIKE A MASK CONSULT APPT AT THE [facility]OW[facility]. SCHEDULED THE PT FOR A MASK CONSULT APPT AT THE [facility]OW[facility] 2023 1PM. CONFIRMED LOCATION TIME AND DATE WITH THE PT. ALSO INFORMED THE PT WE STILL CAN KEEP THE MASK CONSULT APPT IF PROVIDER DOES NOT SIGN BUT WILL NOT BE ABLE TO DISPENSE THE MASK. ALSO INFORMED THE PT SHE CAN RECEIVE ALL ELIGIBLE SUPPLIES AT THIS APPT AS WELL. PT DID NOT HAVE ANY OTHER QUESTIONS OR CONCERNS AT THIS TIME. INFORMED THE PT THAT I WILL INFORM IF ANY ISSUES WITH RX. PENDED RX TO DR YEN TO SIGN FOR UPCOMING APPT. PA FOR PAP SUPPLIES ON FILE VALID UNTIL 2023

## 2023-05-19 NOTE — PROGRESS NOTES
Virtual Visit Details  Type of service:  Video Visit   Video Start Time: 2:14am  Video End Time:3:04pm  Originating Location (pt. Location): Home  Distant Location (provider location):  Off-site  Platform used for Video Visit: Luna    5/22/2023    Referring Provider: Catrachita Nicolas MD    Present for Today's Visit: Ilsa    Presenting Information:   I met with Ilsa Yusuf today for genetic counseling (video visit due to covid19) to discuss her family history of cancer.  She is here today to review this history, cancer screening recommendations, and available genetic testing options.    Personal History:  Ilsa is a 62 year old female. She does not have any personal history of cancer.      She had her first menstrual period at age 11, her first child at age 28, and reports that she went through menopause in 2017. Ilsa has her ovaries, fallopian tubes and uterus in place.  She reports past history of oral contraceptive use for about 18-20 years and that she has never been on hormone replacement therapy, although she is considering it given her significant menopausal symptoms.      Her most recent mammogram in March 2023 was normal and her breast density was reported as scattered fibroglandular densities. Most recent colonoscopy in April 2021 resulted in the removal of one 2-3mm tubular adenoma and one 2-3mm hyperplastic polyp and follow-up was recommended in 5 years per her colonoscopy report. She does not regularly do any other cancer screening at this time.  Ilsa reported past tobacco use (quit in 2015), and no alcohol use.    Family History: Cancer family history and pertinent information reviewed below (Please see scanned pedigree for detailed family history information)    Sister, age 71, has a history of breast cancer diagnosed at age 63/64 as well as a melanoma on her forearm in her late 50's/early 60's.    There is no known Ashkenazi Mosque ancestry on either side of her family.  There is no reported consanguinity.    Discussion:    Based upon her current personal and family history, Ilsa is likely at low risk for a hereditary cancer syndrome.    We discussed the features commonly associated with hereditary cancer syndromes, and a handout regarding this was provided to Ilsa today. This can be found in the after visit summary.    As discussed in our session, her family history is absent for the following features typically seen in high risk families:     Cancers diagnosed at a young age (often before age 50)    Individuals with more than one primary cancer    Certain rare tumors/cancers    Multiple generations of the family affected with cancer    Because of the absence of these features, it is unlikely that there is a currently identifiable hereditary cancer syndrome present in Ilsa's family.      We discussed that insurance coverage for genetic testing is dependent upon personal and family history being suggestive of a hereditary cancer syndrome.    We discussed the importance of Ilsa contacting me if her personal or family history changes. This may modify our assessment regarding risk for a hereditary cancer syndrome and/or genetic testing options.     Screening recommendations based upon personal/family history:    Based on her personal and family history, Ilsa has a 12.0% lifetime risk of developing breast cancer based on the JOSETTE (v8) model. Therefore, Ilsa does not meet current National Comprehensive Cancer Network (NCCN) guidelines for high risk breast screening, which is offered to women with a 20% lifetime risk or higher. However, it is still important for Ilsa to continue with routine breast screening under the care of her physicians. Breast cancer screening is generally recommended to begin approximately 10 years younger than the earliest age of breast cancer diagnosis in the family, or at age 40, whichever comes first. Ilsa is encouraged to discuss breast  screening with her physicians.     Due to the close family history of melanoma, Ilsa remains at some increased risk for developing melanoma. According to the American Cancer Society, Ilsa is encouraged to speak to her primary care provider about having regular skin exams and safe skin practices (i.e. sunscreen, self skin exams, limited sun exposure, etc.).    Other population cancer screening options, such as those recommended by the American Cancer Society and the National Comprehensive Cancer Network (NCCN), are also appropriate for Ilsa and her family. These screening recommendations may change if there are changes to Ilsa's personal and/or family history. Final screening recommendations should be made by each individual's managing physician.    Plan:  1) Ilsa elected to not have genetic testing at this time.   2) Information regarding recommended screening, based upon the family history, was reviewed for Ilsa.  3) Ilsa will contact me regularly and/or if the family or personal history changes. My contact information was provided.       Autumn Weiner MS, [facility]  Licensed, Certified Genetic Counselor  [facility]  [email or web address]

## 2023-05-22 ENCOUNTER — VIRTUAL VISIT (OUTPATIENT)
Dept: ONCOLOGY | Facility: CLINIC | Age: 63
End: 2023-05-22
Attending: FAMILY MEDICINE
Payer: COMMERCIAL

## 2023-05-22 DIAGNOSIS — Z80.3 FAMILY HISTORY OF MALIGNANT NEOPLASM OF BREAST: Primary | ICD-10-CM

## 2023-05-22 DIAGNOSIS — Z80.8 FAMILY HISTORY OF MELANOMA: ICD-10-CM

## 2023-05-22 PROCEDURE — 96040 HC GENETIC COUNSELING, EACH 30 MINUTES: CPT | Mod: GT,95 | Performed by: GENETIC COUNSELOR, MS

## 2023-05-22 NOTE — NURSING NOTE
Is the patient currently in the state of [place]? YES    Visit mode:VIDEO    If the visit is dropped, the patient can be reconnected by: VIDEO VISIT: Send to e-mail at: [email or web address]    Will anyone else be joining the visit? NO      How would you like to obtain your AVS? MyChart    Are changes needed to the allergy or medication list? NO    Reason for visit: Consult    Candice LEES

## 2023-05-22 NOTE — LETTER
5/22/2023         RE: Ilsa Yusuf  [address] [place] [address]        Dear Colleague,    Thank you for referring your patient, Ilsa Yusuf, to the [facility]. Please see a copy of my visit note below.    Virtual Visit Details  Type of service:  Video Visit   Video Start Time: 2:14am  Video End Time:3:04pm  Originating Location (pt. Location): Home  Distant Location (provider location):  Off-site  Platform used for Video Visit: [facility]    5/22/2023    Referring Provider: Catrachita Nicolas MD    Present for Today's Visit: Ilsa    Presenting Information:   I met with Ilsa Yusuf today for genetic counseling (video visit due to covid19) to discuss her family history of cancer.  She is here today to review this history, cancer screening recommendations, and available genetic testing options.    Personal History:  Ilsa is a 62 year old female. She does not have any personal history of cancer.      She had her first menstrual period at age 11, her first child at age 28, and reports that she went through menopause in 2017. Ilas has her ovaries, fallopian tubes and uterus in place.  She reports past history of oral contraceptive use for about 18-20 years and that she has never been on hormone replacement therapy, although she is considering it given her significant menopausal symptoms.      Her most recent mammogram in March 2023 was normal and her breast density was reported as scattered fibroglandular densities. Most recent colonoscopy in April 2021 resulted in the removal of one 2-3mm tubular adenoma and one 2-3mm hyperplastic polyp and follow-up was recommended in 5 years per her colonoscopy report. She does not regularly do any other cancer screening at this time.  Ilsa reported past tobacco use (quit in 2015), and no alcohol use.    Family History: Cancer family history and pertinent information reviewed below (Please see scanned  pedigree for detailed family history information)  Sister, age 71, has a history of breast cancer diagnosed at age 63/64 as well as a melanoma on her forearm in her late 50's/early 60's.  There is no known Ashkenazi Taoist ancestry on either side of her family. There is no reported consanguinity.    Discussion:  Based upon her current personal and family history, Ilsa is likely at low risk for a hereditary cancer syndrome.  We discussed the features commonly associated with hereditary cancer syndromes, and a handout regarding this was provided to Ilsa today. This can be found in the after visit summary.  As discussed in our session, her family history is absent for the following features typically seen in high risk families:   Cancers diagnosed at a young age (often before age 50)  Individuals with more than one primary cancer  Certain rare tumors/cancers  Multiple generations of the family affected with cancer  Because of the absence of these features, it is unlikely that there is a currently identifiable hereditary cancer syndrome present in Ilsa's family.    We discussed that insurance coverage for genetic testing is dependent upon personal and family history being suggestive of a hereditary cancer syndrome.  We discussed the importance of Ilsa contacting me if her personal or family history changes. This may modify our assessment regarding risk for a hereditary cancer syndrome and/or genetic testing options.   Screening recommendations based upon personal/family history:  Based on her personal and family history, Ilsa has a 12.0% lifetime risk of developing breast cancer based on the JOSETTE (v8) model. Therefore, Ilsa does not meet current National Comprehensive Cancer Network (NCCN) guidelines for high risk breast screening, which is offered to women with a 20% lifetime risk or higher. However, it is still important for Ilsa to continue with routine breast screening under the care of her physicians.  Breast cancer screening is generally recommended to begin approximately 10 years younger than the earliest age of breast cancer diagnosis in the family, or at age 40, whichever comes first. Ilsa is encouraged to discuss breast screening with her physicians.   Due to the close family history of melanoma, Ilsa remains at some increased risk for developing melanoma. According to the American Cancer Society, Ilsa is encouraged to speak to her primary care provider about having regular skin exams and safe skin practices (i.e. sunscreen, self skin exams, limited sun exposure, etc.).  Other population cancer screening options, such as those recommended by the American Cancer Society and the National Comprehensive Cancer Network (NCCN), are also appropriate for Ilsa and her family. These screening recommendations may change if there are changes to Ilsa's personal and/or family history. Final screening recommendations should be made by each individual's managing physician.    Plan:  1) Ilsa elected to not have genetic testing at this time.   2) Information regarding recommended screening, based upon the family history, was reviewed for Ilsa.  3) Ilsa will contact me regularly and/or if the family or personal history changes. My contact information was provided.       Autumn Weiner MS, [facility]  Licensed, Certified Genetic Counselor  [facility]  [email or web address]

## 2023-05-22 NOTE — PATIENT INSTRUCTIONS
Assessing Cancer Risk  Cancer is a common diagnosis which impacts many families. Individuals may develop cancer due to environmental factors (such as exposures and lifestyle), aging, genetic predisposition, or a combination of these factors. The vast majority of cancer diagnoses are considered sporadic, and not primarily due to an inherited risk factor. Approximately 5-10% of cancer diagnoses are thought to be caused by inherited risk factors.       There are several features that are likely to be present in a family that has an inherited alteration in a cancer susceptibility gene. However, this may not be the case for all families that carry a cancer risk gene, such as those with small family size or limited history information.  Cancers diagnosed at a young age (often before age 50)  Individuals with more than one primary cancer  Certain rare tumors/cancers  Multiple generations of the family affected with cancer    Categories of Cancer        Cancers may be:  Sporadic: somatic (acquired) genetic errors, environmental exposures, and lifestyle contribute to cancer as we age.  Familial: clustering of cancer in a family due to a combination of multiple genetic and shared environmental factors.  Hereditary: inherited risk for cancer, typically in a single gene.      Cancer Screening and Management  Cancer risk, as well as screening and management recommendations, are based on a combination of factors. This includes both personal and family history factors. Population cancer screening options, such as those recommended by the American Cancer Society and the National Comprehensive Cancer Network (NCCN), are appropriate for many families at average risk for cancer. However, earlier and/or more frequent screening may be recommended based on personal factors (lifestyle, exposures, medications, screening results), family history of cancer, and sometimes genetic factors. These cancer risk management options should be  discussed in more detail with an individual's medical providers.    Resources  National Society of Genetic Counselors nsgc.org   American Cancer Society cancer.org     Please call us if you have any questions or concerns.   Cancer Risk Management Program (Appointments: [phone])

## 2023-05-23 ENCOUNTER — OFFICE VISIT (OUTPATIENT)
Dept: UROLOGY | Facility: CLINIC | Age: 63
End: 2023-05-23
Payer: COMMERCIAL

## 2023-05-23 VITALS
HEART RATE: 72 BPM | BODY MASS INDEX: 30.07 KG/M2 | OXYGEN SATURATION: 96 % | HEIGHT: 69 IN | WEIGHT: 203 LBS | DIASTOLIC BLOOD PRESSURE: 77 MMHG | SYSTOLIC BLOOD PRESSURE: 115 MMHG

## 2023-05-23 DIAGNOSIS — Z87.440 HISTORY OF UTI: Primary | ICD-10-CM

## 2023-05-23 DIAGNOSIS — N32.81 URGENCY-FREQUENCY SYNDROME: ICD-10-CM

## 2023-05-23 DIAGNOSIS — R39.89 BLADDER PAIN: ICD-10-CM

## 2023-05-23 DIAGNOSIS — R30.0 DYSURIA: ICD-10-CM

## 2023-05-23 PROCEDURE — 51798 US URINE CAPACITY MEASURE: CPT | Performed by: PHYSICIAN ASSISTANT

## 2023-05-23 PROCEDURE — 99214 OFFICE O/P EST MOD 30 MIN: CPT | Mod: 25 | Performed by: PHYSICIAN ASSISTANT

## 2023-05-23 PROCEDURE — 81003 URINALYSIS AUTO W/O SCOPE: CPT | Performed by: PHYSICIAN ASSISTANT

## 2023-05-23 ASSESSMENT — PAIN SCALES - GENERAL: PAINLEVEL: MODERATE PAIN (5)

## 2023-05-23 NOTE — NURSING NOTE
Chief Complaint   Patient presents with     History of UTI     Patient today because of Frequency       Patient is here  today for Frequency  Patient state she not doing well with voiding.  Patient state she get up about 4 time at night  PVR by bladder Scan  PVR 32 ml        Viktoriya Ro, A

## 2023-05-23 NOTE — LETTER
"5/23/2023       RE: Ilsa Yusuf  [address] [facility] [address]     Dear Colleague,    Thank you for referring your patient, Ilsa Yusuf, to the [facility] at [facility]. Please see a copy of my visit note below.    Urology Clinic      Name: Ilsa Yusuf    MRN: [id number]   YOB: 1960  Accompanied at today's visit by:self               Chief Complaint:   Burning of bladder           History of Present Illness:   May 23, 2023    HISTORY:   We have been following 62 year old Ilsa Yusuf for hx of UTI, dysuria, urinary freqeuncy/urgency, suprapubic abdominal pain.  She underwent cysto under anesthesia on 11/1/22, and was unremarkable. Was referred to PFPT. Of note, has been on trospium in the past per chart review. Bladder burning has improved some with PFPT, though overall has not noticed much improvement. Voiding every 2 hours during the day. However when she goes to void, will void every 10minutes for a half hour following. Drinks green tea with scant amount of sugar. Voiding 3-4x/night. Hx of ABDOUL. Planning got get new CPAP in June 2023. AZO has helped some in the past. Patient states that the physical therapist told her that her pelvic floor was tight and was taught relaxation techniques to help with voiding. States she had reflux as a child and underwent transposition of one of her ureters and is concerned of her most recent CT imaging showing scaring of cortex. Patient wanting to know if she has reflux and would like further investigation on this. Of note, she also was noted to have renal stones on most recent CT. Patient unsure if has UTI today. Patient voices no other concerns at this time.            Allergies:     Allergies   Allergen Reactions    Gabapentin Other (See Comments)     Patient states she gets \"horrific nightmares\" with this medication    Dilaudid [Hydromorphone " "Hcl]      Made her feel like her skin was crawling    Nsaids Other (See Comments)     Kidney failure    Compazine [Prochlorperazine] Other (See Comments)     \"Makes my skin crawl, I was going nuts.\"            Medications:     Current Outpatient Medications   Medication Sig    buPROPion (WELLBUTRIN SR) 200 MG 12 hr tablet Take 1 tablet (200 mg) by mouth every morning    busPIRone (BUSPAR) 10 MG tablet Take 2 tablets (20 mg) by mouth 2 times daily    cholecalciferol (VITAMIN D3) 5000 UNITS CAPS capsule Take 1 capsule (5,000 Units) by mouth daily Take 1 per day (Patient taking differently: Take 5,000 Units by mouth At Bedtime Take 1 per day)    escitalopram (LEXAPRO) 10 MG tablet Take 2 tablets (20 mg) by mouth daily    fluticasone (FLONASE) 50 MCG/ACT nasal spray SHAKE LIQUID AND USE 2 SPRAYS IN EACH NOSTRIL TWICE DAILY    hydrOXYzine (ATARAX) 25 MG tablet Take 1-2 tablets (25-50 mg) by mouth 3 times daily as needed for anxiety    levothyroxine (SYNTHROID/LEVOTHROID) 175 MCG tablet Take 1 tablet (175 mcg) by mouth daily 5 days per week and 1/2 tab 1 day per week.   +++ appointment needed for additional refills +++    lovastatin (MEVACOR) 20 MG tablet TAKE 1 TABLET(20 MG) BY MOUTH AT BEDTIME    multivitamin w/minerals (THERA-VIT-M) tablet Take 1 tablet by mouth daily (with lunch)    Omega-3 Fatty Acids (OMEGA 3 PO) Take 2 g by mouth 2 times daily Takes 1 in am and 1 at bedtime    omeprazole (PRILOSEC) 40 MG DR capsule TAKE 1 CAPSULE(40 MG) BY MOUTH DAILY    oxyCODONE IR (ROXICODONE) 10 MG tablet as needed    prazosin (MINIPRESS) 5 MG capsule TAKE 1 CAPSULE(5 MG) BY MOUTH AT BEDTIME    tiZANidine (ZANAFLEX) 4 MG tablet Take 1-3 tablets (4-12 mg) by mouth 3 times daily as needed for muscle spasms    UNABLE TO FIND MEDICATION NAME: medical cannibus    albuterol (PROAIR HFA/PROVENTIL HFA/VENTOLIN HFA) 108 (90 Base) MCG/ACT inhaler Inhale 1-2 puffs into the lungs every 4 hours as needed for shortness of breath / dyspnea or " "wheezing (Patient not taking: Reported on 5/23/2023)    triamcinolone (ARISTOCORT HP) 0.5 % external cream Apply topically 2 times daily (Patient not taking: Reported on 5/23/2023)     No current facility-administered medications for this visit.            Past  Surgical History:     Past Surgical History:   Procedure Laterality Date    ABDOMEN SURGERY      BLADDER SURGERY      CHOLECYSTECTOMY      COLONOSCOPY  2012    CYSTOSCOPY      CYSTOSCOPY, BIOPSY BLADDER, COMBINED N/A 11/1/2022    Procedure: EXAM UNDER ANESTHESIA, CYSTOSCOPY;  Surgeon: Terrie Melton MD;  Location: [facility] OR    ORTHOPEDIC SURGERY  left knee    renal artery surgery  child    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    TONSILLECTOMY      ureteral reimplantation      ZZC PARTIAL EXCISION THYROID,UNILAT  1991    rt, negative path then, treated with synthroid.             Physical Exam:     Vitals:    05/23/23 1109   BP: 115/77   Pulse: 72   SpO2: 96%   Weight: 92.1 kg (203 lb)   Height: 1.74 m (5' 8.5\")     PSYCH: NAD  EYES: EOMI  NEURO: AAO x3    LABS:   PVR 32mL    UA RESULTS:  Recent Labs   Lab Test 05/23/23  1102 08/05/22  0945 06/28/22  1118   COLOR Yellow   < > Light Yellow   APPEARANCE Clear   < > Clear   URINEGLC Negative   < > Negative   URINEBILI Negative   < > Negative   URINEKETONE Negative   < > Negative   SG 1.020   < > 1.010   UBLD Negative   < > Negative   URINEPH 6.0   < > 7.5*   PROTEIN Negative   < > Negative   UROBILINOGEN 0.2   < >  --    NITRITE Negative   < > Negative   LEUKEST Negative   < > Trace*   RBCU  --   --  <1   WBCU  --   --  1    < > = values in this interval not displayed.       Creatinine   Date Value Ref Range Status   12/14/2022 0.99 0.52 - 1.04 mg/dL Final   01/04/2021 0.90 0.52 - 1.04 mg/dL Final     Cysto from 10/2022   A 22-[place] Lucian rigid cystoscope was inserted into a well lubricated urethra. We began by performing a white light cystourethroscopy. The urethra was unremarkable. The ureteral " orifices were in their orthotopic positions bilaterally and briskly effluxing. The bladder was diffusely vascular. There were no intravesical stones or diverticuli and the bladder was not trabeculated. The bladder was drained and the cystoscope withdrawn.    CT abdomen 6/28/22  FINDINGS:     Lower thorax:   Bibasilar dependent atelectasis.     Abdomen and pelvis:  Hepatomegaly with patchy areas of hypoattenuating liver parenchyma,  suggestive of hepatic steatosis. Mild intrahepatic biliary dilatation,  likely secondary to reservoir effect from cholecystectomy. Mild fatty  atrophy of the pancreas. No pancreatic ductal dilatation. The spleen  and adrenal glands are normal in appearance. Symmetric renal cortical  enhancement with unchanged scarring along the lower poles of the  kidneys. Multiple nonobstructing left lower pole nephrolithiasis  versus cortical calcifications. No hydronephrosis. The urinary bladder  is well-distended and appears unremarkable. No pelvic masses.      No significant free fluid within the pelvis. No free intraperitoneal  air. No abnormally dilated loops of large or small bowel. Small 1.8 cm  duodenal diverticulum arising from the inferior aspect of the third  portion of the duodenum. Mild wall thickening along the gastric  antrum. Colonic diverticulosis without evidence of acute  diverticulitis. No inguinal lymphadenopathy. No abdominal or pelvic  lymphadenopathy by size criteria. The major intra-abdominal  vasculature is widely patent. The infrarenal aorta is normal in  caliber. Mild aortoiliac atherosclerotic calcifications. Small  fat-containing umbilical hernia.     Bones and soft tissues:  Multilevel degenerative changes of the spine. No suspicious or  aggressive appearing bone lesions. Small lipoma between the right  gluteus medius and minimus muscles.                                                                         IMPRESSION:  1. Mild gastric antral wall thickening, a  nonspecific finding, but can  be seen in the setting of gastritis.  2. Unchanged appearance of the kidneys with inferior pole cortical  scarring and multiple punctate nonobstructing left nephrolithiasis  versus cortical calcifications.  3. Colonic diverticulosis without evidence of acute diverticulitis.  Small duodenal diverticulum.  4. Mild intrahepatic biliary dilatation, likely due to reservoir  effect from prior cholecystectomy.  5. Hepatomegaly with hepatic steatosis.           Assessment and Plan:   62 year old is a pleasant female who has bladder pain, urinary urgency/frequency, nocturia with hx of sleep apnea.     Plan:  - PVR WNL.  - UA grossly negative.   - Discussed the chronic nature of her bladder burning symptoms. Encourage IC diet; handout given on AVS.  - Discussed prelief prn for social events.  - Can try instant ocean baths or sitz baths.   - Keep appointment to get new CPAP equipment.   - Discussed potential etiologies for cortical scaring such as but not limited to hx of infections vs stones vs other. Of note, patient does have hx of kidney stones on most recent CT imaging. Patient also reports she had transposition of ureter as a child. She is concerned that her scaring noted on most recent imaging is from reflux. Patient would like further investigation of reflux. Patient questioned VCUG vs UDS. Will tentatively schedule her for UDS and discuss further with Dr. Melton. Will notify patient if any changes to this plan.   - Discussed with patient that reflux would not cause bladder burning.   - After discussing the assessment and plan with patient, patient verbalizes understanding and agrees to the above plan. All questions answered.       Other orders as below:  Orders Placed This Encounter   Procedures    Urinalysis Macroscopic     31 minutes spent on the date of the encounter doing chart review, review of outside records, review of test results, interpretation of tests, patient visit and  documentation.      Esther Duenas PA-C  Urology  May 23, 2023      Patient Care Team:  Crista Elder MD as PCP - General (Family Medicine)  Breanne Ellis PA-C as Physician Assistant (Physician Assistant - Medical)  Carie Nuñez (Internal Medicine)  Arnaldo Perez MD as MD (Orthopaedic Surgery Hand Surgery)  Arnaldo Perez MD as Assigned Musculoskeletal Provider  Sruthi Corona [facility] as Licensed Mental Health Professional  Terrie Melton MD as Assigned Surgical Provider  Autumn Weiner GC as Genetic Counselor (Genetic Counselor, MS)  Ezekiel Cheng APRN CNP as Assigned Behavioral Health Provider  Catrachita Nicolas MD as Assigned PCP  Fay Reyes MD as Assigned OBGYN Provider

## 2023-05-23 NOTE — PATIENT INSTRUCTIONS
- Can try over the counter prelief 30min prior to a social event.    ________________________________________  Instant [place] Bath:  Over the counter Instant Ocean baths can be used for chronic bladder pain, urethral pain, vulvar pain and pelvic pain. Fill your clean tub half-full with warm water and add 2 cups of instant ocean salts. Allow the salt to dissolve. Soak for about 10 minutes (if tolerated). After bathing, rinse off body with fresh water and gently pat dry with a towel. You can do instant ocean baths 2-3 times per day or as needed.    If unable to obtain Instant [place] Bath, can also try a bath using over the counter epsom salt.   __________________________________________________       Dietary recommendations:    Foods/Beverages to Avoid:  caffeinated beverages, carbonated beverages, coffee, decaffeinated coffee, tea, caffeine free tea, soda, alcoholic beverages, grapefruit, lemon, oranges, pineapple, cranberry juice, grapefruit juice, orange juice, pineapple juice, tomato or tomato based products, red dyed products, spicy foods, chili, horseradish, vinegar, MSG, artificial sweeteners, Mexican food, Polish food,  food, sugar, strawberries, cranberries, chocolate, mocha, key lime, salsa, spicy/hot chips/crackers, chili pepper flakes, hot/spicey wing sauces, jalapeno, citric acid, citrus fruits, soy, oil & vinegar salad dressings, Vitamin C & B6.    Foods/beverages that are least irritating:  water, milk, bananas, blueberries, honeydew melon, peers, raisins, watermelon, broccoli, Evening Shade sprouts, cabbage, carrots, cauliflower, celery, cucumber, mushrooms, peas, radishes, squash, zucchini, white potatoes, sweet potatoes/yams, chicken, eggs, turkey, beef, pork, lamb, shrimp, tunafish, salmon, oat, rice, pretzels, popcorn, applesauce, apricots, artichokes, asparagus, avocado, basil, beans, fresh beats, bell peppers, nonprocessed or overly spiced cheeses, plain chips, corn, oatmeal, cottage cheese, garlic,  basil, dill, mushrooms, creamers without soybean oil, nuts, oils, black olives, oregano, parsley, non-chocolate or fruit pastries, non-soy protein powders, pumpkin, quinoa, radish, fresh rhubarb, rice, rosemary, sohail, ranch dressing, table salt, non-chocolate puddings, honey, thyme, corn or flour tortillas, vanilla, Vitamins (A, B1, B2, B12, D, E, K), flour, cool whip.   ___________________________________________________________     URODYNAMIC TESTING    Where should I go for this test?  The procedure is performed at:     [facility]   [address] [place] [address]   Floor 4    If you have questions about your test, please call our nurse triage line at [phone], option #2. If you need to cancel or reschedule your test for any reason, please notify us as soon as possible.    Please check in approximately 15 minutes prior to your procedure time.      What is urodynamic testing?   Urodynamic testing refers to a group of tests used to assess bladder function by measuring various aspects of urine storage and emptying. The test takes about 75 minutes. For most patients, the test is not painful.     How should I get ready for this test?  Please try to arrive with a comfortably full bladder if possible because you will be asked to try and urinate prior to your study.  If you received a bladder diary, please complete this prior to your urodynamic test and bring it with you to your appointment. A bladder diary measures how much fluid you are drinking and how often and how much you are urinating. You can also record any urinary leakage that may have occurred and what you were doing when you leaked.    If you have chronic constipation, please take stool softeners for two days before your test.    What happens during the test?  You will first be asked to empty your bladder in a private restroom into a special toilet called a uroflow machine which measures the  rate of urine flow.  A nurse will then place a very small tube (called a catheter) into your bladder. This drains any urine left over after urinating and also measures the pressures inside of your bladder during your test. Another small catheter will then be placed into your rectum to measure abdominal pressures. Two small sticky patches will be placed on the skin near your anus to measure pelvic floor function.   We will then instill contrast dye into your bladder through the bladder catheter. The contrast is very dense and will allow us to take x-ray pictures of your bladder intermittently during your test.  You will be asked to tell us when you first start to feel like your bladder is filling up, when you have moderate urgency to urinate, when you have very strong urgency to urinate and finally when you feel that your bladder is full. Once you feel full you will be asked to try and urinate.    You may be asked to cough or bear down several times during your procedure. The provider running your study will be looking for urine leakage during this time.      What happens after the test?  The provider running your urodynamic study will share the results of your test on the day of your procedure or very soon after.  After your test, you may go about your day as normal. You may notice some blood in your urine for a couple of days which should clear up on its own. You may also feel a more urgent need to use the toilet or you may need to go more often - this is due to having a catheter placed and should resolve on its own in a few days.

## 2023-05-23 NOTE — PROGRESS NOTES
"Urology Clinic      Name: Ilsa Yusuf    MRN: [id number]   YOB: 1960  Accompanied at today's visit by:self               Chief Complaint:   Burning of bladder           History of Present Illness:   May 23, 2023    HISTORY:   We have been following 62 year old Ilsa Yusuf for hx of UTI, dysuria, urinary freqeuncy/urgency, suprapubic abdominal pain.  She underwent cysto under anesthesia on 11/1/22, and was unremarkable. Was referred to PFPT. Of note, has been on trospium in the past per chart review. Bladder burning has improved some with PFPT, though overall has not noticed much improvement. Voiding every 2 hours during the day. However when she goes to void, will void every 10minutes for a half hour following. Drinks green tea with scant amount of sugar. Voiding 3-4x/night. Hx of ABDOUL. Planning got get new CPAP in June 2023. AZO has helped some in the past. Patient states that the physical therapist told her that her pelvic floor was tight and was taught relaxation techniques to help with voiding. States she had reflux as a child and underwent transposition of one of her ureters and is concerned of her most recent CT imaging showing scaring of cortex. Patient wanting to know if she has reflux and would like further investigation on this. Of note, she also was noted to have renal stones on most recent CT. Patient unsure if has UTI today. Patient voices no other concerns at this time.            Allergies:     Allergies   Allergen Reactions     Gabapentin Other (See Comments)     Patient states she gets \"horrific nightmares\" with this medication     Dilaudid [Hydromorphone Hcl]      Made her feel like her skin was crawling     Nsaids Other (See Comments)     Kidney failure     Compazine [Prochlorperazine] Other (See Comments)     \"Makes my skin crawl, I was going nuts.\"            Medications:     Current Outpatient Medications   Medication Sig     buPROPion (WELLBUTRIN SR) 200 MG 12 " hr tablet Take 1 tablet (200 mg) by mouth every morning     busPIRone (BUSPAR) 10 MG tablet Take 2 tablets (20 mg) by mouth 2 times daily     cholecalciferol (VITAMIN D3) 5000 UNITS CAPS capsule Take 1 capsule (5,000 Units) by mouth daily Take 1 per day (Patient taking differently: Take 5,000 Units by mouth At Bedtime Take 1 per day)     escitalopram (LEXAPRO) 10 MG tablet Take 2 tablets (20 mg) by mouth daily     fluticasone (FLONASE) 50 MCG/ACT nasal spray SHAKE LIQUID AND USE 2 SPRAYS IN EACH NOSTRIL TWICE DAILY     hydrOXYzine (ATARAX) 25 MG tablet Take 1-2 tablets (25-50 mg) by mouth 3 times daily as needed for anxiety     levothyroxine (SYNTHROID/LEVOTHROID) 175 MCG tablet Take 1 tablet (175 mcg) by mouth daily 5 days per week and 1/2 tab 1 day per week.   +++ appointment needed for additional refills +++     lovastatin (MEVACOR) 20 MG tablet TAKE 1 TABLET(20 MG) BY MOUTH AT BEDTIME     multivitamin w/minerals (THERA-VIT-M) tablet Take 1 tablet by mouth daily (with lunch)     Omega-3 Fatty Acids (OMEGA 3 PO) Take 2 g by mouth 2 times daily Takes 1 in am and 1 at bedtime     omeprazole (PRILOSEC) 40 MG DR capsule TAKE 1 CAPSULE(40 MG) BY MOUTH DAILY     oxyCODONE IR (ROXICODONE) 10 MG tablet as needed     prazosin (MINIPRESS) 5 MG capsule TAKE 1 CAPSULE(5 MG) BY MOUTH AT BEDTIME     tiZANidine (ZANAFLEX) 4 MG tablet Take 1-3 tablets (4-12 mg) by mouth 3 times daily as needed for muscle spasms     UNABLE TO FIND MEDICATION NAME: medical cannibus     albuterol (PROAIR HFA/PROVENTIL HFA/VENTOLIN HFA) 108 (90 Base) MCG/ACT inhaler Inhale 1-2 puffs into the lungs every 4 hours as needed for shortness of breath / dyspnea or wheezing (Patient not taking: Reported on 5/23/2023)     triamcinolone (ARISTOCORT HP) 0.5 % external cream Apply topically 2 times daily (Patient not taking: Reported on 5/23/2023)     No current facility-administered medications for this visit.            Past  Surgical History:     Past  "Surgical History:   Procedure Laterality Date     ABDOMEN SURGERY       BLADDER SURGERY       CHOLECYSTECTOMY       COLONOSCOPY  2012     CYSTOSCOPY       CYSTOSCOPY, BIOPSY BLADDER, COMBINED N/A 11/1/2022    Procedure: EXAM UNDER ANESTHESIA, CYSTOSCOPY;  Surgeon: Terrie Melton MD;  Location: [facility] OR     ORTHOPEDIC SURGERY  left knee     renal artery surgery  child    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     TONSILLECTOMY       ureteral reimplantation       ZZC PARTIAL EXCISION THYROID,UNILAT  1991    rt, negative path then, treated with synthroid.             Physical Exam:     Vitals:    05/23/23 1109   BP: 115/77   Pulse: 72   SpO2: 96%   Weight: 92.1 kg (203 lb)   Height: 1.74 m (5' 8.5\")     PSYCH: NAD  EYES: EOMI  NEURO: AAO x3    LABS:   PVR 32mL    UA RESULTS:  Recent Labs   Lab Test 05/23/23  1102 08/05/22  0945 06/28/22  1118   COLOR Yellow   < > Light Yellow   APPEARANCE Clear   < > Clear   URINEGLC Negative   < > Negative   URINEBILI Negative   < > Negative   URINEKETONE Negative   < > Negative   SG 1.020   < > 1.010   UBLD Negative   < > Negative   URINEPH 6.0   < > 7.5*   PROTEIN Negative   < > Negative   UROBILINOGEN 0.2   < >  --    NITRITE Negative   < > Negative   LEUKEST Negative   < > Trace*   RBCU  --   --  <1   WBCU  --   --  1    < > = values in this interval not displayed.       Creatinine   Date Value Ref Range Status   12/14/2022 0.99 0.52 - 1.04 mg/dL Final   01/04/2021 0.90 0.52 - 1.04 mg/dL Final     Cysto from 10/2022   A 22-Swedish rigid cystoscope was inserted into a well lubricated urethra. We began by performing a white light cystourethroscopy. The urethra was unremarkable. The ureteral orifices were in their orthotopic positions bilaterally and briskly effluxing. The bladder was diffusely vascular. There were no intravesical stones or diverticuli and the bladder was not trabeculated. The bladder was drained and the cystoscope withdrawn.    CT abdomen " 6/28/22  FINDINGS:     Lower thorax:   Bibasilar dependent atelectasis.     Abdomen and pelvis:  Hepatomegaly with patchy areas of hypoattenuating liver parenchyma,  suggestive of hepatic steatosis. Mild intrahepatic biliary dilatation,  likely secondary to reservoir effect from cholecystectomy. Mild fatty  atrophy of the pancreas. No pancreatic ductal dilatation. The spleen  and adrenal glands are normal in appearance. Symmetric renal cortical  enhancement with unchanged scarring along the lower poles of the  kidneys. Multiple nonobstructing left lower pole nephrolithiasis  versus cortical calcifications. No hydronephrosis. The urinary bladder  is well-distended and appears unremarkable. No pelvic masses.      No significant free fluid within the pelvis. No free intraperitoneal  air. No abnormally dilated loops of large or small bowel. Small 1.8 cm  duodenal diverticulum arising from the inferior aspect of the third  portion of the duodenum. Mild wall thickening along the gastric  antrum. Colonic diverticulosis without evidence of acute  diverticulitis. No inguinal lymphadenopathy. No abdominal or pelvic  lymphadenopathy by size criteria. The major intra-abdominal  vasculature is widely patent. The infrarenal aorta is normal in  caliber. Mild aortoiliac atherosclerotic calcifications. Small  fat-containing umbilical hernia.     Bones and soft tissues:  Multilevel degenerative changes of the spine. No suspicious or  aggressive appearing bone lesions. Small lipoma between the right  gluteus medius and minimus muscles.                                                                         IMPRESSION:  1. Mild gastric antral wall thickening, a nonspecific finding, but can  be seen in the setting of gastritis.  2. Unchanged appearance of the kidneys with inferior pole cortical  scarring and multiple punctate nonobstructing left nephrolithiasis  versus cortical calcifications.  3. Colonic diverticulosis without evidence  of acute diverticulitis.  Small duodenal diverticulum.  4. Mild intrahepatic biliary dilatation, likely due to reservoir  effect from prior cholecystectomy.  5. Hepatomegaly with hepatic steatosis.           Assessment and Plan:   62 year old is a pleasant female who has bladder pain, urinary urgency/frequency, nocturia with hx of sleep apnea.     Plan:  - PVR WNL.  - UA grossly negative.   - Discussed the chronic nature of her bladder burning symptoms. Encourage IC diet; handout given on AVS.  - Discussed prelief prn for social events.  - Can try instant ocean baths or sitz baths.   - Keep appointment to get new CPAP equipment.   - Discussed potential etiologies for cortical scaring such as but not limited to hx of infections vs stones vs other. Of note, patient does have hx of kidney stones on most recent CT imaging. Patient also reports she had transposition of ureter as a child. She is concerned that her scaring noted on most recent imaging is from reflux. Patient would like further investigation of reflux. Patient questioned VCUG vs UDS. Will tentatively schedule her for UDS and discuss further with Dr. Melton. Will notify patient if any changes to this plan.   - Discussed with patient that reflux would not cause bladder burning.   - After discussing the assessment and plan with patient, patient verbalizes understanding and agrees to the above plan. All questions answered.       Other orders as below:  Orders Placed This Encounter   Procedures     Urinalysis Macroscopic     31 minutes spent on the date of the encounter doing chart review, review of outside records, review of test results, interpretation of tests, patient visit and documentation.      Esther Duenas PA-C  Urology  May 23, 2023      Patient Care Team:  Crista Elder MD as PCP - General (Family Medicine)  Breanne Ellis PA-C as Physician Assistant (Physician Assistant - Medical)  Carie Nuñez (Internal Medicine)  Arnaldo Perez MD as MD  (Orthopaedic Surgery Hand Surgery)  Arnaldo Perez MD as Assigned Musculoskeletal Provider  Sruthi Corona [facility] as Licensed Mental Health Professional  Terrie Melton MD as Assigned Surgical Provider  Autumn Weiner GC as Genetic Counselor (Genetic Counselor, MS)  Ezekiel Cheng APRN CNP as Assigned Behavioral Health Provider  Catrachita Nicolas MD as Assigned PCP  Fay Reyes MD as Assigned OBGYN Provider

## 2023-05-24 ENCOUNTER — TELEPHONE (OUTPATIENT)
Dept: UROLOGY | Facility: CLINIC | Age: 63
End: 2023-05-24
Payer: MEDICARE

## 2023-05-24 NOTE — TELEPHONE ENCOUNTER
Spoke with Dr. Melton, discussed VCUG vs UDS as patient concerned about possible reflux. Dr. Melton recommends VCUG. Called patient with update today around 2:17PM. Patient would like to hold off for now on further urologic studies. States she mainly feels a pressure on her bladder and thinks this could possibly be GI related. Advised patient to f/u with her PCP in regards to her GI concerns. Advised to notify our team if she would like to move forward with VCUG in the future. Patient verbalized understanding and agreed to plan. All questions answered.

## 2023-05-25 ENCOUNTER — VIRTUAL VISIT (OUTPATIENT)
Dept: PSYCHIATRY | Facility: CLINIC | Age: 63
End: 2023-05-25
Payer: COMMERCIAL

## 2023-05-25 ENCOUNTER — TELEPHONE (OUTPATIENT)
Dept: BEHAVIORAL HEALTH | Facility: CLINIC | Age: 63
End: 2023-05-25
Payer: MEDICARE

## 2023-05-25 DIAGNOSIS — K21.9 GASTROESOPHAGEAL REFLUX DISEASE WITHOUT ESOPHAGITIS: ICD-10-CM

## 2023-05-25 DIAGNOSIS — F41.1 GAD (GENERALIZED ANXIETY DISORDER): ICD-10-CM

## 2023-05-25 DIAGNOSIS — F33.1 MAJOR DEPRESSIVE DISORDER, RECURRENT EPISODE, MODERATE (H): Primary | ICD-10-CM

## 2023-05-25 DIAGNOSIS — F90.1 ATTENTION-DEFICIT HYPERACTIVITY DISORDER, PREDOMINANTLY HYPERACTIVE TYPE: ICD-10-CM

## 2023-05-25 DIAGNOSIS — F43.10 PTSD (POST-TRAUMATIC STRESS DISORDER): ICD-10-CM

## 2023-05-25 PROCEDURE — 99215 OFFICE O/P EST HI 40 MIN: CPT | Mod: VID | Performed by: NURSE PRACTITIONER

## 2023-05-25 RX ORDER — OMEPRAZOLE 40 MG/1
CAPSULE, DELAYED RELEASE ORAL
Qty: 90 CAPSULE | Refills: 0 | Status: SHIPPED | OUTPATIENT
Start: 2023-05-25 | End: 2023-09-11

## 2023-05-25 RX ORDER — BUPROPION HYDROCHLORIDE 150 MG/1
150 TABLET, EXTENDED RELEASE ORAL 2 TIMES DAILY
Qty: 60 TABLET | Refills: 1 | Status: SHIPPED | OUTPATIENT
Start: 2023-05-25 | End: 2023-08-25

## 2023-05-25 ASSESSMENT — ANXIETY QUESTIONNAIRES
2. NOT BEING ABLE TO STOP OR CONTROL WORRYING: MORE THAN HALF THE DAYS
7. FEELING AFRAID AS IF SOMETHING AWFUL MIGHT HAPPEN: NOT AT ALL
IF YOU CHECKED OFF ANY PROBLEMS ON THIS QUESTIONNAIRE, HOW DIFFICULT HAVE THESE PROBLEMS MADE IT FOR YOU TO DO YOUR WORK, TAKE CARE OF THINGS AT HOME, OR GET ALONG WITH OTHER PEOPLE: EXTREMELY DIFFICULT
GAD7 TOTAL SCORE: 9
3. WORRYING TOO MUCH ABOUT DIFFERENT THINGS: MORE THAN HALF THE DAYS
7. FEELING AFRAID AS IF SOMETHING AWFUL MIGHT HAPPEN: NOT AT ALL
8. IF YOU CHECKED OFF ANY PROBLEMS, HOW DIFFICULT HAVE THESE MADE IT FOR YOU TO DO YOUR WORK, TAKE CARE OF THINGS AT HOME, OR GET ALONG WITH OTHER PEOPLE?: EXTREMELY DIFFICULT
GAD7 TOTAL SCORE: 9
4. TROUBLE RELAXING: MORE THAN HALF THE DAYS
1. FEELING NERVOUS, ANXIOUS, OR ON EDGE: NEARLY EVERY DAY
6. BECOMING EASILY ANNOYED OR IRRITABLE: NOT AT ALL
GAD7 TOTAL SCORE: 9
5. BEING SO RESTLESS THAT IT IS HARD TO SIT STILL: NOT AT ALL

## 2023-05-25 ASSESSMENT — PATIENT HEALTH QUESTIONNAIRE - PHQ9
10. IF YOU CHECKED OFF ANY PROBLEMS, HOW DIFFICULT HAVE THESE PROBLEMS MADE IT FOR YOU TO DO YOUR WORK, TAKE CARE OF THINGS AT HOME, OR GET ALONG WITH OTHER PEOPLE: EXTREMELY DIFFICULT
SUM OF ALL RESPONSES TO PHQ QUESTIONS 1-9: 12
SUM OF ALL RESPONSES TO PHQ QUESTIONS 1-9: 12

## 2023-05-25 NOTE — PATIENT INSTRUCTIONS
"Patient Education   The [facility]  What to Expect  Here's what to expect in the [facility] Program.   About the program  You'll be meeting with a psychiatric doctor to check your mental health. A psychiatric doctor helps you deal with troubling thoughts and feelings by giving you medicine. They'll make sure you know the plan for your care. You may see them for a long time. When you're feeling better, they may refer you back to seeing your family doctor.   If you have any questions, we'll be glad to talk to you.  About visits  Be open  At your visits, please talk openly about your problems. It may feel hard, but it's the best way for us to help you.  Cancelling visits  If you can't come to your visit, please call us right away at [phone]. If you don't cancel at least 24 hours (1 full day) before your visit, that's \"late cancellation.\"  Not showing up for your visits  Being very late is the same as not showing up. You'll be a \"no show\" if:  You're more than 15 minutes late for a 30-minute (half hour) visit.  You're more than 30 minutes late for a 60-minute (full hour) visit.  If you cancel late or don't show up 2 times within 6 months, we may end your care.  Getting help between visits  If you need help between visits, you can call us Monday to Friday from 8 a.m. to 4:30 p.m. at [phone].  Emergency care  Call 911 or go to the nearest emergency department if your life or someone else's life is in danger.  Call 988 anytime to reach the national Suicide and Crisis hotline.  Medicine refills  To refill your medicine, call your pharmacy. You can also call [facility]'s [facility] at [phone], Monday to Friday, 8 a.m. to 4:30 p.m. It can take 1 to 3 business days to get a refill.   Forms, letters, and tests  You may have papers to fill out, like FMLA, short-term disability, and workability. We can help you with these forms at your visits, but you must have an " appointment. You may need more than 1 visit for this, to be in an intensive therapy program, or both.  Before we can give you medicine for ADHD, we may refer you to get tested for it or confirm it another way.  We may not be able to give you an emotional support animal letter.  We don't do mental health checks ordered by the court.   We don't do mental health testing, but we can refer you to get tested.   Thank you for choosing us for your care.  For informational purposes only. Not to replace the advice of your health care provider. Copyright   2022 [facility]. All rights reserved. [employer] [id number] - 12/22.       PLAN AND RECOMMENDATIONS:  Take take Wellbutrin  mg twice daily .  Take Lexapro 20 mg every day  Take hydroxyzine 25 - 50 mg instead of 3 times daily as needed for anxiety   Continue to take prazosin 5 mg at bedtime  Continue Buspar 20 mg twice daily for anxiety   Follow up again in 6 weeks for medication review and evaluation.    We have discussed his/her diagnosis, prognosis, differential diagnosis and side effects and benefits of medications.     Patient and I revieweddiagnosis and treatment plan and patient agrees with following recommendations:    1.Patient will take the medications as prescribed. Patient will not stop taking medications or adjust them without consulting with theprovider.  2.Patient will call with any problems between 2 visits.  3.Patient will go to the emergency room if not feeling safe , unable to function in the community, or if suicidal, homicidal or hearing voices orhaving paranoia.  4.Patient will abstain from drugs and alcohol.  5.Patient will not drive if sedated on medications or under influence of any substance. 6.Patient will not mix psychiatric medications with drugs andalcohol.   7.Patient will watch his diet and exercise.  8.Patient will see non psychiatric providers for non psychiatric disorders.

## 2023-05-25 NOTE — PROGRESS NOTES
Virtual Visit Details    Type of service:  Video Visit   Video Start Time: 1134  Video End Time:1158    Originating Location (pt. Location): Home    Distant Location (provider location):  Off-site  Platform used for Video Visit: FanGo  Answers for HPI/ROS submitted by the patient on 5/25/2023  If you checked off any problems, how difficult have these problems made it for you to do your work, take care of things at home, or get along with other people?: Extremely difficult  PHQ9 TOTAL SCORE: 12  DONELL 7 TOTAL SCORE: 9

## 2023-05-25 NOTE — PROGRESS NOTES
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 when taking antidepressants. Anyone taking an antidepressant should be watched closely for worsening depression or unusual behavior especially in the first few weeks after starting an SSRI. Keep in mind, antidepressants are more likely to reduce suicide risk in the long run by improving mood.   SEROTONIN SYNDROME:  Discussed risks of Serotonin syndrome (ie, serotonin toxicity) which is a potentially life-threatening condition associated with increased serotonergic activity in the central nervous system (CNS). It is seen with therapeutic medication use, inadvertent interactions between drugs, and intentional self-poisoning. Serotonin syndrome may involve a spectrum of clinical findings, which often include mental status changes, autonomic hyperactivity, and neuromuscular abnormalities.    BENZODIAZEPINE:  discussion on how benzos work and the need to use them short term due to potential of anxiety getting.  This is a controlled substance with risk for abuse, need to keep in a safe keep place and cannot replace lost scripts.    HARM REDUCTION:  Discussions regarding effects of  mood altering substances, alcohol and cannabis, on mood and that approach is harm reduction, will continue to prescribe meds as they work to cut back use.    FIRST GENERATION ANTIPSYCHOTIC/ SECOND GENERATION ANTIPSYCHOTIC USE:  Atypical need for cardiometabolic monitoring with medication- B/P, weight, blood sugar, cholesterol.  Need to monitor for abnormal movements taught  SAFETY:  We all care about your loved one's safety. To reduce the risk of self-harm, remove access to all:  Firearms, Medicines (both prescribed and over-the-counter), Knives and other sharp objects, Ropes and like materials, and Alcohol  SLEEP HYGIENE: establish a sleep routine, limit screen time 1 hour prior to bed, use bed for sleep only, take sleep/medications on time (including sleepy time tea, trazadone or herbal treatments such as melatonin), aroma therapy, limit caffeine/sugar, yoga, guided imagery, stretch, meditation, limit naps to 20 minutes, make a temperature change in the room, white noise, be mindful of slowing down breathing, take a warm bath/shower, frequently wash sheets, and journaling.   Medlineplus.gov is information for patients.  It is run by the [employer] Library of Medicine and it contains information about all disorders, diseases and all medications.      COMMUNITY RESOURCES:    CRISIS NUMBERS: Provided in AVS 5/25/2023  National Suicide Prevention Lifeline: 1-740-623-TALK (061-325-4821)  [employer]/resources for a list of additional resources (SOS)            [facility] - [phone]   Urgent Care Adult Mental Wrmp[facility]-[phone] mobile unit/ 24/7 crisis line  [facility] -[phone]   COPE 24/7 [place] Mobile Team -[phone] (adults)/ [phone] (child)  Poison Control Center - 1-367.625.9873    OR  go to nearest ER  Crisis Text Line for any crisis 24/7 send this-   To: 018105   [facility]  [phone]  National Suicide Prevention Lifeline:  966.919.1029 (TTY: 973.629.9932). Call anytime for help.  (www.suicidepreventionlifeline.org)  National [place] [employer] [place] Illness (www.ellie.org): 438.670.7542 or 015-215-2177.   Mental Health Association (www.mentalhealth.org): 559.981.7211 or 963-187-4741.  [place] Crisis Text Line: Text [place] to 008803  Suicide LifeLine Chat: suicideKiwiTech.[employer]/chat    ADMINISTRATIVE BILLIN min spent interviewing patient, reviewing referral documents, obtaining and reviewing outside records, communication with other health specialists, and preparing this report on today's date: 2023  Video/Phone Start Time: 11:34 am  Video/Phone End Time:  11:58 am      Signed:     Ezekiel Cheng, MSN, APRN, PMHNP-BC     Chart documentation done in part with Dragon Voice Recognition software.  Although reviewed after completion, some word and grammatical errors may remain.  Answers for HPI/ROS submitted by the patient on 2022  If you checked off any problems, how difficult have these problems made it for you to do your work, take care of things at home, or get along with other people?: Somewhat difficult  PHQ9 TOTAL SCORE: 7  DONELL 7 TOTAL SCORE: 13    Answers for HPI/ROS submitted by the patient on 2/3/2023  If you checked off any problems, how difficult have these problems made it for you to do your work, take care of things at home, or get along with other people?: Very difficult  PHQ9 TOTAL SCORE: 3  DONELL 7 TOTAL SCORE: 6  Answers for HPI/ROS submitted by the patient on 2023  If you checked off any problems, how difficult have these problems made it for you to do your work, take care of things at home, or get along with other people?: Somewhat difficult  PHQ9 TOTAL SCORE: 5  DONELL 7 TOTAL SCORE: 8    Answers for HPI/ROS submitted by the patient on 2023  If you checked off any problems, how difficult have these problems made it for you to do your work, take care of things at home, or get along with other  people?: Extremely difficult  PHQ9 TOTAL SCORE: 12  DONELL 7 TOTAL SCORE: 9

## 2023-05-25 NOTE — TELEPHONE ENCOUNTER
Reason for call:  Other   Patient called regarding (reason for call): call back  Additional comments: Patient left voicemail on transition clinic que as patient did not get medication at pharmacy. Writer routed to provider.    Phone number to reach patient:  Cell number on file:    Telephone Information:   Mobile [phone]       Best Time:  asap    Can we leave a detailed message on this number?  YES    Travel screening: Negative

## 2023-05-25 NOTE — NURSING NOTE
Is the patient currently in the state of [place]? YES    Visit mode:VIDEO    If the visit is dropped, the patient can be reconnected by: VIDEO VISIT:  Send e-mail to at [email or web address]    Will anyone else be joining the visit? No  (If patient encounters technical issues they should call [phone])    How would you like to obtain your AVS? MyChart    Are changes needed to the allergy or medication list? Medications were just reviewed a couple of days ago and pt stated no changes    Rooming Documentation: Assigned questionnaire(s) completed .    Reason for visit: RECHSHIRLEY Peacock

## 2023-05-25 NOTE — TELEPHONE ENCOUNTER
5/25/23: Reason for call:  Medication   If this is a refill request, has the caller requested the refill from the pharmacy already? Yes  Will the patient be using a [facility]? No  Name of the pharmacy and phone number for the current request: [facility] [phone]    Name of the medication requested: busPIRone (BUSPAR) 10 MG tablet    Other request: Pt stated that during her appt it was discussed that there would be no changes to her medications, but that a refill was not addressed for busPIRone (BUSPAR) 10 MG tablet. She is out of this med.    Phone number to reach patient:  Cell number on file:    Telephone Information:   Mobile [phone]       Best Time:  ASAP    Can we leave a detailed message on this number?  YES    Travel screening: Not Applicable

## 2023-05-30 RX ORDER — BUSPIRONE HYDROCHLORIDE 10 MG/1
20 TABLET ORAL 2 TIMES DAILY
Qty: 120 TABLET | Refills: 1 | Status: SHIPPED | OUTPATIENT
Start: 2023-05-30 | End: 2023-07-24

## 2023-06-02 ENCOUNTER — TELEPHONE (OUTPATIENT)
Dept: UROLOGY | Facility: CLINIC | Age: 63
End: 2023-06-02
Payer: MEDICARE

## 2023-06-02 NOTE — TELEPHONE ENCOUNTER
----- Message from Maryuri Baker sent at 5/30/2023  2:40 PM CDT -----  Regarding: FW: VUDS and follow up w/CSF    ----- Message -----  From: Edel Chase  Sent: 5/30/2023   9:25 AM CDT  To: [facility] - Scheduling Pool  Subject: VUDS and follow up w/CSF                         Please schedue VUDS with follow-up with dr. Melton to discuss VUDS at that time same day    JACKIEB  5/23/23

## 2023-06-05 ENCOUNTER — TELEPHONE (OUTPATIENT)
Dept: UROLOGY | Facility: CLINIC | Age: 63
End: 2023-06-05
Payer: MEDICARE

## 2023-06-07 DIAGNOSIS — E03.9 ACQUIRED HYPOTHYROIDISM: ICD-10-CM

## 2023-06-07 RX ORDER — LEVOTHYROXINE SODIUM 175 UG/1
TABLET ORAL
Qty: 90 TABLET | Refills: 3 | Status: SHIPPED | OUTPATIENT
Start: 2023-06-07 | End: 2024-09-25

## 2023-06-09 ENCOUNTER — TELEPHONE (OUTPATIENT)
Dept: SLEEP MEDICINE | Facility: CLINIC | Age: 63
End: 2023-06-09
Payer: MEDICARE

## 2023-06-09 NOTE — TELEPHONE ENCOUNTER
General Call    Contacts       Type Contact Phone/Fax    06/09/2023 04:04 PM CDT Phone (Incoming) Deanne Yusuf (Self) [phone] (M)        Reason for Call:  CPAp script    What are your questions or concerns: medical supplier called to deanne notify her that a new cpap machine is available and needs a script. Old Cpap is 8 year old  Date of last appointment with provider: 7/22/2020 will make appointment     [facility] or would you prefer to receive a phone call?:   My chart

## 2023-06-20 NOTE — PROGRESS NOTES
CPAP Follow-Up Visit:    Date on this visit: 6/21/2023    Ilsa Yusuf has a follow-up visit today to review her CPAP use for ABDOUL. She was initially seen for CPAP use for moderate ABDOUL. She was initially seen at the [facility] for observed apnea 8-10 times per night and difficulty falling asleep for 3-4 years. Her medical history is significant for depression, PTSD, seizure disorder (possibly), hypothyroidism and obesity.     PSG from 1/28/2016 showed an AHI of 25.5 per hour. O2 darrius 86%. CPAP 7 cm was effective. Wt: 219#     Deanne's CPAP has been reading a message that the expected motor life has been exceeded. She has been on the wait list for a replacement machine    She has been feeling very poorly with regards to the CPAP and sleep. She has significant aerophagia and wakes with a belly full of air. She has sinus congestion and very dry mouth. She uses fluticasone twice daily but still gets significant congestion in certain weathers. Her nose runs a lot in the morning.     She takes oxycodone about 30 mg per day.    She has been found to have low amplitude QRS, but had a normal ECHO. She gets short of breath walking down the dejesus.    PAP machine: ResMed. Pressure settings: 8-14 cm    The compliance data shows that from 3/23/23-6/20/23, the patient used the CPAP for 90/90 nights, 98% of nights for >4 hours.  The 95th% pressure is 13.3 cm.  The 95th% leak is 30.3 lpm.  The average nightly usage is 7:59.  The average AHI is 2/hr.    Interface:  Mask: AirFit F20 mask. Washes mask weekly with Ivory  Chin strap: No  Leak: Yes, sometimes as she is getting to sleep she will adjust it. She does not notice it once she gets to sleep. She doubts she has   Using Humidifier: Yes, runs pretty low, but not completely empty  Condensation in hose or mask: No     Difficulties with therapy:    [-] Snoring with CPAP: prior partner observed snoring, 6 months ago, the snoring was consistent for years.  [+]  Difficulty tolerating the pressure:  [+] Epistaxis/dry nose:   [-] Nasal congestion:  [+] Dry mouth:  [-] Mouth breathing:   [-] Pain/skin breakdown: lines on face from straps, mild redness from the cushion.     Improvements noted with CPAP:       Weight change since sleep study: 212 lbs, up 9 pounds in the last month    Bedtime: 11 PM. She takes trazodone  mg (50 mg about 4x/week). Wake time: 9 AM. Falls asleep in 20 minutes. Wakes 3-4 times per night for 5 minutes. Reason for waking: restroom  Naps: no purposeful naps, but dozes sitting up after lunch and supper, dozes 20-30 min.       She also takes prazosin for PTSD nightmares which does help. She was started on hydroxyzine in the last 6 months for anxiety. She takes 1 at bedtime. She does not think it helps in the day but does help at night.     Past medical/surgical history, family history, social history, medications and allergies were reviewed.      Problem List:  Patient Active Problem List    Diagnosis Date Noted     Somatic dysfunction of pelvic region 01/10/2023     Priority: Medium     Pelvic pain in female 01/10/2023     Priority: Medium     Urgency of urination 10/17/2022     Priority: Medium     Added automatically from request for surgery [id number]       History of UTI 10/17/2022     Priority: Medium     Added automatically from request for surgery [id number]       Bladder pain 10/17/2022     Priority: Medium     Added automatically from request for surgery [id number]       Osteoarthritis of carpometacarpal (CMC) joint of both thumbs 04/18/2022     Priority: Medium     Bilateral thumb pain 04/18/2022     Priority: Medium     ABDOUL (obstructive sleep apnea) 07/19/2019     Priority: Medium     ABDOUL       Cervical high risk HPV (human papillomavirus) test positive 03/19/2019     Priority: Medium     TORRI Exposure, needs annual Pap's for life   3/19/19 NIL pap, + HR HPV (not 16, 18). Plan: cotest 1 year.    03/29/21 Lost to follow-up for pap  tracking  12/14/22 NIL pap, + HR HPV (not 16 or 18). Plan: [place] bef 3/14/23  1/17/23 [place] ECC: benign. 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 07/23/2018     Priority: Medium     Calculus of left kidney 08/26/2017     Priority: Medium     Primary osteoarthritis of right knee 12/26/2016     Priority: Medium     Peripheral tear of medial meniscus of right knee, unspecified whether old or current tear, initial encounter 12/26/2016     Priority: Medium     Cervical radiculopathy 10/28/2016     Priority: Medium     Herniated disc C5       Neck pain 09/29/2016     Priority: Medium     Attention-deficit hyperactivity disorder, predominantly hyperactive type 04/19/2016     Priority: Medium     Obesity 06/21/2015     Priority: Medium     Primary insomnia 06/02/2015     Priority: Medium     Esophageal reflux 04/13/2015     Priority: Medium     CKD (chronic kidney disease) stage 3, GFR 30-59 ml/min (H) 03/09/2015     Priority: Medium     Hx of vesiculoureteral reflux repair as child.   Recurrent infxns       Hyperlipidemia LDL goal <130 01/30/2012     Priority: Medium     Major depressive disorder, recurrent episode, moderate (H) 01/18/2012     Priority: Medium     PTSD (post-traumatic stress disorder) 01/18/2012     Priority: Medium     Acquired hypothyroidism 01/18/2012     Priority: Medium        Impression/Plan:    (G47.33) ABDOUL (obstructive sleep apnea)  (primary encounter diagnosis)  Comment: Deanne has high mask leak, air swallowing, nasal congestion and feels unrefreshed from her sleep. Prior bed partner had reported snoring with it on. Her download shows her pressure usually does not go over 10 or 11 cm unless the leak is high. Her machine reads a message that the expected motor life has been exceeded. Her machine is 8+ years old. [facility] has a new one for her but she needed this visit. She also has not been able to replace her mask cushion in almost a year. She has ordered parts, but that part never came. She  sleeps on both sides.  Plan: Comprehensive DME        Changed pressures to 8-11 cm. Order placed again for a replacement machine and new supplies.     (F51.01) Primary insomnia  Comment: She feels she wouldn't fall asleep without medication due to her PTSD. She has high anxiety and depression. She is on  mg trazodone (50 mg about 4 days per week) and hydroxyzine was added in about 6 months ago. She also takes CBD/THC. She is on prazosin 5 mg for nightmares. She is not sure how much all of those things are helping or if they are causing hangover sleepiness. Her download shows she is often in bed 8-10 hours.  Plan: We talked about paying attention to how she feels in the morning when she takes 50 vs 100 mg of trazodone to see if that is contributing to morning grogginess. She was encouraged to start limiting time in bed to closer to 8 hours and reduce the dose of either trazodone or hydroxyzine, whichever she thinks is helping the least. We will work on this more after she gets her new machine.     (J30.9) Allergic rhinitis, unspecified seasonality, unspecified trigger  Comment: She gets very congested despite using fluticasone twice daily. Congestion fluctuates with the weather.  Plan: azelastine (ASTELIN) 0.1 % nasal spray             She will follow up with me in about 2-3 month(s) after getting replacement CPAP.     46 minutes were spent on the date of the encounter doing chart review, history and exam, documentation and further activities as noted above.     Bennett Goltz, PA-C    CC: No ref. provider found

## 2023-06-21 ENCOUNTER — OFFICE VISIT (OUTPATIENT)
Dept: SLEEP MEDICINE | Facility: CLINIC | Age: 63
End: 2023-06-21
Payer: COMMERCIAL

## 2023-06-21 ENCOUNTER — VIRTUAL VISIT (OUTPATIENT)
Dept: PSYCHIATRY | Facility: CLINIC | Age: 63
End: 2023-06-21
Payer: COMMERCIAL

## 2023-06-21 VITALS
WEIGHT: 212.6 LBS | HEIGHT: 69 IN | OXYGEN SATURATION: 96 % | HEART RATE: 69 BPM | SYSTOLIC BLOOD PRESSURE: 108 MMHG | BODY MASS INDEX: 31.49 KG/M2 | DIASTOLIC BLOOD PRESSURE: 66 MMHG

## 2023-06-21 DIAGNOSIS — G47.33 OSA (OBSTRUCTIVE SLEEP APNEA): Primary | ICD-10-CM

## 2023-06-21 DIAGNOSIS — F43.10 PTSD (POST-TRAUMATIC STRESS DISORDER): ICD-10-CM

## 2023-06-21 DIAGNOSIS — F33.1 MAJOR DEPRESSIVE DISORDER, RECURRENT EPISODE, MODERATE (H): Primary | ICD-10-CM

## 2023-06-21 DIAGNOSIS — J30.9 ALLERGIC RHINITIS, UNSPECIFIED SEASONALITY, UNSPECIFIED TRIGGER: ICD-10-CM

## 2023-06-21 DIAGNOSIS — F51.01 PRIMARY INSOMNIA: ICD-10-CM

## 2023-06-21 PROCEDURE — 99215 OFFICE O/P EST HI 40 MIN: CPT | Performed by: PHYSICIAN ASSISTANT

## 2023-06-21 PROCEDURE — 99214 OFFICE O/P EST MOD 30 MIN: CPT | Mod: VID | Performed by: NURSE PRACTITIONER

## 2023-06-21 RX ORDER — AZELASTINE 1 MG/ML
1 SPRAY, METERED NASAL 2 TIMES DAILY
Qty: 30 ML | Refills: 1 | Status: SHIPPED | OUTPATIENT
Start: 2023-06-21 | End: 2024-03-04

## 2023-06-21 RX ORDER — TRAZODONE HYDROCHLORIDE 50 MG/1
TABLET, FILM COATED ORAL
COMMUNITY
Start: 2023-05-25 | End: 2023-11-15

## 2023-06-21 ASSESSMENT — SLEEP AND FATIGUE QUESTIONNAIRES
HOW LIKELY ARE YOU TO NOD OFF OR FALL ASLEEP IN A CAR, WHILE STOPPED FOR A FEW MINUTES IN TRAFFIC: WOULD NEVER DOZE
HOW LIKELY ARE YOU TO NOD OFF OR FALL ASLEEP WHILE SITTING QUIETLY AFTER LUNCH WITHOUT ALCOHOL: HIGH CHANCE OF DOZING
HOW LIKELY ARE YOU TO NOD OFF OR FALL ASLEEP WHILE SITTING AND READING: HIGH CHANCE OF DOZING
HOW LIKELY ARE YOU TO NOD OFF OR FALL ASLEEP WHILE SITTING INACTIVE IN A PUBLIC PLACE: MODERATE CHANCE OF DOZING
HOW LIKELY ARE YOU TO NOD OFF OR FALL ASLEEP WHILE LYING DOWN TO REST IN THE AFTERNOON WHEN CIRCUMSTANCES PERMIT: HIGH CHANCE OF DOZING
HOW LIKELY ARE YOU TO NOD OFF OR FALL ASLEEP WHEN YOU ARE A PASSENGER IN A CAR FOR AN HOUR WITHOUT A BREAK: MODERATE CHANCE OF DOZING
HOW LIKELY ARE YOU TO NOD OFF OR FALL ASLEEP WHILE WATCHING TV: HIGH CHANCE OF DOZING
HOW LIKELY ARE YOU TO NOD OFF OR FALL ASLEEP WHILE SITTING AND TALKING TO SOMEONE: SLIGHT CHANCE OF DOZING

## 2023-06-21 NOTE — PATIENT INSTRUCTIONS
General recommendations for sleep problems (Insomnia)  Allow 2-4 weeks to see results     Establish a regular sleep schedule    Most people only need 7-8 hours of sleep.  Don't be in bed longer than you need     to sleep or you will end up spending more time awake in bed. This trains your    brain to think of the bed as a place to not sleep.  Go to bed at same time each night   Get up at same time each day - Set an alarm everyday (even weekends). This is one of    the most important tips. It prevents you from relying on your insomnia to get you    up on time for your day. That actually reinforces insomnia. It also will help your    body get into a pattern where you start feeling tired at a consistent time each    night.  The body functions best when you keep a consistent routine.  Avoid sleeping-in and napping. Anytime you sleep during the day, you will be less tired at    night. You may be tired enough to fall asleep, but you will wake more in the    middle of the night because you will have met your sleep need before the night is    done.   Cut down time in bed (if not asleep, get up)- Use your bed only for sleep and sex    Anytime you spend time in bed doing activities other than sleep (reading,    watching TV, working, playing on the computer or phone, or even just laying in    bed trying to sleep), you are training  your brain to think of the bed as a place to    do activities other than sleep. If you are not falling asleep within 20-30 minutes,    get out of bed. While out of bed, avoid bright lights. Avoid work or chores. Being    productive in the middle of the night reinforces waking up at night. Find relaxing,    not particularly entertaining activities like reading, listening to music, or relaxation    exercises. Go back to bed if you start feeling groggy, or after about 30 minutes,    even if not feeling very tired. Sometimes, just getting out of bed stops the pattern    of getting frustrated about  laying in bed not sleeping, and that can help you fall    asleep.   Avoid trying to force yourself to sleep- sleep is not like everything else. The harder you    work at most things, the more you can accomplish. The harder you work at    sleep, the less you will sleep.     Make the bedroom comfortable - quiet, dark and cool are better. Consider ear plugs    (silicon). Use dark blinds or wear an eye mask if needed     Make a relaxing routine prior to bedtime  Relaxation exercises:   Progressive muscle relaxation: Relax each muscle group individually    Begin with your feet, flex, then relax. Try to imagine your feet feeling heavy and sinking into the bed. Move to your calves, do the same thing. Work through each muscle group toward your head.    Relaxing Mental Imagery: Try to imagine a trip that you took and found relaxing, or imagine a day at the beach. Try to walk yourself through the day in your mind as if you were dreaming it. Try to imagine sensing the different experiences, such as feeling sand between your toes, the heat of the sun on your skin, seeing the waves crashing the shore, the smell of the salt water, etc.     Deal with your worries before bedtime    Set aside a worry time around dinner time for 10-15 minutes. Write down the    things that are on your mind. Plan time in the coming days to address those    issues. Brainstorm ideas on how you will deal with them. Try to identify issues    that are out of your control, and try to let those issues go.  Listen to relaxation tapes   Classical Music or Nature sounds   Back Massage   Get regular exercise each day (at least 1-2 hours before bedtime)   Take medications only as directed   Eat a light bedtime snack or warm drink   Warm milk   Warm herbal tea (non-caffeinated)       Things to avoid   No overstimulating activities just before bed   No competitive games before bedtime   No exciting television programs before bedtime   Avoid caffeine after lunchtime    Avoid chocolate   Do not use alcohol to induce sleep (worsens Insomnia)   Do not take someone else's sleeping pills   Do not look at the clock when awakening   Do not turn on light when getting up to use bathroom, use a nightlight     Online Programs   www.SHUTi.me (pronounced shut eye). There is a fee for this program. Enter the code  [facility] [facility]  if you decide to enroll in this program.    www.sleepIO.com (pronounced sleep ee oh). There is a fee for this program. Enter the code  [facility]  if you decide to enroll in this program.     Suggested Resources  Insomnia Treatment Books   Overcoming Insomnia by Tobi Flowers and Beverley Carter (2008)  No More Sleepless Nights by Ector Hurst and Merly Levi (1996)  Say Jammie to Insomnia by Meir Calderon (2009)  The Insomnia Workbook by Bridget Pena and Andrew Last (2009)  The Insomnia Answer by Prakash Pichardo and Brandyn Currie (2006)      Stress Management and Relaxation Books  The Relaxation and Stress Reduction Workbook by Funmilayo Rosenberg, Rhonda Olivares and Tom Smith (2008)  Stress Management Workbook: Techniques and Self-Assessment Procedures by Deanne Senior and Marty Funk (1997)  A Mindfulness-Based Stress Reduction Workbook by Josesito Anderson and Junie Thomas (2010)  The Complete Stress Management Workbook by Giovani Robert, Oswald Marie and Leandro López (1996)  Assert Yourself by Nany Willard and Wilber Wlilard (1977)    Relaxation Resources for Computer Download   These websites offer resources to help you relax. This list is for information only. [facility] is not responsible for the quality of services or the actions of any person or organization.  Progressive Muscle Relaxation (PMR):   http://www.innerYYogastudio.com/progressive-muscle-relaxation-exercise.html   http://studentsupport.[facility]/counseling/resources/self-help/relaxation-and-stress-management/   Deep Breathing  Exercises:  http://www.Optherion/breathing-awareness.html     Meditation:   www.[employer]  www.theEnfortaguided-meditation-site.com You may have to pay for some of these resources.    Guided Imagery:  http://www.Optherion/guided-imagery-scripts.html   http://"[employer]"/library/jfpeznnwnx-bfpleo-jmyxbql/   Consider phone apps such as: Calm, Headspace or Insight Timer.    Counseling / Behavioral Health  [facility]  Visit www.[email or web address].org or call [phone] to find a clinic close to you.  Or call [phone] for [facility].

## 2023-06-21 NOTE — PATIENT INSTRUCTIONS
"Patient Education   [facility]  What to Expect  Here's what to expect in the [facility].   About the program  You'll be meeting with a psychiatric doctor to check your mental health. A psychiatric doctor helps you deal with troubling thoughts and feelings by giving you medicine. They'll make sure you know the plan for your care. You may see them for a long time. When you're feeling better, they may refer you back to seeing your family doctor.   If you have any questions, we'll be glad to talk to you.  About visits  Be open  At your visits, please talk openly about your problems. It may feel hard, but it's the best way for us to help you.  Cancelling visits  If you can't come to your visit, please call us right away at [phone]. If you don't cancel at least 24 hours (1 full day) before your visit, that's \"late cancellation.\"  Not showing up for your visits  Being very late is the same as not showing up. You'll be a \"no show\" if:  You're more than 15 minutes late for a 30-minute (half hour) visit.  You're more than 30 minutes late for a 60-minute (full hour) visit.  If you cancel late or don't show up 2 times within 6 months, we may end your care.  Getting help between visits  If you need help between visits, you can call us Monday to Friday from 8 a.m. to 4:30 p.m. at [phone].  Emergency care  Call 911 or go to the nearest emergency department if your life or someone else's life is in danger.  Call 988 anytime to reach the national Suicide and Crisis hotline.  Medicine refills  To refill your medicine, call your pharmacy. You can also call [facility]'s [facility] at [phone], Monday to Friday, 8 a.m. to 4:30 p.m. It can take 1 to 3 business days to get a refill.   Forms, letters, and tests  You may have papers to fill out, like FMLA, short-term disability, and workability. We can help you with these forms at your visits, but you must have an " appointment. You may need more than 1 visit for this, to be in an intensive therapy program, or both.  Before we can give you medicine for ADHD, we may refer you to get tested for it or confirm it another way.  We may not be able to give you an emotional support animal letter.  We don't do mental health checks ordered by the court.   We don't do mental health testing, but we can refer you to get tested.   Thank you for choosing us for your care.  For informational purposes only. Not to replace the advice of your health care provider. Copyright   2022 [facility]. All rights reserved. [employer] 218547 - 12/22.       PLAN AND RECOMMENDATIONS:  Take take Wellbutrin  mg twice daily .  Take Lexapro 20 mg every day  Take hydroxyzine 25 - 50 mg instead of 3 times daily as needed for anxiety   Continue to take prazosin 5 mg at bedtime  Continue Buspar 20 mg twice daily for anxiety   Follow up again in 4 weeks for medication review and evaluation.    We have discussed his/her diagnosis, prognosis, differential diagnosis and side effects and benefits of medications.     Patient and I revieweddiagnosis and treatment plan and patient agrees with following recommendations:    1.Patient will take the medications as prescribed. Patient will not stop taking medications or adjust them without consulting with theprovider.  2.Patient will call with any problems between 2 visits.  3.Patient will go to the emergency room if not feeling safe , unable to function in the community, or if suicidal, homicidal or hearing voices orhaving paranoia.  4.Patient will abstain from drugs and alcohol.  5.Patient will not drive if sedated on medications or under influence of any substance. 6.Patient will not mix psychiatric medications with drugs andalcohol.   7.Patient will watch his diet and exercise.  8.Patient will see non psychiatric providers for non psychiatric disorders.

## 2023-06-21 NOTE — PROGRESS NOTES
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 be watched closely for worsening depression or unusual behavior especially in the first few weeks after starting an SSRI. Keep in mind, antidepressants are more likely to reduce suicide risk in the long run by improving mood.   SEROTONIN SYNDROME:  Discussed risks of Serotonin syndrome (ie, serotonin toxicity) which is a potentially life-threatening condition associated with increased serotonergic activity in the central nervous system (CNS). It is seen with therapeutic medication use, inadvertent interactions between drugs, and intentional self-poisoning. Serotonin syndrome may involve a spectrum of clinical findings, which often include mental status changes, autonomic hyperactivity, and neuromuscular abnormalities.    BENZODIAZEPINE:  discussion on how benzos work and the need to use them short term due to potential of anxiety getting.  This is a controlled substance with risk for abuse, need to keep in a safe keep place and cannot replace lost scripts.    HARM REDUCTION:  Discussions regarding effects of mood altering substances, alcohol and cannabis, on mood  and that approach is harm reduction, will continue to prescribe meds as they work to cut back use.    FIRST GENERATION ANTIPSYCHOTIC/ SECOND GENERATION ANTIPSYCHOTIC USE:  Atypical need for cardiometabolic monitoring with medication- B/P, weight, blood sugar, cholesterol.  Need to monitor for abnormal movements taught  SAFETY:  We all care about your loved one's safety. To reduce the risk of self-harm, remove access to all:  Firearms, Medicines (both prescribed and over-the-counter), Knives and other sharp objects, Ropes and like materials, and Alcohol  SLEEP HYGIENE: establish a sleep routine, limit screen time 1 hour prior to bed, use bed for sleep only, take sleep/medications on time (including sleepy time tea, trazadone or herbal treatments such as melatonin), aroma therapy, limit caffeine/sugar, yoga, guided imagery, stretch, meditation, limit naps to 20 minutes, make a temperature change in the room, white noise, be mindful of slowing down breathing, take a warm bath/shower, frequently wash sheets, and journaling.   Medlineplus.gov is information for patients.  It is run by the [employer] and it contains information about all disorders, diseases and all medications.      COMMUNITY RESOURCES:    CRISIS NUMBERS: Provided in AVS 6/21/2023  National Suicide Prevention Lifeline: 2-820-435-TALK (138-751-1482)  [employer]/resources for a list of additional resources (SOS)            [facility] - [phone]   Urgent Care Adult Mental Lswbcr-[phone] mobile unit/ 24/7 crisis line  [facility] -[phone]   COPE 24/7 [place] Mobile Team -[phone] (adults)/ [phone] (child)  Poison Control Center - 1-558.608.4643    OR  go to nearest ER  Crisis Text Line for any crisis 24/7 send this-   To: 384116   [facility] ([facility]) [facility]  [phone]  National Suicide Prevention Lifeline: 256.575.8449 (TTY: 891.381.3614). Call anytime for  help.  (www.suicidepreventionlifeline.org)  National [place] [employer]ness (www.ellie.org): 157.452.3118 or 407-184-7107.   Mental Health Association (www.mentalhealth.org): 526.411.8135 or 791-059-4973.  [place] Crisis Text Line: Text [place] to 798133  Suicide LifeLine Chat: suicidepreVeohline.org/chat    ADMINISTRATIVE BILLIN min spent interviewing patient, reviewing referral documents, obtaining and reviewing outside records, communication with other health specialists, and preparing this report on today's date: 2023  Video/Phone Start Time: 1:36 pm  Video/Phone End Time:  1:57 pm      Signed:     Ezekiel Cheng, MSN, APRN, PMHNP-BC     Chart documentation done in part with Dragon Voice Recognition software.  Although reviewed after completion, some word and grammatical errors may remain.  Answers for HPI/ROS submitted by the patient on 2022  If you checked off any problems, how difficult have these problems made it for you to do your work, take care of things at home, or get along with other people?: Somewhat difficult  PHQ9 TOTAL SCORE: 7  DONELL 7 TOTAL SCORE: 13    Answers for HPI/ROS submitted by the patient on 2/3/2023  If you checked off any problems, how difficult have these problems made it for you to do your work, take care of things at home, or get along with other people?: Very difficult  PHQ9 TOTAL SCORE: 3  DONELL 7 TOTAL SCORE: 6  Answers for HPI/ROS submitted by the patient on 2023  If you checked off any problems, how difficult have these problems made it for you to do your work, take care of things at home, or get along with other people?: Somewhat difficult  PHQ9 TOTAL SCORE: 5  DONELL 7 TOTAL SCORE: 8    Answers for HPI/ROS submitted by the patient on 2023  If you checked off any problems, how difficult have these problems made it for you to do your work, take care of things at home, or get along with other people?: Extremely difficult  PHQ9 TOTAL SCORE: 12  DONELL 7  TOTAL SCORE: 9

## 2023-06-21 NOTE — PROGRESS NOTES
Virtual Visit Details    Type of service:  Video Visit     Originating Location (pt. Location): Home    Distant Location (provider location):  Off-site  Platform used for Video Visit: Luna

## 2023-06-21 NOTE — NURSING NOTE
"Chief Complaint   Patient presents with     CPAP Follow Up     CPAP follow up, feeling gassy and tired       Initial /66   Pulse 69   Ht 1.74 m (5' 8.5\")   Wt 96.4 kg (212 lb 9.6 oz)   LMP 08/08/2016   SpO2 96%   BMI 31.85 kg/m   Estimated body mass index is 31.85 kg/m  as calculated from the following:    Height as of this encounter: 1.74 m (5' 8.5\").    Weight as of this encounter: 96.4 kg (212 lb 9.6 oz).    Medication Reconciliation: complete  ESS 17  Cookie Bean MA    "

## 2023-06-26 ENCOUNTER — TRANSFERRED RECORDS (OUTPATIENT)
Dept: HEALTH INFORMATION MANAGEMENT | Facility: CLINIC | Age: 63
End: 2023-06-26
Payer: MEDICARE

## 2023-06-28 NOTE — NURSING NOTE
"Chief Complaint   Patient presents with     Abdominal Pain       Initial /57 (BP Location: Left arm, Patient Position: Chair, Cuff Size: Adult Regular)  Pulse 91  Temp 95.8  F (35.4  C) (Tympanic)  Ht 5' 8.5\" (1.74 m)  Wt 210 lb 8 oz (95.5 kg)  SpO2 96%  BMI 31.54 kg/m2 Estimated body mass index is 31.54 kg/(m^2) as calculated from the following:    Height as of this encounter: 5' 8.5\" (1.74 m).    Weight as of this encounter: 210 lb 8 oz (95.5 kg).  Medication Reconciliation: complete     Ana Fair MA      "
pt refused all care, left ed  steady gait in nad

## 2023-07-07 ENCOUNTER — TELEPHONE (OUTPATIENT)
Dept: OTOLARYNGOLOGY | Facility: CLINIC | Age: 63
End: 2023-07-07
Payer: MEDICARE

## 2023-07-07 NOTE — TELEPHONE ENCOUNTER
LVM for patient regarding cancelled appointment due to provider is no longer seeing New Patients. Patient will need to reschedule with either Dr. Garcia or Dr. Wooten or with another ENT Provider at another location. Additionally, we have a new Provider Dr. Good starting in September if patient would like to wait and schedule then. Provided ENT Clinic number for rescheduling needs. Also sent patient a cancelled appointment letter and a Conergy Message.

## 2023-07-07 NOTE — TELEPHONE ENCOUNTER
Sent patient a cancelled appointment letter and a MyChart Message.    Provided ENT Clinic number for rescheduling needs.

## 2023-07-17 ENCOUNTER — TELEPHONE (OUTPATIENT)
Dept: OTOLARYNGOLOGY | Facility: CLINIC | Age: 63
End: 2023-07-17
Payer: MEDICARE

## 2023-07-17 NOTE — TELEPHONE ENCOUNTER
LVM for patient again regarding cancelled appointment due to provider is no longer seeing New Patients. Patient will need to reschedule with either Dr. Garcia or Dr. Wooten or with another ENT Provider at another location. Additionally, we have a new Provider Dr. Good starting in September if patient would like to wait and schedule then. Provided ENT Clinic number for rescheduling needs. Also, previously sent patient a cancelled appointment letter and a previous SPIL GAMES Message.

## 2023-08-02 ENCOUNTER — TRANSFERRED RECORDS (OUTPATIENT)
Dept: HEALTH INFORMATION MANAGEMENT | Facility: CLINIC | Age: 63
End: 2023-08-02
Payer: MEDICARE

## 2023-08-16 ENCOUNTER — VIRTUAL VISIT (OUTPATIENT)
Dept: PSYCHIATRY | Facility: CLINIC | Age: 63
End: 2023-08-16
Payer: COMMERCIAL

## 2023-08-16 ASSESSMENT — PATIENT HEALTH QUESTIONNAIRE - PHQ9
10. IF YOU CHECKED OFF ANY PROBLEMS, HOW DIFFICULT HAVE THESE PROBLEMS MADE IT FOR YOU TO DO YOUR WORK, TAKE CARE OF THINGS AT HOME, OR GET ALONG WITH OTHER PEOPLE: EXTREMELY DIFFICULT
SUM OF ALL RESPONSES TO PHQ QUESTIONS 1-9: 10
SUM OF ALL RESPONSES TO PHQ QUESTIONS 1-9: 10

## 2023-08-16 ASSESSMENT — ANXIETY QUESTIONNAIRES
3. WORRYING TOO MUCH ABOUT DIFFERENT THINGS: SEVERAL DAYS
6. BECOMING EASILY ANNOYED OR IRRITABLE: NOT AT ALL
8. IF YOU CHECKED OFF ANY PROBLEMS, HOW DIFFICULT HAVE THESE MADE IT FOR YOU TO DO YOUR WORK, TAKE CARE OF THINGS AT HOME, OR GET ALONG WITH OTHER PEOPLE?: EXTREMELY DIFFICULT
2. NOT BEING ABLE TO STOP OR CONTROL WORRYING: SEVERAL DAYS
GAD7 TOTAL SCORE: 7
5. BEING SO RESTLESS THAT IT IS HARD TO SIT STILL: NOT AT ALL
GAD7 TOTAL SCORE: 7
7. FEELING AFRAID AS IF SOMETHING AWFUL MIGHT HAPPEN: SEVERAL DAYS
7. FEELING AFRAID AS IF SOMETHING AWFUL MIGHT HAPPEN: SEVERAL DAYS
IF YOU CHECKED OFF ANY PROBLEMS ON THIS QUESTIONNAIRE, HOW DIFFICULT HAVE THESE PROBLEMS MADE IT FOR YOU TO DO YOUR WORK, TAKE CARE OF THINGS AT HOME, OR GET ALONG WITH OTHER PEOPLE: EXTREMELY DIFFICULT
1. FEELING NERVOUS, ANXIOUS, OR ON EDGE: MORE THAN HALF THE DAYS
4. TROUBLE RELAXING: MORE THAN HALF THE DAYS
GAD7 TOTAL SCORE: 7

## 2023-08-16 NOTE — PROGRESS NOTES
"Virtual Visit Details    Type of service:  Video Visit   Video Start Time: {video visit start/end time for provider to select:642212}  Video End Time:{video visit start/end time for provider to select:702271}    Originating Location (pt. Location): {video visit patient location:181280::\"Home\"}  {PROVIDER LOCATION On-site should be selected for visits conducted from your clinic location or adjoining [facility] hospital, academic office, or other nearby [facility] building. Off-site should be selected for all other provider locations, including home:676733}  Distant Location (provider location):  {virtual location provider:020957}  Platform used for Video Visit: {Virtual Visit Platforms:658930::\"[employer]\"}  "

## 2023-08-16 NOTE — NURSING NOTE
Is the patient currently in the state of [place]? YES    Visit mode:VIDEO    If the visit is dropped, the patient can be reconnected by: TELEPHONE VISIT: Phone number: [phone]    Will anyone else be joining the visit? No  (If patient encounters technical issues they should call [phone])    How would you like to obtain your AVS? MyChart    Are changes needed to the allergy or medication list? No    Rooming Documentation: Assigned questionnaire(s) completed .    Reason for visit: SHIRLEY Amador

## 2023-08-25 ENCOUNTER — MYC MEDICAL ADVICE (OUTPATIENT)
Dept: PSYCHIATRY | Facility: CLINIC | Age: 63
End: 2023-08-25
Payer: MEDICARE

## 2023-08-25 DIAGNOSIS — F33.1 MAJOR DEPRESSIVE DISORDER, RECURRENT EPISODE, MODERATE (H): ICD-10-CM

## 2023-08-25 DIAGNOSIS — F41.1 GAD (GENERALIZED ANXIETY DISORDER): ICD-10-CM

## 2023-08-25 RX ORDER — BUSPIRONE HYDROCHLORIDE 10 MG/1
20 TABLET ORAL 2 TIMES DAILY
Qty: 120 TABLET | Refills: 0 | Status: SHIPPED | OUTPATIENT
Start: 2023-08-25 | End: 2023-09-19 | Stop reason: DRUGHIGH

## 2023-08-25 RX ORDER — HYDROXYZINE HYDROCHLORIDE 25 MG/1
25-50 TABLET, FILM COATED ORAL 3 TIMES DAILY PRN
Qty: 90 TABLET | Refills: 0 | Status: SHIPPED | OUTPATIENT
Start: 2023-08-25 | End: 2023-10-16

## 2023-08-25 RX ORDER — BUPROPION HYDROCHLORIDE 150 MG/1
150 TABLET, EXTENDED RELEASE ORAL 2 TIMES DAILY
Qty: 60 TABLET | Refills: 0 | Status: SHIPPED | OUTPATIENT
Start: 2023-08-25 | End: 2023-10-06

## 2023-08-25 NOTE — TELEPHONE ENCOUNTER
RN called patient and spoke with her at [phone]. Patient reports frustration. Was supposed to have a video visit with Shaji Cheng on 8/16/23. Her computer was not working so she was not seen. Frustrated that she cannot get in to be seen sooner. She thought that scheduling would call her. RN explained the process for scheduling appointments. She needs a refill of buspar, wellbutrin, and hydroxyzine. Has been out since 8/16/23. RN provided the number to patient relations.     Date of Last Office Visit: 6/21/23  Date of Next Office Visit: none, provided the number to schedule  No shows since last visit: 1 - computer was not working  Cancellations since last visit: 0    Medication requested: busPIRone (BUSPAR) 10 MG tablet  Date last ordered: 7/24/23 Qty: 120 Refills: 0  Medication requested: hydrOXYzine (ATARAX) 25 MG tablet  Date last ordered: 6/7/23 Qty: 90 Refills: 1   Medication requested: buPROPion (WELLBUTRIN SR) 150 MG 12 hr tablet  Date last ordered: 5/25/23 Qty: 60 Refills: 1     Review of [place] ?: NA    Lapse in medication adherence greater than 5 days?: yes, out since 8/16  If yes, call patient and gather details: spoke with patient on the phone  Medication refill request verified as identical to current order?: yes  Result of Last DAM, VPA, Li+ Level, CBC, or Carbamazepine Level (at or since last visit): N/A    Last visit treatment plan:     ASSESSMENT AND PLAN    Patient is a 62 year old female with a history of MDD DONELL and PTSD  Presents today for follow up visit.  She reports symptoms stabilization except for increased anxiety related to incessant chest pain.  She went to the ER last week due to severe chest pain.  I encouraged patient to follow-up with her PCP related to stomach problem.  She denies SI, SIB, and HI.  She also denied both auditory and visual hallucination.  She reported no rk or hypomania.  Return to clinic in 4 weeks for follow-up.     PLAN AND RECOMMENDATIONS:  Take take  Wellbutrin  mg twice daily         .  Take Lexapro 20 mg every day  Take hydroxyzine 25 - 50 mg instead of 3 times daily as needed for anxiety     Continue to take prazosin 5 mg at bedtime  Continue Buspar 20 mg twice daily for anxiety   Follow up again in 4 weeks for medication review and evaluation.    []Medication refilled per  Medication Refill in Ambulatory Care  policy.  [x]Medication unable to be refilled by RN due to criteria not met as indicated below:    []Eligibility - not seen in the last year   []Supervision - no future appointment   [x]Compliance - no shows, cancellations or lapse in therapy   []Verification - order discrepancy   []Controlled medication   []Medication not included in policy   []90-day supply request   []Other

## 2023-08-31 ENCOUNTER — TRANSFERRED RECORDS (OUTPATIENT)
Dept: HEALTH INFORMATION MANAGEMENT | Facility: CLINIC | Age: 63
End: 2023-08-31
Payer: MEDICARE

## 2023-09-11 DIAGNOSIS — K21.9 GASTROESOPHAGEAL REFLUX DISEASE WITHOUT ESOPHAGITIS: ICD-10-CM

## 2023-09-11 RX ORDER — OMEPRAZOLE 40 MG/1
40 CAPSULE, DELAYED RELEASE ORAL DAILY
Qty: 90 CAPSULE | Refills: 0 | Status: SHIPPED | OUTPATIENT
Start: 2023-09-11 | End: 2023-11-28

## 2023-09-13 ENCOUNTER — TELEPHONE (OUTPATIENT)
Dept: OTOLARYNGOLOGY | Facility: CLINIC | Age: 63
End: 2023-09-13
Payer: MEDICARE

## 2023-09-13 NOTE — TELEPHONE ENCOUNTER
Spoke with patient regarding referral and scheduling for  who has replaced . Patient declined scheduling as offered in November. Patient is ok with referral request being cancelled.-Per Patient

## 2023-09-19 ENCOUNTER — VIRTUAL VISIT (OUTPATIENT)
Dept: PSYCHIATRY | Facility: CLINIC | Age: 63
End: 2023-09-19
Payer: MEDICARE

## 2023-09-19 DIAGNOSIS — F41.1 GAD (GENERALIZED ANXIETY DISORDER): ICD-10-CM

## 2023-09-19 DIAGNOSIS — G47.00 INSOMNIA, UNSPECIFIED TYPE: ICD-10-CM

## 2023-09-19 DIAGNOSIS — F33.1 MODERATE EPISODE OF RECURRENT MAJOR DEPRESSIVE DISORDER (H): Primary | ICD-10-CM

## 2023-09-19 DIAGNOSIS — F43.10 PTSD (POST-TRAUMATIC STRESS DISORDER): ICD-10-CM

## 2023-09-19 PROCEDURE — 99214 OFFICE O/P EST MOD 30 MIN: CPT | Mod: VID | Performed by: NURSE PRACTITIONER

## 2023-09-19 RX ORDER — BUSPIRONE HYDROCHLORIDE 10 MG/1
20 TABLET ORAL 3 TIMES DAILY
Qty: 180 TABLET | Refills: 1 | Status: SHIPPED | OUTPATIENT
Start: 2023-09-19 | End: 2023-11-08

## 2023-09-19 ASSESSMENT — ANXIETY QUESTIONNAIRES
IF YOU CHECKED OFF ANY PROBLEMS ON THIS QUESTIONNAIRE, HOW DIFFICULT HAVE THESE PROBLEMS MADE IT FOR YOU TO DO YOUR WORK, TAKE CARE OF THINGS AT HOME, OR GET ALONG WITH OTHER PEOPLE: VERY DIFFICULT
3. WORRYING TOO MUCH ABOUT DIFFERENT THINGS: NEARLY EVERY DAY
6. BECOMING EASILY ANNOYED OR IRRITABLE: NOT AT ALL
1. FEELING NERVOUS, ANXIOUS, OR ON EDGE: NEARLY EVERY DAY
GAD7 TOTAL SCORE: 14
GAD7 TOTAL SCORE: 14
7. FEELING AFRAID AS IF SOMETHING AWFUL MIGHT HAPPEN: MORE THAN HALF THE DAYS
5. BEING SO RESTLESS THAT IT IS HARD TO SIT STILL: NOT AT ALL
2. NOT BEING ABLE TO STOP OR CONTROL WORRYING: NEARLY EVERY DAY
4. TROUBLE RELAXING: NEARLY EVERY DAY

## 2023-09-19 NOTE — PROGRESS NOTES
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 or for acute medical concerns.    PSYCHOEDUCATION:  Medication side effects and alternatives reviewed. Health promotion activities recommended and reviewed today. All questions addressed. Education and counseling completed regarding risks and benefits of medications and psychotherapy options.  Consent provided by patient/guardian  Call the psychiatric nurse line with medication questions or concerns at [phone].  Databrickshart may be used to communicate with your provider, but this is not intended to be used for emergencies.  BLACK BOX WARNING: Discussed the Food and Drug Administration (FDA) requires that all antidepressants carry a warning that some children, adolescents and young adults may be at increased risk of suicide when taking antidepressants. Anyone taking an antidepressant should be watched closely for worsening depression or unusual behavior especially in the first few weeks after starting an SSRI. Keep in mind, antidepressants are more likely to reduce suicide risk in the long run by improving mood.   SEROTONIN SYNDROME:  Discussed risks of Serotonin syndrome (ie, serotonin toxicity)  which is a potentially life-threatening condition associated with increased serotonergic activity in the central nervous system (CNS). It is seen with therapeutic medication use, inadvertent interactions between drugs, and intentional self-poisoning. Serotonin syndrome may involve a spectrum of clinical findings, which often include mental status changes, autonomic hyperactivity, and neuromuscular abnormalities.    BENZODIAZEPINE:  discussion on how benzos work and the need to use them short term due to potential of anxiety getting.  This is a controlled substance with risk for abuse, need to keep in a safe keep place and cannot replace lost scripts.    HARM REDUCTION:  Discussions regarding effects of mood altering substances, alcohol and cannabis, on mood and that approach is harm reduction, will continue to prescribe meds as they work to cut back use.    FIRST GENERATION ANTIPSYCHOTIC/ SECOND GENERATION ANTIPSYCHOTIC USE:  Atypical need for cardiometabolic monitoring with medication- B/P, weight, blood sugar, cholesterol.  Need to monitor for abnormal movements taught  SAFETY:  We all care about your loved one's safety. To reduce the risk of self-harm, remove access to all:  Firearms, Medicines (both prescribed and over-the-counter), Knives and other sharp objects, Ropes and like materials, and Alcohol  SLEEP HYGIENE: establish a sleep routine, limit screen time 1 hour prior to bed, use bed for sleep only, take sleep/medications on time (including sleepy time tea, trazadone or herbal treatments such as melatonin), aroma therapy, limit caffeine/sugar, yoga, guided imagery, stretch, meditation, limit naps to 20 minutes, make a temperature change in the room, white noise, be mindful of slowing down breathing, take a warm bath/shower, frequently wash sheets, and journaling.   Medlineplus.gov is information for patients.  It is run by the [employer] and it contains information about all disorders,  diseases and all medications.      COMMUNITY RESOURCES:    CRISIS NUMBERS: Provided in AVS 2023  National Suicide Prevention Lifeline: 2-368-127-TALK (007-682-4321)  [employer]/resources for a list of additional resources (SOS)            [facility] - [phone]   Urgent Care Adult Mental [place]-[phone] mobile unit/  crisis line  [facility] [place] -[phone]   COPE  [place] Mobile Team -[phone] (adults)/ [phone] (child)  Poison Control Center - 1-336.666.5767  OR 91  OR  go to nearest ER  Crisis Text Line for any crisis  send this-   To: 334078   [facility]  [phone]  National Suicide Prevention Lifeline: 548.762.2200 (TTY: 575.385.3339). Call anytime for help.  (www.suicidepreventionlifeline.org)  National [place] on Mental Illness (www.ellie.org): 822-273-9723 or 354-870-8923.   Mental Health Association (www.mentalhealth.org): 279.820.7494 or 070-988-5057.  [place] Crisis Text Line: Text [place] to 520196  Suicide LifeLine Chat: suicide[employer].org/chat    ADMINISTRATIVE BILLIN min spent interviewing patient, reviewing referral documents, obtaining and reviewing outside records, communication with other health specialists, and preparing this report on today's date: 2023  Video/Phone Start Time: 1:37 pm  Video/Phone End Time:  1:56 pm      Signed:     Ezekiel Cheng, MSN, APRN, PMHNP-BC     Chart documentation done in part with Dragon Voice Recognition software.  Although reviewed after completion, some word and grammatical errors may remain.  Answers for HPI/ROS submitted by the patient on 2022  If you checked off any problems, how difficult have these problems made it for you to do your work, take care of things at home, or get along with other people?: Somewhat difficult  PHQ9 TOTAL SCORE: 7  DONELL 7 TOTAL SCORE: 13    Answers for HPI/ROS submitted by the patient on 2/3/2023  If you  checked off any problems, how difficult have these problems made it for you to do your work, take care of things at home, or get along with other people?: Very difficult  PHQ9 TOTAL SCORE: 3  DONELL 7 TOTAL SCORE: 6  Answers for HPI/ROS submitted by the patient on 4/14/2023  If you checked off any problems, how difficult have these problems made it for you to do your work, take care of things at home, or get along with other people?: Somewhat difficult  PHQ9 TOTAL SCORE: 5  DONELL 7 TOTAL SCORE: 8    Answers for HPI/ROS submitted by the patient on 5/25/2023  If you checked off any problems, how difficult have these problems made it for you to do your work, take care of things at home, or get along with other people?: Extremely difficult  PHQ9 TOTAL SCORE: 12  DONELL 7 TOTAL SCORE: 9Answers submitted by the patient for this visit:  Patient Health Questionnaire (Submitted on 9/19/2023)  If you checked off any problems, how difficult have these problems made it for you to do your work, take care of things at home, or get along with other people?: Somewhat difficult  PHQ9 TOTAL SCORE: 5  DONELL-7 (Submitted on 9/19/2023)  DONELL 7 TOTAL SCORE: 14

## 2023-09-19 NOTE — PROGRESS NOTES
Virtual Visit Details    Type of service:  Video Visit   Video Start Time:  1:37pm  Video End Time: 1:56 pm    Originating Location (pt. Location): Home    Distant Location (provider location):  Off-site  Platform used for Video Visit: Luna

## 2023-09-19 NOTE — PATIENT INSTRUCTIONS
"Patient Education   The Panel Psychiatry Program  What to Expect  Here's what to expect in the Panel Psychiatry Program.   About the program  You'll be meeting with a psychiatric doctor to check your mental health. A psychiatric doctor helps you deal with troubling thoughts and feelings by giving you medicine. They'll make sure you know the plan for your care. You may see them for a long time. When you're feeling better, they may refer you back to seeing your family doctor.   If you have any questions, we'll be glad to talk to you.  About visits  Be open  At your visits, please talk openly about your problems. It may feel hard, but it's the best way for us to help you.  Cancelling visits  If you can't come to your visit, please call us right away at [phone]. If you don't cancel at least 24 hours (1 full day) before your visit, that's \"late cancellation.\"  Not showing up for your visits  Being very late is the same as not showing up. You'll be a \"no show\" if:  You're more than 15 minutes late for a 30-minute (half hour) visit.  You're more than 30 minutes late for a 60-minute (full hour) visit.  If you cancel late or don't show up 2 times within 6 months, we may end your care.  Getting help between visits  If you need help between visits, you can call us Monday to Friday from 8 a.m. to 4:30 p.m. at [phone].  Emergency care  Call 911 or go to the nearest emergency department if your life or someone else's life is in danger.  Call 988 anytime to reach the national Suicide and Crisis hotline.  Medicine refills  To refill your medicine, call your pharmacy. You can also call [facility] at [phone], Monday to Friday, 8 a.m. to 4:30 p.m. It can take 1 to 3 business days to get a refill.   Forms, letters, and tests  You may have papers to fill out, like FMLA, short-term disability, and workability. We can help you with these forms at your visits, but you must have an " appointment. You may need more than 1 visit for this, to be in an intensive therapy program, or both.  Before we can give you medicine for ADHD, we may refer you to get tested for it or confirm it another way.  We may not be able to give you an emotional support animal letter.  We don't do mental health checks ordered by the court.   We don't do mental health testing, but we can refer you to get tested.   Thank you for choosing us for your care.  For informational purposes only. Not to replace the advice of your health care provider. Copyright   2022 [facility]. All rights reserved. [employer] 411221 - 12/22.       PLAN AND RECOMMENDATIONS:  Continue take Wellbutrin  mg twice daily - we plan to switch to Wellbutrin XL after checking with her insurance about the cost  Continue Lexapro 20 mg every day  Take hydroxyzine 25 - 50 mg instead of 3 times daily as needed for anxiety   Continue to take prazosin 5 mg at bedtime  Take Buspar 20 mg three times daily for anxiety  Short term individual psychotherapy referral sent   Follow up again in 4 weeks for medication review and evaluation.    We have discussed his/her diagnosis, prognosis, differential diagnosis and side effects and benefits of medications.     Patient and I revieweddiagnosis and treatment plan and patient agrees with following recommendations:    1.Patient will take the medications as prescribed. Patient will not stop taking medications or adjust them without consulting with theprovider.  2.Patient will call with any problems between 2 visits.  3.Patient will go to the emergency room if not feeling safe , unable to function in the community, or if suicidal, homicidal or hearing voices orhaving paranoia.  4.Patient will abstain from drugs and alcohol.  5.Patient will not drive if sedated on medications or under influence of any substance. 6.Patient will not mix psychiatric medications with drugs andalcohol.   7.Patient will watch his diet  and exercise.  8.Patient will see non psychiatric providers for non psychiatric disorders.

## 2023-09-19 NOTE — NURSING NOTE
Is the patient currently in the state of [place]? YES    Visit mode:VIDEO    If the visit is dropped, the patient can be reconnected by: VIDEO VISIT: Text to cell phone:   Telephone Information:   Mobile [phone]       Will anyone else be joining the visit? No  (If patient encounters technical issues they should call [phone])    How would you like to obtain your AVS? MyChart    Are changes needed to the allergy or medication list? Yes Bentyl     Rooming Documentation: Assigned questionnaire(s) completed .    Reason for visit: SHIRLEY Amador

## 2023-09-21 NOTE — TELEPHONE ENCOUNTER
Date of Last Office Visit: Today  Date of Next Office Visit: 6/21/23  No shows since last visit: no  Cancellations since last visit: no    Medication requested: busPIRone (BUSPAR) 10 MG tablet Date last ordered: 3/3/23 Qty: 120 Refills: 1         Lapse in medication adherence greater than 5 days?: no  If yes, call patient and gather details: no  Medication refill request verified as identical to current order?: yes  Result of Last DAM, VPA, Li+ Level, CBC, or Carbamazepine Level (at or since last visit): N/A    Last visit treatment plan:     PLAN AND RECOMMENDATIONS:  Take take Wellbutrin  mg twice daily         .  Take Lexapro 20 mg every day  Take hydroxyzine 25 - 50 mg instead of 3 times daily as needed for anxiety     Continue to take prazosin 5 mg at bedtime  Continue Buspar 20 mg twice daily for anxiety   Follow up again in 6 weeks for medication review and evaluation.        []Medication refilled per  Medication Refill in Ambulatory Care  policy.  [x]Medication unable to be refilled by RN due to criteria not met as indicated below:    []Eligibility - not seen in the last year   []Supervision - no future appointment   []Compliance - no shows, cancellations or lapse in therapy   []Verification - order discrepancy   []Controlled medication   [x]Medication not included in policy   []90-day supply request   []Other     Diet: Heart healthy diet

## 2023-10-04 ENCOUNTER — TRANSFERRED RECORDS (OUTPATIENT)
Dept: HEALTH INFORMATION MANAGEMENT | Facility: CLINIC | Age: 63
End: 2023-10-04
Payer: MEDICARE

## 2023-10-06 ENCOUNTER — MYC MEDICAL ADVICE (OUTPATIENT)
Dept: PSYCHIATRY | Facility: CLINIC | Age: 63
End: 2023-10-06
Payer: MEDICARE

## 2023-10-06 DIAGNOSIS — F33.1 MAJOR DEPRESSIVE DISORDER, RECURRENT EPISODE, MODERATE (H): ICD-10-CM

## 2023-10-06 RX ORDER — BUPROPION HYDROCHLORIDE 150 MG/1
150 TABLET, EXTENDED RELEASE ORAL 2 TIMES DAILY
Qty: 60 TABLET | Refills: 0 | Status: SHIPPED | OUTPATIENT
Start: 2023-10-06 | End: 2023-11-08

## 2023-10-09 ENCOUNTER — OFFICE VISIT (OUTPATIENT)
Dept: BEHAVIORAL HEALTH | Facility: CLINIC | Age: 63
End: 2023-10-09
Attending: NURSE PRACTITIONER
Payer: MEDICARE

## 2023-10-09 DIAGNOSIS — F33.1 MODERATE EPISODE OF RECURRENT MAJOR DEPRESSIVE DISORDER (H): ICD-10-CM

## 2023-10-09 DIAGNOSIS — F41.1 GAD (GENERALIZED ANXIETY DISORDER): ICD-10-CM

## 2023-10-09 DIAGNOSIS — F43.10 PTSD (POST-TRAUMATIC STRESS DISORDER): ICD-10-CM

## 2023-10-09 PROCEDURE — 90834 PSYTX W PT 45 MINUTES: CPT

## 2023-10-09 ASSESSMENT — ANXIETY QUESTIONNAIRES
5. BEING SO RESTLESS THAT IT IS HARD TO SIT STILL: NOT AT ALL
IF YOU CHECKED OFF ANY PROBLEMS ON THIS QUESTIONNAIRE, HOW DIFFICULT HAVE THESE PROBLEMS MADE IT FOR YOU TO DO YOUR WORK, TAKE CARE OF THINGS AT HOME, OR GET ALONG WITH OTHER PEOPLE: VERY DIFFICULT
2. NOT BEING ABLE TO STOP OR CONTROL WORRYING: MORE THAN HALF THE DAYS
4. TROUBLE RELAXING: NEARLY EVERY DAY
7. FEELING AFRAID AS IF SOMETHING AWFUL MIGHT HAPPEN: SEVERAL DAYS
GAD7 TOTAL SCORE: 11
3. WORRYING TOO MUCH ABOUT DIFFERENT THINGS: MORE THAN HALF THE DAYS
GAD7 TOTAL SCORE: 11
1. FEELING NERVOUS, ANXIOUS, OR ON EDGE: NEARLY EVERY DAY
6. BECOMING EASILY ANNOYED OR IRRITABLE: NOT AT ALL

## 2023-10-09 ASSESSMENT — PATIENT HEALTH QUESTIONNAIRE - PHQ9
SUM OF ALL RESPONSES TO PHQ QUESTIONS 1-9: 9
10. IF YOU CHECKED OFF ANY PROBLEMS, HOW DIFFICULT HAVE THESE PROBLEMS MADE IT FOR YOU TO DO YOUR WORK, TAKE CARE OF THINGS AT HOME, OR GET ALONG WITH OTHER PEOPLE: VERY DIFFICULT
SUM OF ALL RESPONSES TO PHQ QUESTIONS 1-9: 9

## 2023-10-09 NOTE — Clinical Note
Mattie Girard, I met with Deanne on 10/09 and she expressed wanting to explore getting an emotional support animal. That is not a letter I cannot provide, so will defer to you. Deanne is aware that I am sending you this message. She plans to address this during your next appointment together.   Dominique Jefferson

## 2023-10-09 NOTE — PROGRESS NOTES
[facility]: Integrated Behavioral Health  October 9th, 2023      Behavioral Health Clinician Progress Note    Patient Name: Ilsa Yusfu           Service Type:  Individual      Service Location:   Face to Face in Clinic     Session Start Time: 12:34 PM  Session End Time: 1:21 PM      Session Length: 38 - 52      Attendees: Patient     Service Modality:  In-person    Visit Activities (Refresh list every visit): [facility] and [facility] Only    Diagnostic Assessment Date: Completed by Shaji Cheng CNP on 2/03/2023  Treatment Plan Review Date: TBD  See Flowsheets for today's PHQ-9 and DONELL-7 results  Previous PHQ-9:       5/25/2023    11:26 AM 8/16/2023     1:29 PM 9/19/2023     1:27 PM   PHQ-9 SCORE   PHQ-9 Total Score MyChart 12 (Moderate depression) 10 (Moderate depression) 5 (Mild depression)   PHQ-9 Total Score 12 10 5     Previous DONELL-7:       5/25/2023    11:28 AM 8/16/2023     1:29 PM 9/19/2023     1:28 PM   DONELL-7 SCORE   Total Score 9 (mild anxiety) 7 (mild anxiety) 14 (moderate anxiety)   Total Score 9 7 14       IMER LEVEL:      1/30/2012     2:00 PM   IMER Score (Last Two)   IMER Raw Score 37   Activation Score 49.9   IMER Level 2           DATA  Extended Session (60+ minutes): No  Interactive Complexity: No  Crisis: No  [facility] Patient: No    Treatment Objective(s) Addressed in This Session:  Target Behavior(s):  reduce symptoms of anxiety    Anxiety: will experience a reduction in anxiety, will develop more effective coping skills to manage anxiety symptoms, will develop healthy cognitive patterns and beliefs, and will increase ability to function adaptively    Current Stressors / Issues:    [facility] met with pt face to face in the clinic on this date. Pt states that during the pandemic, pt did not have access to help. Pt states that since then, she has been wanting to get back into therapy. Pt states that she tries therapy every 5-6 years in an effort to maintain her mental health.  Pt was referred by her outpatient psychiatrist, Shaji Cheng CNP. At present, pt states that depression symptoms are well managed. Pt states that she is more so having difficulty with anxiety and ruminating thoughts. Pt states that anxiety has been centered around unhealthy family dynamic and her relationship wit her daughters. Pt states that oldest daughter has not spoken to patient in 6 years and youngest daughter has misperceptions about mental health. Pt took time to process challenging family dynamic and how they a have affected her mental health symptoms.     In regards to self-care and coping skills, pt states that she enjoys walking and cooking. Pt is hoping to learn to paint with oils. Pt states that she also finds peace in prayer. Pt denies SI/HI/SIB at present. Pt did express considering getting an SARAHI. [facility] to communicate this to psychiatrist. Pt states that she would also like referral to ongoing individual therapy.     Progress on Treatment Objective(s) / Homework:  New Objective established this session - PREPARATION (Decided to change - considering how); Intervened by negotiating a change plan and determining options / strategies for behavior change, identifying triggers, exploring social supports, and working towards setting a date to begin behavior change    Motivational Interviewing    MI Intervention: Expressed Empathy/Understanding, Open-ended questions, and Reflections: simple and complex     Change Talk Expressed by the Patient: Desire to change Ability to change Reasons to change    Provider Response to Change Talk: E - Evoked more info from patient about behavior change, A - Affirmed patient's thoughts, decisions, or attempts at behavior change, R - Reflected patient's change talk, and S - Summarized patient's change talk statements    Also provided psychoeducation about behavioral health condition, symptoms, and treatment options    Care Plan review completed: No    Medication Review:  No  changes to current psychiatric medication(s)    Medication Compliance:  Yes    Changes in Health Issues:   None reported    Chemical Use Review:   Substance Use: Chemical use reviewed, no active concerns identified      Tobacco Use: No current tobacco use.      Assessment: Current Emotional / Mental Status (status of significant symptoms):  Risk status (Self / Other harm or suicidal ideation)  Patient has had a history of suicidal ideation: in 2013, passive thought and denies a history of suicide attempts, self-injurious behavior, homicidal ideation, homicidal behavior, and and other safety concerns  Patient denies current fears or concerns for personal safety.  Patient denies current or recent suicidal ideation or behaviors.  Patient denies current or recent homicidal ideation or behaviors.  Patient denies current or recent self injurious behavior or ideation.  Patient denies other safety concerns.  A safety and risk management plan has not been developed at this time, however patient was encouraged to call [place] [phone] should there be a change in any of these risk factors.    Appearance:   Appropriate   Eye Contact:   Good   Psychomotor Behavior: Normal   Attitude:   Cooperative  Interested  Orientation:   All  Speech   Rate / Production: Normal    Volume:  Normal   Mood:    Anxious   Affect:    Appropriate  Tearful  Thought Content:  Clear   Thought Form:  Coherent  Logical   Insight:    Good     Diagnoses:  1. DONELL (generalized anxiety disorder)    2. PTSD (post-traumatic stress disorder)    3. Moderate episode of recurrent major depressive disorder (H)        Collateral Reports Completed:  Communicated with: outpatient psychiatrist     Plan: (Homework, other):  Patient was given information about behavioral services and encouraged to schedule a follow up appointment with the clinic [facility] in 2 weeks.  She was also given information about mental health symptoms and treatment options .  CD Recommendations: No  indications of CD issues.     YORDY Barnes, [facility]  Behavioral Health Clinician  [facility], [address], [address], [address], [place], [place], [address]  [phone]      2023

## 2023-10-12 DIAGNOSIS — F41.1 GAD (GENERALIZED ANXIETY DISORDER): ICD-10-CM

## 2023-10-12 DIAGNOSIS — F43.10 PTSD (POST-TRAUMATIC STRESS DISORDER): ICD-10-CM

## 2023-10-12 RX ORDER — ESCITALOPRAM OXALATE 10 MG/1
20 TABLET ORAL DAILY
Qty: 90 TABLET | Refills: 1 | Status: SHIPPED | OUTPATIENT
Start: 2023-10-12 | End: 2023-11-08

## 2023-10-12 NOTE — TELEPHONE ENCOUNTER
Date of Last Office Visit: 9/19/23  Date of Next Office Visit: 10/18/23  No shows since last visit: no  Cancellations since last visit: no    Medication requested: escitalopram (LEXAPRO) 10 MG tablet  Date last ordered: 3/3/23 Qty: 90 Refills: 1     Lapse in medication adherence greater than 5 days?: no  If yes, call patient and gather details: na  Medication refill request verified as identical to current order?: yes  Result of Last DAM, VPA, Li+ Level, CBC, or Carbamazepine Level (at or since last visit): N/A    Last visit treatment plan: PLAN AND RECOMMENDATIONS:  Continue take Wellbutrin  mg twice daily - we plan to switch to Wellbutrin XL after checking with her insurance about the cost  Continue Lexapro 20 mg every day  Take hydroxyzine 25 - 50 mg instead of 3 times daily as needed for anxiety     Continue to take prazosin 5 mg at bedtime  Take Buspar 20 mg three times daily for anxiety  Short term individual psychotherapy referral sent   Follow up again in 4 weeks for medication review and evaluation.    []Medication refilled per  Medication Refill in Ambulatory Care  policy.  [x]Medication unable to be refilled by RN due to criteria not met as indicated below:    []Eligibility - not seen in the last year   []Supervision - no future appointment   []Compliance - no shows, cancellations or lapse in therapy   []Verification - order discrepancy   []Controlled medication   [x]Medication not included in policy   []90-day supply request   []Other

## 2023-10-14 DIAGNOSIS — F41.1 GAD (GENERALIZED ANXIETY DISORDER): ICD-10-CM

## 2023-10-16 ENCOUNTER — MYC REFILL (OUTPATIENT)
Dept: PSYCHIATRY | Facility: CLINIC | Age: 63
End: 2023-10-16
Payer: MEDICARE

## 2023-10-16 ENCOUNTER — MYC MEDICAL ADVICE (OUTPATIENT)
Dept: PSYCHIATRY | Facility: CLINIC | Age: 63
End: 2023-10-16
Payer: MEDICARE

## 2023-10-16 DIAGNOSIS — F41.1 GAD (GENERALIZED ANXIETY DISORDER): ICD-10-CM

## 2023-10-16 RX ORDER — HYDROXYZINE HYDROCHLORIDE 25 MG/1
25-50 TABLET, FILM COATED ORAL 3 TIMES DAILY PRN
Qty: 90 TABLET | Refills: 0 | Status: SHIPPED | OUTPATIENT
Start: 2023-10-16 | End: 2023-10-17

## 2023-10-16 RX ORDER — BUSPIRONE HYDROCHLORIDE 10 MG/1
20 TABLET ORAL 3 TIMES DAILY
Qty: 540 TABLET | Refills: 1 | OUTPATIENT
Start: 2023-10-16

## 2023-10-16 NOTE — TELEPHONE ENCOUNTER
Patient still has one refill left and also has appt with provider Shaji Cheng NP 10/18/23, to discuss refills

## 2023-10-16 NOTE — TELEPHONE ENCOUNTER
Date of Last Office Visit: 9/19/23  Date of Next Office Visit: 10/18/23  No shows since last visit: 0  Cancellations since last visit: 0    Medication requested: hydrOXYzine (ATARAX) 25 MG tablet  Date last ordered: 8/25/23 Qty: 90 Refills: 0     Review of [place] ?: NA    Lapse in medication adherence greater than 5 days?: No, 3 days  If yes, call patient and gather details: na  Medication refill request verified as identical to current order?: yes  Result of Last DAM, VPA, Li+ Level, CBC, or Carbamazepine Level (at or since last visit): N/A    Last visit treatment plan:   Continue take Wellbutrin  mg twice daily - we plan to switch to Wellbutrin XL after checking with her insurance about the cost  Continue Lexapro 20 mg every day  Take hydroxyzine 25 - 50 mg instead of 3 times daily as needed for anxiety     Continue to take prazosin 5 mg at bedtime  Take Buspar 20 mg three times daily for anxiety  Short term individual psychotherapy referral sent   Follow up again in 4 weeks for medication review and evaluation.       [x]Medication refilled per  Medication Refill in Ambulatory Care  policy.  []Medication unable to be refilled by RN due to criteria not met as indicated below:    []Eligibility - not seen in the last year   []Supervision - no future appointment   []Compliance - no shows, cancellations or lapse in therapy   []Verification - order discrepancy   []Controlled medication   []Medication not included in policy   []90-day supply request   []Other

## 2023-10-17 RX ORDER — HYDROXYZINE HYDROCHLORIDE 25 MG/1
25-50 TABLET, FILM COATED ORAL 3 TIMES DAILY PRN
Qty: 90 TABLET | Refills: 0 | Status: SHIPPED | OUTPATIENT
Start: 2023-10-17 | End: 2023-11-08

## 2023-10-17 NOTE — TELEPHONE ENCOUNTER
RN reviewed patient's PhoneGuardt message and reviewed where the prescription was sent on 10/16/23    The following prescriptions will be filled at [id number] - [place], [place] - [address] [place] [address] At [facility] [address] [[phone]]:   - hydrOXYzine (ATARAX) 25 MG tablet    RN LVM at [facility] to cancel d the prescription for hydroxyzine.    RN sent new prescription to correct pharmacy [facility] [place] on [address] at the request of the patient. .     RN notified patient via [employer].

## 2023-10-19 ENCOUNTER — VIRTUAL VISIT (OUTPATIENT)
Dept: PSYCHIATRY | Facility: CLINIC | Age: 63
End: 2023-10-19
Payer: MEDICARE

## 2023-10-19 DIAGNOSIS — F43.10 PTSD (POST-TRAUMATIC STRESS DISORDER): ICD-10-CM

## 2023-10-19 DIAGNOSIS — F90.1 ATTENTION-DEFICIT HYPERACTIVITY DISORDER, PREDOMINANTLY HYPERACTIVE TYPE: ICD-10-CM

## 2023-10-19 DIAGNOSIS — F33.1 MODERATE EPISODE OF RECURRENT MAJOR DEPRESSIVE DISORDER (H): Primary | ICD-10-CM

## 2023-10-19 DIAGNOSIS — F51.01 PRIMARY INSOMNIA: ICD-10-CM

## 2023-10-19 PROCEDURE — 99214 OFFICE O/P EST MOD 30 MIN: CPT | Mod: VID | Performed by: NURSE PRACTITIONER

## 2023-10-19 RX ORDER — ESZOPICLONE 1 MG/1
1 TABLET, FILM COATED ORAL AT BEDTIME
Qty: 30 TABLET | Refills: 0 | Status: SHIPPED | OUTPATIENT
Start: 2023-10-19 | End: 2023-12-06

## 2023-10-19 ASSESSMENT — PAIN SCALES - GENERAL: PAINLEVEL: NO PAIN (0)

## 2023-10-19 NOTE — PROGRESS NOTES
Virtual Visit Details    Type of service:  Video Visit   Video Start Time:  1134  Video End Time: 1150    Originating Location (pt. Location): Home    Distant Location (provider location):  Off-site  Platform used for Video Visit: CompleteSet

## 2023-10-19 NOTE — PROGRESS NOTES
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 provider as planned or for acute medical concerns.    PSYCHOEDUCATION:  Medication side effects and alternatives reviewed. Health promotion activities recommended and reviewed today. All questions addressed. Education and counseling completed regarding risks and benefits of medications and psychotherapy options.  Consent provided by patient/guardian  Call the psychiatric nurse line with medication questions or concerns at [phone].  LIQUITYhart may be used to communicate with your provider, but this is not intended to be used for emergencies.  BLACK BOX WARNING: Discussed the Food and Drug Administration (FDA) requires that all antidepressants carry a warning that some children, adolescents and young adults may be at increased risk of suicide when taking antidepressants. Anyone taking an antidepressant should be watched closely for worsening depression or unusual behavior especially in the first few weeks after starting an SSRI. Keep in mind, antidepressants are more likely to reduce suicide risk in the long run by improving mood.   SEROTONIN SYNDROME:  Discussed risks of Serotonin syndrome (ie,  serotonin toxicity) which is a potentially life-threatening condition associated with increased serotonergic activity in the central nervous system (CNS). It is seen with therapeutic medication use, inadvertent interactions between drugs, and intentional self-poisoning. Serotonin syndrome may involve a spectrum of clinical findings, which often include mental status changes, autonomic hyperactivity, and neuromuscular abnormalities.    BENZODIAZEPINE:  discussion on how benzos work and the need to use them short term due to potential of anxiety getting.  This is a controlled substance with risk for abuse, need to keep in a safe keep place and cannot replace lost scripts.    HARM REDUCTION:  Discussions regarding effects of mood altering substances, alcohol and cannabis, on mood and that approach is harm reduction, will continue to prescribe meds as they work to cut back use.    FIRST GENERATION ANTIPSYCHOTIC/ SECOND GENERATION ANTIPSYCHOTIC USE:  Atypical need for cardiometabolic monitoring with medication- B/P, weight, blood sugar, cholesterol.  Need to monitor for abnormal movements taught  SAFETY:  We all care about your loved one's safety. To reduce the risk of self-harm, remove access to all:  Firearms, Medicines (both prescribed and over-the-counter), Knives and other sharp objects, Ropes and like materials, and Alcohol  SLEEP HYGIENE: establish a sleep routine, limit screen time 1 hour prior to bed, use bed for sleep only, take sleep/medications on time (including sleepy time tea, trazadone or herbal treatments such as melatonin), aroma therapy, limit caffeine/sugar, yoga, guided imagery, stretch, meditation, limit naps to 20 minutes, make a temperature change in the room, white noise, be mindful of slowing down breathing, take a warm bath/shower, frequently wash sheets, and journaling.   Medlineplus.gov is information for patients.  It is run by the [employer] and it contains information  about all disorders, diseases and all medications.      COMMUNITY RESOURCES:    CRISIS NUMBERS: Provided in AVS 2023  National Suicide Prevention Lifeline: 6-361-404-TALK (840-554-9133)  Airizu/resources for a list of additional resources (SOS)            [facility] - [phone]   Urgent Care Adult Mental Tnlyvl-[phone] mobile unit/  crisis line  [facility] -[phone]   COPE  [place] Mobile Team -[phone] (adults)/ [phone] (child)  Poison Control Center - 1-644.973.6439    OR  go to nearest ER  Crisis Text Line for any crisis  send this-   To: 321022   [facility] ([facility]  [phone]  National Suicide Prevention Lifeline: 951.177.7083 (TTY: 315.976.3293). Call anytime for help.  (www.suicidepreventionlifeline.org)  National [place] on Mental Illness (www.ellie.org): 514-389-3805 or 661-165-9400.   Mental Health Association (www.mentalhealth.org): 646.625.8933 or 366-782-1663.  [place] Crisis Text Line: Text [place] to 634147  Suicide LifeLine Chat: suicideMelodeo.org/chat    ADMINISTRATIVE BILLIN min spent interviewing patient, reviewing referral documents, obtaining and reviewing outside records, communication with other health specialists, and preparing this report on today's date: 10/19/2023  Video/Phone Start Time: 1134  Video/Phone End Time:  1150      Signed:     Ezekiel Cheng, MSN, APRN, PMHNP-BC     Chart documentation done in part with Dragon Voice Recognition software.  Although reviewed after completion, some word and grammatical errors may remain.  Answers for HPI/ROS submitted by the patient on 2022  If you checked off any problems, how difficult have these problems made it for you to do your work, take care of things at home, or get along with other people?: Somewhat difficult  PHQ9 TOTAL SCORE: 7  DONELL 7 TOTAL SCORE: 13    Answers for HPI/ROS submitted by the patient on  2/3/2023  If you checked off any problems, how difficult have these problems made it for you to do your work, take care of things at home, or get along with other people?: Very difficult  PHQ9 TOTAL SCORE: 3  DONELL 7 TOTAL SCORE: 6  Answers for HPI/ROS submitted by the patient on 4/14/2023  If you checked off any problems, how difficult have these problems made it for you to do your work, take care of things at home, or get along with other people?: Somewhat difficult  PHQ9 TOTAL SCORE: 5  DONELL 7 TOTAL SCORE: 8    Answers for HPI/ROS submitted by the patient on 5/25/2023  If you checked off any problems, how difficult have these problems made it for you to do your work, take care of things at home, or get along with other people?: Extremely difficult  PHQ9 TOTAL SCORE: 12  DONELL 7 TOTAL SCORE: 9Answers submitted by the patient for this visit:  Patient Health Questionnaire (Submitted on 9/19/2023)  If you checked off any problems, how difficult have these problems made it for you to do your work, take care of things at home, or get along with other people?: Somewhat difficult  PHQ9 TOTAL SCORE: 5  DONELL-7 (Submitted on 9/19/2023)  DONELL 7 TOTAL SCORE: 14

## 2023-10-19 NOTE — PATIENT INSTRUCTIONS
"Patient Education   The Panel [facility] Program  What to Expect  Here's what to expect in the Panel Psychiatry Program.   About the program  You'll be meeting with a psychiatric doctor to check your mental health. A psychiatric doctor helps you deal with troubling thoughts and feelings by giving you medicine. They'll make sure you know the plan for your care. You may see them for a long time. When you're feeling better, they may refer you back to seeing your family doctor.   If you have any questions, we'll be glad to talk to you.  About visits  Be open  At your visits, please talk openly about your problems. It may feel hard, but it's the best way for us to help you.  Cancelling visits  If you can't come to your visit, please call us right away at [phone]. If you don't cancel at least 24 hours (1 full day) before your visit, that's \"late cancellation.\"  Not showing up for your visits  Being very late is the same as not showing up. You'll be a \"no show\" if:  You're more than 15 minutes late for a 30-minute (half hour) visit.  You're more than 30 minutes late for a 60-minute (full hour) visit.  If you cancel late or don't show up 2 times within 6 months, we may end your care.  Getting help between visits  If you need help between visits, you can call us Monday to Friday from 8 a.m. to 4:30 p.m. at [phone].  Emergency care  Call 911 or go to the nearest emergency department if your life or someone else's life is in danger.  Call 988 anytime to reach the national Suicide and Crisis hotline.  Medicine refills  To refill your medicine, call your pharmacy. You can also call [facility]'s Behavioral Access at [phone], Monday to Friday, 8 a.m. to 4:30 p.m. It can take 1 to 3 business days to get a refill.   Forms, letters, and tests  You may have papers to fill out, like FMLA, short-term disability, and workability. We can help you with these forms at your visits, but you must have an " appointment. You may need more than 1 visit for this, to be in an intensive therapy program, or both.  Before we can give you medicine for ADHD, we may refer you to get tested for it or confirm it another way.  We may not be able to give you an emotional support animal letter.  We don't do mental health checks ordered by the court.   We don't do mental health testing, but we can refer you to get tested.   Thank you for choosing us for your care.  For informational purposes only. Not to replace the advice of your health care provider. Copyright   2022 [facility]. All rights reserved. Yadio [id number] - 12/22.     PARRIS AND RECOMMENDATIONS:  Continue take Wellbutrin  mg twice daily -   Continue Lexapro 20 mg every day  Take hydroxyzine 25 - 50 mg instead of 3 times daily as needed for anxiety   Continue to take prazosin 5 mg at bedtime  Take Buspar 20 mg three times daily for anxiety  Take Lunesta 1 mg at bedtime for sleep.  Continue with Short term individual psychotherapy       We have discussed his/her diagnosis, prognosis, differential diagnosis and side effects and benefits of medications.     Patient and I revieweddiagnosis and treatment plan and patient agrees with following recommendations:    1.Patient will take the medications as prescribed. Patient will not stop taking medications or adjust them without consulting with theprovider.  2.Patient will call with any problems between 2 visits.  3.Patient will go to the emergency room if not feeling safe , unable to function in the community, or if suicidal, homicidal or hearing voices orhaving paranoia.  4.Patient will abstain from drugs and alcohol.  5.Patient will not drive if sedated on medications or under influence of any substance. 6.Patient will not mix psychiatric medications with drugs andalcohol.   7.Patient will watch his diet and exercise.  8.Patient will see non psychiatric providers for non psychiatric disorders.

## 2023-10-19 NOTE — NURSING NOTE
Is the patient currently in the state of [place]? YES    Visit mode:VIDEO    If the visit is dropped, the patient can be reconnected by: VIDEO VISIT: Send to e-mail at: [email or web address]    Will anyone else be joining the visit? NO  (If patient encounters technical issues they should call [phone])    How would you like to obtain your AVS? MyChart    Are changes needed to the allergy or medication list? No    Reason for visit: RECHECK    Linden WATSON

## 2023-10-20 ENCOUNTER — TELEPHONE (OUTPATIENT)
Dept: UROLOGY | Facility: CLINIC | Age: 63
End: 2023-10-20

## 2023-10-20 ENCOUNTER — ALLIED HEALTH/NURSE VISIT (OUTPATIENT)
Dept: UROLOGY | Facility: CLINIC | Age: 63
End: 2023-10-20
Payer: MEDICARE

## 2023-10-20 DIAGNOSIS — R39.89 BLADDER PAIN: Primary | ICD-10-CM

## 2023-10-20 DIAGNOSIS — R33.9 URINARY RETENTION: Primary | ICD-10-CM

## 2023-10-20 DIAGNOSIS — R39.89 BLADDER PAIN: ICD-10-CM

## 2023-10-20 LAB
ALBUMIN UR-MCNC: NEGATIVE MG/DL
APPEARANCE UR: CLEAR
BILIRUB UR QL STRIP: NEGATIVE
COLOR UR AUTO: YELLOW
GLUCOSE UR STRIP-MCNC: NEGATIVE MG/DL
HGB UR QL STRIP: NEGATIVE
KETONES UR STRIP-MCNC: NEGATIVE MG/DL
LEUKOCYTE ESTERASE UR QL STRIP: NEGATIVE
NITRATE UR QL: NEGATIVE
PH UR STRIP: 7 [PH] (ref 5–7)
RESIDUAL VOLUME (RV) (EXTERNAL): 210
SP GR UR STRIP: 1.01 (ref 1–1.03)
UROBILINOGEN UR STRIP-ACNC: 0.2 E.U./DL

## 2023-10-20 PROCEDURE — 51702 INSERT TEMP BLADDER CATH: CPT

## 2023-10-20 PROCEDURE — 87086 URINE CULTURE/COLONY COUNT: CPT | Performed by: PHYSICIAN ASSISTANT

## 2023-10-20 PROCEDURE — 81003 URINALYSIS AUTO W/O SCOPE: CPT | Mod: QW

## 2023-10-20 PROCEDURE — 51798 US URINE CAPACITY MEASURE: CPT | Performed by: PHYSICIAN ASSISTANT

## 2023-10-20 RX ORDER — CIPROFLOXACIN 500 MG/1
500 TABLET, FILM COATED ORAL ONCE
Qty: 1 TABLET | Refills: 0 | Status: SHIPPED | OUTPATIENT
Start: 2023-10-20 | End: 2023-10-20

## 2023-10-20 NOTE — PROGRESS NOTES
Isla Yusuf comes into clinic today for Urinary Retention  at the request of Esther HERNANDEZ Ordering Provider for Catheter Insertion.  This service provided today was under the supervising provider of the day Esther HERNANDEZ, who was available if needed.  post void residual by bladder scan 210 ml  Patient prepped, draped, and cleansed with betadine in sterile fashion. Using sterile field technique a sterile, well-lubricated 16 Fr Moraes straight catheter was gently inserted with ease x1 attempt. Balloon filled with 8 cc's. No discomfort voiced by pt., clear-yellow urine return noted with 375 ml drained from catheter. Sterile tubing and drainage bag attached to catheter and secured to right calf. Pt. instructed on proper hygiene and catheter care. Patient to follow-up, as planned.   Chloe Alejandra LPN

## 2023-10-22 LAB — BACTERIA UR CULT: NORMAL

## 2023-10-24 ENCOUNTER — TELEPHONE (OUTPATIENT)
Dept: UROLOGY | Facility: CLINIC | Age: 63
End: 2023-10-24
Payer: MEDICARE

## 2023-10-24 NOTE — TELEPHONE ENCOUNTER
Spoke with pt earlier to schedule same day UDS/cysto, but pt declined due to avail at the [facility]. First UDS 12/11, and same day would be Jan 11th. Spoke with RN to verify if there was a sooner time to schedule, stated once pt is scheduled can add to wait list and move appt forward if needed/able. Spoke with pt again and declined to schedule and be added to the wait list. Wants to speak to a nurse about concerns prior to scheduling. Will contact nurses in original message to reach out to pt.

## 2023-10-25 ENCOUNTER — VIRTUAL VISIT (OUTPATIENT)
Dept: BEHAVIORAL HEALTH | Facility: CLINIC | Age: 63
End: 2023-10-25
Payer: MEDICARE

## 2023-10-25 DIAGNOSIS — F33.1 MODERATE EPISODE OF RECURRENT MAJOR DEPRESSIVE DISORDER (H): ICD-10-CM

## 2023-10-25 DIAGNOSIS — F43.10 PTSD (POST-TRAUMATIC STRESS DISORDER): ICD-10-CM

## 2023-10-25 DIAGNOSIS — F41.1 GAD (GENERALIZED ANXIETY DISORDER): Primary | ICD-10-CM

## 2023-10-25 PROCEDURE — 90834 PSYTX W PT 45 MINUTES: CPT | Mod: VID

## 2023-10-25 NOTE — TELEPHONE ENCOUNTER
Message left asking pt to call back to discuss her concerns. Message included asking her to schedule the next available appointment and we can then work on moving her up.    Lori Cabrales, TANG  10/25/23  1:51 PM

## 2023-10-25 NOTE — PROGRESS NOTES
"  [facility] Primary Care: Integrated Behavioral Health  October 25th, 2023      Behavioral Health Clinician Progress Note    Patient Name: Ilsa Yusuf           Service Type:  Individual      Service Location:   Face to Face in Clinic     Session Start Time: 1:06 PM  Session End Time: 1:50 PM      Session Length: 38 - 52      Attendees: Patient     Service Modality:  Phone Visit:      Provider verified identity through the following two step process.  Patient provided:  Patient is known previously to provider    Telephone Visit: The patient's condition can be safely assessed and treated via synchronous audio telemedicine encounter.      Reason for Audio Telemedicine Visit: Patient has requested telehealth visit    Originating Site (Patient Location): Patient's home    Distant Site (Provider Location): [facility]    Consent:  The patient/guardian has verbally consented to:     1. The potential risks and benefits of telemedicine (telephone visit) versus in person care;    The patient has been notified of the following:      \"We have found that certain health care needs can be provided without the need for a face to face visit.  This service lets us provide the care you need with a phone conversation.       I will have full access to your [facility] medical record during this entire phone call.   I will be taking notes for your medical record.      Since this is like an office visit, we will bill your insurance company for this service.       There are potential benefits and risks of telephone visits (e.g. limits to patient confidentiality) that differ from in-person visits.?Confidentiality still applies for telephone services, and nobody will record the visit.  It is important to be in a quiet, private space that is free of distractions (including cell phone or other devices) during the visit.??      If during the course of the call I believe a " "telephone visit is not appropriate, you will not be charged for this service\"     Consent has been obtained for this service by care team member: Yes     Visit Activities (Refresh list every visit): [facility] Only    Diagnostic Assessment Date: Completed by Shaji Cheng CNP on 2/03/2023  Treatment Plan Review Date: TBD  See Flowsheets for today's PHQ-9 and DONELL-7 results  Previous PHQ-9:       8/16/2023     1:29 PM 9/19/2023     1:27 PM 10/9/2023    12:27 PM   PHQ-9 SCORE   PHQ-9 Total Score MyChart 10 (Moderate depression) 5 (Mild depression) 9 (Mild depression)   PHQ-9 Total Score 10 5 9     Previous DONELL-7:       8/16/2023     1:29 PM 9/19/2023     1:28 PM 10/9/2023    12:28 PM   DONELL-7 SCORE   Total Score 7 (mild anxiety) 14 (moderate anxiety) 11 (moderate anxiety)   Total Score 7 14 11       IMER LEVEL:      1/30/2012     2:00 PM   IMER Score (Last Two)   IMER Raw Score 37   Activation Score 49.9   IMER Level 2       DATA  Extended Session (60+ minutes): No  Interactive Complexity: No  Crisis: No  [facility] Patient: No    Treatment Objective(s) Addressed in This Session:  Target Behavior(s):  reduce symptoms of anxiety    Anxiety: will experience a reduction in anxiety, will develop more effective coping skills to manage anxiety symptoms, will develop healthy cognitive patterns and beliefs, and will increase ability to function adaptively    Current Stressors / Issues:    Pt is tearful and in distress on this date. Pt states that she has been experiencing bladder concerns. Pt is working with urology. Pt was tearful and discussed frustration with lack of answers and interaction with urology clinic. [facility] provided validation and emotional support. Pt denies SI/HI/SIB. Pt states that she has felt more hopeless, but denies having a plan or intent to harm herself. [facility] and pt discussed different methods of coping.     Progress on Treatment Objective(s) / Homework:  Minimal progress - PREPARATION (Decided to change - considering how); " Intervened by negotiating a change plan and determining options / strategies for behavior change, identifying triggers, exploring social supports, and working towards setting a date to begin behavior change    Motivational Interviewing    MI Intervention: Expressed Empathy/Understanding, Open-ended questions, and Reflections: simple and complex     Change Talk Expressed by the Patient: Desire to change Ability to change Reasons to change    Provider Response to Change Talk: E - Evoked more info from patient about behavior change, A - Affirmed patient's thoughts, decisions, or attempts at behavior change, R - Reflected patient's change talk, and S - Summarized patient's change talk statements    Also provided psychoeducation about behavioral health condition, symptoms, and treatment options    Care Plan review completed: No    Medication Review:  No changes to current psychiatric medication(s)    Medication Compliance:  Yes    Changes in Health Issues:   None reported    Chemical Use Review:   Substance Use: Chemical use reviewed, no active concerns identified      Tobacco Use: No current tobacco use.      Assessment: Current Emotional / Mental Status (status of significant symptoms):  Risk status (Self / Other harm or suicidal ideation)  Patient has had a history of suicidal ideation: in 2013, passive thought and denies a history of suicide attempts, self-injurious behavior, homicidal ideation, homicidal behavior, and and other safety concerns  Patient denies current fears or concerns for personal safety.  Patient denies current or recent suicidal ideation or behaviors.  Patient denies current or recent homicidal ideation or behaviors.  Patient denies current or recent self injurious behavior or ideation.  Patient denies other safety concerns.  A safety and risk management plan has not been developed at this time, however patient was encouraged to call [place] [phone] should there be a change in any of these  risk factors.    Appearance:   IRISH  Eye Contact:   IRISH  Psychomotor Behavior: IRISH  Attitude:   Cooperative  Interested  Orientation:   All  Speech   Rate / Production: Normal    Volume:  Normal   Mood:    Anxious , tearful  Affect:    IRISH  Thought Content:  Clear   Thought Form:  Coherent  Logical   Insight:    Good     Diagnoses:  1. DONELL (generalized anxiety disorder)    2. PTSD (post-traumatic stress disorder)    3. Moderate episode of recurrent major depressive disorder (H)          Collateral Reports Completed:  Not applicable    Plan: (Homework, other):  Patient was given information about behavioral services and encouraged to schedule a follow up appointment with the clinic [facility] in 2 weeks.  She was also given information about mental health symptoms and treatment options .  CD Recommendations: No indications of CD issues.     YORDY Barnes, [facility]  Behavioral Health Clinician  [facility], [address], [address], [place], [place], [address]  [phone]      2023

## 2023-10-26 ENCOUNTER — DOCUMENTATION ONLY (OUTPATIENT)
Dept: SLEEP MEDICINE | Facility: CLINIC | Age: 63
End: 2023-10-26
Payer: MEDICARE

## 2023-10-26 DIAGNOSIS — G47.33 OBSTRUCTIVE SLEEP APNEA: Primary | ICD-10-CM

## 2023-10-26 DIAGNOSIS — F41.1 GAD (GENERALIZED ANXIETY DISORDER): ICD-10-CM

## 2023-10-26 RX ORDER — HYDROXYZINE HYDROCHLORIDE 25 MG/1
TABLET, FILM COATED ORAL
Qty: 540 TABLET | Refills: 1 | OUTPATIENT
Start: 2023-10-26

## 2023-10-26 NOTE — PROGRESS NOTES
Patient was offered choice of vendor and chose [facility].  Patient Ilsa Yusuf was set up at [facility] on October 26, 2023. Patient received a Resmed Airsense 10 Pressures were set at  8-14 cm H2O.   Patient s ramp is 5 cm H2O for Auto and FLEX/EPR is 2.  Patient received a Resmed Mask name: AIRFIT F20  Full Face mask size Small, heated tubing and heated humidifier.  Patient has the following compliance requirements: using and visit requirements    Seda Suarez

## 2023-10-27 ENCOUNTER — ALLIED HEALTH/NURSE VISIT (OUTPATIENT)
Dept: UROLOGY | Facility: CLINIC | Age: 63
End: 2023-10-27
Payer: MEDICARE

## 2023-10-27 DIAGNOSIS — Z87.440 HISTORY OF UTI: ICD-10-CM

## 2023-10-27 DIAGNOSIS — R33.9 URINARY RETENTION: Primary | ICD-10-CM

## 2023-10-27 DIAGNOSIS — N28.89 KIDNEY SCARRING: ICD-10-CM

## 2023-10-27 LAB — RESIDUAL VOLUME (RV) (EXTERNAL): 3

## 2023-10-27 PROCEDURE — 51798 US URINE CAPACITY MEASURE: CPT

## 2023-10-27 NOTE — PROGRESS NOTES
Per last encounter recommended VUDS vs VCUG in 5/2023. Discussed further with Dr. Melton who recommended VCUG. Called patient on 5/24/23 with Dr. Melton's recommendations and patient wanted to hold off  on further urologic studies as she thought her pressure was GI related. Called last week with concerns of urinary retention. Her PVR at that time was 210mL and ernandez was placed. Had TOV today with PVR of 3mL. Called patient to further discuss and if interested in VCUG vs VUDS. Patient would like to have study done within 3 weeks. Do not have VUDS openings within the next 3 weeks. Discussed further with Dr. Melton who agrees with VCUG. Called patient and advised on recommendations. VCUG ordered. Advised patient to call to scheduled VCUG. Called RN coordinator to help schedule patient with Dr. Melton to discuss VCUG results. Patient verbalized understanding and agreed to plan. Advised to contact clinic if has issues with scheduling VCUG.

## 2023-10-27 NOTE — PROGRESS NOTES
Ilsa Yusuf comes into clinic today for Urinary Retention  at the request of Esther HERNANDEZ Ordering Provider for Trial of Void.  This service provided today was under the supervising provider of the day Esther HERNANDEZ, who was available if needed.  Patients catheter removed with ease.  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 Given supplies if unable to urinate. She will follow up with me on Monday , for a plan going forward.  post void residual  by bladder scan 3 ml  Chloe Alejandra LPN

## 2023-10-30 ENCOUNTER — DOCUMENTATION ONLY (OUTPATIENT)
Dept: SLEEP MEDICINE | Facility: CLINIC | Age: 63
End: 2023-10-30
Payer: MEDICARE

## 2023-10-30 DIAGNOSIS — E78.5 HYPERLIPIDEMIA LDL GOAL <130: ICD-10-CM

## 2023-10-30 DIAGNOSIS — F41.1 GAD (GENERALIZED ANXIETY DISORDER): ICD-10-CM

## 2023-10-30 DIAGNOSIS — R33.9 URINARY RETENTION: Primary | ICD-10-CM

## 2023-10-30 RX ORDER — LOVASTATIN 20 MG
TABLET ORAL
Qty: 90 TABLET | Refills: 0 | Status: SHIPPED | OUTPATIENT
Start: 2023-10-30 | End: 2024-01-05

## 2023-10-30 NOTE — PROGRESS NOTES
3 day Sleep therapy management telephone visit    Diagnostic AHI:   PSG    Confirmed with patient at time of call- N/A Patient is still interested in [facility] service       Data only recheck, patiet a replacement machine taken out of the STM.        Objective data     Order Settings for PAP  CPAP min     CPAP max              Device settings from machine CPAP min 8.0     CPAP max 14.0           EPR Setting TWO    RESMED soft response  OFF     Assessment: Nightly usage over four hours      Action plan: Patient removed from STM, STM not required or ordered. . Patient has a follow up visit scheduled:   no    Replacement device: Yes  STM ordered by provider: No     Total time spent on accessing and  interpreting remote patient PAP therapy data  10 minutes    Total time spent counseling, coaching  and reviewing PAP therapy data with patient  0 minutes    [address] no

## 2023-10-31 ENCOUNTER — TRANSFERRED RECORDS (OUTPATIENT)
Dept: HEALTH INFORMATION MANAGEMENT | Facility: CLINIC | Age: 63
End: 2023-10-31

## 2023-10-31 ENCOUNTER — LAB (OUTPATIENT)
Dept: LAB | Facility: CLINIC | Age: 63
End: 2023-10-31
Payer: MEDICARE

## 2023-10-31 DIAGNOSIS — R33.9 URINARY RETENTION: ICD-10-CM

## 2023-10-31 LAB
ALBUMIN UR-MCNC: 30 MG/DL
APPEARANCE UR: CLEAR
BACTERIA #/AREA URNS HPF: ABNORMAL /HPF
BILIRUB UR QL STRIP: NEGATIVE
COLOR UR AUTO: YELLOW
GLUCOSE UR STRIP-MCNC: 100 MG/DL
HGB UR QL STRIP: ABNORMAL
KETONES UR STRIP-MCNC: NEGATIVE MG/DL
LEUKOCYTE ESTERASE UR QL STRIP: ABNORMAL
NITRATE UR QL: POSITIVE
PH UR STRIP: 6 [PH] (ref 5–8)
RBC #/AREA URNS AUTO: ABNORMAL /HPF
SP GR UR STRIP: 1.02 (ref 1–1.03)
SQUAMOUS #/AREA URNS AUTO: ABNORMAL /LPF
UROBILINOGEN UR STRIP-ACNC: 1 E.U./DL
WBC #/AREA URNS AUTO: ABNORMAL /HPF

## 2023-10-31 PROCEDURE — 81001 URINALYSIS AUTO W/SCOPE: CPT

## 2023-10-31 PROCEDURE — 87088 URINE BACTERIA CULTURE: CPT

## 2023-10-31 PROCEDURE — 87086 URINE CULTURE/COLONY COUNT: CPT

## 2023-10-31 PROCEDURE — 87186 SC STD MICRODIL/AGAR DIL: CPT

## 2023-10-31 RX ORDER — HYDROXYZINE HYDROCHLORIDE 25 MG/1
TABLET, FILM COATED ORAL
Qty: 90 TABLET | Refills: 0 | OUTPATIENT
Start: 2023-10-31

## 2023-11-03 DIAGNOSIS — N39.0 URINARY TRACT INFECTION: Primary | ICD-10-CM

## 2023-11-03 LAB
BACTERIA UR CULT: ABNORMAL
BACTERIA UR CULT: ABNORMAL

## 2023-11-03 RX ORDER — SULFAMETHOXAZOLE/TRIMETHOPRIM 800-160 MG
1 TABLET ORAL 2 TIMES DAILY
Qty: 10 TABLET | Refills: 0 | Status: SHIPPED | OUTPATIENT
Start: 2023-11-03 | End: 2023-11-08

## 2023-11-06 DIAGNOSIS — F41.1 GAD (GENERALIZED ANXIETY DISORDER): ICD-10-CM

## 2023-11-06 DIAGNOSIS — F43.10 PTSD (POST-TRAUMATIC STRESS DISORDER): ICD-10-CM

## 2023-11-07 ENCOUNTER — ANCILLARY PROCEDURE (OUTPATIENT)
Dept: GENERAL RADIOLOGY | Facility: CLINIC | Age: 63
End: 2023-11-07
Attending: PHYSICIAN ASSISTANT
Payer: MEDICARE

## 2023-11-07 DIAGNOSIS — Z87.440 HISTORY OF UTI: ICD-10-CM

## 2023-11-07 DIAGNOSIS — N28.89 KIDNEY SCARRING: ICD-10-CM

## 2023-11-07 DIAGNOSIS — R33.9 URINARY RETENTION: ICD-10-CM

## 2023-11-07 PROCEDURE — 74455 X-RAY URETHRA/BLADDER: CPT | Mod: GC | Performed by: RADIOLOGY

## 2023-11-07 PROCEDURE — 51600 INJECTION FOR BLADDER X-RAY: CPT | Mod: GC | Performed by: RADIOLOGY

## 2023-11-07 RX ORDER — IOPAMIDOL 510 MG/ML
150 INJECTION, SOLUTION INTRAVASCULAR ONCE
Status: COMPLETED | OUTPATIENT
Start: 2023-11-07 | End: 2023-11-07

## 2023-11-07 RX ADMIN — IOPAMIDOL 450 ML: 510 INJECTION, SOLUTION INTRAVASCULAR at 15:26

## 2023-11-08 DIAGNOSIS — F41.1 GAD (GENERALIZED ANXIETY DISORDER): ICD-10-CM

## 2023-11-08 DIAGNOSIS — F33.1 MAJOR DEPRESSIVE DISORDER, RECURRENT EPISODE, MODERATE (H): ICD-10-CM

## 2023-11-08 RX ORDER — HYDROXYZINE HYDROCHLORIDE 25 MG/1
25-50 TABLET, FILM COATED ORAL 3 TIMES DAILY PRN
Qty: 90 TABLET | Refills: 0 | Status: SHIPPED | OUTPATIENT
Start: 2023-11-08 | End: 2023-11-20

## 2023-11-08 RX ORDER — BUPROPION HYDROCHLORIDE 150 MG/1
150 TABLET, EXTENDED RELEASE ORAL 2 TIMES DAILY
Qty: 60 TABLET | Refills: 0 | Status: SHIPPED | OUTPATIENT
Start: 2023-11-08 | End: 2023-11-28

## 2023-11-08 RX ORDER — BUSPIRONE HYDROCHLORIDE 10 MG/1
20 TABLET ORAL 3 TIMES DAILY
Qty: 180 TABLET | Refills: 1 | Status: SHIPPED | OUTPATIENT
Start: 2023-11-08 | End: 2023-12-04

## 2023-11-08 RX ORDER — ESCITALOPRAM OXALATE 10 MG/1
20 TABLET ORAL DAILY
Qty: 180 TABLET | Refills: 1 | Status: SHIPPED | OUTPATIENT
Start: 2023-11-08 | End: 2023-11-09

## 2023-11-08 NOTE — TELEPHONE ENCOUNTER
escitalopram (LEXAPRO) 10 MG tablet last ordered on 10/12/23 for a 45 day supply. Patient is requesting a 90 day supply.     Date of Last Office Visit: 10/19/23  Date of Next Office Visit: 11/17/23  No shows since last visit: 0  Cancellations since last visit: 0    Medication requested: escitalopram (LEXAPRO) 10 MG tablet  Date last ordered: 10/12/23 Qty: 90 Refills: 1     Review of [place] ?: NA    Lapse in medication adherence greater than 5 days?: no  If yes, call patient and gather details: NA  Medication refill request verified as identical to current order?: yes  Result of Last DAM, VPA, Li+ Level, CBC, or Carbamazepine Level (at or since last visit): N/A    Last visit treatment plan:     ASSESSMENT AND PLAN    Patient is a 62 year old female with a history of MDD DONELL and PTSD  Presents today for follow up visit. Today, she reports anxiety level has not changed despite taking buspirone.  She has been taking buspirone 10 mg instead of 20 mg 3 times daily as discussed during last visit.  I encouraged her to start taking buspirone 20 mg 3 times daily.  I also started patient on Lunesta 1 mg to help with difficulty falling and staying asleep.  Patient has started short-term therapy and seem to like her therapist and started getting benefit from the sessions.  She denies SI, SIB, and HI.  She also denied both auditory and visual hallucination.  She reported no rk or hypomania.  Return to clinic in 4 weeks for follow-up.     PLAN AND RECOMMENDATIONS:  Continue take Wellbutrin  mg twice daily -   Continue Lexapro 20 mg every day  Take hydroxyzine 25 - 50 mg instead of 3 times daily as needed for anxiety     Continue to take prazosin 5 mg at bedtime  Take Buspar 20 mg three times daily for anxiety  Take Lunesta 1 mg at bedtime for sleep.  Continue with Short term individual psychotherapy    []Medication refilled per  Medication Refill in Ambulatory Care  policy.  [x]Medication unable to be refilled by RN due to  criteria not met as indicated below:    []Eligibility - not seen in the last year   []Supervision - no future appointment   []Compliance - no shows, cancellations or lapse in therapy   []Verification - order discrepancy   []Controlled medication   []Medication not included in policy   [x]90-day supply request   []Other

## 2023-11-08 NOTE — TELEPHONE ENCOUNTER
Patient is requesting a 90 day supply.      Date of Last Office Visit: 10/19/23  Date of Next Office Visit: 11/17/23  No shows since last visit: 0  Cancellations since last visit: 0     Medication requested: buPROPion (WELLBUTRIN SR) 150 MG 12 hr tablet    Take 1 tablet by mouth 2 times daily   Date last ordered: 10/6/23 Qty: 60 Refills: 0    Medication requested: hydrOXYzine (ATARAX) 25 MG tablet   Sig - Route: Take 1-2 tablets (25-50 mg) by mouth 3 times daily as needed for anxiety  Date last ordered: 10/17/23 Qty: 90 Refills: 0    Medication requested: busPIRone (BUSPAR) 10 MG tablet   Sig - Route: Take 2 tablets (20 mg) by mouth 3 times daily  Date last ordered: 9/19/23 Qty: 180 Refills: 1     Review of [place] ?: NA     Lapse in medication adherence greater than 5 days?: no, per patient   If yes, call patient and gather details: NA  Medication refill request verified as identical to current order?: yes  Result of Last DAM, VPA, Li+ Level, CBC, or Carbamazepine Level (at or since last visit): N/A     Last visit treatment plan:      ASSESSMENT AND PLAN    Patient is a 62 year old female with a history of MDD DONELL and PTSD  Presents today for follow up visit. Today, she reports anxiety level has not changed despite taking buspirone.  She has been taking buspirone 10 mg instead of 20 mg 3 times daily as discussed during last visit.  I encouraged her to start taking buspirone 20 mg 3 times daily.  I also started patient on Lunesta 1 mg to help with difficulty falling and staying asleep.  Patient has started short-term therapy and seem to like her therapist and started getting benefit from the sessions.  She denies SI, SIB, and HI.  She also denied both auditory and visual hallucination.  She reported no rk or hypomania.  Return to clinic in 4 weeks for follow-up.     PLAN AND RECOMMENDATIONS:  Continue take Wellbutrin  mg twice daily -   Continue Lexapro 20 mg every day  Take hydroxyzine 25 - 50 mg instead of  3 times daily as needed for anxiety     Continue to take prazosin 5 mg at bedtime  Take Buspar 20 mg three times daily for anxiety  Take Lunesta 1 mg at bedtime for sleep.  Continue with Short term individual psychotherapy     []Medication refilled per  Medication Refill in Ambulatory Care  policy.  [x]Medication unable to be refilled by RN due to criteria not met as indicated below:               []Eligibility - not seen in the last year              []Supervision - no future appointment              []Compliance - no shows, cancellations or lapse in therapy              []Verification - order discrepancy              []Controlled medication              []Medication not included in policy              [x]90-day supply request              []Other    RN pended prescription for provider review     Jolly Delgado RN on 11/8/2023 at 3:48 PM

## 2023-11-09 ENCOUNTER — TELEPHONE (OUTPATIENT)
Dept: PSYCHIATRY | Facility: CLINIC | Age: 63
End: 2023-11-09
Payer: MEDICARE

## 2023-11-09 DIAGNOSIS — F33.1 MODERATE EPISODE OF RECURRENT MAJOR DEPRESSIVE DISORDER (H): Primary | ICD-10-CM

## 2023-11-09 DIAGNOSIS — F41.1 GAD (GENERALIZED ANXIETY DISORDER): ICD-10-CM

## 2023-11-09 DIAGNOSIS — F43.10 PTSD (POST-TRAUMATIC STRESS DISORDER): ICD-10-CM

## 2023-11-09 RX ORDER — ESCITALOPRAM OXALATE 20 MG/1
20 TABLET ORAL DAILY
Qty: 90 TABLET | Refills: 0 | Status: SHIPPED | OUTPATIENT
Start: 2023-11-09 | End: 2024-02-01

## 2023-11-09 NOTE — TELEPHONE ENCOUNTER
"Received a fax from [employer] in [place] regarding escitalopram (LEXAPRO) 10 MG tablet.    Fax states: \" insurance limits escitalopram 10mg to a max daily dose of 1.5 tablets.\"    Does provider want to complete a quantity exception or adjust the prescription?        ASSESSMENT AND PLAN    Patient is a 62 year old female with a history of MDD DONELL and PTSD  Presents today for follow up visit. Today, she reports anxiety level has not changed despite taking buspirone.  She has been taking buspirone 10 mg instead of 20 mg 3 times daily as discussed during last visit.  I encouraged her to start taking buspirone 20 mg 3 times daily.  I also started patient on Lunesta 1 mg to help with difficulty falling and staying asleep.  Patient has started short-term therapy and seem to like her therapist and started getting benefit from the sessions.  She denies SI, SIB, and HI.  She also denied both auditory and visual hallucination.  She reported no rk or hypomania.  Return to clinic in 4 weeks for follow-up.     PLAN AND RECOMMENDATIONS:  Continue take Wellbutrin  mg twice daily -   Continue Lexapro 20 mg every day  Take hydroxyzine 25 - 50 mg instead of 3 times daily as needed for anxiety     Continue to take prazosin 5 mg at bedtime  Take Buspar 20 mg three times daily for anxiety  Take Lunesta 1 mg at bedtime for sleep.  Continue with Short term individual psychotherapy         "

## 2023-11-10 ENCOUNTER — VIRTUAL VISIT (OUTPATIENT)
Dept: BEHAVIORAL HEALTH | Facility: CLINIC | Age: 63
End: 2023-11-10
Payer: MEDICARE

## 2023-11-10 DIAGNOSIS — F43.10 PTSD (POST-TRAUMATIC STRESS DISORDER): ICD-10-CM

## 2023-11-10 DIAGNOSIS — F33.1 MODERATE EPISODE OF RECURRENT MAJOR DEPRESSIVE DISORDER (H): ICD-10-CM

## 2023-11-10 DIAGNOSIS — F41.1 GAD (GENERALIZED ANXIETY DISORDER): Primary | ICD-10-CM

## 2023-11-10 PROCEDURE — 90834 PSYTX W PT 45 MINUTES: CPT | Mod: VID

## 2023-11-10 ASSESSMENT — ANXIETY QUESTIONNAIRES
6. BECOMING EASILY ANNOYED OR IRRITABLE: NOT AT ALL
4. TROUBLE RELAXING: NEARLY EVERY DAY
7. FEELING AFRAID AS IF SOMETHING AWFUL MIGHT HAPPEN: NOT AT ALL
1. FEELING NERVOUS, ANXIOUS, OR ON EDGE: MORE THAN HALF THE DAYS
IF YOU CHECKED OFF ANY PROBLEMS ON THIS QUESTIONNAIRE, HOW DIFFICULT HAVE THESE PROBLEMS MADE IT FOR YOU TO DO YOUR WORK, TAKE CARE OF THINGS AT HOME, OR GET ALONG WITH OTHER PEOPLE: VERY DIFFICULT
GAD7 TOTAL SCORE: 8
GAD7 TOTAL SCORE: 8
2. NOT BEING ABLE TO STOP OR CONTROL WORRYING: MORE THAN HALF THE DAYS
3. WORRYING TOO MUCH ABOUT DIFFERENT THINGS: SEVERAL DAYS
5. BEING SO RESTLESS THAT IT IS HARD TO SIT STILL: NOT AT ALL

## 2023-11-10 ASSESSMENT — PATIENT HEALTH QUESTIONNAIRE - PHQ9
10. IF YOU CHECKED OFF ANY PROBLEMS, HOW DIFFICULT HAVE THESE PROBLEMS MADE IT FOR YOU TO DO YOUR WORK, TAKE CARE OF THINGS AT HOME, OR GET ALONG WITH OTHER PEOPLE: VERY DIFFICULT
SUM OF ALL RESPONSES TO PHQ QUESTIONS 1-9: 9
SUM OF ALL RESPONSES TO PHQ QUESTIONS 1-9: 9

## 2023-11-10 NOTE — PROGRESS NOTES
[facility] Primary Care: Integrated Behavioral Health  November 10th, 2023       Behavioral Health Clinician Progress Note    Patient Name: Ilsa Yusuf           Service Type:  Individual      Service Location:   Face to Face in Clinic     Session Start Time: 10:00 AM  Session End Time:10:47 AM      Session Length: 38 - 52      Attendees: Patient     Service Modality:  Video Visit:      Provider verified identity through the following two step process.  Patient provided:  Patient is known previously to provider    Telemedicine Visit: The patient's condition can be safely assessed and treated via synchronous audio and visual telemedicine encounter.      Reason for Telemedicine Visit: Patient has requested telehealth visit    Originating Site (Patient Location): Patient's home    Distant Site (Provider Location): [facility]    Consent:  The patient/guardian has verbally consented to: the potential risks and benefits of telemedicine (video visit) versus in person care; bill my insurance or make self-payment for services provided; and responsibility for payment of non-covered services.     Patient would like the video invitation sent by:  My Chart    Mode of Communication:  Video Conference via [facility]    Distant Location (Provider):  On-site    As the provider I attest to compliance with applicable laws and regulations related to telemedicine.    Visit Activities (Refresh list every visit): [facility] Only    Diagnostic Assessment Date: Completed by Shaji Cheng CNP on 2/03/2023  Treatment Plan Review Date: 11/10/23  See Flowsheets for today's PHQ-9 and DONELL-7 results  Previous PHQ-9:       8/16/2023     1:29 PM 9/19/2023     1:27 PM 10/9/2023    12:27 PM   PHQ-9 SCORE   PHQ-9 Total Score MyChart 10 (Moderate depression) 5 (Mild depression) 9 (Mild depression)   PHQ-9 Total Score 10 5 9     Previous DONELL-7:       8/16/2023     1:29 PM 9/19/2023     1:28 PM 10/9/2023     12:28 PM   DONELL-7 SCORE   Total Score 7 (mild anxiety) 14 (moderate anxiety) 11 (moderate anxiety)   Total Score 7 14 11       IMER LEVEL:      1/30/2012     2:00 PM   IMER Score (Last Two)   IMER Raw Score 37   Activation Score 49.9   IMER Level 2       DATA  Extended Session (60+ minutes): No  Interactive Complexity: No  Crisis: No  formerly [facility] Patient: No    Treatment Objective(s) Addressed in This Session:  Target Behavior(s):  reduce symptoms of anxiety    Anxiety: will experience a reduction in anxiety, will develop more effective coping skills to manage anxiety symptoms, will develop healthy cognitive patterns and beliefs, and will increase ability to function adaptively    Current Stressors / Issues:    Update:  [facility] me with pt virtually on this date. Pt endorses that she is experiencing stress related to the holidays and her family dynamics. Pt expressed sadness and frustration with the quality of relationship with her daughter. [facility] and pt continues to discuss self-care and [facility] continues to utilize CBT techniques to address pt's automatic negative thoughts. Pt denies SI/HI/SIB. Treatment plan created on this date and reviewed.     Progress on Treatment Objective(s) / Homework:  Minimal progress - PREPARATION (Decided to change - considering how); Intervened by negotiating a change plan and determining options / strategies for behavior change, identifying triggers, exploring social supports, and working towards setting a date to begin behavior change    Motivational Interviewing    MI Intervention: Expressed Empathy/Understanding, Open-ended questions, and Reflections: simple and complex     Change Talk Expressed by the Patient: Desire to change Ability to change Reasons to change    Provider Response to Change Talk: E - Evoked more info from patient about behavior change, A - Affirmed patient's thoughts, decisions, or attempts at behavior change, R - Reflected patient's change talk, and S - Summarized patient's change  talk statements    Also provided psychoeducation about behavioral health condition, symptoms, and treatment options    Care Plan review completed: No    Medication Review:  No changes to current psychiatric medication(s)    Medication Compliance:  Yes    Changes in Health Issues:   None reported    Chemical Use Review:   Substance Use: Chemical use reviewed, no active concerns identified      Tobacco Use: No current tobacco use.      Assessment: Current Emotional / Mental Status (status of significant symptoms):  Risk status (Self / Other harm or suicidal ideation)  Patient has had a history of suicidal ideation: in 2013, passive thought and denies a history of suicide attempts, self-injurious behavior, homicidal ideation, homicidal behavior, and and other safety concerns  Patient denies current fears or concerns for personal safety.  Patient denies current or recent suicidal ideation or behaviors.  Patient denies current or recent homicidal ideation or behaviors.  Patient denies current or recent self injurious behavior or ideation.  Patient denies other safety concerns.  A safety and risk management plan has not been developed at this time, however patient was encouraged to call [place] [phone] should there be a change in any of these risk factors.    Appearance:   IRISH  Eye Contact:   IRISH  Psychomotor Behavior: IRISH  Attitude:   Cooperative  Interested  Orientation:   All  Speech   Rate / Production: Normal    Volume:  Normal   Mood:    Anxious , tearful  Affect:    IRISH  Thought Content:  Clear   Thought Form:  Coherent  Logical   Insight:    Good     Diagnoses:  1. DONELL (generalized anxiety disorder)    2. PTSD (post-traumatic stress disorder)    3. Moderate episode of recurrent major depressive disorder (H)            Collateral Reports Completed:  Not applicable    Plan: (Homework, other):  Patient was given information about behavioral services and encouraged to schedule a follow up appointment with the  clinic [facility] in 2 weeks.  She was also given information about mental health symptoms and treatment options .  CD Recommendations: No indications of CD issues.     Individual Treatment Plan    Patient's Name: Ilsa Yusuf  YOB: 1960    Date of Creation: 11/10/23  Date Treatment Plan Last Reviewed/Revised: n/a    DSM5 Diagnoses:   1. DONELL (generalized anxiety disorder)    2. PTSD (post-traumatic stress disorder)    3. Moderate episode of recurrent major depressive disorder (H)      Psychosocial / Contextual Factors: strained relationship with family members.   PROMIS (reviewed every 90 days): 10/09/23    Referral / Collaboration: consider referral to longer term therapist      Anticipated number of session for this episode of care: 6-10  Anticipation frequency of session: as needed   Anticipated Duration of each session: 38-52 minutes  Treatment plan will be reviewed in 90 days or when goals have been changed.       MeasurableTreatment Goal(s) related to diagnosis / functional impairment(s)  Goal 1: Patient will experience a reduction in depressive symptoms along with a corresponding increase in positive emotion and life satisfaction.    Objective #A (Patient Action)    Patient will Increase interest, engagement, and pleasure in doing things.  Status: Continued - Date(s): 11/10/23    Intervention(s)  Therapist will help patient identify pleasant and mastery oriented events that elicit positive, relaxed mood.    Objective #B  Patient will Decrease frequency and intensity of feeling down, depressed, hopeless.  Status: Continued - Date(s): 11/10/23    Intervention(s)  Therapist will introduce patient to cognitive-behavioral and acceptance and commitment therapy topics aimed to help reduce depression and anxiety      Other Possible Therapeutic Intervention(s):    Psycho-education regarding mental health diagnoses and treatment options    Supportive Therapy  Provide affirmations, reflections, and  establish working rapport  Emphasize and reflect on strength of therapeutic relationship    Skills training  Explore skills useful to client in current situation.  Skills include assertiveness, communication, conflict management, problem-solving, relaxation, etc.    Solution-Focused Therapy  Explore patterns in patient's relationships and discuss options for new behaviors.  Explore patterns in patient's actions and choices and discuss options for new behaviors.    Cognitive-behavioral Therapy  Discuss common cognitive distortions, identify them in patient's life.  Explore ways to challenge, replace, and act against these cognitions.    Acceptance and Commitment Therapy  Explore and identify important values in patient's life.  Discuss ways to commit to behavioral activation around these values.    Psychodynamic psychotherapy  Discuss patient's emotional dynamics and issues and how they impact behaviors.  Explore patient's history of relationships and how they impact present behaviors.  Explore how to work with and make changes in these schemas and patterns.    Narrative Therapy  Explore the patient's story of his/her life from his/her perspective.  Explore alternate ways of understanding their experience, identifying exceptions, developing new themes.    Interpersonal Psychotherapy  Explore patterns in relationships that are effective or ineffective at helping patient reach their goals, find satisfying experience.  Discuss new patterns or behaviors to engage in for improved social functioning.    Behavioral Activation  Discuss steps patient can take to become more involved in meaningful activity.  Identify barriers to these activities and explore possible solutions.    Mindfulness-Based Strategies  Discuss skills based on development and application of mindfulness.  Skills drawn from compassion-focused therapy, dialectical behavior therapy, mindfulness-based stress reduction, mindfulness-based cognitive therapy,  etc.      Patient has reviewed and agreed to the above plan.    YORDY Barnes, [facility]  Behavioral Health Clinician  [facility], [address], [address], [address], [place], [place], [address]  [phone]    2023

## 2023-11-14 ENCOUNTER — PATIENT OUTREACH (OUTPATIENT)
Dept: CARE COORDINATION | Facility: CLINIC | Age: 63
End: 2023-11-14
Payer: MEDICARE

## 2023-11-15 ENCOUNTER — OFFICE VISIT (OUTPATIENT)
Dept: UROLOGY | Facility: CLINIC | Age: 63
End: 2023-11-15
Payer: MEDICARE

## 2023-11-15 VITALS
BODY MASS INDEX: 30.07 KG/M2 | OXYGEN SATURATION: 97 % | SYSTOLIC BLOOD PRESSURE: 139 MMHG | DIASTOLIC BLOOD PRESSURE: 76 MMHG | HEART RATE: 73 BPM | WEIGHT: 203 LBS | HEIGHT: 69 IN

## 2023-11-15 DIAGNOSIS — N39.8 VOIDING DYSFUNCTION: ICD-10-CM

## 2023-11-15 DIAGNOSIS — R39.15 URGENCY OF URINATION: Primary | ICD-10-CM

## 2023-11-15 LAB — RESIDUAL VOLUME (RV) (EXTERNAL): 94

## 2023-11-15 PROCEDURE — 51798 US URINE CAPACITY MEASURE: CPT | Performed by: UROLOGY

## 2023-11-15 PROCEDURE — 99213 OFFICE O/P EST LOW 20 MIN: CPT | Mod: 25 | Performed by: UROLOGY

## 2023-11-15 RX ORDER — TOPIRAMATE 25 MG/1
25 TABLET, FILM COATED ORAL 2 TIMES DAILY
COMMUNITY

## 2023-11-15 RX ORDER — DICYCLOMINE HYDROCHLORIDE 10 MG/1
CAPSULE ORAL
COMMUNITY

## 2023-11-15 RX ORDER — ONDANSETRON 4 MG/1
TABLET, FILM COATED ORAL
COMMUNITY

## 2023-11-15 ASSESSMENT — PAIN SCALES - GENERAL: PAINLEVEL: MODERATE PAIN (4)

## 2023-11-15 NOTE — NURSING NOTE
Chief Complaint   Patient presents with    Urinary Urgency      Follow-up after VCUG    PVR-94ml  Stefany Gallo LPN

## 2023-11-15 NOTE — LETTER
"11/15/2023       RE: Ilsa Yusuf  [address] [address]     Dear Colleague,    Thank you for referring your patient, Ilsa Yusuf, to the [facility] at [facility]. Please see a copy of my visit note below.    November 15, 2023    Deanne was seen today for urinary urgency .    Diagnoses and all orders for this visit:    Urgency of urination  -     MEASURE POST-VOID RESIDUAL URINE/BLADDER CAPACITY, US NON-IMAGING (83834); Future  -     MEASURE POST-VOID RESIDUAL URINE/BLADDER CAPACITY, US NON-IMAGING (24422)    Voiding dysfunction       At this time unclear cause of her symptoms.  Recommend VUDS to assess he voiding dysfunction    8 minutes were spent today on the day of the encounter in reviewing the EMR including interpretation of the VCUG, direct patient care, coordination of care and documentation    Terrie Melton MD MPH  (she/her/hers)   of Urology  [facility]      Subjective    Here today to discuss her recent VCUG.  She denies any changes in health since last visit    /76   Pulse 73   Ht 1.74 m (5' 8.5\")   Wt 92.1 kg (203 lb)   LMP 08/08/2016   SpO2 97%   BMI 30.42 kg/m    GENERAL: healthy, alert and no distress  EYES: Eyes grossly normal to inspection, conjunctivae and sclerae normal  HENT: normal cephalic/atraumatic.  External ears, nose and mouth without ulcers or lesions.  RESP: no audible wheeze, cough, or visible cyanosis.  No visible retractions or increased work of breathing.  Able to speak fully in complete sentences.  NEURO: Cranial nerves grossly intact, mentation intact and speech normal  PSYCH: mentation appears normal, affect normal/bright, judgement and insight intact, normal speech and appearance well-groomed    Labs/imaging/pathology  VCUG images reviewed and are remarkable for no VUR but she is unable to empty.      PVR 94 mL by bladder " scan    CC  Patient Care Team:  Catrachita Nicolas MD as PCP - General  Breanne Ellis PA-C as Physician Assistant (Physician Assistant - Medical)  Carie Nuñez (Internal Medicine)  Arnaldo Perez MD as MD (Orthopaedic Surgery Hand Surgery)  Sruthi Corona [facility] as Licensed Mental Health Professional  Terrie Melton MD as Assigned Surgical Provider  Autumn Weiner GC as Genetic Counselor (Genetic Counselor, MS)  Ezekiel Cheng APRN CNP as Assigned Behavioral Health Provider  Catrachita Nicolas MD as Assigned PCP  Fay Reyes MD as Assigned OBGYN Provider  Tom Mathis MD as MD (Otolaryngology)  Goltz, Bennett Ezra, PA-C as Assigned Neuroscience Provider

## 2023-11-15 NOTE — PROGRESS NOTES
"November 15, 2023    Deanne was seen today for urinary urgency .    Diagnoses and all orders for this visit:    Urgency of urination  -     MEASURE POST-VOID RESIDUAL URINE/BLADDER CAPACITY, US NON-IMAGING (14093); Future  -     MEASURE POST-VOID RESIDUAL URINE/BLADDER CAPACITY, US NON-IMAGING (62050)    Voiding dysfunction       At this time unclear cause of her symptoms.  Recommend VUDS to assess he voiding dysfunction    8 minutes were spent today on the day of the encounter in reviewing the EMR including interpretation of the VCUG, direct patient care, coordination of care and documentation    Terrie Melton MD MPH  (she/her/hers)   of Urology  [facility]      Subjective    Here today to discuss her recent VCUG.  She denies any changes in health since last visit    /76   Pulse 73   Ht 1.74 m (5' 8.5\")   Wt 92.1 kg (203 lb)   LMP 08/08/2016   SpO2 97%   BMI 30.42 kg/m    GENERAL: healthy, alert and no distress  EYES: Eyes grossly normal to inspection, conjunctivae and sclerae normal  HENT: normal cephalic/atraumatic.  External ears, nose and mouth without ulcers or lesions.  RESP: no audible wheeze, cough, or visible cyanosis.  No visible retractions or increased work of breathing.  Able to speak fully in complete sentences.  NEURO: Cranial nerves grossly intact, mentation intact and speech normal  PSYCH: mentation appears normal, affect normal/bright, judgement and insight intact, normal speech and appearance well-groomed    Labs/imaging/pathology  VCUG images reviewed and are remarkable for no VUR but she is unable to empty.      PVR 94 mL by bladder scan    CC  Patient Care Team:  Catrachita Nicolas MD as PCP - General  Breanne Ellis PA-C as Physician Assistant (Physician Assistant - Medical)  Carie Nuñez (Internal Medicine)  Arnaldo Perez MD as MD (Orthopaedic Surgery Hand Surgery)  Sruthi Corona, [facility] as Licensed Mental Health Professional  Geronimo, " Terrie Mckeon MD as Assigned Surgical Provider  Autumn Weiner GC as Genetic Counselor (Genetic Counselor, MS)  Ezekiel Cheng APRN CNP as Assigned Behavioral Health Provider  Catrachita Nicolas MD as Assigned PCP  Fay Reyes MD as Assigned OBGYN Provider  Tom Mathis MD as MD (Otolaryngology)  Goltz, Bennett Ezra, PA-C as Assigned Neuroscience Provider

## 2023-11-17 ENCOUNTER — TELEPHONE (OUTPATIENT)
Dept: UROLOGY | Facility: CLINIC | Age: 63
End: 2023-11-17
Payer: MEDICARE

## 2023-11-18 DIAGNOSIS — F41.1 GAD (GENERALIZED ANXIETY DISORDER): ICD-10-CM

## 2023-11-20 ENCOUNTER — VIRTUAL VISIT (OUTPATIENT)
Dept: BEHAVIORAL HEALTH | Facility: CLINIC | Age: 63
End: 2023-11-20
Payer: MEDICARE

## 2023-11-20 DIAGNOSIS — F43.10 PTSD (POST-TRAUMATIC STRESS DISORDER): ICD-10-CM

## 2023-11-20 DIAGNOSIS — F41.1 GAD (GENERALIZED ANXIETY DISORDER): Primary | ICD-10-CM

## 2023-11-20 DIAGNOSIS — F33.1 MODERATE EPISODE OF RECURRENT MAJOR DEPRESSIVE DISORDER (H): ICD-10-CM

## 2023-11-20 PROCEDURE — 90834 PSYTX W PT 45 MINUTES: CPT | Mod: VID

## 2023-11-20 RX ORDER — HYDROXYZINE HYDROCHLORIDE 25 MG/1
TABLET, FILM COATED ORAL
Qty: 540 TABLET | Refills: 1 | Status: SHIPPED | OUTPATIENT
Start: 2023-11-20 | End: 2024-04-29

## 2023-11-20 ASSESSMENT — PATIENT HEALTH QUESTIONNAIRE - PHQ9
10. IF YOU CHECKED OFF ANY PROBLEMS, HOW DIFFICULT HAVE THESE PROBLEMS MADE IT FOR YOU TO DO YOUR WORK, TAKE CARE OF THINGS AT HOME, OR GET ALONG WITH OTHER PEOPLE: EXTREMELY DIFFICULT
SUM OF ALL RESPONSES TO PHQ QUESTIONS 1-9: 6
SUM OF ALL RESPONSES TO PHQ QUESTIONS 1-9: 6

## 2023-11-20 NOTE — PROGRESS NOTES
[facility] Primary Care: Integrated Behavioral Health  November 20th, 2023       Behavioral Health Clinician Progress Note    Patient Name: Ilsa Yusuf           Service Type:  Individual      Service Location:   Face to Face in Clinic     Session Start Time: 10:55 AM  Session End Time: 11:39 AM      Session Length: 38 - 52      Attendees: Patient     Service Modality:  Video Visit:      Provider verified identity through the following two step process.  Patient provided:  Patient is known previously to provider    Telemedicine Visit: The patient's condition can be safely assessed and treated via synchronous audio and visual telemedicine encounter.      Reason for Telemedicine Visit: Patient has requested telehealth visit    Originating Site (Patient Location): Patient's home    Distant Site (Provider Location): [facility]    Consent:  The patient/guardian has verbally consented to: the potential risks and benefits of telemedicine (video visit) versus in person care; bill my insurance or make self-payment for services provided; and responsibility for payment of non-covered services.     Patient would like the video invitation sent by:  My Chart    Mode of Communication:  Video Conference via [facility]    Distant Location (Provider):  On-site    As the provider I attest to compliance with applicable laws and regulations related to telemedicine.    Visit Activities (Refresh list every visit): [facility] Only    Diagnostic Assessment Date: Completed by Shaji Cheng CNP on 2/03/2023  Treatment Plan Review Date: 11/10/23  See Flowsheets for today's PHQ-9 and DONELL-7 results  Previous PHQ-9:       9/19/2023     1:27 PM 10/9/2023    12:27 PM 11/10/2023     9:58 AM   PHQ-9 SCORE   PHQ-9 Total Score MyChart 5 (Mild depression) 9 (Mild depression) 9 (Mild depression)   PHQ-9 Total Score 5 9 9     Previous DONELL-7:       9/19/2023     1:28 PM 10/9/2023    12:28 PM 11/10/2023      9:59 AM   DONELL-7 SCORE   Total Score 14 (moderate anxiety) 11 (moderate anxiety) 8 (mild anxiety)   Total Score 14 11 8    8       IMER LEVEL:      1/30/2012     2:00 PM   IMER Score (Last Two)   IMER Raw Score 37   Activation Score 49.9   IMER Level 2       DATA  Extended Session (60+ minutes): No  Interactive Complexity: No  Crisis: No  [facility] Patient: No    Treatment Objective(s) Addressed in This Session:  Target Behavior(s):  reduce symptoms of anxiety    Anxiety: will experience a reduction in anxiety, will develop more effective coping skills to manage anxiety symptoms, will develop healthy cognitive patterns and beliefs, and will increase ability to function adaptively    Current Stressors / Issues:    Update:  [facility] met with pt virtually on this date. Pt states that she has actively been working hard to address automatic negative thoughts. Pt states that she has actively been practicing mindfulness and guided imagery. [facility] continues to use CBT techniques to assist pt in addressing automatic negative thoughts.  Pt denies SI/HI/SIB. [facility] and pt discussed the holidays and how pt can prepare for a family gathering. [facility] and pt discussed different healthy distractions and methods of coping that pt can access if she feels overwhelmed.     Progress on Treatment Objective(s) / Homework:  Minimal progress - PREPARATION (Decided to change - considering how); Intervened by negotiating a change plan and determining options / strategies for behavior change, identifying triggers, exploring social supports, and working towards setting a date to begin behavior change    Motivational Interviewing    MI Intervention: Expressed Empathy/Understanding, Open-ended questions, and Reflections: simple and complex     Change Talk Expressed by the Patient: Desire to change Ability to change Reasons to change    Provider Response to Change Talk: E - Evoked more info from patient about behavior change, A - Affirmed patient's thoughts, decisions, or  attempts at behavior change, R - Reflected patient's change talk, and S - Summarized patient's change talk statements    Also provided psychoeducation about behavioral health condition, symptoms, and treatment options    Care Plan review completed: No    Medication Review:  No changes to current psychiatric medication(s)    Medication Compliance:  Yes    Changes in Health Issues:   None reported    Chemical Use Review:   Substance Use: Chemical use reviewed, no active concerns identified      Tobacco Use: No current tobacco use.      Assessment: Current Emotional / Mental Status (status of significant symptoms):  Risk status (Self / Other harm or suicidal ideation)  Patient has had a history of suicidal ideation: in 2013, passive thought and denies a history of suicide attempts, self-injurious behavior, homicidal ideation, homicidal behavior, and and other safety concerns  Patient denies current fears or concerns for personal safety.  Patient denies current or recent suicidal ideation or behaviors.  Patient denies current or recent homicidal ideation or behaviors.  Patient denies current or recent self injurious behavior or ideation.  Patient denies other safety concerns.  A safety and risk management plan has not been developed at this time, however patient was encouraged to call [place] [phone] should there be a change in any of these risk factors.    Appearance:   Appropriate  Eye Contact:   Good  Psychomotor Behavior: Normal  Attitude:   Cooperative  Interested  Orientation:   All  Speech   Rate / Production: Normal    Volume:  Normal   Mood:    Anxious , tearful  Affect:    Neutral, teaful at times  Thought Content:  Clear   Thought Form:  Coherent  Logical   Insight:    Good     Diagnoses:  1. DONELL (generalized anxiety disorder)    2. PTSD (post-traumatic stress disorder)    3. Moderate episode of recurrent major depressive disorder (H)        Collateral Reports Completed:  Not applicable    Plan:  (Homework, other):  Patient was given information about behavioral services and encouraged to schedule a follow up appointment with the clinic [facility] in 2 weeks.  She was also given information about mental health symptoms and treatment options . [facility] placed referral for ongoing individual therapy. CD Recommendations: No indications of CD issues.     Individual Treatment Plan    Patient's Name: Ilsa Yusuf  YOB: 1960    Date of Creation: 11/10/23  Date Treatment Plan Last Reviewed/Revised: n/a    DSM5 Diagnoses:   1. DONELL (generalized anxiety disorder)    2. PTSD (post-traumatic stress disorder)    3. Moderate episode of recurrent major depressive disorder (H)      Psychosocial / Contextual Factors: strained relationship with family members.   PROMIS (reviewed every 90 days): 10/09/23    Referral / Collaboration: consider referral to longer term therapist      Anticipated number of session for this episode of care: 6-10  Anticipation frequency of session: as needed   Anticipated Duration of each session: 38-52 minutes  Treatment plan will be reviewed in 90 days or when goals have been changed.       MeasurableTreatment Goal(s) related to diagnosis / functional impairment(s)  Goal 1: Patient will experience a reduction in depressive symptoms along with a corresponding increase in positive emotion and life satisfaction.    Objective #A (Patient Action)    Patient will Increase interest, engagement, and pleasure in doing things.  Status: Continued - Date(s): 11/10/23    Intervention(s)  Therapist will help patient identify pleasant and mastery oriented events that elicit positive, relaxed mood.    Objective #B  Patient will Decrease frequency and intensity of feeling down, depressed, hopeless.  Status: Continued - Date(s): 11/10/23    Intervention(s)  Therapist will introduce patient to cognitive-behavioral and acceptance and commitment therapy topics aimed to help reduce depression and  anxiety      Other Possible Therapeutic Intervention(s):    Psycho-education regarding mental health diagnoses and treatment options    Supportive Therapy  Provide affirmations, reflections, and establish working rapport  Emphasize and reflect on strength of therapeutic relationship    Skills training  Explore skills useful to client in current situation.  Skills include assertiveness, communication, conflict management, problem-solving, relaxation, etc.    Solution-Focused Therapy  Explore patterns in patient's relationships and discuss options for new behaviors.  Explore patterns in patient's actions and choices and discuss options for new behaviors.    Cognitive-behavioral Therapy  Discuss common cognitive distortions, identify them in patient's life.  Explore ways to challenge, replace, and act against these cognitions.    Acceptance and Commitment Therapy  Explore and identify important values in patient's life.  Discuss ways to commit to behavioral activation around these values.    Psychodynamic psychotherapy  Discuss patient's emotional dynamics and issues and how they impact behaviors.  Explore patient's history of relationships and how they impact present behaviors.  Explore how to work with and make changes in these schemas and patterns.    Narrative Therapy  Explore the patient's story of his/her life from his/her perspective.  Explore alternate ways of understanding their experience, identifying exceptions, developing new themes.    Interpersonal Psychotherapy  Explore patterns in relationships that are effective or ineffective at helping patient reach their goals, find satisfying experience.  Discuss new patterns or behaviors to engage in for improved social functioning.    Behavioral Activation  Discuss steps patient can take to become more involved in meaningful activity.  Identify barriers to these activities and explore possible solutions.    Mindfulness-Based Strategies  Discuss skills based on  development and application of mindfulness.  Skills drawn from compassion-focused therapy, dialectical behavior therapy, mindfulness-based stress reduction, mindfulness-based cognitive therapy, etc.      Patient has reviewed and agreed to the above plan.    YORDY Barnes, [facility]  Behavioral Health Clinician  [facility] Professional [facility], [address], [address], [place], [place], [address]  [phone]    2023

## 2023-11-20 NOTE — TELEPHONE ENCOUNTER
Pharmacy requests: DX code needed for hydrOXYzine (ATARAX) 25 MG tablet. See unsigned order below.

## 2023-11-24 ENCOUNTER — PRE VISIT (OUTPATIENT)
Dept: UROLOGY | Facility: CLINIC | Age: 63
End: 2023-11-24
Payer: MEDICARE

## 2023-11-24 NOTE — TELEPHONE ENCOUNTER
UDS for urinary urgency/voiding dysfunction.  Records available in [facility].

## 2023-11-28 ENCOUNTER — PATIENT OUTREACH (OUTPATIENT)
Dept: CARE COORDINATION | Facility: CLINIC | Age: 63
End: 2023-11-28
Payer: MEDICARE

## 2023-11-28 ENCOUNTER — VIRTUAL VISIT (OUTPATIENT)
Dept: BEHAVIORAL HEALTH | Facility: CLINIC | Age: 63
End: 2023-11-28
Payer: MEDICARE

## 2023-11-28 DIAGNOSIS — F41.1 GAD (GENERALIZED ANXIETY DISORDER): Primary | ICD-10-CM

## 2023-11-28 DIAGNOSIS — F43.10 PTSD (POST-TRAUMATIC STRESS DISORDER): ICD-10-CM

## 2023-11-28 DIAGNOSIS — F33.1 MODERATE EPISODE OF RECURRENT MAJOR DEPRESSIVE DISORDER (H): ICD-10-CM

## 2023-11-28 DIAGNOSIS — K21.9 GASTROESOPHAGEAL REFLUX DISEASE WITHOUT ESOPHAGITIS: ICD-10-CM

## 2023-11-28 PROCEDURE — 90832 PSYTX W PT 30 MINUTES: CPT | Mod: 95

## 2023-11-28 RX ORDER — OMEPRAZOLE 40 MG/1
40 CAPSULE, DELAYED RELEASE ORAL DAILY
Qty: 90 CAPSULE | Refills: 0 | Status: SHIPPED | OUTPATIENT
Start: 2023-11-28 | End: 2024-03-04

## 2023-11-28 NOTE — PROGRESS NOTES
[facility]  November 28th, 2023       Behavioral Health Clinician Progress Note    Patient Name: Ilsa Yusuf           Service Type:  Individual      Service Location:   Face to Face in Clinic     Session Start Time: 12:30 PM  Session End Time: 1:05 PM      Session Length: 38 - 52      Attendees: Patient     Service Modality:  Video Visit:      Provider verified identity through the following two step process.  Patient provided:  Patient is known previously to provider    Telemedicine Visit: The patient's condition can be safely assessed and treated via synchronous audio and visual telemedicine encounter.      Reason for Telemedicine Visit: Patient has requested telehealth visit    Originating Site (Patient Location): Patient's home    Distant Site (Provider Location): [facility]    Consent:  The patient/guardian has verbally consented to: the potential risks and benefits of telemedicine (video visit) versus in person care; bill my insurance or make self-payment for services provided; and responsibility for payment of non-covered services.     Patient would like the video invitation sent by:  My Chart    Mode of Communication:  Video Conference via [facility]    Distant Location (Provider):  On-site    As the provider I attest to compliance with applicable laws and regulations related to telemedicine.    Visit Activities (Refresh list every visit): [facility] Only    Diagnostic Assessment Date: Completed by Shaji Cheng CNP on 2/03/2023  Treatment Plan Review Date: 11/10/23  See Flowsheets for today's PHQ-9 and DONELL-7 results  Previous PHQ-9:       10/9/2023    12:27 PM 11/10/2023     9:58 AM 11/20/2023    10:53 AM   PHQ-9 SCORE   PHQ-9 Total Score MyChart 9 (Mild depression) 9 (Mild depression) 6 (Mild depression)   PHQ-9 Total Score 9 9 6     Previous DONELL-7:       9/19/2023     1:28 PM 10/9/2023    12:28 PM 11/10/2023      9:59 AM   DONELL-7 SCORE   Total Score 14 (moderate anxiety) 11 (moderate anxiety) 8 (mild anxiety)   Total Score 14 11 8    8       IMER LEVEL:      1/30/2012     2:00 PM   IMER Score (Last Two)   IMER Raw Score 37   Activation Score 49.9   IMER Level 2       DATA  Extended Session (60+ minutes): No  Interactive Complexity: No  Crisis: No  [facility] Patient: No    Treatment Objective(s) Addressed in This Session:  Target Behavior(s):  reduce symptoms of anxiety    Anxiety: will experience a reduction in anxiety, will develop more effective coping skills to manage anxiety symptoms, will develop healthy cognitive patterns and beliefs, and will increase ability to function adaptively    Current Stressors / Issues:    [facility] met with pt virtually on this date. Pt states that the holidays with her family went well, better than anticipated. Pt continues to process through challenging family dynamics. Pt did express some anxiety surrounding the Christmas holiday. Pt continues to actively work on self-care strategies. Pt denies safety concerns including SI/HI/SIB.     Progress on Treatment Objective(s) / Homework:  Satisfactory progress - ACTION (Actively working towards change); Intervened by reinforcing change plan / affirming steps taken    Motivational Interviewing    MI Intervention: Expressed Empathy/Understanding, Open-ended questions, and Reflections: simple and complex     Change Talk Expressed by the Patient: Desire to change Ability to change Reasons to change    Provider Response to Change Talk: E - Evoked more info from patient about behavior change, A - Affirmed patient's thoughts, decisions, or attempts at behavior change, R - Reflected patient's change talk, and S - Summarized patient's change talk statements    Also provided psychoeducation about behavioral health condition, symptoms, and treatment options    Care Plan review completed: No    Medication Review:  No changes to current psychiatric  medication(s)    Medication Compliance:  Yes    Changes in Health Issues:   None reported    Chemical Use Review:   Substance Use: Chemical use reviewed, no active concerns identified      Tobacco Use: No current tobacco use.      Assessment: Current Emotional / Mental Status (status of significant symptoms):  Risk status (Self / Other harm or suicidal ideation)  Patient has had a history of suicidal ideation: in 2013, passive thought and denies a history of suicide attempts, self-injurious behavior, homicidal ideation, homicidal behavior, and and other safety concerns  Patient denies current fears or concerns for personal safety.  Patient denies current or recent suicidal ideation or behaviors.  Patient denies current or recent homicidal ideation or behaviors.  Patient denies current or recent self injurious behavior or ideation.  Patient denies other safety concerns.  A safety and risk management plan has not been developed at this time, however patient was encouraged to call [place] [phone] should there be a change in any of these risk factors.    Appearance:   Appropriate  Eye Contact:   Good  Psychomotor Behavior: Normal  Attitude:   Cooperative  Interested  Orientation:   All  Speech   Rate / Production: Normal    Volume:  Normal   Mood:    Anxious , tearful  Affect:    Neutral, teaful at times  Thought Content:  Clear   Thought Form:  Coherent  Logical   Insight:    Good     Diagnoses:  1. DONELL (generalized anxiety disorder)    2. PTSD (post-traumatic stress disorder)    3. Moderate episode of recurrent major depressive disorder (H)      Collateral Reports Completed:  Not applicable    Plan: (Homework, other):  Patient was given information about behavioral services and encouraged to schedule a follow up appointment with the clinic [facility] in 2 weeks.  She was also given information about mental health symptoms and treatment options . [facility] placed referral for ongoing individual therapy. CD Recommendations: No  indications of CD issues.     Individual Treatment Plan    Patient's Name: Ilsa Yusuf  YOB: 1960    Date of Creation: 11/10/23  Date Treatment Plan Last Reviewed/Revised: n/a    DSM5 Diagnoses:   1. DONELL (generalized anxiety disorder)    2. PTSD (post-traumatic stress disorder)    3. Moderate episode of recurrent major depressive disorder (H)      Psychosocial / Contextual Factors: strained relationship with family members.   PROMIS (reviewed every 90 days): 10/09/23    Referral / Collaboration: consider referral to longer term therapist      Anticipated number of session for this episode of care: 6-10  Anticipation frequency of session: as needed   Anticipated Duration of each session: 38-52 minutes  Treatment plan will be reviewed in 90 days or when goals have been changed.       MeasurableTreatment Goal(s) related to diagnosis / functional impairment(s)  Goal 1: Patient will experience a reduction in depressive symptoms along with a corresponding increase in positive emotion and life satisfaction.    Objective #A (Patient Action)    Patient will Increase interest, engagement, and pleasure in doing things.  Status: Continued - Date(s): 11/10/23    Intervention(s)  Therapist will help patient identify pleasant and mastery oriented events that elicit positive, relaxed mood.    Objective #B  Patient will Decrease frequency and intensity of feeling down, depressed, hopeless.  Status: Continued - Date(s): 11/10/23    Intervention(s)  Therapist will introduce patient to cognitive-behavioral and acceptance and commitment therapy topics aimed to help reduce depression and anxiety      Other Possible Therapeutic Intervention(s):    Psycho-education regarding mental health diagnoses and treatment options    Supportive Therapy  Provide affirmations, reflections, and establish working rapport  Emphasize and reflect on strength of therapeutic relationship    Skills training  Explore skills useful to  client in current situation.  Skills include assertiveness, communication, conflict management, problem-solving, relaxation, etc.    Solution-Focused Therapy  Explore patterns in patient's relationships and discuss options for new behaviors.  Explore patterns in patient's actions and choices and discuss options for new behaviors.    Cognitive-behavioral Therapy  Discuss common cognitive distortions, identify them in patient's life.  Explore ways to challenge, replace, and act against these cognitions.    Acceptance and Commitment Therapy  Explore and identify important values in patient's life.  Discuss ways to commit to behavioral activation around these values.    Psychodynamic psychotherapy  Discuss patient's emotional dynamics and issues and how they impact behaviors.  Explore patient's history of relationships and how they impact present behaviors.  Explore how to work with and make changes in these schemas and patterns.    Narrative Therapy  Explore the patient's story of his/her life from his/her perspective.  Explore alternate ways of understanding their experience, identifying exceptions, developing new themes.    Interpersonal Psychotherapy  Explore patterns in relationships that are effective or ineffective at helping patient reach their goals, find satisfying experience.  Discuss new patterns or behaviors to engage in for improved social functioning.    Behavioral Activation  Discuss steps patient can take to become more involved in meaningful activity.  Identify barriers to these activities and explore possible solutions.    Mindfulness-Based Strategies  Discuss skills based on development and application of mindfulness.  Skills drawn from compassion-focused therapy, dialectical behavior therapy, mindfulness-based stress reduction, mindfulness-based cognitive therapy, etc.      Patient has reviewed and agreed to the above plan.    YORDY Barnes, [facility]  Behavioral Health Clinician  [facility]  [facility] Professional Bldg, [address], [address], [place], [place], [address]  [phone]    2023

## 2023-11-29 DIAGNOSIS — N39.0 URINARY TRACT INFECTION: Primary | ICD-10-CM

## 2023-12-03 ENCOUNTER — TELEPHONE (OUTPATIENT)
Dept: SLEEP MEDICINE | Facility: CLINIC | Age: 63
End: 2023-12-03
Payer: MEDICARE

## 2023-12-03 DIAGNOSIS — F41.1 GAD (GENERALIZED ANXIETY DISORDER): ICD-10-CM

## 2023-12-03 NOTE — TELEPHONE ENCOUNTER
----- Message from Seda Suarez sent at 11/30/2023 11:00 AM CST -----  Regarding: RESIGNING SLEEP STUDY INTERPRETATION  Good morning!    This patient is receiving new CPAP equipment, however the sleep study interpretation from 1/28/2016 needs an updated signature per Medicare requirements. Please re-sign the sleep study at your earliest convenience.    Thank you greatly in advance!    Best,  Samantha Suarez

## 2023-12-04 ENCOUNTER — TRANSFERRED RECORDS (OUTPATIENT)
Dept: HEALTH INFORMATION MANAGEMENT | Facility: CLINIC | Age: 63
End: 2023-12-04
Payer: MEDICARE

## 2023-12-04 RX ORDER — BUSPIRONE HYDROCHLORIDE 10 MG/1
20 TABLET ORAL 3 TIMES DAILY
Qty: 540 TABLET | Refills: 1 | Status: SHIPPED | OUTPATIENT
Start: 2023-12-04 | End: 2024-06-03

## 2023-12-04 NOTE — TELEPHONE ENCOUNTER
Date of Last Office Visit: 10/19/23  Date of Next Office Visit: 12/6/23  No shows since last visit: 0  Cancellations since last visit: 0    Medication requested: busPIRone (BUSPAR) 10 MG tablet  Date last ordered: 11/8/23 Qty: 180 Refills: 1 Requesting 90 day refill     Review of [place] ?: NA    Lapse in medication adherence greater than 5 days?: No  If yes, call patient and gather details: na  Medication refill request verified as identical to current order?: yes  Result of Last DAM, VPA, Li+ Level, CBC, or Carbamazepine Level (at or since last visit): N/A    Last visit treatment plan: Continue take Wellbutrin  mg twice daily -   Continue Lexapro 20 mg every day  Take hydroxyzine 25 - 50 mg instead of 3 times daily as needed for anxiety     Continue to take prazosin 5 mg at bedtime  Take Buspar 20 mg three times daily for anxiety  Take Lunesta 1 mg at bedtime for sleep.  Continue with Short term individual psychotherapy    []Medication refilled per  Medication Refill in Ambulatory Care  policy.  [x]Medication unable to be refilled by RN due to criteria not met as indicated below:    []Eligibility - not seen in the last year   []Supervision - no future appointment   []Compliance - no shows, cancellations or lapse in therapy   []Verification - order discrepancy   []Controlled medication   []Medication not included in policy   [x]90-day supply request   []Other

## 2023-12-04 NOTE — TELEPHONE ENCOUNTER
PSG Report and Signed Interpretation have been scanned into pts chart.    A TERRI Weller, Clinical Specialist - [place]

## 2023-12-06 ENCOUNTER — VIRTUAL VISIT (OUTPATIENT)
Dept: PSYCHIATRY | Facility: CLINIC | Age: 63
End: 2023-12-06
Payer: MEDICARE

## 2023-12-06 VITALS — HEIGHT: 69 IN | BODY MASS INDEX: 30.81 KG/M2 | WEIGHT: 208 LBS

## 2023-12-06 DIAGNOSIS — F43.10 PTSD (POST-TRAUMATIC STRESS DISORDER): ICD-10-CM

## 2023-12-06 DIAGNOSIS — F33.1 MAJOR DEPRESSIVE DISORDER, RECURRENT EPISODE, MODERATE (H): Primary | ICD-10-CM

## 2023-12-06 DIAGNOSIS — F41.1 GAD (GENERALIZED ANXIETY DISORDER): ICD-10-CM

## 2023-12-06 DIAGNOSIS — F90.1 ATTENTION-DEFICIT HYPERACTIVITY DISORDER, PREDOMINANTLY HYPERACTIVE TYPE: ICD-10-CM

## 2023-12-06 DIAGNOSIS — F51.01 PRIMARY INSOMNIA: ICD-10-CM

## 2023-12-06 PROCEDURE — 99214 OFFICE O/P EST MOD 30 MIN: CPT | Mod: VID | Performed by: NURSE PRACTITIONER

## 2023-12-06 RX ORDER — PRAZOSIN HYDROCHLORIDE 5 MG/1
CAPSULE ORAL
Qty: 90 CAPSULE | Refills: 1 | Status: SHIPPED | OUTPATIENT
Start: 2023-12-06 | End: 2024-06-03

## 2023-12-06 ASSESSMENT — PAIN SCALES - GENERAL: PAINLEVEL: MODERATE PAIN (4)

## 2023-12-06 NOTE — PATIENT INSTRUCTIONS
"Patient Education   The [facility]  What to Expect  Here's what to expect in the [facility] Program.   About the program  You'll be meeting with a psychiatric doctor to check your mental health. A psychiatric doctor helps you deal with troubling thoughts and feelings by giving you medicine. They'll make sure you know the plan for your care. You may see them for a long time. When you're feeling better, they may refer you back to seeing your family doctor.   If you have any questions, we'll be glad to talk to you.  About visits  Be open  At your visits, please talk openly about your problems. It may feel hard, but it's the best way for us to help you.  Cancelling visits  If you can't come to your visit, please call us right away at [phone]. If you don't cancel at least 24 hours (1 full day) before your visit, that's \"late cancellation.\"  Not showing up for your visits  Being very late is the same as not showing up. You'll be a \"no show\" if:  You're more than 15 minutes late for a 30-minute (half hour) visit.  You're more than 30 minutes late for a 60-minute (full hour) visit.  If you cancel late or don't show up 2 times within 6 months, we may end your care.  Getting help between visits  If you need help between visits, you can call us Monday to Friday from 8 a.m. to 4:30 p.m. at [phone].  Emergency care  Call 911 or go to the nearest emergency department if your life or someone else's life is in danger.  Call 988 anytime to reach the national Suicide and Crisis hotline.  Medicine refills  To refill your medicine, call your pharmacy. You can also call [facility]'s [facility] at [phone], Monday to Friday, 8 a.m. to 4:30 p.m. It can take 1 to 3 business days to get a refill.   Forms, letters, and tests  You may have papers to fill out, like FMLA, short-term disability, and workability. We can help you with these forms at your visits, but you must have an " appointment. You may need more than 1 visit for this, to be in an intensive therapy program, or both.  Before we can give you medicine for ADHD, we may refer you to get tested for it or confirm it another way.  We may not be able to give you an emotional support animal letter.  We don't do mental health checks ordered by the court.   We don't do mental health testing, but we can refer you to get tested.   Thank you for choosing us for your care.  For informational purposes only. Not to replace the advice of your health care provider. Copyright   2022 [facility]. All rights reserved. [facility] [id number] - 12/22.     PLAN AND RECOMMENDATIONS:  Continue take Wellbutrin  mg twice daily -   Continue Lexapro 20 mg every day  Continue hydroxyzine 25 - 50 mg instead of 3 times daily as needed for anxiety   Continue to take prazosin 5 mg at bedtime  Continue Prazosin 5 mg at bedtime   Continue Buspar 20 mg three times daily for anxiety  Continue with Short term individual psychotherapy       We have discussed his/her diagnosis, prognosis, differential diagnosis and side effects and benefits of medications.

## 2023-12-06 NOTE — PROGRESS NOTES
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 promotion activities recommended and reviewed today. All questions addressed. Education and counseling completed regarding risks and benefits of medications and psychotherapy options.  Consent provided by patient/guardian  Call the psychiatric nurse line with medication questions or concerns at [phone].  [employer] may be used to communicate with your provider, but this is not intended to be used for emergencies.  BLACK BOX WARNING: Discussed the Food and Drug Administration (FDA) requires that all antidepressants carry a warning that some children, adolescents and young adults may be at increased risk of suicide when taking antidepressants. Anyone taking an antidepressant should be watched closely for worsening depression or unusual behavior especially in the first few weeks after starting an SSRI. Keep in mind, antidepressants are more likely to reduce suicide risk in the long run by improving mood.   SEROTONIN SYNDROME:  Discussed risks of Serotonin syndrome (ie, serotonin toxicity) which is a potentially life-threatening condition associated with increased serotonergic activity in the central nervous system (CNS). It is seen with therapeutic medication use,  inadvertent interactions between drugs, and intentional self-poisoning. Serotonin syndrome may involve a spectrum of clinical findings, which often include mental status changes, autonomic hyperactivity, and neuromuscular abnormalities.    BENZODIAZEPINE:  discussion on how benzos work and the need to use them short term due to potential of anxiety getting.  This is a controlled substance with risk for abuse, need to keep in a safe keep place and cannot replace lost scripts.    HARM REDUCTION:  Discussions regarding effects of mood altering substances, alcohol and cannabis, on mood and that approach is harm reduction, will continue to prescribe meds as they work to cut back use.    FIRST GENERATION ANTIPSYCHOTIC/ SECOND GENERATION ANTIPSYCHOTIC USE:  Atypical need for cardiometabolic monitoring with medication- B/P, weight, blood sugar, cholesterol.  Need to monitor for abnormal movements taught  SAFETY:  We all care about your loved one's safety. To reduce the risk of self-harm, remove access to all:  Firearms, Medicines (both prescribed and over-the-counter), Knives and other sharp objects, Ropes and like materials, and Alcohol  SLEEP HYGIENE: establish a sleep routine, limit screen time 1 hour prior to bed, use bed for sleep only, take sleep/medications on time (including sleepy time tea, trazadone or herbal treatments such as melatonin), aroma therapy, limit caffeine/sugar, yoga, guided imagery, stretch, meditation, limit naps to 20 minutes, make a temperature change in the room, white noise, be mindful of slowing down breathing, take a warm bath/shower, frequently wash sheets, and journaling.   Medlineplus.gov is information for patients.  It is run by the National Library of Medicine and it contains information about all disorders, diseases and all medications.      COMMUNITY RESOURCES:    CRISIS NUMBERS: Provided in AVS 6/21/2023  National Suicide Prevention Lifeline: 7-830-392-TALK  (595.503.4003)  Informatics In Context/resources for a list of additional resources (SOS)            [facility] - [phone]   Urgent Care Adult Mental [facility]-391-240-7900 mobile unit/  crisis line  [facility] -[phone]   COPE  [employer] Mobile Team -[phone] (adults)/ [phone] (child)  Poison Control Center - 1-639.316.3984    OR  go to nearest ER  Crisis Text Line for any crisis  send this-   To: 540461   [facility] ([facility]) [facility] ER  [phone]  National Suicide Prevention Lifeline: 873.953.5135 (TTY: 296.443.3755). Call anytime for help.  (www.suicidepreventionlifeline.org)  National [place] on Mental Illness (www.ellie.org): 426.999.7657 or 552-286-2741.   Mental Health Association (www.mentalhealth.org): 437.293.8014 or 360-691-3017.  [place] Crisis Text Line: Text [place] to 214530  Suicide LifeLine Chat: [employer].org/chat    ADMINISTRATIVE BILLIN min spent interviewing patient, reviewing referral documents, obtaining and reviewing outside records, communication with other health specialists, and preparing this report on today's date: 2023  Video/Phone Start Time: 1:33 pm  Video/Phone End Time:  1:55 pm      Signed:     Ezekiel Cheng, MSN, APRN, PMHNP-BC     Chart documentation done in part with Dragon Voice Recognition software.  Although reviewed after completion, some word and grammatical errors may remain.  Answers for HPI/ROS submitted by the patient on 2022  If you checked off any problems, how difficult have these problems made it for you to do your work, take care of things at home, or get along with other people?: Somewhat difficult  PHQ9 TOTAL SCORE: 7  DONELL 7 TOTAL SCORE: 13    Answers for HPI/ROS submitted by the patient on 2/3/2023  If you checked off any problems, how difficult have these problems made it for you to do your work, take care of things at home, or get along with other people?:  Very difficult  PHQ9 TOTAL SCORE: 3  DONELL 7 TOTAL SCORE: 6  Answers for HPI/ROS submitted by the patient on 4/14/2023  If you checked off any problems, how difficult have these problems made it for you to do your work, take care of things at home, or get along with other people?: Somewhat difficult  PHQ9 TOTAL SCORE: 5  DONELL 7 TOTAL SCORE: 8    Answers for HPI/ROS submitted by the patient on 5/25/2023  If you checked off any problems, how difficult have these problems made it for you to do your work, take care of things at home, or get along with other people?: Extremely difficult  PHQ9 TOTAL SCORE: 12  DONELL 7 TOTAL SCORE: 9Answers submitted by the patient for this visit:  Patient Health Questionnaire (Submitted on 9/19/2023)  If you checked off any problems, how difficult have these problems made it for you to do your work, take care of things at home, or get along with other people?: Somewhat difficult  PHQ9 TOTAL SCORE: 5  DONELL-7 (Submitted on 9/19/2023)  DONELL 7 TOTAL SCORE: 14

## 2023-12-06 NOTE — PROGRESS NOTES
Virtual Visit Details    Type of service:  Video Visit   Video Start Time:  1:33 pm  Video End Time: 1:55 pm    Originating Location (pt. Location): Home    Distant Location (provider location):  Off-site  Platform used for Video Visit: Luna

## 2023-12-06 NOTE — NURSING NOTE
Is the patient currently in the state of [place]? YES    Visit mode:VIDEO    If the visit is dropped, the patient can be reconnected by: VIDEO VISIT: Send to e-mail at: [email or web address]    Will anyone else be joining the visit? NO  (If patient encounters technical issues they should call [phone]    How would you like to obtain your AVS? MyChart    Are changes needed to the allergy or medication list? Pt stated no changes to allergies and Pt stated no med changes    Reason for visit: JALEN WATSON

## 2023-12-07 ENCOUNTER — PRE VISIT (OUTPATIENT)
Dept: UROLOGY | Facility: CLINIC | Age: 63
End: 2023-12-07

## 2023-12-07 ENCOUNTER — ANCILLARY PROCEDURE (OUTPATIENT)
Dept: RADIOLOGY | Facility: AMBULATORY SURGERY CENTER | Age: 63
End: 2023-12-07
Attending: PHYSICIAN ASSISTANT
Payer: MEDICARE

## 2023-12-07 ENCOUNTER — ALLIED HEALTH/NURSE VISIT (OUTPATIENT)
Dept: UROLOGY | Facility: CLINIC | Age: 63
End: 2023-12-07
Payer: MEDICARE

## 2023-12-07 VITALS
HEART RATE: 71 BPM | DIASTOLIC BLOOD PRESSURE: 72 MMHG | WEIGHT: 208 LBS | BODY MASS INDEX: 30.81 KG/M2 | HEIGHT: 69 IN | SYSTOLIC BLOOD PRESSURE: 113 MMHG

## 2023-12-07 DIAGNOSIS — R39.89 URINARY PROBLEM: ICD-10-CM

## 2023-12-07 DIAGNOSIS — N32.81 URGENCY-FREQUENCY SYNDROME: Primary | ICD-10-CM

## 2023-12-07 LAB
ALBUMIN UR-MCNC: NEGATIVE MG/DL
APPEARANCE UR: CLEAR
BILIRUB UR QL STRIP: NEGATIVE
COLOR UR AUTO: YELLOW
GLUCOSE UR STRIP-MCNC: NEGATIVE MG/DL
HGB UR QL STRIP: NEGATIVE
KETONES UR STRIP-MCNC: NEGATIVE MG/DL
LEUKOCYTE ESTERASE UR QL STRIP: NEGATIVE
NITRATE UR QL: NEGATIVE
PH UR STRIP: 6.5 [PH] (ref 5–8)
SP GR UR STRIP: 1.01 (ref 1–1.03)
UROBILINOGEN UR STRIP-ACNC: 0.2 E.U./DL

## 2023-12-07 PROCEDURE — 51600 INJECTION FOR BLADDER X-RAY: CPT | Performed by: PHYSICIAN ASSISTANT

## 2023-12-07 PROCEDURE — 51797 INTRAABDOMINAL PRESSURE TEST: CPT | Performed by: PHYSICIAN ASSISTANT

## 2023-12-07 PROCEDURE — 51728 CYSTOMETROGRAM W/VP: CPT | Performed by: PHYSICIAN ASSISTANT

## 2023-12-07 PROCEDURE — 51741 ELECTRO-UROFLOWMETRY FIRST: CPT | Performed by: PHYSICIAN ASSISTANT

## 2023-12-07 PROCEDURE — 81003 URINALYSIS AUTO W/O SCOPE: CPT | Performed by: PHYSICIAN ASSISTANT

## 2023-12-07 PROCEDURE — 81003 URINALYSIS AUTO W/O SCOPE: CPT | Mod: 91 | Performed by: PATHOLOGY

## 2023-12-07 PROCEDURE — 51784 ANAL/URINARY MUSCLE STUDY: CPT | Performed by: PHYSICIAN ASSISTANT

## 2023-12-07 PROCEDURE — 74430 CONTRAST X-RAY BLADDER: CPT | Performed by: PHYSICIAN ASSISTANT

## 2023-12-07 RX ORDER — LIDOCAINE HYDROCHLORIDE 20 MG/ML
JELLY TOPICAL ONCE
Status: COMPLETED | OUTPATIENT
Start: 2023-12-07 | End: 2023-12-07

## 2023-12-07 RX ORDER — SULFAMETHOXAZOLE/TRIMETHOPRIM 800-160 MG
1 TABLET ORAL ONCE
Status: COMPLETED | OUTPATIENT
Start: 2023-12-07 | End: 2023-12-07

## 2023-12-07 RX ADMIN — SULFAMETHOXAZOLE AND TRIMETHOPRIM 1 TABLET: 800; 160 TABLET ORAL at 10:40

## 2023-12-07 RX ADMIN — LIDOCAINE HYDROCHLORIDE: 20 JELLY TOPICAL at 10:39

## 2023-12-07 ASSESSMENT — PAIN SCALES - GENERAL: PAINLEVEL: MILD PAIN (3)

## 2023-12-07 NOTE — PROGRESS NOTES
PREPROCEDURE DIAGNOSES:    1. Urinary urgency/frequency  2. Voiding dysfunction    POSTPROCEDURE DIAGNOSES:  -Maximum cystometric capacity 325 mL with heightened filling sensations.  -Good bladder compliance without detrusor overactivity or urodynamic stress incontinence.  -Maximum detrusor contraction during voiding reaches 28 cm H2O. She voids 134 mL with slow flow (Qmax 12 ml/s), intermittent flow curve, significantly increased EMG activity, incomplete bladder emptying ( mL), and no evidence for bladder outlet obstruction (BOOI -8.9). With catheters removed, she voids an additional 100 mL for final PVR of 90 mL.   -Fluoroscopy reveals a mildly trabeculated bladder wall without diverticuli or VUR. The bladder neck is closed during filling; voiding images unavailable as patient unable to void under fluoroscopy.     PROCEDURE:    1. Uroflowmetry.  2. Sterile urethral catheterization for measurement of postvoid residual urine volume.  3. Complex filling cystometrogram with measurement of bladder and rectal pressures.  4. Complex voiding cystometrogram with measurement of bladder and rectal pressures.  5. Electromyography of the pelvic floor during urodynamics.  6. Fluoroscopic imaging of the bladder during urodynamics, at least 3 views.    7. Interpretation of urodynamics and flouroscopic imaging.      INDICATIONS FOR PROCEDURE:  Ms. Ilsa Yusuf is a pleasant 63 year old female with urinary urgency, frequency, and voiding dysfunction. Baseline video urodynamic assessment is requested today by Dr. Melton to better characterize Ms. Ilsa Yusuf's voiding dysfunction.      VOIDING DIARY:  Did not complete.     DESCRIPTION OF PROCEDURE:  Risks, benefits, and alternatives to urodynamics were discussed with the patient and she wished to proceed.  Urodynamics are planned to better assess the primary etiology for Ms. Yusfu's urologic dysfunction.  The patient does not currently take  anticholinergic or beta 3 agonist medications for her bladder.  After informed consent was obtained, the patient was taken to the procedure room where uroflowmetry was performed. Findings below.     PRE-STUDY UROFLOWMETRY:  Voided volume: 47 mL.  Maximum flow rate: 8.8 mL/sec.  Average flow rate: 2.6 mL/sec.  Character of the curve: intermittent.  Postvoid residual by catheter: 80 mL.  Pretest urine dipstick was negative for leukocytes and nitrites.    Next a 7F double-lumen urodynamics catheter was inserted into the bladder under sterile technique via urethra.  A 7F abdominal manometry catheter was placed in the vagina.  EMG pads were placed on both sides of the anal verge.  The bladder was filled with 100 mL of Omnipaque at 30 mL/minute and serial pressures were recorded.  With coughing there was an appropriate rise in vesical and abdominal pressures with no change in detrusor pressure, confirming good study catheter placement.    DURING THE FILLING PHASE:  First sensation: 27 mL.  First Desire: 125 mL.  Strong Desire: 212 mL.  Maximum Capacity: 325 mL.    Uninhibited detrusor contractions: none.  Compliance: good. PDet=0.5 cmH20 at capacity.   Continence: no DOI or DANYEL.  EMG: concordant during filling.    DURING THE VOIDING PHASE:  Maximum detrusor contraction with void: 28 cm of H2O pressure.  Voided volume: 134 mL + 100 mL with catheters removed.  Maximum flow rate: 12 mL/sec.  Average flow rate: 3.5 mL/sec.  Postvoid Residual: 90 mL.  EMG activity: significantly increased.  Character of voiding curve: intermittent.  BOOI: -8.9 (suggesting no obstruction - see key below)  [obstructed (RINCON index [BOOI] ? 40); equivocal (no definite   obstruction; BOOI 20-40); and no obstruction (BOOI ? 20)]    FLUOROSCOPIC IMAGING OF THE BLADDER DURING URODYNAMICS:  Please note, image numbers on UDS tracings correlate with iSite series numbers on PACS images. Fluoroscopy during today's procedure demonstrated a mildly  trabeculated bladder wall without diverticulae or cellules.  No vesicoureteral reflux was observed.  The bladder neck was closed during filling. Patient unable to void under fluoroscopy so no voiding images available.  After voiding to completion, all catheters were removed.    ASSESSMENT/PLAN:  Ms. Ilsa Yusuf is a pleasant 63 year old female with urgency/frequency and voiding dysfunction who demonstrated the following findings today on urodynamic evaluation:    -Maximum cystometric capacity 325 mL with heightened filling sensations.  -Good bladder compliance without detrusor overactivity or urodynamic stress incontinence.  -Maximum detrusor contraction during voiding reaches 28 cm H2O. She voids 134 mL with slow flow (Qmax 12 ml/s), intermittent flow curve, significantly increased EMG activity, incomplete bladder emptying ( mL), and no evidence for bladder outlet obstruction (BOOI -8.9). With catheters removed, she voids an additional 100 mL for final PVR of 90 mL.   -Fluoroscopy reveals a mildly trabeculated bladder wall without diverticuli or VUR. The bladder neck is closed during filling; voiding images unavailable as patient unable to void under fluoroscopy.     The patient will follow up as scheduled with Dr. Melton to further discuss today's study results and make plans for how best to proceed.      - A single Bactrim antibiotic was administered at the conclusion of the study per department protocol. The risk of UTI with VUDS is low at ~2.5-3%.      Thank you for allowing me to participate in the care of Ms. Ilsa Yusuf and please don't hesitate to contact me with any questions or concerns.      Dominique Henderson PA-C  Urology Physician Assistant

## 2023-12-07 NOTE — PATIENT INSTRUCTIONS
Follow up with Dr. Melton as scheduled.    It was a pleasure meeting with you today.  Thank you for allowing me and my team the privilege of caring for you today.  YOU are the reason we are here, and I truly hope we provided you with the excellent service you deserve.  Please let us know if there is anything else we can do for you so that we can be sure you are leaving completely satisfied with your care experience.      Nusrat Cota, CMA

## 2023-12-07 NOTE — NURSING NOTE
"  Chief Complaint   Patient presents with    Urodynamics Study     Urinary frequency/urgency         Blood pressure 113/72, pulse 71, height 1.74 m (5' 8.5\"), weight 94.3 kg (208 lb), last menstrual period 08/08/2016, not currently breastfeeding. Body mass index is 31.17 kg/m .    Patient Active Problem List   Diagnosis    Major depressive disorder, recurrent episode, moderate (H)    PTSD (post-traumatic stress disorder)    Acquired hypothyroidism    Hyperlipidemia LDL goal <130    CKD (chronic kidney disease) stage 3, GFR 30-59 ml/min (H)    Esophageal reflux    Primary insomnia    Obesity    Attention-deficit hyperactivity disorder, predominantly hyperactive type    Neck pain    Cervical radiculopathy    Primary osteoarthritis of right knee    Peripheral tear of medial meniscus of right knee, unspecified whether old or current tear, initial encounter    Calculus of left kidney    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    Cervical high risk HPV (human papillomavirus) test positive    ABDOUL (obstructive sleep apnea)    Osteoarthritis of carpometacarpal (CMC) joint of both thumbs    Bilateral thumb pain    Urgency of urination    History of UTI    Bladder pain    Somatic dysfunction of pelvic region    Pelvic pain in female       Allergies   Allergen Reactions    Gabapentin Other (See Comments)     Patient states she gets \"horrific nightmares\" with this medication    Dilaudid [Hydromorphone Hcl]      Made her feel like her skin was crawling    Nsaids Other (See Comments)     Kidney failure    Compazine [Prochlorperazine] Other (See Comments)     \"Makes my skin crawl, I was going nuts.\"       Current Outpatient Medications   Medication Sig Dispense Refill    albuterol (PROAIR HFA/PROVENTIL HFA/VENTOLIN HFA) 108 (90 Base) MCG/ACT inhaler Inhale 1-2 puffs into the lungs every 4 hours as needed for shortness of breath / dyspnea or wheezing 18 g 0    azelastine (ASTELIN) 0.1 % nasal spray Spray 1 spray into both nostrils 2 times daily 30 mL " 1    buPROPion (WELLBUTRIN SR) 150 MG 12 hr tablet TAKE 1 TABLET BY MOUTH 2 TIMES DAILY 60 tablet 1    busPIRone (BUSPAR) 10 MG tablet TAKE 2 TABLETS (20 MG) BY MOUTH 3 TIMES DAILY 540 tablet 1    cholecalciferol (VITAMIN D3) 5000 UNITS CAPS capsule Take 1 capsule (5,000 Units) by mouth daily Take 1 per day (Patient taking differently: Take 5,000 Units by mouth at bedtime Take 1 per day) 100 capsule 2    dicyclomine (BENTYL) 10 MG capsule TAKE 1 CAPSULE BY MOUTH 4 TIMES A DAY AS NEEDED (ABDOMINAL PAIN OR CRAMPS)      escitalopram (LEXAPRO) 20 MG tablet Take 1 tablet (20 mg) by mouth daily 90 tablet 0    hydrOXYzine (ATARAX) 25 MG tablet TAKE 1 TO 2 TABLETS (25-50 MG) BY MOUTH 3 TIMES A DAY AS NEEDED FOR ANXIETY 540 tablet 1    levothyroxine (SYNTHROID/LEVOTHROID) 175 MCG tablet TAKE 1 TABLET BY MOUTH DAILY FOR 5 DAYS PER WEEK AND 1/2 TABLET ONE DAY PER WEEK 90 tablet 3    lovastatin (MEVACOR) 20 MG tablet TAKE 1 (20MG) TABLET ORALLY AT BEDTIME DAILY 90 tablet 0    multivitamin w/minerals (THERA-VIT-M) tablet Take 1 tablet by mouth daily (with lunch)      Omega-3 Fatty Acids (OMEGA 3 PO) Take 2 g by mouth 2 times daily Takes 1 in am and 1 at bedtime      omeprazole (PRILOSEC) 40 MG DR capsule TAKE 1 CAPSULE BY MOUTH EVERY DAY 90 capsule 0    ondansetron (ZOFRAN) 4 MG tablet TAKE 1 TABLET (4MG) BY MOUTH EVERY 8HRS AS NEEDED FOR NAUSEA OR VOMITING      oxyCODONE IR (ROXICODONE) 10 MG tablet as needed      prazosin (MINIPRESS) 5 MG capsule TAKE 1 CAPSULE(5 MG) BY MOUTH AT BEDTIME 90 capsule 1    sulfamethoxazole-trimethoprim (BACTRIM DS) 800-160 MG tablet Take 1 tablet by mouth 2 times daily for 7 days 14 tablet 0    tiZANidine (ZANAFLEX) 4 MG tablet Take 1-3 tablets (4-12 mg) by mouth 3 times daily as needed for muscle spasms 210 tablet 3    topiramate (TOPAMAX) 25 MG tablet Take 25 mg by mouth 2 times daily      UNABLE TO FIND MEDICATION NAME: medical cannibus         Social History     Tobacco Use    Smoking status:  Former     Packs/day: .3     Types: Cigarettes     Quit date: 2015     Years since quittin.8    Smokeless tobacco: Never   Vaping Use    Vaping Use: Never used   Substance Use Topics    Alcohol use: Not Currently     Alcohol/week: 0.0 standard drinks of alcohol    Drug use: Yes     Types: Marijuana     Comment: medical marijuana       Invasive Procedure Safety Checklist:    Procedure: Urodynamics    Action: Complete sections and checkboxes as appropriate.    Pre-procedure:    1. Patient ID Verified with 2 identifiers (Tamra and  or MRN) : YES    2. Procedure and site verified with patient/designee (when able) : YES    3. Accurate consent documentation in medical record : YES    4. H&P (or appropriate assessment) documented in medical record : N/A    H&P must be up to 30 days prior to procedure an updated within 24 hours of Procedure as applicable.     5. Relevant diagnostic and radiology test results appropriately labeled and displayed as applicable : YES    6. Blood products, implants, devices, and/or special equipment available for the procedure as applicable : YES    7. Procedure site(s) marked with provider initials [Exclusions: none] : NO    8. Marking not required. Reason : Yes  Procedure does not require site marking    Time Out:     Time-Out performed immediately prior to starting procedure, including verbal and active participation of all team members addressing: YES    1. Correct patient identity.  2. Confirmed that the correct side and site are marked.  3. An accurate procedure to be done.  4. Agreement on the procedure to be done.  5. Correct patient position.  6. Relevant images and results are properly labeled and appropriately displayed.  7. The need to administer antibiotics or fluids for irrigation purposes during the procedure as applicable.  8. Safety precautions based on patient history or medication use.    During Procedure: Verification of correct person, site, and procedure occurs  any time the responsibility for care of the patient is transferred to another member of the care team.          The following medication was given:     MEDICATION:  Bactrim (Trimethoprim / Sulfamethoxazole)  ROUTE: PO  SITE: Medication was given orally  DOSE: 800mg/160mg  LOT #:   : [employer]  EXPIRATION DATE: 10/2024  NDC#: 4167-1165- 61  Was there drug waste? No      The following medication was given:     MEDICATION:  Omnipaque (Iohexol Injection) (240mgI/mL)  ROUTE: Provider Administered  SITE: Provider Administered via catheter  DOSE: 100mL  LOT #: [id number]  : [employer]  EXPIRATION DATE: 3/16/2026  NDC#: 77098827-60   Was there drug waste? No      The following medication was given:     MEDICATION:  Urojet  ROUTE: Urethral  SITE: Urethra  DOSE: 200 mg (20 mg/mL)  LOT #: [id number]  : [employer]  EXPIRATION DATE: 06/2025  NDC#: [address]-2   Was there drug waste? No      Nusrat Cota CMA  December 7, 2023

## 2023-12-07 NOTE — TELEPHONE ENCOUNTER
Reason for visit: Review UDS     Relevant information: urinary urgency/voiding dysfunction     Records/imaging/labs/orders: all records available    Pt called: No need for a call    At Rooming: monica Cabrales CMA  12/7/2023  12:42 PM

## 2023-12-12 ENCOUNTER — VIRTUAL VISIT (OUTPATIENT)
Dept: BEHAVIORAL HEALTH | Facility: CLINIC | Age: 63
End: 2023-12-12
Payer: MEDICARE

## 2023-12-12 DIAGNOSIS — F33.1 MODERATE EPISODE OF RECURRENT MAJOR DEPRESSIVE DISORDER (H): ICD-10-CM

## 2023-12-12 DIAGNOSIS — F43.10 PTSD (POST-TRAUMATIC STRESS DISORDER): ICD-10-CM

## 2023-12-12 DIAGNOSIS — F41.1 GAD (GENERALIZED ANXIETY DISORDER): Primary | ICD-10-CM

## 2023-12-12 PROCEDURE — 90832 PSYTX W PT 30 MINUTES: CPT | Mod: 95

## 2023-12-12 NOTE — PROGRESS NOTES
[facility] Primary Care: Integrated Behavioral Health  December 12th, 2023       Behavioral Health Clinician Progress Note    Patient Name: Ilsa Yusuf           Service Type:  Individual      Service Location:   Face to Face in Clinic     Session Start Time: 12:30 PM  Session End Time: 1:00 PM      Session Length: 38 - 52      Attendees: Patient     Service Modality:  Video Visit:      Provider verified identity through the following two step process.  Patient provided:  Patient is known previously to provider    Telemedicine Visit: The patient's condition can be safely assessed and treated via synchronous audio and visual telemedicine encounter.      Reason for Telemedicine Visit: Patient has requested telehealth visit    Originating Site (Patient Location): Patient's home    Distant Site (Provider Location): [facility]    Consent:  The patient/guardian has verbally consented to: the potential risks and benefits of telemedicine (video visit) versus in person care; bill my insurance or make self-payment for services provided; and responsibility for payment of non-covered services.     Patient would like the video invitation sent by:  My Chart    Mode of Communication:  [facility]    Distant Location (Provider):  On-site    As the provider I attest to compliance with applicable laws and regulations related to telemedicine.    Visit Activities (Refresh list every visit): [facility] [place] Only    Diagnostic Assessment Date: Completed by Shaji Cheng CNP on 2/03/2023  Treatment Plan Review Date: 11/10/23  See Flowsheets for today's PHQ-9 and DONELL-7 results  Previous PHQ-9:       10/9/2023    12:27 PM 11/10/2023     9:58 AM 11/20/2023    10:53 AM   PHQ-9 SCORE   PHQ-9 Total Score MyChart 9 (Mild depression) 9 (Mild depression) 6 (Mild depression)   PHQ-9 Total Score 9 9 6     Previous DONELL-7:       9/19/2023     1:28 PM 10/9/2023    12:28 PM 11/10/2023      "9:59 AM   DONELL-7 SCORE   Total Score 14 (moderate anxiety) 11 (moderate anxiety) 8 (mild anxiety)   Total Score 14 11 8    8       IMER LEVEL:      1/30/2012     2:00 PM   IMER Score (Last Two)   IMER Raw Score 37   Activation Score 49.9   IMER Level 2       DATA  Extended Session (60+ minutes): No  Interactive Complexity: No  Crisis: No  [facility] Patient: No    Treatment Objective(s) Addressed in This Session:  Target Behavior(s):  reduce symptoms of anxiety    Anxiety: will experience a reduction in anxiety, will develop more effective coping skills to manage anxiety symptoms, will develop healthy cognitive patterns and beliefs, and will increase ability to function adaptively    Current Stressors / Issues:    [facility] met with pt virtually on this date. Pt states that her mental health has been \"a roller coaster\". Pt discussed medical concerns and upcoming appointments with medical providers. Pt also continues to discuss relationship with daughter and wanting to work on connecting and fostering that relationship. Pt discussed automatic thoughts that have increased anxiety, however pt is able to use CBT skills to restructure and reframe her though process. Pt denies safety concerns including SI/HI/SIB. Pt is aware that [facility] is leaving health system in January 2024. Pt states that she had an appointment with a psychologist, but forgot the appointment. Pt states that she plans to call back and reschedule.       Progress on Treatment Objective(s) / Homework:  Satisfactory progress - ACTION (Actively working towards change); Intervened by reinforcing change plan / affirming steps taken    Motivational Interviewing    MI Intervention: Expressed Empathy/Understanding, Open-ended questions, and Reflections: simple and complex     Change Talk Expressed by the Patient: Desire to change Ability to change Reasons to change    Provider Response to Change Talk: E - Evoked more info from patient about behavior change, A - Affirmed patient's " thoughts, decisions, or attempts at behavior change, R - Reflected patient's change talk, and S - Summarized patient's change talk statements    Also provided psychoeducation about behavioral health condition, symptoms, and treatment options    Care Plan review completed: No    Medication Review:  No changes to current psychiatric medication(s)    Medication Compliance:  Yes    Changes in Health Issues:   None reported    Chemical Use Review:   Substance Use: Chemical use reviewed, no active concerns identified      Tobacco Use: No current tobacco use.      Assessment: Current Emotional / Mental Status (status of significant symptoms):  Risk status (Self / Other harm or suicidal ideation)  Patient has had a history of suicidal ideation: in 2013, passive thought and denies a history of suicide attempts, self-injurious behavior, homicidal ideation, homicidal behavior, and and other safety concerns  Patient denies current fears or concerns for personal safety.  Patient denies current or recent suicidal ideation or behaviors.  Patient denies current or recent homicidal ideation or behaviors.  Patient denies current or recent self injurious behavior or ideation.  Patient denies other safety concerns.  A safety and risk management plan has not been developed at this time, however patient was encouraged to call [place] [phone] should there be a change in any of these risk factors.    Appearance:   Appropriate  Eye Contact:   Good  Psychomotor Behavior: Normal  Attitude:   Cooperative  Interested  Orientation:   All  Speech   Rate / Production: Normal    Volume:  Normal   Mood:    Anxious , tearful  Affect:    Neutral, teaful at times  Thought Content:  Clear   Thought Form:  Coherent  Logical   Insight:    Good     Diagnoses:  No diagnosis found.    Collateral Reports Completed:  Not applicable    Plan: (Homework, other):  Patient is aware that [facility] is leaving the health system in January 2024 and has plans to meet with  a psychologist that she was referred to by [facility]. Pt plans to call and schedule appointment. Pt is open to scheduling a closing session with [facility]. CD Recommendations: No indications of CD issues.     Individual Treatment Plan    Patient's Name: Ilsa Yusuf  YOB: 1960    Date of Creation: 11/10/23  Date Treatment Plan Last Reviewed/Revised: n/a    DSM5 Diagnoses:   1. DONELL (generalized anxiety disorder)    2. PTSD (post-traumatic stress disorder)    3. Moderate episode of recurrent major depressive disorder (H)      Psychosocial / Contextual Factors: strained relationship with family members.   PROMIS (reviewed every 90 days): 10/09/23    Referral / Collaboration: consider referral to longer term therapist      Anticipated number of session for this episode of care: 6-10  Anticipation frequency of session: as needed   Anticipated Duration of each session: 38-52 minutes  Treatment plan will be reviewed in 90 days or when goals have been changed.       MeasurableTreatment Goal(s) related to diagnosis / functional impairment(s)  Goal 1: Patient will experience a reduction in depressive symptoms along with a corresponding increase in positive emotion and life satisfaction.    Objective #A (Patient Action)    Patient will Increase interest, engagement, and pleasure in doing things.  Status: Continued - Date(s): 11/10/23    Intervention(s)  Therapist will help patient identify pleasant and mastery oriented events that elicit positive, relaxed mood.    Objective #B  Patient will Decrease frequency and intensity of feeling down, depressed, hopeless.  Status: Continued - Date(s): 11/10/23    Intervention(s)  Therapist will introduce patient to cognitive-behavioral and acceptance and commitment therapy topics aimed to help reduce depression and anxiety      Other Possible Therapeutic Intervention(s):    Psycho-education regarding mental health diagnoses and treatment options    Supportive  Therapy  Provide affirmations, reflections, and establish working rapport  Emphasize and reflect on strength of therapeutic relationship    Skills training  Explore skills useful to client in current situation.  Skills include assertiveness, communication, conflict management, problem-solving, relaxation, etc.    Solution-Focused Therapy  Explore patterns in patient's relationships and discuss options for new behaviors.  Explore patterns in patient's actions and choices and discuss options for new behaviors.    Cognitive-behavioral Therapy  Discuss common cognitive distortions, identify them in patient's life.  Explore ways to challenge, replace, and act against these cognitions.    Acceptance and Commitment Therapy  Explore and identify important values in patient's life.  Discuss ways to commit to behavioral activation around these values.    Psychodynamic psychotherapy  Discuss patient's emotional dynamics and issues and how they impact behaviors.  Explore patient's history of relationships and how they impact present behaviors.  Explore how to work with and make changes in these schemas and patterns.    Narrative Therapy  Explore the patient's story of his/her life from his/her perspective.  Explore alternate ways of understanding their experience, identifying exceptions, developing new themes.    Interpersonal Psychotherapy  Explore patterns in relationships that are effective or ineffective at helping patient reach their goals, find satisfying experience.  Discuss new patterns or behaviors to engage in for improved social functioning.    Behavioral Activation  Discuss steps patient can take to become more involved in meaningful activity.  Identify barriers to these activities and explore possible solutions.    Mindfulness-Based Strategies  Discuss skills based on development and application of mindfulness.  Skills drawn from compassion-focused therapy, dialectical behavior therapy, mindfulness-based stress  reduction, mindfulness-based cognitive therapy, etc.      Patient has reviewed and agreed to the above plan.    YORDY Barnes, [facility]  Behavioral Health Clinician  [facility] Professional [facility], [address], [address], [address], [place], [address]  [phone]    2023

## 2023-12-14 ENCOUNTER — OFFICE VISIT (OUTPATIENT)
Dept: UROLOGY | Facility: CLINIC | Age: 63
End: 2023-12-14
Payer: MEDICARE

## 2023-12-14 VITALS
WEIGHT: 208 LBS | HEART RATE: 71 BPM | SYSTOLIC BLOOD PRESSURE: 118 MMHG | DIASTOLIC BLOOD PRESSURE: 72 MMHG | BODY MASS INDEX: 30.81 KG/M2 | HEIGHT: 69 IN

## 2023-12-14 DIAGNOSIS — N39.8 VOIDING DYSFUNCTION: ICD-10-CM

## 2023-12-14 DIAGNOSIS — R39.89 BLADDER PAIN: ICD-10-CM

## 2023-12-14 DIAGNOSIS — N32.81 URGENCY-FREQUENCY SYNDROME: Primary | ICD-10-CM

## 2023-12-14 DIAGNOSIS — R30.0 DYSURIA: ICD-10-CM

## 2023-12-14 PROCEDURE — 99214 OFFICE O/P EST MOD 30 MIN: CPT | Performed by: UROLOGY

## 2023-12-14 RX ORDER — DIAZEPAM 10 MG
TABLET ORAL
Qty: 30 TABLET | Refills: 3 | Status: SHIPPED | OUTPATIENT
Start: 2023-12-14 | End: 2024-02-13

## 2023-12-14 ASSESSMENT — PAIN SCALES - GENERAL: PAINLEVEL: MODERATE PAIN (4)

## 2023-12-14 NOTE — LETTER
12/14/2023       RE: Ilsa Yusuf  [address] [facility] [address]     Dear Colleague,    Thank you for referring your patient, Ilsa Yusuf, to the [facility] [place] [facility]. Please see a copy of my visit note below.    December 14, 2023    Deanne was seen today for recheck.    Diagnoses and all orders for this visit:    Urgency-frequency syndrome  -     diazepam (VALIUM) 10 MG tablet; VAGINAL VALIUM SUPPOSITORY 10MG AT NIGHT AND PRIOR TO THERAPY AS NEEDED  -     naloxone (NARCAN) 4 MG/0.1ML nasal spray; Spray 1 spray (4 mg) into one nostril alternating nostrils as needed for opioid reversal every 2-3 minutes until assistance arrives    Bladder pain  -     diazepam (VALIUM) 10 MG tablet; VAGINAL VALIUM SUPPOSITORY 10MG AT NIGHT AND PRIOR TO THERAPY AS NEEDED  -     naloxone (NARCAN) 4 MG/0.1ML nasal spray; Spray 1 spray (4 mg) into one nostril alternating nostrils as needed for opioid reversal every 2-3 minutes until assistance arrives    Voiding dysfunction  -     diazepam (VALIUM) 10 MG tablet; VAGINAL VALIUM SUPPOSITORY 10MG AT NIGHT AND PRIOR TO THERAPY AS NEEDED  -     naloxone (NARCAN) 4 MG/0.1ML nasal spray; Spray 1 spray (4 mg) into one nostril alternating nostrils as needed for opioid reversal every 2-3 minutes until assistance arrives    Dysuria  -     diazepam (VALIUM) 10 MG tablet; VAGINAL VALIUM SUPPOSITORY 10MG AT NIGHT AND PRIOR TO THERAPY AS NEEDED  -     naloxone (NARCAN) 4 MG/0.1ML nasal spray; Spray 1 spray (4 mg) into one nostril alternating nostrils as needed for opioid reversal every 2-3 minutes until assistance arrives       At this time since it is pain will start with vaginal valium.  Discussed its use.  She will not use the hydroxyzine at the same time    RTC 4-6 weeks to reassess, sooner if needed    10 minutes were spent today on the day of the encounter in reviewing the EMR including  "UDS interpretation, direct patient care including prescription medications, coordination of care and documentation    Terrie Melton MD MPH  (she/her/hers)   of Urology  [facility]      Subjective    Here today for follow up for UDS.  She states that the most bothersome symptom is more the pain/burning, wonders if related to her back.  She denies any changes in health since last visit    /72   Pulse 71   Ht 1.74 m (5' 8.5\")   Wt 94.3 kg (208 lb)   LMP 08/08/2016   BMI 31.17 kg/m    GENERAL: healthy, alert and no distress  EYES: Eyes grossly normal to inspection, conjunctivae and sclerae normal  HENT: normal cephalic/atraumatic.  External ears, nose and mouth without ulcers or lesions.  RESP: no audible wheeze, cough, or visible cyanosis.  No visible retractions or increased work of breathing.  Able to speak fully in complete sentences.  NEURO: Cranial nerves grossly intact, mentation intact and speech normal  PSYCH: mentation appears normal, affect normal/bright, judgement and insight intact, normal speech and appearance well-groomed    UDS was reviewed.  On my interpretation longterm 325mL with normal compliance with no DO/DOI.USI.  She has Pdet max 28 and able to empty appropriately with the catheters removed with final PVR 90mL    CC  Patient Care Team:  Catrachita Nicolas MD as PCP - General  Breanne Ellis PA-C as Physician Assistant (Physician Assistant - Medical)  Carie Nuñez (Internal Medicine)  Arnaldo Perez MD as MD (Orthopaedic Surgery Hand Surgery)  Sruthi Corona, [facility] as Licensed Mental Health Professional  Terrie Melton MD as Assigned Surgical Provider  Autumn Weiner GC as Genetic Counselor (Genetic Counselor, MS)  Ezekiel Cheng APRN CNP as Assigned Behavioral Health Provider  Catrachita Nicolas MD as Assigned PCP  Fay Reyes MD as Assigned OBGYN Provider  Tom Mathis MD as MD (Otolaryngology)  Goltz, Bennett " DONA Dougherty as Assigned Neuroscience Provider

## 2023-12-14 NOTE — PATIENT INSTRUCTIONS
Do not use the hydroxyzine if you are using the vaginal valium    Websites with free information:    American [employer]ro[employer] Society patient website: www.voicesforpfd.org    Total Control Program: www.totalcontrolprogram.com    It was a pleasure meeting with you today.  Thank you for allowing me and my team the privilege of caring for you today.  YOU are the reason we are here, and I truly hope we provided you with the excellent service you deserve.  Please let us know if there is anything else we can do for you so that we can be sure you are leaving completely satisfied with your care experience.

## 2023-12-14 NOTE — NURSING NOTE
"Chief Complaint   Patient presents with    RECHECK     UDS follow up       Blood pressure 118/72, pulse 71, height 1.74 m (5' 8.5\"), weight 94.3 kg (208 lb), last menstrual period 08/08/2016, not currently breastfeeding. Body mass index is 31.17 kg/m .    Patient Active Problem List   Diagnosis    Major depressive disorder, recurrent episode, moderate (H)    PTSD (post-traumatic stress disorder)    Acquired hypothyroidism    Hyperlipidemia LDL goal <130    CKD (chronic kidney disease) stage 3, GFR 30-59 ml/min (H)    Esophageal reflux    Primary insomnia    Obesity    Attention-deficit hyperactivity disorder, predominantly hyperactive type    Neck pain    Cervical radiculopathy    Primary osteoarthritis of right knee    Peripheral tear of medial meniscus of right knee, unspecified whether old or current tear, initial encounter    Calculus of left kidney    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    Cervical high risk HPV (human papillomavirus) test positive    ABDOUL (obstructive sleep apnea)    Osteoarthritis of carpometacarpal (CMC) joint of both thumbs    Bilateral thumb pain    Urgency of urination    History of UTI    Bladder pain    Somatic dysfunction of pelvic region    Pelvic pain in female       Allergies   Allergen Reactions    Gabapentin Other (See Comments)     Patient states she gets \"horrific nightmares\" with this medication    Dilaudid [Hydromorphone Hcl]      Made her feel like her skin was crawling    Nsaids Other (See Comments)     Kidney failure    Compazine [Prochlorperazine] Other (See Comments)     \"Makes my skin crawl, I was going nuts.\"       Current Outpatient Medications   Medication Sig Dispense Refill    albuterol (PROAIR HFA/PROVENTIL HFA/VENTOLIN HFA) 108 (90 Base) MCG/ACT inhaler Inhale 1-2 puffs into the lungs every 4 hours as needed for shortness of breath / dyspnea or wheezing 18 g 0    azelastine (ASTELIN) 0.1 % nasal spray Spray 1 spray into both nostrils 2 times daily 30 mL 1    buPROPion " (WELLBUTRIN SR) 150 MG 12 hr tablet TAKE 1 TABLET BY MOUTH 2 TIMES DAILY 60 tablet 1    busPIRone (BUSPAR) 10 MG tablet TAKE 2 TABLETS (20 MG) BY MOUTH 3 TIMES DAILY 540 tablet 1    cholecalciferol (VITAMIN D3) 5000 UNITS CAPS capsule Take 1 capsule (5,000 Units) by mouth daily Take 1 per day (Patient taking differently: Take 5,000 Units by mouth at bedtime Take 1 per day) 100 capsule 2    dicyclomine (BENTYL) 10 MG capsule TAKE 1 CAPSULE BY MOUTH 4 TIMES A DAY AS NEEDED (ABDOMINAL PAIN OR CRAMPS)      escitalopram (LEXAPRO) 20 MG tablet Take 1 tablet (20 mg) by mouth daily 90 tablet 0    hydrOXYzine (ATARAX) 25 MG tablet TAKE 1 TO 2 TABLETS (25-50 MG) BY MOUTH 3 TIMES A DAY AS NEEDED FOR ANXIETY 540 tablet 1    levothyroxine (SYNTHROID/LEVOTHROID) 175 MCG tablet TAKE 1 TABLET BY MOUTH DAILY FOR 5 DAYS PER WEEK AND 1/2 TABLET ONE DAY PER WEEK 90 tablet 3    lovastatin (MEVACOR) 20 MG tablet TAKE 1 (20MG) TABLET ORALLY AT BEDTIME DAILY 90 tablet 0    multivitamin w/minerals (THERA-VIT-M) tablet Take 1 tablet by mouth daily (with lunch)      Omega-3 Fatty Acids (OMEGA 3 PO) Take 2 g by mouth 2 times daily Takes 1 in am and 1 at bedtime      omeprazole (PRILOSEC) 40 MG DR capsule TAKE 1 CAPSULE BY MOUTH EVERY DAY 90 capsule 0    ondansetron (ZOFRAN) 4 MG tablet TAKE 1 TABLET (4MG) BY MOUTH EVERY 8HRS AS NEEDED FOR NAUSEA OR VOMITING      oxyCODONE IR (ROXICODONE) 10 MG tablet as needed      prazosin (MINIPRESS) 5 MG capsule TAKE 1 CAPSULE(5 MG) BY MOUTH AT BEDTIME 90 capsule 1    tiZANidine (ZANAFLEX) 4 MG tablet Take 1-3 tablets (4-12 mg) by mouth 3 times daily as needed for muscle spasms 210 tablet 3    topiramate (TOPAMAX) 25 MG tablet Take 25 mg by mouth 2 times daily      UNABLE TO FIND MEDICATION NAME: medical cannibus         Social History     Tobacco Use    Smoking status: Former     Packs/day: .3     Types: Cigarettes     Quit date: 2015     Years since quittin.8     Passive exposure: Past     Smokeless tobacco: Former    Tobacco comments:     Quit 5 - 10 years ago    Vaping Use    Vaping Use: Never used   Substance Use Topics    Alcohol use: Not Currently     Alcohol/week: 0.0 standard drinks of alcohol    Drug use: Yes     Types: Marijuana     Comment: medical marijuana       Jennifer Palm MA  12/14/2023  3:14 PM

## 2023-12-15 ENCOUNTER — TELEPHONE (OUTPATIENT)
Dept: UROLOGY | Facility: CLINIC | Age: 63
End: 2023-12-15

## 2023-12-15 NOTE — TELEPHONE ENCOUNTER
A prior authorization is needed for the following medication prescribed.  Please complete a prior authorization with the information included below.    Medication: Diazepam 10mg Supp    Ingredients:    Diazepam Powd  NDC: [address]-47 QTY: 0.3gm  FattyBlend Misc NDC: [address]-66  QTY: 55.2gm    RX #: [id number]    Reason for Rejection: Not cov under Med D     Pharmacy Insurance plan: [employer] Part D  BIN #: [id number]  ID #: [id number]  PCN #: [id number]  Phone #: [phone]      Pharmacy NPI:[id number]      Please advise the pharmacy when the prior authorization is approved or denied.     Thank you for your time.    [facility]  [phone]

## 2023-12-20 NOTE — TELEPHONE ENCOUNTER
PA Initiation    Medication: Comment:  Valium Vaginal Suppositories 10mg  Insurance Company: Martina Tamez - Phone [phone] Fax [phone]  Pharmacy Filling the Rx: [facility] - [place], [place] - [address]  Filling Pharmacy Phone: [phone]  Filling Pharmacy Fax:    Start Date: 12/20/2023  Central Prior Authorization Team   Phone: [phone]

## 2023-12-21 NOTE — TELEPHONE ENCOUNTER
PRIOR AUTHORIZATION DENIED    Medication: Comment:  Valium Vaginal Suppositories 10mg  Insurance Company: [employer] - Phone [phone] Fax [phone]  Denial Date: 12/21/2023  Denial Reason(s):     Appeal Information:     Patient Notified: Pharmacy will notify patient.

## 2023-12-26 DIAGNOSIS — F33.1 MAJOR DEPRESSIVE DISORDER, RECURRENT EPISODE, MODERATE (H): ICD-10-CM

## 2023-12-27 RX ORDER — BUPROPION HYDROCHLORIDE 150 MG/1
150 TABLET, EXTENDED RELEASE ORAL 2 TIMES DAILY
Qty: 180 TABLET | Refills: 1 | OUTPATIENT
Start: 2023-12-27

## 2023-12-27 NOTE — TELEPHONE ENCOUNTER
1) RN received refill request from    [facility]/PHARMACY #[id number] - [place], [place] - [address]    2) This medication was last prescribed on 11/28/23 for qty of 60 with 2 refill(s). Pharmacist reports that patient has 120 cap left on current Rx    3) Per [place] , NA     5) Therefore, RN sent reply back to pharmacy that refill request will be DENIED. Call placed to patient to review refill. LVM    6) Pt also has follow-up appointment scheduled.

## 2024-01-03 ENCOUNTER — TRANSFERRED RECORDS (OUTPATIENT)
Dept: HEALTH INFORMATION MANAGEMENT | Facility: CLINIC | Age: 64
End: 2024-01-03
Payer: MEDICARE

## 2024-01-04 DIAGNOSIS — E78.5 HYPERLIPIDEMIA LDL GOAL <130: ICD-10-CM

## 2024-01-05 ENCOUNTER — PATIENT OUTREACH (OUTPATIENT)
Dept: FAMILY MEDICINE | Facility: CLINIC | Age: 64
End: 2024-01-05
Payer: MEDICARE

## 2024-01-05 DIAGNOSIS — R87.810 CERVICAL HIGH RISK HPV (HUMAN PAPILLOMAVIRUS) TEST POSITIVE: Primary | ICD-10-CM

## 2024-01-05 RX ORDER — LOVASTATIN 20 MG
TABLET ORAL
Qty: 90 TABLET | Refills: 0 | Status: SHIPPED | OUTPATIENT
Start: 2024-01-05 | End: 2024-04-22

## 2024-01-17 ENCOUNTER — VIRTUAL VISIT (OUTPATIENT)
Dept: PSYCHIATRY | Facility: CLINIC | Age: 64
End: 2024-01-17
Payer: MEDICARE

## 2024-01-17 DIAGNOSIS — F51.01 PRIMARY INSOMNIA: ICD-10-CM

## 2024-01-17 DIAGNOSIS — F43.10 PTSD (POST-TRAUMATIC STRESS DISORDER): ICD-10-CM

## 2024-01-17 DIAGNOSIS — F33.1 MAJOR DEPRESSIVE DISORDER, RECURRENT EPISODE, MODERATE (H): Primary | ICD-10-CM

## 2024-01-17 PROCEDURE — 99214 OFFICE O/P EST MOD 30 MIN: CPT | Mod: 95 | Performed by: NURSE PRACTITIONER

## 2024-01-17 RX ORDER — BUPROPION HYDROCHLORIDE 200 MG/1
200 TABLET, EXTENDED RELEASE ORAL DAILY
Qty: 90 TABLET | Refills: 0 | Status: SHIPPED | OUTPATIENT
Start: 2024-01-17 | End: 2024-04-29

## 2024-01-17 ASSESSMENT — ANXIETY QUESTIONNAIRES
GAD7 TOTAL SCORE: 7
3. WORRYING TOO MUCH ABOUT DIFFERENT THINGS: NOT AT ALL
GAD7 TOTAL SCORE: 7
4. TROUBLE RELAXING: NEARLY EVERY DAY
5. BEING SO RESTLESS THAT IT IS HARD TO SIT STILL: NOT AT ALL
7. FEELING AFRAID AS IF SOMETHING AWFUL MIGHT HAPPEN: NOT AT ALL
8. IF YOU CHECKED OFF ANY PROBLEMS, HOW DIFFICULT HAVE THESE MADE IT FOR YOU TO DO YOUR WORK, TAKE CARE OF THINGS AT HOME, OR GET ALONG WITH OTHER PEOPLE?: VERY DIFFICULT
2. NOT BEING ABLE TO STOP OR CONTROL WORRYING: MORE THAN HALF THE DAYS
1. FEELING NERVOUS, ANXIOUS, OR ON EDGE: MORE THAN HALF THE DAYS
GAD7 TOTAL SCORE: 7
7. FEELING AFRAID AS IF SOMETHING AWFUL MIGHT HAPPEN: NOT AT ALL
6. BECOMING EASILY ANNOYED OR IRRITABLE: NOT AT ALL
IF YOU CHECKED OFF ANY PROBLEMS ON THIS QUESTIONNAIRE, HOW DIFFICULT HAVE THESE PROBLEMS MADE IT FOR YOU TO DO YOUR WORK, TAKE CARE OF THINGS AT HOME, OR GET ALONG WITH OTHER PEOPLE: VERY DIFFICULT

## 2024-01-17 ASSESSMENT — PATIENT HEALTH QUESTIONNAIRE - PHQ9
SUM OF ALL RESPONSES TO PHQ QUESTIONS 1-9: 8
SUM OF ALL RESPONSES TO PHQ QUESTIONS 1-9: 8
10. IF YOU CHECKED OFF ANY PROBLEMS, HOW DIFFICULT HAVE THESE PROBLEMS MADE IT FOR YOU TO DO YOUR WORK, TAKE CARE OF THINGS AT HOME, OR GET ALONG WITH OTHER PEOPLE: EXTREMELY DIFFICULT

## 2024-01-17 ASSESSMENT — PAIN SCALES - GENERAL: PAINLEVEL: MODERATE PAIN (5)

## 2024-01-17 NOTE — PROGRESS NOTES
Virtual Visit Details    Type of service:  Video Visit   Video Start Time:  1:33 pm  Video End Time: 1:53 pm    Originating Location (pt. Location): Home    Distant Location (provider location):  Off-site  Platform used for Video Visit: Luna

## 2024-01-17 NOTE — NURSING NOTE
Is the patient currently in the state of [place]? YES    Visit mode:VIDEO    If the visit is dropped, the patient can be reconnected by: VIDEO VISIT: Text to cell phone:   Telephone Information:   Mobile [phone]       Will anyone else be joining the visit? NO  (If patient encounters technical issues they should call [phone])    How would you like to obtain your AVS? MyChart    Are changes needed to the allergy or medication list? No    Reason for visit: RECHECK    Rosalba WATSON

## 2024-01-17 NOTE — PROGRESS NOTES
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 planned or for acute medical concerns.    PSYCHOEDUCATION:  Medication side effects and alternatives reviewed. Health promotion activities recommended and reviewed today. All questions addressed. Education and counseling completed regarding risks and benefits of medications and psychotherapy options.  Consent provided by patient/guardian  Call the psychiatric nurse line with medication questions or concerns at [phone].  Double the Donationhart may be used to communicate with your provider, but this is not intended to be used for emergencies.  BLACK BOX WARNING: Discussed the Food and Drug Administration (FDA) requires that all antidepressants carry a warning that some children, adolescents and young adults may be at increased risk of suicide when taking antidepressants. Anyone taking an antidepressant should be watched closely for worsening depression or unusual behavior especially in the first few weeks after starting an SSRI. Keep in mind, antidepressants are more likely to reduce suicide risk in the long run by improving mood.   SEROTONIN SYNDROME:  Discussed risks of Serotonin syndrome (ie, serotonin toxicity) which is a potentially life-threatening condition associated  with increased serotonergic activity in the central nervous system (CNS). It is seen with therapeutic medication use, inadvertent interactions between drugs, and intentional self-poisoning. Serotonin syndrome may involve a spectrum of clinical findings, which often include mental status changes, autonomic hyperactivity, and neuromuscular abnormalities.    BENZODIAZEPINE:  discussion on how benzos work and the need to use them short term due to potential of anxiety getting.  This is a controlled substance with risk for abuse, need to keep in a safe keep place and cannot replace lost scripts.    HARM REDUCTION:  Discussions regarding effects of mood altering substances, alcohol and cannabis, on mood and that approach is harm reduction, will continue to prescribe meds as they work to cut back use.    FIRST GENERATION ANTIPSYCHOTIC/ SECOND GENERATION ANTIPSYCHOTIC USE:  Atypical need for cardiometabolic monitoring with medication- B/P, weight, blood sugar, cholesterol.  Need to monitor for abnormal movements taught  SAFETY:  We all care about your loved one's safety. To reduce the risk of self-harm, remove access to all:  Firearms, Medicines (both prescribed and over-the-counter), Knives and other sharp objects, Ropes and like materials, and Alcohol  SLEEP HYGIENE: establish a sleep routine, limit screen time 1 hour prior to bed, use bed for sleep only, take sleep/medications on time (including sleepy time tea, trazadone or herbal treatments such as melatonin), aroma therapy, limit caffeine/sugar, yoga, guided imagery, stretch, meditation, limit naps to 20 minutes, make a temperature change in the room, white noise, be mindful of slowing down breathing, take a warm bath/shower, frequently wash sheets, and journaling.   Medlineplus.gov is information for patients.  It is run by the National Library of Medicine and it contains information about all disorders, diseases and all medications.      COMMUNITY RESOURCES:     CRISIS NUMBERS: Provided in AVS 2023  National Suicide Prevention Lifeline: 7-695-247-TALK (903-135-6569)  [facility]/resources for a list of additional resources (SOS)            [facility] - [phone]   Urgent Care Adult Mental Xpgsao-[phone] mobile unit/  crisis line  [facility] -[phone]   COPE  [place] Mobile Team -[phone] (adults)/ [phone] (child)  Poison Control Center - 1-594.795.7748    OR  go to nearest ER  Crisis Text Line for any crisis  send this-   To: 516165   [facility]  [phone]  National Suicide Prevention Lifeline: 574.817.5149 (TTY: 357.123.6649). Call anytime for help.  (www.suicidepreventionlifeline.org)  National [place] on Mental [place]ness (www.ellie.org): 759-414-7690 or 245-233-4995.   Mental Health Association (www.mentalhealth.org): 681.742.1366 or 210-019-3241.  [place] Crisis Text Line: Text [place] to 947611  Suicide LifeLine Chat: suicidepreFormabilioline.org/chat    ADMINISTRATIVE BILLIN min spent interviewing patient, reviewing referral documents, obtaining and reviewing outside records, communication with other health specialists, and preparing this report on today's date: 2024  Video/Phone Start Time: 1:33 pm  Video/Phone End Time:  1:53 pm      Signed:     Ezekiel Cheng, MSN, APRN, PMHNP-BC     Chart documentation done in part with Dragon Voice Recognition software.  Although reviewed after completion, some word and grammatical errors may remain.  Answers for HPI/ROS submitted by the patient on 2022  If you checked off any problems, how difficult have these problems made it for you to do your work, take care of things at home, or get along with other people?: Somewhat difficult  PHQ9 TOTAL SCORE: 7  DONELL 7 TOTAL SCORE: 13    Answers for HPI/ROS submitted by the patient on 2/3/2023  If you checked off any problems, how difficult have these problems  made it for you to do your work, take care of things at home, or get along with other people?: Very difficult  PHQ9 TOTAL SCORE: 3  DONELL 7 TOTAL SCORE: 6  Answers for HPI/ROS submitted by the patient on 4/14/2023  If you checked off any problems, how difficult have these problems made it for you to do your work, take care of things at home, or get along with other people?: Somewhat difficult  PHQ9 TOTAL SCORE: 5  DONELL 7 TOTAL SCORE: 8    Answers for HPI/ROS submitted by the patient on 5/25/2023  If you checked off any problems, how difficult have these problems made it for you to do your work, take care of things at home, or get along with other people?: Extremely difficult  PHQ9 TOTAL SCORE: 12  DONELL 7 TOTAL SCORE: 9Answers submitted by the patient for this visit:  Patient Health Questionnaire (Submitted on 9/19/2023)  If you checked off any problems, how difficult have these problems made it for you to do your work, take care of things at home, or get along with other people?: Somewhat difficult  PHQ9 TOTAL SCORE: 5  DONELL-7 (Submitted on 9/19/2023)  DONELL 7 TOTAL SCORE: 14    Answers submitted by the patient for this visit:  Patient Health Questionnaire (Submitted on 1/17/2024)  If you checked off any problems, how difficult have these problems made it for you to do your work, take care of things at home, or get along with other people?: Extremely difficult  PHQ9 TOTAL SCORE: 8  DONELL-7 (Submitted on 1/17/2024)  DONELL 7 TOTAL SCORE: 7

## 2024-01-17 NOTE — PATIENT INSTRUCTIONS
"Patient Education   The [facility] Program  What to Expect  Here's what to expect in the Panel Psychiatry Program.   About the program  You'll be meeting with a psychiatric doctor to check your mental health. A psychiatric doctor helps you deal with troubling thoughts and feelings by giving you medicine. They'll make sure you know the plan for your care. You may see them for a long time. When you're feeling better, they may refer you back to seeing your family doctor.   If you have any questions, we'll be glad to talk to you.  About visits  Be open  At your visits, please talk openly about your problems. It may feel hard, but it's the best way for us to help you.  Cancelling visits  If you can't come to your visit, please call us right away at [phone]. If you don't cancel at least 24 hours (1 full day) before your visit, that's \"late cancellation.\"  Not showing up for your visits  Being very late is the same as not showing up. You'll be a \"no show\" if:  You're more than 15 minutes late for a 30-minute (half hour) visit.  You're more than 30 minutes late for a 60-minute (full hour) visit.  If you cancel late or don't show up 2 times within 6 months, we may end your care.  Getting help between visits  If you need help between visits, you can call us Monday to Friday from 8 a.m. to 4:30 p.m. at [phone].  Emergency care  Call 911 or go to the nearest emergency department if your life or someone else's life is in danger.  Call 988 anytime to reach the national Suicide and Crisis hotline.  Medicine refills  To refill your medicine, call your pharmacy. You can also call [facility]'s Behavioral Access at [phone], Monday to Friday, 8 a.m. to 4:30 p.m. It can take 1 to 3 business days to get a refill.   Forms, letters, and tests  You may have papers to fill out, like FMLA, short-term disability, and workability. We can help you with these forms at your visits, but you must have an " appointment. You may need more than 1 visit for this, to be in an intensive therapy program, or both.  Before we can give you medicine for ADHD, we may refer you to get tested for it or confirm it another way.  We may not be able to give you an emotional support animal letter.  We don't do mental health checks ordered by the court.   We don't do mental health testing, but we can refer you to get tested.   Thank you for choosing us for your care.  For informational purposes only. Not to replace the advice of your health care provider. Copyright   2022 [facility]. All rights reserved. [employer] [id number] - 12/22.

## 2024-01-29 NOTE — PROGRESS NOTES
CPAP Follow-Up Visit:    Date on this visit: 1/30/2024    Ilsa Yusuf has a  follow-up visit today to review her CPAP use for ABDOUL. She was initially seen for CPAP use for moderate ABDOUL. She was initially seen at the [facility] for observed apnea 8-10 times per night and difficulty falling asleep for 3-4 years. Her medical history is significant for depression, PTSD, seizure disorder (possibly), hypothyroidism and obesity.     PSG from 1/28/2016 showed an AHI of 25.5 per hour. O2 darrius 86%. CPAP 7 cm was effective. Wt: 219#         [employer]   Auto-PAP 8.0 - 14.0 cmH2O 30 day usage data:    100% of days with > 4 hours of use. 0/30 days with no use.   Average use 503 minutes per day.   95%ile Leak 14.52 L/min.   CPAP 95% pressure 13.8 cm.   AHI 1.39 events per hour.       At the last visit, I had changed her pressures to 8-11 cm to limit aerophagia. She obtained a replacement CPAP since her last visit, and it appears when she was given her new machine, old orders were followed which had the pressure settings of 8-14 cm.    She is told she looks tired in the daytime. She was also told that she snored a lot even with the CPAP (that was about 8 months ago). She thinks it was lasting all night. She wakes with a sore throat and very dry throat. She is up for the restroom about 3 times per night.   She feels she has a very narrow nasal passages. She uses azelastine regularly. She still feels her nasal airway is narrow by where her glasses would sit. Her nose gets more congested at night, particularly when it is over freezing. She runs the AC in January to act as a dehumidifier. She belches a lot when she wakes.  She sleeps on her sides.    She stopped trazodone, melatonin and stopped medical cannabis in the 2 hours before bed.  She does not think depression is playing a role in her sleepiness. She takes 25 mg topirimate twice daily, and tizanidine and oxycodone for pain twice daily (since 2016). She  still takes medical cannabis in the day for pain.        No specialty comments available.    Do you use a CPAP Machine at home: Yes  Overall, on a scale of 0-10 how would you rate your CPAP (0 poor, 10 great): 3    What type of mask do you use: Full Face Mask AirFit F20  Is your mask comfortable: Yes  If not, why:    How often do you replace supplies:    Is your mask leaking: No  If yes, where do you feel it:    How many night per week does the mask leak (0-7):      Do you notice snoring with mask on: Yes  Do you notice gasping arousals with mask on: No  Are you having significant oral or nasal dryness: Yes  Are you using the humidifier: yes  Does the water chamber run out before the night is over: yes  Do you get condensation in the mask or hose: no  Is the pressure setting comfortable: Yes  If not, why:      Typical bedtime: 12:  Sleep latency on PAP therapy: seems like 10-15 minutes. She uses guided meditation  Typical wake time: 09:30-10  Wakes 3 times per night for 5 minutes. Reason for waking: restroom. A few times per month, she won't be able to get back to sleep around 4:30 AM.   How many hours on average per night are you using PAP therapy: all hours of sleep  How many hours are you sleeping per night: 9  Do you feel well rested in the morning: No    Naps: no. No inadvertent dozing either. She is on bupropion which helps her energy. She still feels exhausted. She says she feels like she has not slept when she wakes in the morning. That has been a problem for a couple of years.            Weight change since sleep study: 210 lbs      Past medical/surgical history, family history, social history, medications and allergies were reviewed.      Problem List:  Patient Active Problem List    Diagnosis Date Noted    Somatic dysfunction of pelvic region 01/10/2023     Priority: Medium    Pelvic pain in female 01/10/2023     Priority: Medium    Urgency of urination 10/17/2022     Priority: Medium     Added  automatically from request for surgery [id number]      History of UTI 10/17/2022     Priority: Medium     Added automatically from request for surgery [id number]      Bladder pain 10/17/2022     Priority: Medium     Added automatically from request for surgery [id number]      Osteoarthritis of carpometacarpal (CMC) joint of both thumbs 04/18/2022     Priority: Medium    Bilateral thumb pain 04/18/2022     Priority: Medium    ABDOUL (obstructive sleep apnea) 07/19/2019     Priority: Medium     ABDOUL      Cervical high risk HPV (human papillomavirus) test positive 03/19/2019     Priority: Medium     TORRI Exposure, needs annual Pap's for life   3/19/19 NIL pap, + HR HPV (not 16, 18). Plan: cotest 1 year.    03/29/21 Lost to follow-up for pap tracking  12/14/22 NIL pap, + HR HPV (not 16 or 18). Plan: [place] bef 3/14/23  1/17/23 [place] ECC: benign. 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 07/23/2018     Priority: Medium    Calculus of left kidney 08/26/2017     Priority: Medium    Primary osteoarthritis of right knee 12/26/2016     Priority: Medium    Peripheral tear of medial meniscus of right knee, unspecified whether old or current tear, initial encounter 12/26/2016     Priority: Medium    Cervical radiculopathy 10/28/2016     Priority: Medium     Herniated disc C5      Neck pain 09/29/2016     Priority: Medium    Attention-deficit hyperactivity disorder, predominantly hyperactive type 04/19/2016     Priority: Medium    Obesity 06/21/2015     Priority: Medium    Primary insomnia 06/02/2015     Priority: Medium    Esophageal reflux 04/13/2015     Priority: Medium    CKD (chronic kidney disease) stage 3, GFR 30-59 ml/min (H) 03/09/2015     Priority: Medium     Hx of vesiculoureteral reflux repair as child.   Recurrent infxns      Hyperlipidemia LDL goal <130 01/30/2012     Priority: Medium    Major depressive disorder, recurrent episode, moderate (H) 01/18/2012     Priority: Medium    PTSD (post-traumatic  "stress disorder) 01/18/2012     Priority: Medium    Acquired hypothyroidism 01/18/2012     Priority: Medium        Impression/Plan:    (G47.33) ABDOUL (obstructive sleep apnea)  (primary encounter diagnosis)  Comment: Deanne has been using CPAP consistently but still feels \"exhausted\" when she wakes and through the day. That said, she does not doze inadvertently and does not think she would fall asleep if she tried to nap.  She was told she snored with CPAP when last observed about 8 months ago. She also continues to have very dry mouth (despite no significant mask leak) and air swallowing. She attributes these symptoms to nasal congestion and having to mouth breathe. Her water chamber runs out overnight. She is running the AC to dehumidify because she seems to get more congestion when it is more humid. I did point out that it seems counterproductive to dehumidify the house if using the humidifier on the CPAP.  On her sleep study, she did well with a nasal mask and a pressure of 7 cm. At home, she was unable to breathe through her nose with the CPAP.   Plan: Continue auto CPAP 8-14 cm. Prescription written for new supplies.   Use the [employer] heaven, see if there is a lot of residual snoring and how that responds to getting her nasal congestion addressed. She did not want a pressure increase as she thinks that would just cause more air swallowing.     (R09.81) Nasal congestion  Comment: She feels her nasal airway is restricted at about the level where glasses sit. She has previously tried fluticasone and currently uses azelastine regularly. She attributes a lot of her dry mouth, air swallowing and other sleep issues to the nasal congestion. I certainly think she would do better if we could reduce the CPAP pressures and use a nasal mask again, like she did on her sleep study.   Plan: Adult ENT  Referral            (R35.1) Nocturia  Comment:  wakes about 3 times briefly for restroom, but apnea appears well treated " based on the download. Her urologist felt this was from under-treated apnea, although that does not appear to be the case based on her download.  Plan: We will keep an eye on this symptom once she meets with the ENT.     She will follow up with me in about 1 year(s), sooner if needed. I encouraged her to let me know how she does after meeting with ENT.     45 minutes were spent on the date of the encounter doing chart review, history and exam, documentation and further activities as noted above.     Bennett Goltz, PA-C    CC: No ref. provider found

## 2024-01-30 ENCOUNTER — VIRTUAL VISIT (OUTPATIENT)
Dept: SLEEP MEDICINE | Facility: CLINIC | Age: 64
End: 2024-01-30
Payer: MEDICARE

## 2024-01-30 VITALS — WEIGHT: 210 LBS | HEIGHT: 68 IN | BODY MASS INDEX: 31.83 KG/M2

## 2024-01-30 DIAGNOSIS — G47.33 OSA (OBSTRUCTIVE SLEEP APNEA): Primary | ICD-10-CM

## 2024-01-30 DIAGNOSIS — R35.1 NOCTURIA: ICD-10-CM

## 2024-01-30 DIAGNOSIS — R09.81 NASAL CONGESTION: ICD-10-CM

## 2024-01-30 PROCEDURE — 99215 OFFICE O/P EST HI 40 MIN: CPT | Mod: 95 | Performed by: PHYSICIAN ASSISTANT

## 2024-01-30 ASSESSMENT — SLEEP AND FATIGUE QUESTIONNAIRES
HOW LIKELY ARE YOU TO NOD OFF OR FALL ASLEEP WHILE WATCHING TV: MODERATE CHANCE OF DOZING
HOW LIKELY ARE YOU TO NOD OFF OR FALL ASLEEP WHILE SITTING QUIETLY AFTER LUNCH WITHOUT ALCOHOL: MODERATE CHANCE OF DOZING
HOW LIKELY ARE YOU TO NOD OFF OR FALL ASLEEP WHILE SITTING INACTIVE IN A PUBLIC PLACE: WOULD NEVER DOZE
HOW LIKELY ARE YOU TO NOD OFF OR FALL ASLEEP WHILE SITTING AND TALKING TO SOMEONE: WOULD NEVER DOZE
HOW LIKELY ARE YOU TO NOD OFF OR FALL ASLEEP WHEN YOU ARE A PASSENGER IN A CAR FOR AN HOUR WITHOUT A BREAK: SLIGHT CHANCE OF DOZING
HOW LIKELY ARE YOU TO NOD OFF OR FALL ASLEEP IN A CAR, WHILE STOPPED FOR A FEW MINUTES IN TRAFFIC: WOULD NEVER DOZE
HOW LIKELY ARE YOU TO NOD OFF OR FALL ASLEEP WHILE SITTING AND READING: MODERATE CHANCE OF DOZING
HOW LIKELY ARE YOU TO NOD OFF OR FALL ASLEEP WHILE LYING DOWN TO REST IN THE AFTERNOON WHEN CIRCUMSTANCES PERMIT: SLIGHT CHANCE OF DOZING

## 2024-01-30 ASSESSMENT — PAIN SCALES - GENERAL: PAINLEVEL: MODERATE PAIN (4)

## 2024-01-30 NOTE — NURSING NOTE
Is the patient currently in the state of [place]? YES    Visit mode:VIDEO    If the visit is dropped, the patient can be reconnected by: VIDEO VISIT: Text to cell phone:   Telephone Information:   Mobile [phone]       Will anyone else be joining the visit? NO  (If patient encounters technical issues they should call [phone]    How would you like to obtain your AVS? MyChart    Are changes needed to the allergy or medication list? No    Reason for visit: RECHECK    Werner WATSON

## 2024-01-30 NOTE — PROGRESS NOTES
Virtual Visit Details    Type of service:  Video Visit   Video Start Time: 2:11 PM  Video End Time:2:50 PM    Originating Location (pt. Location): Home    Distant Location (provider location):  On-site  Platform used for Video Visit: Luna

## 2024-02-01 DIAGNOSIS — F33.1 MODERATE EPISODE OF RECURRENT MAJOR DEPRESSIVE DISORDER (H): ICD-10-CM

## 2024-02-01 DIAGNOSIS — F41.1 GAD (GENERALIZED ANXIETY DISORDER): ICD-10-CM

## 2024-02-01 RX ORDER — ESCITALOPRAM OXALATE 20 MG/1
20 TABLET ORAL DAILY
Qty: 90 TABLET | Refills: 0 | Status: SHIPPED | OUTPATIENT
Start: 2024-02-01 | End: 2024-04-29

## 2024-02-01 NOTE — TELEPHONE ENCOUNTER
Date of Last Office Visit: 1/17/2023  Date of Next Office Visit: 2/29/2024  No shows since last visit: none  Cancellations since last visit: none    Medication requested: escitalopram (LEXAPRO) 20 MG tablet  Date last ordered: 11/9/2023 Qty: 90 Refills: 0  Take 1 tablet (20 mg) by mouth daily      Review of [place] ?: no, this medication [place] not [place] on the [place]-     Lapse in medication adherence greater than 5 days?: No  If yes, call patient and gather details: n/a  Medication refill request verified as identical to current order?: Yes  Result of Last DAM, VPA, Li+ Level, CBC, or Carbamazepine Level (at or since last visit): N/A    Last visit treatment plan:   ASSESSMENT AND PLAN    Patient is a 62 year old female with a history of MDD DONELL and PTSD  Presents today for follow up visit. Today, she reports struggling with most worsening depression in the form of anhedonia, low motivation, irritability for the past few weeks.  Psychosocial stressor related to relationship problems with the family members as well as having COVID before [place] also exacerbated symptoms.  She would like to continue taking Wellbutrin SR once daily instead of twice daily.  I titrated Wellbutrin SR to 200 mg to further help with ongoing depressive symptoms.  We discussed future plan to send Wellbutrin to an extended release if she continues to struggle with SR titration.  She denies SI, SIB, and HI.  She also denied both auditory and visual hallucination.  She reported no rk or hypomania.  Return to clinic in 6 weeks for follow-up.     PLAN AND RECOMMENDATIONS:  Take  Wellbutrin  mg  daily -   Continue Lexapro 20 mg every day  Continue hydroxyzine 25 - 50 mg instead of 3 times daily as needed for anxiety         Continue to take prazosin 5 mg at bedtime  Continue Prazosin 5 mg at bedtime   Continue Buspar 20 mg three times daily for anxiety  Continue with Short term individual psychotherapy       []Medication refilled per   Medication Refill in Ambulatory Care  policy.  [x]Medication unable to be refilled by RN due to criteria not met as indicated below:    []Eligibility - not seen in the last year   []Supervision - no future appointment   []Compliance - no shows, cancellations or lapse in therapy   []Verification - order discrepancy   []Controlled medication   []Medication not included in policy   [x]90-day supply request   []Other    Jolly Delgado RN on 2/1/2024 at 1:41 PM

## 2024-02-02 ENCOUNTER — MYC MEDICAL ADVICE (OUTPATIENT)
Dept: PSYCHIATRY | Facility: CLINIC | Age: 64
End: 2024-02-02
Payer: MEDICARE

## 2024-02-02 NOTE — LETTER
To Whom It May Concern:    Re: Emotional Support Animal Letter    Ilsa Yusuf is my patient, and has been under my care since 02/03/2023.  I am intimately familiar with her medical history and with the functional limitations imposed by her disability.  She meets the definition of disability under the Americans with disabilities act, the Fair housing act, and the rehabilitation act 1973.    Due to mental illness, Deanne has certain limitations regarding anxiety, social interaction and coping with stress.  In order to help alleviate these difficulties, and to enhance her ability to live independently and to fully use and enjoy the dwelling unit you own and/or administer, I am prescribing an emotional support animal that will assist Deanne in coping with her disability.    I am familiar with the voluminous professional literature concerning the therapeutic benefit of assistance animal for people with disabilities such as experienced by Deanne.  Upon request, I will share citations to relevant studies, I will be happy to answer other questions you may have concerning my recommendations that Ilsa Yusuf have an emotional support animal.  Should you have additional questions, please do not hesitate to contact me.      Sincerely,    Ezekiel Cheng, KAIN, APRN  Psychiatric Nurse Practitioner  [facility] [place] [address]

## 2024-02-02 NOTE — TELEPHONE ENCOUNTER
Emotional Support Animal letter written as requested and sent to the patient via [employer].

## 2024-02-02 NOTE — TELEPHONE ENCOUNTER
Reviewed patient's WILEXt message. She is requesting and emotional support animal letter.       COREY GRAYSON RN on 2/2/2024 at 1:28 PM

## 2024-02-06 ENCOUNTER — TRANSFERRED RECORDS (OUTPATIENT)
Dept: HEALTH INFORMATION MANAGEMENT | Facility: CLINIC | Age: 64
End: 2024-02-06
Payer: MEDICARE

## 2024-02-13 ENCOUNTER — TELEPHONE (OUTPATIENT)
Dept: UROLOGY | Facility: CLINIC | Age: 64
End: 2024-02-13

## 2024-02-13 ENCOUNTER — VIRTUAL VISIT (OUTPATIENT)
Dept: UROLOGY | Facility: CLINIC | Age: 64
End: 2024-02-13
Payer: MEDICARE

## 2024-02-13 DIAGNOSIS — R30.0 DYSURIA: ICD-10-CM

## 2024-02-13 DIAGNOSIS — N39.8 VOIDING DYSFUNCTION: ICD-10-CM

## 2024-02-13 DIAGNOSIS — M79.18 MYALGIA OF PELVIC FLOOR: ICD-10-CM

## 2024-02-13 DIAGNOSIS — N32.81 URGENCY-FREQUENCY SYNDROME: Primary | ICD-10-CM

## 2024-02-13 DIAGNOSIS — R39.89 BLADDER PAIN: ICD-10-CM

## 2024-02-13 DIAGNOSIS — R10.2 SUPRAPUBIC ABDOMINAL PAIN: ICD-10-CM

## 2024-02-13 DIAGNOSIS — M62.89 PELVIC FLOOR DYSFUNCTION: ICD-10-CM

## 2024-02-13 PROCEDURE — 99214 OFFICE O/P EST MOD 30 MIN: CPT | Mod: 95 | Performed by: UROLOGY

## 2024-02-13 RX ORDER — DIAZEPAM 10 MG
TABLET ORAL
Qty: 30 TABLET | Refills: 3 | Status: SHIPPED | OUTPATIENT
Start: 2024-02-13 | End: 2024-02-13

## 2024-02-13 RX ORDER — DIAZEPAM 10 MG
TABLET ORAL
Qty: 30 TABLET | Refills: 3 | Status: SHIPPED | OUTPATIENT
Start: 2024-02-13 | End: 2024-08-26

## 2024-02-13 ASSESSMENT — PAIN SCALES - GENERAL: PAINLEVEL: MODERATE PAIN (4)

## 2024-02-13 NOTE — NURSING NOTE
Is the patient currently in the state of [place]? YES    Visit mode:VIDEO    If the visit is dropped, the patient can be reconnected by: VIDEO VISIT: Send to e-mail at: [email or web address]    Will anyone else be joining the visit? NO  (If patient encounters technical issues they should call [phone]    How would you like to obtain your AVS? MyChart    Are changes needed to the allergy or medication list? No    Reason for visit: JALEN WATSON

## 2024-02-13 NOTE — TELEPHONE ENCOUNTER
PRIOR AUTHORIZATION DENIED    Medication: DIAZEPAM 10 MG PO TABS  Insurance Company: [employer] - Phone [phone] Fax [phone]  Denial Date: 2/13/2024  Denial Reason(s):     Appeal Information:     Patient Notified: No

## 2024-02-13 NOTE — PATIENT INSTRUCTIONS
Websites with free information:    [employer] patient website: www.voicesforpfd.org    Total Control Program: www.totalcontrolprogram.com    Continue to work with the pain clinic    It was a pleasure meeting with you today.  Thank you for allowing me and my team the privilege of caring for you today.  YOU are the reason we are here, and I truly hope we provided you with the excellent service you deserve.  Please let us know if there is anything else we can do for you so that we can be sure you are leaving completely satisfied with your care experience.

## 2024-02-13 NOTE — PROGRESS NOTES
Virtual Visit Details    Type of service:  Video Visit   Video Start Time: 1:39 PM  Video End Time:1:48 PM    Originating Location (pt. Location): Home    Distant Location (provider location):  On-site  Platform used for Video Visit: [facility]    February 13, 2024    Deanne was seen today for recheck.    Diagnoses and all orders for this visit:    Urgency-frequency syndrome  -     Discontinue: diazepam (VALIUM) 10 MG tablet; VAGINAL VALIUM SUPPOSITORY 10MG AT NIGHT AND PRIOR TO THERAPY AS NEEDED  -     diazepam (VALIUM) 10 MG tablet; VAGINAL VALIUM SUPPOSITORY 10MG AT NIGHT AND PRIOR TO THERAPY AS NEEDED    Bladder pain  -     Discontinue: diazepam (VALIUM) 10 MG tablet; VAGINAL VALIUM SUPPOSITORY 10MG AT NIGHT AND PRIOR TO THERAPY AS NEEDED  -     diazepam (VALIUM) 10 MG tablet; VAGINAL VALIUM SUPPOSITORY 10MG AT NIGHT AND PRIOR TO THERAPY AS NEEDED    Suprapubic abdominal pain  -     Discontinue: diazepam (VALIUM) 10 MG tablet; VAGINAL VALIUM SUPPOSITORY 10MG AT NIGHT AND PRIOR TO THERAPY AS NEEDED  -     diazepam (VALIUM) 10 MG tablet; VAGINAL VALIUM SUPPOSITORY 10MG AT NIGHT AND PRIOR TO THERAPY AS NEEDED    Voiding dysfunction  -     Discontinue: diazepam (VALIUM) 10 MG tablet; VAGINAL VALIUM SUPPOSITORY 10MG AT NIGHT AND PRIOR TO THERAPY AS NEEDED  -     diazepam (VALIUM) 10 MG tablet; VAGINAL VALIUM SUPPOSITORY 10MG AT NIGHT AND PRIOR TO THERAPY AS NEEDED    Dysuria  -     Discontinue: diazepam (VALIUM) 10 MG tablet; VAGINAL VALIUM SUPPOSITORY 10MG AT NIGHT AND PRIOR TO THERAPY AS NEEDED  -     diazepam (VALIUM) 10 MG tablet; VAGINAL VALIUM SUPPOSITORY 10MG AT NIGHT AND PRIOR TO THERAPY AS NEEDED    Pelvic floor dysfunction  -     Discontinue: diazepam (VALIUM) 10 MG tablet; VAGINAL VALIUM SUPPOSITORY 10MG AT NIGHT AND PRIOR TO THERAPY AS NEEDED  -     diazepam (VALIUM) 10 MG tablet; VAGINAL VALIUM SUPPOSITORY 10MG AT NIGHT AND PRIOR TO THERAPY AS NEEDED    Myalgia of pelvic floor  -     diazepam (VALIUM) 10 MG  tablet; VAGINAL VALIUM SUPPOSITORY 10MG AT NIGHT AND PRIOR TO THERAPY AS NEEDED    Will try another PA to see if vaginal valium covered    At this time she will continue to work with her pain clinic and return to see Maria D in 6 months, sooner if needed     9 minutes were spent today on the day of the encounter in reviewing the EMR, direct patient care including prescription medications, coordination of care and documentation    Terrie Melton MD MPH  (she/her/hers)   of Urology  [facility]      Subjective    Here today for follow up.  Vaginal valium suppositories help by not covered    She thinks that her bladder pain symptoms are really related to other back issues that she is working with the pain clinic for    She denies any changes in health since last visit    LMP 08/08/2016   GENERAL: healthy, alert and no distress  EYES: Eyes grossly normal to inspection, conjunctivae and sclerae normal  HENT: normal cephalic/atraumatic.  External ears, nose and mouth without ulcers or lesions.  RESP: no audible wheeze, cough, or visible cyanosis.  No visible retractions or increased work of breathing.  Able to speak fully in complete sentences.  NEURO: Cranial nerves grossly intact, mentation intact and speech normal  PSYCH: mentation appears normal, affect normal/bright, judgement and insight intact, normal speech and appearance well-groomed      CC  Patient Care Team:  Catrachita Nicolas MD as PCP - General  Breanne Ellis PA-C as Physician Assistant (Physician Assistant - Medical)  Carie Nuñez (Internal Medicine)  Arnaldo Perez MD as MD (Orthopaedic Surgery Hand Surgery)  Sruthi Corona, [facility] as Licensed Mental Health Professional  Terrie Melton MD as Assigned Surgical Provider  Autumn Weiner GC as Genetic Counselor (Genetic Counselor, MS)  Ezekiel Cheng APRN CNP as Assigned Behavioral Health Provider  Catrachita Nicolas MD as Assigned PCP  Fay Reyes  MD VALERIE as Assigned OBGYN Provider  Tom Mathis MD as MD (Otolaryngology)  Goltz, Bennett Ezra, PA-C as Assigned Neuroscience Provider

## 2024-02-13 NOTE — LETTER
2/13/2024       RE: Ilsa Yusuf  [address] [facility] [address]     Dear Colleague,    Thank you for referring your patient, Ilsa Yusuf, to the [facility] at [facility]. Please see a copy of my visit note below.    Virtual Visit Details    Type of service:  Video Visit   Video Start Time: 1:39 PM  Video End Time:1:48 PM    Originating Location (pt. Location): Home    Distant Location (provider location):  On-site  Platform used for Video Visit: [facility]    February 13, 2024    Deanne was seen today for recheck.    Diagnoses and all orders for this visit:    Urgency-frequency syndrome  -     Discontinue: diazepam (VALIUM) 10 MG tablet; VAGINAL VALIUM SUPPOSITORY 10MG AT NIGHT AND PRIOR TO THERAPY AS NEEDED  -     diazepam (VALIUM) 10 MG tablet; VAGINAL VALIUM SUPPOSITORY 10MG AT NIGHT AND PRIOR TO THERAPY AS NEEDED    Bladder pain  -     Discontinue: diazepam (VALIUM) 10 MG tablet; VAGINAL VALIUM SUPPOSITORY 10MG AT NIGHT AND PRIOR TO THERAPY AS NEEDED  -     diazepam (VALIUM) 10 MG tablet; VAGINAL VALIUM SUPPOSITORY 10MG AT NIGHT AND PRIOR TO THERAPY AS NEEDED    Suprapubic abdominal pain  -     Discontinue: diazepam (VALIUM) 10 MG tablet; VAGINAL VALIUM SUPPOSITORY 10MG AT NIGHT AND PRIOR TO THERAPY AS NEEDED  -     diazepam (VALIUM) 10 MG tablet; VAGINAL VALIUM SUPPOSITORY 10MG AT NIGHT AND PRIOR TO THERAPY AS NEEDED    Voiding dysfunction  -     Discontinue: diazepam (VALIUM) 10 MG tablet; VAGINAL VALIUM SUPPOSITORY 10MG AT NIGHT AND PRIOR TO THERAPY AS NEEDED  -     diazepam (VALIUM) 10 MG tablet; VAGINAL VALIUM SUPPOSITORY 10MG AT NIGHT AND PRIOR TO THERAPY AS NEEDED    Dysuria  -     Discontinue: diazepam (VALIUM) 10 MG tablet; VAGINAL VALIUM SUPPOSITORY 10MG AT NIGHT AND PRIOR TO THERAPY AS NEEDED  -     diazepam (VALIUM) 10 MG tablet; VAGINAL VALIUM SUPPOSITORY 10MG AT NIGHT AND PRIOR TO THERAPY AS  NEEDED    Pelvic floor dysfunction  -     Discontinue: diazepam (VALIUM) 10 MG tablet; VAGINAL VALIUM SUPPOSITORY 10MG AT NIGHT AND PRIOR TO THERAPY AS NEEDED  -     diazepam (VALIUM) 10 MG tablet; VAGINAL VALIUM SUPPOSITORY 10MG AT NIGHT AND PRIOR TO THERAPY AS NEEDED    Myalgia of pelvic floor  -     diazepam (VALIUM) 10 MG tablet; VAGINAL VALIUM SUPPOSITORY 10MG AT NIGHT AND PRIOR TO THERAPY AS NEEDED    Will try another PA to see if vaginal valium covered    At this time she will continue to work with her pain clinic and return to see Maria D in 6 months, sooner if needed     9 minutes were spent today on the day of the encounter in reviewing the EMR, direct patient care including prescription medications, coordination of care and documentation    Terrie Melton MD MPH  (she/her/hers)   of Urology  [facility]      Subjective    Here today for follow up.  Vaginal valium suppositories help by not covered    She thinks that her bladder pain symptoms are really related to other back issues that she is working with the pain clinic for    She denies any changes in health since last visit    LMP 08/08/2016   GENERAL: healthy, alert and no distress  EYES: Eyes grossly normal to inspection, conjunctivae and sclerae normal  HENT: normal cephalic/atraumatic.  External ears, nose and mouth without ulcers or lesions.  RESP: no audible wheeze, cough, or visible cyanosis.  No visible retractions or increased work of breathing.  Able to speak fully in complete sentences.  NEURO: Cranial nerves grossly intact, mentation intact and speech normal  PSYCH: mentation appears normal, affect normal/bright, judgement and insight intact, normal speech and appearance well-groomed      CC  Patient Care Team:  Catrachita Nicolas MD as PCP - General  Breanne Ellis PA-C as Physician Assistant (Physician Assistant - Medical)  Carie Nuñez (Internal Medicine)  Arnaldo Perez MD as MD (Orthopaedic Surgery  Hand Surgery)  Sruthi Corona [facility] as Licensed Mental Health Professional  Terrie Melton MD as Assigned Surgical Provider  Autumn Weiner GC as Genetic Counselor (Genetic Counselor, MS)  Ezekiel Cheng APRN CNP as Assigned Behavioral Health Provider  Catrachita Nicolas MD as Assigned PCP  Fay Reyes MD as Assigned OBGYN Provider  Tom Mathis MD as MD (Otolaryngology)  Goltz, Bennett Ezra, PA-C as Assigned Neuroscience Provider

## 2024-02-14 NOTE — TELEPHONE ENCOUNTER
RN reviewed encounter. [employer] messages have not been read.     RN called patient but the call went to voicemail. RN left a message to review her [employer] message related to her request and to give us a call back if she has any more questions at [phone].    Beverley Jin RN on 2/14/2024 at 8:36 AM

## 2024-02-17 ENCOUNTER — HEALTH MAINTENANCE LETTER (OUTPATIENT)
Age: 64
End: 2024-02-17

## 2024-02-29 ENCOUNTER — VIRTUAL VISIT (OUTPATIENT)
Dept: PSYCHIATRY | Facility: CLINIC | Age: 64
End: 2024-02-29
Payer: MEDICARE

## 2024-02-29 DIAGNOSIS — F51.01 PRIMARY INSOMNIA: ICD-10-CM

## 2024-02-29 DIAGNOSIS — F33.1 MAJOR DEPRESSIVE DISORDER, RECURRENT EPISODE, MODERATE (H): Primary | ICD-10-CM

## 2024-02-29 DIAGNOSIS — F43.10 PTSD (POST-TRAUMATIC STRESS DISORDER): ICD-10-CM

## 2024-02-29 PROCEDURE — 99214 OFFICE O/P EST MOD 30 MIN: CPT | Mod: 95 | Performed by: NURSE PRACTITIONER

## 2024-02-29 NOTE — PROGRESS NOTES
Virtual Visit Details    Type of service:  Video Visit   Video Start Time:  1105  Video End Time: 1120    Originating Location (pt. Location): Home    Distant Location (provider location):  Off-site  Platform used for Video Visit: [employer]

## 2024-02-29 NOTE — PATIENT INSTRUCTIONS
"Patient Education   The Panel [facility] Program  What to Expect  Here's what to expect in the Panel Psychiatry Program.   About the program  You'll be meeting with a psychiatric doctor to check your mental health. A psychiatric doctor helps you deal with troubling thoughts and feelings by giving you medicine. They'll make sure you know the plan for your care. You may see them for a long time. When you're feeling better, they may refer you back to seeing your family doctor.   If you have any questions, we'll be glad to talk to you.  About visits  Be open  At your visits, please talk openly about your problems. It may feel hard, but it's the best way for us to help you.  Cancelling visits  If you can't come to your visit, please call us right away at [phone]. If you don't cancel at least 24 hours (1 full day) before your visit, that's \"late cancellation.\"  Not showing up for your visits  Being very late is the same as not showing up. You'll be a \"no show\" if:  You're more than 15 minutes late for a 30-minute (half hour) visit.  You're more than 30 minutes late for a 60-minute (full hour) visit.  If you cancel late or don't show up 2 times within 6 months, we may end your care.  Getting help between visits  If you need help between visits, you can call us Monday to Friday from 8 a.m. to 4:30 p.m. at [phone].  Emergency care  Call 911 or go to the nearest emergency department if your life or someone else's life is in danger.  Call 988 anytime to reach the national Suicide and Crisis hotline.  Medicine refills  To refill your medicine, call your pharmacy. You can also call [facility]'s Behavioral Access at [phone], Monday to Friday, 8 a.m. to 4:30 p.m. It can take 1 to 3 business days to get a refill.   Forms, letters, and tests  You may have papers to fill out, like FMLA, short-term disability, and workability. We can help you with these forms at your visits, but you must have an " appointment. You may need more than 1 visit for this, to be in an intensive therapy program, or both.  Before we can give you medicine for ADHD, we may refer you to get tested for it or confirm it another way.  We may not be able to give you an emotional support animal letter.  We don't do mental health checks ordered by the court.   We don't do mental health testing, but we can refer you to get tested.   Thank you for choosing us for your care.  For informational purposes only. Not to replace the advice of your health care provider. Copyright   2022 [facility]. All rights reserved. [employer] 755450 - 12/22.    PLAN AND RECOMMENDATIONS:  Continue  Wellbutrin  mg  daily -   Continue Lexapro 20 mg every day  Continue hydroxyzine 25 - 50 mg instead of 3 times daily as needed for anxiety   Continue to take prazosin 5 mg at bedtime  Continue Prazosin 5 mg at bedtime   Continue Buspar 20 mg three times daily for anxiety       We have discussed his/her diagnosis, prognosis, differential diagnosis and side effects and benefits of medications.     Patient and I revieweddiagnosis and treatment plan and patient agrees with following recommendations:    1.Patient will take the medications as prescribed. Patient will not stop taking medications or adjust them without consulting with theprovider.  2.Patient will call with any problems between 2 visits.  3.Patient will go to the emergency room if not feeling safe , unable to function in the community, or if suicidal, homicidal or hearing voices orhaving paranoia.  4.Patient will abstain from drugs and alcohol.  5.Patient will not drive if sedated on medications or under influence of any substance. 6.Patient will not mix psychiatric medications with drugs andalcohol.   7.Patient will watch his diet and exercise.  8.Patient will see non psychiatric providers for non psychiatric disorders.

## 2024-02-29 NOTE — NURSING NOTE
Is the patient currently in the state of [place]? YES    Visit mode:VIDEO    If the visit is dropped, the patient can be reconnected by: VIDEO VISIT: Send to e-mail at: [email or web address]    Will anyone else be joining the visit? NO  (If patient encounters technical issues they should call [phone]    How would you like to obtain your AVS? MyChart    Are changes needed to the allergy or medication list? N/A    Reason for visit: JALEN WATSON

## 2024-02-29 NOTE — PROGRESS NOTES
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 medication questions or concerns at [phone].  Adlogixhart may be used to communicate with your provider, but this is not intended to be used for emergencies.  BLACK BOX WARNING: Discussed the Food and Drug Administration (FDA) requires that all antidepressants carry a warning that some children, adolescents and young adults may be at increased risk of suicide when taking antidepressants. Anyone taking an antidepressant should be watched closely for worsening depression or unusual behavior especially in the first few weeks after starting an SSRI. Keep in mind, antidepressants are more likely to reduce suicide risk in the long run by improving mood.   SEROTONIN SYNDROME:  Discussed risks of Serotonin syndrome (ie, serotonin toxicity) which is a potentially life-threatening condition associated with increased serotonergic activity in the central nervous system (CNS). It is seen with therapeutic medication use, inadvertent interactions between drugs, and intentional self-poisoning. Serotonin syndrome may involve a spectrum of clinical findings, which often include mental status changes, autonomic hyperactivity, and  neuromuscular abnormalities.    BENZODIAZEPINE:  discussion on how benzos work and the need to use them short term due to potential of anxiety getting.  This is a controlled substance with risk for abuse, need to keep in a safe keep place and cannot replace lost scripts.    HARM REDUCTION:  Discussions regarding effects of mood altering substances, alcohol and cannabis, on mood and that approach is harm reduction, will continue to prescribe meds as they work to cut back use.    FIRST GENERATION ANTIPSYCHOTIC/ SECOND GENERATION ANTIPSYCHOTIC USE:  Atypical need for cardiometabolic monitoring with medication- B/P, weight, blood sugar, cholesterol.  Need to monitor for abnormal movements taught  SAFETY:  We all care about your loved one's safety. To reduce the risk of self-harm, remove access to all:  Firearms, Medicines (both prescribed and over-the-counter), Knives and other sharp objects, Ropes and like materials, and Alcohol  SLEEP HYGIENE: establish a sleep routine, limit screen time 1 hour prior to bed, use bed for sleep only, take sleep/medications on time (including sleepy time tea, trazadone or herbal treatments such as melatonin), aroma therapy, limit caffeine/sugar, yoga, guided imagery, stretch, meditation, limit naps to 20 minutes, make a temperature change in the room, white noise, be mindful of slowing down breathing, take a warm bath/shower, frequently wash sheets, and journaling.   Medlineplus.gov is information for patients.  It is run by the National Library of Medicine and it contains information about all disorders, diseases and all medications.      COMMUNITY RESOURCES:    CRISIS NUMBERS: Provided in AVS 6/21/2023  National Suicide Prevention Lifeline: 5-688-460-TALK (558-657-7979)  [employer]/resources for a list of additional resources (SOS)            [facility] - [phone]   Urgent Care Adult Mental Swcfyf-[phone] mobile unit/ 24/7 crisis line  [facility]  [facility] -[phone]   COPE  Kg Mobile Team -[phone] (adults)/ [phone] (child)  Poison Control Center - 1-478.929.2849    OR  go to nearest ER  Crisis Text Line for any crisis  send this-   To: 799391   [facility] ([facility]) [facility] ER  [phone]  National Suicide Prevention Lifeline: 259.957.8259 (TTY: 259.120.6927). Call anytime for help.  (www.suicidepreventionlifeline.org)  National [employer] on Mental Illness (www.ellie.org): 544.570.1059 or 355-083-5885.   Mental Health Association (www.mentalhealth.org): 734.607.9074 or 676-531-9143.  [place] Crisis Text Line: Text [place] to 705653  Suicide LifeLine Chat: suicideInnohub.org/chat    ADMINISTRATIVE BILLIN min spent interviewing patient, reviewing referral documents, obtaining and reviewing outside records, communication with other health specialists, and preparing this report on today's date: 2024  Video/Phone Start Time: 1105  Video/Phone End Time:  1120      Signed:     Ezekiel Cheng, MSN, APRN, PMHNP-BC     Chart documentation done in part with Dragon Voice Recognition software.  Although reviewed after completion, some word and grammatical errors may remain.  Answers for HPI/ROS submitted by the patient on 2022  If you checked off any problems, how difficult have these problems made it for you to do your work, take care of things at home, or get along with other people?: Somewhat difficult  PHQ9 TOTAL SCORE: 7  DONELL 7 TOTAL SCORE: 13    Answers for HPI/ROS submitted by the patient on 2/3/2023  If you checked off any problems, how difficult have these problems made it for you to do your work, take care of things at home, or get along with other people?: Very difficult  PHQ9 TOTAL SCORE: 3  DONELL 7 TOTAL SCORE: 6  Answers for HPI/ROS submitted by the patient on 2023  If you checked off any problems, how difficult have these problems made it for you to do your work, take care of things at  home, or get along with other people?: Somewhat difficult  PHQ9 TOTAL SCORE: 5  DONELL 7 TOTAL SCORE: 8    Answers for HPI/ROS submitted by the patient on 5/25/2023  If you checked off any problems, how difficult have these problems made it for you to do your work, take care of things at home, or get along with other people?: Extremely difficult  PHQ9 TOTAL SCORE: 12  DONELL 7 TOTAL SCORE: 9Answers submitted by the patient for this visit:  Patient Health Questionnaire (Submitted on 9/19/2023)  If you checked off any problems, how difficult have these problems made it for you to do your work, take care of things at home, or get along with other people?: Somewhat difficult  PHQ9 TOTAL SCORE: 5  DONELL-7 (Submitted on 9/19/2023)  DONELL 7 TOTAL SCORE: 14    Answers submitted by the patient for this visit:  Patient Health Questionnaire (Submitted on 1/17/2024)  If you checked off any problems, how difficult have these problems made it for you to do your work, take care of things at home, or get along with other people?: Extremely difficult  PHQ9 TOTAL SCORE: 8  DONELL-7 (Submitted on 1/17/2024)  DONELL 7 TOTAL SCORE: 7

## 2024-02-29 NOTE — LETTER
February 29, 2024      Ilsa Yusuf  [address] [address]      To Whom It May Concern:     Re: Emotional Support Animal Letter     Ilsa Yusuf is my patient, and has been under my care since 02/03/2023.  I am intimately familiar with her medical history and with the functional limitations imposed by her disability.  She meets the definition of disability under the Americans with disabilities act, the Fair housing act, and the rehabilitation act 1973.     I am prescribing Deanne an emotional support animal that will assist with treatment.      I am familiar with the voluminous professional literature concerning the therapeutic benefit of assistance animal for people with disabilities such as experienced by Deanne.  Upon request, I will share citations to relevant studies, I will be happy to answer other questions you may have concerning my recommendations that Ilsa Yusuf have an emotional support animal.  Should you have additional questions, please do not hesitate to contact me.        Sincerely,     Ezekiel Cheng CNP, ALONDRA  Psychiatric Nurse Practitioner  [facility] [place] [address]                 Sincerely,        ALONDRA Vang CNP

## 2024-03-03 DIAGNOSIS — K21.9 GASTROESOPHAGEAL REFLUX DISEASE WITHOUT ESOPHAGITIS: ICD-10-CM

## 2024-03-04 DIAGNOSIS — J30.9 ALLERGIC RHINITIS, UNSPECIFIED SEASONALITY, UNSPECIFIED TRIGGER: ICD-10-CM

## 2024-03-04 RX ORDER — AZELASTINE 1 MG/ML
1 SPRAY, METERED NASAL 2 TIMES DAILY
Qty: 30 ML | Refills: 1 | Status: SHIPPED | OUTPATIENT
Start: 2024-03-04

## 2024-03-04 RX ORDER — OMEPRAZOLE 40 MG/1
40 CAPSULE, DELAYED RELEASE ORAL DAILY
Qty: 90 CAPSULE | Refills: 0 | Status: SHIPPED | OUTPATIENT
Start: 2024-03-04 | End: 2024-04-29

## 2024-03-05 ENCOUNTER — TRANSFERRED RECORDS (OUTPATIENT)
Dept: HEALTH INFORMATION MANAGEMENT | Facility: CLINIC | Age: 64
End: 2024-03-05
Payer: MEDICARE

## 2024-03-08 NOTE — TELEPHONE ENCOUNTER
FYI to provider - Patient is lost to pap tracking follow-up. Attempts to contact pt have been made per reminder process and there has been no reply and/or no appt scheduled. Contact hx listed below.     TORRI Exposure, needs annual Pap's for life   3/19/19 NIL pap, + HR HPV (not 16, 18). Plan: cotest 1 year.    03/29/21 Lost to follow-up for pap tracking  12/14/22 NIL pap, + HR HPV (not 16 or 18). Plan: [place] bef 3/14/23  1/17/23 [place] ECC: benign. Plan 1 year cotest  1/4/24 Reminder mychart  2/8/24 Reminder call -- Pt notified  3/8/24 Lost to follow-up for pap tracking     Yu Lemos RN BSN, Pap Tracking

## 2024-03-28 ENCOUNTER — HOSPITAL ENCOUNTER (OUTPATIENT)
Dept: MAMMOGRAPHY | Facility: CLINIC | Age: 64
Discharge: HOME | End: 2024-03-28
Attending: FAMILY MEDICINE | Admitting: FAMILY MEDICINE
Payer: MEDICARE

## 2024-03-28 DIAGNOSIS — Z12.31 BREAST CANCER SCREENING BY MAMMOGRAM: ICD-10-CM

## 2024-03-28 PROCEDURE — 77063 BREAST TOMOSYNTHESIS BI: CPT

## 2024-04-04 ENCOUNTER — TRANSFERRED RECORDS (OUTPATIENT)
Dept: HEALTH INFORMATION MANAGEMENT | Facility: CLINIC | Age: 64
End: 2024-04-04
Payer: MEDICARE

## 2024-04-20 ENCOUNTER — TELEPHONE (OUTPATIENT)
Dept: FAMILY MEDICINE | Facility: CLINIC | Age: 64
End: 2024-04-20
Payer: MEDICARE

## 2024-04-20 DIAGNOSIS — E78.5 HYPERLIPIDEMIA LDL GOAL <130: ICD-10-CM

## 2024-04-22 RX ORDER — LOVASTATIN 20 MG
20 TABLET ORAL AT BEDTIME
Qty: 30 TABLET | Refills: 0 | Status: SHIPPED | OUTPATIENT
Start: 2024-04-22

## 2024-04-22 NOTE — TELEPHONE ENCOUNTER
Reyna refill sent  Due for med check prior to further refills (virutal visit is ok)  Please send reminder

## 2024-04-28 DIAGNOSIS — F33.1 MODERATE EPISODE OF RECURRENT MAJOR DEPRESSIVE DISORDER (H): ICD-10-CM

## 2024-04-28 DIAGNOSIS — F41.1 GAD (GENERALIZED ANXIETY DISORDER): ICD-10-CM

## 2024-04-28 DIAGNOSIS — F33.1 MAJOR DEPRESSIVE DISORDER, RECURRENT EPISODE, MODERATE (H): ICD-10-CM

## 2024-04-28 DIAGNOSIS — K21.9 GASTROESOPHAGEAL REFLUX DISEASE WITHOUT ESOPHAGITIS: ICD-10-CM

## 2024-04-29 RX ORDER — ESCITALOPRAM OXALATE 20 MG/1
20 TABLET ORAL DAILY
Qty: 90 TABLET | Refills: 0 | Status: SHIPPED | OUTPATIENT
Start: 2024-04-29 | End: 2024-07-25

## 2024-04-29 RX ORDER — OMEPRAZOLE 40 MG/1
40 CAPSULE, DELAYED RELEASE ORAL DAILY
Qty: 30 CAPSULE | Refills: 0 | Status: SHIPPED | OUTPATIENT
Start: 2024-04-29 | End: 2024-06-11

## 2024-04-29 RX ORDER — HYDROXYZINE HYDROCHLORIDE 25 MG/1
25-50 TABLET, FILM COATED ORAL 3 TIMES DAILY PRN
Qty: 540 TABLET | Refills: 0 | Status: SHIPPED | OUTPATIENT
Start: 2024-04-29 | End: 2024-07-29

## 2024-04-29 RX ORDER — BUPROPION HYDROCHLORIDE 200 MG/1
200 TABLET, EXTENDED RELEASE ORAL DAILY
Qty: 90 TABLET | Refills: 0 | Status: SHIPPED | OUTPATIENT
Start: 2024-04-29 | End: 2024-07-26

## 2024-04-29 NOTE — TELEPHONE ENCOUNTER
Date of Last Office Visit: 2/29/24  Date of Next Office Visit: None - RN forwarding encounter to [facility] for scheduling. [phone]    No shows since last visit: 0  Cancellations since last visit: 0        Medication requested:   escitalopram (LEXAPRO) 20 MG tablet Date last ordered: 2/1/24 Qty: 90 Refills: 0  buPROPion (WELLBUTRIN SR) 200 MG 12 hr tablet Date last ordered: 1/17/24 Qty: 90 Refills: 0  hydrOXYzine HCl (ATARAX) 25 MG tablet   Date last ordered: 11/20/23 Qty: 540 Refills: 1             Review of [place] ?: NA    Lapse in medication adherence greater than 5 days?: 12 days since end of Wellbutrin script.    If yes, call patient and gather details: RN reached Deanne via phone. Per Sally;    - Has been taking daily Wellbutrin 200 MG.Has 1 pill left. Has no concerns about this medication.     - Deanne stated she thought she had and appointment scheduled with Shaji Cheng CNP and would call [phone] to confirm and reschedule appointment.    - She also requested hydroxyzine refill. Reporting her last dose was yesterday. She does not have any more hydroxyzine left.    Medication refill request verified as identical to current order?: Yes  Result of Last DAM, VPA, Li+ Level, CBC, or Carbamazepine Level (at or since last visit): N/A    Last visit treatment plan: 2/29/24  ASSESSMENT AND PLAN    Patient is a 62 year old female with a history of MDD DONELL and PTSD  Presents today for follow up visit. Today, she reports struggling with most worsening depression in the form of anhedonia, low motivation, irritability for the past few weeks.  She has now established care with a long time psychotherapist outside of the  [place].  Today, she reports doing well as she has noticed improvement in depressive symptoms since increasing Wellbutrin SR to 200 mg.  She will be moving to a new apartment in April that is a lot cheaper compared to where she currently lives in April.  She denies SI, SIB, and HI.  She also denied both  auditory and visual hallucination.  She reported no rk or hypomania.  Return to clinic in 6 weeks for follow-up.     PLAN AND RECOMMENDATIONS:  Continue  Wellbutrin  mg  daily -   Continue Lexapro 20 mg every day  Continue hydroxyzine 25 - 50 mg instead of 3 times daily as needed for anxiety         Continue to take prazosin 5 mg at bedtime  Continue Prazosin 5 mg at bedtime   Continue Buspar 20 mg three times daily for anxiety    []Medication refilled per  Medication Refill in Ambulatory Care  policy.  [x]Medication unable to be refilled by RN due to criteria not met as indicated below:    []Eligibility - not seen in the last year   [x]Supervision - no future appointment   []Compliance - no shows, cancellations or lapse in therapy   []Verification - order discrepancy   []Controlled medication   []Medication not included in policy   [x]90-day supply request   []Other

## 2024-05-06 ENCOUNTER — TRANSFERRED RECORDS (OUTPATIENT)
Dept: HEALTH INFORMATION MANAGEMENT | Facility: CLINIC | Age: 64
End: 2024-05-06
Payer: MEDICARE

## 2024-05-09 NOTE — TELEPHONE ENCOUNTER
"FUTURE VISIT INFORMATION      FUTURE VISIT INFORMATION:  Date: 8/6/24  Time: 1:20 PM  Location: [facility] - ENT  REFERRAL INFORMATION:  Referring provider: Goltz, Bennett Ezra, PA-C     Referring providers clinic:    Reason for visit/diagnosis:  R09.81 (ICD-10-CM) - Nasal congestion, Referral from Bennett Goltz, Referral notes in [facility] Pt made appt for [facility]     RECORDS REQUESTED FROM      Clinic name Comments Records Status Imaging Status   [facility] 1/30/24 referral/ note- Goltz, Bennett Ezra, PA-C  [facility] 1/4/21 CTA head  1/4/21 CT head  5/8/19 XR cervical  3/26/19 MR cervical [facility] PACS   [facility] 9/17/20 note- Jeffry Gannon MD   8/10/20 note- Sophia Kern MD  Epic            \"Please notify/message CSS if patient completed outside imaging prior to scheduled appointment and/or any outside records that might have been missed at pre visit -Thank you\"  "

## 2024-05-30 ENCOUNTER — TELEPHONE (OUTPATIENT)
Dept: PSYCHIATRY | Facility: CLINIC | Age: 64
End: 2024-05-30
Payer: MEDICARE

## 2024-05-30 NOTE — TELEPHONE ENCOUNTER
Received Reasonable Accommodation Verification Form.     Provider please review and complete.  After completion will need to reach out to patient to confirm where to send back to. There was no fax # provided on papers.    Hard copy in provider's mail drawer

## 2024-06-02 DIAGNOSIS — F43.10 PTSD (POST-TRAUMATIC STRESS DISORDER): ICD-10-CM

## 2024-06-02 DIAGNOSIS — F41.1 GAD (GENERALIZED ANXIETY DISORDER): ICD-10-CM

## 2024-06-03 RX ORDER — BUSPIRONE HYDROCHLORIDE 10 MG/1
20 TABLET ORAL 3 TIMES DAILY
Qty: 540 TABLET | Refills: 1 | Status: SHIPPED | OUTPATIENT
Start: 2024-06-03

## 2024-06-03 RX ORDER — PRAZOSIN HYDROCHLORIDE 5 MG/1
CAPSULE ORAL
Qty: 90 CAPSULE | Refills: 1 | Status: SHIPPED | OUTPATIENT
Start: 2024-06-03

## 2024-06-03 NOTE — TELEPHONE ENCOUNTER
Date of Last Office Visit: 2/29/24  Date of Next Office Visit: 6/10/24  No shows since last visit: 0  Cancellations since last visit: 0    Medication requested: prazosin (MINIPRESS) 5 MG capsule  Date last ordered: 12/6/23 Qty: 90 Refills: 1    Medication requested: busPIRone (BUSPAR) 10 MG tablet  Date last ordered: 12/4/23 Qty: 540 Refills: 1     Review of [place] ?: NA    Lapse in medication adherence greater than 5 days?: No  If yes, call patient and gather details: na  Medication refill request verified as identical to current order?: yes  Result of Last DAM, VPA, Li+ Level, CBC, or Carbamazepine Level (at or since last visit): N/A    Last visit treatment plan:  Today, she reports struggling with most worsening depression in the form of anhedonia, low motivation, irritability for the past few weeks.  She has now established care with a long time psychotherapist outside of the  [place].  Today, she reports doing well as she has noticed improvement in depressive symptoms since increasing Wellbutrin SR to 200 mg.  She will be moving to a new apartment in April that is a lot cheaper compared to where she currently lives in April.  She denies SI, SIB, and HI.  She also denied both auditory and visual hallucination.  She reported no rk or hypomania.  Return to clinic in 6 weeks for follow-up.   Continue  Wellbutrin  mg  daily -   Continue Lexapro 20 mg every day  Continue hydroxyzine 25 - 50 mg instead of 3 times daily as needed for anxiety         Continue to take prazosin 5 mg at bedtime  Continue Prazosin 5 mg at bedtime   Continue Buspar 20 mg three times daily for anxiety    []Medication refilled per  Medication Refill in Ambulatory Care  policy.  [x]Medication unable to be refilled by RN due to criteria not met as indicated below:    []Eligibility - not seen in the last year   []Supervision - no future appointment   []Compliance - no shows, cancellations or lapse in therapy   []Verification - order  discrepancy   []Controlled medication   []Medication not included in policy   [x]90-day supply request   []Other

## 2024-06-03 NOTE — TELEPHONE ENCOUNTER
Provider completed and signed forms. OBC faxed back to [facility] @ [phone].    Faxed copy to HIM for scanning.

## 2024-06-04 NOTE — NURSING NOTE
Is the patient currently in the state of [place]? YES    Visit mode:VIDEO    If the visit is dropped, the patient can be reconnected by: TELEPHONE VISIT: Phone number: [phone]    Will anyone else be joining the visit? No  (If patient encounters technical issues they should call [phone])    How would you like to obtain your AVS? MyChart    Are changes needed to the allergy or medication list? NO    Rooming Documentation: Assigned questionnaire(s) completed .    Reason for visit: SHIRLEY Amador         [facility]  Initial Discharge Assessment    Assessment completed at bedside with Pt, and all information on FaceSheet confirmed, including demographics, PCP, pharmacy and insurance. Pt has not addressed advance directives, and reports Uyen Pelletier (Significant other) [phone]  is his NOK. He currently lives at home with SO and 1 friend. His PCP is Nayeli at [facility], and last appointment was on 4 days ago. His preferred pharmacy is [facility]. \He denies any HH/HD/Blood Thinners. For DME Pt has a BIPAP, o2 and a blood pressure machine. For transportation to appointments, he usually drives himself but at discharge, spouse to provide. He denies any recent hospitalizations. Plan for discharge is to return home with family. Case Management to continue to follow for discharge planning needs.             Primary Care Provider: Oralia Tyler FNP    Admission Diagnosis: Decompensated COPD with exacerbation (chronic obstructive pulmonary disease) [J44.1]  Acute respiratory failure with hypoxemia [J96.01]    Admission Date: 6/4/2024  Expected Discharge Date: 6/5/2024    Transition of Care Barriers: None    Payor[place] / Plan: [place] [facility] COMMUNITY PLAN [place] / Product Type: Managed Medicaid /     Extended Emergency Contact Information  Primary Emergency Contact: Uyen Pelletier  Address: [address] [place] [address] [place] [address] [facility]  Home Phone: [phone]  Mobile Phone: [phone]  Relation: Significant other  Preferred language: English   needed? No    Discharge Plan A: Home with family  Discharge Plan B: Home with family      [id number] - [place], [place] - [address]  [address]  [place] [address]  Phone: [phone] Fax: [phone]      Initial Assessment (most recent)       Adult Discharge Assessment - 06/04/24 1107          Discharge Assessment    Assessment Type  Discharge Planning Assessment     Confirmed/corrected address, phone number and insurance Yes     Confirmed Demographics Correct on Facesheet     Source of Information patient     Communicated ANALI with patient/caregiver No     Reason For Admission Shortness of breath COPD     People in Home spouse;friend(s)     Facility Arrived From: home     Do you expect to return to your current living situation? Yes     Do you have help at home or someone to help you manage your care at home? Yes     Who are your caregiver(s) and their phone number(s)? Uyen Pelletier (Significant other)  [phone]     Prior to hospitilization cognitive status: Alert/Oriented     Current cognitive status: Alert/Oriented     Walking or Climbing Stairs Difficulty no     Dressing/Bathing Difficulty no     Home Accessibility wheelchair accessible     Home Layout Able to live on 1st floor     Equipment Currently Used at Home walker, rolling;oxygen;BIPAP     Readmission within 30 days? No     Patient currently being followed by outpatient case management? No     Do you currently have service(s) that help you manage your care at home? No     Do you take prescription medications? Yes     Do you have prescription coverage? Yes     Coverage Medicaid     Do you have any problems affording any of your prescribed medications? Yes     If yes, what medications? inhalers     Is the patient taking medications as prescribed? yes     Who is going to help you get home at discharge? Uyen Pelletier (Significant other) [phone]     How do you get to doctors appointments? car, drives self;family or friend will provide     Are you on dialysis? No     Do you take coumadin? No     Discharge Plan A Home with family     Discharge Plan B Home with family     DME Needed Upon Discharge  none     Discharge Plan discussed with: Patient     Transition of Care Barriers None        Physical Activity    On average, how many days per week do you engage in moderate to strenuous  exercise (like a brisk walk)? 5 days     On average, how many minutes do you engage in exercise at this level? 30 min        Financial Resource Strain    How hard is it for you to pay for the very basics like food, housing, medical care, and heating? Somewhat hard        Housing Stability    In the last 12 months, was there a time when you were not able to pay the mortgage or rent on time? Yes     At any time in the past 12 months, were you homeless or living in a shelter (including now)? No        Transportation Needs    Has the lack of transportation kept you from medical appointments, meetings, work or from getting things needed for daily living? No        Food Insecurity    Within the past 12 months, you worried that your food would run out before you got the money to buy more. Never true     Within the past 12 months, the food you bought just didn't last and you didn't have money to get more. Never true        Stress    Do you feel stress - tense, restless, nervous, or anxious, or unable to sleep at night because your mind is troubled all the time - these days? To some extent        Social Isolation    How often do you feel lonely or isolated from those around you?  Never        Alcohol Use    Q1: How often do you have a drink containing alcohol? 2-4 times a month     Q2: How many drinks containing alcohol do you have on a typical day when you are drinking? 1 or 2     Q3: How often do you have six or more drinks on one occasion? Less than monthly        Utilities    In the past 12 months has the electric, gas, oil, or water company threatened to shut off services in your home? No        Health Literacy    How often do you need to have someone help you when you read instructions, pamphlets, or other written material from your doctor or pharmacy? Never

## 2024-06-05 ENCOUNTER — HOSPITAL ENCOUNTER (EMERGENCY)
Facility: CLINIC | Age: 64
Discharge: HOME | End: 2024-06-05
Attending: STUDENT IN AN ORGANIZED HEALTH CARE EDUCATION/TRAINING PROGRAM | Admitting: STUDENT IN AN ORGANIZED HEALTH CARE EDUCATION/TRAINING PROGRAM
Payer: MEDICARE

## 2024-06-05 ENCOUNTER — APPOINTMENT (OUTPATIENT)
Dept: MRI IMAGING | Facility: CLINIC | Age: 64
End: 2024-06-05
Attending: STUDENT IN AN ORGANIZED HEALTH CARE EDUCATION/TRAINING PROGRAM
Payer: MEDICARE

## 2024-06-05 VITALS
DIASTOLIC BLOOD PRESSURE: 67 MMHG | HEART RATE: 88 BPM | OXYGEN SATURATION: 98 % | SYSTOLIC BLOOD PRESSURE: 126 MMHG | RESPIRATION RATE: 18 BRPM

## 2024-06-05 DIAGNOSIS — R53.1 WEAKNESS: ICD-10-CM

## 2024-06-05 DIAGNOSIS — M54.9 ACUTE BACK PAIN, UNSPECIFIED BACK LOCATION, UNSPECIFIED BACK PAIN LATERALITY: ICD-10-CM

## 2024-06-05 LAB
ALBUMIN SERPL BCG-MCNC: 4.7 G/DL (ref 3.5–5.2)
ALP SERPL-CCNC: 107 U/L (ref 40–150)
ALT SERPL W P-5'-P-CCNC: 17 U/L (ref 0–50)
ANION GAP SERPL CALCULATED.3IONS-SCNC: 8 MMOL/L (ref 7–15)
AST SERPL W P-5'-P-CCNC: 21 U/L (ref 0–45)
BASOPHILS # BLD AUTO: 0.1 10E3/UL (ref 0–0.2)
BASOPHILS NFR BLD AUTO: 1 %
BILIRUB SERPL-MCNC: 0.4 MG/DL
BUN SERPL-MCNC: 18.9 MG/DL (ref 8–23)
CALCIUM SERPL-MCNC: 10.1 MG/DL (ref 8.8–10.2)
CHLORIDE SERPL-SCNC: 105 MMOL/L (ref 98–107)
CREAT SERPL-MCNC: 1.1 MG/DL (ref 0.51–0.95)
DEPRECATED HCO3 PLAS-SCNC: 28 MMOL/L (ref 22–29)
EGFRCR SERPLBLD CKD-EPI 2021: 55 ML/MIN/1.73M2
EOSINOPHIL # BLD AUTO: 0.1 10E3/UL (ref 0–0.7)
EOSINOPHIL NFR BLD AUTO: 1 %
ERYTHROCYTE [DISTWIDTH] IN BLOOD BY AUTOMATED COUNT: 12.8 % (ref 10–15)
GLUCOSE SERPL-MCNC: 56 MG/DL (ref 70–99)
HCT VFR BLD AUTO: 47.5 % (ref 35–47)
HGB BLD-MCNC: 15.9 G/DL (ref 11.7–15.7)
IMM GRANULOCYTES # BLD: 0 10E3/UL
IMM GRANULOCYTES NFR BLD: 0 %
INR PPP: 1.03 (ref 0.85–1.15)
LYMPHOCYTES # BLD AUTO: 3.7 10E3/UL (ref 0.8–5.3)
LYMPHOCYTES NFR BLD AUTO: 36 %
MCH RBC QN AUTO: 29.7 PG (ref 26.5–33)
MCHC RBC AUTO-ENTMCNC: 33.5 G/DL (ref 31.5–36.5)
MCV RBC AUTO: 89 FL (ref 78–100)
MONOCYTES # BLD AUTO: 0.7 10E3/UL (ref 0–1.3)
MONOCYTES NFR BLD AUTO: 7 %
NEUTROPHILS # BLD AUTO: 5.7 10E3/UL (ref 1.6–8.3)
NEUTROPHILS NFR BLD AUTO: 55 %
NRBC # BLD AUTO: 0 10E3/UL
NRBC BLD AUTO-RTO: 0 /100
PLATELET # BLD AUTO: 217 10E3/UL (ref 150–450)
POTASSIUM SERPL-SCNC: 4.7 MMOL/L (ref 3.4–5.3)
PROT SERPL-MCNC: 7.8 G/DL (ref 6.4–8.3)
RBC # BLD AUTO: 5.36 10E6/UL (ref 3.8–5.2)
SODIUM SERPL-SCNC: 141 MMOL/L (ref 135–145)
WBC # BLD AUTO: 10.3 10E3/UL (ref 4–11)

## 2024-06-05 PROCEDURE — 72148 MRI LUMBAR SPINE W/O DYE: CPT | Mod: 26 | Performed by: RADIOLOGY

## 2024-06-05 PROCEDURE — 51798 US URINE CAPACITY MEASURE: CPT | Performed by: STUDENT IN AN ORGANIZED HEALTH CARE EDUCATION/TRAINING PROGRAM

## 2024-06-05 PROCEDURE — 36415 COLL VENOUS BLD VENIPUNCTURE: CPT | Performed by: STUDENT IN AN ORGANIZED HEALTH CARE EDUCATION/TRAINING PROGRAM

## 2024-06-05 PROCEDURE — G1010 CDSM STANSON: HCPCS

## 2024-06-05 PROCEDURE — 99285 EMERGENCY DEPT VISIT HI MDM: CPT | Mod: 25 | Performed by: STUDENT IN AN ORGANIZED HEALTH CARE EDUCATION/TRAINING PROGRAM

## 2024-06-05 PROCEDURE — 99285 EMERGENCY DEPT VISIT HI MDM: CPT | Performed by: NURSE PRACTITIONER

## 2024-06-05 PROCEDURE — 250N000011 HC RX IP 250 OP 636: Performed by: STUDENT IN AN ORGANIZED HEALTH CARE EDUCATION/TRAINING PROGRAM

## 2024-06-05 PROCEDURE — 99284 EMERGENCY DEPT VISIT MOD MDM: CPT | Mod: 25 | Performed by: STUDENT IN AN ORGANIZED HEALTH CARE EDUCATION/TRAINING PROGRAM

## 2024-06-05 PROCEDURE — 76705 ECHO EXAM OF ABDOMEN: CPT | Mod: 26 | Performed by: STUDENT IN AN ORGANIZED HEALTH CARE EDUCATION/TRAINING PROGRAM

## 2024-06-05 PROCEDURE — G1010 CDSM STANSON: HCPCS | Performed by: RADIOLOGY

## 2024-06-05 PROCEDURE — 85610 PROTHROMBIN TIME: CPT | Performed by: STUDENT IN AN ORGANIZED HEALTH CARE EDUCATION/TRAINING PROGRAM

## 2024-06-05 PROCEDURE — 84155 ASSAY OF PROTEIN SERUM: CPT | Performed by: STUDENT IN AN ORGANIZED HEALTH CARE EDUCATION/TRAINING PROGRAM

## 2024-06-05 PROCEDURE — 99207 PR NO BILLABLE SERVICE THIS VISIT: CPT | Performed by: PSYCHIATRY & NEUROLOGY

## 2024-06-05 PROCEDURE — 82247 BILIRUBIN TOTAL: CPT | Performed by: STUDENT IN AN ORGANIZED HEALTH CARE EDUCATION/TRAINING PROGRAM

## 2024-06-05 PROCEDURE — 85004 AUTOMATED DIFF WBC COUNT: CPT | Performed by: STUDENT IN AN ORGANIZED HEALTH CARE EDUCATION/TRAINING PROGRAM

## 2024-06-05 PROCEDURE — 250N000013 HC RX MED GY IP 250 OP 250 PS 637: Performed by: STUDENT IN AN ORGANIZED HEALTH CARE EDUCATION/TRAINING PROGRAM

## 2024-06-05 RX ORDER — OXYCODONE HYDROCHLORIDE 5 MG/1
5 TABLET ORAL ONCE
Status: COMPLETED | OUTPATIENT
Start: 2024-06-05 | End: 2024-06-05

## 2024-06-05 RX ORDER — ONDANSETRON 4 MG/1
4 TABLET, ORALLY DISINTEGRATING ORAL ONCE
Status: COMPLETED | OUTPATIENT
Start: 2024-06-05 | End: 2024-06-05

## 2024-06-05 RX ORDER — LORAZEPAM 1 MG/1
1 TABLET ORAL ONCE
Status: COMPLETED | OUTPATIENT
Start: 2024-06-05 | End: 2024-06-05

## 2024-06-05 RX ADMIN — OXYCODONE HYDROCHLORIDE 5 MG: 5 TABLET ORAL at 14:03

## 2024-06-05 RX ADMIN — ONDANSETRON 4 MG: 4 TABLET, ORALLY DISINTEGRATING ORAL at 14:32

## 2024-06-05 RX ADMIN — LORAZEPAM 1 MG: 1 TABLET ORAL at 15:26

## 2024-06-05 ASSESSMENT — COLUMBIA-SUICIDE SEVERITY RATING SCALE - C-SSRS
1. IN THE PAST MONTH, HAVE YOU WISHED YOU WERE DEAD OR WISHED YOU COULD GO TO SLEEP AND NOT WAKE UP?: NO
6. HAVE YOU EVER DONE ANYTHING, STARTED TO DO ANYTHING, OR PREPARED TO DO ANYTHING TO END YOUR LIFE?: NO
2. HAVE YOU ACTUALLY HAD ANY THOUGHTS OF KILLING YOURSELF IN THE PAST MONTH?: NO

## 2024-06-05 ASSESSMENT — ACTIVITIES OF DAILY LIVING (ADL)
ADLS_ACUITY_SCORE: 35
ADLS_ACUITY_SCORE: 33

## 2024-06-05 NOTE — ED TRIAGE NOTES
Arrives ambulatory with lower back pain and generalized weakness. She reports she has a known herniated discs L4-L5 and sacral.     Triage Assessment (Adult)       Row Name 06/05/24 1321          Triage Assessment    Airway WDL WDL        Respiratory WDL    Respiratory WDL WDL        Skin Circulation/Temperature WDL    Skin Circulation/Temperature WDL WDL        Cardiac WDL    Cardiac WDL WDL        Peripheral/Neurovascular WDL    Peripheral Neurovascular WDL WDL        Cognitive/Neuro/Behavioral WDL    Cognitive/Neuro/Behavioral WDL WDL

## 2024-06-05 NOTE — DISCHARGE INSTRUCTIONS
Here in the emergency room you had reassuring labs, reassuring MRI, and a reassuring ultrasound  At this point I am not sure why you are having the symptoms of new weakness and bladder issues, but as we discussed if anything gets worse please return immediately to the emergency room otherwise please follow-up with urology as well as neurology as scheduled  I have made you both primary care and a neurology visit as an outpatient  If you have any new or worsening symptoms please return immediately to the emergency room

## 2024-06-05 NOTE — CONSULTS
"[facility]  Neurology Consultation    Patient Name:  Ilsa Yusuf  MRN:  [id number]    :  1960  Date of Service:  2024  Primary care provider:  Catrachita Nicolas      Neurology consultation service was asked to see Ilsa Yusuf by Dr. Jerome to evaluate numbness.    Chief Complaint: Numbness and weakness    History of Present Illness:   Ilsa Yusuf is a 63 year old female with history of chronic neck and back pain with known stable L4-L5 herniation and mild cervical canal stenosis presenting with worsening low back pain, numbness, paresthesia, and subjective weakness and urinary retention.  She was seen by NSGY, who recommended an L-spine MRI, which was grossly stable.  They did not offer operative intervention, but recommended she be seen by neurology at some point for further evaluation of her numbness, prompting our consultation.      On discussion with Ms Yusuf, she notes that a few weeks ago she was moving boxes and felt \"some tingles\" in her bilateral feet.  Over the past 2 days, her numbness and paresthesias have progressed rapidly, and she now involving much of her bilateral legs, especially her medial thighs.  She spontaneously volunteers that she developed \"saddle anesthesia.\"  Of note, she is reportedly on a first date with her longtime neighbor and friend (present at bedside).    Otherwise, she has been in her usual state of health: Denies recent illness, unusual exposures, changes in medications, new or progressed neck or back pain over the last several weeks (although she does express that her neck pain has been bothersome for years, and she was disappointed in the results of her prior facet nerve radioablation).  Over the course of her time in the ED, symptoms have remained stable vs slightly improved.  She is reportedly on a first date with her neighbor.      On exam, she endorses dullness to pinprick in " in a patchy distribution without clear respect for peripheral nerve or dermatomal distributions (b/l anterior thighs, lateral and medial thighs, dorsum of foot, anterior shin).  She has some giveaway weakness, but otherwise motor exam is normal.  Rectal tone grossly preserved per Dr Jerome.  She is mildly hyperreflexic her L-patella, with very subtle adductor spreading, but is otherwise 2+ at biceps, brachioradialis, achilles, toes down-going.  Pinprick exam of her back does NOT reveal a sensory level.  She is able to feel pinprick strongly in at least some of her distal toes and all of her fingers.      ROS  A comprehensive ROS was performed and pertinent findings were included in HPI.     PMH  Past Medical History:   Diagnosis Date    Arthritis 2012    both knees; hands    Cervical high risk HPV (human papillomavirus) test positive 03/19/2019    see problem list    Depressive disorder     Depressive disorder, not elsewhere classified 08/28/12    DC 10/05/12-[facility]    Hyperlipidemia LDL goal < 130     mevacor    Hypothyroid     Moderate major depression (H)     abilify, cymbalta, seroquel, and nuvigibrigida, Dr Antonio Perales    Mumps     ABDOUL (obstructive sleep apnea)     PTSD (post-traumatic stress disorder)     Seizure (H) 03/2011    one episode, was on Keppra, EEG negative     Past Surgical History:   Procedure Laterality Date    ABDOMEN SURGERY      BLADDER SURGERY      CHOLECYSTECTOMY      COLONOSCOPY  2012    CYSTOSCOPY      CYSTOSCOPY, BIOPSY BLADDER, COMBINED N/A 11/1/2022    Procedure: EXAM UNDER ANESTHESIA, CYSTOSCOPY;  Surgeon: Terrie Melton MD;  Location: [facility] OR    ORTHOPEDIC SURGERY  left knee    renal artery surgery  child    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    TONSILLECTOMY      ureteral reimplantation      ZZC PARTIAL EXCISION THYROID,UNILAT  1991    rt, negative path then, treated with synthroid.       Medications   I have personally reviewed the patient's medication list.      Allergies  I have personally reviewed the patient's allergy list.       Physical Examination   Vitals: /67   Pulse 88   Resp 18   LMP 08/08/2016   SpO2 98%   General: Lying in bed, NAD  Head: NC/AT  Eyes: no icterus, op pink and moist  Cardiac: RRR. Extremities warm, no edema.   Respiratory: non-labored on RA  GI: S/NT/ND  Skin: No rash or lesion on exposed skin  Psych: Mood pleasant, affect congruent  Neuro:  Mental status: Awake, alert, attentive, oriented to self, time, place, and circumstance. Language is fluent and coherent with intact comprehension of complex commands, naming and repetition.  Cranial nerves: VFF, PERRL, conjugate gaze, EOMI, facial sensation intact, face symmetric, shoulder shrug strong, tongue/uvula midline, no dysarthria.   Motor: Normal bulk and tone. No abnormal movements. 5/5 strength bilaterally in deltoids, biceps, triceps, hand , hip flexors, hip extensors, knee flexion, knee extension, plantarflexion, dorsiflexion.   Reflexes: Mildly brisk at L-patella with subtle adductor spreading, otherwise normo-reflexic and symmetric biceps, brachioradialis, patellae, and achilles. Negative Bernstein, no clonus, toes down-going.  Sensory: Patchy sensory loss to pinprick and light touch most prominently in her anterior and medial thigh, anterior shin, lateral calf, anterior forearms.  Pinprick preserved in her distal toes and fingertips.  No sensory level on pinprick examination of her back  Coordination: FNF without ataxia or dysmetria.   Gait: Slightly broad based, but otherwise stable, steady casual gait.    Investigations   I have personally reviewed pertinent labs, tests, and radiological imaging. Discussion of notable findings is included under Impression.     Was patient transferred from outside hospital?   No    MR L-spine 6/5/24  IMPRESSION:  1. Slight progression of multilevel lumbar spondylosis since 5/2/2018,  although no change in L5-S1 left subarticular disc  protrusion with  abutment of the traversing left S1 nerve roots and bilateral L5  foraminal nerve root impingement.  2. Progressive active inflammatory arthropathy of the left L4-5 facet  joint.  3. Normal variant transitional lumbosacral anatomy.    MR C-spine 3/26/2019  CONCLUSION:  1.  Good anatomic alignment and vertebral body heights are maintained.      2.  No abnormal signal noted within cervical spinal cord.     3.  At the C3-C4 disc space level there is mild left neural foraminal narrowing.      4.  At the C5-C6 disc space level there is mild canal compromise and moderate left greater than right neural foraminal narrowing.    MR T-spine 2017  Impression: Normal thoracic spine MRI.     Impression  63F h/o chronic neck and low back pain presenting with complaint of saddle and lower-extremity anesthesia, paresthesia, and subjective weakness and urinary retention.      Considered GBS, spinal cord compressive pathologies (e.g. malignancy, hematoma) which both could lead to similar acute presentations.  However, patchy sensory loss w/o respect for dermatomal or peripheral nerve distributions, giveaway weakness, preserved rectal tone, and grossly normal reflex exam all strongly argue against these possibilities.  Her neck and back pain have not progressed in concert with her symptoms.  L-spine MRI reassuring against cauda equina.      Etiology of her complaint unclear.  However, given subjective stability / slight improvement in the ED, without objective findings raising concern for a pernicious cause for her sensory changes (and absence of objective weakness), it appears safe to discharge her from the ED with plan for outpatient follow-up and strict return precautions.      Recommendations  -Outpatient follow-up with neurology and neurosurgery   >Consider EMG if symptoms persist  -Strict return precautions    Thank you for involving Neurology in the care of Ilsa PANCHO ThurmanMadelin.  Please do not hesitate to call  with questions/concerns (consult pager [phone]).      Patient was discussed with Dr. Padilla.    Mason Haynes MD

## 2024-06-05 NOTE — ED PROVIDER NOTES
"    [facility] ([facility])    6/05/24       ED PROVIDER NOTE    History     Chief Complaint   Patient presents with    Back Pain     HPI  Ilsa Yusuf is a 63 year old female with history of stable L4-L5 lumbosacral herniation, CKD III, and urinary retention who presents to the ED with back pain. She reports she recently moved. Over the past couple days she has had worsening back pain. She endorses numbness in her lower back and legs, stating her legs \"feel like jelly.\" This worsened yesterday afternoon. She has had difficulty walking today because of the pain and numbness. She also reports urinary retention since yesterday. No episodes of incontinence. No fevers. She endorses nausea, but no vomiting. She also reports abdominal bloating anytime she eats new in the past couple months. She states she feels like she is carrying extra weight in her abdomen. No history of back surgery. No implants. She last ate this morning.    Past Medical History  Past Medical History:   Diagnosis Date    Arthritis 2012    both knees; hands    Cervical high risk HPV (human papillomavirus) test positive 03/19/2019    see problem list    Depressive disorder     Depressive disorder, not elsewhere classified 08/28/12    DC 10/05/12-[facility]    Hyperlipidemia LDL goal < 130     mevacor    Hypothyroid     Moderate major depression (H)     abilify, cymbalta, seroquel, and sofya, Dr Antonio Yoon     ABDOUL (obstructive sleep apnea)     PTSD (post-traumatic stress disorder)     Seizure (H) 03/2011    one episode, was on Keppra, EEG negative     Past Surgical History:   Procedure Laterality Date    ABDOMEN SURGERY      BLADDER SURGERY      CHOLECYSTECTOMY      COLONOSCOPY  2012    CYSTOSCOPY      CYSTOSCOPY, BIOPSY BLADDER, COMBINED N/A 11/1/2022    Procedure: EXAM UNDER ANESTHESIA, CYSTOSCOPY;  Surgeon: Terrie Melton MD;  Location: [facility] OR    ORTHOPEDIC SURGERY  left knee    renal " "artery surgery  child    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    TONSILLECTOMY      ureteral reimplantation      ZZC PARTIAL EXCISION THYROID,UNILAT      rt, negative path then, treated with synthroid.     albuterol (PROAIR HFA/PROVENTIL HFA/VENTOLIN HFA) 108 (90 Base) MCG/ACT inhaler  azelastine (ASTELIN) 0.1 % nasal spray  buPROPion (WELLBUTRIN SR) 200 MG 12 hr tablet  busPIRone (BUSPAR) 10 MG tablet  cholecalciferol (VITAMIN D3) 5000 UNITS CAPS capsule  diazepam (VALIUM) 10 MG tablet  dicyclomine (BENTYL) 10 MG capsule  escitalopram (LEXAPRO) 20 MG tablet  hydrOXYzine HCl (ATARAX) 25 MG tablet  levothyroxine (SYNTHROID/LEVOTHROID) 175 MCG tablet  lovastatin (MEVACOR) 20 MG tablet  multivitamin w/minerals (THERA-VIT-M) tablet  naloxone (NARCAN) 4 MG/0.1ML nasal spray  Omega-3 Fatty Acids (OMEGA 3 PO)  omeprazole (PRILOSEC) 40 MG DR capsule  ondansetron (ZOFRAN) 4 MG tablet  oxyCODONE IR (ROXICODONE) 10 MG tablet  prazosin (MINIPRESS) 5 MG capsule  tiZANidine (ZANAFLEX) 4 MG tablet  topiramate (TOPAMAX) 25 MG tablet  UNABLE TO FIND      Allergies   Allergen Reactions    Gabapentin Other (See Comments)     Patient states she gets \"horrific nightmares\" with this medication    Dilaudid [Hydromorphone Hcl]      Made her feel like her skin was crawling    Nsaids Other (See Comments)     Kidney failure    Compazine [Prochlorperazine] Other (See Comments)     \"Makes my skin crawl, I was going nuts.\"     Family History  Family History   Problem Relation Age of Onset    Alzheimer Disease Mother         total care now    Depression Mother     Anxiety Disorder Mother     C.A.D. Father 58         at 58, heart disease    Mental Illness Father     Coronary Artery Disease Father     Hyperlipidemia Father     Diverticulitis Father     Diabetes Sister         adult onset    Melanoma Sister     Breast Cancer Sister     Thyroid Disease Sister     Diabetes Maternal Grandmother     Anesthesia Reaction No family hx of     " Bleeding Disorder No family hx of     Venous thrombosis No family hx of      Social History   Social History     Tobacco Use    Smoking status: Former     Current packs/day: 0.00     Types: Cigarettes     Quit date: 2015     Years since quittin.3     Passive exposure: Past    Smokeless tobacco: Former    Tobacco comments:     Quit 5 - 10 years ago    Vaping Use    Vaping status: Never Used   Substance Use Topics    Alcohol use: Not Currently     Alcohol/week: 0.0 standard drinks of alcohol    Drug use: Yes     Types: Marijuana     Comment: medical marijuana      Past medical history, past surgical history, medications, allergies, family history, and social history were reviewed with the patient. No additional pertinent items.     A medically appropriate review of systems was performed with pertinent positives and negatives noted in the HPI, and all other systems negative.    Physical Exam   BP: (!) 162/73  Pulse: 78  Resp: 20  SpO2: 99 %  Physical Exam  Vitals and nursing note reviewed.   Constitutional:       General: She is not in acute distress.     Appearance: Normal appearance. She is not diaphoretic.   HENT:      Head: Atraumatic.      Mouth/Throat:      Mouth: Mucous membranes are moist.   Eyes:      General: No scleral icterus.     Conjunctiva/sclera: Conjunctivae normal.   Cardiovascular:      Rate and Rhythm: Normal rate.      Heart sounds: Normal heart sounds.   Pulmonary:      Effort: No respiratory distress.      Breath sounds: Normal breath sounds.   Abdominal:      General: Abdomen is flat.   Genitourinary:     Comments: Rectal exam done with nurse chaperone in room, has tone, possibly diminished, has sensation  Musculoskeletal:      Cervical back: Neck supple.      Comments: Midline lumbar spinal tenderness palpation   Skin:     General: Skin is warm.      Findings: No rash.   Neurological:      Mental Status: She is alert.      Comments: Fluctuating strength in bilateral lower extremities,  intermittently between 3 and 5, reporting decreased sensation to bilateral lower extremities           ED Course, Procedures, & Data     ED Course as of 06/05/24 1814   Wed Jun 05, 2024   1742 Spoke with neurosurgery who said that patient does not have cauda equina, recommend both neurology and urology consult     Procedures             Results for orders placed or performed during the hospital encounter of 06/05/24   MR Lumbar Spine w/o Contrast     Status: None    Narrative    EXAM: MR LUMBAR SPINE W/O CONTRAST  6/5/2024 4:39 PM     HISTORY: r/o cauda equina; Low back pain; Neurologic deficit,  non-traumatic; Bladder/bowel dysfunction; New bowel/bladder  dysfunction; No known/automatically detected potential  contraindications to imaging       COMPARISON: 5/2/2018 MRI    TECHNIQUE: Sagittal T1-weighted, sagittal STIR, sagittal  diffusion-weighted (with ADC map), axial T1-weighted, 3D volumetric  axial and sagittal reconstructed T2-weighted images of the lumbar  spine were obtained without intravenous contrast.    CONTRAST: None.    FINDINGS:  There are 5 lumbar type vertebrae with transitional lumbosacral  segment designated as S1 as on prior evaluation. Conus tip at upper  L2. Normal lumbar vertebral alignment. Mild loss of intervertebral  disc height at L1-2 and L5-S1, slightly progressed at L5-S1 since  2018. No loss of vertebral body height. Persistent opposing endplate  signal changes at L5-S1 with persistent but decreased edema since  2018. Marrow signal is otherwise normal.    On a level by level basis, the findings are as follows:    L1-2: Mild circumferential disc bulge. Mild facet arthropathy. No  spinal canal or neuroforaminal stenosis.    L2-3: Mild posterior disc bulge. Mild bilateral facet arthropathy.  Mild spinal canal narrowing. No neural foraminal stenosis.    L3-4: Mild posterior disc bulge. Left greater than right facet  arthropathy. Mild left neural foraminal narrowing. No right  neural  foraminal stenosis. Mild spinal canal stenosis.    L4-5: Mild posterior disc bulge. Bilateral facet arthropathy. Mild  bilateral neuroforaminal stenosis, left greater than right. Mild  spinal canal stenosis. Increased osseous and periarticular edema  associated with the left L4-5 facet joint.    L5-S1: Circumferential disc osteophyte complex with superimposed left  subarticular disc protrusion abutting the traversing S1 nerve roots,  similar to 2018. Left greater than right facet hypertrophy. Mild to  moderate bilateral neural foraminal stenosis. Bilateral foraminal L5  nerve root impingement, left greater than right, also similar to 2018.  Mild spinal canal stenosis.    Diffuse mild atrophy of the erector spinae musculature, similar to  2018.       Impression    IMPRESSION:  1. Slight progression of multilevel lumbar spondylosis since 5/2/2018,  although no change in L5-S1 left subarticular disc protrusion with  abutment of the traversing left S1 nerve roots and bilateral L5  foraminal nerve root impingement.  2. Progressive active inflammatory arthropathy of the left L4-5 facet  joint.  3. Normal variant transitional lumbosacral anatomy.    I have personally reviewed the examination and initial interpretation  and I agree with the findings.    SHANT WALDEN MD         SYSTEM ID:  [id number]   INR     Status: Normal   Result Value Ref Range    INR 1.03 0.85 - 1.15   Comprehensive metabolic panel     Status: Abnormal   Result Value Ref Range    Sodium 141 135 - 145 mmol/L    Potassium 4.7 3.4 - 5.3 mmol/L    Carbon Dioxide (CO2) 28 22 - 29 mmol/L    Anion Gap 8 7 - 15 mmol/L    Urea Nitrogen 18.9 8.0 - 23.0 mg/dL    Creatinine 1.10 (H) 0.51 - 0.95 mg/dL    GFR Estimate 55 (L) >60 mL/min/1.73m2    Calcium 10.1 8.8 - 10.2 mg/dL    Chloride 105 98 - 107 mmol/L    Glucose 56 (L) 70 - 99 mg/dL    Alkaline Phosphatase 107 40 - 150 U/L    AST 21 0 - 45 U/L    ALT 17 0 - 50 U/L    Protein Total 7.8 6.4 - 8.3 g/dL     Albumin 4.7 3.5 - 5.2 g/dL    Bilirubin Total 0.4 <=1.2 mg/dL   CBC with platelets and differential     Status: Abnormal   Result Value Ref Range    WBC Count 10.3 4.0 - 11.0 10e3/uL    RBC Count 5.36 (H) 3.80 - 5.20 10e6/uL    Hemoglobin 15.9 (H) 11.7 - 15.7 g/dL    Hematocrit 47.5 (H) 35.0 - 47.0 %    MCV 89 78 - 100 fL    MCH 29.7 26.5 - 33.0 pg    MCHC 33.5 31.5 - 36.5 g/dL    RDW 12.8 10.0 - 15.0 %    Platelet Count 217 150 - 450 10e3/uL    % Neutrophils 55 %    % Lymphocytes 36 %    % Monocytes 7 %    % Eosinophils 1 %    % Basophils 1 %    % Immature Granulocytes 0 %    NRBCs per 100 WBC 0 <1 /100    Absolute Neutrophils 5.7 1.6 - 8.3 10e3/uL    Absolute Lymphocytes 3.7 0.8 - 5.3 10e3/uL    Absolute Monocytes 0.7 0.0 - 1.3 10e3/uL    Absolute Eosinophils 0.1 0.0 - 0.7 10e3/uL    Absolute Basophils 0.1 0.0 - 0.2 10e3/uL    Absolute Immature Granulocytes 0.0 <=0.4 10e3/uL    Absolute NRBCs 0.0 10e3/uL   CBC with platelets differential     Status: Abnormal    Narrative    The following orders were created for panel order CBC with platelets differential.  Procedure                               Abnormality         Status                     ---------                               -----------         ------                     CBC with platelets and d...[[id number]]  Abnormal            Final result                 Please view results for these tests on the individual orders.     Medications   LORazepam (ATIVAN) tablet 1 mg (1 mg Oral $Given 6/5/24 1526)   oxyCODONE (ROXICODONE) tablet 5 mg (5 mg Oral $Given 6/5/24 1403)   ondansetron (ZOFRAN ODT) ODT tab 4 mg (4 mg Oral $Given 6/5/24 1432)     Labs Ordered and Resulted from Time of ED Arrival to Time of ED Departure   COMPREHENSIVE METABOLIC PANEL - Abnormal       Result Value    Sodium 141      Potassium 4.7      Carbon Dioxide (CO2) 28      Anion Gap 8      Urea Nitrogen 18.9      Creatinine 1.10 (*)     GFR Estimate 55 (*)     Calcium 10.1      Chloride 105       Glucose 56 (*)     Alkaline Phosphatase 107      AST 21      ALT 17      Protein Total 7.8      Albumin 4.7      Bilirubin Total 0.4     CBC WITH PLATELETS AND DIFFERENTIAL - Abnormal    WBC Count 10.3      RBC Count 5.36 (*)     Hemoglobin 15.9 (*)     Hematocrit 47.5 (*)     MCV 89      MCH 29.7      MCHC 33.5      RDW 12.8      Platelet Count 217      % Neutrophils 55      % Lymphocytes 36      % Monocytes 7      % Eosinophils 1      % Basophils 1      % Immature Granulocytes 0      NRBCs per 100 WBC 0      Absolute Neutrophils 5.7      Absolute Lymphocytes 3.7      Absolute Monocytes 0.7      Absolute Eosinophils 0.1      Absolute Basophils 0.1      Absolute Immature Granulocytes 0.0      Absolute NRBCs 0.0     INR - Normal    INR 1.03     ROUTINE UA WITH MICROSCOPIC REFLEX TO CULTURE     MR Lumbar Spine w/o Contrast   Final Result   IMPRESSION:   1. Slight progression of multilevel lumbar spondylosis since 5/2/2018,   although no change in L5-S1 left subarticular disc protrusion with   abutment of the traversing left S1 nerve roots and bilateral L5   foraminal nerve root impingement.   2. Progressive active inflammatory arthropathy of the left L4-5 facet   joint.   3. Normal variant transitional lumbosacral anatomy.      I have personally reviewed the examination and initial interpretation   and I agree with the findings.      SHANT WALDEN MD            SYSTEM ID:  [id number]      POC US ABDOMEN LIMITED    (Results Pending)          Critical care was not performed.     Medical Decision Making  The patient's presentation was of high complexity (an acute health issue posing potential threat to life or bodily function).    The patient's evaluation involved:  ordering and/or review of 3+ test(s) in this encounter (see separate area of note for details)  discussion of management or test interpretation with another health professional (see separate area of note for details)    The patient's management  necessitated high risk (a parenteral controlled substance).    Assessment & Plan    Here in the emergency room labs significant for mild thrombocytosis and NIGEL, otherwise reassuring.  MRI reassuring as well, and point-of-care ultrasound just shows approximately 1 L of fluid in bladder, but patient has not used the bathroom this morning, so not true postvoid residual  Spoke with neurosurgery who said from a neurosurgical standpoint, patient is okay to discharge home, they do recommend emergent neurology as well as urology consults  Here in the emergency room consult urology and they said if patient is able to urinate she can go home and follow-up with her primary care doctor, when given the option for Moraes catheter or urination, patient was able to urinate within 5 minutes, and said she will follow-up with her own urologist as an outpatient  Spoke with neurology who came to bedside and saw the patient and said patient was okay to go home  Spoke with neurology again who is asked for 1 more postvoid residual  Did a postvoid residual which showed a bladder volume of 50 cc which patient said is below her baseline and that she has had this issue persistently which is why she sees urology as an outpatient  Therefore will discharge patient home with strict return precautions, instructions to follow-up with both primary care doctors and neurology as an outpatient as well as her own urologist  And strict return precaution to come back to the emergency room for new or worsening symptoms.  I have reviewed the nursing notes. I have reviewed the findings, diagnosis, plan and need for follow up with the patient.    New Prescriptions    No medications on file       Final diagnoses:   None   I, Xavier Garcia, am serving as a trained medical scribe to document services personally performed by Fransisco Jerome MD based on the provider's statements to me on June 5, 2024.  This document has been checked and approved by the attending  provider.    I, Fransisco Jerome MD, was physically present and have reviewed and verified the accuracy of this note documented by Xavier Garcia, medical scribe.      Fransisco Jerome MD  formerly [facility] EMERGENCY DEPARTMENT  6/5/2024     Fransisco Jerome MD  06/05/24 1918

## 2024-06-05 NOTE — CONSULTS
"[facility]       NEUROSURGERY CONSULTATION NOTE    This consultation was requested by Dr. Jerome from the Emergency service.      Reason for Consultation  Concern for cauda equina     HPI:  Ilsa Yusuf is a 63 year old female with history of stable L4-L5 disc herniation, CKD III, urinary retention, depression, and hypothyroidism who presents with lower back pain and numbness extending to her bilateral lower extremities since yesterday. Patient states she has had low back pain for several years and began experiencing numbness over the sacrum 3 months ago. However, yesterday she started having new numbness in her groin area and medial legs that she feels is worsening. She also describes a \"static\" sensation in both legs and endorses difficulty urinating since yesterday afternoon as well (worse from her baseline). States she feels like she needs to void but has been unable. She cannot recall any specific inciting event, recent falls or trauma. No history of back surgery.  Does not take AC/AP medications.       PAST MEDICAL HISTORY:   Past Medical History:   Diagnosis Date    Arthritis 2012    both knees; hands    Cervical high risk HPV (human papillomavirus) test positive 03/19/2019    see problem list    Depressive disorder     Depressive disorder, not elsewhere classified 08/28/12    DC 10/05/12-[facility]    Hyperlipidemia LDL goal < 130     mevacor    Hypothyroid     Moderate major depression (H)     abilify, cymbalta, seroquel, and nuvigibrigida, Dr Antonio Yoon     ABDOUL (obstructive sleep apnea)     PTSD (post-traumatic stress disorder)     Seizure (H) 03/2011    one episode, was on Keppra, EEG negative       PAST SURGICAL HISTORY:   Past Surgical History:   Procedure Laterality Date    ABDOMEN SURGERY      BLADDER SURGERY      CHOLECYSTECTOMY      COLONOSCOPY  2012    CYSTOSCOPY      CYSTOSCOPY, BIOPSY BLADDER, COMBINED N/A 11/1/2022    Procedure: " EXAM UNDER ANESTHESIA, CYSTOSCOPY;  Surgeon: Terrie Melton MD;  Location: [facility] OR    ORTHOPEDIC SURGERY  left knee    renal artery surgery  child    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    TONSILLECTOMY      ureteral reimplantation      ZZC PARTIAL EXCISION THYROID,UNILAT      rt, negative path then, treated with synthroid.       FAMILY HISTORY:   Family History   Problem Relation Age of Onset    Alzheimer Disease Mother         total care now    Depression Mother     Anxiety Disorder Mother     C.A.D. Father 58         at 58, heart disease    Mental Illness Father     Coronary Artery Disease Father     Hyperlipidemia Father     Diverticulitis Father     Diabetes Sister         adult onset    Melanoma Sister     Breast Cancer Sister     Thyroid Disease Sister     Diabetes Maternal Grandmother     Anesthesia Reaction No family hx of     Bleeding Disorder No family hx of     Venous thrombosis No family hx of        SOCIAL HISTORY:   Social History     Tobacco Use    Smoking status: Former     Current packs/day: 0.00     Types: Cigarettes     Quit date: 2015     Years since quittin.3     Passive exposure: Past    Smokeless tobacco: Former    Tobacco comments:     Quit 5 - 10 years ago    Substance Use Topics    Alcohol use: Not Currently     Alcohol/week: 0.0 standard drinks of alcohol       MEDICATIONS:  Current Outpatient Medications   Medication Sig Dispense Refill    albuterol (PROAIR HFA/PROVENTIL HFA/VENTOLIN HFA) 108 (90 Base) MCG/ACT inhaler Inhale 1-2 puffs into the lungs every 4 hours as needed for shortness of breath / dyspnea or wheezing 18 g 0    azelastine (ASTELIN) 0.1 % nasal spray Spray 1 spray into both nostrils 2 times daily 30 mL 1    buPROPion (WELLBUTRIN SR) 200 MG 12 hr tablet TAKE 1 TABLET BY MOUTH EVERY DAY 90 tablet 0    busPIRone (BUSPAR) 10 MG tablet TAKE 2 TABLETS (20 MG) BY MOUTH 3 TIMES DAILY 540 tablet 1    cholecalciferol (VITAMIN D3) 5000 UNITS CAPS  capsule Take 1 capsule (5,000 Units) by mouth daily Take 1 per day (Patient taking differently: Take 5,000 Units by mouth at bedtime Take 1 per day) 100 capsule 2    diazepam (VALIUM) 10 MG tablet VAGINAL VALIUM SUPPOSITORY 10MG AT NIGHT AND PRIOR TO THERAPY AS NEEDED 30 tablet 3    dicyclomine (BENTYL) 10 MG capsule TAKE 1 CAPSULE BY MOUTH 4 TIMES A DAY AS NEEDED (ABDOMINAL PAIN OR CRAMPS)      escitalopram (LEXAPRO) 20 MG tablet TAKE 1 TABLET BY MOUTH EVERY DAY 90 tablet 0    hydrOXYzine HCl (ATARAX) 25 MG tablet Take 1-2 tablets (25-50 mg) by mouth 3 times daily as needed for anxiety TAKE 1 TO 2 TABLETS (25-50 MG) BY MOUTH 3 TIMES A DAY AS NEEDED FOR ANXIETY 540 tablet 0    levothyroxine (SYNTHROID/LEVOTHROID) 175 MCG tablet TAKE 1 TABLET BY MOUTH DAILY FOR 5 DAYS PER WEEK AND 1/2 TABLET ONE DAY PER WEEK 90 tablet 3    lovastatin (MEVACOR) 20 MG tablet Take 1 tablet (20 mg) by mouth at bedtime Due for office visit prior to further refill 30 tablet 0    multivitamin w/minerals (THERA-VIT-M) tablet Take 1 tablet by mouth daily (with lunch)      naloxone (NARCAN) 4 MG/0.1ML nasal spray Spray 1 spray (4 mg) into one nostril alternating nostrils as needed for opioid reversal every 2-3 minutes until assistance arrives 0.2 mL 3    Omega-3 Fatty Acids (OMEGA 3 PO) Take 2 g by mouth 2 times daily Takes 1 in am and 1 at bedtime      omeprazole (PRILOSEC) 40 MG DR capsule TAKE 1 CAPSULE (40 MG) BY MOUTH DAILY DUE FOR CLINIC VISIT PRIOR TO FURTHER REFILL 30 capsule 0    ondansetron (ZOFRAN) 4 MG tablet TAKE 1 TABLET (4MG) BY MOUTH EVERY 8HRS AS NEEDED FOR NAUSEA OR VOMITING      oxyCODONE IR (ROXICODONE) 10 MG tablet as needed      prazosin (MINIPRESS) 5 MG capsule TAKE 1 CAPSULE(5 MG) BY MOUTH AT BEDTIME 90 capsule 1    tiZANidine (ZANAFLEX) 4 MG tablet Take 1-3 tablets (4-12 mg) by mouth 3 times daily as needed for muscle spasms 210 tablet 3    topiramate (TOPAMAX) 25 MG tablet Take 25 mg by mouth 2 times daily      UNABLE  "TO FIND MEDICATION NAME: medical cannibus         Allergies:  Allergies   Allergen Reactions    Gabapentin Other (See Comments)     Patient states she gets \"horrific nightmares\" with this medication    Dilaudid [Hydromorphone Hcl]      Made her feel like her skin was crawling    Nsaids Other (See Comments)     Kidney failure    Compazine [Prochlorperazine] Other (See Comments)     \"Makes my skin crawl, I was going nuts.\"       ROS: 10 point ROS of systems including Constitutional, Eyes, Respiratory, Cardiovascular, Gastroenterology, Genitourinary, Integumentary, Muscularskeletal, Psychiatric were all negative except for pertinent positives noted in my HPI.    Physical exam:   Blood pressure (!) 162/73, pulse 78, resp. rate 20, last menstrual period 08/08/2016, SpO2 99%, not currently breastfeeding.  General: awake and alert  HEENT: NC/AT. Sclerae non-icteric.  PULM: breathing comfortably on room air  MSK: No gross deformities     NEUROLOGIC:  -- Awake; Alert; oriented x 3  -- Follows commands briskly  -- Speech fluent, spontaneous. No aphasia or dysarthria.  -- Full strength throughout. Symmetrically diminished sensation to light touch in b/l lower extremities    Motor:  Normal bulk / tone; no tremor, rigidity, or bradykinesia.  No muscle wasting or fasciculations       Delt Bi Tri Hand Flexion/  Extension Iliopsoas Quadriceps Hamstrings Tibialis Anterior Gastroc    C5 C6 C7 C8/T1 L2 L3 L4-S1 L4 S1   R 5 5 5 5 5 5 5 5 5   L 5 5 5 5 5 5 5 5 5     Sensory:  Symmetrically diminished sensation to light touch in b/l lower extremites. SILT b/l UEs.      Reflexes: no clonus        Gerardo Pat Ach     UMN L2-4 S1   R Norm 3+ 2+   L Norm 2+ 2+          IMAGING:    MRI lumbar spine personally reviewed:   No evidence of compression to the thecal sac, disc degeneration at L5-S1 with lateral recess narrowing on the left at L5-S1 displacing the left S1 nerve root.  No pathology concerning for cauda equina syndrome (KRISTAN).       LABS: "   Last Comprehensive Metabolic Panel:  Lab Results   Component Value Date     06/05/2024    POTASSIUM 4.7 06/05/2024    CHLORIDE 105 06/05/2024    CO2 28 06/05/2024    ANIONGAP 8 06/05/2024    GLC 56 (L) 06/05/2024    BUN 18.9 06/05/2024    CR 1.10 (H) 06/05/2024    GFRESTIMATED 55 (L) 06/05/2024    MICHAEL 10.1 06/05/2024         Lab Results   Component Value Date    WBC 10.3 06/05/2024    WBC 8.7 01/04/2021     Lab Results   Component Value Date    RBC 5.36 06/05/2024    RBC 5.62 01/04/2021     Lab Results   Component Value Date    HGB 15.9 06/05/2024    HGB 16.4 01/04/2021     Lab Results   Component Value Date    HCT 47.5 06/05/2024    HCT 50.5 01/04/2021     Lab Results   Component Value Date    MCV 89 06/05/2024    MCV 90 01/04/2021     Lab Results   Component Value Date    MCH 29.7 06/05/2024    MCH 29.2 01/04/2021     Lab Results   Component Value Date    MCHC 33.5 06/05/2024    MCHC 32.5 01/04/2021     Lab Results   Component Value Date    RDW 12.8 06/05/2024    RDW 13.0 01/04/2021     Lab Results   Component Value Date     06/05/2024     01/04/2021     INR   Date Value Ref Range Status   06/05/2024 1.03 0.85 - 1.15 Final   09/03/2017 0.92 0.86 - 1.14 Final       ASSESSMENT:  62 YO F with known history or urinary retention, L4-5 disc herniation with lower back pain and lower extremity numbness x 1 day; no evidence of KRISTAN or structural cause of symptoms on MRI    RECOMMENDATIONS  No neurosurgical intervention indicated at this time   MRI without evidence of spinal cord compression; Stable appearing L4/L5 disc herniation from prior imaging   Neurology consult for numbness  Urology consult for urinary retention   Neurosurgery will sign off at this time    Breanne Sales, MS-4    ALONDRA Stewart, CNP  Department of Neurosurgery  Pager: [phone]    The patient was discussed with Dr. Scar Carvalho, neurosurgery chief resident, and Dr. Osito Stahl, neurosurgery staff.

## 2024-06-06 ENCOUNTER — TRANSFERRED RECORDS (OUTPATIENT)
Dept: HEALTH INFORMATION MANAGEMENT | Facility: CLINIC | Age: 64
End: 2024-06-06
Payer: MEDICARE

## 2024-06-06 ENCOUNTER — MEDICAL CORRESPONDENCE (OUTPATIENT)
Dept: HEALTH INFORMATION MANAGEMENT | Facility: CLINIC | Age: 64
End: 2024-06-06
Payer: MEDICARE

## 2024-06-10 ENCOUNTER — VIRTUAL VISIT (OUTPATIENT)
Dept: PSYCHIATRY | Facility: CLINIC | Age: 64
End: 2024-06-10
Payer: MEDICARE

## 2024-06-10 ENCOUNTER — TRANSCRIBE ORDERS (OUTPATIENT)
Dept: OTHER | Age: 64
End: 2024-06-10

## 2024-06-10 DIAGNOSIS — G89.29 CHRONIC LOW BACK PAIN: ICD-10-CM

## 2024-06-10 DIAGNOSIS — M54.50 CHRONIC LOW BACK PAIN: ICD-10-CM

## 2024-06-10 DIAGNOSIS — F51.01 PRIMARY INSOMNIA: ICD-10-CM

## 2024-06-10 DIAGNOSIS — F33.1 MAJOR DEPRESSIVE DISORDER, RECURRENT EPISODE, MODERATE (H): ICD-10-CM

## 2024-06-10 DIAGNOSIS — F43.10 PTSD (POST-TRAUMATIC STRESS DISORDER): Primary | ICD-10-CM

## 2024-06-10 DIAGNOSIS — K21.9 GASTROESOPHAGEAL REFLUX DISEASE WITHOUT ESOPHAGITIS: ICD-10-CM

## 2024-06-10 DIAGNOSIS — M54.2 NECK PAIN: Primary | ICD-10-CM

## 2024-06-10 DIAGNOSIS — F41.1 GAD (GENERALIZED ANXIETY DISORDER): ICD-10-CM

## 2024-06-10 PROCEDURE — 99214 OFFICE O/P EST MOD 30 MIN: CPT | Mod: 95 | Performed by: NURSE PRACTITIONER

## 2024-06-10 RX ORDER — HYDROXYZINE HYDROCHLORIDE 25 MG/1
25-50 TABLET, FILM COATED ORAL 3 TIMES DAILY PRN
Qty: 540 TABLET | Refills: 0 | Status: CANCELLED | OUTPATIENT
Start: 2024-06-10

## 2024-06-10 RX ORDER — BUPROPION HYDROCHLORIDE 200 MG/1
200 TABLET, EXTENDED RELEASE ORAL DAILY
Qty: 90 TABLET | Refills: 0 | Status: CANCELLED | OUTPATIENT
Start: 2024-06-10

## 2024-06-10 RX ORDER — BUSPIRONE HYDROCHLORIDE 10 MG/1
20 TABLET ORAL 3 TIMES DAILY
Qty: 540 TABLET | Refills: 1 | Status: CANCELLED | OUTPATIENT
Start: 2024-06-10

## 2024-06-10 ASSESSMENT — PATIENT HEALTH QUESTIONNAIRE - PHQ9
10. IF YOU CHECKED OFF ANY PROBLEMS, HOW DIFFICULT HAVE THESE PROBLEMS MADE IT FOR YOU TO DO YOUR WORK, TAKE CARE OF THINGS AT HOME, OR GET ALONG WITH OTHER PEOPLE: NOT DIFFICULT AT ALL
SUM OF ALL RESPONSES TO PHQ QUESTIONS 1-9: 1
SUM OF ALL RESPONSES TO PHQ QUESTIONS 1-9: 1

## 2024-06-10 ASSESSMENT — PAIN SCALES - GENERAL: PAINLEVEL: MODERATE PAIN (4)

## 2024-06-10 NOTE — PROGRESS NOTES
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 activities recommended and reviewed today. All questions addressed. Education and counseling completed regarding risks and benefits of medications and psychotherapy options.  Consent provided by patient/guardian  Call the psychiatric nurse line with medication questions or concerns at [phone].  [employer] may be used to communicate with your provider, but this is not intended to be used for emergencies.  BLACK BOX WARNING: Discussed the Food and Drug Administration (FDA) requires that all antidepressants carry a warning that some children, adolescents and young adults may be at increased risk of suicide when taking antidepressants. Anyone taking an antidepressant should be watched closely for worsening depression or unusual behavior especially in the first few weeks after starting an SSRI. Keep in mind, antidepressants are more likely to reduce suicide risk in the long run by improving mood.   SEROTONIN SYNDROME:  Discussed risks of Serotonin syndrome (ie, serotonin toxicity) which is a potentially life-threatening condition associated with increased serotonergic activity in the central nervous system (CNS). It is seen with therapeutic medication  use, inadvertent interactions between drugs, and intentional self-poisoning. Serotonin syndrome may involve a spectrum of clinical findings, which often include mental status changes, autonomic hyperactivity, and neuromuscular abnormalities.    BENZODIAZEPINE:  discussion on how benzos work and the need to use them short term due to potential of anxiety getting.  This is a controlled substance with risk for abuse, need to keep in a safe keep place and cannot replace lost scripts.    HARM REDUCTION:  Discussions regarding effects of mood altering substances, alcohol and cannabis, on mood and that approach is harm reduction, will continue to prescribe meds as they work to cut back use.    FIRST GENERATION ANTIPSYCHOTIC/ SECOND GENERATION ANTIPSYCHOTIC USE:  Atypical need for cardiometabolic monitoring with medication- B/P, weight, blood sugar, cholesterol.  Need to monitor for abnormal movements taught  SAFETY:  We all care about your loved one's safety. To reduce the risk of self-harm, remove access to all:  Firearms, Medicines (both prescribed and over-the-counter), Knives and other sharp objects, Ropes and like materials, and Alcohol  SLEEP HYGIENE: establish a sleep routine, limit screen time 1 hour prior to bed, use bed for sleep only, take sleep/medications on time (including sleepy time tea, trazadone or herbal treatments such as melatonin), aroma therapy, limit caffeine/sugar, yoga, guided imagery, stretch, meditation, limit naps to 20 minutes, make a temperature change in the room, white noise, be mindful of slowing down breathing, take a warm bath/shower, frequently wash sheets, and journaling.   Medlineplus.gov is information for patients.  It is run by the National Library of Medicine and it contains information about all disorders, diseases and all medications.      COMMUNITY RESOURCES:    CRISIS NUMBERS: Provided in AVS 6/21/2023  National Suicide Prevention Lifeline: 3-799-976-TALK  (256.309.1791)  VMO Systems/resources for a list of additional resources (SOS)            [facility] - [phone]   Urgent Care Adult Mental Nkeusr-[phone] mobile unit/  crisis line  [facility] -[phone]   COPE  [place] Mobile Team -[phone] (adults)/ [phone] (child)  Poison Control Center - 1-662.762.6892    OR  go to nearest ER  Crisis Text Line for any crisis  send this-   To: 281462   [facility] ([facility]) [facility]  [phone]  National Suicide Prevention Lifeline: 903.939.3570 (TTY: 233.488.4222). Call anytime for help.  (www.suicidepreventionlifeline.org)  National [place] on Mental Illness (www.ellie.org): 720.245.4953 or 277-980-4582.   Mental Health Association (www.mentalhealth.org): 419.558.4104 or 638-428-2477.  [place] Crisis Text Line: Text [place] to 238444  Suicide LifeLine Chat: [employer].org/chat    ADMINISTRATIVE BILLIN min spent interviewing patient, reviewing referral documents, obtaining and reviewing outside records, communication with other health specialists, and preparing this report on today's date: 6/10/2024  Video/Phone Start Time: 1138  Video/Phone End Time:  1156      Signed:     Ezekiel Cheng, MSN, APRN, PMHNP-BC     Chart documentation done in part with Dragon Voice Recognition software.  Although reviewed after completion, some word and grammatical errors may remain.  Answers for HPI/ROS submitted by the patient on 2022  If you checked off any problems, how difficult have these problems made it for you to do your work, take care of things at home, or get along with other people?: Somewhat difficult  PHQ9 TOTAL SCORE: 7  DONELL 7 TOTAL SCORE: 13    Answers for HPI/ROS submitted by the patient on 2/3/2023  If you checked off any problems, how difficult have these problems made it for you to do your work, take care of things at home, or get along with other people?: Very  difficult  PHQ9 TOTAL SCORE: 3  DONELL 7 TOTAL SCORE: 6  Answers for HPI/ROS submitted by the patient on 4/14/2023  If you checked off any problems, how difficult have these problems made it for you to do your work, take care of things at home, or get along with other people?: Somewhat difficult  PHQ9 TOTAL SCORE: 5  DONELL 7 TOTAL SCORE: 8    Answers for HPI/ROS submitted by the patient on 5/25/2023  If you checked off any problems, how difficult have these problems made it for you to do your work, take care of things at home, or get along with other people?: Extremely difficult  PHQ9 TOTAL SCORE: 12  DONELL 7 TOTAL SCORE: 9Answers submitted by the patient for this visit:  Patient Health Questionnaire (Submitted on 9/19/2023)  If you checked off any problems, how difficult have these problems made it for you to do your work, take care of things at home, or get along with other people?: Somewhat difficult  PHQ9 TOTAL SCORE: 5  DONELL-7 (Submitted on 9/19/2023)  DONELL 7 TOTAL SCORE: 14    Answers submitted by the patient for this visit:  Patient Health Questionnaire (Submitted on 1/17/2024)  If you checked off any problems, how difficult have these problems made it for you to do your work, take care of things at home, or get along with other people?: Extremely difficult  PHQ9 TOTAL SCORE: 8  DONELL-7 (Submitted on 1/17/2024)  DONELL 7 TOTAL SCORE: 7

## 2024-06-10 NOTE — NURSING NOTE
Is the patient currently in the state of [place]? YES    Visit mode:VIDEO    If the visit is dropped, the patient can be reconnected by: VIDEO VISIT: Send to e-mail at: [email or web address]    Will anyone else be joining the visit? NO  (If patient encounters technical issues they should call [phone] :[phone])    How would you like to obtain your AVS? MyChart    Are changes needed to the allergy or medication list? Pt stated no changes to allergies and Pt stated no med changes    Are refills needed on medications prescribed by this physician? YES    Reason for visit: JALEN WATSON

## 2024-06-10 NOTE — PATIENT INSTRUCTIONS
"Patient Education   The [facility]  What to Expect  Here's what to expect in the Panel Psychiatry Program.   About the program  You'll be meeting with a psychiatric doctor to check your mental health. A psychiatric doctor helps you deal with troubling thoughts and feelings by giving you medicine. They'll make sure you know the plan for your care. You may see them for a long time. When you're feeling better, they may refer you back to seeing your family doctor.   If you have any questions, we'll be glad to talk to you.  About visits  Be open  At your visits, please talk openly about your problems. It may feel hard, but it's the best way for us to help you.  Cancelling visits  If you can't come to your visit, please call us right away at [phone]. If you don't cancel at least 24 hours (1 full day) before your visit, that's \"late cancellation.\"  Not showing up for your visits  Being very late is the same as not showing up. You'll be a \"no show\" if:  You're more than 15 minutes late for a 30-minute (half hour) visit.  You're more than 30 minutes late for a 60-minute (full hour) visit.  If you cancel late or don't show up 2 times within 6 months, we may end your care.  Getting help between visits  If you need help between visits, you can call us Monday to Friday from 8 a.m. to 4:30 p.m. at [phone].  Emergency care  Call 911 or go to the nearest emergency department if your life or someone else's life is in danger.  Call 988 anytime to reach the national Suicide and Crisis hotline.  Medicine refills  To refill your medicine, call your pharmacy. You can also call [facility]'s Behavioral Access at [phone], Monday to Friday, 8 a.m. to 4:30 p.m. It can take 1 to 3 business days to get a refill.   Forms, letters, and tests  You may have papers to fill out, like FMLA, short-term disability, and workability. We can help you with these forms at your visits, but you must have an " appointment. You may need more than 1 visit for this, to be in an intensive therapy program, or both.  Before we can give you medicine for ADHD, we may refer you to get tested for it or confirm it another way.  We may not be able to give you an emotional support animal letter.  We don't do mental health checks ordered by the court.   We don't do mental health testing, but we can refer you to get tested.   Thank you for choosing us for your care.  For informational purposes only. Not to replace the advice of your health care provider. Copyright   2022 [facility]. All rights reserved. [employer] 532174 - 12/22.     PLAN AND RECOMMENDATIONS:  Continue  Wellbutrin  mg  daily -   Continue Lexapro 20 mg every day  Continue hydroxyzine 25 - 50 mg instead of 3 times daily as needed for anxiety   Continue to take prazosin 5 mg at bedtime  Continue Buspar 20 mg three times daily for anxiety       We have discussed his/her diagnosis, prognosis, differential diagnosis and side effects and benefits of medications.     Patient and I revieweddiagnosis and treatment plan and patient agrees with following recommendations:    1.Patient will take the medications as prescribed. Patient will not stop taking medications or adjust them without consulting with theprovider.  2.Patient will call with any problems between 2 visits.  3.Patient will go to the emergency room if not feeling safe , unable to function in the community, or if suicidal, homicidal or hearing voices orhaving paranoia.  4.Patient will abstain from drugs and alcohol.  5.Patient will not drive if sedated on medications or under influence of any substance. 6.Patient will not mix psychiatric medications with drugs andalcohol.   7.Patient will watch his diet and exercise.  8.Patient will see non psychiatric providers for non psychiatric disorders.

## 2024-06-10 NOTE — PROGRESS NOTES
Virtual Visit Details    Type of service:  Video Visit   Video Start Time:  1138  Video End Time: 1156    Originating Location (pt. Location): Home    Distant Location (provider location):  On-site  Platform used for Video Visit: [email or web address]  Answers submitted by the patient for this visit:  Patient Health Questionnaire (Submitted on 6/10/2024)  If you checked off any problems, how difficult have these problems made it for you to do your work, take care of things at home, or get along with other people?: Not difficult at all  PHQ9 TOTAL SCORE: 1

## 2024-06-11 ENCOUNTER — PATIENT OUTREACH (OUTPATIENT)
Dept: CARE COORDINATION | Facility: CLINIC | Age: 64
End: 2024-06-11
Payer: MEDICARE

## 2024-06-11 RX ORDER — OMEPRAZOLE 40 MG/1
40 CAPSULE, DELAYED RELEASE ORAL DAILY
Qty: 15 CAPSULE | Refills: 0 | Status: SHIPPED | OUTPATIENT
Start: 2024-06-11

## 2024-06-26 ENCOUNTER — TELEPHONE (OUTPATIENT)
Dept: BEHAVIORAL HEALTH | Facility: CLINIC | Age: 64
End: 2024-06-26
Payer: MEDICARE

## 2024-06-26 NOTE — TELEPHONE ENCOUNTER
"Pt is a(n) adult (18+ out of HS) Seeking as eval for Adult Mental Health DA for Programmatic Care - Program Preference? Yes: 55+ IOP Program .  Appointment scheduled by:  Patient.  (self-pay - complete Cost Estimate)  Caller name:  Ilsa Yusuf    Caller phone #: [phone]   Legal Guardianship Reviewed?  No  Honoring Choices Notified?  No  Brief reason for appt:       needed?  NO    Contact information verified/updated: Yes    If appt is for adult [facility] program location, confirm you have verified the location and address with the patient referring to the template header.  N/A    Sherley Vines    \"We have scheduled your evaluation. In the event that your insurance coverage comes back as out of network, you may receive a call to cancel your appointment and direct you to your insurance company for in-network coverage.\"    Disclaimer regarding insurance read to patient?  Yes   "

## 2024-07-02 ENCOUNTER — TRANSFERRED RECORDS (OUTPATIENT)
Dept: HEALTH INFORMATION MANAGEMENT | Facility: CLINIC | Age: 64
End: 2024-07-02
Payer: MEDICARE

## 2024-07-08 ENCOUNTER — TELEPHONE (OUTPATIENT)
Dept: BEHAVIORAL HEALTH | Facility: CLINIC | Age: 64
End: 2024-07-08

## 2024-07-08 ENCOUNTER — VIRTUAL VISIT (OUTPATIENT)
Dept: BEHAVIORAL HEALTH | Facility: CLINIC | Age: 64
End: 2024-07-08
Payer: MEDICARE

## 2024-07-08 DIAGNOSIS — F41.1 GAD (GENERALIZED ANXIETY DISORDER): Primary | ICD-10-CM

## 2024-07-08 DIAGNOSIS — F33.9 MAJOR DEPRESSIVE DISORDER, RECURRENT EPISODE WITH ANXIOUS DISTRESS (H): ICD-10-CM

## 2024-07-08 PROCEDURE — 99207 PR NO CHARGE LOS: CPT | Mod: 95 | Performed by: SOCIAL WORKER

## 2024-07-08 ASSESSMENT — ANXIETY QUESTIONNAIRES
5. BEING SO RESTLESS THAT IT IS HARD TO SIT STILL: NOT AT ALL
7. FEELING AFRAID AS IF SOMETHING AWFUL MIGHT HAPPEN: SEVERAL DAYS
IF YOU CHECKED OFF ANY PROBLEMS ON THIS QUESTIONNAIRE, HOW DIFFICULT HAVE THESE PROBLEMS MADE IT FOR YOU TO DO YOUR WORK, TAKE CARE OF THINGS AT HOME, OR GET ALONG WITH OTHER PEOPLE: VERY DIFFICULT
2. NOT BEING ABLE TO STOP OR CONTROL WORRYING: SEVERAL DAYS
3. WORRYING TOO MUCH ABOUT DIFFERENT THINGS: SEVERAL DAYS
1. FEELING NERVOUS, ANXIOUS, OR ON EDGE: SEVERAL DAYS
6. BECOMING EASILY ANNOYED OR IRRITABLE: NOT AT ALL
GAD7 TOTAL SCORE: 5
4. TROUBLE RELAXING: SEVERAL DAYS

## 2024-07-08 ASSESSMENT — PATIENT HEALTH QUESTIONNAIRE - PHQ9: SUM OF ALL RESPONSES TO PHQ QUESTIONS 1-9: 9

## 2024-07-08 NOTE — TELEPHONE ENCOUNTER
Summary of Patient Care Communication Handoff to Patient Navigator Coordinator    PATIENT'S NAME: Ilsa Yusuf  MRN:   [id number]  :   1960    DATE OF SERVICE: 24    Referral Needed: Yes    Is the patient coming from an inpatient unit? No    What program is this referral for? Adult Mental Health Referral    Level of Care Recommended:  Combined Intensive Outpatient Day Treatment Program (IOP-ADT) / Adult Day Treatment Program (ADT)    Specialty Track Recommendations:  MH Specialty Tracks: 55 Plus    Schedule Preferences: Schedule Preference: Afternoons Preferred    Are there any potential barriers for entrance into programmatic care? No  Followed up from  Needed?:  No    Mental Health Referral Needed: No    Release of Information Needed:  ADILENE Needed: Individual or Family Therapist:   [facility] [place], [place]    Faxing Needed: Yes:     Follow up Requests:  Patient Navigator Coordinator Follow-Up Needed: Referral to designated [place] Program.    Comments:  Ready to go to 55+ when there is availability.  preferences [facility]  afternoon program.     Martha Li [facility]      Patient Navigator Coordinator Contact Information  Pool Message: [email or web address] ([phone])   Phone:  [phone]  Fax:  [phone]  Email:  [email or web address]

## 2024-07-08 NOTE — PROGRESS NOTES
"[facility]         PATIENT'S NAME: Ilsa Yusuf  PREFERRED NAME: Deanne  PRONOUNS: She she    MRN: [id number]  : 1960  ADDRESS: [address]  [address]  [facility] [address]  [facility] NUM[facility]:  [id number]  DATE OF SERVICE: 24  START TIME: 1130  END TIME: 1230  PREFERRED PHONE: [phone]  May we leave a program related message: Yes  EMERGENCY CONTACT: was not obtained .  SERVICE MODALITY:  Video Visit:      Provider verified identity through the following two step process.  Patient provided:  Patient is known previously to provider    Telemedicine Visit: The patient's condition can be safely assessed and treated via synchronous audio and visual telemedicine encounter.      Reason for Telemedicine Visit: Patient has requested telehealth visit    Originating Site (Patient Location): Patient's home    Distant Site (Provider Location): Provider Remote Setting- Home Office    Consent:  The patient/guardian has verbally consented to: the potential risks and benefits of telemedicine (video visit) versus in person care; bill my insurance or make self-payment for services provided; and responsibility for payment of non-covered services.     Patient would like the video invitation sent by:  Send to e-mail at: [email or web address]    Mode of Communication:  Video Conference via Amwell    Distant Location (Provider):  Off-site    As the provider I attest to compliance with applicable laws and regulations related to telemedicine.    UNIVERSAL ADULT Mental Health DIAGNOSTIC ASSESSMENT    Identifying Information:  Patient is a 63 year old,   individual.  Patient was referred for an assessment by self and current Therapist .  Patient attended the session alone.    Chief Complaint:   The reason for seeking services at this time is: \" I need more structure and consistency to help improve my mood  seeing a therapist once a week is not enough. \".  The problem(s) began  years " ago, there has been some improvement and then recently have worsened.    Patient has attempted to resolve these concerns in the past through  Day treatment in  in [place], [place] DBT in 2016  and currently, individual therapy 1 time per week since 2024 through [facility] with Brett Atkinson .    Social/Family History:  Patient reported they grew up in [place], [place].  They were raised by biological parents.  Parents are .  Patient  but not certain as to how to define her child upbringing.  Patient described their current relationships with family of origin as good.   Patient has 1 older sibling and 1 younger sibling.    in .  Two daughters  ages 34 and 31 yr.  who reside in the area.   Has one grandson 1 yr. Old.       The patient describes their cultural background as .  Cultural influences and impact on patient's life structure, values, norms, and healthcare: Northern . Contextual influences on patient's health include:   Western medicineContextual Factors: Societal Factors  concerns regarding the spread of COVID .    These factors will be addressed in the Preliminary Treatment plan. Patient identified their preferred language to be  English. Patient reported they does not need the assistance of an  or other support involved in therapy.     Patient reported had no significant delays in developmental tasks.   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  Patient identified the following learning problems: none reported.  Modifications will not be used to assist communication in therapy. Patient reports they are  able to understand written materials.    Patient's current relationship status is single since .   Patient identified their sexual orientation as  heterosexual.  Patient reported having 2 child(mikey). Patient identified them as part of their support system.   Patient identified the quality of these relationships as good.      Patient's current living/housing situation involves alone.  The immediate members of family and household include self and they report that housing is stable.    Patient is currently single.  Patient reports their finances are obtained through . Patient identify finances as a current stressor.      Patient reported that they  been involved with the legal system. Patient report being under  probation/ parole/ jurisdiction. They are not under any current court jurisdiction. .    Patient's Strengths and Limitations:  Patient identified the following strengths or resources that will help them succeed in treatment: insight. Things that may interfere with the patient's success in treatment include:   limited social life .     Assessments:  The following assessments were completed by patient for this visit:  PHQ9:       9/19/2023     1:27 PM 10/9/2023    12:27 PM 11/10/2023     9:58 AM 11/20/2023    10:53 AM 1/17/2024     1:21 PM 6/10/2024    11:35 AM 7/8/2024    11:00 AM   PHQ-9 SCORE   PHQ-9 Total Score MyChart 5 (Mild depression) 9 (Mild depression) 9 (Mild depression) 6 (Mild depression) 8 (Mild depression) 1 (Minimal depression)    PHQ-9 Total Score 5 9 9 6 8 1 9     GAD7:       4/14/2023     1:21 PM 5/25/2023    11:28 AM 8/16/2023     1:29 PM 9/19/2023     1:28 PM 10/9/2023    12:28 PM 11/10/2023     9:59 AM 1/17/2024     1:23 PM   DONELL-7 SCORE   Total Score 8 (mild anxiety) 9 (mild anxiety) 7 (mild anxiety) 14 (moderate anxiety) 11 (moderate anxiety) 8 (mild anxiety) 7 (mild anxiety)   Total Score 8 9 7 14 11 8    8 7     CAGE-AID:       10/6/2022     9:06 AM 7/8/2024    11:00 AM   CAGE-AID Total Score   Total Score 0 0   Total Score MyChart 0 (A total score of 2 or greater is considered clinically significant)      PROMIS 10-Global Health (all questions and answers displayed):       10/6/2022     9:05 AM 4/14/2023     1:27 PM 8/16/2023     1:27 PM  10/9/2023    12:29 PM 1/17/2024     1:27 PM 7/8/2024    11:00 AM   PROMIS 10   In general, would you say your health is: Fair Good Fair Very good Good    In general, would you say your quality of life is: Fair Good Fair Fair Fair    In general, how would you rate your physical health? Good Good Fair Good Good    In general, how would you rate your mental health, including your mood and your ability to think? Poor Fair Good Fair Fair    In general, how would you rate your satisfaction with your social activities and relationships? Poor Good Good Good Poor    In general, please rate how well you carry out your usual social activities and roles Fair Poor Good Very good Poor    To what extent are you able to carry out your everyday physical activities such as walking, climbing stairs, carrying groceries, or moving a chair? Moderately A little Mostly Moderately Moderately    In the past 7 days, how often have you been bothered by emotional problems such as feeling anxious, depressed, or irritable? Often Sometimes Often Often Often    In the past 7 days, how would you rate your fatigue on average? Mild Moderate Moderate Moderate Moderate    In the past 7 days, how would you rate your pain on average, where 0 means no pain, and 10 means worst imaginable pain? 7 7 5 7 4    In general, would you say your health is: 2 3 2 4 3 3   In general, would you say your quality of life is: 2 3 2 2 2 3   In general, how would you rate your physical health? 3 3 2 3 3 3   In general, how would you rate your mental health, including your mood and your ability to think? 1 2 3 2 2 2   In general, how would you rate your satisfaction with your social activities and relationships? 1 3 3 3 1 3   In general, please rate how well you carry out your usual social activities and roles. (This includes activities at home, at work and in your community, and responsibilities as a parent, child, spouse, employee, friend, etc.) 2 1 3 4 1 2   To what  extent are you able to carry out your everyday physical activities such as walking, climbing stairs, carrying groceries, or moving a chair? 3 2 4 3 3 4   In the past 7 days, how often have you been bothered by emotional problems such as feeling anxious, depressed, or irritable? 4 3 4 4 4 5   In the past 7 days, how would you rate your fatigue on average? 2 3 3 3 3 5   In the past 7 days, how would you rate your pain on average, where 0 means no pain, and 10 means worst imaginable pain? 7 7 5 7 4 7   Global Mental Health Score 6 11 10 9 7 9   Global Physical Health Score 12 10 12 11 12 10   PROMIS TOTAL - SUBSCORES 18 21 22 20 19 19     [place] Suicide Severity Rating Scale (Lifetime/Recent)      10/6/2022     2:00 PM 6/5/2024     1:23 PM 7/8/2024    12:00 PM   [place] Suicide Severity Rating (Lifetime/Recent)   Q1 Wished to be Dead (Past Month)  0-->no 0-->no   Q2 Suicidal Thoughts (Past Month)  0-->no 0-->no   Q6 Suicide Behavior (Lifetime)  0-->no 0-->no   Level of Risk per Screen  no risks indicated no risks indicated   Q1 Wish to be Dead (Lifetime) Y     1. Wish to be Dead (Past 1 Month) Y     Calculated C-SSRS Risk Score (Lifetime/Recent) Low Risk         Personal and Family Medical History:  Patient   report a family history of mental health concerns.  Patient reports family history includes Alzheimer Disease in her mother; Anxiety Disorder in her mother; Breast Cancer in her sister; C.A.D. (age of onset: 58) in her father; Coronary Artery Disease in her father; Depression in her mother; Diabetes in her maternal grandmother and sister; Diverticulitis in her father; Hyperlipidemia in her father; Melanoma in her sister; Mental Illness in her father; Thyroid Disease in her sister..     Patient does report Mental Health Diagnosis and/or Treatment.  Patient reported the following previous diagnoses which include(s):   Anxiety, depression, PTSD  Years ago.  Patient has received mental health services in the past:   2013 in Day treatment in [place], [place].    DBT group in 2016. Currently being seen by a   Brett Atkinson (Therapist) [facility].  [place], [place].  Being  seen weekly since February 2024.  Psychiatric Hospitalizations:  none   Patient denies a history of civil commitment.      Currently, patient    is receiving other mental health services.  These include psychotherapy with  Brett Atkinson psychologist through [place], [place] .       Patient has had a physical exam to rule out medical causes for current symptoms.  Date of last physical exam was within the past year. Symptoms have developed since last physical exam and client was encouraged to follow up with PCP.  . The patient has a non-[place] Primary Care Provider. Their PCP is  Catrachita Foote MD.  Patient reports no current medical concerns.  Patient reports pain concerns including  back.  Patient  is currently addressing this with pain clinic want help addressing pain concerns.   There are not significant appetite / nutritional concerns / weight changes.   Patient does not report a history of head injury / trauma / cognitive impairment.      Patient reports current meds as:   Current Outpatient Medications   Medication Sig Dispense Refill    albuterol (PROAIR HFA/PROVENTIL HFA/VENTOLIN HFA) 108 (90 Base) MCG/ACT inhaler Inhale 1-2 puffs into the lungs every 4 hours as needed for shortness of breath / dyspnea or wheezing 18 g 0    azelastine (ASTELIN) 0.1 % nasal spray Spray 1 spray into both nostrils 2 times daily 30 mL 1    buPROPion (WELLBUTRIN SR) 200 MG 12 hr tablet TAKE 1 TABLET BY MOUTH EVERY DAY 90 tablet 0    busPIRone (BUSPAR) 10 MG tablet TAKE 2 TABLETS (20 MG) BY MOUTH 3 TIMES DAILY 540 tablet 1    cholecalciferol (VITAMIN D3) 5000 UNITS CAPS capsule Take 1 capsule (5,000 Units) by mouth daily Take 1 per day (Patient taking differently: Take 5,000 Units by mouth at bedtime Take 1 per day) 100 capsule  2    diazepam (VALIUM) 10 MG tablet VAGINAL VALIUM SUPPOSITORY 10MG AT NIGHT AND PRIOR TO THERAPY AS NEEDED 30 tablet 3    dicyclomine (BENTYL) 10 MG capsule TAKE 1 CAPSULE BY MOUTH 4 TIMES A DAY AS NEEDED (ABDOMINAL PAIN OR CRAMPS)      escitalopram (LEXAPRO) 20 MG tablet TAKE 1 TABLET BY MOUTH EVERY DAY 90 tablet 0    hydrOXYzine HCl (ATARAX) 25 MG tablet Take 1-2 tablets (25-50 mg) by mouth 3 times daily as needed for anxiety TAKE 1 TO 2 TABLETS (25-50 MG) BY MOUTH 3 TIMES A DAY AS NEEDED FOR ANXIETY 540 tablet 0    levothyroxine (SYNTHROID/LEVOTHROID) 175 MCG tablet TAKE 1 TABLET BY MOUTH DAILY FOR 5 DAYS PER WEEK AND 1/2 TABLET ONE DAY PER WEEK 90 tablet 3    lovastatin (MEVACOR) 20 MG tablet Take 1 tablet (20 mg) by mouth at bedtime Due for office visit prior to further refill 30 tablet 0    multivitamin w/minerals (THERA-VIT-M) tablet Take 1 tablet by mouth daily (with lunch)      naloxone (NARCAN) 4 MG/0.1ML nasal spray Spray 1 spray (4 mg) into one nostril alternating nostrils as needed for opioid reversal every 2-3 minutes until assistance arrives 0.2 mL 3    Omega-3 Fatty Acids (OMEGA 3 PO) Take 2 g by mouth 2 times daily Takes 1 in am and 1 at bedtime      omeprazole (PRILOSEC) 40 MG DR capsule TAKE 1 CAPSULE (40 MG) BY MOUTH DAILY DUE FOR CLINIC VISIT PRIOR TO FURTHER REFILL 15 capsule 0    ondansetron (ZOFRAN) 4 MG tablet TAKE 1 TABLET (4MG) BY MOUTH EVERY 8HRS AS NEEDED FOR NAUSEA OR VOMITING      oxyCODONE IR (ROXICODONE) 10 MG tablet as needed      prazosin (MINIPRESS) 5 MG capsule TAKE 1 CAPSULE(5 MG) BY MOUTH AT BEDTIME 90 capsule 1    tiZANidine (ZANAFLEX) 4 MG tablet Take 1-3 tablets (4-12 mg) by mouth 3 times daily as needed for muscle spasms 210 tablet 3    topiramate (TOPAMAX) 25 MG tablet Take 25 mg by mouth 2 times daily      UNABLE TO FIND MEDICATION NAME: medical cannibus       No current facility-administered medications for this visit.     Current Outpatient Medications   Medication Sig  Dispense Refill    albuterol (PROAIR HFA/PROVENTIL HFA/VENTOLIN HFA) 108 (90 Base) MCG/ACT inhaler Inhale 1-2 puffs into the lungs every 4 hours as needed for shortness of breath / dyspnea or wheezing 18 g 0    azelastine (ASTELIN) 0.1 % nasal spray Spray 1 spray into both nostrils 2 times daily 30 mL 1    buPROPion (WELLBUTRIN SR) 200 MG 12 hr tablet TAKE 1 TABLET BY MOUTH EVERY DAY 90 tablet 0    busPIRone (BUSPAR) 10 MG tablet TAKE 2 TABLETS (20 MG) BY MOUTH 3 TIMES DAILY 540 tablet 1    cholecalciferol (VITAMIN D3) 5000 UNITS CAPS capsule Take 1 capsule (5,000 Units) by mouth daily Take 1 per day (Patient taking differently: Take 5,000 Units by mouth at bedtime Take 1 per day) 100 capsule 2    diazepam (VALIUM) 10 MG tablet VAGINAL VALIUM SUPPOSITORY 10MG AT NIGHT AND PRIOR TO THERAPY AS NEEDED 30 tablet 3    dicyclomine (BENTYL) 10 MG capsule TAKE 1 CAPSULE BY MOUTH 4 TIMES A DAY AS NEEDED (ABDOMINAL PAIN OR CRAMPS)      escitalopram (LEXAPRO) 20 MG tablet TAKE 1 TABLET BY MOUTH EVERY DAY 90 tablet 0    hydrOXYzine HCl (ATARAX) 25 MG tablet Take 1-2 tablets (25-50 mg) by mouth 3 times daily as needed for anxiety TAKE 1 TO 2 TABLETS (25-50 MG) BY MOUTH 3 TIMES A DAY AS NEEDED FOR ANXIETY 540 tablet 0    levothyroxine (SYNTHROID/LEVOTHROID) 175 MCG tablet TAKE 1 TABLET BY MOUTH DAILY FOR 5 DAYS PER WEEK AND 1/2 TABLET ONE DAY PER WEEK 90 tablet 3    lovastatin (MEVACOR) 20 MG tablet Take 1 tablet (20 mg) by mouth at bedtime Due for office visit prior to further refill 30 tablet 0    multivitamin w/minerals (THERA-VIT-M) tablet Take 1 tablet by mouth daily (with lunch)      naloxone (NARCAN) 4 MG/0.1ML nasal spray Spray 1 spray (4 mg) into one nostril alternating nostrils as needed for opioid reversal every 2-3 minutes until assistance arrives 0.2 mL 3    Omega-3 Fatty Acids (OMEGA 3 PO) Take 2 g by mouth 2 times daily Takes 1 in am and 1 at bedtime      omeprazole (PRILOSEC) 40 MG DR capsule TAKE 1 CAPSULE (40  "MG) BY MOUTH DAILY DUE FOR CLINIC VISIT PRIOR TO FURTHER REFILL 15 capsule 0    ondansetron (ZOFRAN) 4 MG tablet TAKE 1 TABLET (4MG) BY MOUTH EVERY 8HRS AS NEEDED FOR NAUSEA OR VOMITING      oxyCODONE IR (ROXICODONE) 10 MG tablet as needed      prazosin (MINIPRESS) 5 MG capsule TAKE 1 CAPSULE(5 MG) BY MOUTH AT BEDTIME 90 capsule 1    tiZANidine (ZANAFLEX) 4 MG tablet Take 1-3 tablets (4-12 mg) by mouth 3 times daily as needed for muscle spasms 210 tablet 3    topiramate (TOPAMAX) 25 MG tablet Take 25 mg by mouth 2 times daily      UNABLE TO FIND MEDICATION NAME: medical cannibus       No current facility-administered medications for this visit.       Medication Adherence:  Patient reports  .  taking prescribed medications as prescribed.    Patient Allergies:    Allergies   Allergen Reactions    Gabapentin Other (See Comments)     Patient states she gets \"horrific nightmares\" with this medication    Dilaudid [Hydromorphone Hcl]      Made her feel like her skin was crawling    Nsaids Other (See Comments)     Kidney failure    Compazine [Prochlorperazine] Other (See Comments)     \"Makes my skin crawl, I was going nuts.\"       Medical History:    Past Medical History:   Diagnosis Date    Arthritis 2012    both knees; hands    Cervical high risk HPV (human papillomavirus) test positive 03/19/2019    see problem list    Depressive disorder     Depressive disorder, not elsewhere classified 08/28/12    DC 10/05/12-[facility]    Hyperlipidemia LDL goal < 130     mevacor    Hypothyroid     Moderate major depression (H)     abilify, cymbalta, seroquel, and sofya, Dr Antonio Perales    [place]     ABDOUL (obstructive sleep apnea)     PTSD (post-traumatic stress disorder)     Seizure (H) 03/2011    one episode, was on Keppra, EEG negative         Current Mental Status Exam:   Appearance:  Appropriate    Eye Contact:  Good   Psychomotor:  Normal       Gait / station:  no problem  Attitude / Demeanor: Cooperative   Speech      Rate " / Production: Normal/ Responsive      Volume:  Normal  volume      Language:  intact  Mood:   Sad   Affect:   Blunted    Thought Content: Clear   Thought Process: Coherent       Associations: No loosening of associations  Insight:   Good   Judgment:  Intact   Orientation:  All  Attention/concentration: Good    Substance Use:   Patient did report a family history of substance use concerns; see medical history section for details.  Patient has not received chemical dependency treatment in the past.  Patient has not ever been to detox.      Patient is not currently receiving any chemical dependency treatment.           Substance History of use Age of first use Date of last use     Pattern and duration of use (include amounts and frequency)   Alcohol  none       REPORTS SUBSTANCE USE: N/A   Cannabis    none     REPORTS SUBSTANCE USE: N/A     Amphetamines    none     REPORTS SUBSTANCE USE: N/A   Cocaine/crack    none       REPORTS SUBSTANCE USE: N/A   Hallucinogens   none        REPORTS SUBSTANCE USE: N/A   Inhalants none         REPORTS SUBSTANCE USE: N/A   Heroin none         REPORTS SUBSTANCE USE: N/A   Other Opiates Prescribed for pain      REPORTS SUBSTANCE USE: N/A   Benzodiazepine    none     REPORTS SUBSTANCE USE: N/A   Barbiturates  none     REPORTS SUBSTANCE USE: N/A   Over the counter meds none     REPORTS SUBSTANCE USE: N/A   Caffeine none     REPORTS SUBSTANCE USE: N/A   Nicotine  none     REPORTS SUBSTANCE USE: N/A   Other substances not listed above:  Identify:   none     REPORTS SUBSTANCE USE: N/A     Patient reported the following problems as a result of their substance use: None indicated.    Substance Use: No symptoms    Based on the negative CAGE score and clinical interview there  are not indications of drug or alcohol abuse.    Significant Losses / Trauma / Abuse / Neglect Issues:   Patient has not serve in the .  There are indications or report of significant loss, trauma, abuse or neglect  issues related to: death of Parents .  Concerns for possible neglect are not present.     Safety Assessment:   Patient denies current homicidal ideation and behaviors.  Patient denies current self-injurious ideation and behaviors.    Patient denied risk behaviors associated with substance use.   Patient denies any high risk behaviors associated with mental health symptoms.  Patient reports the following current concerns for their personal safety: None.  Patient reports there are no firearms in the house.     History of Safety Concerns:  Patient denied a history of homicidal ideation.     Patient denied a history of personal safety concerns.    Patient denied a history of assaultive behaviors.    Patient denied a history of sexual assault behaviors.     Patient denied a history of risk behaviors associated with substance use.  Patient denies any history of high risk behaviors associated with mental health symptoms.  Patient reports the following protective factors:      Risk Plan:  See Recommendations for Safety and Risk Management Plan    Review of Symptoms per patient report:   Depression: No symptoms, Lack of interest, Change in energy level, Difficulties concentrating, and Low self-worth  Susan:  No Symptoms  Psychosis: No Symptoms  Anxiety: Excessive worry, Nervousness, and Social anxiety  Panic:  No symptoms  Post Traumatic Stress Disorder:  Experienced traumatic event as a child.    Eating Disorder: No Symptoms  ADD / ADHD:  Inattentive, Poor task completion, and Distractibility  Conduct Disorder: No symptoms  Autism Spectrum Disorder: No symptoms  Obsessive Compulsive Disorder: No Symptoms    Patient reports the following compulsive behaviors and treatment history:   none indicated .      Diagnostic Criteria:   Generalized Anxiety Disorder  A. Excessive anxiety and worry about a number of events or activities (such as work or school performance).   B. The person finds it difficult to control the worry.   -  Restlessness or feeling keyed up or on edge.    - Being easily fatigued.    - Difficulty concentrating or mind going blank.    - Irritability.    - Sleep disturbance (difficulty falling or staying asleep, or restless unsatisfying sleep).   D. The focus of the anxiety and worry is not confined to features of an Axis I disorder.  E. The anxiety, worry, or physical symptoms cause clinically significant distress or impairment in social, occupational, or other important areas of functioning.   F. The disturbance is not due to the direct physiological effects of a substance (e.g., a drug of abuse, a medication) or a general medical condition (e.g., hyperthyroidism) and does not occur exclusively during a Mood Disorder, a Psychotic Disorder, or a Pervasive Developmental Disorder.     Major Depressive Disorder  A) Recurrent episode(s) - symptoms have been present during the same 2-week period and represent a change from previous functioning 5 or more symptoms (required for diagnosis)   - Depressed mood. Note: In children and adolescents, can be irritable mood.     - Diminished interest or pleasure in all, or almost all, activities.    - Decreased sleep.    - Fatigue or loss of energy.    - Feelings of worthlessness or guilt.    - Diminished ability to think or concentrate, or indecisiveness.    - Recurrent thoughts of death (not just fear of dying), recurrent suicidal ideation without a specific plan, or a suicide attempt or a specific plan for committing suicide.   B) The symptoms cause clinically significant distress or impairment in social, occupational, or other important areas of functioning  C) The episode is not attributable to the physiological effects of a substance or to another medical condition  D) The occurence of major depressive episode is not better explained by other thought / psychotic disorders  E) There has never been a manic episode or hypomanic episode     DSM5 Diagnoses: (Sustained by DSM5 Criteria  Listed Above)  Diagnoses: 300.02 (F41.1) Generalized Anxiety Disorder    296.33 (F33.2) Major Depressive  Disorder, Recurrent Episode,  Severe - and with anxious distress  300.02    3. Other Diagnoses that is relevant to services:   ADHD inattentive.   Has been treated with ADD meds at that time but according to patient went off of the meds because of cardiac arrhythmia issues.  4. Provisional Diagnosis:   none indicated  5. Prognosis: Expect Improvement.  6. Likely consequences of symptoms if not treated:  increased anxiety and         depression if not treated.  7. Client strengths include:  intelligent, motivated, and wants to learn .  Functional Status:  Patient reports the following functional impairments:  social interactions.   Patient has limited support system.  Current stressors include relationship issues family stressors and financial concerns      Programmatic care:  Current LOCUS was assigned and patient needs the following level of care based on score 19  .    Clinical Summary:  1. Psychosocial, Cultural and Contextual Factors:  patient has limited support system.  Current stressors include relationship issues, family stressors and social isolation.    Recommendations:     1. Plan for Safety and Risk Management:   Safety and Risk: Recommended that patient call 911 or go to the local ED should there be a change in any of these risk factors.          Report to child / adult protection services was NA.     2. Patient's identified mental health concerns with a cultural influence will be addressed by Therapist .     3. Initial Treatment will focus on:    Depressed Mood -  strategies and techniques to decrease depression   Anxiety -  strategies and techniques to decrease anxiety  Mood Instability -   improve function .     4. Resources/Service Plan:    services are not indicated.   Modifications to assist communication are not indicated.   Additional disability accommodations are not  "indicated.      5. Collaboration:   Collaboration / coordination of treatment will be initiated with the following  support professionals: outpatient therapist.      6.  Referrals:   The following referral(s) will be initiated: 55+ Senior Outpatient Program.       A Release of Information has been obtained for the following: outpatient therapist.     Clinical Substantiation/medical necessity for the above recommendations:  Patient will benefit from 55 plus to reduce the chronicity and intensity of symptoms which have impacted daily functioning.The reason for seeking services at this time is: \" I need more structure and consistency to help improve my mood  seeing a therapist once a week is not enough. \"  The problem(s) began  years ago, there has been some improvement and then recently have worsened.   It will be advantageous for patient to participate in an environment that will offer her support and encouragement and a structured setting.  Patient has attempted to resolve these concerns in the past through  Day treatment in 2014 in [place], [place] DBT in 2016  and currently, individual therapy once a week since February 2024 through [facility] with Brett Atkinson (Therapist).    7. NARENDRA:      Discussed the general effects of drugs and alcohol on health and well-being. Provider gave patient printed information about the  effects of chemical use on their health and well being. Recommendations:  none indicated.     8. Records:   These were reviewed at time of assessment.   Information in this assessment was obtained from the medical record and  provided by patient who is a good historian.    Patient will have open access to their mental health medical record.    9.   Interactive Complexity: No    10. Safety Plan: See below    Provider Name/ Credentials:  Martha RAMIREZSW  July 8, 2024    Adult Long Safety Plan:     [facility]                                       Ilsa DELEON" "Madelin     SAFETY PLAN:  Step 1: Warning signs / cues (Thoughts, images, mood, situation, behavior) that a crisis may be developing:  Thoughts: \"I don't matter\"  Images: flashbacks  Thinking Processes: racing thoughts  Mood: worsening depression  Behaviors: not taking care of my responsibilities  Situations: changes in symptoms:  depression and anxiety increased.    Step 2: Coping strategies - Things I can do to take my mind off of my problems without contacting another person (relaxation technique, physical activity):  Distress Tolerance Strategies:  relaxation activities:   Go for walks  Physical Activities: go for a walk  Focus on helpful thoughts:  \"This is temporary\"  Step 3: People and social settings that provide distraction:  Sister   Betty Bradley   [phone]     Step 4: Remind myself of people and things that are important to me and worth living for:   family and grandchild.      Step 5: When I am in crisis, I can ask these people to help Step 6: Making the environment safe:   Betty Bradley   [phone]  be around others  Step 7: Professionals or agencies I can contact during a crisis:  Suicide Prevention Lifeline: Call or Text 988   [facility] Crisis Services:  [phone] ([facility])    Call 911 or go to my nearest emergency department.   I helped develop this safety plan and agree to use it when needed.  I have been given a copy of this plan.      Client signature  verbal auth. From Patient on 24  Today s date:  2024  Completed by Provider Name/ Credentials:    Martha Li [facility]  2024  Adapted from Safety Plan Template 2008 Anya Escobar is reprinted with the express permission of the authors.  No portion of the Safety Plan Template may be reproduced without the express, written permission.  You can contact the authors at [email or web address] or [email or web address].          LOCUS Worksheet     Name: Ilsa Yusuf MRN: [id number]    : " 1960      Gender:  female    PMI:  N/a   Provider Name: Martha IGLESIAS   Provider NPI:  [id number]    Actual level of Care Provided:   outpatient diagnostic assessment    Service(s) receiving or referred to:   55+    Reason for Variance:  none indicated      Rating completed by:Martha IGLESIAS       I. Risk of Harm:   2      Low Risk of Harm    II. Functional Status:   3      Moderate Impairment    III. Co-Morbidity:   3      Significant Co-Morbidity    IV - A. Recovery Environment - Level of Stress:   3      Moderately Stress Environment    IV - B. Recovery Environment - Level of Support:   3      Limited Support in Environment    V. Treatment and Recovery History:   2      Significant Response to Treatment and Recovery Management    VI. Engagement and Recovery Project:   3      Limited Engagement and Recovery       19 Composite Score    Level of Care Recommendation:   17 to 19       High Intensity Community Based Services

## 2024-07-08 NOTE — TELEPHONE ENCOUNTER
**Patient Navigator Follow Up**    7/8/2024;    Referral for IOP-ADT; 55 plus afternoons    Comments:  Ready to go to 55+ when there is availability.  preferences [facility]  afternoon program.      Martha Li [facility]      I called and spoke to patient regarding programming.  She wanted to get added to the 55 plus afternoons waitlist.  I added her and program will follow up with her accordingly.     I sent her program content for review through her MyChart as well.    Thank you,    Cristy BOLAÑOS  Patient Navigator

## 2024-07-15 ENCOUNTER — TELEPHONE (OUTPATIENT)
Dept: BEHAVIORAL HEALTH | Facility: CLINIC | Age: 64
End: 2024-07-15
Payer: MEDICARE

## 2024-07-15 NOTE — TELEPHONE ENCOUNTER
Writer and patient discussed programming, patient will attend A-IOP and start in IOP/DT-3 on 7/18.    Terri River, [facility] on 7/15/2024 at 10:50 AM

## 2024-07-16 ENCOUNTER — PATIENT OUTREACH (OUTPATIENT)
Dept: CARE COORDINATION | Facility: CLINIC | Age: 64
End: 2024-07-16
Payer: MEDICARE

## 2024-07-16 ENCOUNTER — TELEPHONE (OUTPATIENT)
Dept: BEHAVIORAL HEALTH | Facility: CLINIC | Age: 64
End: 2024-07-16
Payer: MEDICARE

## 2024-07-16 NOTE — TELEPHONE ENCOUNTER
----- Message from Terri River sent at 7/15/2024 10:51 AM CDT -----  Regarding: Patient starting IOP/DT-3 on 7/18  Adult Mental Health Programmatic Care Schedule Request    Patient Name: Ilsa Yusuf  Location of programming: [facility]  Start Date: 7/18/24    Group/PROVIDER: ADMISSION IOP AM TRACK [[id number]]   Appt Time: 12pm   Duration of Appointment in minutes: 60 minutes   Visit Type: Treatment [870]   Attending Provider: Avila Garcia     Adult Program Group: Adult Program Group: IOP/DT 3  55+ Track [01075]  Schedule: M, W, Th, F 1pm-4pm  12 hours per week for 9 weeks  Number of visits to be scheduled: 36 days    Attending Provider (MD):  Dr Avila Garcia.  Visit Type:  In-Person    Accommodations Needed: N/A  Alerts Identified/Substantiation: N/A  Consulted with Supervisor: N/A    Send to:   [UR BEH B[facility] (39313)] ##ONLY if Start Date is determined##  NG 14 OBC's (BEH BEHAVIORAL OUTPATIENT ASSESSMENTS [45730])   Terri River ([facility])  Rhoda Yanes (PHP)

## 2024-07-18 ENCOUNTER — HOSPITAL ENCOUNTER (OUTPATIENT)
Dept: BEHAVIORAL HEALTH | Facility: CLINIC | Age: 64
Discharge: HOME | End: 2024-07-18
Attending: PSYCHIATRY & NEUROLOGY
Payer: MEDICARE

## 2024-07-18 ENCOUNTER — TELEPHONE (OUTPATIENT)
Dept: BEHAVIORAL HEALTH | Facility: CLINIC | Age: 64
End: 2024-07-18
Payer: MEDICARE

## 2024-07-18 PROBLEM — F41.1 GENERALIZED ANXIETY DISORDER: Status: ACTIVE | Noted: 2024-07-18

## 2024-07-18 PROCEDURE — 90853 GROUP PSYCHOTHERAPY: CPT

## 2024-07-18 PROCEDURE — 90853 GROUP PSYCHOTHERAPY: CPT | Performed by: SOCIAL WORKER

## 2024-07-18 NOTE — TELEPHONE ENCOUNTER
Writer spoke with patient requesting info for 55+ program. Writer e-mailed information under directions as requested by patient.    Sallie Hernandez  07/18/2024  1101

## 2024-07-18 NOTE — GROUP NOTE
Psychoeducation Group Note    PATIENT'S NAME: Ilsa Yusuf  MRN:   [id number]  :   1960  ACCT. NUMBER: [id number]  DATE OF SERVICE: 24  START TIME:  3:00 PM  END TIME:  3:50 PM  FACILITATOR: Falguni Johnson LICSW; Mariela Casas RN  TOPIC: MH Wellness Group: Mental Health Maintenance  [facility] Adult Mental Health Outpatient Programs  TRACK: IOP/DT 3    NUMBER OF PARTICIPANTS: 6    Summary of Group / Topics Discussed:  Mental Health Maintenance:  Stigma: In this group patients explored stigma surrounding a mental health diagnosis.  The group discussed the way stigma impacts their own life, and discussed strategies to reduce. The relationship between physical and mental health were also explored in the context of healthcare access, treatment, and support.    Patient Session Goals / Objectives:  Patients identified the importance of practicing emotional hygiene  Patients identified ways to decrease the  impact of stigma in their own life      Patient Participation / Response:  Fully participated with the group by sharing personal reflections / insights and openly received / provided feedback with other participants.    Demonstrated understanding of topics discussed through group discussion and participation and Verbalized understanding of mental health maintenance topic    Treatment Plan:  Patient has a current master individualized treatment plan.  See Epic treatment plan for more information.    HARRIS Wong

## 2024-07-18 NOTE — GROUP NOTE
"Process Group Note    PATIENT'S NAME: Ilsa Yusuf  MRN:   [id number]  :   1960  ACCT. NUMBER: [id number]  DATE OF SERVICE: 24  START TIME:  1:00 PM  END TIME:  1:50 PM  FACILITATOR: Cleo Zavala LICSW  TOPIC:  Process Group    Diagnoses:  300.02 (F41.1) Generalized Anxiety Disorder    296.33 (F33.2) Major Depressive  Disorder, Recurrent Episode,  Severe - and with anxious distress  300.02      [facility] Adult Mental Health Outpatient Programs  TRACK: IOP/DT 3 55+    NUMBER OF PARTICIPANTS: 6        Data:    Session content: At the start of this group, patients were invited to check in by identifying themselves, describing their current emotional status, and identifying issues to address in this group.   Area(s) of treatment focus addressed in this session included Symptom Management, Personal Safety, and Community Resources/Discharge Planning.  Patient attended her first day of IOP/DT 3 55+.  Writer oriented patient to group and outlined group expectations. Reported mood is \"hopeful\".  She reports a history of anxiety, depression and PTSD.  She reports success with past Day Treatment but noticed a decline in mental health when the pandemic started. She adds, \"I've lost myself\".   Client denied safety concerns, including SI and SIB. Client denies any chemical use.  Client is taking medications as prescribed. Client's goal is \"get a feel for the group\".  Client reported plans to use the following coping skills: \"be present\". Peers offered supportive feedback and validation.    Therapeutic Interventions/Treatment Strategies:  Psychotherapist offered support, feedback and validation and reinforced use of skills. Treatment modalities used include Cognitive Behavioral Therapy and Dialectical Behavioral Therapy. Interventions include Mindfulness: Facilitated discussion of when/how to use mindfulness skills to benefit general health, mental health symptoms, and stressors and Symptoms " "Management: Promoted understanding of their diagnoses and how it impacts their functioning.    Assessment:    Patient response:   Patient responded to session by accepting feedback, giving feedback, listening, focusing on goals, being attentive, and accepting support    Possible barriers to participation / learning include: and no barriers identified    Health Issues:   None reported       Substance Use Review:   Substance Use: No active concerns identified.    Mental Status/Behavioral Observations  Appearance:   Appropriate   Eye Contact:   Good   Psychomotor Behavior: Normal   Attitude:   Cooperative  Interested Friendly Pleasant  Orientation:   All  Speech   Rate / Production: Normal    Volume:  Normal   Mood:    Depressed  \"Hopeful\"  Affect:    Appropriate   Thought Content:   Clear and Safety denies any current safety concerns including suicidal ideation, self-harm, and homicidal ideation  Thought Form:  Coherent  Logical     Insight:    Good     Plan:   Safety Plan: No current safety concerns identified.  Recommended that patient call 911 or go to the local ED should there be a change in any of these risk factors.   Barriers to treatment: None identified  Patient Contracts (see media tab):  None  Substance Use: Not addressed in session   Continue or Discharge: Patient will continue in 55+ Program (55+) as planned. Patient is likely to benefit from learning and using skills as they work toward the goals identified in their treatment plan.      Cleo Zavala, [facility]  July 18, 2024  "

## 2024-07-18 NOTE — GROUP NOTE
Psychotherapy Group Note    PATIENT'S NAME: Ilsa Yusuf  MRN:   [id number]  :   1960  ACCT. NUMBER: [id number]  DATE OF SERVICE: 24  START TIME:  2:00 PM  END TIME:  2:50 PM  FACILITATOR: Cleo Zavala LICSW  TOPIC: MH EBP Group: Emotions Management  [facility] Mental Health Outpatient Programs  TRACK: IOP/DT 3 55+    NUMBER OF PARTICIPANTS: 6    Summary of Group / Topics Discussed:  Emotions Management: Opposite to Emotion: Patients discussed past and present struggles with knowing how to make changes in their lives due to difficult emotional experiences.  Explored desires to experience and feel less anger, sadness, guilt, and fear.  Reviewed the therapeutic skill of opposite action and patients explored opportunities to use their behaviors as a tool to reduce an emotion that they want to change.     Patient Session Goals / Objectives:  Review DBT concepts and focus on patient s experiences of distress and difficult emotional experiences.  Learn how to do the opposite of what an emotion makes us want to do in an effort to decrease an unwanted emotional experience.  Demonstrate understanding of the skill of opposite action by sharing experiences where the technique could be useful in past / present situations.      Patient Participation / Response:  Fully participated with the group by sharing personal reflections / insights and openly received / provided feedback with other participants.    Demonstrated understanding of topics discussed through group discussion and participation, Expressed understanding of the relevance / importance of emotions management skills at distressing times in life, Self-aware of experiences with difficult emotions, and strategies to employ to manage them, Demonstrated knowledge of when to consider applying a variety of emotions management skills in daily life, Demonstrated understanding and practice strategies to manage difficult emotions and move  towards healing, Identified barriers to applying emotions management strategies, Identified strategies to overcome barriers to use of emotions management skills, and Identified emotions management strategies that have helped maintain / improve symptoms in the past    Treatment Plan:  Patient has a current master individualized treatment plan.  See Epic treatment plan for more information.    JAMES EdenSW

## 2024-07-19 ENCOUNTER — HOSPITAL ENCOUNTER (OUTPATIENT)
Dept: BEHAVIORAL HEALTH | Facility: CLINIC | Age: 64
Discharge: HOME | End: 2024-07-19
Attending: PSYCHIATRY & NEUROLOGY
Payer: MEDICARE

## 2024-07-19 VITALS
TEMPERATURE: 97.5 F | OXYGEN SATURATION: 98 % | RESPIRATION RATE: 16 BRPM | SYSTOLIC BLOOD PRESSURE: 124 MMHG | DIASTOLIC BLOOD PRESSURE: 76 MMHG | HEART RATE: 68 BPM

## 2024-07-19 DIAGNOSIS — F41.1 GENERALIZED ANXIETY DISORDER: Primary | ICD-10-CM

## 2024-07-19 DIAGNOSIS — F33.1 MAJOR DEPRESSIVE DISORDER, RECURRENT EPISODE, MODERATE (H): ICD-10-CM

## 2024-07-19 PROCEDURE — 99215 OFFICE O/P EST HI 40 MIN: CPT

## 2024-07-19 PROCEDURE — 90853 GROUP PSYCHOTHERAPY: CPT | Performed by: SOCIAL WORKER

## 2024-07-19 PROCEDURE — 90853 GROUP PSYCHOTHERAPY: CPT

## 2024-07-19 PROCEDURE — 99417 PROLNG OP E/M EACH 15 MIN: CPT

## 2024-07-19 ASSESSMENT — PAIN SCALES - GENERAL: PAINLEVEL: MODERATE PAIN (5)

## 2024-07-19 NOTE — ADDENDUM NOTE
Encounter addended by: Falguni Johnson, LICSW on: 7/19/2024 4:08 PM   Actions taken: Charge Capture section accepted

## 2024-07-19 NOTE — GROUP NOTE
Psychoeducation Group Note    PATIENT'S NAME: Ilsa Yusuf  MRN:   [id number]  :   1960  ACCT. NUMBER: [id number]  DATE OF SERVICE: 24  START TIME:  2:00 PM  END TIME:  2:50 PM  FACILITATOR: Cleo Zavala LICSW  TOPIC: MH Life Skills Group: Lifestyle Balance and Structure  [facility] Adult Mental Health Outpatient Programs  TRACK: IOP/DT 3 55+    NUMBER OF PARTICIPANTS: 6    Summary of Group / Topics Discussed:  Lifestyle Balance and Strucure:  Time Management: Time for Tips and Tips for Time: Patients were introduced to how effective time management is beneficial to self esteem, relationships with others, life balance, and other aspects of daily life.   Patients were taught strategies on how to improve time management skills.    Patient Session Goals / Objectives:  Facilitated the discussion on challenges/barriers and personal situations with time management   Identified specific techniques and strategies to improve time management to support mental health recovery     Identified a plan to implement strategies into daily life to support improved time management       Patient Participation / Response:  Fully participated with the group by sharing personal reflections / insights and openly received / provided feedback with other participants.    Verbalized understanding of content and Patient would benefit from additional opportunities to practice the content to be able to generalize it to their everyday life with increased intentionality, consistency, and efficacy in support of their psychiatric recovery    Treatment Plan:  Patient has a current master individualized treatment plan.  See Epic treatment plan for more information.    HARRIS Eden
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
## 2024-07-19 NOTE — GROUP NOTE
"Process Group Note    PATIENT'S NAME: Ilsa Yusuf  MRN:   [id number]  :   1960  ACCT. NUMBER: [id number]  DATE OF SERVICE: 24  START TIME:  1:00 PM  END TIME:  1:50 PM  FACILITATOR: Cleo Zavala LICSW  TOPIC:  Process Group    Diagnoses:  300.02 (F41.1) Generalized Anxiety Disorder    296.33 (F33.2) Major Depressive  Disorder, Recurrent Episode,  Severe - and with anxious distress  300.02      [facility] Adult Mental Health Outpatient Programs  TRACK: IOP/DT 3 55+    NUMBER OF PARTICIPANTS: 6        Data:    Session content: At the start of this group, patients were invited to check in by identifying themselves, describing their current emotional status, and identifying issues to address in this group.   Area(s) of treatment focus addressed in this session included Symptom Management, Personal Safety, and Community Resources/Discharge Planning.  Reported mood is \"scrambled\".  She reports \" a lot of physical discomfort\".  She brought a pillow to minimize pain while seated in group. She apologized for being late due to traffic and construction.   Client denied safety concerns, including SI and SIB. Client denies any chemical use.  Client is taking medications as prescribed. Client's goal is \"be present\".  Client reported plans to use the following coping skills: opposite to emotion, mindfulness.  She started to share political opinions which writer redirected.  She named fear for her mental health and \"existential\" dread. Client is proud of getting to group today. Peers offered supportive feedback and validation.    Therapeutic Interventions/Treatment Strategies:  Psychotherapist offered support, feedback and validation and reinforced use of skills. Treatment modalities used include Cognitive Behavioral Therapy and Dialectical Behavioral Therapy. Interventions include Mindfulness: Facilitated discussion of when/how to use mindfulness skills to benefit general health, mental health " symptoms, and stressors and Emotions Management:  Discussed barriers to emotional regulation and Reviewed opposite action skill.    Assessment:    Patient response:   Patient responded to session by accepting feedback, giving feedback, listening, focusing on goals, being attentive, and accepting support    Possible barriers to participation / learning include: and no barriers identified    Health Issues:   Yes: Pain, Associated Psychological Distress       Substance Use Review:   Substance Use: No active concerns identified.    Mental Status/Behavioral Observations  Appearance:   Appropriate   Eye Contact:   Good   Psychomotor Behavior: Normal   Attitude:   Cooperative  Interested Friendly Pleasant  Orientation:   All  Speech   Rate / Production: Normal    Volume:  Normal   Mood:    Anxious   Affect:    Appropriate   Thought Content:   Clear and Safety denies any current safety concerns including suicidal ideation, self-harm, and homicidal ideation  Thought Form:  Coherent  Logical     Insight:    Good     Plan:   Safety Plan: No current safety concerns identified.  Recommended that patient call 911 or go to the local ED should there be a change in any of these risk factors.   Barriers to treatment: None identified  Patient Contracts (see media tab):  None  Substance Use: Not addressed in session   Continue or Discharge: Patient will continue in 55+ Program (55+) as planned. Patient is likely to benefit from learning and using skills as they work toward the goals identified in their treatment plan.      Cleo Zavala, HARRIS  July 19, 2024

## 2024-07-22 ENCOUNTER — HOSPITAL ENCOUNTER (OUTPATIENT)
Dept: BEHAVIORAL HEALTH | Facility: CLINIC | Age: 64
Discharge: HOME | End: 2024-07-22
Attending: PSYCHIATRY & NEUROLOGY
Payer: MEDICARE

## 2024-07-22 PROCEDURE — 90853 GROUP PSYCHOTHERAPY: CPT | Performed by: SOCIAL WORKER

## 2024-07-22 PROCEDURE — 90853 GROUP PSYCHOTHERAPY: CPT

## 2024-07-22 NOTE — GROUP NOTE
Psychoeducation Group Note    PATIENT'S NAME: Ilsa Yusuf  MRN:   [id number]  :   1960  ACCT. NUMBER: [id number]  DATE OF SERVICE: 24  START TIME:  3:00 PM  END TIME:  3:50 PM  FACILITATOR: Falguni Johnson LICSW; Dede Lomas RN  TOPIC:  Wellness Group: Brain Health  [facility] Mental Health Outpatient Programs  TRACK: IOP/DT 3    NUMBER OF PARTICIPANTS: 6    Summary of Group / Topics Discussed:  Brain Health:  Pathophysiology of Mood Disorders: Patients were educated on mood disorder etiology and neuroscience, risk factors, symptoms, and pharmacologic, psychotherapeutic, and complementary treatment options. Patients were guided on a discussion of mental, behavioral, and physical symptoms and shared their symptoms with the group.     Patient Session Goals / Objectives:  Described what mood disorders are and identified risk factors   Explained how chemical imbalances in the brain can cause symptoms and how medications work to reverse this imbalance   Identified and described pharmacologic, psychotherapeutic, and complementary treatment options      Patient Participation / Response:  Fully participated with the group by sharing personal reflections / insights and openly received / provided feedback with other participants.    Demonstrated understanding of topics discussed through group discussion and participation and Verbalized understanding of brain health topic    Treatment Plan:  Patient has a current master individualized treatment plan.  See Epic treatment plan for more information.    HARRIS Wong

## 2024-07-22 NOTE — GROUP NOTE
Psychotherapy Group Note    PATIENT'S NAME: Ilsa Yusuf  MRN:   [id number]  :   1960  ACCT. NUMBER: [id number]  DATE OF SERVICE: 24  START TIME:  2:00 PM  END TIME:  2:50 PM  FACILITATOR: Cleo Zavala LICSW  TOPIC: MH EBP Group: Behavioral Activation  [facility] Mental Health Outpatient Programs  TRACK: IOP/DT 3 55+    NUMBER OF PARTICIPANTS: 7    Summary of Group / Topics Discussed:  Provided education and assessment on patient's group programming goals. Evaluated patient's assessment of their ability to participate in groups, share emotions with group members, and openness to the group format. Provided education regarding appropriate individual-based group therapy goals.     Patient Session Goals and Objectives:  -Participate in reflective assessment of group readiness.  -Understand appropriate topics and methods to discuss those topics in group programming.  -Identify and problem solve barriers to group participation.  -Identify strategies to actively cope while engaging in group programming.   -Reflected up individualized treatment plans  -Meet with members of the treatment team to assess goal progress       Patient Participation / Response:  Fully participated with the group by sharing personal reflections / insights and openly received / provided feedback with other participants.    Demonstrated understanding of topics discussed through group discussion and participation, Expressed understanding of the relationship between behaviors, thoughts, and feelings, Shared experiences and challenges with making behavioral changes, Identified barriers to change, Identified / Expressed personal readiness to make behavioral change, and Identified ways to increase goal directed activities    Treatment Plan:  Patient has a current master individualized treatment plan.  See Epic treatment plan for more information.    HARRIS Eden

## 2024-07-23 NOTE — GROUP NOTE
"Process Group Note    PATIENT'S NAME: Ilsa Yusuf  MRN:   [id number]  :   1960  ACCT. NUMBER: [id number]  DATE OF SERVICE: 24  START TIME:  1:00 PM  END TIME:  1:50 PM  FACILITATOR: Cleo Zavala LICSW  TOPIC:  Process Group    Diagnoses:  300.02 (F41.1) Generalized Anxiety Disorder    296.33 (F33.2) Major Depressive  Disorder, Recurrent Episode,  Severe - and with anxious distress  300.02      [facility] Mental Health Outpatient Programs  TRACK: IOP/DT 3 55+    NUMBER OF PARTICIPANTS: 7        Data:    Session content: At the start of this group, patients were invited to check in by identifying themselves, describing their current emotional status, and identifying issues to address in this group.   Area(s) of treatment focus addressed in this session included Symptom Management, Personal Safety, and Community Resources/Discharge Planning.  Reported mood is \"exhausted\".  She notes difficulty feeling productive after group.   Client denied safety concerns, including SI and SIB. Client denies any chemical use.  Client is taking medications as prescribed. Client's goal is \"be present\".  Client reported plans to use the following coping skills: opposite to emotion, set healthy boundaries, \"keep it light\". Client is proud of herself for leaving early enough to get to group on time. Peers offered supportive feedback and validation.    Therapeutic Interventions/Treatment Strategies:  Psychotherapist offered support, feedback and validation and reinforced use of skills. Treatment modalities used include Cognitive Behavioral Therapy and Dialectical Behavioral Therapy. Interventions include Mindfulness: Facilitated discussion of when/how to use mindfulness skills to benefit general health, mental health symptoms, and stressors and Emotions Management:  Discussed barriers to emotional regulation and Reviewed opposite action skill.    Assessment:    Patient response:   Patient responded to " session by accepting feedback, giving feedback, listening, focusing on goals, being attentive, and accepting support    Possible barriers to participation / learning include: and no barriers identified    Health Issues:   None reported       Substance Use Review:   Substance Use: No active concerns identified.    Mental Status/Behavioral Observations  Appearance:   Appropriate   Eye Contact:   Good   Psychomotor Behavior: Normal   Attitude:   Cooperative  Interested Friendly Pleasant  Orientation:   All  Speech   Rate / Production: Normal    Volume:  Normal   Mood:    Depressed   Affect:    Appropriate  Lethargic   Thought Content:   Clear and Safety denies any current safety concerns including suicidal ideation, self-harm, and homicidal ideation  Thought Form:  Coherent  Logical     Insight:    Good     Plan:   Safety Plan: No current safety concerns identified.  Recommended that patient call 911 or go to the local ED should there be a change in any of these risk factors.   Barriers to treatment: None identified  Patient Contracts (see media tab):  None  Substance Use: Not addressed in session   Continue or Discharge: Patient will continue in 55+ Program (55+) as planned. Patient is likely to benefit from learning and using skills as they work toward the goals identified in their treatment plan.      Cleo Zavala, HARRIS  July 23, 2024

## 2024-07-25 DIAGNOSIS — F41.1 GAD (GENERALIZED ANXIETY DISORDER): ICD-10-CM

## 2024-07-25 DIAGNOSIS — F33.1 MODERATE EPISODE OF RECURRENT MAJOR DEPRESSIVE DISORDER (H): ICD-10-CM

## 2024-07-25 RX ORDER — ESCITALOPRAM OXALATE 20 MG/1
20 TABLET ORAL DAILY
Qty: 90 TABLET | Refills: 0 | Status: SHIPPED | OUTPATIENT
Start: 2024-07-25

## 2024-07-25 NOTE — TELEPHONE ENCOUNTER
Date of Last Office Visit: 6/10/24  Date of Next Office Visit:  8/8/24  No shows since last visit: No  More than one patient-initiated cancellation (with reschedule) since last seen in clinic? No    []Medication refilled per  Medication Refill in Ambulatory Care  policy.  [x]Medication unable to be refilled by RN due to criteria not met as indicated below:    []Eligibility: has not had a provider visit within last 6 months   []Supervision: no future appointment; < 7 days before next appointment   []Compliance: no shows; cancellations; lapse in therapy   []Verification: order discrepancy; may need modification...   [x] > 30-day supply request   []Advanced refill request: > 7 days before refill date   []Controlled medication   []Medication not included in policy   []Review: new med; med adjusted ? 30 days; safety alert; requires lab monitoring...   []Scope of Practice: refill request processed by LPN/MA   []Other:      Medication(s) requested:     -  escitalopram (LEXAPRO) 20 MG tablet   Date last ordered: 4/29/24  Qty: 90  Refills: 0  Appropriate for refill? Yes    Any Controlled Substance(s)? No      Requested medication(s) verified as identical to current order? Yes    Any lapse in adherence to medication(s) greater than 5 days? No      Additional action taken? no.      Last visit treatment plan:   Patient is a 62 year old female with a history of MDD DONELL and PTSD  Presents today for follow up visit. Today, reports doing great on current medication regimen as mood, depression, and anxiety are fairly under control.  She has moved to a new place and since noticed decreasing in expenses.  She plans to find out about [facility] 55+ program.  Apart from back pain which made her went to the ER few days ago, she is doing great mentally otherwise.  She denies SI, SIB, and HI.  She also denied both auditory and visual hallucination.  She reported no rk or hypomania.  Return to clinic in 6 weeks for follow-up.   Continue   Wellbutrin  mg  daily -   Continue Lexapro 20 mg every day  Continue hydroxyzine 25 - 50 mg instead of 3 times daily as needed for anxiety         Continue to take prazosin 5 mg at bedtime  Continue Buspar 20 mg three times daily for anxiety    Any medication(s) require lab monitoring? No

## 2024-07-26 ENCOUNTER — HOSPITAL ENCOUNTER (OUTPATIENT)
Dept: BEHAVIORAL HEALTH | Facility: CLINIC | Age: 64
Discharge: HOME | End: 2024-07-26
Attending: PSYCHIATRY & NEUROLOGY
Payer: MEDICARE

## 2024-07-26 DIAGNOSIS — F33.1 MAJOR DEPRESSIVE DISORDER, RECURRENT EPISODE, MODERATE (H): ICD-10-CM

## 2024-07-26 PROCEDURE — 90853 GROUP PSYCHOTHERAPY: CPT

## 2024-07-26 RX ORDER — BUPROPION HYDROCHLORIDE 200 MG/1
200 TABLET, EXTENDED RELEASE ORAL DAILY
Qty: 90 TABLET | Refills: 0 | Status: SHIPPED | OUTPATIENT
Start: 2024-07-26

## 2024-07-26 NOTE — TELEPHONE ENCOUNTER
Date of Last Office Visit: 6/10/2024  Date of Next Office Visit:  8/8/2024  No shows since last visit: No  More than one patient-initiated cancellation (with reschedule) since last seen in clinic? No    []Medication refilled per  Medication Refill in Ambulatory Care  policy.  []Medication unable to be refilled by RN due to criteria not met as indicated below:    []Eligibility: has not had a provider visit within last 6 months   []Supervision: no future appointment; < 7 days before next appointment   []Compliance: no shows; cancellations; lapse in therapy   []Verification: order discrepancy; may need modification...   [] > 30-day supply request   []Advanced refill request: > 7 days before refill date   []Controlled medication   []Medication not included in policy   []Review: new med; med adjusted ? 30 days; safety alert; requires lab monitoring...   [x]Scope of Practice: refill request processed by LPN/MA   []Other:      Medication(s) requested:     -  buPROPion (WELLBUTRIN SR) 200 MG 12 hr tablet   Date last ordered: 04/29/2024  Qty: 90 tablet  Refills: 0  Appropriate for refill? Yes    Any Controlled Substance(s)? No    Requested medication(s) verified as identical to current order?     Any lapse in adherence to medication(s) greater than 5 days?      Additional action taken? routed encounter to provider for review.      Last visit treatment plan:   ASSESSMENT AND PLAN    Patient is a 62 year old female with a history of MDD DONELL and PTSD  Presents today for follow up visit. Today, reports doing great on current medication regimen as mood, depression, and anxiety are fairly under control.  She has moved to a new place and since noticed decreasing in expenses.  She plans to find out about [employer] 55+ program.  Apart from back pain which made her went to the ER few days ago, she is doing great mentally otherwise.  She denies SI, SIB, and HI.  She also denied both auditory and visual hallucination.  She reported no  rk or hypomania.  Return to clinic in 6 weeks for follow-up.     PLAN AND RECOMMENDATIONS:  Continue  Wellbutrin  mg  daily -   Continue Lexapro 20 mg every day  Continue hydroxyzine 25 - 50 mg instead of 3 times daily as needed for anxiety         Continue to take prazosin 5 mg at bedtime  Continue Buspar 20 mg three times daily for anxiety       Any medication(s) require lab monitoring? No

## 2024-07-26 NOTE — GROUP NOTE
Psychoeducation Group Note    PATIENT'S NAME: Ilsa Yusuf  MRN:   [id number]  :   1960  ACCT. NUMBER: [id number]  DATE OF SERVICE: 24  START TIME:  3:00 PM  END TIME:  3:50 PM  FACILITATOR: Hayde Hermosillo LICSW  TOPIC: MH Wellness Group: Health Maintenance  [facility] Outpatient [facility]  TRACK: IOP 3 55+    NUMBER OF PARTICIPANTS: 7     Summary of Group / Topics Discussed:  Health Maintenance:     Weekend planning: Patients were given time to complete a weekend plan of what they will do to promote wellness and sobriety over the weekend when they do not have the structure of group. Patients were encouraged to review progress on their treatment goals and were challenged to identify ways to work toward meeting them. Patients identified and discussed foreseeable barriers to success over the weekend and then developed a plan to overcome them. Patients reviewed their distress coping skills and social support network and discussed this with the group.       Patient Session Goals / Objectives:    ?    Identified activities to engage in that promote balance in wellness  ?    Distinguished possible barriers to success over the weekend and created a plan to overcome them  ?    Listed distress coping skills and identified social support network to utilize if in crisis during the weekend        Patient Participation / Response:  Fully participated with the group by sharing personal reflections / insights and openly received / provided feedback with other participants.    Demonstrated understanding of topics discussed through group discussion and participation, Identified / Expressed personal readiness to practice skills, and Verbalized understanding of health maintenance topic    Treatment Plan:  Patient has a current master individualized treatment plan.  See Epic treatment plan for more information.    HARRIS Medina

## 2024-07-26 NOTE — GROUP NOTE
Psychotherapy Group Note    PATIENT'S NAME: Ilsa Yusuf  MRN:   [id number]  :   1960  ACCT. NUMBER: [id number]  DATE OF SERVICE: 24  START TIME:  2:00 PM  END TIME:  2:50 PM  FACILITATOR: Cleo Zavala LICSW  TOPIC: MH EBP Group: Coping Skills  [facility] Adult Mental Health Outpatient Programs  TRACK: IOP/DT 3 55+    NUMBER OF PARTICIPANTS: 7    Summary of Group / Topics Discussed:  Coping Skills: Grounding part 2: Patients discussed and practiced strategies to increase attachment / presence to the current moment.  Patients identified situations in which using these strategies will help improve emotion regulation sense of calm in the body.  Reviewed the benefits of applying grounding strategies, as well as past / current practices of each member.  Patients identified situations in which using these strategies would reduce stress. They developed the ability to distinguish when these strategies can be useful in their lives for management and stress and psychological well-being.    Patient Session Goals / Objectives:  Understand the purpose of using grounding strategies to reduce stress.  Verbalize understanding of how and when to apply grounding strategies to reduce distress and increase presence in the moment.  Review patients current grounding practices and discuss a more formal way of practicing and accessing skills.  Practice using various calming strategies (e.g. 5-4-3-2-1; mental and body awareness).  Choose 1-2 grounding strategies to apply during times of distress.      Patient Participation / Response:  Fully participated with the group by sharing personal reflections / insights and openly received / provided feedback with other participants.    Demonstrated understanding of topics discussed through group discussion and participation, Expressed understanding of the relevance / importance of coping skills at distressing times in life, Demonstrated knowledge of when to consider  using a variety of coping skills in daily life, Identified barriers to applying coping skills, and Identified 2-3 positive coping strategies that have helped maintain / improve symptoms in the past    Treatment Plan:  Patient has a current master individualized treatment plan.  See Epic treatment plan for more information.    Cleo Zavala LICSW

## 2024-07-26 NOTE — GROUP NOTE
"Process Group Note    PATIENT'S NAME: Ilsa Yusuf  MRN:   [id number]  :   1960  ACCT. NUMBER: [id number]  DATE OF SERVICE: 24  START TIME:  1:00 PM  END TIME:  1:50 PM  FACILITATOR: Cleo Zavala LICSW  TOPIC:  Process Group    Diagnoses:  300.02 (F41.1) Generalized Anxiety Disorder    296.33 (F33.2) Major Depressive  Disorder, Recurrent Episode,  Severe - and with anxious distress  300.02      [facility] Mental [facility] Outpatient Programs  TRACK: IOP/DT 3 55+    NUMBER OF PARTICIPANTS: 7        Data:    Session content: At the start of this group, patients were invited to check in by identifying themselves, describing their current emotional status, and identifying issues to address in this group.   Area(s) of treatment focus addressed in this session included Symptom Management, Personal Safety, and Community Resources/Discharge Planning.  Reported mood is stable.  She reflected on needing emergency dental care after finding an abscess on her gums.  She reports pain today is \"pretty good\".   Client denied safety concerns, including SI and SIB. Client denies any chemical use.  Client is taking medications as prescribed. Client's goal is \"be present\".  Client reported plans to use the following coping skills: take care of physical self-care needs, humor, positive distraction, self-compassion.  She described using green cabbage as an anti-inflammatory.   Client is grateful for friends she made where she lives. Peers offered supportive feedback and validation.    Therapeutic Interventions/Treatment Strategies:  Psychotherapist offered support, feedback and validation and reinforced use of skills. Treatment modalities used include Cognitive Behavioral Therapy and Dialectical Behavioral Therapy. Interventions include Coping Skills: Assisted patient in identifying 1-2 healthy distraction skills to reduce overall distress, Discussed how the use of intentional \"in the moment\" actions can " help reduce distress, and Promoted understanding of how and when to apply grounding strategies to reduce distress and increase presence in the moment.    Assessment:    Patient response:   Patient responded to session by accepting feedback, giving feedback, listening, focusing on goals, being attentive, and accepting support    Possible barriers to participation / learning include: and no barriers identified    Health Issues:   Yes: Pain, No Psychological Distress       Substance Use Review:   Substance Use: No active concerns identified.    Mental Status/Behavioral Observations  Appearance:   Appropriate   Eye Contact:   Good   Psychomotor Behavior: Normal   Attitude:   Cooperative  Interested Friendly Pleasant  Orientation:   All  Speech   Rate / Production: Normal    Volume:  Normal   Mood:    Normal  Affect:    Appropriate   Thought Content:   Clear and Safety denies any current safety concerns including suicidal ideation, self-harm, and homicidal ideation  Thought Form:  Coherent  Logical     Insight:    Good     Plan:   Safety Plan: No current safety concerns identified.  Recommended that patient call 911 or go to the local ED should there be a change in any of these risk factors.   Barriers to treatment: None identified  Patient Contracts (see media tab):  None  Substance Use: Not addressed in session   Continue or Discharge: Patient will continue in 55+ Program (55+) as planned. Patient is likely to benefit from learning and using skills as they work toward the goals identified in their treatment plan.      Cleo Zavala, [facility]  July 26, 2024

## 2024-07-27 DIAGNOSIS — F41.1 GAD (GENERALIZED ANXIETY DISORDER): ICD-10-CM

## 2024-07-29 ENCOUNTER — HOSPITAL ENCOUNTER (OUTPATIENT)
Dept: BEHAVIORAL HEALTH | Facility: CLINIC | Age: 64
Discharge: HOME | End: 2024-07-29
Attending: PSYCHIATRY & NEUROLOGY
Payer: MEDICARE

## 2024-07-29 PROCEDURE — 90853 GROUP PSYCHOTHERAPY: CPT

## 2024-07-29 PROCEDURE — 90853 GROUP PSYCHOTHERAPY: CPT | Performed by: SOCIAL WORKER

## 2024-07-29 RX ORDER — HYDROXYZINE HYDROCHLORIDE 25 MG/1
TABLET, FILM COATED ORAL
Qty: 540 TABLET | Refills: 0 | Status: SHIPPED | OUTPATIENT
Start: 2024-07-29 | End: 2024-10-02

## 2024-07-29 NOTE — GROUP NOTE
"Process Group Note    PATIENT'S NAME: Ilsa Yusuf  MRN:   [id number]  :   1960  ACCT. NUMBER: [id number]  DATE OF SERVICE: 24  START TIME:  1:00 PM  END TIME:  1:50 PM  FACILITATOR: Cleo Zavala LICSW  TOPIC:  Process Group    Diagnoses:  300.02 (F41.1) Generalized Anxiety Disorder    296.33 (F33.2) Major Depressive  Disorder, Recurrent Episode,  Severe - and with anxious distress  300.02      [facility] Mental [facility] Outpatient Programs  TRACK: IOP/DT 3 55+    NUMBER OF PARTICIPANTS: 7        Data:    Session content: At the start of this group, patients were invited to check in by identifying themselves, describing their current emotional status, and identifying issues to address in this group.   Area(s) of treatment focus addressed in this session included Symptom Management, Personal Safety, and Community Resources/Discharge Planning.  Reported mood is stable. She reports high levels of pain in her jaw and head (and wonders if the pain is not just related to her teeth).  She is self-soothing using mindful imagery (\"anushka petals\").   Client denied safety concerns, including SI and SIB. Client denies any chemical use.  Client is taking medications as prescribed. Client's goal is \"being here\".  Client reported plans to use the following coping skills: radical acceptance, opposite to emotion. Client notified group peers that she will be keeping her eyes closed and that she is actively listening. Client is proud of \"how well the meditation is working\". Peers offered supportive feedback and validation.    Therapeutic Interventions/Treatment Strategies:  Psychotherapist offered support, feedback and validation and reinforced use of skills. Treatment modalities used include Cognitive Behavioral Therapy and Dialectical Behavioral Therapy. Interventions include Coping Skills: Facilitated discussion on learning and applying radical acceptance skill and Discussed meditation skills and " addressed ways to implement meditation skills  and Emotions Management:  Reviewed opposite action skill.    Assessment:    Patient response:   Patient responded to session by accepting feedback, giving feedback, listening, focusing on goals, being attentive, and accepting support    Possible barriers to participation / learning include: and no barriers identified    Health Issues:   Yes: Pain, Associated Psychological Distress       Substance Use Review:   Substance Use: No active concerns identified.    Mental Status/Behavioral Observations  Appearance:   Appropriate   Eye Contact:   Good   Psychomotor Behavior: Normal   Attitude:   Cooperative  Interested Friendly Pleasant  Orientation:   All  Speech   Rate / Production: Normal    Volume:  Normal   Mood:    Depressed   Affect:    Appropriate   Thought Content:   Clear and Safety denies any current safety concerns including suicidal ideation, self-harm, and homicidal ideation  Thought Form:  Coherent  Logical     Insight:    Good     Plan:   Safety Plan: No current safety concerns identified.  Recommended that patient call 911 or go to the local ED should there be a change in any of these risk factors.   Barriers to treatment: None identified  Patient Contracts (see media tab):  None  Substance Use: Not addressed in session   Continue or Discharge: Patient will continue in 55+ Program (55+) as planned. Patient is likely to benefit from learning and using skills as they work toward the goals identified in their treatment plan.      Cleo Zavala, [facility]  July 29, 2024

## 2024-07-29 NOTE — GROUP NOTE
Psychotherapy Group Note    PATIENT'S NAME: Ilsa Yusuf  MRN:   [id number]  :   1960  ACCT. NUMBER: [id number]  DATE OF SERVICE: 24  START TIME:  2:00 PM  END TIME:  2:50 PM  FACILITATOR: Cleo Zavala LICSW  TOPIC: MH EBP Group: Self-Awareness  [facility] Mental Health Outpatient Programs  TRACK: IOP/DT 3 55+    NUMBER OF PARTICIPANTS: 7    Summary of Group / Topics Discussed:  Self-Awareness: Values: Patients identified personal values by examining development of their current values and how their values influence their daily functioning and life choices. Patients explored the impact of their values on their thoughts, feelings, and actions. Patients discussed definition of personal values and how they develop and change over time. The goal is to help patients reconcile value conflicts and achieve balance and flexibility to improve mood and daily functioning.     Patient Session Goals / Objectives:  Examined development of values and impact of values on functioning  Identified and prioritized important values related to current well-being   Identified strategies to change or enhance values to positively impact symptoms  Assisted patients to find ways to adapt functioning to better fit their values      Patient Participation / Response:  Fully participated with the group by sharing personal reflections / insights and openly received / provided feedback with other participants.    Demonstrated understanding of topics discussed through group discussion and participation, Demonstrated understanding of values, strengths, and challenges to learn about themselves, Identified / Expressed readiness to act intentionally, increase self-compassion, promote personal growth, Verbalized understanding of ways to proactively manage illness, Identified plan to address barriers to practicing skills that promote self-awareness , and Practiced skills in session    Treatment Plan:  Patient has a  current master individualized treatment plan.  See Epic treatment plan for more information.    JAMES EdenSW

## 2024-07-29 NOTE — TELEPHONE ENCOUNTER
Date of Last Office Visit: 6/10/24  Date of Next Office Visit:  8/8/24  No shows since last visit: No  More than one patient-initiated cancellation (with reschedule) since last seen in clinic? No    [x]Medication refilled per  Medication Refill in Ambulatory Care  policy.  []Medication unable to be refilled by RN due to criteria not met as indicated below:    []Eligibility: has not had a provider visit within last 6 months   []Supervision: no future appointment; < 7 days before next appointment   []Compliance: no shows; cancellations; lapse in therapy   []Verification: order discrepancy; may need modification...   [] > 30-day supply request   []Advanced refill request: > 7 days before refill date   []Controlled medication   []Medication not included in policy   []Review: new med; med adjusted ? 30 days; safety alert; requires lab monitoring...   []Scope of Practice: refill request processed by LPN/MA   []Other:      Medication(s) requested:     -  hydrOXYzine HCl (ATARAX) 25 MG tablet   Date last ordered: 4/29/24  Qty: 540  Refills: 0  Appropriate for refill? Yes    Any Controlled Substance(s)? No      Requested medication(s) verified as identical to current order? Yes    Any lapse in adherence to medication(s) greater than 5 days? N/A , pt using PRN    Additional action taken? no.      Last visit treatment plan:   PLAN AND RECOMMENDATIONS:  Continue  Wellbutrin  mg  daily -   Continue Lexapro 20 mg every day  Continue hydroxyzine 25 - 50 mg instead of 3 times daily as needed for anxiety         Continue to take prazosin 5 mg at bedtime  Continue Buspar 20 mg three times daily for anxiety    Any medication(s) require lab monitoring? No

## 2024-07-29 NOTE — GROUP NOTE
Psychoeducation Group Note    PATIENT'S NAME: Ilsa Yusuf  MRN:   [id number]  :   1960  ACCT. NUMBER: [id number]  DATE OF SERVICE: 24  START TIME:  3:00 PM  END TIME:  3:50 PM  FACILITATOR: Falguni Johnson LICSW; Mariela Ortiz RN  TOPIC:  Wellness Group: Mind/Body Practice & Complementary  [facility] Adult Mental Health Outpatient Programs  TRACK: IOP/DT 3    NUMBER OF PARTICIPANTS: 5    Summary of Group / Topics Discussed:  Mind/Body Practice & Complementary Therapies:  Relaxation Techniques: In this group, patients were educated on a variety of relaxation and mindfulness skills to reduce distress and improve coping skills. The skills were taught to the group and then practiced through an organized exercise.     Skills taught & practiced included:  Personal Relaxation Scene, Deep Breathing Exercise, Sensory Exercise,  Guided Imagery,      Patient Session Goals / Objectives:  Identified relaxation skills  Explained how the various relaxation skills are practiced  Practiced relaxation experiential       Patient Participation / Response:  Fully participated with the group by sharing personal reflections / insights and openly received / provided feedback with other participants.    Demonstrated understanding of topics discussed through group discussion and participation and Verbalized understanding of Mind/Body Practice & Complementary Therapies topic    Treatment Plan:  Patient has a current master individualized treatment plan.  See Epic treatment plan for more information.    HARRIS Wong

## 2024-07-30 NOTE — ADDENDUM NOTE
Encounter addended by: Falguni Johnson, LICSW on: 7/30/2024 8:06 AM   Actions taken: Charge Capture section accepted
Statement Selected

## 2024-07-31 ENCOUNTER — HOSPITAL ENCOUNTER (OUTPATIENT)
Dept: BEHAVIORAL HEALTH | Facility: CLINIC | Age: 64
Discharge: HOME | End: 2024-07-31
Attending: PSYCHIATRY & NEUROLOGY
Payer: MEDICARE

## 2024-07-31 PROCEDURE — 90853 GROUP PSYCHOTHERAPY: CPT

## 2024-07-31 NOTE — GROUP NOTE
"Process Group Note    PATIENT'S NAME: Ilsa Yusuf  MRN:   [id number]  :   1960  ACCT. NUMBER: [id number]  DATE OF SERVICE: 24  START TIME:  1:00 PM  END TIME:  1:50 PM  FACILITATOR: Cleo Zavala LICSW  TOPIC:  Process Group    Diagnoses:  300.02 (F41.1) Generalized Anxiety Disorder    296.33 (F33.2) Major Depressive  Disorder, Recurrent Episode,  Severe - and with anxious distress  300.02      [facility] Mental [facility] Outpatient Programs  TRACK: IOP/DT 3 55+    NUMBER OF PARTICIPANTS: 6        Data:    Session content: At the start of this group, patients were invited to check in by identifying themselves, describing their current emotional status, and identifying issues to address in this group.   Area(s) of treatment focus addressed in this session included Symptom Management, Personal Safety, and Community Resources/Discharge Planning.  Reported mood is \"glad to be here\".  She reports ongoing jaw pain which is negatively impacting her mood.  She confirmed source of pain with her dentist.  She reports needing to go to an endodontist to extract her tooth.  She accepted an ice pack from program staff.  She reported barriers to vulnerability with emotions \"I was taught don't make waves\".   Client denied safety concerns, including SI and SIB. Client denies any chemical use.  Client is taking medications as prescribed. Client's goal is \"focus\" and \"opposite to emotion with my pain\".  Client is grateful for having lunch with her grandson and daughter today. Peers offered supportive feedback and validation.    Therapeutic Interventions/Treatment Strategies:  Psychotherapist offered support, feedback and validation and reinforced use of skills. Treatment modalities used include Cognitive Behavioral Therapy and Dialectical Behavioral Therapy. Interventions include Cognitive Restructuring:  Facilitated recognition of the connection between negative thoughts and negative core beliefs and " Emotions Management:  Discussed barriers to emotional regulation and Reviewed opposite action skill.    Assessment:    Patient response:   Patient responded to session by accepting feedback, giving feedback, listening, focusing on goals, being attentive, and accepting support    Possible barriers to participation / learning include: and no barriers identified    Health Issues:   Yes: Pain, Associated Psychological Distress       Substance Use Review:   Substance Use: No active concerns identified.    Mental Status/Behavioral Observations  Appearance:   Appropriate   Eye Contact:   Good   Psychomotor Behavior: Normal   Attitude:   Cooperative  Interested Friendly Pleasant  Orientation:   All  Speech   Rate / Production: Normal    Volume:  Normal   Mood:    Depressed   Affect:    Lethargic   Thought Content:   Clear and Safety denies any current safety concerns including suicidal ideation, self-harm, and homicidal ideation  Thought Form:  Coherent  Logical     Insight:    Good     Plan:   Safety Plan: No current safety concerns identified.  Recommended that patient call 911 or go to the local ED should there be a change in any of these risk factors.   Barriers to treatment: None identified  Patient Contracts (see media tab):  None  Substance Use: Not addressed in session   Continue or Discharge: Patient will continue in 55+ Program (55+) as planned. Patient is likely to benefit from learning and using skills as they work toward the goals identified in their treatment plan.      Cleo Zavala [facility]  July 31, 2024

## 2024-07-31 NOTE — GROUP NOTE
Psychotherapy Group Note    PATIENT'S NAME: Ilsa Yusuf  MRN:   [id number]  :   1960  ACCT. NUMBER: [id number]  DATE OF SERVICE: 24  START TIME:  2:00 PM  END TIME:  2:50 PM  FACILITATOR: Cleo Zavala LICSW  TOPIC:  EBP Group: Self-Awareness  [facility]  TRACK: IOP/DT 3 55+    NUMBER OF PARTICIPANTS: 6    Summary of Group / Topics Discussed:  Self-Awareness: Personal Strengths: Topic focused on assisting patients in identifying personal strengths and how they relate to the management of mental health symptoms. Patients discussed the benefits of acknowledging their personal strengths and their impact on mood improvement, mindfulness, and perspective. Patients worked to increase time spent on recognition and appreciation of what is positive and working in their lives. The goal is to reduce rumination and negative thinking resulting in increased mindfulness and resilience. Patients will work to put skills into practice and problem-solve barriers.     Patient Session Goals / Objectives:  Identified personal strengths  Identified barriers to recognition of personal strengths  Verbalized understanding of strategies to increase use of their strengths in management of daily symptoms      Patient Participation / Response:  Minimally participated, only when prompted / asked.    Opted not to participate in the following aspects of today's group: verbal discussion, due to jaw pain3 and hurts to talk    Treatment Plan:  Patient has a current master individualized treatment plan.  See Epic treatment plan for more information.    HARRIS Eden

## 2024-08-02 ENCOUNTER — TRANSFERRED RECORDS (OUTPATIENT)
Dept: HEALTH INFORMATION MANAGEMENT | Facility: CLINIC | Age: 64
End: 2024-08-02
Payer: MEDICARE

## 2024-08-05 PROBLEM — R09.81 NASAL CONGESTION: Status: ACTIVE | Noted: 2024-08-05

## 2024-08-06 ENCOUNTER — PRE VISIT (OUTPATIENT)
Dept: OTOLARYNGOLOGY | Facility: CLINIC | Age: 64
End: 2024-08-06

## 2024-08-07 ENCOUNTER — TELEPHONE (OUTPATIENT)
Dept: BEHAVIORAL HEALTH | Facility: CLINIC | Age: 64
End: 2024-08-07
Payer: MEDICARE

## 2024-08-07 NOTE — TELEPHONE ENCOUNTER
Writer spoke with patient as she called in absent from 55+ IOP/DT 3 due to pain after tooth extraction.  [facility] her [facility] extraction was disappointed they could not prescribe pain medication.  She plans on being in attendance on Thursday.  Writer offered the idea of discharging from group until her pain was more manageable.  She is motivated to return to group to get support.    HARRIS Eden on 8/7/2024 at 3:43 PM

## 2024-08-08 ENCOUNTER — TELEPHONE (OUTPATIENT)
Dept: PSYCHIATRY | Facility: CLINIC | Age: 64
End: 2024-08-08
Payer: MEDICARE

## 2024-08-08 ENCOUNTER — HOSPITAL ENCOUNTER (OUTPATIENT)
Dept: BEHAVIORAL HEALTH | Facility: CLINIC | Age: 64
Discharge: HOME | End: 2024-08-08
Attending: PSYCHIATRY & NEUROLOGY
Payer: MEDICARE

## 2024-08-08 PROCEDURE — 90853 GROUP PSYCHOTHERAPY: CPT

## 2024-08-08 PROCEDURE — 90853 GROUP PSYCHOTHERAPY: CPT | Performed by: SOCIAL WORKER

## 2024-08-08 NOTE — GROUP NOTE
Psychoeducation Group Note    PATIENT'S NAME: Ilsa Yusuf  MRN:   [id number]  :   1960  ACCT. NUMBER: [id number]  DATE OF SERVICE: 24  START TIME:  3:00 PM  END TIME:  3:50 PM  FACILITATOR: Falguni Johnson LICSW; Mariela Ortiz RN  TOPIC:  Wellness Group: Brain Health  [facility] Adult Mental Health Outpatient Programs  TRACK: IOPDT 3    NUMBER OF PARTICIPANTS: 6    Summary of Group / Topics Discussed:  Brain Health:  Healthy aging: Patients were educated on understanding wellness concepts as we age and incorporating holistic and healthful habits may improve outlook and enjoyment of life. An open conversation among group member was facilitated to explore topics related to changes in life that aging brings and offer feedback and wisdom.  Group members were challenged to identify personal warning signs, members of their support system, safety considerations, wellness strategies, and personal interests/hobbies.    Patient Session Goals / Objectives:  Described the normal process of aging and discussed the expected changes that it brings within the group  Listed ideas for incorporating holistic healthy habits in daily routine  Identified important warning signs, member of support system, pertinent safety considerations, wellness strategies, and interest/hobbies       Patient Participation / Response:  Fully participated with the group by sharing personal reflections / insights and openly received / provided feedback with other participants.    Demonstrated understanding of topics discussed through group discussion and participation and Verbalized understanding of brain health topic    Treatment Plan:  Patient has a current master individualized treatment plan.  See Epic treatment plan for more information.    HARRIS Wong

## 2024-08-08 NOTE — TELEPHONE ENCOUNTER
Patient may not attend today's appointment since she is currently under the care of Dr Cedeno and Dr. Garcia who are managing are medication at the 55+ outpatient program. Patient may schedule a follow up appointment after done with her programming.

## 2024-08-08 NOTE — TELEPHONE ENCOUNTER
RN called the patient to let her know she does need to keep the appt today with Shaji and that she needs to schedule a follow up appt with Shaji for after she completes the program.    RN notified the virtual facilitators that the patient will not be keeping today's appt.     RN forwarding to [facility] to call the patient to schedule another follow up appt.     Jolly Delgado RN on 8/8/2024 at 11:12 AM

## 2024-08-08 NOTE — GROUP NOTE
Psychoeducation Group Note    PATIENT'S NAME: Ilsa Yusuf  MRN:   [id number]  :   1960  ACCT. NUMBER: [id number]  DATE OF SERVICE: 24  START TIME:  2:00 PM  END TIME:  2:50 PM  FACILITATOR: Cleo Zavala LICSW  TOPIC: MH Wellness Group: Mental Health Maintenance  [facility] Adult Mental Health Outpatient Programs  TRACK: IOP/DT 3 55+    NUMBER OF PARTICIPANTS: 6    Summary of Group / Topics Discussed:  Mental Health Maintenance:  Vulnerability: In this group, the concept of vulnerability was explored through the viewing, discussion, and self-reflection of the Thor Salas Talk Titled,  The Power of Vulnerability.      Patient Session Goals / Objectives:  Defined and described definition of vulnerability   Identified 2 or more ways of practicing authenticity       Patient Participation / Response:  Fully participated with the group by sharing personal reflections / insights and openly received / provided feedback with other participants.    Demonstrated understanding of topics discussed through group discussion and participation, Identified / Expressed personal readiness to practice skills, and Verbalized understanding of mental health maintenance topic    Treatment Plan:  Patient has a current master individualized treatment plan.  See Epic treatment plan for more information.    HARRIS Eden

## 2024-08-08 NOTE — TELEPHONE ENCOUNTER
8/8/24: Reason for call:  Other   Patient called regarding (reason for call): call back  Additional comments: At the time of scheduling this appt, pt was not yet in 55+ program. Now she is, and stated that she is being followed by psychiatry there. Pt is wondering if today's appt is necessary, and/or if it would be doubling up on services. She is asking to discuss with a nurse.     Phone number to reach patient:  Cell number on file:    Telephone Information:   Mobile [phone]       Best Time:  ASAP    Can we leave a detailed message on this number?  YES    Travel screening: Not Applicable

## 2024-08-08 NOTE — GROUP NOTE
"Process Group Note    PATIENT'S NAME: Ilsa Yusuf  MRN:   [id number]  :   1960  ACCT. NUMBER: [id number]  DATE OF SERVICE: 24  START TIME:  1:20 PM  END TIME:  1:50 PM  FACILITATOR: Cleo Zavala LICSW  TOPIC:  Process Group    Diagnoses:  300.02 (F41.1) Generalized Anxiety Disorder    296.33 (F33.2) Major Depressive  Disorder, Recurrent Episode,  Severe - and with anxious distress  300.02      [facility] Adult Mental Health Outpatient Programs  TRACK: IOP/DT 3 55+    NUMBER OF PARTICIPANTS: 7        Data:    Session content: At the start of this group, patients were invited to check in by identifying themselves, describing their current emotional status, and identifying issues to address in this group.   Area(s) of treatment focus addressed in this session included Symptom Management, Personal Safety, and Community Resources/Discharge Planning.  Deanne arrived late to group due to losing her handicap accessible parking pass in between her car seats. Reported mood is depressed.  She reports less pain since the tooth extraction. She has found that a YouTube meditation \"taken care of most of the pain\".  She reflected on the impact of pain on her mood (\"it skews your thoughts and behavior\").   Client denied safety concerns, including SI and SIB. Client denies any chemical use.  Client is taking medications as prescribed.  Client reported plans to use the following coping skills: meditation, stretching, remembering to stand up periodically. Peers offered supportive feedback and validation.    Therapeutic Interventions/Treatment Strategies:  Psychotherapist offered support, feedback and validation and reinforced use of skills. Treatment modalities used include Cognitive Behavioral Therapy and Dialectical Behavioral Therapy. Interventions include Coping Skills: Discussed meditation skills and addressed ways to implement meditation skills  and Addressed barriers to utilizing coping skills when " in distress and Mindfulness: Facilitated discussion of when/how to use mindfulness skills to benefit general health, mental health symptoms, and stressors.    Assessment:    Patient response:   Patient responded to session by accepting feedback, giving feedback, listening, focusing on goals, being attentive, and accepting support    Possible barriers to participation / learning include: and no barriers identified    Health Issues:   Yes: Pain, No Psychological Distress       Substance Use Review:   Substance Use: No active concerns identified.    Mental Status/Behavioral Observations  Appearance:   Appropriate   Eye Contact:   Good   Psychomotor Behavior: Normal   Attitude:   Cooperative  Interested Friendly Pleasant  Orientation:   All  Speech   Rate / Production: Normal    Volume:  Normal   Mood:    Depressed   Affect:    Subdued   Thought Content:   Clear and Safety denies any current safety concerns including suicidal ideation, self-harm, and homicidal ideation  Thought Form:  Coherent  Logical     Insight:    Good     Plan:   Safety Plan: No current safety concerns identified.  Recommended that patient call 911 or go to the local ED should there be a change in any of these risk factors.   Barriers to treatment: None identified  Patient Contracts (see media tab):  None  Substance Use: Not addressed in session   Continue or Discharge: Patient will continue in 55+ Program (55+) as planned. Patient is likely to benefit from learning and using skills as they work toward the goals identified in their treatment plan.      Cleo Zavala, JAMESSW  August 8, 2024

## 2024-08-08 NOTE — TELEPHONE ENCOUNTER
RN reviewed [facility] notes and called the patient at [phone].    The patient is scheduled to see Shaji today at 11:30 AM today   She is currently seeing Dr. Cedeno and Dr. Garcia and she said they are managing her psychiatric medication while she is in the  and [facility] outpatient program.   She will be in the program for 4- 6 weeks    The patient would like to know does she need to keep today's appt with Akin

## 2024-08-09 ENCOUNTER — HOSPITAL ENCOUNTER (OUTPATIENT)
Dept: BEHAVIORAL HEALTH | Facility: CLINIC | Age: 64
Discharge: HOME | End: 2024-08-09
Attending: PSYCHIATRY & NEUROLOGY
Payer: MEDICARE

## 2024-08-09 PROCEDURE — 90853 GROUP PSYCHOTHERAPY: CPT

## 2024-08-09 PROCEDURE — 90853 GROUP PSYCHOTHERAPY: CPT | Performed by: SOCIAL WORKER

## 2024-08-09 NOTE — GROUP NOTE
"Process Group Note    PATIENT'S NAME: Ilsa Yusuf  MRN:   [id number]  :   1960  ACCT. NUMBER: [id number]  DATE OF SERVICE: 24  START TIME:  1:00 PM  END TIME:  1:50 PM  FACILITATOR: Cleo Zavala LICSW  TOPIC:  Process Group    Diagnoses:  300.02 (F41.1) Generalized Anxiety Disorder    296.33 (F33.2) Major Depressive  Disorder, Recurrent Episode,  Severe - and with anxious distress  300.02      [facility] Mental Health Outpatient Programs  TRACK: IOP/DT 3 55+    NUMBER OF PARTICIPANTS: 5        Data:    Session content: At the start of this group, patients were invited to check in by identifying themselves, describing their current emotional status, and identifying issues to address in this group.   Area(s) of treatment focus addressed in this session included Symptom Management, Personal Safety, and Community Resources/Discharge Planning.  Reported mood is \"off, exhausted\".  She reports interrupted sleep patterns.  She got blood work back from her doctor which pointed to potential diagnosis of Lupus.  She felt \"validated\" in her recent fatigue and pain.  She also has Fibromyalgia.  She finds it unfamiliar to \"take things slow\" which has been the case since the onset of her chronic illnesses.   Client denied safety concerns, including SI and SIB. Client denies any chemical use.  Client is taking medications as prescribed. Client's goal is to feel centered.  Client reported plans to use the following coping skills: one step at a time, affirmations \"I can see centered, I know I will get there\".   Client is grateful for friends who [place].  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  Peers offered supportive feedback and validation.    Therapeutic Interventions/Treatment Strategies:  Psychotherapist offered support, feedback and validation and reinforced use of skills. Treatment modalities used include Cognitive Behavioral Therapy and " "Dialectical Behavioral Therapy. Interventions include Coping Skills: Discussed use of self-soothe skills to decrease distress in the body and Promoted understanding of how and when to apply grounding strategies to reduce distress and increase presence in the moment, Mindfulness: Facilitated discussion of when/how to use mindfulness skills to benefit general health, mental health symptoms, and stressors, and Symptoms Management: Promoted understanding of their diagnoses and how it impacts their functioning.    Assessment:    Patient response:   Patient responded to session by accepting feedback, giving feedback, listening, focusing on goals, being attentive, and accepting support    Possible barriers to participation / learning include: and no barriers identified    Health Issues:   Yes: Chronic disease management, Associated Psychological Distress       Substance Use Review:   Substance Use: No active concerns identified.    Mental Status/Behavioral Observations  Appearance:   Appropriate   Eye Contact:   Good   Psychomotor Behavior: Normal   Attitude:   Cooperative  Interested Friendly Pleasant  Orientation:   All  Speech   Rate / Production: Normal    Volume:  Normal   Mood:    Depressed  \"Off, Exhausted\"  Affect:    Lethargic  Subdued   Thought Content:   Clear and Safety denies any current safety concerns including suicidal ideation, self-harm, and homicidal ideation  Thought Form:  Coherent  Logical     Insight:    Good     Plan:   Safety Plan: No current safety concerns identified.  Recommended that patient call 911 or go to the local ED should there be a change in any of these risk factors.   Barriers to treatment: None identified  Patient Contracts (see media tab):  None  Substance Use: Not addressed in session   Continue or Discharge: Patient will continue in 55+ Program (55+) as planned. Patient is likely to benefit from learning and using skills as they work toward the goals identified in their treatment " plan.      Cleo Zavala, [facility]  August 9, 2024

## 2024-08-09 NOTE — GROUP NOTE
Psychoeducation Group Note    PATIENT'S NAME: Ilsa Yusuf  MRN:   [id number]  :   1960  ACCT. NUMBER: [id number]  DATE OF SERVICE: 24  START TIME:  3:00 PM  END TIME:  3:50 PM  FACILITATOR: Falguni Johnson LICSW; Marta Brock OTR  TOPIC:  PHP OT Group: Lifestyle Balance and Structure  [facility] Adult Mental Health Outpatient Programs  TRACK: IOP/DT 3    NUMBER OF PARTICIPANTS: 4    Summary of Group / Topics Discussed:  Lifestyle Balance and Structure:  Lifestyle Balance and Structure: Benefits of Leisure on Mental Health: Patients explored and learned about the benefits and possibilities of leisure activity to create lifestyle balance that supports their mental and physical wellbeing.  Patients were assisted to identify individualized leisure interests, recognized the benefits of leisure activity on mental health, and problem solved barriers to leisure engagement and strategies to overcome.  Patients independently planned leisure activities to engage in during the week. Validation, support, and feedback provided throughout group process.      Patient Session Goals / Objectives:   Increased awareness of the importance of engagement in leisure activities to support. lifestyle balance and perceived quality of life.   Identified strategies to recognize and challenge barriers to leisure participation.    Facilitated exploration of meaningful leisure interests and values.   Practiced and reflected on how to generalize taught skills to their everyday life.       Patient Participation / Response:  Fully participated with the group by sharing personal reflections / insights and openly received / provided feedback with other participants.    Verbalized understanding of content    Treatment Plan:  Patient has a current master individualized treatment plan.  See Epic treatment plan for more information.    HARRIS Wong

## 2024-08-09 NOTE — GROUP NOTE
Psychotherapy Group Note    PATIENT'S NAME: Ilsa Yusuf  MRN:   [id number]  :   1960  ACCT. NUMBER: [id number]  DATE OF SERVICE: 24  START TIME:  2:00 PM  END TIME:  2:50 PM  FACILITATOR: Cleo Zavala LICSW  TOPIC: MH EBP Group: Coping Skills  [facility] Adult Mental Health Outpatient Programs  TRACK: IOP/DT 3 55+    NUMBER OF PARTICIPANTS: 5    Summary of Group / Topics Discussed:  Coping Skills: Additional Coping Skills:  Patients discussed and practiced the benefits of connecting to nature as a symptom management tool.  Reviewed the benefits of applying the aforementioned coping strategies.  Patients explored how these strategies might be applied to daily stressors or distressing situations.     Patient Session Goals / Objectives:         Understand the purpose and benefits of applying  coping strategies by connecting to nature.         Reviewed the physiology of stress and how connecting to nature assists in reducing stress hormones and promotes homeostasis.         Identified  ways to connect with nature both at home and in the environment  for a symptom management tool as well as for overall wellness.         Patient Participation / Response:  Fully participated with the group by sharing personal reflections / insights and openly received / provided feedback with other participants.    Demonstrated understanding of topics discussed through group discussion and participation, Expressed understanding of the relevance / importance of coping skills at distressing times in life, Demonstrated knowledge of when to consider using a variety of coping skills in daily life, Identified barriers to applying coping skills, Identified 2-3 positive coping strategies that have helped maintain / improve symptoms in the past, and Identified / Expressed personal readiness to practice new coping skills    Treatment Plan:  Patient has a current master individualized treatment plan.  See Epic treatment  plan for more information.    JAMES EdenSW

## 2024-08-12 ENCOUNTER — HOSPITAL ENCOUNTER (OUTPATIENT)
Dept: BEHAVIORAL HEALTH | Facility: CLINIC | Age: 64
Discharge: HOME | End: 2024-08-12
Attending: PSYCHIATRY & NEUROLOGY
Payer: MEDICARE

## 2024-08-12 PROCEDURE — 90853 GROUP PSYCHOTHERAPY: CPT

## 2024-08-12 PROCEDURE — 90853 GROUP PSYCHOTHERAPY: CPT | Performed by: SOCIAL WORKER

## 2024-08-12 NOTE — GROUP NOTE
Psychoeducation Group Note    PATIENT'S NAME: Ilsa Yusuf  MRN:   [id number]  :   1960  ACCT. NUMBER: [id number]  DATE OF SERVICE: 24  START TIME:  3:00 PM  END TIME:  3:50 PM  FACILITATOR: Falguni Johnson LICSW; Marta Brock OTR  TOPIC: MH Life Skills Group: Lifestyle Balance and Structure  [facility] Adult [facility] Outpatient Programs  TRACK: IOP/DT 3    NUMBER OF PARTICIPANTS: 6    Summary of Group / Topics Discussed:  Lifestyle Balance and Strucure:   Leisure Values: Provided psychoeducation and discussion on benefits of leisure on stress management and overall wellness. Facilitated a structured self-reflective process where patients identified their leisure values and motivation for participating in leisure.  Facilitated leisure experience in which patients engaged in a social game or creative expression task.  This provides an opportunity to gain self-awareness, build milieu cohesion and social skills, self-monitor personal performance skills, and identify the benefits of activity on their nervous system. Validation and support provided.     Patient Session Goals / Objectives:   [place] the mechanisms and benefits of leisure activity to create lifestyle balance and improve mental health.   Identified their leisure values and motivators   Identify the first step to engaging in a new or old leisure activity again and problem solve barriers.   Learn through an experiential intervention activity the benefits of leisure   Identify and reflect on the process and share insight around their engagement in the group process.       Patient Participation / Response:  Fully participated with the group by sharing personal reflections / insights and openly received / provided feedback with other participants.    Verbalized understanding of content    Treatment Plan:  Patient has a current master individualized treatment plan.  See Epic treatment plan for more information.    Falguni  HARRIS Townsend

## 2024-08-12 NOTE — GROUP NOTE
Psychotherapy Group Note    PATIENT'S NAME: Ilsa Yusuf  MRN:   [id number]  :   1960  ACCT. NUMBER: [id number]  DATE OF SERVICE: 24  START TIME:  2:00 PM  END TIME:  2:50 PM  FACILITATOR: Cleo Zavala LICSW  TOPIC: MH EBP Group: Coping Skills  [facility] Adult Mental Health Outpatient Programs  TRACK: IOP/DT 3 55+    NUMBER OF PARTICIPANTS: 6    Summary of Group / Topics Discussed:  Coping Skills: Additional Coping Skills:  Patients discussed and practiced TIPP (DBT) strategies.  Reviewed the benefits of applying the aforementioned coping strategies.  Patients explored how these strategies might be applied to daily stressors or distressing situations.    Patient Session Goals / Objectives:  Understand the purpose and benefits of applying TIPP coping strategies  Reviewed distress tolerance handout 6 (DBT)  Made a plan to apply TIPP strategies in times of distress.  Address barriers to utilizing coping skills when in distress.      Patient Participation / Response:  Fully participated with the group by sharing personal reflections / insights and openly received / provided feedback with other participants.    Demonstrated understanding of topics discussed through group discussion and participation, Expressed understanding of the relevance / importance of coping skills at distressing times in life, Demonstrated knowledge of when to consider using a variety of coping skills in daily life, Identified barriers to applying coping skills, and Identified 2-3 positive coping strategies that have helped maintain / improve symptoms in the past    Treatment Plan:  Patient has a current master individualized treatment plan.  See Epic treatment plan for more information.    HARRIS Eden

## 2024-08-12 NOTE — TELEPHONE ENCOUNTER
SPINE PATIENTS - NEW PROTOCOL PREVISIT    RECORDS RECEIVED FROM: Care Everywhere   REASON FOR VISIT: Chronic low back pain - concern for pain radiating down legs   PROVIDER: Elbert Luo MD   DATE OF APPT: 8/23/24 @ 11:30 am    NOTES (FOR ALL VISITS) STATUS DETAILS   OFFICE NOTE from referring provider Received 6/10/24 (Transcribed Orders), 8/2/24, 7/2/24, 6/6/24 (Transferred Recs)  Fatou Latham NP @[facility]    See Additional Transferred Recs   DISCHARGE REPORT from ER Internal 6/5/24 Fransisco Jerome MD @[facility] ED     EMG REPORT Internal [facility]  5/24/19 EMG     MEDICATION LIST Internal    IMAGING  (FOR ALL VISITS)     MRI (HEAD, NECK, SPINE) Internal [facility]  6/5/24 MR Lumbar Spine  5/2/18 MR Lumbar Spine  9/3/17 MR Lumbar Spine  8/16/17 MR Lumbar Spine  3/10/17 MR Thoracic Spine  3/10/17 MR Lumbar Spine     CT (HEAD, NECK, SPINE) Internal [facility]  8/16/17 CT Lumbar Spine

## 2024-08-12 NOTE — GROUP NOTE
Process Group Note    PATIENT'S NAME: Ilsa Yusuf  MRN:   [id number]  :   1960  ACCT. NUMBER: [id number]  DATE OF SERVICE: 24  START TIME:  1:00 PM  END TIME:  1:50 PM  FACILITATOR: Celo Zavala LICSW  TOPIC:  Process Group    Diagnoses:  300.02 (F41.1) Generalized Anxiety Disorder    296.33 (F33.2) Major Depressive  Disorder, Recurrent Episode,  Severe - and with anxious distress  300.02      [facility]  TRACK: IOP/DT 3 55+    NUMBER OF PARTICIPANTS: 6        Data:    Session content: At the start of this group, patients were invited to check in by identifying themselves, describing their current emotional status, and identifying issues to address in this group.   Area(s) of treatment focus addressed in this session included Symptom Management, Personal Safety, and Community Resources/Discharge Planning.  Reported mood is stable.  She enjoyed time with her grandson and attending [employer] this weekend.  She appreciated moments to practice mindfulness.   Client denied safety concerns, including SI and SIB. Client denies any chemical use.  Client is taking medications as prescribed.  She declined to check in further.    Deanne pulled writer aside and asked to connect with SELIN Sierra about her sleep apnea. Her CPAP mask wasn't fitting right and she did not feel heard by her sleep doctor.  Due to depression, she stopped using the mask.  She expressed wanting help coming up with a plan to reach back out to her sleep doctor.  Writer agreed to meet with her after group sessions today.    Therapeutic Interventions/Treatment Strategies:  Psychotherapist offered support, feedback and validation and reinforced use of skills. Treatment modalities used include Cognitive Behavioral Therapy and Dialectical Behavioral Therapy. Interventions include Coping Skills: Assisted patient in understanding the purpose of planning / creating / participating / sharing in  positive experiences and Mindfulness: Facilitated discussion of when/how to use mindfulness skills to benefit general health, mental health symptoms, and stressors.    Assessment:    Patient response:   Patient responded to session by accepting feedback, giving feedback, listening, focusing on goals, being attentive, and accepting support    Possible barriers to participation / learning include: and no barriers identified    Health Issues:   Yes: Sleep disturbance, Associated Psychological Distress       Substance Use Review:   Substance Use: No active concerns identified.    Mental Status/Behavioral Observations  Appearance:   Appropriate   Eye Contact:   Good   Psychomotor Behavior: Normal   Attitude:   Cooperative  Interested Friendly Pleasant  Orientation:   All  Speech   Rate / Production: Normal    Volume:  Normal   Mood:    Depressed   Affect:    Appropriate   Thought Content:   Clear and Safety denies any current safety concerns including suicidal ideation, self-harm, and homicidal ideation  Thought Form:  Coherent  Logical     Insight:    Good     Plan:   Safety Plan: No current safety concerns identified.  Recommended that patient call 911 or go to the local ED should there be a change in any of these risk factors.   Barriers to treatment: None identified  Patient Contracts (see media tab):  None  Substance Use: Not addressed in session   Continue or Discharge: Patient will continue in 55+ Program (55+) as planned. Patient is likely to benefit from learning and using skills as they work toward the goals identified in their treatment plan.      Cleo Zavala, HARRIS  August 12, 2024

## 2024-08-14 ENCOUNTER — HOSPITAL ENCOUNTER (OUTPATIENT)
Dept: BEHAVIORAL HEALTH | Facility: CLINIC | Age: 64
Discharge: HOME | End: 2024-08-14
Attending: PSYCHIATRY & NEUROLOGY
Payer: MEDICARE

## 2024-08-14 PROCEDURE — 90853 GROUP PSYCHOTHERAPY: CPT

## 2024-08-14 PROCEDURE — 90853 GROUP PSYCHOTHERAPY: CPT | Performed by: SOCIAL WORKER

## 2024-08-14 NOTE — GROUP NOTE
Psychoeducation Group Note    PATIENT'S NAME: Ilsa Yusuf  MRN:   [id number]  :   1960  ACCT. NUMBER: [id number]  DATE OF SERVICE: 24  START TIME:  3:00 PM  END TIME:  3:50 PM  FACILITATOR: Marta Brock OTR; Falguni Johnson [facility]  TOPIC:   Group: Self- Regulation Skills  [facility]  TRACK: IOP/DT 3    NUMBER OF PARTICIPANTS: 6    Summary of Group / Topics Discussed:  Self-Regulation Skills: Self-Regulation Skills: Difficulty Day Planning: Patients were guided through therapeutic discussion focused on identifying how they feel physically and emotionally when they are having a difficult day and are in need of support. Psychoeducation was offered regarding the potential benefits of developing a plan to follow when they are feeling dysregulated to assist with decision making, self-regulation, and ensuring safety. Patients then developed and personalized a functional difficult day plan which identifies skills and tools that are helpful in the areas of: medications, sleep, nutrition, movement, support, leisure or self-care activities, and coping skills. Patients identified a place they can keep this document to easily access it as needed.     Patient Session Goals / Objectives:    Identified how they feel physically and emotionally when they are having a difficult day, and what daily activities may become difficult.   Identified personalized skills to utilize when appropriate and incorporated them into a functional plan to follow as needed.   Established a plan for accessing their plan in their own environments.       Patient Participation / Response:  Fully participated with the group by sharing personal reflections / insights and openly received / provided feedback with other participants.    Verbalized understanding of content    Treatment Plan:  Patient has a current master individualized treatment plan.  See Epic treatment plan for more  information.    Falguni Johnson, LICSW

## 2024-08-14 NOTE — GROUP NOTE
Addended by: RONALDO APRIL S on: 3/9/2023 08:11 AM     Modules accepted: Orders     Psychotherapy Group Note    PATIENT'S NAME: Ilsa Yusuf  MRN:   [id number]  :   1960  ACCT. NUMBER: [id number]  DATE OF SERVICE: 24  START TIME:  2:00 PM  END TIME:  2:50 PM  FACILITATOR: Cleo Zavala LICSW  TOPIC: MH EBP Group: Coping Skills  [facility]  TRACK: IOP/DT 3 55+    NUMBER OF PARTICIPANTS: 6    Summary of Group / Topics Discussed:  Coping Skills: Radical Acceptance: Patients learned radical acceptance as a way to tolerate heightened stress in the moment.  Patients identified situations that necessitate radical acceptance.  They focused on applying acceptance of the moment to tolerate difficult emotions and events. Patients discussed how to distinguish when this can be useful in their lives and when other skills are more relevant or helpful.    Patient Session Goals / Objectives:  Understand that some amount of pain exists for each of us in our lives  Process the difficulty of acceptance in our lives and benefits of radical acceptance to mood and functioning.  Demonstrate understanding of when to use the radical acceptance to tolerate distress by providing examples of situations where this could be applied.  Identify barriers to applying radical acceptance to difficult situations and explore strategies to overcome them      Patient Participation / Response:  Fully participated with the group by sharing personal reflections / insights and openly received / provided feedback with other participants.    Demonstrated understanding of topics discussed through group discussion and participation, Expressed understanding of the relevance / importance of coping skills at distressing times in life, Demonstrated knowledge of when to consider using a variety of coping skills in daily life, Identified barriers to applying coping skills, Identified 2-3 positive coping strategies that have helped maintain / improve symptoms in the past, and Identified  / Expressed personal readiness to practice new coping skills    Treatment Plan:  Patient has a current master individualized treatment plan.  See Epic treatment plan for more information.    JAMES EdenSW

## 2024-08-14 NOTE — GROUP NOTE
"Process Group Note    PATIENT'S NAME: Ilsa Yusuf  MRN:   [id number]  :   1960  ACCT. NUMBER: [id number]  DATE OF SERVICE: 24  START TIME:  1:00 PM  END TIME:  1:50 PM  FACILITATOR: Cleo Zavala LICSW  TOPIC:  Process Group    Diagnoses:  300.02 (F41.1) Generalized Anxiety Disorder    296.33 (F33.2) Major Depressive  Disorder, Recurrent Episode,  Severe - and with anxious distress  300.02      [facility]  TRACK: IOP/DT 3 55+    NUMBER OF PARTICIPANTS: 6        Data:    Session content: At the start of this group, patients were invited to check in by identifying themselves, describing their current emotional status, and identifying issues to address in this group.   Area(s) of treatment focus addressed in this session included Symptom Management, Personal Safety, and Community Resources/Discharge Planning.  Reported mood is \"pretty good\", less depressed.  She reflected positively on running errands yesterday: \"I felt like I was 18; it was so relieving; I felt very centered\".  This experience caused her to reflect on her depressive symptoms: \"I get this tunnel vision, I can't believe I carry all that around\".   Client denied safety concerns, including SI and SIB. Client denies any chemical use.  Client is taking medications as prescribed. Client's goal is \"maintain the roadmap that got me here\".  Client reported plans to use the following coping skills: \"tactile, visual, auditory\" grounding, radical acceptance. Client is proud of herself \"I didn't undermine my teresa. It wasn't magic, it was me\". Peers offered supportive feedback and validation.    Therapeutic Interventions/Treatment Strategies:  Psychotherapist offered support, feedback and validation and reinforced use of skills. Treatment modalities used include Cognitive Behavioral Therapy and Dialectical Behavioral Therapy. Interventions include Coping Skills: Facilitated discussion on learning " "and applying radical acceptance skill and Promoted understanding of how and when to apply grounding strategies to reduce distress and increase presence in the moment and Symptoms Management: Promoted understanding of their diagnoses and how it impacts their functioning.    Assessment:    Patient response:   Patient responded to session by accepting feedback, giving feedback, listening, focusing on goals, being attentive, and accepting support    Possible barriers to participation / learning include: and no barriers identified    Health Issues:   Yes: Pain, No Psychological Distress       Substance Use Review:   Substance Use: No active concerns identified.    Mental Status/Behavioral Observations  Appearance:   Appropriate   Eye Contact:   Good   Psychomotor Behavior: Normal   Attitude:   Cooperative  Interested Friendly Pleasant  Orientation:   All  Speech   Rate / Production: Normal    Volume:  Normal   Mood:    Depressed  \"pretty good\"  Affect:    Appropriate   Thought Content:   Clear and Safety denies any current safety concerns including suicidal ideation, self-harm, and homicidal ideation  Thought Form:  Coherent  Logical     Insight:    Good     Plan:   Safety Plan: No current safety concerns identified.  Recommended that patient call 911 or go to the local ED should there be a change in any of these risk factors.   Barriers to treatment: None identified  Patient Contracts (see media tab):  None  Substance Use: Not addressed in session   Continue or Discharge: Patient will continue in 55+ Program (55+) as planned. Patient is likely to benefit from learning and using skills as they work toward the goals identified in their treatment plan.      Cleo Zavala, [facility]  August 14, 2024  "

## 2024-08-16 ENCOUNTER — TELEPHONE (OUTPATIENT)
Dept: BEHAVIORAL HEALTH | Facility: CLINIC | Age: 64
End: 2024-08-16
Payer: MEDICARE

## 2024-08-16 NOTE — TELEPHONE ENCOUNTER
The writer spoke with Ilsa, who informed them that she is unable to attend the programming today due to herniated discs, which are causing her significant pain (rating her pain at 7 to 8). Ilsa mentioned that the 2-hour daily commute, four times a week, is too challenging for her. She is open to participating in a DBT program with an external provider instead. The writer will inform the therapist of Ilsa s request, and Ilsa has expressed her understanding of this plan.

## 2024-08-20 ENCOUNTER — TELEPHONE (OUTPATIENT)
Dept: BEHAVIORAL HEALTH | Facility: CLINIC | Age: 64
End: 2024-08-20
Payer: MEDICARE

## 2024-08-20 NOTE — TELEPHONE ENCOUNTER
----- Message from Cleo Zavala sent at 8/19/2024  3:53 PM CDT -----  Regarding: Pt. Discharging from IOP/DT 3 55+  Patient Deanne is discharging from 55+ IOP/DT 3 (M,W,TH,F 1-4pm) effective today 8/19/2024.  Please remove any future appointments from the [facility].    All the best,    Cleo Zavala, LICSW

## 2024-08-21 DIAGNOSIS — M51.369 LUMBAR DEGENERATIVE DISC DISEASE: Primary | ICD-10-CM

## 2024-08-22 ENCOUNTER — TELEPHONE (OUTPATIENT)
Dept: NEUROSURGERY | Facility: CLINIC | Age: 64
End: 2024-08-22
Payer: MEDICARE

## 2024-08-23 ENCOUNTER — ANCILLARY PROCEDURE (OUTPATIENT)
Dept: GENERAL RADIOLOGY | Facility: CLINIC | Age: 64
End: 2024-08-23
Attending: NEUROLOGICAL SURGERY
Payer: MEDICARE

## 2024-08-23 ENCOUNTER — OFFICE VISIT (OUTPATIENT)
Dept: NEUROSURGERY | Facility: CLINIC | Age: 64
End: 2024-08-23
Payer: MEDICARE

## 2024-08-23 ENCOUNTER — PRE VISIT (OUTPATIENT)
Dept: NEUROSURGERY | Facility: CLINIC | Age: 64
End: 2024-08-23

## 2024-08-23 VITALS
SYSTOLIC BLOOD PRESSURE: 122 MMHG | OXYGEN SATURATION: 96 % | RESPIRATION RATE: 20 BRPM | HEIGHT: 69 IN | BODY MASS INDEX: 32.02 KG/M2 | DIASTOLIC BLOOD PRESSURE: 73 MMHG | HEART RATE: 67 BPM | WEIGHT: 216.2 LBS

## 2024-08-23 DIAGNOSIS — M51.369 LUMBAR DEGENERATIVE DISC DISEASE: Primary | ICD-10-CM

## 2024-08-23 DIAGNOSIS — M51.369 LUMBAR DEGENERATIVE DISC DISEASE: ICD-10-CM

## 2024-08-23 DIAGNOSIS — M54.2 NECK PAIN: ICD-10-CM

## 2024-08-23 PROCEDURE — 72110 X-RAY EXAM L-2 SPINE 4/>VWS: CPT | Mod: XU | Performed by: STUDENT IN AN ORGANIZED HEALTH CARE EDUCATION/TRAINING PROGRAM

## 2024-08-23 PROCEDURE — 99213 OFFICE O/P EST LOW 20 MIN: CPT | Performed by: NEUROLOGICAL SURGERY

## 2024-08-23 PROCEDURE — 72082 X-RAY EXAM ENTIRE SPI 2/3 VW: CPT | Performed by: STUDENT IN AN ORGANIZED HEALTH CARE EDUCATION/TRAINING PROGRAM

## 2024-08-23 ASSESSMENT — PAIN SCALES - GENERAL: PAINLEVEL: SEVERE PAIN (6)

## 2024-08-23 NOTE — NURSING NOTE
"Chief Complaint   Patient presents with    New Patient     /73 (BP Location: Right arm, Patient Position: Sitting, Cuff Size: Adult Large)   Pulse 67   Resp 20   Ht 1.745 m (5' 8.7\")   Wt 98.1 kg (216 lb 3.2 oz)   LMP 08/08/2016   SpO2 96%   BMI 32.21 kg/m      LAUREEN HANCOCK    "

## 2024-08-23 NOTE — PROGRESS NOTES
Date of service: 8/23/2024    Ilsa Husain is a 64-year-old female who presents our clinic for evaluation of low back pain with patchy area of numbness especially in her tailbone anterior thighs and intermittently with paresthesia and numbness in her lower legs bilaterally.  These symptoms have been slowly progressing over the years.  Afte she walks for about a half a mile, she begins to have weakness in her legs and they become somewhat wobbly.  She also has patchy area of numbness in the anterior thighs which is fairly constant.  Her low back pain is right at the lumbosacral junction and slightly below into the sacrum.    She has been followed at a pain clinic and has had steroid injections and other conservative therapies.    Past Medical History:   Diagnosis Date    Arthritis 2012    both knees; hands    Cervical high risk HPV (human papillomavirus) test positive 03/19/2019    see problem list    Depressive disorder     Depressive disorder, not elsewhere classified 08/28/12    DC 10/05/12-[facility]    Hyperlipidemia LDL goal < 130     mevacor    Hypothyroid     Moderate major depression (H)     abilify, cymbalta, seroquel, and sofya, Dr Antonio ePrales    Mumps     ABDOUL (obstructive sleep apnea)     PTSD (post-traumatic stress disorder)     Seizure (H) 03/2011    one episode, was on Keppra, EEG negative     Past Surgical History:   Procedure Laterality Date    ABDOMEN SURGERY      BLADDER SURGERY      CHOLECYSTECTOMY      COLONOSCOPY  2012    CYSTOSCOPY      CYSTOSCOPY, BIOPSY BLADDER, COMBINED N/A 11/1/2022    Procedure: EXAM UNDER ANESTHESIA, CYSTOSCOPY;  Surgeon: Terrie Melton MD;  Location: [facility] OR    ORTHOPEDIC SURGERY  left knee    renal artery surgery  child    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    TONSILLECTOMY      ureteral reimplantation      ZZC PARTIAL EXCISION THYROID,UNILAT  1991    rt, negative path then, treated with synthroid.     Current Outpatient Medications    Medication Sig Dispense Refill    azelastine (ASTELIN) 0.1 % nasal spray Spray 1 spray into both nostrils 2 times daily 30 mL 1    buPROPion (WELLBUTRIN SR) 200 MG 12 hr tablet TAKE 1 TABLET BY MOUTH EVERY DAY 90 tablet 0    busPIRone (BUSPAR) 10 MG tablet TAKE 2 TABLETS (20 MG) BY MOUTH 3 TIMES DAILY 540 tablet 1    cholecalciferol (VITAMIN D3) 5000 UNITS CAPS capsule Take 1 capsule (5,000 Units) by mouth daily Take 1 per day (Patient taking differently: Take 5,000 Units by mouth at bedtime. Take 1 per day) 100 capsule 2    diazepam (VALIUM) 10 MG tablet VAGINAL VALIUM SUPPOSITORY 10MG AT NIGHT AND PRIOR TO THERAPY AS NEEDED 30 tablet 3    dicyclomine (BENTYL) 10 MG capsule TAKE 1 CAPSULE BY MOUTH 4 TIMES A DAY AS NEEDED (ABDOMINAL PAIN OR CRAMPS)      escitalopram (LEXAPRO) 20 MG tablet TAKE 1 TABLET BY MOUTH EVERY DAY 90 tablet 0    hydrOXYzine HCl (ATARAX) 25 MG tablet TAKE 1 TO 2 TABLETS BY MOUTH 3 TIMES DAILY AS NEEDED FOR ANXIETY 540 tablet 0    levothyroxine (SYNTHROID/LEVOTHROID) 175 MCG tablet TAKE 1 TABLET BY MOUTH DAILY FOR 5 DAYS PER WEEK AND 1/2 TABLET ONE DAY PER WEEK 90 tablet 3    lovastatin (MEVACOR) 20 MG tablet Take 1 tablet (20 mg) by mouth at bedtime Due for office visit prior to further refill 30 tablet 0    multivitamin w/minerals (THERA-VIT-M) tablet Take 1 tablet by mouth daily (with lunch)      omeprazole (PRILOSEC) 40 MG DR capsule TAKE 1 CAPSULE (40 MG) BY MOUTH DAILY DUE FOR CLINIC VISIT PRIOR TO FURTHER REFILL 15 capsule 0    ondansetron (ZOFRAN) 4 MG tablet TAKE 1 TABLET (4MG) BY MOUTH EVERY 8HRS AS NEEDED FOR NAUSEA OR VOMITING      oxyCODONE IR (ROXICODONE) 10 MG tablet as needed      prazosin (MINIPRESS) 5 MG capsule TAKE 1 CAPSULE(5 MG) BY MOUTH AT BEDTIME 90 capsule 1    tiZANidine (ZANAFLEX) 4 MG tablet Take 1-3 tablets (4-12 mg) by mouth 3 times daily as needed for muscle spasms 210 tablet 3    topiramate (TOPAMAX) 25 MG tablet Take 25 mg by mouth 2 times daily      UNABLE TO  FIND MEDICATION NAME: medical cannibus      albuterol (PROAIR HFA/PROVENTIL HFA/VENTOLIN HFA) 108 (90 Base) MCG/ACT inhaler Inhale 1-2 puffs into the lungs every 4 hours as needed for shortness of breath / dyspnea or wheezing (Patient not taking: Reported on 7/19/2024) 18 g 0    naloxone (NARCAN) 4 MG/0.1ML nasal spray Spray 1 spray (4 mg) into one nostril alternating nostrils as needed for opioid reversal every 2-3 minutes until assistance arrives (Patient not taking: Reported on 7/19/2024) 0.2 mL 3    Omega-3 Fatty Acids (OMEGA 3 PO) Take 2 g by mouth 2 times daily Takes 1 in am and 1 at bedtime (Patient not taking: Reported on 7/19/2024)       No current facility-administered medications for this visit.     She has a normal strength.  Deep tendon reflexes are 2+ in the patella's 2+ in the radialis.  She has no Anthony sign.  She has no gait or balance difficulty.    I have personally reviewed the lumbar spine MRI scan which shows evidence of significant decrease in disc height at L5-S1 due to degenerative disc disease and there is evidence of Modic changes at the endplates.  Due to the subsidence of disc height there is a mild foraminal stenosis but no significant nerve compression.  Other levels only show mild degeneration.    Impression/plan:  Ilsa Alvarado is a 64-year-old female who presents our clinic for evaluation of low back pain with patchy area of numbness in anterior thighs tailbone region and sometimes in her calfs.  MRI scan does not show any central canal stenosis or nerve root compression.  Her symptoms are nonspecific and is not clear based on the MRI scan what is making her weak after she walks a certain distance.  She has no signs of myelopathy nor she has balance difficulty.  She may have a polyneuropathy and I did suggest an option of undergoing an EMG but at this time she would like to defer the study.  At this time there is no indication for surgical intervention.      Elbert Luo,  MD.

## 2024-08-23 NOTE — LETTER
8/23/2024       RE: Ilsa Yusuf  [address]  [address]  [facility] [address]     Dear Colleague,    Thank you for referring your patient, Ilsa Yusuf, to the [facility] [place] at [facility]. Please see a copy of my visit note below.    Date of service: 8/23/2024    Ilsa Husain is a 64-year-old female who presents our clinic for evaluation of low back pain with patchy area of numbness especially in her tailbone anterior thighs and intermittently with paresthesia and numbness in her lower legs bilaterally.  These symptoms have been slowly progressing over the years.  Afte she walks for about a half a mile, she begins to have weakness in her legs and they become somewhat wobbly.  She also has patchy area of numbness in the anterior thighs which is fairly constant.  Her low back pain is right at the lumbosacral junction and slightly below into the sacrum.    She has been followed at a pain clinic and has had steroid injections and other conservative therapies.    Past Medical History:   Diagnosis Date     Arthritis 2012    both knees; hands     Cervical high risk HPV (human papillomavirus) test positive 03/19/2019    see problem list     Depressive disorder      Depressive disorder, not elsewhere classified 08/28/12    DC 10/05/12-[facility]     Hyperlipidemia LDL goal < 130     mevacor     Hypothyroid      Moderate major depression (H)     abilify, cymbalta, seroquel, and nuvigil, Dr Antonio Perales     Mumps      ABDOUL (obstructive sleep apnea)      PTSD (post-traumatic stress disorder)      Seizure (H) 03/2011    one episode, was on Keppra, EEG negative     Past Surgical History:   Procedure Laterality Date     ABDOMEN SURGERY       BLADDER SURGERY       CHOLECYSTECTOMY       COLONOSCOPY  2012     CYSTOSCOPY       CYSTOSCOPY, BIOPSY BLADDER, COMBINED N/A 11/1/2022    Procedure: EXAM UNDER ANESTHESIA, CYSTOSCOPY;   Surgeon: Terrie Melton MD;  Location: [facility] OR     ORTHOPEDIC SURGERY  left knee     renal artery surgery  child    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     TONSILLECTOMY       ureteral reimplantation       ZZC PARTIAL EXCISION THYROID,UNILAT  1991    rt, negative path then, treated with synthroid.     Current Outpatient Medications   Medication Sig Dispense Refill     azelastine (ASTELIN) 0.1 % nasal spray Spray 1 spray into both nostrils 2 times daily 30 mL 1     buPROPion (WELLBUTRIN SR) 200 MG 12 hr tablet TAKE 1 TABLET BY MOUTH EVERY DAY 90 tablet 0     busPIRone (BUSPAR) 10 MG tablet TAKE 2 TABLETS (20 MG) BY MOUTH 3 TIMES DAILY 540 tablet 1     cholecalciferol (VITAMIN D3) 5000 UNITS CAPS capsule Take 1 capsule (5,000 Units) by mouth daily Take 1 per day (Patient taking differently: Take 5,000 Units by mouth at bedtime. Take 1 per day) 100 capsule 2     diazepam (VALIUM) 10 MG tablet VAGINAL VALIUM SUPPOSITORY 10MG AT NIGHT AND PRIOR TO THERAPY AS NEEDED 30 tablet 3     dicyclomine (BENTYL) 10 MG capsule TAKE 1 CAPSULE BY MOUTH 4 TIMES A DAY AS NEEDED (ABDOMINAL PAIN OR CRAMPS)       escitalopram (LEXAPRO) 20 MG tablet TAKE 1 TABLET BY MOUTH EVERY DAY 90 tablet 0     hydrOXYzine HCl (ATARAX) 25 MG tablet TAKE 1 TO 2 TABLETS BY MOUTH 3 TIMES DAILY AS NEEDED FOR ANXIETY 540 tablet 0     levothyroxine (SYNTHROID/LEVOTHROID) 175 MCG tablet TAKE 1 TABLET BY MOUTH DAILY FOR 5 DAYS PER WEEK AND 1/2 TABLET ONE DAY PER WEEK 90 tablet 3     lovastatin (MEVACOR) 20 MG tablet Take 1 tablet (20 mg) by mouth at bedtime Due for office visit prior to further refill 30 tablet 0     multivitamin w/minerals (THERA-VIT-M) tablet Take 1 tablet by mouth daily (with lunch)       omeprazole (PRILOSEC) 40 MG DR capsule TAKE 1 CAPSULE (40 MG) BY MOUTH DAILY DUE FOR CLINIC VISIT PRIOR TO FURTHER REFILL 15 capsule 0     ondansetron (ZOFRAN) 4 MG tablet TAKE 1 TABLET (4MG) BY MOUTH EVERY 8HRS AS NEEDED FOR NAUSEA OR  VOMITING       oxyCODONE IR (ROXICODONE) 10 MG tablet as needed       prazosin (MINIPRESS) 5 MG capsule TAKE 1 CAPSULE(5 MG) BY MOUTH AT BEDTIME 90 capsule 1     tiZANidine (ZANAFLEX) 4 MG tablet Take 1-3 tablets (4-12 mg) by mouth 3 times daily as needed for muscle spasms 210 tablet 3     topiramate (TOPAMAX) 25 MG tablet Take 25 mg by mouth 2 times daily       UNABLE TO FIND MEDICATION NAME: medical cannibus       albuterol (PROAIR HFA/PROVENTIL HFA/VENTOLIN HFA) 108 (90 Base) MCG/ACT inhaler Inhale 1-2 puffs into the lungs every 4 hours as needed for shortness of breath / dyspnea or wheezing (Patient not taking: Reported on 7/19/2024) 18 g 0     naloxone (NARCAN) 4 MG/0.1ML nasal spray Spray 1 spray (4 mg) into one nostril alternating nostrils as needed for opioid reversal every 2-3 minutes until assistance arrives (Patient not taking: Reported on 7/19/2024) 0.2 mL 3     Omega-3 Fatty Acids (OMEGA 3 PO) Take 2 g by mouth 2 times daily Takes 1 in am and 1 at bedtime (Patient not taking: Reported on 7/19/2024)       No current facility-administered medications for this visit.     She has a normal strength.  Deep tendon reflexes are 2+ in the patella's 2+ in the radialis.  She has no Anthony sign.  She has no gait or balance difficulty.    I have personally reviewed the lumbar spine MRI scan which shows evidence of significant decrease in disc height at L5-S1 due to degenerative disc disease and there is evidence of Modic changes at the endplates.  Due to the subsidence of disc height there is a mild foraminal stenosis but no significant nerve compression.  Other levels only show mild degeneration.    Impression/plan:  Ilsa Alvarado is a 64-year-old female who presents our clinic for evaluation of low back pain with patchy area of numbness in anterior thighs tailbone region and sometimes in her calfs.  MRI scan does not show any central canal stenosis or nerve root compression.  Her symptoms are nonspecific  and is not clear based on the MRI scan what is making her weak after she walks a certain distance.  She has no signs of myelopathy nor she has balance difficulty.  She may have a polyneuropathy and I did suggest an option of undergoing an EMG but at this time she would like to defer the study.  At this time there is no indication for surgical intervention.      Elbert Luo MD.        Again, thank you for allowing me to participate in the care of your patient.      Sincerely,    Elbert Luo MD

## 2024-08-26 DIAGNOSIS — N39.8 VOIDING DYSFUNCTION: ICD-10-CM

## 2024-08-26 DIAGNOSIS — R30.0 DYSURIA: ICD-10-CM

## 2024-08-26 DIAGNOSIS — N32.81 URGENCY-FREQUENCY SYNDROME: ICD-10-CM

## 2024-08-26 DIAGNOSIS — M62.89 PELVIC FLOOR DYSFUNCTION: ICD-10-CM

## 2024-08-26 DIAGNOSIS — M79.18 MYALGIA OF PELVIC FLOOR: ICD-10-CM

## 2024-08-26 DIAGNOSIS — R39.89 BLADDER PAIN: ICD-10-CM

## 2024-08-26 DIAGNOSIS — R10.2 SUPRAPUBIC ABDOMINAL PAIN: ICD-10-CM

## 2024-08-28 ENCOUNTER — TRANSFERRED RECORDS (OUTPATIENT)
Dept: HEALTH INFORMATION MANAGEMENT | Facility: CLINIC | Age: 64
End: 2024-08-28
Payer: MEDICARE

## 2024-08-30 RX ORDER — DIAZEPAM 10 MG
TABLET ORAL
Qty: 30 TABLET | Refills: 3 | Status: SHIPPED | OUTPATIENT
Start: 2024-08-30

## 2024-09-02 DIAGNOSIS — E03.9 ACQUIRED HYPOTHYROIDISM: ICD-10-CM

## 2024-09-02 NOTE — LETTER
September 5, 2024      Ilsa Yusuf  [address]    [facility] [id number]        Your current medication request for levothyroxine (SYNTHROID/LEVOTHROID) 175 MCG was declined.      You are due for a follow up with Dr. Nicolas for further refills. Please give us a call at [phone] to schedule visit        Gina Martel-

## 2024-09-03 NOTE — TELEPHONE ENCOUNTER
I have not seen the patient since December 2022.  I am unable to continue to fill this medication without visit and labs.  If she is wanting to set up these appointments I can send in small sky refill.  Okay for virtual for the medication check but would recommend in person at some point in the next 6 mo

## 2024-09-05 RX ORDER — LEVOTHYROXINE SODIUM 175 UG/1
TABLET ORAL
Qty: 72 TABLET | Refills: 3 | OUTPATIENT
Start: 2024-09-05

## 2024-09-16 ENCOUNTER — VIRTUAL VISIT (OUTPATIENT)
Dept: PSYCHIATRY | Facility: CLINIC | Age: 64
End: 2024-09-16
Payer: MEDICARE

## 2024-09-16 DIAGNOSIS — F41.1 GENERALIZED ANXIETY DISORDER: ICD-10-CM

## 2024-09-16 DIAGNOSIS — F43.10 PTSD (POST-TRAUMATIC STRESS DISORDER): ICD-10-CM

## 2024-09-16 DIAGNOSIS — F33.1 MAJOR DEPRESSIVE DISORDER, RECURRENT EPISODE, MODERATE (H): Primary | ICD-10-CM

## 2024-09-16 PROCEDURE — 99214 OFFICE O/P EST MOD 30 MIN: CPT | Mod: 95 | Performed by: NURSE PRACTITIONER

## 2024-09-16 ASSESSMENT — PATIENT HEALTH QUESTIONNAIRE - PHQ9
10. IF YOU CHECKED OFF ANY PROBLEMS, HOW DIFFICULT HAVE THESE PROBLEMS MADE IT FOR YOU TO DO YOUR WORK, TAKE CARE OF THINGS AT HOME, OR GET ALONG WITH OTHER PEOPLE: VERY DIFFICULT
SUM OF ALL RESPONSES TO PHQ QUESTIONS 1-9: 13
SUM OF ALL RESPONSES TO PHQ QUESTIONS 1-9: 13

## 2024-09-16 ASSESSMENT — PAIN SCALES - GENERAL: PAINLEVEL: NO PAIN (0)

## 2024-09-16 NOTE — NURSING NOTE
Current patient location: [address]    [facility] [address]    Is the patient currently in the state of [place]? YES    Visit mode:VIDEO    If the visit is dropped, the patient can be reconnected by: VIDEO VISIT: Send to e-mail at: [email or web address]    Will anyone else be joining the visit? NO  (If patient encounters technical issues they should call [phone]    How would you like to obtain your AVS? MyChart    Are changes needed to the allergy or medication list? No    Are refills needed on medications prescribed by this physician? YES    Rooming Documentation:  Questionnaire(s) completed      Reason for visit: RECHECK    Linden ROBBINSF

## 2024-09-16 NOTE — PROGRESS NOTES
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 taken to minimize risk include making medication adjustment, asking patient to call 911 and go to the ER if not able to stay safe at home,  Therefore, based on all available evidence including the factors cited above, Deanne does not appear to be an imminent danger to self or others and does not meet criteria 72 hour hold. However, if patient uses substances or is non-adherent with medication, their risk of decompensation and SI/HI will be elevated. This was discussed with the patient as she verbalized good understanding.   CONSULTS/REFERRALS:   Recommend therapy. Referral placed for [facility].  Call [facility] at [phone] if you do not hear from them soon  Coordinate care with therapist as needed    MEDICAL:   None at this time  Coordinate care with PCP (Crista Elder) as needed  Follow up with primary care provider as planned or for acute medical concerns.    PSYCHOEDUCATION:  Medication side effects and alternatives reviewed. Health promotion activities recommended and reviewed today. All questions addressed. Education and counseling completed regarding risks and benefits of medications and psychotherapy options.  Consent provided by patient/guardian  Call the  psychiatric nurse line with medication questions or concerns at [phone].  MyChart may be used to communicate with your provider, but this is not intended to be used for emergencies.  BLACK BOX WARNING: Discussed the Food and Drug Administration (FDA) requires that all antidepressants carry a warning that some children, adolescents and young adults may be at increased risk of suicide when taking antidepressants. Anyone taking an antidepressant should be watched closely for worsening depression or unusual behavior especially in the first few weeks after starting an SSRI. Keep in mind, antidepressants are more likely to reduce suicide risk in the long run by improving mood.   SEROTONIN SYNDROME:  Discussed risks of Serotonin syndrome (ie, serotonin toxicity) which is a potentially life-threatening condition associated with increased serotonergic activity in the central nervous system (CNS). It is seen with therapeutic medication use, inadvertent interactions between drugs, and intentional self-poisoning. Serotonin syndrome may involve a spectrum of clinical findings, which often include mental status changes, autonomic hyperactivity, and neuromuscular abnormalities.    BENZODIAZEPINE:  discussion on how benzos work and the need to use them short term due to potential of anxiety getting.  This is a controlled substance with risk for abuse, need to keep in a safe keep place and cannot replace lost scripts.    HARM REDUCTION:  Discussions regarding effects of mood altering substances, alcohol and cannabis, on mood and that approach is harm reduction, will continue to prescribe meds as they work to cut back use.    FIRST GENERATION ANTIPSYCHOTIC/ SECOND GENERATION ANTIPSYCHOTIC USE:  Atypical need for cardiometabolic monitoring with medication- B/P, weight, blood sugar, cholesterol.  Need to monitor for abnormal movements taught  SAFETY:  We all care about your loved one's safety. To reduce the risk of self-harm,  remove access to all:  Firearms, Medicines (both prescribed and over-the-counter), Knives and other sharp objects, Ropes and like materials, and Alcohol  SLEEP HYGIENE: establish a sleep routine, limit screen time 1 hour prior to bed, use bed for sleep only, take sleep/medications on time (including sleepy time tea, trazadone or herbal treatments such as melatonin), aroma therapy, limit caffeine/sugar, yoga, guided imagery, stretch, meditation, limit naps to 20 minutes, make a temperature change in the room, white noise, be mindful of slowing down breathing, take a warm bath/shower, frequently wash sheets, and journaling.   Medlineplus.gov is information for patients.  It is run by the [employer] of [employer] and it contains information about all disorders, diseases and all medications.      COMMUNITY RESOURCES:    CRISIS NUMBERS: Provided in AVS 2023  National Suicide Prevention Lifeline: 8-594-955-TALK (924-703-8757)  [employer]/resources for a list of additional resources (SOS)            [facility] - [phone]   [facility]-[phone] mobile unit/  crisis line  [facility] -[phone]   COPE  [place] Mobile Team -[phone] (adults)/ [phone] (child)  Poison Control Center - 1-461.771.4139    OR  go to nearest ER  Crisis Text Line for any crisis  send this-   To: 178396   [facility]  [phone]  National Suicide Prevention Lifeline: 265.417.8164 (TTY: 657.598.7066). Call anytime for help.  (www.suicidepreventionlifeline.org)  National [place] on Mental Illness (www.ellie.org): 940.697.9645 or 948-546-7433.   Mental Health Association (www.mentalhealth.org): 461.623.2224 or 884-336-1289.  [place] Crisis Text Line: Text [place] to 049017  Suicide LifeLine Chat: suicidepreKonkuraline.org/chat    ADMINISTRATIVE BILLIN min spent interviewing patient, reviewing referral documents,  obtaining and reviewing outside records, communication with other health specialists, and preparing this report on today's date: 9/16/2024  Video/Phone Start Time: 1035  Video/Phone End Time:  1104      Signed:     Ezekiel Cheng, MSN, APRN, PMHNP-BC     Chart documentation done in part with Dragon Voice Recognition software.  Although reviewed after completion, some word and grammatical errors may remain.  Answers for HPI/ROS submitted by the patient on 9/1/2022  If you checked off any problems, how difficult have these problems made it for you to do your work, take care of things at home, or get along with other people?: Somewhat difficult  PHQ9 TOTAL SCORE: 7  DONELL 7 TOTAL SCORE: 13    Answers for HPI/ROS submitted by the patient on 2/3/2023  If you checked off any problems, how difficult have these problems made it for you to do your work, take care of things at home, or get along with other people?: Very difficult  PHQ9 TOTAL SCORE: 3  DONELL 7 TOTAL SCORE: 6  Answers for HPI/ROS submitted by the patient on 4/14/2023  If you checked off any problems, how difficult have these problems made it for you to do your work, take care of things at home, or get along with other people?: Somewhat difficult  PHQ9 TOTAL SCORE: 5  DONELL 7 TOTAL SCORE: 8    Answers for HPI/ROS submitted by the patient on 5/25/2023  If you checked off any problems, how difficult have these problems made it for you to do your work, take care of things at home, or get along with other people?: Extremely difficult  PHQ9 TOTAL SCORE: 12  DONELL 7 TOTAL SCORE: 9Answers submitted by the patient for this visit:  Patient Health Questionnaire (Submitted on 9/19/2023)  If you checked off any problems, how difficult have these problems made it for you to do your work, take care of things at home, or get along with other people?: Somewhat difficult  PHQ9 TOTAL SCORE: 5  DONELL-7 (Submitted on 9/19/2023)  DONELL 7 TOTAL SCORE: 14    Answers submitted by the patient for  this visit:  Patient Health Questionnaire (Submitted on 1/17/2024)  If you checked off any problems, how difficult have these problems made it for you to do your work, take care of things at home, or get along with other people?: Extremely difficult  PHQ9 TOTAL SCORE: 8  DONELL-7 (Submitted on 1/17/2024)  DONELL 7 TOTAL SCORE: 7    Answers submitted by the patient for this visit:  Patient Health Questionnaire (Submitted on 9/16/2024)  If you checked off any problems, how difficult have these problems made it for you to do your work, take care of things at home, or get along with other people?: Very difficult  PHQ9 TOTAL SCORE: 13

## 2024-09-16 NOTE — PATIENT INSTRUCTIONS
"Patient Education   The Panel Psychiatry Program  What to Expect  Here's what to expect in the Panel Psychiatry Program.   About the program  You'll be meeting with a psychiatric doctor to check your mental health. A psychiatric doctor helps you deal with troubling thoughts and feelings by giving you medicine. They'll make sure you know the plan for your care. You may see them for a long time. When you're feeling better, they may refer you back to seeing your family doctor.   If you have any questions, we'll be glad to talk to you.  About visits  Be open  At your visits, please talk openly about your problems. It may feel hard, but it's the best way for us to help you.  Cancelling visits  If you can't come to your visit, please call us right away at [phone]. If you don't cancel at least 24 hours (1 full day) before your visit, that's \"late cancellation.\"  Not showing up for your visits  Being very late is the same as not showing up. You'll be a \"no show\" if:  You're more than 15 minutes late for a 30-minute (half hour) visit.  You're more than 30 minutes late for a 60-minute (full hour) visit.  If you cancel late or don't show up 2 times within 6 months, we may end your care.  Getting help between visits  If you need help between visits, you can call us Monday to Friday from 8 a.m. to 4:30 p.m. at [phone].  Emergency care  Call 911 or go to the nearest emergency department if your life or someone else's life is in danger.  Call 988 anytime to reach the national Suicide and Crisis hotline.  Medicine refills  To refill your medicine, call your pharmacy. You can also call [facility]'s [facility] at [phone], Monday to Friday, 8 a.m. to 4:30 p.m. It can take 1 to 3 business days to get a refill.   Forms, letters, and tests  You may have papers to fill out, like FMLA, short-term disability, and workability. We can help you with these forms at your visits, but you must have an " appointment. You may need more than 1 visit for this, to be in an intensive therapy program, or both.  Before we can give you medicine for ADHD, we may refer you to get tested for it or confirm it another way.  We may not be able to give you an emotional support animal letter.  We don't do mental health checks ordered by the court.   We don't do mental health testing, but we can refer you to get tested.   Thank you for choosing us for your care.  For informational purposes only. Not to replace the advice of your health care provider. Copyright   2022 [facility]. All rights reserved. [employer] [id number] - 12/22.     PLAN AND RECOMMENDATIONS:  Continue  Wellbutrin  mg  daily -   Continue Lexapro 20 mg every day  Continue hydroxyzine 25 - 50 mg instead of 3 times daily as needed for anxiety   Continue to take prazosin 5 mg at bedtime  Continue Buspar 20 mg three times daily for anxiety       We have discussed his/her diagnosis, prognosis, differential diagnosis and side effects and benefits of medications.     Patient and I revieweddiagnosis and treatment plan and patient agrees with following recommendations:    1.Patient will take the medications as prescribed. Patient will not stop taking medications or adjust them without consulting with theprovider.  2.Patient will call with any problems between 2 visits.  3.Patient will go to the emergency room if not feeling safe , unable to function in the community, or if suicidal, homicidal or hearing voices orhaving paranoia.  4.Patient will abstain from drugs and alcohol.  5.Patient will not drive if sedated on medications or under influence of any substance. 6.Patient will not mix psychiatric medications with drugs andalcohol.   7.Patient will watch his diet and exercise.  8.Patient will see non psychiatric providers for non psychiatric disorders.

## 2024-09-16 NOTE — PROGRESS NOTES
Virtual Visit Details    Type of service:  Video Visit   Video Start Time:  1035  Video End Time: 1104    Originating Location (pt. Location): Home    Distant Location (provider location):  On-site  Platform used for Video Visit: Total Boox  Answers submitted by the patient for this visit:  Patient Health Questionnaire (Submitted on 9/16/2024)  If you checked off any problems, how difficult have these problems made it for you to do your work, take care of things at home, or get along with other people?: Very difficult  PHQ9 TOTAL SCORE: 13

## 2024-09-20 NOTE — TELEPHONE ENCOUNTER
FUTURE VISIT INFORMATION      FUTURE VISIT INFORMATION:  Date: 11/6/24  Time: 11:10AM  [place] [facility] [place]FORMATION:  Referring provider:  Goltz, Bennett Ezra, PA-C   Referring providers clinic:  [facility]   Reason for visit/diagnosis  New per pt-ref, R09.81 (ICD-10-CM) - Nasal congestion, Referral from Bennett Goltz, Referral notes in [facility] Pt made appt for [facility]SC     RECORDS REQUESTED FROM:       Clinic name Comments Records Status Imaging Status   [facility] [place] 1/30/24 referral/ note- Goltz, Bennett Ezra, PA-C  [facility] 1/4/21 CTA head  1/4/21 CT head  5/8/19 XR cervical  3/26/19 MR cervical [facility] PACS   [facility] 9/17/20 note- Jeffry Gannon MD   8/10/20 note- Sophia Kern MD  Epic

## 2024-09-25 ENCOUNTER — MYC MEDICAL ADVICE (OUTPATIENT)
Dept: FAMILY MEDICINE | Facility: CLINIC | Age: 64
End: 2024-09-25
Payer: MEDICARE

## 2024-09-25 DIAGNOSIS — E03.9 ACQUIRED HYPOTHYROIDISM: ICD-10-CM

## 2024-09-25 RX ORDER — LEVOTHYROXINE SODIUM 175 UG/1
175 TABLET ORAL DAILY
Qty: 7 TABLET | Refills: 0 | Status: SHIPPED | OUTPATIENT
Start: 2024-09-25 | End: 2024-09-27

## 2024-09-27 ENCOUNTER — VIRTUAL VISIT (OUTPATIENT)
Dept: FAMILY MEDICINE | Facility: CLINIC | Age: 64
End: 2024-09-27
Payer: MEDICARE

## 2024-09-27 DIAGNOSIS — D58.2 ELEVATED HEMOGLOBIN (H): ICD-10-CM

## 2024-09-27 DIAGNOSIS — E03.9 ACQUIRED HYPOTHYROIDISM: Primary | ICD-10-CM

## 2024-09-27 DIAGNOSIS — N18.2 STAGE 2 CHRONIC KIDNEY DISEASE: ICD-10-CM

## 2024-09-27 PROBLEM — F98.8 OTHER SPECIFIED BEHAVIORAL AND EMOTIONAL DISORDERS WITH ONSET USUALLY OCCURRING IN CHILDHOOD AND ADOLESCENCE: Status: ACTIVE | Noted: 2019-09-05

## 2024-09-27 PROBLEM — M79.645 BILATERAL THUMB PAIN: Status: RESOLVED | Noted: 2022-04-18 | Resolved: 2024-09-27

## 2024-09-27 PROBLEM — F98.8 ATTENTION DEFICIT DISORDER (ADD) IN ADULT: Status: ACTIVE | Noted: 2019-09-05

## 2024-09-27 PROBLEM — M79.644 BILATERAL THUMB PAIN: Status: RESOLVED | Noted: 2022-04-18 | Resolved: 2024-09-27

## 2024-09-27 PROCEDURE — 99213 OFFICE O/P EST LOW 20 MIN: CPT | Mod: 95 | Performed by: STUDENT IN AN ORGANIZED HEALTH CARE EDUCATION/TRAINING PROGRAM

## 2024-09-27 RX ORDER — LEVOTHYROXINE SODIUM 175 UG/1
175 TABLET ORAL DAILY
Qty: 7 TABLET | Refills: 0 | Status: SHIPPED | OUTPATIENT
Start: 2024-09-27 | End: 2024-09-27

## 2024-09-27 RX ORDER — LEVOTHYROXINE SODIUM 175 UG/1
175 TABLET ORAL DAILY
Qty: 90 TABLET | Refills: 0 | Status: SHIPPED | OUTPATIENT
Start: 2024-09-27

## 2024-09-27 NOTE — PATIENT INSTRUCTIONS
Mauricio Alicea,    Thank you for allowing [facility] to manage your care.    I ordered some blood work, please go to the laboratory to get your laboratory studies.        I sent your prescriptions to your pharmacy.        For your convenience, test results are released as soon as they are available  Please allow 1-2 business days for me to send you a comment about your results.  If not done so, I encourage you to login into [facility] ([email or web address] to review your results in real time.     If you have any questions or concerns, please feel free to call us at [phone].    Sincerely,    Dr. Vinson    Did you know?      You can schedule a video visit for follow-up appointments as well as future appointments for certain conditions.  Please see the below link.     [email or web address]    If you have not already done so,  I encourage you to sign up for [email or web address] ([email or web address]).  This will allow you to review your results, securely communicate with a provider, and schedule virtual visits as well.

## 2024-09-27 NOTE — PROGRESS NOTES
Deanne is a 64 year old who is being evaluated via a billable video visit.    How would you like to obtain your AVS? MyChart  If the video visit is dropped, the invitation should be resent by: Text to cell phone: [phone]  Will anyone else be joining your video visit? No      Assessment & Plan     Acquired hypothyroidism  Status unknown as she has not taken medication in 3 weeks.  - levothyroxine (SYNTHROID/LEVOTHROID) 175 MCG tablet; Take 1 tablet (175 mcg) by mouth daily.  - TSH with free T4 reflex; Future to be done in 6-8 weeks    Elevated hemoglobin (H)  No concern at this time    Stage 2 chronic kidney disease  NO concern at this time              Subjective   Deanne is a 64 year old, presenting for the following health issues:  Thyroid Problem      9/27/2024    10:27 AM   Additional Questions   Roomed by Cari BEATTY         9/27/2024    10:27 AM   Patient Reported Additional Medications   Patient reports taking the following new medications No new medications to add     Video Start Time:  11:19    HPI     Hypothyroidism Follow-up    Since last visit, patient describes the following symptoms: Weight stable, no hair loss, no skin changes, no constipation, no loose stools    How many servings of fruits and vegetables do you eat daily?  0-1  On average, how many sweetened beverages do you drink each day (Examples: soda, juice, sweet tea, etc.  Do NOT count diet or artificially sweetened beverages)?   4 oz orange daily  How many days per week do you exercise enough to make your heart beat faster? 7  How many minutes a day do you exercise enough to make your heart beat faster? 60 or more  How many days per week do you miss taking your medication? 0        Review of Systems  Constitutional, HEENT, cardiovascular, pulmonary, gi and gu systems are negative, except as otherwise noted.      Objective           Vitals:  No vitals were obtained today due to virtual visit.    Physical Exam   GENERAL: alert and no  distress  EYES: Eyes grossly normal to inspection.  No discharge or erythema, or obvious scleral/conjunctival abnormalities.  RESP: No audible wheeze, cough, or visible cyanosis.    SKIN: Visible skin clear. No significant rash, abnormal pigmentation or lesions.  PSYCH: Appropriate affect, tone, and pace of words          Video-Visit Details    Type of service:  Video Visit   Video End Time: 11:25  Originating Location (pt. Location): Home    Distant Location (provider location):  On-site  Platform used for Video Visit: Luna  Signed Electronically by: Karie Vinson MD

## 2024-10-02 ENCOUNTER — MYC MEDICAL ADVICE (OUTPATIENT)
Dept: PSYCHIATRY | Facility: CLINIC | Age: 64
End: 2024-10-02
Payer: MEDICARE

## 2024-10-02 DIAGNOSIS — F41.1 GAD (GENERALIZED ANXIETY DISORDER): ICD-10-CM

## 2024-10-02 RX ORDER — BUSPIRONE HYDROCHLORIDE 10 MG/1
20 TABLET ORAL 3 TIMES DAILY
Qty: 540 TABLET | Refills: 0 | OUTPATIENT
Start: 2024-10-02

## 2024-10-02 RX ORDER — HYDROXYZINE HYDROCHLORIDE 25 MG/1
TABLET, FILM COATED ORAL
Qty: 540 TABLET | Refills: 0 | Status: SHIPPED | OUTPATIENT
Start: 2024-10-02

## 2024-10-02 NOTE — TELEPHONE ENCOUNTER
Date of Last Office Visit: 9/16/24  Date of Next Office Visit:  10/15/24  No shows since last visit: No  More than one patient-initiated cancellation (with reschedule) since last seen in clinic? No    []Medication refilled per  Medication Refill in Ambulatory Care  policy.  [x]Medication unable to be refilled by RN due to criteria not met as indicated below:    []Eligibility: has not had a provider visit within last 6 months   []Supervision: no future appointment; < 7 days before next appointment   []Compliance: no shows; cancellations; lapse in therapy   []Verification: order discrepancy; may need modification...   [x] > 30-day supply request   []Advanced refill request: > 7 days before refill date   []Controlled medication   []Medication not included in policy   []Review: new med; med adjusted ? 30 days; safety alert; requires lab monitoring...   []Scope of Practice: refill request processed by LPN/MA   []Other:      Medication(s) requested:     -  hydrOXYzine HCl (ATARAX) 25 MG tablet  Date last ordered: 7/29/24  Qty: 540  Refills: 0  Appropriate for refill? Provider to review.      -  busPIRone (BUSPAR) 10 MG tablet  Date last ordered: 6/3/24  Qty: 540  Refills: 1  Appropriate for refill? No: pharmacy should have refill(s) on file     Any Controlled Substance(s)? No      Requested medication(s) verified as identical to current order? Yes    Any lapse in adherence to medication(s) greater than 5 days? No      Additional action taken? contacted patient via O-RID to notify her that a refill of Buspar should be on profile at her pharmacy. RN sent that refill request back to pharmacy as DENIED, and cued up refillable medication for provider to review.      Last visit treatment plan:      PLAN AND RECOMMENDATIONS:  Continue  Wellbutrin  mg  daily -   Continue Lexapro 20 mg every day  Continue hydroxyzine 25 - 50 mg instead of 3 times daily as needed for anxiety         Continue to take prazosin 5 mg at  bedtime  Continue Buspar 20 mg three times daily for anxiety  Return to clinic in 6 weeks for follow-up.    Any medication(s) require lab monitoring? No

## 2024-10-03 ENCOUNTER — TRANSFERRED RECORDS (OUTPATIENT)
Dept: HEALTH INFORMATION MANAGEMENT | Facility: CLINIC | Age: 64
End: 2024-10-03
Payer: MEDICARE

## 2024-10-15 ENCOUNTER — VIRTUAL VISIT (OUTPATIENT)
Dept: PSYCHIATRY | Facility: CLINIC | Age: 64
End: 2024-10-15
Payer: MEDICARE

## 2024-10-15 VITALS — SYSTOLIC BLOOD PRESSURE: 122 MMHG | DIASTOLIC BLOOD PRESSURE: 78 MMHG

## 2024-10-15 DIAGNOSIS — F33.1 MAJOR DEPRESSIVE DISORDER, RECURRENT EPISODE, MODERATE (H): Primary | ICD-10-CM

## 2024-10-15 DIAGNOSIS — F41.1 GENERALIZED ANXIETY DISORDER: ICD-10-CM

## 2024-10-15 DIAGNOSIS — F90.1 ATTENTION-DEFICIT HYPERACTIVITY DISORDER, PREDOMINANTLY HYPERACTIVE TYPE: ICD-10-CM

## 2024-10-15 DIAGNOSIS — F43.10 PTSD (POST-TRAUMATIC STRESS DISORDER): ICD-10-CM

## 2024-10-15 PROCEDURE — 99214 OFFICE O/P EST MOD 30 MIN: CPT | Mod: 95 | Performed by: NURSE PRACTITIONER

## 2024-10-15 ASSESSMENT — PAIN SCALES - GENERAL: PAINLEVEL: MODERATE PAIN (5)

## 2024-10-15 NOTE — PROGRESS NOTES
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 criteria 72 hour hold. However, if patient uses substances or is non-adherent with medication, their risk of decompensation and SI/HI will be elevated. This was discussed with the patient as she verbalized good understanding.   CONSULTS/REFERRALS:   Recommend therapy. Referral placed for [facility].  Call [facility] at [phone] if you do not hear from them soon  Coordinate care with therapist as needed    MEDICAL:   None at this time  Coordinate care with PCP (Crista Elder) as needed  Follow up with primary care provider as planned or for acute medical concerns.    PSYCHOEDUCATION:  Medication side effects and alternatives reviewed. Health promotion activities recommended and reviewed today. All questions addressed. Education and counseling completed regarding risks and benefits of medications and psychotherapy options.  Consent provided by patient/guardian  Call the psychiatric nurse line with medication questions or concerns at [phone].  MyChart may be used to communicate with your provider, but this is not intended to be used for emergencies.  BLACK BOX WARNING: Discussed the Food and Drug Administration (FDA) requires that all antidepressants  carry a warning that some children, adolescents and young adults may be at increased risk of suicide when taking antidepressants. Anyone taking an antidepressant should be watched closely for worsening depression or unusual behavior especially in the first few weeks after starting an SSRI. Keep in mind, antidepressants are more likely to reduce suicide risk in the long run by improving mood.   SEROTONIN SYNDROME:  Discussed risks of Serotonin syndrome (ie, serotonin toxicity) which is a potentially life-threatening condition associated with increased serotonergic activity in the central nervous system (CNS). It is seen with therapeutic medication use, inadvertent interactions between drugs, and intentional self-poisoning. Serotonin syndrome may involve a spectrum of clinical findings, which often include mental status changes, autonomic hyperactivity, and neuromuscular abnormalities.    BENZODIAZEPINE:  discussion on how benzos work and the need to use them short term due to potential of anxiety getting.  This is a controlled substance with risk for abuse, need to keep in a safe keep place and cannot replace lost scripts.    HARM REDUCTION:  Discussions regarding effects of mood altering substances, alcohol and cannabis, on mood and that approach is harm reduction, will continue to prescribe meds as they work to cut back use.    FIRST GENERATION ANTIPSYCHOTIC/ SECOND GENERATION ANTIPSYCHOTIC USE:  Atypical need for cardiometabolic monitoring with medication- B/P, weight, blood sugar, cholesterol.  Need to monitor for abnormal movements taught  SAFETY:  We all care about your loved one's safety. To reduce the risk of self-harm, remove access to all:  Firearms, Medicines (both prescribed and over-the-counter), Knives and other sharp objects, Ropes and like materials, and Alcohol  SLEEP HYGIENE: establish a sleep routine, limit screen time 1 hour prior to bed, use bed for sleep only, take sleep/medications on time  (including sleepy time tea, trazadone or herbal treatments such as melatonin), aroma therapy, limit caffeine/sugar, yoga, guided imagery, stretch, meditation, limit naps to 20 minutes, make a temperature change in the room, white noise, be mindful of slowing down breathing, take a warm bath/shower, frequently wash sheets, and journaling.   Medlineplus.gov is information for patients.  It is run by the [employer] and it contains information about all disorders, diseases and all medications.      COMMUNITY RESOURCES:    CRISIS NUMBERS: Provided in AVS 2023  National Suicide Prevention Lifeline: 8-254-562-TALK (201-125-9167)  MedicAnimal.com/resources for a list of additional resources (SOS)            [facility] - [phone]   Urgent Care Adult Mental Oemzdk-[phone] mobile unit/  crisis line  [facility] -[phone]   COPE  [place] Mobile Team -[phone] (adults)/ [phone] (child)  Poison Control Center - 1-615.570.9521    OR  go to nearest ER  Crisis Text Line for any crisis  send this-   To: 701995   [facility] ([facility]  [phone]  National Suicide Prevention Lifeline: 451.500.5296 (TTY: 656.910.8574). Call anytime for help.  (www.suicidepreventionlifeline.org)  [place] on Mental Illness (www.ellie.org): 228.840.9320 or 652-058-7912.   Mental Health Association (www.mentalhealth.org): 744.956.2459 or 688-488-7664.  [place] Crisis Text Line: Text [place] to 054714  Suicide LifeLine Chat: suicidepreNuovo Biologicsline.org/chat    ADMINISTRATIVE BILLIN min spent interviewing patient, reviewing referral documents, obtaining and reviewing outside records, communication with other health specialists, and preparing this report on today's date: 10/15/2024  Video/Phone Start Time: 1136  Video/Phone End Time:  1200      Signed:     Ezekiel Cheng, MSN, APRN, PMHNP-BC     Chart documentation done in part  with Dragon Voice Recognition software.  Although reviewed after completion, some word and grammatical errors may remain.  Answers for HPI/ROS submitted by the patient on 9/1/2022  If you checked off any problems, how difficult have these problems made it for you to do your work, take care of things at home, or get along with other people?: Somewhat difficult  PHQ9 TOTAL SCORE: 7  DONELL 7 TOTAL SCORE: 13    Answers for HPI/ROS submitted by the patient on 2/3/2023  If you checked off any problems, how difficult have these problems made it for you to do your work, take care of things at home, or get along with other people?: Very difficult  PHQ9 TOTAL SCORE: 3  DONELL 7 TOTAL SCORE: 6  Answers for HPI/ROS submitted by the patient on 4/14/2023  If you checked off any problems, how difficult have these problems made it for you to do your work, take care of things at home, or get along with other people?: Somewhat difficult  PHQ9 TOTAL SCORE: 5  DONELL 7 TOTAL SCORE: 8    Answers for HPI/ROS submitted by the patient on 5/25/2023  If you checked off any problems, how difficult have these problems made it for you to do your work, take care of things at home, or get along with other people?: Extremely difficult  PHQ9 TOTAL SCORE: 12  DONELL 7 TOTAL SCORE: 9Answers submitted by the patient for this visit:  Patient Health Questionnaire (Submitted on 9/19/2023)  If you checked off any problems, how difficult have these problems made it for you to do your work, take care of things at home, or get along with other people?: Somewhat difficult  PHQ9 TOTAL SCORE: 5  DONELL-7 (Submitted on 9/19/2023)  DONELL 7 TOTAL SCORE: 14    Answers submitted by the patient for this visit:  Patient Health Questionnaire (Submitted on 1/17/2024)  If you checked off any problems, how difficult have these problems made it for you to do your work, take care of things at home, or get along with other people?: Extremely difficult  PHQ9 TOTAL SCORE: 8  DONELL-7 (Submitted  on 1/17/2024)  DONELL 7 TOTAL SCORE: 7    Answers submitted by the patient for this visit:  Patient Health Questionnaire (Submitted on 9/16/2024)  If you checked off any problems, how difficult have these problems made it for you to do your work, take care of things at home, or get along with other people?: Very difficult  PHQ9 TOTAL SCORE: 13

## 2024-10-15 NOTE — PROGRESS NOTES
Virtual Visit Details    Type of service:  Video Visit   Video/Phone Start Time: 1136  Video/Phone End Time:  1200    Originating Location (pt. Location): Home    Distant Location (provider location):  Off-site  Platform used for Video Visit: Sgnam

## 2024-10-15 NOTE — NURSING NOTE
Current patient location: [address]    [facility] [address]    Is the patient currently in the state of [place]? YES    Visit mode:VIDEO    If the visit is dropped, the patient can be reconnected by: VIDEO VISIT: Text to cell phone:   Telephone Information:   Mobile [phone]    and VIDEO VISIT: Send to e-mail at: [email or web address]    Will anyone else be joining the visit? NO  (If patient encounters technical issues they should call [phone])    Are changes needed to the allergy or medication list? No    Are refills needed on medications prescribed by this physician? NO    Rooming Documentation:  Unable to complete questionnaire(s) due to time    Reason for visit: RECHECK    Rosalba WATSON

## 2024-10-15 NOTE — PATIENT INSTRUCTIONS
"Patient Education   The Panel Psychiatry Program  What to Expect  Here's what to expect in the Panel Psychiatry Program.   About the program  You'll be meeting with a psychiatric doctor to check your mental health. A psychiatric doctor helps you deal with troubling thoughts and feelings by giving you medicine. They'll make sure you know the plan for your care. You may see them for a long time. When you're feeling better, they may refer you back to seeing your family doctor.   If you have any questions, we'll be glad to talk to you.  About visits  Be open  At your visits, please talk openly about your problems. It may feel hard, but it's the best way for us to help you.  Cancelling visits  If you can't come to your visit, please call us right away at [phone]. If you don't cancel at least 24 hours (1 full day) before your visit, that's \"late cancellation.\"  Not showing up for your visits  Being very late is the same as not showing up. You'll be a \"no show\" if:  You're more than 15 minutes late for a 30-minute (half hour) visit.  You're more than 30 minutes late for a 60-minute (full hour) visit.  If you cancel late or don't show up 2 times within 6 months, we may end your care.  Getting help between visits  If you need help between visits, you can call us Monday to Friday from 8 a.m. to 4:30 p.m. at [phone].  Emergency care  Call 911 or go to the nearest emergency department if your life or someone else's life is in danger.  Call 988 anytime to reach the national Suicide and Crisis hotline.  Medicine refills  To refill your medicine, call your pharmacy. You can also call [facility]'s Behavioral Access at [phone], Monday to Friday, 8 a.m. to 4:30 p.m. It can take 1 to 3 business days to get a refill.   Forms, letters, and tests  You may have papers to fill out, like FMLA, short-term disability, and workability. We can help you with these forms at your visits, but you must have an " appointment. You may need more than 1 visit for this, to be in an intensive therapy program, or both.  Before we can give you medicine for ADHD, we may refer you to get tested for it or confirm it another way.  We may not be able to give you an emotional support animal letter.  We don't do mental health checks ordered by the court.   We don't do mental health testing, but we can refer you to get tested.   Thank you for choosing us for your care.  For informational purposes only. Not to replace the advice of your health care provider. Copyright   2022 [facility]. All rights reserved. [employer] [id number] - 12/22.     PLAN AND RECOMMENDATIONS:  Continue  Wellbutrin  mg  daily -   Continue Lexapro 20 mg every day  Continue hydroxyzine 25 - 50 mg instead of 3 times daily as needed for anxiety   Continue to take prazosin 5 mg at bedtime  Continue Buspar 20 mg three times daily for anxiety       We have discussed his/her diagnosis, prognosis, differential diagnosis and side effects and benefits of medications.     Patient and I revieweddiagnosis and treatment plan and patient agrees with following recommendations:    1.Patient will take the medications as prescribed. Patient will not stop taking medications or adjust them without consulting with theprovider.  2.Patient will call with any problems between 2 visits.  3.Patient will go to the emergency room if not feeling safe , unable to function in the community, or if suicidal, homicidal or hearing voices orhaving paranoia.  4.Patient will abstain from drugs and alcohol.  5.Patient will not drive if sedated on medications or under influence of any substance. 6.Patient will not mix psychiatric medications with drugs andalcohol.   7.Patient will watch his diet and exercise.  8.Patient will see non psychiatric providers for non psychiatric disorders.

## 2024-10-21 ENCOUNTER — OFFICE VISIT (OUTPATIENT)
Dept: SLEEP MEDICINE | Facility: CLINIC | Age: 64
End: 2024-10-21
Payer: MEDICARE

## 2024-10-21 VITALS
DIASTOLIC BLOOD PRESSURE: 87 MMHG | BODY MASS INDEX: 32.58 KG/M2 | HEART RATE: 63 BPM | SYSTOLIC BLOOD PRESSURE: 147 MMHG | WEIGHT: 215 LBS | HEIGHT: 68 IN | OXYGEN SATURATION: 95 %

## 2024-10-21 DIAGNOSIS — G47.33 OSA (OBSTRUCTIVE SLEEP APNEA): Primary | ICD-10-CM

## 2024-10-21 PROCEDURE — 99214 OFFICE O/P EST MOD 30 MIN: CPT | Performed by: INTERNAL MEDICINE

## 2024-10-21 NOTE — NURSING NOTE
"Chief Complaint   Patient presents with    CPAP Follow Up     Machine is alarming       Initial BP (!) 147/87   Pulse 63   Ht 1.727 m (5' 8\")   Wt 97.5 kg (215 lb)   LMP 08/08/2016   SpO2 95%   BMI 32.69 kg/m   Estimated body mass index is 32.69 kg/m  as calculated from the following:    Height as of this encounter: 1.727 m (5' 8\").    Weight as of this encounter: 97.5 kg (215 lb).    Medication Reconciliation: complete  ESS 8  Marni Saul MA   "

## 2024-10-21 NOTE — PATIENT INSTRUCTIONS
Your Body mass index is 32.69 kg/m .  Weight management is a personal decision.  If you are interested in exploring weight loss strategies, the following discussion covers the approaches that may be successful. Body mass index (BMI) is one way to tell whether you are at a healthy weight, overweight, or obese. It measures your weight in relation to your height.  A BMI of 18.5 to 24.9 is in the healthy range. A person with a BMI of 25 to 29.9 is considered overweight, and someone with a BMI of 30 or greater is considered obese. More than two-thirds of American adults are considered overweight or obese.  Being overweight or obese increases the risk for further weight gain. Excess weight may lead to heart disease and diabetes.  Creating and following plans for healthy eating and physical activity may help you improve your health.  Weight control is part of healthy lifestyle and includes exercise, emotional health, and healthy eating habits. Careful eating habits lifelong are the mainstay of weight control. Though there are significant health benefits from weight loss, long-term weight loss with diet alone may be very difficult to achieve- studies show long-term success with dietary management in less than 10% of people. Attaining a healthy weight may be especially difficult to achieve in those with severe obesity. In some cases, medications, devices and surgical management might be considered.  What can you do?  If you are overweight or obese and are interested in methods for weight loss, you should discuss this with your provider.   Consider reducing daily calorie intake by 500 calories.   Keep a food journal.   Avoiding skipping meals, consider cutting portions instead.    Diet combined with exercise helps maintain muscle while optimizing fat loss. Strength training is particularly important for building and maintaining muscle mass. Exercise helps reduce stress, increase energy, and improves fitness. Increasing  exercise without diet control, however, may not burn enough calories to loose weight.     Start walking three days a week 10-20 minutes at a time  Work towards walking thirty minutes five days a week   Eventually, increase the speed of your walking for 1-2 minutes at time    In addition, we recommend that you review healthy lifestyles and methods for weight loss available through the National Institutes of Health patient information sites:  http://win.niddk.nih.gov/publications/index.htm    And look into health and wellness programs that may be available through your health insurance provider, employer, local community center, or boris club.

## 2024-10-21 NOTE — PROGRESS NOTES
Obstructive Sleep Apnea - PAP Follow-Up Visit:    Chief Complaint   Patient presents with    CPAP Follow Up     Machine is alarming     Ilsa Yusuf comes in today for follow-up of their moderate sleep apnea, managed with CPAP.     Split-night PSG on 1/28/2016 at a weight of 219 pounds showed an apnea-hypopnea index of 25.5/h.  REM sleep was absent during the diagnostic portion.Baseline oxygen saturation was 92.2%. Lowest oxygen saturation was 85.9%. Time spent below 89% was 18.2 minutes. The optimal pressure was 7 cmH2O with an AHI of 1.7 events per hour. Time in REM supine on final pressure was 21.0 minutes.      Historically, patient has used CPAP regularly. In the last few months, her depression has been worse and she has attended a day program.  He is also not been able to use CPAP as her device kept sounding an alarm that the hose was occluded.  She also complains of having aerophagia which has been a problem before.  Nasal congestion is also reported which is not relieved by Flonase or azelastine spray.    ResMed (6/24/24- 7/23/24)     Auto-PAP 8-14 cmH2O download:  22/30 total days of use. 8 nonuse days. 17% days with >4 hours use.  Average use 2 hours 46 minutes per day. Median Leak 0 L/min. 95%ile Leak 9 L/min. CPAP 95% pressure 12.5 cm. AHI 1.7    EPWORTH SLEEPINESS SCALE         1/30/2024     1:06 PM    [place] Sleepiness Scale ( [facility] Jose  [id number]<br>ESS - [place]/English - Final version - 21 Nov 07 - [facility].)   Sitting and reading Moderate chance of dozing   Watching TV Moderate chance of dozing   Sitting, inactive in a public place (e.g. a theatre or a meeting) Would never doze   As a passenger in a car for an hour without a break Slight chance of dozing   Lying down to rest in the afternoon when circumstances permit Slight chance of dozing   Sitting and talking to someone Would never doze   Sitting quietly after a lunch without alcohol Moderate chance of dozing   In a car,  "while stopped for a few minutes in traffic Would never doze   Aberdeen Score (MC) 8   Aberdeen Score (Sleep) 8       INSOMNIA SEVERITY INDEX (FRANCISCO)          10/21/2024    11:24 AM   Insomnia Severity Index (FRANCISCO)   Difficulty falling asleep 1   Difficulty staying asleep 2   Problems waking up too early 2   How SATISFIED/DISSATISFIED are you with your CURRENT sleep pattern? 3   How NOTICEABLE to others do you think your sleep problem is in terms of impairing the quality of your life? 1   How WORRIED/DISTRESSED are you about your current sleep problem? 2   To what extent do you consider your sleep problem to INTERFERE with your daily functioning (e.g. daytime fatigue, mood, ability to function at work/daily chores, concentration, memory, mood, etc.) CURRENTLY? 3   FRANCISCO Total Score 14       Guidelines for Scoring/Interpretation:  Total score categories:  0-7 = No clinically significant insomnia   8-14 = Subthreshold insomnia   15-21 = Clinical insomnia (moderate severity)  22-28 = Clinical insomnia (severe)  Used via courtesy of www.[employer]ar[employer].va.gov with permission from Andrew Last PhD., [facility]    BP (!) 147/87   Pulse 63   Ht 1.727 m (5' 8\")   Wt 97.5 kg (215 lb)   LMP 08/08/2016   SpO2 95%   BMI 32.69 kg/m      Impression/Plan:     Moderate sleep apnea.     Historically, patient has used CPAP regularly and has benefited from treatment.  In the last few months, she has been unable to use CPAP due to her device sounding alarm that hose is occluded, aerophagia and nasal congestion.  I will reduce her auto CPAP minimum pressure to see if this will help with aerophagia.  Expiratory pressure relief is maximized.  I also recommended that she consider sleeping on a wedge pillow.  She will check with the DME whether any alarms can be turned off.  I was unable to see any specific alarms on her device.  She should also continue Flonase.    Alternate therapy options were discussed at her request.  If she remains " intolerant of CPAP, main choice will be a mandibular advancement dental appliance.  She had questions regarding hypoglossal nerve stimulator therapy which was answered.     Plan:     Continue auto CPAP therapy with pressure settings changed to 6 to 14 cm H2O    Ilsa Yusuf will follow up in about 1 year(s).       Electronically signed by Dr. Crow Merino, Diplomate, Sleep Medicine, ABPN       CC:  Catrachita Nicolas,

## 2024-10-30 ENCOUNTER — VIRTUAL VISIT (OUTPATIENT)
Dept: FAMILY MEDICINE | Facility: CLINIC | Age: 64
End: 2024-10-30
Payer: MEDICARE

## 2024-10-30 DIAGNOSIS — N18.31 STAGE 3A CHRONIC KIDNEY DISEASE (H): ICD-10-CM

## 2024-10-30 DIAGNOSIS — Z91.89 HISTORY OF FAINTING: Primary | ICD-10-CM

## 2024-10-30 PROCEDURE — 99215 OFFICE O/P EST HI 40 MIN: CPT | Mod: 95 | Performed by: STUDENT IN AN ORGANIZED HEALTH CARE EDUCATION/TRAINING PROGRAM

## 2024-10-30 ASSESSMENT — PATIENT HEALTH QUESTIONNAIRE - PHQ9
SUM OF ALL RESPONSES TO PHQ QUESTIONS 1-9: 10
10. IF YOU CHECKED OFF ANY PROBLEMS, HOW DIFFICULT HAVE THESE PROBLEMS MADE IT FOR YOU TO DO YOUR WORK, TAKE CARE OF THINGS AT HOME, OR GET ALONG WITH OTHER PEOPLE: VERY DIFFICULT
SUM OF ALL RESPONSES TO PHQ QUESTIONS 1-9: 10

## 2024-10-30 NOTE — PATIENT INSTRUCTIONS
Mauricio Alicea,    Thank you for allowing [facility] to manage your care.          I made a referral, they will be calling in approximately 1 week to set up your appointment.  If you do not hear from them, please call the specialty number on your after visit.     For your convenience, test results are released as soon as they are available  Please allow 1-2 business days for me to send you a comment about your results.  If not done so, I encourage you to login into [email or web address] to review your results in real time.     If you have any questions or concerns, please feel free to call us at [phone].    Sincerely,    Dr. Vinson    Did you know?      You can schedule a video visit for follow-up appointments as well as future appointments for certain conditions.  Please see the below link.     [email or web address]    If you have not already done so,  I encourage you to sign up for [email or web address] ([email or web address]).  This will allow you to review your results, securely communicate with a provider, and schedule virtual visits as well.

## 2024-10-30 NOTE — PROGRESS NOTES
Deanne is a 64 year old who is being evaluated via a billable video visit.    How would you like to obtain your AVS? Anitahart  If the video visit is dropped, the invitation should be resent by: Text to cell phone: [phone]  Will anyone else be joining your video visit? No      Assessment & Plan   Patient presented today with a form for her  s license renewal. She reported that in 2011, she fainted while shopping at a mall and was later found to have low blood sugar in the emergency department. Today, she is seeking clearance on this form to confirm she has no history of seizures and is safe to drive. When I reviewed her medical records in Care Everywhere, I found that she saw a neurologist in 1309-3172. An EEG was performed at that time, and while the results were unremarkable, the neurologist noted that an epileptic seizure could not be ruled out. Records also indicated she was prescribed Keppra for a period but has not had follow-up with a neurologist since then.    The patient mentioned that when she contacted the [facility], they informed her that her primary care physician could complete the form to indicate  no concerns.  However, upon reviewing the form, I noted several specific yes-or-no questions. I explained to her that I could not independently confirm her condition, as I did not evaluate her at the time of her symptoms, and could only summarize information from her medical records and her account. I advised her that I would document what she reported and include the details from her neurologist s assessment in [id number] but could not definitively state that she has no history of seizures.    The patient expressed frustration and emphasized that the V told her a primary care physician could complete the form. I offered to refer her to a neurologist who could more thoroughly address the specific questions, as some required a detailed evaluation. She requested that the form be completed by  today or tomorrow. I advised her to try scheduling an appointment with a neurologist in our network and suggested she contact her insurance provider for additional options if necessary.    History of fainting    - Adult Neurology  Referral; Future    CKD  No concern at this time.      45 minutes spent by me on the date of the encounter doing chart review, history and exam, documentation and further activities per the note      Subjective   Deanne is a 64 year old, presenting for the following health issues:  Eval/Assessment      10/30/2024     4:24 PM   Additional Questions   Roomed by Patient completed echeck in via [employer]         History of Present Illness       Reason for visit:  Discuss/report no episodes of fainting    She eats 2-3 servings of fruits and vegetables daily.She consumes 0 sweetened beverage(s) daily.She exercises with enough effort to increase her heart rate 30 to 60 minutes per day.  She exercises with enough effort to increase her heart rate 7 days per week.   She is taking medications regularly.    Patient presented today with a form for her  s license renewal. She reported that in 2011, she fainted while shopping at a mall and was later found to have low blood sugar in the emergency department. Today, she is seeking clearance on this form to confirm she has no history of seizures and is safe to drive. When I reviewed her medical records in Care Everywhere, I found that she saw a neurologist in [id number]. An EEG was performed at that time, and while the results were unremarkable, the neurologist noted that an epileptic seizure could not be ruled out. Records also indicated she was prescribed Keppra for a period but has not had follow-up with a neurologist since then.    The patient mentioned that when she contacted the [facility], they informed her that her primary care physician could complete the form to indicate  no concerns.  However, upon reviewing the form, I noted  several specific yes-or-no questions. I explained to her that I could not independently confirm her condition, as I did not evaluate her at the time of her symptoms, and could only summarize information from her medical records and her account. I advised her that I would document what she reported and include the details from her neurologist s assessment in [phone] but could not definitively state that she has no history of seizures.    The patient expressed frustration and emphasized that the [employer] told her a primary care physician could complete the form. I offered to refer her to a neurologist who could more thoroughly address the specific questions, as some required a detailed evaluation. She requested that the form be completed by today or tomorrow. I advised her to try scheduling an appointment with a neurologist in our network and suggested she contact her insurance provider for additional options if necessary.    History of CKD . No concern at this time.        ?    ?    ?    ?              Review of Systems  Constitutional, HEENT, cardiovascular, pulmonary, gi and gu systems are negative, except as otherwise noted.      Objective           Vitals:  No vitals were obtained today due to virtual visit.    Physical Exam   GENERAL: alert and no distress  EYES: Eyes grossly normal to inspection.  No discharge or erythema, or obvious scleral/conjunctival abnormalities.  RESP: No audible wheeze, cough, or visible cyanosis.    PSYCH: Appropriate affect, tone, and pace of words    Video-Visit Details    Type of service:  Video Visit     Originating Location (pt. Location): Home    Distant Location (provider location):  On-site  Platform used for Video Visit: Luna  Signed Electronically by: Karie Vinson MD

## 2024-11-01 DIAGNOSIS — F33.1 MAJOR DEPRESSIVE DISORDER, RECURRENT EPISODE, MODERATE (H): ICD-10-CM

## 2024-11-01 RX ORDER — BUPROPION HYDROCHLORIDE 200 MG/1
200 TABLET, EXTENDED RELEASE ORAL DAILY
Qty: 90 TABLET | Refills: 0 | Status: SHIPPED | OUTPATIENT
Start: 2024-11-01

## 2024-11-01 NOTE — TELEPHONE ENCOUNTER
Date of Last Office Visit: 10/15/2024  Date of Next Office Visit:  11/26/2024  No shows since last visit: No  More than one patient-initiated cancellation (with reschedule) since last seen in clinic? No    []Medication refilled per  Medication Refill in Ambulatory Care  policy.  []Medication unable to be refilled by RN due to criteria not met as indicated below:    []Eligibility: has not had a provider visit within last 6 months   []Supervision: no future appointment; < 7 days before next appointment   []Compliance: no shows; cancellations; lapse in therapy   []Verification: order discrepancy; may need modification...   [] > 30-day supply request   []Advanced refill request: > 7 days before refill date   []Controlled medication   []Medication not included in policy   []Review: new med; med adjusted <= 30 days; safety alert; requires lab monitoring...   []Scope of Practice: refill request processed by LPN/MA   []Other:      Medication(s) requested:     buPROPion (WELLBUTRIN SR) 200 MG 12 hr tablet  Date last ordered: 07/26/2024  Qty: 90 tablet  Refills: 0  Appropriate for refill? Yes    Any Controlled Substance(s)? No      Requested medication(s) verified as identical to current order? Yes    Any lapse in adherence to medication(s) greater than 5 days? No      Additional action taken? routed encounter to provider for review.      Last visit treatment plan:   PLAN AND RECOMMENDATIONS:  Continue  Wellbutrin  mg  daily -   Continue Lexapro 20 mg every day  Continue hydroxyzine 25 - 50 mg instead of 3 times daily as needed for anxiety         Continue to take prazosin 5 mg at bedtime  Continue Buspar 20 mg three times daily for anxiety        We have discussed his/her diagnosis, prognosis, differential diagnosis and side effects and benefits of medications.      Patient and I revieweddiagnosis and treatment plan and patient agrees with following recommendations:     1.Patient will take the medications as  prescribed. Patient will not stop taking medications or adjust them without consulting with theprovider.  2.Patient will call with any problems between 2 visits.  3.Patient will go to the emergency room if not feeling safe , unable to function in the community, or if suicidal, homicidal or hearing voices orhaving paranoia.  4.Patient will abstain from drugs and alcohol.  5.Patient will not drive if sedated on medications or under influence of any substance. 6.Patient will not mix psychiatric medications with drugs andalcohol.   7.Patient will watch his diet and exercise.  8.Patient will see non psychiatric providers for non psychiatric disorders.    Any medication(s) require lab monitoring? No

## 2024-11-05 ENCOUNTER — TRANSFERRED RECORDS (OUTPATIENT)
Dept: HEALTH INFORMATION MANAGEMENT | Facility: CLINIC | Age: 64
End: 2024-11-05
Payer: MEDICARE

## 2024-11-06 ENCOUNTER — PRE VISIT (OUTPATIENT)
Dept: OTOLARYNGOLOGY | Facility: CLINIC | Age: 64
End: 2024-11-06

## 2024-11-07 DIAGNOSIS — F41.1 GAD (GENERALIZED ANXIETY DISORDER): ICD-10-CM

## 2024-11-07 DIAGNOSIS — F33.1 MODERATE EPISODE OF RECURRENT MAJOR DEPRESSIVE DISORDER (H): ICD-10-CM

## 2024-11-07 DIAGNOSIS — E78.5 HYPERLIPIDEMIA LDL GOAL <130: ICD-10-CM

## 2024-11-08 RX ORDER — LOVASTATIN 20 MG/1
20 TABLET ORAL AT BEDTIME
Qty: 30 TABLET | Refills: 3 | Status: SHIPPED | OUTPATIENT
Start: 2024-11-08

## 2024-11-08 RX ORDER — ESCITALOPRAM OXALATE 20 MG/1
20 TABLET ORAL DAILY
Qty: 90 TABLET | Refills: 0 | Status: SHIPPED | OUTPATIENT
Start: 2024-11-08

## 2024-11-08 NOTE — CONFIDENTIAL NOTE
I have not seen the patient since 2022 and am unable to fill this Rx.  Will route to provider who has seen her recently a few times.

## 2024-11-08 NOTE — TELEPHONE ENCOUNTER
Date of Last Office Visit: 10/15/24  Date of Next Office Visit:  11/26/24  No shows since last visit: No  More than one patient-initiated cancellation (with reschedule) since last seen in clinic? No    []Medication refilled per  Medication Refill in Ambulatory Care  policy.  [x]Medication unable to be refilled by RN due to criteria not met as indicated below:    []Eligibility: has not had a provider visit within last 6 months   []Supervision: no future appointment; < 7 days before next appointment   []Compliance: no shows; cancellations; lapse in therapy   []Verification: order discrepancy; may need modification...   [x] > 30-day supply request   []Advanced refill request: > 7 days before refill date   []Controlled medication   []Medication not included in policy   []Review: new med; med adjusted <= 30 days; safety alert; requires lab monitoring...   []Scope of Practice: refill request processed by LPN/MA   []Other:      Medication(s) requested:     -  escitalopram (LEXAPRO) 20 MG tablet   Date last ordered: 7/25/24  Qty: 90  Refills: 0  Appropriate for refill? Provider to review. Patient reports she will be out before next appointment, that things are going well and doesn't think the med will be changed so wants renewed at current 90 days.      Any Controlled Substance(s)? No      Requested medication(s) verified as identical to current order? Yes    Any lapse in adherence to medication(s) greater than 5 days? No      Additional action taken?  RN spoke with patient (see note above) and routed encounter to provider for review.      Last visit treatment plan:   PLAN AND RECOMMENDATIONS:  Continue  Wellbutrin  mg  daily -   Continue Lexapro 20 mg every day  Continue hydroxyzine 25 - 50 mg instead of 3 times daily as needed for anxiety         Continue to take prazosin 5 mg at bedtime  Continue Buspar 20 mg three times daily for anxiety        We have discussed his/her diagnosis, prognosis, differential diagnosis  and side effects and benefits of medications.      Patient and I revieweddiagnosis and treatment plan and patient agrees with following recommendations:     1.Patient will take the medications as prescribed. Patient will not stop taking medications or adjust them without consulting with theprovider.  2.Patient will call with any problems between 2 visits.  3.Patient will go to the emergency room if not feeling safe , unable to function in the community, or if suicidal, homicidal or hearing voices orhaving paranoia.  4.Patient will abstain from drugs and alcohol.  5.Patient will not drive if sedated on medications or under influence of any substance. 6.Patient will not mix psychiatric medications with drugs andalcohol.   7.Patient will watch his diet and exercise.  8.Patient will see non psychiatric providers for non psychiatric disorders.       Any medication(s) require lab monitoring? No

## 2024-11-26 ENCOUNTER — VIRTUAL VISIT (OUTPATIENT)
Dept: PSYCHIATRY | Facility: CLINIC | Age: 64
End: 2024-11-26
Payer: MEDICARE

## 2024-11-26 DIAGNOSIS — F41.1 GENERALIZED ANXIETY DISORDER: ICD-10-CM

## 2024-11-26 DIAGNOSIS — F43.10 PTSD (POST-TRAUMATIC STRESS DISORDER): ICD-10-CM

## 2024-11-26 DIAGNOSIS — F33.1 MAJOR DEPRESSIVE DISORDER, RECURRENT EPISODE, MODERATE (H): Primary | ICD-10-CM

## 2024-11-26 ASSESSMENT — PATIENT HEALTH QUESTIONNAIRE - PHQ9
10. IF YOU CHECKED OFF ANY PROBLEMS, HOW DIFFICULT HAVE THESE PROBLEMS MADE IT FOR YOU TO DO YOUR WORK, TAKE CARE OF THINGS AT HOME, OR GET ALONG WITH OTHER PEOPLE: VERY DIFFICULT
SUM OF ALL RESPONSES TO PHQ QUESTIONS 1-9: 6
SUM OF ALL RESPONSES TO PHQ QUESTIONS 1-9: 6

## 2024-11-26 NOTE — PROGRESS NOTES
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 (Crista Elder) as needed  Follow up with primary care provider as planned or for acute medical concerns.    PSYCHOEDUCATION:  Medication side effects and alternatives reviewed. Health promotion activities recommended and reviewed today. All questions addressed. Education and counseling completed regarding risks and benefits of medications and psychotherapy options.  Consent provided by patient/guardian  Call the psychiatric nurse line with medication questions or concerns at [phone].  Showpitcht may be used to communicate with your provider, but this is not intended to be used for emergencies.  BLACK BOX WARNING: Discussed the Food and Drug Administration (FDA) requires that all antidepressants carry a warning that some children, adolescents and young adults may be at increased risk of suicide when taking antidepressants. Anyone taking an antidepressant should be watched closely for worsening depression or unusual behavior especially in the first few weeks after starting an SSRI. Keep in mind, antidepressants are more likely to reduce suicide risk in the long run by improving mood.   SEROTONIN SYNDROME:  Discussed risks of Serotonin  syndrome (ie, serotonin toxicity) which is a potentially life-threatening condition associated with increased serotonergic activity in the central nervous system (CNS). It is seen with therapeutic medication use, inadvertent interactions between drugs, and intentional self-poisoning. Serotonin syndrome may involve a spectrum of clinical findings, which often include mental status changes, autonomic hyperactivity, and neuromuscular abnormalities.    BENZODIAZEPINE:  discussion on how benzos work and the need to use them short term due to potential of anxiety getting.  This is a controlled substance with risk for abuse, need to keep in a safe keep place and cannot replace lost scripts.    HARM REDUCTION:  Discussions regarding effects of mood altering substances, alcohol and cannabis, on mood and that approach is harm reduction, will continue to prescribe meds as they work to cut back use.    FIRST GENERATION ANTIPSYCHOTIC/ SECOND GENERATION ANTIPSYCHOTIC USE:  Atypical need for cardiometabolic monitoring with medication- B/P, weight, blood sugar, cholesterol.  Need to monitor for abnormal movements taught  SAFETY:  We all care about your loved one's safety. To reduce the risk of self-harm, remove access to all:  Firearms, Medicines (both prescribed and over-the-counter), Knives and other sharp objects, Ropes and like materials, and Alcohol  SLEEP HYGIENE: establish a sleep routine, limit screen time 1 hour prior to bed, use bed for sleep only, take sleep/medications on time (including sleepy time tea, trazadone or herbal treatments such as melatonin), aroma therapy, limit caffeine/sugar, yoga, guided imagery, stretch, meditation, limit naps to 20 minutes, make a temperature change in the room, white noise, be mindful of slowing down breathing, take a warm bath/shower, frequently wash sheets, and journaling.   Medlineplus.gov is information for patients.  It is run by the [employer] and it contains  information about all disorders, diseases and all medications.      COMMUNITY RESOURCES:    CRISIS NUMBERS: Provided in AVS 2023  National Suicide Prevention Lifeline: 3-656-869-TALK (392-932-8883)  RECUPYL/resources for a list of additional resources (SOS)            [facility] - [phone]   Urgent Care Adult Mental Dohxbt-[phone] mobile unit/  crisis line  [facility] -[phone]   COPE  [facility] -[phone] (adults)/ [phone] (child)  Poison Control Center - 1-444.798.5068    OR  go to nearest ER  Crisis Text Line for any crisis  send this-   To: 684124   [facility] ([facility]  [phone]  National Suicide Prevention Lifeline: 927.393.3241 (TTY: 824.259.3243). Call anytime for help.  (www.suicidepreventionlifeline.org)  National [place] on Mental Illness (www.ellie.org): 154-863-6659 or 489-446-8060.   Mental Health Association (www.mentalhealth.org): 541.801.3548 or 144-564-6778.  [place] Crisis Text Line: Text [place] to 832546  Suicide LifeLine Chat: suicideGrayBug.org/chat    ADMINISTRATIVE BILLIN min spent interviewing patient, reviewing referral documents, obtaining and reviewing outside records, communication with other health specialists, and preparing this report on today's date: 2024  Video/Phone Start Time: 1134  Video/Phone End Time:  1159    The longitudinal plan of care for the diagnosis(es)/condition(s) as documented were addressed during this visit. Due to the added complexity in care, I will continue to support Deanne in the subsequent management and with ongoing continuity of care.       Signed:     Ezekiel Cheng, MSN, APRN, PMHNP-BC     Chart documentation done in part with Dragon Voice Recognition software.  Although reviewed after completion, some word and grammatical errors may remain.  Answers for HPI/ROS submitted by the patient on 2022  If you checked  off any problems, how difficult have these problems made it for you to do your work, take care of things at home, or get along with other people?: Somewhat difficult  PHQ9 TOTAL SCORE: 7  DONELL 7 TOTAL SCORE: 13    Answers for HPI/ROS submitted by the patient on 2/3/2023  If you checked off any problems, how difficult have these problems made it for you to do your work, take care of things at home, or get along with other people?: Very difficult  PHQ9 TOTAL SCORE: 3  DONELL 7 TOTAL SCORE: 6  Answers for HPI/ROS submitted by the patient on 4/14/2023  If you checked off any problems, how difficult have these problems made it for you to do your work, take care of things at home, or get along with other people?: Somewhat difficult  PHQ9 TOTAL SCORE: 5  DONELL 7 TOTAL SCORE: 8    Answers for HPI/ROS submitted by the patient on 5/25/2023  If you checked off any problems, how difficult have these problems made it for you to do your work, take care of things at home, or get along with other people?: Extremely difficult  PHQ9 TOTAL SCORE: 12  DONELL 7 TOTAL SCORE: 9Answers submitted by the patient for this visit:  Patient Health Questionnaire (Submitted on 9/19/2023)  If you checked off any problems, how difficult have these problems made it for you to do your work, take care of things at home, or get along with other people?: Somewhat difficult  PHQ9 TOTAL SCORE: 5  DONELL-7 (Submitted on 9/19/2023)  DONELL 7 TOTAL SCORE: 14    Answers submitted by the patient for this visit:  Patient Health Questionnaire (Submitted on 1/17/2024)  If you checked off any problems, how difficult have these problems made it for you to do your work, take care of things at home, or get along with other people?: Extremely difficult  PHQ9 TOTAL SCORE: 8  DONELL-7 (Submitted on 1/17/2024)  DONELL 7 TOTAL SCORE: 7    Answers submitted by the patient for this visit:  Patient Health Questionnaire (Submitted on 9/16/2024)  If you checked off any problems, how difficult have  these problems made it for you to do your work, take care of things at home, or get along with other people?: Very difficult  PHQ9 TOTAL SCORE: 13    Answers submitted by the patient for this visit:  Patient Health Questionnaire (Submitted on 11/26/2024)  If you checked off any problems, how difficult have these problems made it for you to do your work, take care of things at home, or get along with other people?: Very difficult  PHQ9 TOTAL SCORE: 6

## 2024-11-26 NOTE — PROGRESS NOTES
Virtual Visit Details    Type of service:  Video Visit   Visit start 1134  Visit ends 1153    Originating Location (pt. Location): Home    Distant Location (provider location):  Off-site  Platform used for Video Visit: Luna

## 2024-11-26 NOTE — NURSING NOTE
Is the patient currently in the state of [place]? YES    Current patient location: [address]    [facility] [address]    Visit mode:VIDEO    If the visit is dropped, the patient can be reconnected by: VIDEO VISIT: Text to cell phone:   Telephone Information:   Mobile [phone]       Will anyone else be joining the visit? No  (If patient encounters technical issues they should call [phone])    Are changes needed to the allergy or medication list? No    Are refills needed on medications prescribed by this physician? Discuss with Provider    Rooming Documentation: Questionnaire(s) completed.    Reason for visit: SHIRLEY mAador

## 2024-11-26 NOTE — PATIENT INSTRUCTIONS
PLAN AND RECOMMENDATIONS:  Continue  Wellbutrin  mg  daily -   Continue Lexapro 20 mg every day  Continue hydroxyzine 25 - 50 mg instead of 3 times daily as needed for anxiety   Continue to take prazosin 5 mg at bedtime  Continue Buspar 20 mg three times daily for anxiety       We have discussed his/her diagnosis, prognosis, differential diagnosis and side effects and benefits of medications.     Patient and I revieweddiagnosis and treatment plan and patient agrees with following recommendations:    1.Patient will take the medications as prescribed. Patient will not stop taking medications or adjust them without consulting with theprovider.  2.Patient will call with any problems between 2 visits.  3.Patient will go to the emergency room if not feeling safe , unable to function in the community, or if suicidal, homicidal or hearing voices orhaving paranoia.  4.Patient will abstain from drugs and alcohol.  5.Patient will not drive if sedated on medications or under influence of any substance. 6.Patient will not mix psychiatric medications with drugs andalcohol.   7.Patient will watch his diet and exercise.  8.Patient will see non psychiatric providers for non psychiatric disorders.

## 2024-12-02 ENCOUNTER — HOSPITAL ENCOUNTER (EMERGENCY)
Facility: CLINIC | Age: 64
Discharge: HOME | End: 2024-12-02
Payer: MEDICARE

## 2024-12-02 ENCOUNTER — APPOINTMENT (OUTPATIENT)
Dept: RADIOLOGY | Facility: CLINIC | Age: 64
End: 2024-12-02
Payer: MEDICARE

## 2024-12-02 VITALS
OXYGEN SATURATION: 95 % | HEART RATE: 63 BPM | RESPIRATION RATE: 16 BRPM | SYSTOLIC BLOOD PRESSURE: 151 MMHG | DIASTOLIC BLOOD PRESSURE: 84 MMHG | TEMPERATURE: 97.9 F

## 2024-12-02 DIAGNOSIS — J06.9 VIRAL URI WITH COUGH: ICD-10-CM

## 2024-12-02 LAB
ANION GAP SERPL CALCULATED.3IONS-SCNC: 10 MMOL/L (ref 7–15)
ATRIAL RATE - MUSE: 58 BPM
BASOPHILS # BLD AUTO: 0.1 10E3/UL (ref 0–0.2)
BASOPHILS NFR BLD AUTO: 1 %
BUN SERPL-MCNC: 17.9 MG/DL (ref 8–23)
CALCIUM SERPL-MCNC: 9.9 MG/DL (ref 8.8–10.4)
CHLORIDE SERPL-SCNC: 105 MMOL/L (ref 98–107)
CREAT SERPL-MCNC: 1 MG/DL (ref 0.51–0.95)
D DIMER PPP FEU-MCNC: <=0.27 UG/ML FEU (ref 0–0.5)
DIASTOLIC BLOOD PRESSURE - MUSE: NORMAL MMHG
EGFRCR SERPLBLD CKD-EPI 2021: 62 ML/MIN/1.73M2
EOSINOPHIL # BLD AUTO: 0.1 10E3/UL (ref 0–0.7)
EOSINOPHIL NFR BLD AUTO: 2 %
ERYTHROCYTE [DISTWIDTH] IN BLOOD BY AUTOMATED COUNT: 12.9 % (ref 10–15)
FLUAV RNA SPEC QL NAA+PROBE: NEGATIVE
FLUBV RNA RESP QL NAA+PROBE: NEGATIVE
GLUCOSE SERPL-MCNC: 96 MG/DL (ref 70–99)
GROUP A STREP BY PCR: NOT DETECTED
HCO3 SERPL-SCNC: 26 MMOL/L (ref 22–29)
HCT VFR BLD AUTO: 46.8 % (ref 35–47)
HGB BLD-MCNC: 16.2 G/DL (ref 11.7–15.7)
IMM GRANULOCYTES # BLD: 0 10E3/UL
IMM GRANULOCYTES NFR BLD: 0 %
INTERPRETATION ECG - MUSE: NORMAL
LYMPHOCYTES # BLD AUTO: 3 10E3/UL (ref 0.8–5.3)
LYMPHOCYTES NFR BLD AUTO: 38 %
MAGNESIUM SERPL-MCNC: 2.1 MG/DL (ref 1.7–2.3)
MCH RBC QN AUTO: 30.5 PG (ref 26.5–33)
MCHC RBC AUTO-ENTMCNC: 34.6 G/DL (ref 31.5–36.5)
MCV RBC AUTO: 88 FL (ref 78–100)
MONOCYTES # BLD AUTO: 0.5 10E3/UL (ref 0–1.3)
MONOCYTES NFR BLD AUTO: 7 %
NEUTROPHILS # BLD AUTO: 4.2 10E3/UL (ref 1.6–8.3)
NEUTROPHILS NFR BLD AUTO: 53 %
NRBC # BLD AUTO: 0 10E3/UL
NRBC BLD AUTO-RTO: 0 /100
NT-PROBNP SERPL-MCNC: 284 PG/ML (ref 0–900)
P AXIS - MUSE: 43 DEGREES
PLATELET # BLD AUTO: 222 10E3/UL (ref 150–450)
POTASSIUM SERPL-SCNC: 4.2 MMOL/L (ref 3.4–5.3)
PR INTERVAL - MUSE: 148 MS
QRS DURATION - MUSE: 86 MS
QT - MUSE: 458 MS
QTC - MUSE: 449 MS
R AXIS - MUSE: 50 DEGREES
RBC # BLD AUTO: 5.31 10E6/UL (ref 3.8–5.2)
RSV RNA SPEC NAA+PROBE: NEGATIVE
SARS-COV-2 RNA RESP QL NAA+PROBE: NEGATIVE
SODIUM SERPL-SCNC: 141 MMOL/L (ref 135–145)
SYSTOLIC BLOOD PRESSURE - MUSE: NORMAL MMHG
T AXIS - MUSE: 44 DEGREES
TROPONIN T SERPL HS-MCNC: <6 NG/L
VENTRICULAR RATE- MUSE: 58 BPM
WBC # BLD AUTO: 7.9 10E3/UL (ref 4–11)

## 2024-12-02 PROCEDURE — 83735 ASSAY OF MAGNESIUM: CPT

## 2024-12-02 PROCEDURE — 83880 ASSAY OF NATRIURETIC PEPTIDE: CPT

## 2024-12-02 PROCEDURE — 85379 FIBRIN DEGRADATION QUANT: CPT

## 2024-12-02 PROCEDURE — 93005 ELECTROCARDIOGRAM TRACING: CPT

## 2024-12-02 PROCEDURE — 87651 STREP A DNA AMP PROBE: CPT

## 2024-12-02 PROCEDURE — 85004 AUTOMATED DIFF WBC COUNT: CPT

## 2024-12-02 PROCEDURE — 99285 EMERGENCY DEPT VISIT HI MDM: CPT | Mod: 25

## 2024-12-02 PROCEDURE — 85041 AUTOMATED RBC COUNT: CPT

## 2024-12-02 PROCEDURE — 84484 ASSAY OF TROPONIN QUANT: CPT

## 2024-12-02 PROCEDURE — 71046 X-RAY EXAM CHEST 2 VIEWS: CPT

## 2024-12-02 PROCEDURE — 82565 ASSAY OF CREATININE: CPT

## 2024-12-02 PROCEDURE — 87637 SARSCOV2&INF A&B&RSV AMP PRB: CPT

## 2024-12-02 PROCEDURE — 80048 BASIC METABOLIC PNL TOTAL CA: CPT

## 2024-12-02 PROCEDURE — 250N000012 HC RX MED GY IP 250 OP 636 PS 637: Mod: GZ

## 2024-12-02 PROCEDURE — 36415 COLL VENOUS BLD VENIPUNCTURE: CPT

## 2024-12-02 RX ADMIN — DEXAMETHASONE 10 MG: 2 TABLET ORAL at 17:45

## 2024-12-02 ASSESSMENT — COLUMBIA-SUICIDE SEVERITY RATING SCALE - C-SSRS
6. HAVE YOU EVER DONE ANYTHING, STARTED TO DO ANYTHING, OR PREPARED TO DO ANYTHING TO END YOUR LIFE?: NO
2. HAVE YOU ACTUALLY HAD ANY THOUGHTS OF KILLING YOURSELF IN THE PAST MONTH?: NO
1. IN THE PAST MONTH, HAVE YOU WISHED YOU WERE DEAD OR WISHED YOU COULD GO TO SLEEP AND NOT WAKE UP?: NO

## 2024-12-02 NOTE — ED PROVIDER NOTES
EMERGENCY DEPARTMENT ENCOUNTER      NAME: Ilsa Yusuf  AGE: 64 year old female  YOB: 1960  MRN: [id number]  EVALUATION DATE & TIME: No admission date for patient encounter.    PCP: Catrachita Nicolas    ED PROVIDER: Kaylin Gibbs PA-C      Chief Complaint   Patient presents with     Pharyngitis            Cold Symptoms     FINAL IMPRESSION:  No diagnosis found.      ED COURSE & MEDICAL DECISION MAKING:    Pertinent Labs & Imaging studies reviewed. (See chart for details)  64 year old female presents to the Emergency Department for evaluation of cold symptoms.  For the past 2 days patient's been having a fever, cough, congestion, runny nose, body aches.  Vital signs reviewed and patient is hypertensive.  Afebrile.  On exam patient alert and answering questions appropriately.  Normal rate.  Lungs are clear to auscultation bilaterally.  No respiratory distress.  Abdomen is soft and nontender.  No rash.  Posteropharynx is nonerythematous without tonsillar exudate or edema.  Uvula is 9.  Trachea is midline.  Normal range of motion of the neck.    Differential diagnosis includes COVID, influenza, RSV, strep, pneumonia, pneumothorax, hemothorax.  Influenza COVID and RSV was negative.  Strep was negative.  Chest x-ray shows no focal airspace opacity.  No pleural effusion or pneumothorax.  Stable heart size and mediastinal contours.  Upon reevaluation patient reports that she has centralized non radiating chest pain and shortness of breath with walking started while in the waiting room.  Labs, EKG and Decadron was ordered for the patient.  Patient was educated on results and will be signed out to Neeraj Husain PA-C.      ED COURSE:   2:07 PM I saw the patient.   5:30 PM I checked on the patient. Patient agrees with the plan.   *** Signed out to Neeraj Husain PA-C  ED Course as of 12/02/24 1756   Mon Dec 02, 2024   9228     Patient is a 64-year-old female, past medical history of  anxiety, presents emergency department for evaluation of her respiratory tract infection.  At the time of signout, patient now pending laboratory studies as she is reportedly complaining of some chest discomfort and mild shortness of breath.  Vitally stable here in the emergency department, not febrile, tachycardic tachypneic.  Not hypoxic.  X-ray of the chest shows no acute abnormalities.  COVID influenza RSV negative, strep negative       At the conclusion of the encounter I discussed the results of all of the tests and the disposition. The questions were answered. The patient or family acknowledged understanding and was agreeable with the care plan.     0 minutes of critical care time       Medical Decision Making  Obtained supplemental history:Supplemental history obtained?: No  Reviewed external records: External records reviewed?: No  Care impacted by chronic illness:Chronic Kidney Disease and Mental Health  Care significantly affected by social determinants of health:N/A  Did you consider but not order tests?: Work up considered but not performed and documented in chart, if applicable  Did you interpret images independently?: Independent interpretation of ECG and images noted in documentation, when applicable.  Consultation discussion with other provider:Did you involve another provider (consultant, , pharmacy, etc.)?: I discussed the care with another health care provider, see documentation for details.  Signed out    Not Applicable      MEDICATIONS GIVEN IN THE EMERGENCY:  Medications - No data to display    NEW PRESCRIPTIONS STARTED AT TODAY'S ER VISIT  New Prescriptions    No medications on file          =================================================================    HPI    Patient information was obtained from: patient     Use of : N/A      Ilsa Yusuf is a 64 year old female with a pertinent history of HPV, DONELL, and stage 3 CKD who presents to this ED by walking for  "evaluation of shortness of breath, pharyngitis, cough, runny nose, and fever.     Patient stated that she has been having \"cold symptoms\" since Saturday (2 days ago). Patient said that she has been having a fever that was measure at home at 100.8 F. She has been taking tylenol every 4 hours for her symptoms. She also described feeling body aches and fatigue. She noted that when she walked out of the car in the cold weather she \"it burned\". She noted that her cough has been intermittent. She has been having body aches, but said that they're not much worse than her baseline.       REVIEW OF SYSTEMS   As per HPI    PAST MEDICAL HISTORY:  Past Medical History:   Diagnosis Date     Arthritis 2012    both knees; hands     Cervical high risk HPV (human papillomavirus) test positive 03/19/2019    see problem list     Depressive disorder      Depressive disorder, not elsewhere classified 08/28/12    [facility] 10[facility]05[facility]12[facility]     Hyperlipidemia LDL goal < 130     mevacor     Hypothyroid      Moderate major depression (H)     abilify, cymbalta, seroquel, and sofya, Dr Alvarez Persamy     Mumps      ABDOUL (obstructive sleep apnea)      PTSD (post-traumatic stress disorder)      Seizure (H) 03/2011    one episode, was on Keppra, EEG negative       PAST SURGICAL HISTORY:  Past Surgical History:   Procedure Laterality Date     ABDOMEN SURGERY       BLADDER SURGERY       CHOLECYSTECTOMY       COLONOSCOPY  2012     CYSTOSCOPY       CYSTOSCOPY, BIOPSY BLADDER, COMBINED N/A 11/1/2022    Procedure: EXAM UNDER ANESTHESIA, CYSTOSCOPY;  Surgeon: Terrie Melton MD;  [place] [facility] [place]     ORTHOPEDIC SURGERY  left knee     renal artery surgery  child    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     TONSILLECTOMY       ureteral reimplantation       ZZC PARTIAL EXCISION THYROID,UNILAT  1991    rt, negative path then, treated with synthroid.           CURRENT MEDICATIONS:    albuterol (PROAIR HFA/PROVENTIL HFA/VENTOLIN HFA) 108 (90 Base) " "MCG/ACT inhaler  azelastine (ASTELIN) 0.1 % nasal spray  buPROPion (WELLBUTRIN SR) 200 MG 12 hr tablet  busPIRone (BUSPAR) 10 MG tablet  cholecalciferol (VITAMIN D3) 5000 UNITS CAPS capsule  diazepam (VALIUM) 10 MG tablet  dicyclomine (BENTYL) 10 MG capsule  escitalopram (LEXAPRO) 20 MG tablet  hydrOXYzine HCl (ATARAX) 25 MG tablet  levothyroxine (SYNTHROID/LEVOTHROID) 175 MCG tablet  lovastatin (MEVACOR) 20 MG tablet  multivitamin w/minerals (THERA-VIT-M) tablet  naloxone (NARCAN) 4 MG/0.1ML nasal spray  Omega-3 Fatty Acids (OMEGA 3 PO)  omeprazole (PRILOSEC) 40 MG DR capsule  ondansetron (ZOFRAN) 4 MG tablet  oxyCODONE IR (ROXICODONE) 10 MG tablet  prazosin (MINIPRESS) 5 MG capsule  tiZANidine (ZANAFLEX) 4 MG tablet  topiramate (TOPAMAX) 25 MG tablet  UNABLE TO FIND        ALLERGIES:  Allergies   Allergen Reactions     Gabapentin Other (See Comments)     Patient states she gets \"horrific nightmares\" with this medication     Hydromorphone Hcl Other (See Comments)     Made her feel like her skin was crawling      \"creepy crawly skin\"     Nsaids Other (See Comments)     Kidney failure     Compazine [Prochlorperazine] Other (See Comments)     \"Makes my skin crawl, I was going nuts.\"       FAMILY HISTORY:  Family History   Problem Relation Age of Onset     Alzheimer Disease Mother         total care now     Depression Mother      Anxiety Disorder Mother      C.A.D. Father 58         at 58, heart disease     Mental Illness Father      Coronary Artery Disease Father      Hyperlipidemia Father      Diverticulitis Father      Diabetes Sister         adult onset     Melanoma Sister      Breast Cancer Sister      Thyroid Disease Sister      Diabetes Maternal Grandmother      Anesthesia Reaction No family hx of      Bleeding Disorder No family hx of      Venous thrombosis No family hx of        SOCIAL HISTORY:   Social History     Socioeconomic History     Marital status:      Spouse name: None     Number of " children: None     Years of education: None     Highest education level: None   Tobacco Use     Smoking status: Former     Current packs/day: 0.00     Types: Cigarettes     Quit date: 2015     Years since quittin.7     Passive exposure: Past     Smokeless tobacco: Former     Tobacco comments:     Quit 5 - 10 years ago    Vaping Use     Vaping status: Never Used   Substance and Sexual Activity     Alcohol use: Not Currently     Alcohol/week: 0.0 standard drinks of alcohol     Drug use: Yes     Types: Marijuana     Comment: medical marijuana     Sexual activity: Yes     Partners: Male     Birth control/protection: None   Other Topics Concern     Parent/sibling w/ CABG, MI or angioplasty before 65F 55M? 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       Social Drivers of Health      Received from [employer]    Financial Resource Strain    Received from [employer]    Social Connections       VITALS:  BP (!) 135/91   Pulse 70   Temp 97.9  F (36.6  C)   Resp 16   LMP 2016   SpO2 98%     PHYSICAL EXAM    Physical Exam  Vitals and nursing note reviewed.   Constitutional:       Appearance: Normal appearance.   HENT:      Head: Atraumatic.      Jaw: There is normal jaw occlusion. No trismus.      Right Ear: Hearing, tympanic membrane, ear canal and external ear normal.      Left Ear: Hearing, tympanic membrane, ear canal and external ear normal.      Nose: Nose normal.      Right Sinus: No maxillary sinus tenderness or frontal sinus tenderness.      Left Sinus: No maxillary sinus tenderness.      Mouth/Throat:      Lips: No lesions.      Mouth: Mucous membranes are moist.      Tongue: No lesions. Tongue does not deviate from midline.      Palate: No mass and lesions.      Pharynx: Oropharynx is clear. Uvula midline.      Tonsils: No  tonsillar exudate or tonsillar abscesses.      Comments: Palate is soft.   Eyes:      Conjunctiva/sclera: Conjunctivae normal.      Pupils: Pupils are equal, round, and reactive to light.   Neck:      Trachea: Trachea and phonation normal.   Cardiovascular:      Rate and Rhythm: Normal rate and regular rhythm.      Pulses: Normal pulses.      Heart sounds: Normal heart sounds. No murmur heard.     No friction rub. No gallop.   Pulmonary:      Effort: Pulmonary effort is normal.      Breath sounds: Normal breath sounds. No wheezing or rales.   Abdominal:      Tenderness: There is no abdominal tenderness. There is no guarding or rebound.   Musculoskeletal:      Cervical back: Full passive range of motion without pain and normal range of motion. No rigidity or crepitus. Normal range of motion.   Lymphadenopathy:      Cervical: No cervical adenopathy.   Skin:     General: Skin is dry.      Capillary Refill: Capillary refill takes less than 2 seconds.   Neurological:      General: No focal deficit present.      Mental Status: She is alert and oriented to person, place, and time. Mental status is at baseline.   Psychiatric:         Mood and Affect: Mood normal.         Thought Content: Thought content normal.        LAB:  All pertinent labs reviewed and interpreted.  Labs Ordered and Resulted from Time of ED Arrival to Time of ED Departure - No data to display     RADIOLOGY:  Reviewed all pertinent imaging. Please see official radiology report.  No orders to display         IGunnar, am serving as a scribe to document services personally performed by Kaylin Gibbs PA-C, based on my observation and the provider's statements to me. I, Kaylin Gibbs PA-C, attest that Gunnar Arciniega is acting in a scribe capacity, has observed my performance of the services and has documented them in accordance with my direction.    Kaylin Gibbs PA-C  [facility] EMERGENCY ROOM  [address]  [facility] [place] [address] [phone]

## 2024-12-02 NOTE — ED TRIAGE NOTES
Pt presents with sore throat and cold symptoms since Saturday. ABCs intact.      Triage Assessment (Adult)       Row Name 12/02/24 7417          Triage Assessment    Airway WDL WDL        Respiratory WDL    Respiratory WDL WDL        Skin Circulation/Temperature WDL    Skin Circulation/Temperature WDL WDL        Cardiac WDL    Cardiac WDL WDL        Peripheral/Neurovascular WDL    Peripheral Neurovascular WDL WDL        Cognitive/Neuro/Behavioral WDL    Cognitive/Neuro/Behavioral WDL WDL

## 2024-12-02 NOTE — ED NOTES
EMERGENCY DEPARTMENT SIGN OUT NOTE        ED COURSE AND MEDICAL DECISION MAKING  Patient was signed out to me by Kaylin Gibbs PA-C at 5:40 PM  6:19 PM We discussed the plan for discharge and the patient is agreeable. Reviewed supportive cares, symptomatic treatment, outpatient follow up, and reasons to return to the Emergency Department. All questions and concerns were addressed. Patient to be discharged by ED RN.       In brief, Ilsa Yusuf is a 64 year old female who initially presented to the ED for evaluation of shortness of breath, pharyngitis, cough, runny nose, and fever.  Patient has been experiencing cold symptoms since Saturday (2 days). She has been taking tylenol every 4 hours.     At time of sign out, disposition was pending labs, reassessment    ED Course as of 12/02/24 1944   Mon Dec 02, 2024   1738     Patient is a 64-year-old female, past medical history of anxiety, presents emergency department for evaluation of her respiratory tract infection.  At the time of signout, patient now pending laboratory studies as she is reportedly complaining of some chest discomfort and mild shortness of breath.  Vitally stable here in the emergency department, not febrile, tachycardic tachypneic.  Not hypoxic.  X-ray of the chest shows no acute abnormalities.  COVID influenza RSV negative, strep negative   1759 D-Dimer Quantitative: <=0.27   1828     Seen updated on laboratory results here in the emergency department, troponin is undetectable, magnesium is normal, BNP here normal, dimer does not show any significant elevation.  Patient otherwise well-appearing here in the emergency department, and laboratory workup and imaging here is negative.  Plan for discharge and outpatient follow-up.  Suspect likely viral etiology.     FINAL IMPRESSION    1. Viral URI with cough        ED MEDS  Medications   dexAMETHasone (DECADRON) tablet 10 mg (10 mg Oral $Given 12/2/24 1832)       LAB  Labs Ordered and  Resulted from Time of ED Arrival to Time of ED Departure   BASIC METABOLIC PANEL - Abnormal       Result Value    Sodium 141      Potassium 4.2      Chloride 105      Carbon Dioxide (CO2) 26      Anion Gap 10      Urea Nitrogen 17.9      Creatinine 1.00 (*)     GFR Estimate 62      Calcium 9.9      Glucose 96     CBC WITH PLATELETS AND DIFFERENTIAL - Abnormal    WBC Count 7.9      RBC Count 5.31 (*)     Hemoglobin 16.2 (*)     Hematocrit 46.8      MCV 88      MCH 30.5      MCHC 34.6      RDW 12.9      Platelet Count 222      % Neutrophils 53      % Lymphocytes 38      % Monocytes 7      % Eosinophils 2      % Basophils 1      % Immature Granulocytes 0      NRBCs per 100 WBC 0      Absolute Neutrophils 4.2      Absolute Lymphocytes 3.0      Absolute Monocytes 0.5      Absolute Eosinophils 0.1      Absolute Basophils 0.1      Absolute Immature Granulocytes 0.0      Absolute NRBCs 0.0     INFLUENZA A/B, RSV AND SARS-COV2 PCR - Normal    Influenza A PCR Negative      Influenza B PCR Negative      RSV PCR Negative      SARS CoV2 PCR Negative     D DIMER QUANTITATIVE - Normal    D-Dimer Quantitative <=0.27     TROPONIN T, HIGH SENSITIVITY - Normal    Troponin T, High Sensitivity <6     N TERMINAL PRO BNP OUTPATIENT - Normal    N Terminal Pro BNP Outpatient 284     MAGNESIUM - Normal    Magnesium 2.1     GROUP A STREPTOCOCCUS PCR THROAT SWAB - Normal    Group A strep by PCR Not Detected         EKG  NSR.     RADIOLOGY    Chest XR,  PA & LAT   Final Result   IMPRESSION: No focal airspace opacity. No pleural effusion or pneumothorax. Stable heart size and mediastinal contours.          DISCHARGE MEDS  New Prescriptions    No medications on file       Rustam Husain PA-C  [facility] EMERGENCY ROOM  [facility] [facility] [id number] [phone]         Rustam Husain PA-C  12/02/24 1944

## 2024-12-03 NOTE — DISCHARGE INSTRUCTIONS
You were seen here in the emergency department for evaluation of cough, pharyngitis.  Your testing here does not reveal any evidence of blood clots electrolyte abnormalities or other cardiac etiology.  Please follow with your primary care doctor.  Continue with ibuprofen and Tylenol at home.    For pain or fever you may use:  -Tylenol 650 mg every 6 hours.  Max 4000 mg in 24 hours  Do not use thismedication with alcohol as it can cause liver problems.  -Ibuprofen 600 mg every 6 hours.  Max 3500 mg in 24 hours  Do not take this medication if you have a history of a GI bleed or have kidney problems.  You may use both of these medications at the same time or you can alternate them every 3 hours.  For example, Tylenol at 6 AM, ibuprofen at 9 AM, Tylenol at noon, etc.

## 2024-12-20 ENCOUNTER — LAB (OUTPATIENT)
Dept: LAB | Facility: CLINIC | Age: 64
End: 2024-12-20
Payer: MEDICARE

## 2024-12-20 DIAGNOSIS — E03.9 ACQUIRED HYPOTHYROIDISM: ICD-10-CM

## 2024-12-20 PROCEDURE — 84439 ASSAY OF FREE THYROXINE: CPT

## 2024-12-20 PROCEDURE — 36415 COLL VENOUS BLD VENIPUNCTURE: CPT

## 2024-12-20 PROCEDURE — 84443 ASSAY THYROID STIM HORMONE: CPT

## 2024-12-21 LAB
T4 FREE SERPL-MCNC: 1.5 NG/DL (ref 0.9–1.7)
TSH SERPL DL<=0.005 MIU/L-ACNC: 0.15 UIU/ML (ref 0.3–4.2)

## 2024-12-26 DIAGNOSIS — F41.1 GAD (GENERALIZED ANXIETY DISORDER): ICD-10-CM

## 2024-12-26 RX ORDER — HYDROXYZINE HYDROCHLORIDE 25 MG/1
TABLET, FILM COATED ORAL
Qty: 180 TABLET | Refills: 0 | Status: SHIPPED | OUTPATIENT
Start: 2024-12-26

## 2024-12-26 NOTE — TELEPHONE ENCOUNTER
CHANTE Cheng pt    Date of Last Office Visit: 11/26/24  Date of Next Office Visit:  1/21/25  No shows since last visit: No  More than one patient-initiated cancellation (with reschedule) since last seen in clinic? No    []Medication refilled per  Medication Refill in Ambulatory Care  policy.  [x]Medication unable to be refilled by RN due to criteria not met as indicated below:    []Eligibility: has not had a provider visit within last 6 months   []Supervision: no future appointment; < 7 days before next appointment   []Compliance: no shows; cancellations; lapse in therapy   []Verification: order discrepancy; may need modification...   [x] > 30-day supply request   []Advanced refill request: > 7 days before refill date   []Controlled medication   []Medication not included in policy   []Review: new med; med adjusted <= 30 days; safety alert; requires lab monitoring...   [x]Scope of Practice: refill request processed by LPN/MA   []Other:      Medication(s) requested:     -  hydrOXYzine HCl (ATARAX) 25 MG tablet   Date last ordered: 10/2/24  Qty: 540  Refills: 0     Disp Refills Start End VLEVET   hydrOXYzine HCl (ATARAX) 25 MG tablet 540 tablet 0 10/2/2024 -- No   Sig: TAKE 1 TO 2 TABLETS BY MOUTH 3 TIMES DAILY AS NEEDED FOR ANXIETY   Sent to pharmacy as: hydrOXYzine HCl 25 MG Oral Tablet (ATARAX)   Class: E-Prescribe       Any Controlled Substance(s)? No      Requested medication(s) verified as identical to current order? Yes    Any lapse in adherence to medication(s) greater than 5 days? No      Additional action taken? routed encounter to provider for review.      Last visit treatment plan:      PLAN AND RECOMMENDATIONS:  Continue  Wellbutrin  mg  daily -   Continue Lexapro 20 mg every day  Continue hydroxyzine 25 - 50 mg instead of 3 times daily as needed for anxiety         Continue to take prazosin 5 mg at bedtime  Continue Buspar 20 mg three times daily for anxiety    Any medication(s) require lab monitoring?  No

## 2024-12-30 ENCOUNTER — TELEPHONE (OUTPATIENT)
Dept: PEDIATRICS | Facility: CLINIC | Age: 64
End: 2024-12-30
Payer: MEDICARE

## 2024-12-30 NOTE — TELEPHONE ENCOUNTER
Reached out to patient per management request.  Patient trying to establish with EA provider but no openings until mid February.     Concerned for a blockage as she is having thinner stools.      Intermittent rectal pressure, sensation that something is sitting in her rectum, some low abdominal pressure at times.   No visual blood in stool or toilet. No chest pain, no SOB, denies fever, and no dizziness.  Uses Miralax  States she has wonderful bowel etiquette  Has history of explosive stools.    Reviewed symptoms that should prompt care sooner or a call to triage.     She verbally understood and did not have any questions at this time.     SELIN Guillermo on 12/30/2024 at 12:34 PM

## 2024-12-31 DIAGNOSIS — E03.9 ACQUIRED HYPOTHYROIDISM: ICD-10-CM

## 2024-12-31 RX ORDER — LEVOTHYROXINE SODIUM 175 UG/1
175 TABLET ORAL DAILY
Qty: 90 TABLET | Refills: 0 | Status: SHIPPED | OUTPATIENT
Start: 2024-12-31

## 2025-01-02 ENCOUNTER — TRANSFERRED RECORDS (OUTPATIENT)
Dept: HEALTH INFORMATION MANAGEMENT | Facility: CLINIC | Age: 65
End: 2025-01-02
Payer: MEDICARE

## 2025-01-15 ENCOUNTER — OFFICE VISIT (OUTPATIENT)
Dept: PEDIATRICS | Facility: CLINIC | Age: 65
End: 2025-01-15
Payer: MEDICARE

## 2025-01-15 VITALS
SYSTOLIC BLOOD PRESSURE: 120 MMHG | HEIGHT: 68 IN | HEART RATE: 71 BPM | OXYGEN SATURATION: 95 % | WEIGHT: 206 LBS | BODY MASS INDEX: 31.22 KG/M2 | TEMPERATURE: 96.8 F | RESPIRATION RATE: 20 BRPM | DIASTOLIC BLOOD PRESSURE: 68 MMHG

## 2025-01-15 DIAGNOSIS — R30.0 DYSURIA: Primary | ICD-10-CM

## 2025-01-15 LAB
ALBUMIN UR-MCNC: NEGATIVE MG/DL
AMORPH CRY #/AREA URNS HPF: ABNORMAL /HPF
APPEARANCE UR: CLEAR
BACTERIA #/AREA URNS HPF: ABNORMAL /HPF
BILIRUB UR QL STRIP: NEGATIVE
COLOR UR AUTO: YELLOW
GLUCOSE UR STRIP-MCNC: NEGATIVE MG/DL
HGB UR QL STRIP: NEGATIVE
KETONES UR STRIP-MCNC: NEGATIVE MG/DL
LEUKOCYTE ESTERASE UR QL STRIP: NEGATIVE
MUCOUS THREADS #/AREA URNS LPF: PRESENT /LPF
NITRATE UR QL: POSITIVE
PH UR STRIP: 7 [PH] (ref 5–7)
RBC #/AREA URNS AUTO: ABNORMAL /HPF
SP GR UR STRIP: 1.01 (ref 1–1.03)
SQUAMOUS #/AREA URNS AUTO: ABNORMAL /LPF
UROBILINOGEN UR STRIP-ACNC: 0.2 E.U./DL
WBC #/AREA URNS AUTO: ABNORMAL /HPF

## 2025-01-15 PROCEDURE — 81001 URINALYSIS AUTO W/SCOPE: CPT | Performed by: PHYSICIAN ASSISTANT

## 2025-01-15 PROCEDURE — 99214 OFFICE O/P EST MOD 30 MIN: CPT | Performed by: PHYSICIAN ASSISTANT

## 2025-01-15 PROCEDURE — 87086 URINE CULTURE/COLONY COUNT: CPT | Performed by: PHYSICIAN ASSISTANT

## 2025-01-15 RX ORDER — SULFAMETHOXAZOLE AND TRIMETHOPRIM 800; 160 MG/1; MG/1
1 TABLET ORAL 2 TIMES DAILY
Qty: 14 TABLET | Refills: 0 | Status: SHIPPED | OUTPATIENT
Start: 2025-01-15 | End: 2025-01-22

## 2025-01-15 ASSESSMENT — PATIENT HEALTH QUESTIONNAIRE - PHQ9
SUM OF ALL RESPONSES TO PHQ QUESTIONS 1-9: 3
SUM OF ALL RESPONSES TO PHQ QUESTIONS 1-9: 3
10. IF YOU CHECKED OFF ANY PROBLEMS, HOW DIFFICULT HAVE THESE PROBLEMS MADE IT FOR YOU TO DO YOUR WORK, TAKE CARE OF THINGS AT HOME, OR GET ALONG WITH OTHER PEOPLE: SOMEWHAT DIFFICULT

## 2025-01-15 ASSESSMENT — PAIN SCALES - GENERAL: PAINLEVEL_OUTOF10: MODERATE PAIN (4)

## 2025-01-15 NOTE — PROGRESS NOTES
"  Assessment & Plan     Dysuria  No fevers, nausea vomiting   Possible recent infection.   No evidence for stone at this time or Pyelo  Urine culture pending   - UA Macroscopic with reflex to Microscopic and Culture - Lab Collect  - UA Macroscopic with reflex to Microscopic and Culture - Lab Collect  - Urine Microscopic Exam  - Urine Culture  - sulfamethoxazole-trimethoprim (BACTRIM DS) 800-160 MG tablet  Dispense: 14 tablet; Refill: 0            BMI  Estimated body mass index is 31.32 kg/m  as calculated from the following:    Height as of this encounter: 1.727 m (5' 8\").    Weight as of this encounter: 93.4 kg (206 lb).             Yair Alicea is a 64 year old, presenting for the following health issues:  UTI      1/15/2025     3:19 PM   Additional Questions   Roomed by Radha RUTH   Accompanied by N/A         1/15/2025     3:19 PM   Patient Reported Additional Medications   Patient reports taking the following new medications No     UTI    History of Present Illness       Reason for visit:  Bladder infection  Symptom onset:  1-3 days ago  Symptoms include:  Bladder spasms ,frequency  Symptom intensity:  Moderate  Symptom progression:  Staying the same  Had these symptoms before:  Yes  Has tried/received treatment for these symptoms:  Yes  Previous treatment was successful:  Yes  Prior treatment description:  Keflex 7days   She is taking medications regularly.     Noted bladder spasms, urinary frequency, dysuria started 2 days ago.  Pyridium- helps some  No nausea vomiting  Some chills  Symptoms worsening   Waking at night  Last uti- w/in 30 days, keflex x 7 days- seemed to resolve, but then returned                            Objective    /68 (BP Location: Right arm, Patient Position: Sitting, Cuff Size: Adult Regular)   Pulse 71   Temp 96.8  F (36  C) (Temporal)   Resp 20   Ht 1.727 m (5' 8\")   Wt 93.4 kg (206 lb)   LMP 08/08/2016   SpO2 95%   BMI 31.32 kg/m    Body mass index is 31.32 " kg/m .  Physical Exam   GENERAL: alert and no distress  RESP: lungs clear to auscultation - no rales, rhonchi or wheezes  CV: regular rate and rhythm, normal S1 S2, no S3 or S4, no murmur, click or rub, no peripheral edema  SKIN: no suspicious lesions or rashes            Signed Electronically by: Luh Tolbert PA-C

## 2025-01-16 LAB — BACTERIA UR CULT: NO GROWTH

## 2025-01-21 ENCOUNTER — VIRTUAL VISIT (OUTPATIENT)
Dept: PSYCHIATRY | Facility: CLINIC | Age: 65
End: 2025-01-21
Payer: MEDICARE

## 2025-01-21 DIAGNOSIS — F33.1 MAJOR DEPRESSIVE DISORDER, RECURRENT EPISODE, MODERATE (H): Primary | ICD-10-CM

## 2025-01-21 DIAGNOSIS — F41.1 GENERALIZED ANXIETY DISORDER: ICD-10-CM

## 2025-01-21 DIAGNOSIS — F43.10 PTSD (POST-TRAUMATIC STRESS DISORDER): ICD-10-CM

## 2025-01-21 RX ORDER — BUPROPION HYDROCHLORIDE 200 MG/1
200 TABLET, EXTENDED RELEASE ORAL DAILY
Qty: 90 TABLET | Refills: 0 | Status: SHIPPED | OUTPATIENT
Start: 2025-01-21

## 2025-01-21 ASSESSMENT — PATIENT HEALTH QUESTIONNAIRE - PHQ9
10. IF YOU CHECKED OFF ANY PROBLEMS, HOW DIFFICULT HAVE THESE PROBLEMS MADE IT FOR YOU TO DO YOUR WORK, TAKE CARE OF THINGS AT HOME, OR GET ALONG WITH OTHER PEOPLE: SOMEWHAT DIFFICULT
SUM OF ALL RESPONSES TO PHQ QUESTIONS 1-9: 4
SUM OF ALL RESPONSES TO PHQ QUESTIONS 1-9: 4

## 2025-01-21 NOTE — PROGRESS NOTES
Virtual Visit Details    Type of service:  Video Visit   Video/Phone Start Time: 1136  Video/Phone End Time:  1200    Originating Location (pt. Location): Home    Distant Location (provider location):  Off-site  Platform used for Video Visit: [employer]

## 2025-01-21 NOTE — PROGRESS NOTES
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  Medication side effects and alternatives reviewed. Health promotion activities recommended and reviewed today. All questions addressed. Education and counseling completed regarding risks and benefits of medications and psychotherapy options.  Consent provided by patient/guardian  Call the psychiatric nurse line with medication questions or concerns at [phone].  Engeznihart may be used to communicate with your provider, but this is not intended to be used for emergencies.  BLACK BOX WARNING: Discussed the Food and Drug Administration (FDA) requires that all antidepressants carry a warning that some children, adolescents and young adults may be at increased risk of suicide when taking antidepressants. Anyone taking an antidepressant should be watched closely for worsening depression or unusual behavior especially in the first few weeks after starting an SSRI. Keep in mind, antidepressants are more likely to reduce suicide risk in the long run by improving mood.   SEROTONIN SYNDROME:  Discussed risks of Serotonin syndrome (ie, serotonin toxicity) which is a potentially life-threatening condition associated with increased serotonergic activity in the central nervous system (CNS). It is  seen with therapeutic medication use, inadvertent interactions between drugs, and intentional self-poisoning. Serotonin syndrome may involve a spectrum of clinical findings, which often include mental status changes, autonomic hyperactivity, and neuromuscular abnormalities.    BENZODIAZEPINE:  discussion on how benzos work and the need to use them short term due to potential of anxiety getting.  This is a controlled substance with risk for abuse, need to keep in a safe keep place and cannot replace lost scripts.    HARM REDUCTION:  Discussions regarding effects of mood altering substances, alcohol and cannabis, on mood and that approach is harm reduction, will continue to prescribe meds as they work to cut back use.    FIRST GENERATION ANTIPSYCHOTIC/ SECOND GENERATION ANTIPSYCHOTIC USE:  Atypical need for cardiometabolic monitoring with medication- B/P, weight, blood sugar, cholesterol.  Need to monitor for abnormal movements taught  SAFETY:  We all care about your loved one's safety. To reduce the risk of self-harm, remove access to all:  Firearms, Medicines (both prescribed and over-the-counter), Knives and other sharp objects, Ropes and like materials, and Alcohol  SLEEP HYGIENE: establish a sleep routine, limit screen time 1 hour prior to bed, use bed for sleep only, take sleep/medications on time (including sleepy time tea, trazadone or herbal treatments such as melatonin), aroma therapy, limit caffeine/sugar, yoga, guided imagery, stretch, meditation, limit naps to 20 minutes, make a temperature change in the room, white noise, be mindful of slowing down breathing, take a warm bath/shower, frequently wash sheets, and journaling.   Medlineplus.gov is information for patients.  It is run by the National Library of Medicine and it contains information about all disorders, diseases and all medications.      COMMUNITY RESOURCES:    CRISIS NUMBERS: Provided in AVS 6/21/2023  National Suicide Prevention Lifeline:  3-172-887-TALK (925-600-3939)  [employer]/resources for a list of additional resources (SOS)            [facility] - [phone]   Urgent Care Adult Mental Uietyf-[phone] mobile unit/  crisis line  [facility] -[phone]   COPE  [place] Mobile Team -[phone] (adults)/ [phone] (child)  Poison Control Center - 1-580.177.3977    OR  go to nearest ER  Crisis Text Line for any crisis  send this-   To: 336986   [facility] ([facility]) [facility]  [phone]  National Suicide Prevention Lifeline: 993.508.5491 (TTY: 666.806.6504). Call anytime for help.  (www.suicidepreventionlifeline.org)  National [place] on Mental Illness (www.ellie.org): 501.907.4471 or 856-707-0803.   Mental Health Association (www.mentalhealth.org): 117.410.6008 or 101-550-4356.  [place] Crisis Text Line: Text [place] to 574969  Suicide LifeLine Chat: suicideMagTag.org/chat    ADMINISTRATIVE BILLIN min spent interviewing patient, reviewing referral documents, obtaining and reviewing outside records, communication with other health specialists, and preparing this report on today's date: 2025  Video/Phone Start Time: 1136  Video/Phone End Time:  1200    The longitudinal plan of care for the diagnosis(es)/condition(s) as documented were addressed during this visit. Due to the added complexity in care, I will continue to support Deanne in the subsequent management and with ongoing continuity of care.       Signed:     Ezekiel Cheng, MSN, APRN, PMHNP-BC     Chart documentation done in part with Dragon Voice Recognition software.  Although reviewed after completion, some word and grammatical errors may remain.  Answers for HPI/ROS submitted by the patient on 2022  If you checked off any problems, how difficult have these problems made it for you to do your work, take care of things at home, or get along with other people?: Somewhat difficult  PHQ9  TOTAL SCORE: 7  DONELL 7 TOTAL SCORE: 13    Answers for HPI/ROS submitted by the patient on 2/3/2023  If you checked off any problems, how difficult have these problems made it for you to do your work, take care of things at home, or get along with other people?: Very difficult  PHQ9 TOTAL SCORE: 3  DONELL 7 TOTAL SCORE: 6  Answers for HPI/ROS submitted by the patient on 4/14/2023  If you checked off any problems, how difficult have these problems made it for you to do your work, take care of things at home, or get along with other people?: Somewhat difficult  PHQ9 TOTAL SCORE: 5  DONELL 7 TOTAL SCORE: 8    Answers for HPI/ROS submitted by the patient on 5/25/2023  If you checked off any problems, how difficult have these problems made it for you to do your work, take care of things at home, or get along with other people?: Extremely difficult  PHQ9 TOTAL SCORE: 12  DONELL 7 TOTAL SCORE: 9Answers submitted by the patient for this visit:  Patient Health Questionnaire (Submitted on 9/19/2023)  If you checked off any problems, how difficult have these problems made it for you to do your work, take care of things at home, or get along with other people?: Somewhat difficult  PHQ9 TOTAL SCORE: 5  DONELL-7 (Submitted on 9/19/2023)  DONELL 7 TOTAL SCORE: 14    Answers submitted by the patient for this visit:  Patient Health Questionnaire (Submitted on 1/17/2024)  If you checked off any problems, how difficult have these problems made it for you to do your work, take care of things at home, or get along with other people?: Extremely difficult  PHQ9 TOTAL SCORE: 8  DONELL-7 (Submitted on 1/17/2024)  DONELL 7 TOTAL SCORE: 7    Answers submitted by the patient for this visit:  Patient Health Questionnaire (Submitted on 9/16/2024)  If you checked off any problems, how difficult have these problems made it for you to do your work, take care of things at home, or get along with other people?: Very difficult  PHQ9 TOTAL SCORE: 13    Answers submitted by  the patient for this visit:  Patient Health Questionnaire (Submitted on 11/26/2024)  If you checked off any problems, how difficult have these problems made it for you to do your work, take care of things at home, or get along with other people?: Very difficult  PHQ9 TOTAL SCORE: 6    Answers submitted by the patient for this visit:  Patient Health Questionnaire (Submitted on 1/21/2025)  If you checked off any problems, how difficult have these problems made it for you to do your work, take care of things at home, or get along with other people?: Somewhat difficult  PHQ9 TOTAL SCORE: 4

## 2025-01-21 NOTE — NURSING NOTE
Current patient location: [address]   [place] [address]    Is the patient currently in the state of [place]? YES    Visit mode: VIDEO    If the visit is dropped, the patient can be reconnected by:VIDEO VISIT: Text to cell phone:   Telephone Information:   Mobile [phone]       Will anyone else be joining the visit? NO  (If patient encounters technical issues they should call [phone] :[phone])    Are changes needed to the allergy or medication list? Pt stated no changes to allergies and Pt stated no med changes    Are refills needed on medications prescribed by this physician? Discuss with provider    Rooming Documentation:  Questionnaire(s) completed    Reason for visit: JALEN ROBBINSF

## 2025-01-23 NOTE — TELEPHONE ENCOUNTER
"FUTURE VISIT INFORMATION      FUTURE VISIT INFORMATION:  Date: 4/8/25  Time: 3:20pm  Location: [facility]  REFERRAL INFORMATION:  Referring provider:  Goltz, Bennett Ezra, PA-C   Referring providers clinic:  [facility]   Reason for visit/diagnosis  New per pt-ref, R09.81 (ICD-10-CM) - Nasal congestion, Referral from Bennett Goltz, Referral notes in [facility] Pt made appt for [facility]. **pt will have new referral sent over, expires on 1/30/2025     RECORDS REQUESTED FROM:       Clinic name Comments Records Status Imaging Status   [facility] [place] 1/30/24 referral/ note- Goltz, Bennett Ezra, PA-C  [facility] 1/4/21 CTA head  1/4/21 CT head  5/8/19 XR cervical  3/26/19 MR cervical [facility] PACS   [facility] Otolaryngology 9/17/20 note- Jeffry Gannon MD   8/10/20 note- Sophia Kern MD  E[facility]                       \"Please notify/message CSS if patient completed outside imaging prior to scheduled appointment and/or any outside records that might have been missed at pre visit -Thanks\"    "

## 2025-02-03 ENCOUNTER — TRANSFERRED RECORDS (OUTPATIENT)
Dept: HEALTH INFORMATION MANAGEMENT | Facility: CLINIC | Age: 65
End: 2025-02-03
Payer: MEDICARE

## 2025-02-15 DIAGNOSIS — F33.1 MODERATE EPISODE OF RECURRENT MAJOR DEPRESSIVE DISORDER (H): ICD-10-CM

## 2025-02-15 DIAGNOSIS — F41.1 GAD (GENERALIZED ANXIETY DISORDER): ICD-10-CM

## 2025-02-17 RX ORDER — ESCITALOPRAM OXALATE 20 MG/1
20 TABLET ORAL DAILY
Qty: 90 TABLET | Refills: 0 | Status: SHIPPED | OUTPATIENT
Start: 2025-02-17

## 2025-02-17 NOTE — TELEPHONE ENCOUNTER
Date of Last Office Visit: 1/21/25  Date of Next Office Visit:  3/20/25  No shows since last visit: No  More than one patient-initiated cancellation (with reschedule) since last seen in clinic? No    []Medication refilled per  Medication Refill in Ambulatory Care  policy.  [x]Medication unable to be refilled by RN due to criteria not met as indicated below:    []Eligibility: has not had a provider visit within last 6 months   []Supervision: no future appointment; < 7 days before next appointment   []Compliance: no shows; cancellations; lapse in therapy   []Verification: order discrepancy; may need modification...   [] > 30-day supply request   []Advanced refill request: > 7 days before refill date   []Controlled medication   [x]Medication not included in policy   []Review: new med; med adjusted <= 30 days; safety alert; requires lab monitoring...   []Scope of Practice: refill request processed by LPN/MA   []Other:      Medication(s) requested:     -  escitalopram (LEXAPRO) 20 MG tablet   Date last ordered: 11/8/25  Qty: 90  Refills: 0  Appropriate for refill? Provider to review.            Any Controlled Substance(s)? No      Requested medication(s) verified as identical to current order? Yes    Any lapse in adherence to medication(s) greater than 5 days? No      Additional action taken? routed encounter to provider for review.      Last visit treatment plan:   PLAN AND RECOMMENDATIONS:  Continue  Wellbutrin  mg  daily -   Continue Lexapro 20 mg every day  Continue hydroxyzine 25 - 50 mg instead of 3 times daily as needed for anxiety         Continue to take prazosin 5 mg at bedtime  Continue Buspar 20 mg three times daily for anxiety    Return in about 2 months (around 3/21/2025) for Follow up, with me, using a MyChart eVisit.     Any medication(s) require lab monitoring? No    Jameson Washburn RN on 2/17/2025 at 11:25 AM

## 2025-02-27 ENCOUNTER — MYC MEDICAL ADVICE (OUTPATIENT)
Dept: INTERNAL MEDICINE | Facility: CLINIC | Age: 65
End: 2025-02-27

## 2025-02-27 ENCOUNTER — OFFICE VISIT (OUTPATIENT)
Dept: INTERNAL MEDICINE | Facility: CLINIC | Age: 65
End: 2025-02-27
Payer: MEDICARE

## 2025-02-27 VITALS
DIASTOLIC BLOOD PRESSURE: 70 MMHG | OXYGEN SATURATION: 96 % | WEIGHT: 209 LBS | BODY MASS INDEX: 31.78 KG/M2 | TEMPERATURE: 97.6 F | SYSTOLIC BLOOD PRESSURE: 123 MMHG | HEART RATE: 78 BPM | RESPIRATION RATE: 20 BRPM

## 2025-02-27 DIAGNOSIS — Z02.4 ENCOUNTER FOR DRIVER'S LICENSE HISTORY AND PHYSICAL: Primary | ICD-10-CM

## 2025-02-27 PROBLEM — N20.0 CALCULUS OF KIDNEY: Status: ACTIVE | Noted: 2017-08-26

## 2025-02-27 PROBLEM — M19.90 IDIOPATHIC OSTEOARTHRITIS: Status: ACTIVE | Noted: 2022-04-18

## 2025-02-27 PROBLEM — G47.33 OBSTRUCTIVE SLEEP APNEA SYNDROME: Status: ACTIVE | Noted: 2019-07-19

## 2025-02-27 PROBLEM — M99.05 SOMATIC DYSFUNCTION OF PELVIS REGION: Status: ACTIVE | Noted: 2023-01-10

## 2025-02-27 PROBLEM — R39.15 URINARY URGENCY: Status: ACTIVE | Noted: 2022-10-17

## 2025-02-27 RX ORDER — DULOXETIN HYDROCHLORIDE 60 MG/1
120 CAPSULE, DELAYED RELEASE ORAL
COMMUNITY

## 2025-02-27 RX ORDER — MULTIVITAMIN,THERAPEUTIC
1 TABLET ORAL DAILY
COMMUNITY

## 2025-02-27 RX ORDER — ARIPIPRAZOLE 10 MG/1
15 TABLET ORAL
COMMUNITY

## 2025-02-27 ASSESSMENT — PAIN SCALES - GENERAL: PAINLEVEL_OUTOF10: MODERATE PAIN (5)

## 2025-02-27 NOTE — PROGRESS NOTES
"  Assessment & Plan   Problem List Items Addressed This Visit    None  Visit Diagnoses       Encounter for 's license history and physical    -  Primary                  BMI  Estimated body mass index is 31.78 kg/m  as calculated from the following:    Height as of 1/15/25: 1.727 m (5' 8\").    Weight as of this encounter: 94.8 kg (209 lb).   Jayde Alicea is a 64 year old, presenting for the following health issues:   Pre-driving Assessment        2/27/2025     2:53 PM   Additional Questions   Roomed by Benson Love CMA   Accompanied by Self         2/27/2025     2:53 PM   Patient Reported Additional Medications   Patient reports taking the following new medications no     History of Present Illness       Reason for visit:  Update records   She is taking medications regularly.    In 2008 pt was hypoglycemic and loss consciousness in a store in [place]. She has been driving ever since that episode, she has had 2 EEG's, both in [place]. Pt is originally from [place] and so this incident was documented in [place]. Since then she moved to [place]. She has been successfully driving in [place] without fail and 9/10/2024 [place] noted this episode in [place] that [place]  which caused [place]. Pt had a story that wasn't clear and somewhat contradicting itself.     Pt had another visit noted 10/30/2024 for this situation which stated \"Pt reported that in 2011, she fainted while shopping at a mall and was later found to have low blood sugar in the emergency department. Today, she is seeking clearance on this form to confirm she has no history of seizures and is safe to drive. When I reviewed her medical records in Care Everywhere, I found that she saw a neurologist in [id number]. An EEG was performed at that time, and while the results were unremarkable, the neurologist noted that an epileptic seizure could not be ruled out. Records also indicated she was prescribed " "Keppra for a period but has not had follow-up with a neurologist since then.     The patient mentioned that when she contacted the [facility], they informed her that her primary care physician could complete the form to indicate  no concerns.  However, upon reviewing the form, I noted several specific yes-or-no questions. I explained to her that I could not independently confirm her condition, as I did not evaluate her at the time of her symptoms, and could only summarize information from her medical records and her account. I advised her that I would document what she reported and include the details from her neurologist s assessment in [id number] but could not definitively state that she has no history of seizures.\"     Suggested for patient to go to encourage [place] to take a motor vehicle drivers license test.  Explained that ultimately she needs to be seen by primary care provider who knows her situation and who follows her.  Patient was very urgent about this request and very frustrated about turning her down. Reviewed paperwork that needs to be signed and I do not feel comfortable and convinced in providing clearance.                    Objective    /70 (BP Location: Right arm, Patient Position: Sitting, Cuff Size: Adult Regular)   Pulse 78   Temp 97.6  F (36.4  C) (Oral)   Resp 20   Wt 94.8 kg (209 lb)   LMP 08/08/2016   SpO2 96%   BMI 31.78 kg/m    Body mass index is 31.78 kg/m .  Physical Exam               Signed Electronically by: ALONDRA Pinedo CNP    "

## 2025-03-03 NOTE — TELEPHONE ENCOUNTER
[facility] will not be available as a PCP until 3-4-2025. Patient has also made a appt with an [facility] provider to establish care. This writer will not enter a PCP at this time in the patient chart.

## 2025-03-04 ENCOUNTER — TRANSFERRED RECORDS (OUTPATIENT)
Dept: HEALTH INFORMATION MANAGEMENT | Facility: CLINIC | Age: 65
End: 2025-03-04
Payer: MEDICARE

## 2025-03-05 ENCOUNTER — TELEPHONE (OUTPATIENT)
Dept: UROLOGY | Facility: CLINIC | Age: 65
End: 2025-03-05
Payer: MEDICARE

## 2025-03-05 NOTE — TELEPHONE ENCOUNTER
[facility] Call Center    Phone Message    May a detailed message be left on voicemail: yes     Reason for Call: Medication Refill Request    Has the patient contacted the pharmacy for the refill? Yes she is completely out  Name of medication being requested:  VaLIUM SUPPOSITORY  Provider who prescribed the medication: Dr Melton  Pharmacy: [facility] pharmacy [phone]   Date medication is needed: ASAP     Action Taken: Other: [facility]    Travel Screening: Not Applicable     Date of Service:

## 2025-03-06 DIAGNOSIS — N32.81 URGENCY-FREQUENCY SYNDROME: Primary | ICD-10-CM

## 2025-03-06 DIAGNOSIS — R39.89 BLADDER PAIN: ICD-10-CM

## 2025-03-06 DIAGNOSIS — R10.2 SUPRAPUBIC ABDOMINAL PAIN: ICD-10-CM

## 2025-03-06 DIAGNOSIS — N32.81 URGENCY-FREQUENCY SYNDROME: ICD-10-CM

## 2025-03-06 DIAGNOSIS — R30.0 DYSURIA: ICD-10-CM

## 2025-03-06 DIAGNOSIS — N39.8 VOIDING DYSFUNCTION: ICD-10-CM

## 2025-03-06 DIAGNOSIS — M79.18 MYALGIA OF PELVIC FLOOR: ICD-10-CM

## 2025-03-06 DIAGNOSIS — M62.89 PELVIC FLOOR DYSFUNCTION: ICD-10-CM

## 2025-03-06 RX ORDER — DIAZEPAM 10 MG/1
10 TABLET ORAL EVERY 6 HOURS PRN
Qty: 30 TABLET | Refills: 3 | Status: CANCELLED | OUTPATIENT
Start: 2025-03-06

## 2025-03-06 RX ORDER — DIAZEPAM 10 MG/1
TABLET ORAL
Qty: 30 TABLET | Refills: 1 | Status: SHIPPED | OUTPATIENT
Start: 2025-03-06

## 2025-03-06 NOTE — CONFIDENTIAL NOTE
Medication was refilled    She needs to set up an appointment with our [facility] before anymore

## 2025-03-13 ENCOUNTER — VIRTUAL VISIT (OUTPATIENT)
Dept: UROLOGY | Facility: CLINIC | Age: 65
End: 2025-03-13
Payer: MEDICARE

## 2025-03-13 DIAGNOSIS — R30.0 DYSURIA: ICD-10-CM

## 2025-03-13 DIAGNOSIS — N32.81 URGENCY-FREQUENCY SYNDROME: ICD-10-CM

## 2025-03-13 DIAGNOSIS — R39.89 BLADDER PAIN: ICD-10-CM

## 2025-03-13 DIAGNOSIS — N39.8 VOIDING DYSFUNCTION: ICD-10-CM

## 2025-03-13 DIAGNOSIS — Z87.19 HISTORY OF IBS: ICD-10-CM

## 2025-03-13 NOTE — LETTER
3/13/2025       RE: Ilsa Yusuf  [address]  [facility] [address]     Dear Colleague,    Thank you for referring your patient, Ilsa Yusuf, to the [facility] at [facility]. Please see a copy of my visit note below.      Video-Visit Details    Type of service:  Video Visit    Video Start Time: 3:34 PM    Video End Time:3:49 PM    Originating Location (pt. Location): Home in [place]    Distant Location (provider location): onsite    Platform used for Video Visit: [facility]    Urology Clinic    Name: Ilsa Yusuf    MRN: [id number]   YOB: 1960  Accompanied at today's visit by:self              Assessment and Plan:   64 year old female with pelvic/rectal pressure, hx of IBS, urinary urgency/frequency, bladder pain, suprapubic abdominal pain, voiding dysfunction, pelvic floor dysfunction, myalgia of pelvic floor.     - will have patient follow-up for exam to evaluate for possible prolapse.   - consider referral back to PFPT.  - if exam unremarkable recommend seeing GI as could be GI related given her sx of rectal/pelvic pressure and hx of IBS. Referral placed today per patient's request.  - continue vaginal valium suppositories prn. Narcan renewed today.    Orders Placed This Encounter   Procedures     Adult GI  Referral - Consult Only     After discussing the assessment and plan with patient, patient verbalized understanding and agreed to the above plan. All questions answered.     36 minutes were spent today on the date of the encounter in reviewing the EMR, direct patient care, coordination of care and documentation in addition to review of labs, review of [facility] records (total time 3 minutes), referral to GI, renewal of narcan.    Esther Duenas PA-C  March 13, 2025    Patient Care Team:  No Ref-Primary, Physician as PCP - Breanne Burks PA-C as Physician Assistant (Physician  Assistant - Medical)  Carie Nuñez MD (Internal Medicine)  Arnaldo Perez MD as MD (Orthopaedic Surgery Hand Surgery)  Sruthi Corona [facility] as Licensed Mental Health Professional  Terrie eMlton MD as Assigned Surgical Provider  Autumn Weiner GC as Genetic Counselor (Genetic Counselor, MS)  Ezekiel Cheng APRN CNP as Assigned Behavioral Health Provider  Tom Mathis MD as MD (Otolaryngology)  Unruly Temple MD as Physician  Elbert Luo MD as MD (Neurological Surgery)  Fatou Latham, Casey Gibson MD as MD (Otolaryngology)  Karie Vinson MD as Assigned PCP  Crow Merino MD as Assigned Sleep Provider  Elbert Luo MD as Assigned Neuroscience Provider  APARNA FELDMAN          Chief Complaint:   Follow-up          History of Present Illness:   March 13, 2025    HISTORY: Ilsa Yusuf is a 64 year old female is here for follow-up. We have been following her for urinary urgency/frequency, bladder pain, suprapubic abdominal pain, voiding dysfunction, dysuria, pelvic floor dysfunction, myalgia of pelvic floor. Had UDS on 12/7/23 showed shelter 325mL with normal compliance with no DO/DOI, USI.  She has Pdet max 28 and able to empty appropriately with the catheters removed with final PVR 90mL. Last seen by Dr. Melton on 2/13/24 and reported her bladder pain sx were related to her back issues and working with pain clinic. Her vaginal valium suppositories help improve pain. Today reports pelvic pressure and rectal pressure. Reports hx of IBS. Reports she is on a bowel regimen to avoid constipation. States she was recently seen at [facility] and reports the provider note said patient had multiple hernias but was not discussed with patient and patient unable to see other provider at [facility] for this. Has used estrogen cream in the past and didn't think it helped. Does report vaginal valium suppositories help. Patient voices no other concerns at this time.            Past  Medical History:     Past Medical History:   Diagnosis Date     Arthritis     both knees; hands     Cervical high risk HPV (human papillomavirus) test positive 2019    see problem list     Depressive disorder      Depressive disorder, not elsewhere classified 12    DC 10/05/12-[facility]     Hyperlipidemia LDL goal < 130     mevacor     Hypothyroid      Moderate major depression (H)     abilify, cymbalta, seroquel, and nuvigil, Dr Alvarez Persamy     Mumps      ABDOUL (obstructive sleep apnea)      PTSD (post-traumatic stress disorder)      Seizure (H) 2011    one episode, was on Keppra, EEG negative          Past Surgical History:     Past Surgical History:   Procedure Laterality Date     ABDOMEN SURGERY       BLADDER SURGERY       CHOLECYSTECTOMY       COLONOSCOPY       CYSTOSCOPY       CYSTOSCOPY, BIOPSY BLADDER, COMBINED N/A 2022    Procedure: EXAM UNDER ANESTHESIA, CYSTOSCOPY;  Surgeon: Terrie Melton MD;  Location: [facility] OR     ORTHOPEDIC SURGERY  left knee     renal artery surgery  child    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     TONSILLECTOMY       ureteral reimplantation       ZZC PARTIAL EXCISION THYROID,UNILAT      rt, negative path then, treated with synthroid.          Social History:     Social History     Tobacco Use     Smoking status: Former     Current packs/day: 0.00     Types: Cigarettes     Quit date: 2015     Years since quitting: 10.0     Passive exposure: Past     Smokeless tobacco: Former     Tobacco comments:     Quit 5 - 10 years ago    Substance Use Topics     Alcohol use: Not Currently     Alcohol/week: 0.0 standard drinks of alcohol          Family History:     Family History   Problem Relation Age of Onset     Alzheimer Disease Mother         total care now     Depression Mother      Anxiety Disorder Mother      C.A.D. Father 58         at 58, heart disease     Mental Illness Father      Coronary Artery Disease Father       "Hyperlipidemia Father      Diverticulitis Father      Diabetes Sister         adult onset     Melanoma Sister      Breast Cancer Sister      Thyroid Disease Sister      Diabetes Maternal Grandmother      Anesthesia Reaction No family hx of      Bleeding Disorder No family hx of      Venous thrombosis No family hx of               Allergies:     Allergies   Allergen Reactions     Gabapentin Other (See Comments)     Patient states she gets \"horrific nightmares\" with this medication     Hydromorphone Hcl Other (See Comments)     Made her feel like her skin was crawling      \"creepy crawly skin\"     Nsaids Other (See Comments)     Kidney failure     Compazine [Prochlorperazine] Other (See Comments)     \"Makes my skin crawl, I was going nuts.\"          Medications:     Current Outpatient Medications   Medication Sig Dispense Refill     naloxone (NARCAN) 4 MG/0.1ML nasal spray Spray 1 spray (4 mg) into one nostril alternating nostrils as needed for opioid reversal. every 2-3 minutes until assistance arrives 0.2 mL 3     albuterol (PROAIR HFA/PROVENTIL HFA/VENTOLIN HFA) 108 (90 Base) MCG/ACT inhaler Inhale 1-2 puffs into the lungs every 4 hours as needed for shortness of breath / dyspnea or wheezing 18 g 0     ARIPiprazole (ABILIFY) 10 MG tablet Take 15 mg by mouth.       azelastine (ASTELIN) 0.1 % nasal spray Spray 1 spray into both nostrils 2 times daily 30 mL 1     buPROPion (WELLBUTRIN SR) 200 MG 12 hr tablet Take 1 tablet (200 mg) by mouth daily. 90 tablet 0     busPIRone (BUSPAR) 10 MG tablet TAKE 2 TABLETS (20 MG) BY MOUTH 3 TIMES DAILY 540 tablet 1     cholecalciferol (VITAMIN D3) 5000 UNITS CAPS capsule Take 1 capsule (5,000 Units) by mouth daily Take 1 per day 100 capsule 2     diazepam (VALIUM) 10 MG tablet VAGINAL VALIUM SUPPOSITORY 10MG AT NIGHT AND PRIOR TO THERAPY AS NEEDED 30 tablet 1     dicyclomine (BENTYL) 10 MG capsule TAKE 1 CAPSULE BY MOUTH 4 TIMES A DAY AS NEEDED (ABDOMINAL PAIN OR CRAMPS)       " DULoxetine (CYMBALTA) 60 MG capsule Take 120 mg by mouth. (Patient not taking: Reported on 2/27/2025)       escitalopram (LEXAPRO) 20 MG tablet TAKE 1 TABLET BY MOUTH EVERY DAY 90 tablet 0     hydrOXYzine HCl (ATARAX) 25 MG tablet TAKE 1 TO 2 TABLETS BY MOUTH 3 TIMES DAILY AS NEEDED FOR ANXIETY. Bridge to next appointment in January 180 tablet 0     levothyroxine (SYNTHROID/LEVOTHROID) 175 MCG tablet TAKE 1 TABLET BY MOUTH EVERY DAY 90 tablet 0     lovastatin (MEVACOR) 20 MG tablet TAKE 1 TABLET (20 MG) BY MOUTH AT BEDTIME DUE FOR OFFICE VISIT PRIOR TO FURTHER REFILL 30 tablet 3     Multiple Vitamin (THERA-TABS) TABS Take 1 tablet by mouth daily.       multivitamin w/minerals (THERA-VIT-M) tablet Take 1 tablet by mouth daily (with lunch)       Omega-3 Fatty Acids (OMEGA 3 PO) Take 2 g by mouth 2 times daily. Takes 1 in am and 1 at bedtime       omeprazole (PRILOSEC) 40 MG DR capsule TAKE 1 CAPSULE (40 MG) BY MOUTH DAILY DUE FOR CLINIC VISIT PRIOR TO FURTHER REFILL (Patient not taking: Reported on 2/27/2025) 15 capsule 0     ondansetron (ZOFRAN) 4 MG tablet TAKE 1 TABLET (4MG) BY MOUTH EVERY 8HRS AS NEEDED FOR NAUSEA OR VOMITING       oxyCODONE IR (ROXICODONE) 10 MG tablet as needed       prazosin (MINIPRESS) 5 MG capsule TAKE 1 CAPSULE(5 MG) BY MOUTH AT BEDTIME 90 capsule 1     tiZANidine (ZANAFLEX) 4 MG tablet Take 1-3 tablets (4-12 mg) by mouth 3 times daily as needed for muscle spasms 210 tablet 3     topiramate (TOPAMAX) 25 MG tablet Take 25 mg by mouth 2 times daily       UNABLE TO FIND MEDICATION NAME: medical cannibus       No current facility-administered medications for this visit.             Review of Systems:    ROS: 14 point ROS neg other than the symptoms noted above in the HPI.          Physical Exam:   Last menstrual period 08/08/2016, not currently breastfeeding.  Data Unavailable, There is no height or weight on file to calculate BMI., 0 lbs 0 oz  Gen appearance: Age-appropriate appearing female in  NAD.   HEENT:  EOMI, conjunctiva clear/white. Normal ROM of neck for age.   Psych:  alert , In no acute distress.  Neuro:  A&Ox3  Resp:  Normal respiratory effort; not in acute respiratory distress.          Data:    Labs:    Creatinine   Date Value Ref Range Status   12/02/2024 1.00 (H) 0.51 - 0.95 mg/dL Final   01/04/2021 0.90 0.52 - 1.04 mg/dL Final     UDS 12/7/23  POSTPROCEDURE DIAGNOSES:  -Maximum cystometric capacity 325 mL with heightened filling sensations.  -Good bladder compliance without detrusor overactivity or urodynamic stress incontinence.  -Maximum detrusor contraction during voiding reaches 28 cm H2O. She voids 134 mL with slow flow (Qmax 12 ml/s), intermittent flow curve, significantly increased EMG activity, incomplete bladder emptying ( mL), and no evidence for bladder outlet obstruction (BOOI -8.9). With catheters removed, she voids an additional 100 mL for final PVR of 90 mL.   -Fluoroscopy reveals a mildly trabeculated bladder wall without diverticuli or VUR. The bladder neck is closed during filling; voiding images unavailable as patient unable to void under fluoroscopy.     Examination: XR CYSTOGRAM VOIDING 11/7/2023 4:14 PM   Comparison: 6/28/2022 CT     History: Urinary retention, history of UTI, kidney scarring     Fluoroscopy time: 0.8 minutes     Findings:   On the  film the bowel gas pattern is nonobstructed. There are  phleboliths within the pelvis. There are no bony abnormalities. There  are no abnormal soft tissue densities. Left renal  calcification/nephrolithiasis. Cholecystectomy.      The bladder was filled in retrograde fashion through a Moraes catheter  with contrast under gravity, with approximately 475 cc Isovue 250, to  the patient's tolerance. There was no extravasation of contrast. The  bladder was normal in size and configuration. There were no filling  defects, masses, or vesicoureteral reflux.     Patient was unable to void after the catheter was removed.  Post void  radiographs demonstrate no significant change in retained contrast in  the bladder.                                                                    Impression:   1. No vesicoureteral reflux.  2. Essentially unchanged volume of contrast on the post void images.    EXAM: CT ABDOMEN PELVIS WITH IV CONTRAST from 9/19/23 [facility]  Impression  No acute findings in the abdomen and pelvis on CT. Specifically, nothing to explain patient's left  lower quadrant pain.    Nonacute/chronic findings as detailed.           Again, thank you for allowing me to participate in the care of your patient.      Sincerely,    Esther Duenas PA-C

## 2025-03-13 NOTE — NURSING NOTE
Current patient location: [address]   [facility] [address]    Is the patient currently in the state of [place]? YES    Visit mode: VIDEO    If the visit is dropped, the patient can be reconnected by:VIDEO VISIT: Text to cell phone:   Telephone Information:   Mobile [phone]       Will anyone else be joining the visit? NO  (If patient encounters technical issues they should call [phone]    Are changes needed to the allergy or medication list? No and Pt stated no med changes    Are refills needed on medications prescribed by this physician? Discuss with provider    Rooming Documentation:  Not applicable    Reason for visit: JALEN WATSON

## 2025-03-13 NOTE — PROGRESS NOTES
Video-Visit Details    Type of service:  Video Visit    Video Start Time: 3:34 PM    Video End Time:3:49 PM    Originating Location (pt. Location): Home in [place]    Distant Location (provider location): onsite    Platform used for Video Visit: [facility]    Urology Clinic    Name: Ilsa Yusuf    MRN: [id number]   YOB: 1960  Accompanied at today's visit by:self              Assessment and Plan:   64 year old female with pelvic/rectal pressure, hx of IBS, urinary urgency/frequency, bladder pain, suprapubic abdominal pain, voiding dysfunction, pelvic floor dysfunction, myalgia of pelvic floor.     - will have patient follow-up for exam to evaluate for possible prolapse.   - consider referral back to PFPT.  - if exam unremarkable recommend seeing GI as could be GI related given her sx of rectal/pelvic pressure and hx of IBS. Referral placed today per patient's request.  - continue vaginal valium suppositories prn. Narcan renewed today.    Orders Placed This Encounter   Procedures    Adult GI  Referral - Consult Only     After discussing the assessment and plan with patient, patient verbalized understanding and agreed to the above plan. All questions answered.     36 minutes were spent today on the date of the encounter in reviewing the EMR, direct patient care, coordination of care and documentation in addition to review of labs, review of [facility] records (total time 3 minutes), referral to GI, renewal of narcan.    Esther Duenas PA-C  March 13, 2025    Patient Care Team:  No Ref-Primary, Physician as PCP - Breanne Burks PA-C as Physician Assistant (Physician Assistant - Medical)  Carie Nuñez MD (Internal Medicine)  Arnaldo Perez MD as MD (Orthopaedic Surgery Hand Surgery)  Sruthi Corona LICSW as Licensed Mental Health Professional  Terrie Melton MD as Assigned Surgical Provider  Autumn Weiner GC as Genetic Counselor (Genetic Counselor, MS)  Ezekiel Cheng  APRN CNP as Assigned Behavioral Health Provider  Tom Mathis MD as MD (Otolaryngology)  Unruly Temple MD as Physician  Elbert Luo MD as MD (Neurological Surgery)  Fatou Latham NP Rubel, Kolin, MD as MD (Otolaryngology)  Karie Vinson MD as Assigned PCP  Crow Merino MD as Assigned Sleep Provider  Elbert Luo MD as Assigned Neuroscience Provider  APARNA FELDMAN          Chief Complaint:   Follow-up          History of Present Illness:   March 13, 2025    HISTORY: Ilsa Yusuf is a 64 year old female is here for follow-up. We have been following her for urinary urgency/frequency, bladder pain, suprapubic abdominal pain, voiding dysfunction, dysuria, pelvic floor dysfunction, myalgia of pelvic floor. Had UDS on 12/7/23 showed senior care 325mL with normal compliance with no DO/DOI, USI.  She has Pdet max 28 and able to empty appropriately with the catheters removed with final PVR 90mL. Last seen by Dr. Melton on 2/13/24 and reported her bladder pain sx were related to her back issues and working with pain clinic. Her vaginal valium suppositories help improve pain. Today reports pelvic pressure and rectal pressure. Reports hx of IBS. Reports she is on a bowel regimen to avoid constipation. States she was recently seen at [facility] and reports the provider note said patient had multiple hernias but was not discussed with patient and patient unable to see other provider at [facility] for this. Has used estrogen cream in the past and didn't think it helped. Does report vaginal valium suppositories help. Patient voices no other concerns at this time.            Past Medical History:     Past Medical History:   Diagnosis Date    Arthritis 2012    both knees; hands    Cervical high risk HPV (human papillomavirus) test positive 03/19/2019    see problem list    Depressive disorder     Depressive disorder, not elsewhere classified 08/28/12    DC 10/05/12-[facility]    Hyperlipidemia LDL goal <  130     mevacor    Hypothyroid     Moderate major depression (H)     abilify, cymbalta, seroquel, and nuvigil, Dr Antonio Perales    Mumps     ABDOUL (obstructive sleep apnea)     PTSD (post-traumatic stress disorder)     Seizure (H) 2011    one episode, was on Keppra, EEG negative          Past Surgical History:     Past Surgical History:   Procedure Laterality Date    ABDOMEN SURGERY      BLADDER SURGERY      CHOLECYSTECTOMY      COLONOSCOPY      CYSTOSCOPY      CYSTOSCOPY, BIOPSY BLADDER, COMBINED N/A 2022    Procedure: EXAM UNDER ANESTHESIA, CYSTOSCOPY;  Surgeon: Terrie Melton MD;  Location: [facility] OR    ORTHOPEDIC SURGERY  left knee    renal artery surgery  child    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    TONSILLECTOMY      ureteral reimplantation      ZZC PARTIAL EXCISION THYROID,UNILAT      rt, negative path then, treated with synthroid.          Social History:     Social History     Tobacco Use    Smoking status: Former     Current packs/day: 0.00     Types: Cigarettes     Quit date: 2015     Years since quitting: 10.0     Passive exposure: Past    Smokeless tobacco: Former    Tobacco comments:     Quit 5 - 10 years ago    Substance Use Topics    Alcohol use: Not Currently     Alcohol/week: 0.0 standard drinks of alcohol          Family History:     Family History   Problem Relation Age of Onset    Alzheimer Disease Mother         total care now    Depression Mother     Anxiety Disorder Mother     C.A.D. Father 58         at 58, heart disease    Mental Illness Father     Coronary Artery Disease Father     Hyperlipidemia Father     Diverticulitis Father     Diabetes Sister         adult onset    Melanoma Sister     Breast Cancer Sister     Thyroid Disease Sister     Diabetes Maternal Grandmother     Anesthesia Reaction No family hx of     Bleeding Disorder No family hx of     Venous thrombosis No family hx of               Allergies:     Allergies   Allergen Reactions     "Gabapentin Other (See Comments)     Patient states she gets \"horrific nightmares\" with this medication    Hydromorphone Hcl Other (See Comments)     Made her feel like her skin was crawling      \"creepy crawly skin\"    Nsaids Other (See Comments)     Kidney failure    Compazine [Prochlorperazine] Other (See Comments)     \"Makes my skin crawl, I was going nuts.\"          Medications:     Current Outpatient Medications   Medication Sig Dispense Refill    naloxone (NARCAN) 4 MG/0.1ML nasal spray Spray 1 spray (4 mg) into one nostril alternating nostrils as needed for opioid reversal. every 2-3 minutes until assistance arrives 0.2 mL 3    albuterol (PROAIR HFA/PROVENTIL HFA/VENTOLIN HFA) 108 (90 Base) MCG/ACT inhaler Inhale 1-2 puffs into the lungs every 4 hours as needed for shortness of breath / dyspnea or wheezing 18 g 0    ARIPiprazole (ABILIFY) 10 MG tablet Take 15 mg by mouth.      azelastine (ASTELIN) 0.1 % nasal spray Spray 1 spray into both nostrils 2 times daily 30 mL 1    buPROPion (WELLBUTRIN SR) 200 MG 12 hr tablet Take 1 tablet (200 mg) by mouth daily. 90 tablet 0    busPIRone (BUSPAR) 10 MG tablet TAKE 2 TABLETS (20 MG) BY MOUTH 3 TIMES DAILY 540 tablet 1    cholecalciferol (VITAMIN D3) 5000 UNITS CAPS capsule Take 1 capsule (5,000 Units) by mouth daily Take 1 per day 100 capsule 2    diazepam (VALIUM) 10 MG tablet VAGINAL VALIUM SUPPOSITORY 10MG AT NIGHT AND PRIOR TO THERAPY AS NEEDED 30 tablet 1    dicyclomine (BENTYL) 10 MG capsule TAKE 1 CAPSULE BY MOUTH 4 TIMES A DAY AS NEEDED (ABDOMINAL PAIN OR CRAMPS)      DULoxetine (CYMBALTA) 60 MG capsule Take 120 mg by mouth. (Patient not taking: Reported on 2/27/2025)      escitalopram (LEXAPRO) 20 MG tablet TAKE 1 TABLET BY MOUTH EVERY DAY 90 tablet 0    hydrOXYzine HCl (ATARAX) 25 MG tablet TAKE 1 TO 2 TABLETS BY MOUTH 3 TIMES DAILY AS NEEDED FOR ANXIETY. Bridge to next appointment in January 180 tablet 0    levothyroxine (SYNTHROID/LEVOTHROID) 175 MCG " tablet TAKE 1 TABLET BY MOUTH EVERY DAY 90 tablet 0    lovastatin (MEVACOR) 20 MG tablet TAKE 1 TABLET (20 MG) BY MOUTH AT BEDTIME DUE FOR OFFICE VISIT PRIOR TO FURTHER REFILL 30 tablet 3    Multiple Vitamin (THERA-TABS) TABS Take 1 tablet by mouth daily.      multivitamin w/minerals (THERA-VIT-M) tablet Take 1 tablet by mouth daily (with lunch)      Omega-3 Fatty Acids (OMEGA 3 PO) Take 2 g by mouth 2 times daily. Takes 1 in am and 1 at bedtime      omeprazole (PRILOSEC) 40 MG DR capsule TAKE 1 CAPSULE (40 MG) BY MOUTH DAILY DUE FOR CLINIC VISIT PRIOR TO FURTHER REFILL (Patient not taking: Reported on 2/27/2025) 15 capsule 0    ondansetron (ZOFRAN) 4 MG tablet TAKE 1 TABLET (4MG) BY MOUTH EVERY 8HRS AS NEEDED FOR NAUSEA OR VOMITING      oxyCODONE IR (ROXICODONE) 10 MG tablet as needed      prazosin (MINIPRESS) 5 MG capsule TAKE 1 CAPSULE(5 MG) BY MOUTH AT BEDTIME 90 capsule 1    tiZANidine (ZANAFLEX) 4 MG tablet Take 1-3 tablets (4-12 mg) by mouth 3 times daily as needed for muscle spasms 210 tablet 3    topiramate (TOPAMAX) 25 MG tablet Take 25 mg by mouth 2 times daily      UNABLE TO FIND MEDICATION NAME: medical cannibus       No current facility-administered medications for this visit.             Review of Systems:    ROS: 14 point ROS neg other than the symptoms noted above in the HPI.          Physical Exam:   Last menstrual period 08/08/2016, not currently breastfeeding.  Data Unavailable, There is no height or weight on file to calculate BMI., 0 lbs 0 oz  Gen appearance: Age-appropriate appearing female in NAD.   HEENT:  EOMI, conjunctiva clear/white. Normal ROM of neck for age.   Psych:  alert , In no acute distress.  Neuro:  A&Ox3  Resp:  Normal respiratory effort; not in acute respiratory distress.          Data:    Labs:    Creatinine   Date Value Ref Range Status   12/02/2024 1.00 (H) 0.51 - 0.95 mg/dL Final   01/04/2021 0.90 0.52 - 1.04 mg/dL Final     UDS 12/7/23  POSTPROCEDURE DIAGNOSES:  -Maximum  cystometric capacity 325 mL with heightened filling sensations.  -Good bladder compliance without detrusor overactivity or urodynamic stress incontinence.  -Maximum detrusor contraction during voiding reaches 28 cm H2O. She voids 134 mL with slow flow (Qmax 12 ml/s), intermittent flow curve, significantly increased EMG activity, incomplete bladder emptying ( mL), and no evidence for bladder outlet obstruction (BOOI -8.9). With catheters removed, she voids an additional 100 mL for final PVR of 90 mL.   -Fluoroscopy reveals a mildly trabeculated bladder wall without diverticuli or VUR. The bladder neck is closed during filling; voiding images unavailable as patient unable to void under fluoroscopy.     Examination: XR CYSTOGRAM VOIDING 11/7/2023 4:14 PM   Comparison: 6/28/2022 CT     History: Urinary retention, history of UTI, kidney scarring     Fluoroscopy time: 0.8 minutes     Findings:   On the  film the bowel gas pattern is nonobstructed. There are  phleboliths within the pelvis. There are no bony abnormalities. There  are no abnormal soft tissue densities. Left renal  calcification/nephrolithiasis. Cholecystectomy.      The bladder was filled in retrograde fashion through a Moraes catheter  with contrast under gravity, with approximately 475 cc Isovue 250, to  the patient's tolerance. There was no extravasation of contrast. The  bladder was normal in size and configuration. There were no filling  defects, masses, or vesicoureteral reflux.     Patient was unable to void after the catheter was removed. Post void  radiographs demonstrate no significant change in retained contrast in  the bladder.                                                                    Impression:   1. No vesicoureteral reflux.  2. Essentially unchanged volume of contrast on the post void images.    EXAM: CT ABDOMEN PELVIS WITH IV CONTRAST from 9/19/23 [facility]  Impression  No acute findings in the abdomen and pelvis on CT.  Specifically, nothing to explain patient's left  lower quadrant pain.    Nonacute/chronic findings as detailed.

## 2025-03-14 PROBLEM — G47.33 OSA (OBSTRUCTIVE SLEEP APNEA): Status: ACTIVE | Noted: 2019-07-19

## 2025-03-14 PROBLEM — F33.3 SEVERE RECURRENT MAJOR DEPRESSIVE DISORDER WITH PSYCHOTIC FEATURES (H): Status: ACTIVE | Noted: 2025-03-14

## 2025-03-17 ENCOUNTER — PATIENT OUTREACH (OUTPATIENT)
Dept: CARE COORDINATION | Facility: CLINIC | Age: 65
End: 2025-03-17
Payer: MEDICARE

## 2025-03-18 ENCOUNTER — TELEPHONE (OUTPATIENT)
Dept: UROLOGY | Facility: CLINIC | Age: 65
End: 2025-03-18
Payer: MEDICARE

## 2025-03-18 NOTE — TELEPHONE ENCOUNTER
"----- Message from Esther Duenas sent at 3/14/2025 10:07 AM CDT -----  Regarding: scheduling  Saw patient yesterday virtually. Reports she saw [facility] for pelvic pain in 1/2025 and had imaging that showed \"multiple hernias\" but was upset that the provider she saw for this did not discuss these findings on imaging. I want her to follow-up for exam to evaluate for prolapse (I will need RN staff in room for assistance). If negative I may consider imaging however if recently had imaging I'd hold off on this. Can we reach out to [facility] in [place] and see if they in fact did any imaging in January or February on patient and if I can get the results? I do not see it in her chart. I do see older [facility] records however. Please also get her scheduled for exam.     Maria D  "

## 2025-03-20 ENCOUNTER — TELEPHONE (OUTPATIENT)
Dept: UROLOGY | Facility: CLINIC | Age: 65
End: 2025-03-20
Payer: MEDICARE

## 2025-03-20 NOTE — TELEPHONE ENCOUNTER
REFERRAL INFORMATION:  Referring Provider:  Esther Duenas PA-C   Referring Clinic:   [facility]   Reason for Visit/Diagnosis: Z87.19 (ICD-10-CM) - History of IBS      FUTURE VISIT INFORMATION:  Appointment Date: 4/7/25     NOTES STATUS DETAILS   OFFICE NOTE from Referring Provider Internal 3/13/25    [facility]:  OV-9/7/23-[place] [facility]   MEDICATION LIST Internal    PROCEDURES     ENDOSCOPY  Care Everywhere [facility]:  6/29/21   COLONOSCOPY Internal 2/15/22    [facility]:  4/8/21   STOOL TESTING     LABS     PERTINENT LABS Internal    PATHOLOGY REPORTS (RELATED) Care Everywhere [facility]: Colonoscopy 4/8/21   IMAGES     CT In process [facility]:  9/7/23-CT abdomen pelvis    [facility]:  6/28/22, 6/9/22-CT abd pel     Records Requested   March 20, 2025 8:22 AM  [email or web address]   Facility  [facility]   Outcome Requested image

## 2025-03-20 NOTE — TELEPHONE ENCOUNTER
Spoke with pt, she is having transportation issues right now so she will call us back to schedule once she is able to figure transportation out.

## 2025-03-20 NOTE — TELEPHONE ENCOUNTER
----- Message from Edel PADILLA sent at 3/18/2025  3:59 PM CDT -----  Regarding: Next available for exam  Follow-up next available for exam.    MAGED  3/13/25

## 2025-03-25 ENCOUNTER — OFFICE VISIT (OUTPATIENT)
Dept: PEDIATRICS | Facility: CLINIC | Age: 65
End: 2025-03-25
Payer: MEDICARE

## 2025-03-25 VITALS
HEIGHT: 68 IN | SYSTOLIC BLOOD PRESSURE: 94 MMHG | OXYGEN SATURATION: 97 % | WEIGHT: 209.3 LBS | HEART RATE: 78 BPM | DIASTOLIC BLOOD PRESSURE: 57 MMHG | TEMPERATURE: 98 F | RESPIRATION RATE: 18 BRPM | BODY MASS INDEX: 31.72 KG/M2

## 2025-03-25 DIAGNOSIS — Z91.89 DES EXPOSURE IN UTERO: ICD-10-CM

## 2025-03-25 DIAGNOSIS — Z12.4 CERVICAL CANCER SCREENING: Primary | ICD-10-CM

## 2025-03-25 DIAGNOSIS — K59.00 CONSTIPATION, UNSPECIFIED CONSTIPATION TYPE: ICD-10-CM

## 2025-03-25 PROCEDURE — 1125F AMNT PAIN NOTED PAIN PRSNT: CPT | Performed by: STUDENT IN AN ORGANIZED HEALTH CARE EDUCATION/TRAINING PROGRAM

## 2025-03-25 PROCEDURE — 3078F DIAST BP <80 MM HG: CPT | Performed by: STUDENT IN AN ORGANIZED HEALTH CARE EDUCATION/TRAINING PROGRAM

## 2025-03-25 PROCEDURE — 99213 OFFICE O/P EST LOW 20 MIN: CPT | Performed by: STUDENT IN AN ORGANIZED HEALTH CARE EDUCATION/TRAINING PROGRAM

## 2025-03-25 PROCEDURE — 3074F SYST BP LT 130 MM HG: CPT | Performed by: STUDENT IN AN ORGANIZED HEALTH CARE EDUCATION/TRAINING PROGRAM

## 2025-03-25 RX ORDER — BUPROPION HYDROCHLORIDE 200 MG/1
200 TABLET, EXTENDED RELEASE ORAL DAILY
Qty: 90 TABLET | Refills: 0 | Status: CANCELLED | OUTPATIENT
Start: 2025-03-25

## 2025-03-25 ASSESSMENT — PAIN SCALES - GENERAL: PAINLEVEL_OUTOF10: MILD PAIN (3)

## 2025-03-25 NOTE — PROGRESS NOTES
"  Assessment & Plan     Cervical cancer screening  TORRI exposure  Presents today for pap.  Requires screening in the setting of TORRI exposure.   - Gynecologic Cytology (PAP Smear); Future    Constipation, unspecified constipation type  Notes ongoing constipation for which she take fiber, suppository and drinks abundant water.  Notes bloating.  No current red flag symptoms and overall abdominal exam reassuring.  Has upcoming GI appointment.     hyperlipidemia  Also discussed cholesterol results from last visit.  LDL is slightly above goal.  She would like to hold off on making changes to her medication at this time.    I spent a total of 25 minutes on the day of the visit.   Time spent by me today doing chart review, history and exam, documentation and further activities per the note    The longitudinal plan of care for the diagnosis(es)/condition(s) as documented were addressed during this visit. Due to the added complexity in care, I will continue to support Deanne in the subsequent management and with ongoing continuity of care.      BMI  Estimated body mass index is 31.82 kg/m  as calculated from the following:    Height as of this encounter: 1.727 m (5' 8\").    Weight as of this encounter: 94.9 kg (209 lb 4.8 oz).             Subjective   Deanne is a 64 year old, presenting for the following health issues:  Follow Up  Pt notes that she has been severely bloated last night but has chronic mild bloating      3/25/2025     1:16 PM   Additional Questions   Roomed by jeferson   Accompanied by barbara         3/25/2025     1:16 PM   Patient Reported Additional Medications   Patient reports taking the following new medications na     History of Present Illness       Reason for visit:  Abdominal,low pelvic,bladder,rectal pressure.visible bowel motion across abdomen. not able to pass stool without a dulcolax tablet despite 30gms fiber daily,miralax daily,6-8glasses water during the day. unable to pass no more than ~100cc urine at a " "time  Symptom onset:  More than a month  Symptoms include:  Bloated abd,low pelvic pressure,constant feeling of have to empty my bladder.  Symptom intensity:  Moderate  Symptom progression:  Staying the same  Had these symptoms before:  Yes  Has tried/received treatment for these symptoms:  Yes  What makes it worse:  Eating,heavy activity  What makes it better:  No   She is taking medications regularly.        Intermittent bloating   Most prominent after eating     Prior diagnosis of IBS     Significant constipation  -Miralax  -Fiber   - Bm daily but takes suppositoy     Oxy twice a day for pain               Objective    BP 94/57 (BP Location: Right arm, Patient Position: Sitting, Cuff Size: Adult Regular)   Pulse 78   Temp 98  F (36.7  C) (Temporal)   Resp 18   Ht 1.727 m (5' 8\")   Wt 94.9 kg (209 lb 4.8 oz)   LMP 08/08/2016   SpO2 97%   BMI 31.82 kg/m    Body mass index is 31.82 kg/m .  Physical Exam   GENERAL: alert and no distress  ABDOMEN: soft, tenderness without rebound or guarding, no hepatosplenomegaly, no masses and bowel sounds normal   (female): normal female external genitalia, normal urethral meatus, normal vaginal mucosa  MS: no gross musculoskeletal defects noted, no edema            Signed Electronically by: Seda Keita MD    "

## 2025-03-31 ENCOUNTER — TELEPHONE (OUTPATIENT)
Dept: PEDIATRICS | Facility: CLINIC | Age: 65
End: 2025-03-31

## 2025-03-31 ENCOUNTER — VIRTUAL VISIT (OUTPATIENT)
Dept: PSYCHIATRY | Facility: CLINIC | Age: 65
End: 2025-03-31
Payer: MEDICARE

## 2025-03-31 VITALS — BODY MASS INDEX: 31.78 KG/M2 | WEIGHT: 209 LBS

## 2025-03-31 DIAGNOSIS — F33.1 MAJOR DEPRESSIVE DISORDER, RECURRENT EPISODE, MODERATE (H): ICD-10-CM

## 2025-03-31 DIAGNOSIS — F43.10 PTSD (POST-TRAUMATIC STRESS DISORDER): Primary | ICD-10-CM

## 2025-03-31 DIAGNOSIS — F41.1 GENERALIZED ANXIETY DISORDER: ICD-10-CM

## 2025-03-31 PROCEDURE — 98006 SYNCH AUDIO-VIDEO EST MOD 30: CPT | Performed by: NURSE PRACTITIONER

## 2025-03-31 PROCEDURE — 1125F AMNT PAIN NOTED PAIN PRSNT: CPT | Mod: 95 | Performed by: NURSE PRACTITIONER

## 2025-03-31 ASSESSMENT — ANXIETY QUESTIONNAIRES
7. FEELING AFRAID AS IF SOMETHING AWFUL MIGHT HAPPEN: NOT AT ALL
IF YOU CHECKED OFF ANY PROBLEMS ON THIS QUESTIONNAIRE, HOW DIFFICULT HAVE THESE PROBLEMS MADE IT FOR YOU TO DO YOUR WORK, TAKE CARE OF THINGS AT HOME, OR GET ALONG WITH OTHER PEOPLE: VERY DIFFICULT
2. NOT BEING ABLE TO STOP OR CONTROL WORRYING: NOT AT ALL
7. FEELING AFRAID AS IF SOMETHING AWFUL MIGHT HAPPEN: NOT AT ALL
3. WORRYING TOO MUCH ABOUT DIFFERENT THINGS: NEARLY EVERY DAY
1. FEELING NERVOUS, ANXIOUS, OR ON EDGE: NEARLY EVERY DAY
5. BEING SO RESTLESS THAT IT IS HARD TO SIT STILL: NOT AT ALL
6. BECOMING EASILY ANNOYED OR IRRITABLE: NOT AT ALL
GAD7 TOTAL SCORE: 6
8. IF YOU CHECKED OFF ANY PROBLEMS, HOW DIFFICULT HAVE THESE MADE IT FOR YOU TO DO YOUR WORK, TAKE CARE OF THINGS AT HOME, OR GET ALONG WITH OTHER PEOPLE?: VERY DIFFICULT
GAD7 TOTAL SCORE: 6
GAD7 TOTAL SCORE: 6
4. TROUBLE RELAXING: NOT AT ALL

## 2025-03-31 ASSESSMENT — PATIENT HEALTH QUESTIONNAIRE - PHQ9
10. IF YOU CHECKED OFF ANY PROBLEMS, HOW DIFFICULT HAVE THESE PROBLEMS MADE IT FOR YOU TO DO YOUR WORK, TAKE CARE OF THINGS AT HOME, OR GET ALONG WITH OTHER PEOPLE: NOT DIFFICULT AT ALL
SUM OF ALL RESPONSES TO PHQ QUESTIONS 1-9: 8
SUM OF ALL RESPONSES TO PHQ QUESTIONS 1-9: 8

## 2025-03-31 ASSESSMENT — PAIN SCALES - GENERAL: PAINLEVEL_OUTOF10: MODERATE PAIN (5)

## 2025-03-31 NOTE — PROGRESS NOTES
Virtual Visit Details    Type of service:  Video Visit   Video/Phone Start Time: 1539  Video/Phone End Time:  1556    Originating Location (pt. Location): Home    Distant Location (provider location):  On-site  Platform used for Video Visit: Luna

## 2025-03-31 NOTE — PATIENT INSTRUCTIONS
"Patient Education   The [facility]  What to Expect  Here's what to expect in the [facility] Psych[facility] Program.   About the program  You'll be meeting with a psychiatric doctor to check your mental health. A psychiatric doctor helps you deal with troubling thoughts and feelings by giving you medicine. They'll make sure you know the plan for your care. You may see them for a long time. When you're feeling better, they may refer you back to seeing your family doctor.   If you have any questions, we'll be glad to talk to you.  About visits  Be open  At your visits, please talk openly about your problems. It may feel hard, but it's the best way for us to help you.  Cancelling visits  If you can't come to your visit, please call us right away at [phone]. If you don't cancel at least 24 hours (1 full day) before your visit, that's \"late cancellation.\"  Not showing up for your visits  Being very late is the same as not showing up. You'll be a \"no show\" if:  You're more than 15 minutes late for a 30-minute (half hour) visit.  You're more than 30 minutes late for a 60-minute (full hour) visit.  If you cancel late or don't show up 2 times within 6 months, we may end your care.  Getting help between visits  If you need help between visits, you can call us Monday to Friday from 8 a.m. to 4:30 p.m. at [phone].  Emergency care  Call 911 or go to the nearest emergency department if your life or someone else's life is in danger.  Call 988 anytime to reach the national Suicide and Crisis hotline.  Medicine refills  To refill your medicine, call your pharmacy. You can also call [facility]'s [facility]al [facility] at [phone], Monday to Friday, 8 a.m. to 4:30 p.m. It can take 1 to 3 business days to get a refill.   Forms, letters, and tests  You may have papers to fill out, like FMLA, short-term disability, and workability. We can help you with these forms at your visits, but you must have an " appointment. You may need more than 1 visit for this, to be in an intensive therapy program, or both.  Before we can give you medicine for ADHD, we may refer you to get tested for it or confirm it another way.  We may not be able to give you an emotional support animal letter.  We don't do mental health checks ordered by the court.   We don't do mental health testing, but we can refer you to get tested.   Thank you for choosing us for your care.  For informational purposes only. Not to replace the advice of your health care provider. Copyright   2022 [facility]. All rights reserved. [employer] 914819 - Rev 11/24.   PLAN AND RECOMMENDATIONS:  Continue  Wellbutrin  mg  daily -   Continue Lexapro 20 mg every day  Continue hydroxyzine 25 - 50 mg instead of 3 times daily as needed for anxiety   Continue to take prazosin 5 mg at bedtime  Continue Buspar 20 mg three times daily for anxiety       We have discussed his/her diagnosis, prognosis, differential diagnosis and side effects and benefits of medications.     Patient and I revieweddiagnosis and treatment plan and patient agrees with following recommendations:    1.Patient will take the medications as prescribed. Patient will not stop taking medications or adjust them without consulting with theprovider.  2.Patient will call with any problems between 2 visits.  3.Patient will go to the emergency room if not feeling safe , unable to function in the community, or if suicidal, homicidal or hearing voices orhaving paranoia.  4.Patient will abstain from drugs and alcohol.  5.Patient will not drive if sedated on medications or under influence of any substance. 6.Patient will not mix psychiatric medications with drugs andalcohol.   7.Patient will watch his diet and exercise.  8.Patient will see non psychiatric providers for non psychiatric disorders.

## 2025-03-31 NOTE — TELEPHONE ENCOUNTER
Please call and let Deanne know that unfortunately we were not able to figure out a way to run the HPV portion of the pap even though per guidelines with the TORRI exposure this would be indicated.  If she would be willing to complete the waiver we would be able to add this on. (We have 30 days from the pap to do so).     Thank you,  Seda Keita MD

## 2025-03-31 NOTE — PROGRESS NOTES
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 volunteer a safety plan and history of seeking help when needed. Additional steps taken to minimize risk include making medication adjustment, asking patient to call 911 and go to the ER if not able to stay safe at home,  Therefore, based on all available evidence including the factors cited above, Deanne does not appear to be an imminent danger to self or others and does not meet criteria 72 hour hold. However, if patient uses substances or is non-adherent with medication, their risk of decompensation and SI/HI will be elevated. This was discussed with the patient as she verbalized good understanding.   CONSULTS/REFERRALS:   Recommend therapy. Referral placed for [facility].  Call [facility] at [phone] if you do not hear from them soon  Coordinate care with therapist as needed    MEDICAL:   None at this time  Coordinate care with PCP (Crista Elder) as needed  Follow up with primary care provider as planned or for acute medical concerns.    PSYCHOEDUCATION:  Medication side effects and alternatives reviewed. Health promotion activities recommended and reviewed today. All questions addressed. Education and counseling completed  regarding risks and benefits of medications and psychotherapy options.  Consent provided by patient/guardian  Call the psychiatric nurse line with medication questions or concerns at [phone].  MyChart may be used to communicate with your provider, but this is not intended to be used for emergencies.  BLACK BOX WARNING: Discussed the Food and Drug Administration (FDA) requires that all antidepressants carry a warning that some children, adolescents and young adults may be at increased risk of suicide when taking antidepressants. Anyone taking an antidepressant should be watched closely for worsening depression or unusual behavior especially in the first few weeks after starting an SSRI. Keep in mind, antidepressants are more likely to reduce suicide risk in the long run by improving mood.   SEROTONIN SYNDROME:  Discussed risks of Serotonin syndrome (ie, serotonin toxicity) which is a potentially life-threatening condition associated with increased serotonergic activity in the central nervous system (CNS). It is seen with therapeutic medication use, inadvertent interactions between drugs, and intentional self-poisoning. Serotonin syndrome may involve a spectrum of clinical findings, which often include mental status changes, autonomic hyperactivity, and neuromuscular abnormalities.    BENZODIAZEPINE:  discussion on how benzos work and the need to use them short term due to potential of anxiety getting.  This is a controlled substance with risk for abuse, need to keep in a safe keep place and cannot replace lost scripts.    HARM REDUCTION:  Discussions regarding effects of mood altering substances, alcohol and cannabis, on mood and that approach is harm reduction, will continue to prescribe meds as they work to cut back use.    FIRST GENERATION ANTIPSYCHOTIC/ SECOND GENERATION ANTIPSYCHOTIC USE:  Atypical need for cardiometabolic monitoring with medication- B/P, weight, blood sugar, cholesterol.  Need to monitor  for abnormal movements taught  SAFETY:  We all care about your loved one's safety. To reduce the risk of self-harm, remove access to all:  Firearms, Medicines (both prescribed and over-the-counter), Knives and other sharp objects, Ropes and like materials, and Alcohol  SLEEP HYGIENE: establish a sleep routine, limit screen time 1 hour prior to bed, use bed for sleep only, take sleep/medications on time (including sleepy time tea, trazadone or herbal treatments such as melatonin), aroma therapy, limit caffeine/sugar, yoga, guided imagery, stretch, meditation, limit naps to 20 minutes, make a temperature change in the room, white noise, be mindful of slowing down breathing, take a warm bath/shower, frequently wash sheets, and journaling.   Medlineplus.gov is information for patients.  It is run by the [employer] of Medicine and it contains information about all disorders, diseases and all medications.      COMMUNITY RESOURCES:    CRISIS NUMBERS: Provided in AVS 6/21/2023  National Suicide Prevention Lifeline: 3-893-458-TALK (225-038-1984)  [employer]/resources for a list of additional resources (SOS)            [facility] - [phone]   Urgent Care Adult Mental Tlkkob-[phone] mobile unit/ 24/7 crisis line  [facility] -[phone]   COPE 24/7 [place] Mobile Team -[phone] (adults)/ [phone] (child)  Poison Control Center - 1-159.981.9478    OR  go to nearest ER  Crisis Text Line for any crisis 24/7 send this-   To: 375639   [facility] ([facility]  [phone]  National Suicide Prevention Lifeline: 166.437.3465 (TTY: 646.985.9275). Call anytime for help.  (www.suicidepreventionlifeline.org)  National [employer] Mental Illness (www.ellie.org): 748.201.1447 or 205-233-8339.   Mental Health Association (www.mentalhealth.org): 984.379.6243 or 533-730-8933.  [place] Crisis Text Line: Text [place] to 447723  Suicide LifeLine Chat:  suicidepreventionlifeline.org/cielo24    ADMINISTRATIVE BILLIN min spent interviewing patient, reviewing referral documents, obtaining and reviewing outside records, communication with other health specialists, and preparing this report on today's date: 3/31/2025  Video/Phone Start Time: 1539  Video/Phone End Time:  1556    The longitudinal plan of care for the diagnosis(es)/condition(s) as documented were addressed during this visit. Due to the added complexity in care, I will continue to support Deanne in the subsequent management and with ongoing continuity of care.       Signed:     Ezekiel Cheng, MSN, APRN, PMHNP-BC     Chart documentation done in part with Dragon Voice Recognition software.  Although reviewed after completion, some word and grammatical errors may remain.  Answers for HPI/ROS submitted by the patient on 2022  If you checked off any problems, how difficult have these problems made it for you to do your work, take care of things at home, or get along with other people?: Somewhat difficult  PHQ9 TOTAL SCORE: 7  DONELL 7 TOTAL SCORE: 13    Answers for HPI/ROS submitted by the patient on 2/3/2023  If you checked off any problems, how difficult have these problems made it for you to do your work, take care of things at home, or get along with other people?: Very difficult  PHQ9 TOTAL SCORE: 3  DONELL 7 TOTAL SCORE: 6  Answers for HPI/ROS submitted by the patient on 2023  If you checked off any problems, how difficult have these problems made it for you to do your work, take care of things at home, or get along with other people?: Somewhat difficult  PHQ9 TOTAL SCORE: 5  DONELL 7 TOTAL SCORE: 8    Answers for HPI/ROS submitted by the patient on 2023  If you checked off any problems, how difficult have these problems made it for you to do your work, take care of things at home, or get along with other people?: Extremely difficult  PHQ9 TOTAL SCORE: 12  DONELL 7 TOTAL SCORE: 9Answers submitted  by the patient for this visit:  Patient Health Questionnaire (Submitted on 9/19/2023)  If you checked off any problems, how difficult have these problems made it for you to do your work, take care of things at home, or get along with other people?: Somewhat difficult  PHQ9 TOTAL SCORE: 5  DONELL-7 (Submitted on 9/19/2023)  DONELL 7 TOTAL SCORE: 14    Answers submitted by the patient for this visit:  Patient Health Questionnaire (Submitted on 1/17/2024)  If you checked off any problems, how difficult have these problems made it for you to do your work, take care of things at home, or get along with other people?: Extremely difficult  PHQ9 TOTAL SCORE: 8  DONELL-7 (Submitted on 1/17/2024)  DONELL 7 TOTAL SCORE: 7    Answers submitted by the patient for this visit:  Patient Health Questionnaire (Submitted on 9/16/2024)  If you checked off any problems, how difficult have these problems made it for you to do your work, take care of things at home, or get along with other people?: Very difficult  PHQ9 TOTAL SCORE: 13    Answers submitted by the patient for this visit:  Patient Health Questionnaire (Submitted on 11/26/2024)  If you checked off any problems, how difficult have these problems made it for you to do your work, take care of things at home, or get along with other people?: Very difficult  PHQ9 TOTAL SCORE: 6    Answers submitted by the patient for this visit:  Patient Health Questionnaire (Submitted on 1/21/2025)  If you checked off any problems, how difficult have these problems made it for you to do your work, take care of things at home, or get along with other people?: Somewhat difficult  PHQ9 TOTAL SCORE: 4    Answers submitted by the patient for this visit:  Patient Health Questionnaire (Submitted on 3/31/2025)  If you checked off any problems, how difficult have these problems made it for you to do your work, take care of things at home, or get along with other people?: Not difficult at all  PHQ9 TOTAL SCORE:  8  Patient Health Questionnaire (G7) (Submitted on 3/31/2025)  DONELL 7 TOTAL SCORE: 6

## 2025-03-31 NOTE — TELEPHONE ENCOUNTER
Called the patient at [phone]   - Informed the patient of Dr. Keita's comments/recommendations   - Informed the patient that unfortunately we were not able to figure out a way to run the HPV portion of the pap even though per guidelines with the TORRI exposure this would be indicated   - Informed the patient that if she would be willing to complete the waiver then the HPV could be added on   - Informed the patient that the waiver needs to be completed within 30 days from the pap to do so   - Patient states that she does not want to proceed due to the uncertainty of if she will have to cover the cost at this time   - Patient wanting to think about this and will call back if she is willing to sign the waiver     Reyna SIMS RN   [facility]

## 2025-03-31 NOTE — NURSING NOTE
Current patient location: [address]   [facility] [address]    Is the patient currently in the state of [place]? YES    Visit mode: VIDEO    If the visit is dropped, the patient can be reconnected by:VIDEO VISIT: Text to cell phone:   Telephone Information:   Mobile [phone]    and VIDEO VISIT: Send to e-mail at: [email or web address]    Will anyone else be joining the visit? NO  (If patient encounters technical issues they should call [phone])    Are changes needed to the allergy or medication list? No    Are refills needed on medications prescribed by this physician? NO    Rooming Documentation:  Questionnaire(s) completed    Reason for visit: RECHECK    Rosalba WATSON

## 2025-04-01 ENCOUNTER — PATIENT OUTREACH (OUTPATIENT)
Dept: PEDIATRICS | Facility: CLINIC | Age: 65
End: 2025-04-01
Payer: MEDICARE

## 2025-04-07 ENCOUNTER — VIRTUAL VISIT (OUTPATIENT)
Dept: GASTROENTEROLOGY | Facility: CLINIC | Age: 65
End: 2025-04-07
Attending: PHYSICIAN ASSISTANT
Payer: MEDICARE

## 2025-04-07 ENCOUNTER — PRE VISIT (OUTPATIENT)
Dept: GASTROENTEROLOGY | Facility: CLINIC | Age: 65
End: 2025-04-07

## 2025-04-07 DIAGNOSIS — R11.0 NAUSEA: ICD-10-CM

## 2025-04-07 DIAGNOSIS — R68.81 EARLY SATIETY: ICD-10-CM

## 2025-04-07 DIAGNOSIS — R14.0 ABDOMINAL DISTENSION: ICD-10-CM

## 2025-04-07 DIAGNOSIS — R10.13 DYSPEPSIA: Primary | ICD-10-CM

## 2025-04-07 DIAGNOSIS — Z87.19 HISTORY OF IBS: ICD-10-CM

## 2025-04-07 PROCEDURE — 1125F AMNT PAIN NOTED PAIN PRSNT: CPT | Mod: 95 | Performed by: STUDENT IN AN ORGANIZED HEALTH CARE EDUCATION/TRAINING PROGRAM

## 2025-04-07 PROCEDURE — 98003 SYNCH AUDIO-VIDEO NEW HI 60: CPT | Performed by: STUDENT IN AN ORGANIZED HEALTH CARE EDUCATION/TRAINING PROGRAM

## 2025-04-07 NOTE — PATIENT INSTRUCTIONS
It was a pleasure meeting with you today and discussing your healthcare plan. Below is a summary of what we covered:    - Increase omeprazole to 20 mg in the morning and before supper for 2 months, then reduce down to 20 mg once daily  - Abdominal xray ordered - call to schedule [phone]   - MRE ordered - call same number as above   - Let me know if you would like a referral to the pelvic floor center through [facility]  - Follow up in 3 months     Please see below for any additional questions and scheduling guidelines.    Sign up for [employer]: [employer] patient portal serves as a secure platform for accessing your medical records from the [facility]. Additionally, [employer] facilitates easy, timely, and secure messaging with your care team. If you have not signed up, you may do so by using the provided code or calling [phone].    Coordinating your care after your visit:  There are multiple options for scheduling your follow-up care based on your provider's recommendation.    How do I schedule a follow-up clinic appointment:   After your appointment, you may receive scheduling assistance with the Clinic Coordinators by having a seat in the waiting room and a Clinic Coordinator will call you up to schedule.  Virtual visits or after you leave the clinic:  Your provider has placed a follow-up order in the [employer] portal for scheduling your return appointment. A member of the scheduling team will contact you to schedule.  [employer] Scheduling: Timely scheduling through [employer] is advised to ensure appointment availability.   Call to schedule: You may schedule your follow-up appointment(s) by calling [phone], option 1.    How do I schedule my endoscopy or colonoscopy procedure:  If a procedure, such as a colonoscopy or upper endoscopy was ordered by your provider, the scheduling team will contact you to schedule this procedure. Or you may choose to call to schedule at    [phone], option 2.  Please allow 20-30 minutes when scheduling a procedure.    How do I get my blood work done? To get your blood work done, you need to schedule a lab appointment at an [facility]. There are multiple ways to schedule:   At the clinic: The Clinic Coordinator you meet after your visit can help you schedule a lab appointment.   [facility] scheduling: [facility] offers online lab scheduling at all [facility] laboratory locations.   Call to schedule: You can call [phone] to schedule your lab appointment.    How do I schedule my imaging study: To schedule imaging studies, such as CT scans, ultrasounds, MRIs, or X-rays, contact Imaging Services at [phone].    How do I schedule a referral to another doctor: If your provider recommended a referral to another specialist(s), the referral order was placed by your provider. You will receive a phone call to schedule this referral, or you may choose to call the number attached to the referral to self-schedule.    For Post-Visit Question(s):  For any inquiries following today's visit:  Please utilize [facility] messaging and allow 48 hours for reply or contact the Call Center during normal business hours at [phone], option 3.  For Emergent After-hours questions, contact the On-Call GI Fellow through the [facility]  at [phone].  In addition, you may contact your Nurse directly using the provided contact information.    Test Results:  Test results will be accessible via [facility] in compliance with the 21st Century Cures Act. This means that your results will be available to you at the same time as your provider. Often you may see your results before your provider does. Results are reviewed by staff within two weeks with communication follow-up. Results may be released in the patient portal prior to your care team review.    Prescription Refill(s):  Medication prescribed by your provider will be addressed  during your visit. For future refills, please coordinate with your pharmacy. If you have not had a recent clinic visit or routine labs, for your safety, your provider may not be able to refill your prescription.

## 2025-04-07 NOTE — NURSING NOTE
Current patient location: [address]   [facility] [address]    Is the patient currently in the state of [place]? YES    Visit mode: VIDEO    If the visit is dropped, the patient can be reconnected by:VIDEO VISIT: Text to cell phone:   Telephone Information:   Mobile [phone]       Will anyone else be joining the visit? NO  (If patient encounters technical issues they should call [phone])    Are changes needed to the allergy or medication list? No    Are refills needed on medications prescribed by this physician? NO    Rooming Documentation:  Questionnaire(s) completed    Reason for visit: Consult    Mark ROBBINSF

## 2025-04-07 NOTE — PROGRESS NOTES
GASTROENTEROLOGY NEW PATIENT VIDEO VISIT    Virtual Visit Details    Type of service:  Video Visit   Video Start Time: 3:16 PM  Video End Time: 3:57 PM    Originating Location (pt. Location): Home    Distant Location (provider location):  Off-site  Platform used for Video Visit: Luna RENNER/REFERRING MD:    Seda Keita    REASON FOR CONSULTATION:   Esther Duenas for   Chief Complaint   Patient presents with    Consult       HISTORY OF PRESENT ILLNESS:    Ilsa Yusuf is a 64 year old female with a past medical history significant for seizures, ABDOUL, esophageal reflux, hypothyroidism, CKD stage 3, PTSD, ADHD, anxiety who is being evaluated via a billable video visit for history of IBS. She was referred by urology on 3/13/25. She sees urology for urinary frequency/urgency, voiding dysfunction, pelvic floor dysfunction and myalgia of the pelvic floor. She uses vaginal valium suppositories PRN. She also was recommended to have an in person visit to evaluate for pelvic organ prolapse vs. Consider referral back to PFPT. She reported rectal pressure so she was referred to GI.     Today, she reports after eating she has a lot of bloating and very firm abdomen, particularly across the epigastric region. She reports visible distension after meals which is very uncomfortable. She reports about 6 months ago she started experiencing pelvic and rectal pressure. States she has been on opioids for about 8 years, initially taking 3-6 pills per day for the first 7 years. In last 3 months has been able to decrease to 3-4 pills per day on the opioids. She hydrates, takes miralax and high fiber diet, so she hasn't had trouble with constipation. Takes dulcolax as needed, about 1-2 times per week. Takes 1 capful of miralax daily. She states she has daily bowel movements, and her PCP has suspected constipation in the past. She reports stools are soft, easy to pass, complete. States she  uses medical cannabis which has helped with the bladder emptying. Denies melena, hematochezia.    She has not been able to identify particular food triggers to the postprandial distension. It is not so bad after her old fashioned oats in the morning which she tolerates pretty well. Lunch and dinner are worse. She is trying to cut out bread, swapping for white kidney beans.     She has been diagnosed with IBS, which helped with discomfort but not with the bloating.    She reports heartburn and reflux. Was on omeprazole x 10 years, ran out last fall so tried to come off of it. She did okay, but then started havin sesnation of barbed wire in her esophageal, went back on 20 mg omeprazole which ahs controlled.  She was previously on 40 mg daily.     Also takes zofran about once daily after supper when she feels nauseous.     She notes early satiety. Denies unintentional weight loss - states she has been unintentionally losing weight    She states she doesn't have rectal pain, but she has rectal pressure. Feels internal pressure. She has worked with [employer] in 2022, and still does the exercises. States she has had evaluation for pelvic organ prolapse which was negative.     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 She reports she had vomiting with the prep.  Due 4/2026     No hiatal hernia noted on EGD in 2021     I have reviewed and updated the patient's Past Medical History, Social History, Family History and Medication List.    Exam:    General appearance:  Healthy appearing adult, in no acute distress  Eyes:  Sclera anicteric  Ears, nose, mouth and throat:  No obvious external lesions of ears and nose.  Hearing intact  Neck:  Symmetric, No obvious external lesions  Respiratory:   Normal respiration, no use of accessory muscles   MSK:  No visual upper extremity, neck or facial muscle atrophy  Psychiatric:  Oriented to person, place and time, Appropriate mood and affect.   Neurologic:  Peripheral muscle function and dexterity appear to be intact      PERTINENT STUDIES have been reviewed.    ASSESSMENT/PLAN:    Ilsa Yusuf is a 64 year old female who presents for evaluation of postprandial bloating, abdominal distension, early satiety, rectal pressure, nausea, and IBS.    1. History of IBS  - Adult GI  Referral - Consult Only  2. Dyspepsia   3. Nausea  4. Early satiety    Deanne is a 64 year old female with years of postprandial bloating, abdominal distension. She has been diagnosed with IBS in the past, and has had treatment with dicyclomine which has helped with discomfort, but still has significant bloating. She has not noted any particular food triggers but it is worse after supper. She has nausea after supper and will use zofran for this on most days. She manages bowel movements with 1 capful of miralax daily plus dulcolax PRN (about 1-2 times per week) along with high fiber diet, exercise and hydration. She has a daily bowel movement that is soft, easy to pass and feels complete. She takes regular opioids for herniated discs in her back. She has had evaluation with colonoscopy and EGD in 2021 - noted to have insufficient prep but she reports she had vomiting. Recommended to repeat in 5 years (4/2026), and EGD was unremarkable. She recently tried coming off omeprazole but noted worsening acid reflux so restarted at 20 mg once daily. She is s/p cholecystectomy in 2012. She also notes rectal and pelvic floor pressure. She has had PFPT in the past through [facility] in 2022 and still does the exercises.     Differential diagnosis includes dyspepsia, constipation, pelvic floor dysfunction or pelvic organ prolapse, gastroparesis, SIBO.     I recommend evaluation with abdominal  xray and increasing omeprazole to 20 BID (did not increase dose further due to CKD). If no improvement in dyspepsia symptoms could consider EGD vs addition of famotidine. If no significant stool burden, can consider proceeding with gastric emptying study or breath testing for SIBO. Discussed PFC but she would like to hold off for now. We also decided to proceed with MRE. Other future considerations include celiac serologies after gluten challenge (recently decreased gluten intake).    We reviewed the following plan:   - Increase omeprazole to 20 mg in the morning and before supper for 2 months, then reduce down to 20 mg once daily  - Abdominal xray ordered - call to schedule [phone]   - MRE ordered - call same number as above   - Let me know if you would like a referral to the pelvic floor center through [facility]  - Follow up in 3 months   - Future considerations could include [place] GES or breath testing for SIBO      Dede Pacheco PA-C    RTC 3 months    Thank you for this consultation.  It was a pleasure to participate in the care of this patient; please contact us with any further questions. 63 minutes spent on the date of the encounter doing chart review, history and exam, documentation and further activities per the note.    Dede Pacheco PA-C  Division of Gastroenterology, Hepatology and Nutrition  [facility]

## 2025-04-07 NOTE — LETTER
4/7/2025      Ilsa Yusuf  [address]  [facility] [address]      Dear Colleague,    Thank you for referring your patient, Ilsa Yusuf, to the [facility] [place]. Please see a copy of my visit note below.          GASTROENTEROLOGY NEW PATIENT VIDEO VISIT    Virtual Visit Details    Type of service:  Video Visit   Video Start Time: 3:16 PM  Video End Time: 3:57 PM    Originating Location (pt. Location): Home    Distant Location (provider location):  Off-site  Platform used for Video Visit: Luna RENNER/REFERRING MD:    Seda Keita    REASON FOR CONSULTATION:   Esther Duenas for   Chief Complaint   Patient presents with     Consult       HISTORY OF PRESENT ILLNESS:    Ilsa Yusuf is a 64 year old female with a past medical history significant for seizures, ABDOUL, esophageal reflux, hypothyroidism, CKD stage 3, PTSD, ADHD, anxiety who is being evaluated via a billable video visit for history of IBS. She was referred by urology on 3/13/25. She sees urology for urinary frequency/urgency, voiding dysfunction, pelvic floor dysfunction and myalgia of the pelvic floor. She uses vaginal valium suppositories PRN. She also was recommended to have an in person visit to evaluate for pelvic organ prolapse vs. Consider referral back to PFPT. She reported rectal pressure so she was referred to GI.     Today, she reports after eating she has a lot of bloating and very firm abdomen, particularly across the epigastric region. She reports visible distension after meals which is very uncomfortable. She reports about 6 months ago she started experiencing pelvic and rectal pressure. States she has been on opioids for about 8 years, initially taking 3-6 pills per day for the first 7 years. In last 3 months has been able to decrease to 3-4 pills per day on the opioids. She hydrates, takes miralax and high fiber diet, so she  hasn't had trouble with constipation. Takes dulcolax as needed, about 1-2 times per week. Takes 1 capful of miralax daily. She states she has daily bowel movements, and her PCP has suspected constipation in the past. She reports stools are soft, easy to pass, complete. States she uses medical cannabis which has helped with the bladder emptying. Denies melena, hematochezia.    She has not been able to identify particular food triggers to the postprandial distension. It is not so bad after her old fashioned oats in the morning which she tolerates pretty well. Lunch and dinner are worse. She is trying to cut out bread, swapping for white kidney beans.     She has been diagnosed with IBS, which helped with discomfort but not with the bloating.    She reports heartburn and reflux. Was on omeprazole x 10 years, ran out last fall so tried to come off of it. She did okay, but then started havin sesnation of barbed wire in her esophageal, went back on 20 mg omeprazole which ahs controlled.  She was previously on 40 mg daily.     Also takes zofran about once daily after supper when she feels nauseous.     She notes early satiety. Denies unintentional weight loss - states she has been unintentionally losing weight    She states she doesn't have rectal pain, but she has rectal pressure. Feels internal pressure. She has worked with [employer] in 2022, and still does the exercises. States she has had evaluation for pelvic organ prolapse which was negative.     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 She reports she had vomiting with the prep.  Due 4/2026     No hiatal hernia noted on EGD in 2021     I have reviewed and updated the patient's Past Medical History, Social History, Family History  and Medication List.    Exam:    General appearance:  Healthy appearing adult, in no acute distress  Eyes:  Sclera anicteric  Ears, nose, mouth and throat:  No obvious external lesions of ears and nose.  Hearing intact  Neck:  Symmetric, No obvious external lesions  Respiratory:  Normal respiration, no use of accessory muscles   MSK:  No visual upper extremity, neck or facial muscle atrophy  Psychiatric:  Oriented to person, place and time, Appropriate mood and affect.   Neurologic:  Peripheral muscle function and dexterity appear to be intact      PERTINENT STUDIES have been reviewed.    ASSESSMENT/PLAN:    Ilsa Yusuf is a 64 year old female who presents for evaluation of postprandial bloating, abdominal distension, early satiety, rectal pressure, nausea, and IBS.    1. History of IBS  - Adult GI  Referral - Consult Only  2. Dyspepsia   3. Nausea  4. Early satiety    Deanne is a 64 year old female with years of postprandial bloating, abdominal distension. She has been diagnosed with IBS in the past, and has had treatment with dicyclomine which has helped with discomfort, but still has significant bloating. She has not noted any particular food triggers but it is worse after supper. She has nausea after supper and will use zofran for this on most days. She manages bowel movements with 1 capful of miralax daily plus dulcolax PRN (about 1-2 times per week) along with high fiber diet, exercise and hydration. She has a daily bowel movement that is soft, easy to pass and feels complete. She takes regular opioids for herniated discs in her back. She has had evaluation with colonoscopy and EGD in 2021 - noted to have insufficient prep but she reports she had vomiting. Recommended to repeat in 5 years (4/2026), and EGD was unremarkable. She recently tried coming off omeprazole but noted worsening acid reflux so restarted at 20 mg once daily. She is s/p cholecystectomy in 2012. She also notes rectal and  pelvic floor pressure. She has had PFPT in the past through [facility] in 2022 and still does the exercises.     Differential diagnosis includes dyspepsia, constipation, pelvic floor dysfunction or pelvic organ prolapse, gastroparesis, SIBO.     I recommend evaluation with abdominal xray and increasing omeprazole to 20 BID (did not increase dose further due to CKD). If no improvement in dyspepsia symptoms could consider EGD vs addition of famotidine. If no significant stool burden, can consider proceeding with gastric emptying study or breath testing for SIBO. Discussed PFC but she would like to hold off for now. We also decided to proceed with MRE. Other future considerations include celiac serologies after gluten challenge (recently decreased gluten intake).    We reviewed the following plan:   - Increase omeprazole to 20 mg in the morning and before supper for 2 months, then reduce down to 20 mg once daily  - Abdominal xray ordered - call to schedule [phone]   - MRE ordered - call same number as above   - Let me know if you would like a referral to the pelvic floor center through [facility]  - Follow up in 3 months   - Future considerations could include NM GES or breath testing for SIBO      Dede Pacheco PA-C    RTC 3 months    Thank you for this consultation.  It was a pleasure to participate in the care of this patient; please contact us with any further questions. 63 minutes spent on the date of the encounter doing chart review, history and exam, documentation and further activities per the note.    Dede Pacheco PA-C  Division of Gastroenterology, Hepatology and Nutrition  [facility]      Again, thank you for allowing me to participate in the care of your patient.        Sincerely,        Dede Pacheco PA-C    Electronically signed

## 2025-04-08 ENCOUNTER — PRE VISIT (OUTPATIENT)
Dept: OTOLARYNGOLOGY | Facility: CLINIC | Age: 65
End: 2025-04-08

## 2025-04-09 ENCOUNTER — TELEPHONE (OUTPATIENT)
Dept: GASTROENTEROLOGY | Facility: CLINIC | Age: 65
End: 2025-04-09
Payer: MEDICARE

## 2025-04-10 NOTE — TELEPHONE ENCOUNTER
REFERRAL INFORMATION:  Referring By: Goltz, Bennett Ezra, PA-C   Referring providers clinic:  [facility]   Reason for Visit/Diagnosis: New per pt-ref, R09.81 (ICD-10-CM) - Nasal congestion, Referral from Bennett Goltz, Referral notes in EPIC Pt made appt for [facility]. **pt will have new referral sent over, expires on 2025    FUTURE VISIT INFORMATION:  Appointment Date: 25  Appointment Time: 2:10pm   NOTES STATUS DETAILS   OFFICE NOTE from referring provider Internal  24- referral/ note w. Goltz, Bennett Ezra, PA-C   OFFICE NOTE from other specialist Internal   [facility] M20 note- Jeffry Gannon MD   8/10/20 note- Sophia Kern MD    IMAGES *pertaining images & report*       CT, MRI, PET, NM, US, XRAYS Internal  [facility]:  21 CTA head  21 CT head  19 XR cervical  3/26/19 MR cervical

## 2025-04-30 ENCOUNTER — MYC MEDICAL ADVICE (OUTPATIENT)
Dept: PEDIATRICS | Facility: CLINIC | Age: 65
End: 2025-04-30
Payer: MEDICARE

## 2025-04-30 DIAGNOSIS — Z87.19 HISTORY OF IBS: Primary | ICD-10-CM

## 2025-04-30 RX ORDER — DICYCLOMINE HYDROCHLORIDE 10 MG/1
CAPSULE ORAL
Qty: 90 CAPSULE | Refills: 3 | OUTPATIENT
Start: 2025-04-30

## 2025-04-30 RX ORDER — DICYCLOMINE HYDROCHLORIDE 10 MG/1
10 CAPSULE ORAL 4 TIMES DAILY PRN
Qty: 180 CAPSULE | Refills: 3 | Status: SHIPPED | OUTPATIENT
Start: 2025-04-30

## 2025-04-30 NOTE — TELEPHONE ENCOUNTER
Called and left voice message for patient to call [phone] and ask to speak to a nurse.     Upon call back please:  -inquire about symptoms for medication  -inform PCP has not prescribed, will need visit    Per chart review  dicyclomine (BENTYL) 10 MG capsule -- --  -- --   Sig: TAKE 1 CAPSULE BY MOUTH 4 TIMES A DAY AS NEEDED (ABDOMINAL PAIN OR CRAMPS)   Class: Historical     Awaiting call back at this time.    Reyna Islas, RN

## 2025-04-30 NOTE — TELEPHONE ENCOUNTER
Pt takes med for IBS x past couple years, prescribed by [facility]       Started seeing [facility], last GI visit was 4/7/25.  This was discussed at that visit.  Advised pt to call GI now and speak to one of their nurses regarding the refill and let us know if she has further concerns.  Pt verbalized understanding and agreeable to plan.    Cristy Kilgore RN, BSN  [facility]

## 2025-04-30 NOTE — TELEPHONE ENCOUNTER
Please see patient MyChart message  -states messaged Antonia Roche with GI regarding dicyclomine    Per chart review  4/29/25 refill request  -patient state med is from KRISTIN Islas RN

## 2025-05-02 ENCOUNTER — TRANSFERRED RECORDS (OUTPATIENT)
Dept: HEALTH INFORMATION MANAGEMENT | Facility: CLINIC | Age: 65
End: 2025-05-02
Payer: MEDICARE

## 2025-05-05 DIAGNOSIS — R10.2 SUPRAPUBIC ABDOMINAL PAIN: ICD-10-CM

## 2025-05-05 DIAGNOSIS — N39.8 VOIDING DYSFUNCTION: ICD-10-CM

## 2025-05-05 DIAGNOSIS — N32.81 URGENCY-FREQUENCY SYNDROME: ICD-10-CM

## 2025-05-05 DIAGNOSIS — M79.18 MYALGIA OF PELVIC FLOOR: ICD-10-CM

## 2025-05-05 DIAGNOSIS — R39.89 BLADDER PAIN: ICD-10-CM

## 2025-05-05 DIAGNOSIS — M62.89 PELVIC FLOOR DYSFUNCTION: ICD-10-CM

## 2025-05-05 DIAGNOSIS — R30.0 DYSURIA: ICD-10-CM

## 2025-05-06 RX ORDER — DIAZEPAM 10 MG/1
TABLET ORAL
Qty: 30 TABLET | Refills: 1 | Status: SHIPPED | OUTPATIENT
Start: 2025-05-06

## 2025-05-06 NOTE — TELEPHONE ENCOUNTER
Spoke with patient, discussed Esther's recommendations.  Patient states understanding.    Lizzy Davis, RN, BSN  Urology Care Coordinator  [facility]  [phone]

## 2025-05-14 ENCOUNTER — VIRTUAL VISIT (OUTPATIENT)
Dept: PSYCHIATRY | Facility: CLINIC | Age: 65
End: 2025-05-14
Payer: MEDICARE

## 2025-05-14 DIAGNOSIS — F43.10 PTSD (POST-TRAUMATIC STRESS DISORDER): ICD-10-CM

## 2025-05-14 DIAGNOSIS — F33.1 MODERATE EPISODE OF RECURRENT MAJOR DEPRESSIVE DISORDER (H): ICD-10-CM

## 2025-05-14 DIAGNOSIS — F33.1 MAJOR DEPRESSIVE DISORDER, RECURRENT EPISODE, MODERATE (H): ICD-10-CM

## 2025-05-14 DIAGNOSIS — F41.1 GAD (GENERALIZED ANXIETY DISORDER): ICD-10-CM

## 2025-05-14 RX ORDER — ESCITALOPRAM OXALATE 20 MG/1
20 TABLET ORAL DAILY
Qty: 90 TABLET | Refills: 0 | Status: SHIPPED | OUTPATIENT
Start: 2025-05-14

## 2025-05-14 RX ORDER — PRAZOSIN HYDROCHLORIDE 5 MG/1
CAPSULE ORAL
Qty: 90 CAPSULE | Refills: 1 | Status: SHIPPED | OUTPATIENT
Start: 2025-05-14

## 2025-05-14 RX ORDER — BUPROPION HYDROCHLORIDE 200 MG/1
200 TABLET, EXTENDED RELEASE ORAL DAILY
Qty: 90 TABLET | Refills: 0 | Status: SHIPPED | OUTPATIENT
Start: 2025-05-14

## 2025-05-14 ASSESSMENT — ANXIETY QUESTIONNAIRES
6. BECOMING EASILY ANNOYED OR IRRITABLE: NOT AT ALL
1. FEELING NERVOUS, ANXIOUS, OR ON EDGE: SEVERAL DAYS
GAD7 TOTAL SCORE: 6
2. NOT BEING ABLE TO STOP OR CONTROL WORRYING: MORE THAN HALF THE DAYS
7. FEELING AFRAID AS IF SOMETHING AWFUL MIGHT HAPPEN: SEVERAL DAYS
8. IF YOU CHECKED OFF ANY PROBLEMS, HOW DIFFICULT HAVE THESE MADE IT FOR YOU TO DO YOUR WORK, TAKE CARE OF THINGS AT HOME, OR GET ALONG WITH OTHER PEOPLE?: VERY DIFFICULT
4. TROUBLE RELAXING: MORE THAN HALF THE DAYS
GAD7 TOTAL SCORE: 6
3. WORRYING TOO MUCH ABOUT DIFFERENT THINGS: NOT AT ALL
5. BEING SO RESTLESS THAT IT IS HARD TO SIT STILL: NOT AT ALL
GAD7 TOTAL SCORE: 6
7. FEELING AFRAID AS IF SOMETHING AWFUL MIGHT HAPPEN: SEVERAL DAYS
IF YOU CHECKED OFF ANY PROBLEMS ON THIS QUESTIONNAIRE, HOW DIFFICULT HAVE THESE PROBLEMS MADE IT FOR YOU TO DO YOUR WORK, TAKE CARE OF THINGS AT HOME, OR GET ALONG WITH OTHER PEOPLE: VERY DIFFICULT

## 2025-05-14 ASSESSMENT — PATIENT HEALTH QUESTIONNAIRE - PHQ9
SUM OF ALL RESPONSES TO PHQ QUESTIONS 1-9: 4
10. IF YOU CHECKED OFF ANY PROBLEMS, HOW DIFFICULT HAVE THESE PROBLEMS MADE IT FOR YOU TO DO YOUR WORK, TAKE CARE OF THINGS AT HOME, OR GET ALONG WITH OTHER PEOPLE: VERY DIFFICULT
SUM OF ALL RESPONSES TO PHQ QUESTIONS 1-9: 4

## 2025-05-14 ASSESSMENT — PAIN SCALES - GENERAL: PAINLEVEL_OUTOF10: MODERATE PAIN (4)

## 2025-05-14 NOTE — PROGRESS NOTES
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 needed. Additional steps taken to minimize risk include making medication adjustment, asking patient to call 911 and go to the ER if not able to stay safe at home,  Therefore, based on all available evidence including the factors cited above, Deanne does not appear to be an imminent danger to self or others and does not meet criteria 72 hour hold. However, if patient uses substances or is non-adherent with medication, their risk of decompensation and SI/HI will be elevated. This was discussed with the patient as she verbalized good understanding.   CONSULTS/REFERRALS:   Recommend therapy. Referral placed for [facility].  Call [facility] at [phone] if you do not hear from them soon  Coordinate care with therapist as needed    MEDICAL:   None at this time  Coordinate care with PCP (Crista Elder) as needed  Follow up with primary care provider as planned or for acute medical concerns.    PSYCHOEDUCATION:  Medication side effects and alternatives reviewed. Health promotion activities recommended and reviewed today. All questions addressed. Education and counseling completed regarding risks and benefits of medications and psychotherapy  options.  Consent provided by patient/guardian  Call the psychiatric nurse line with medication questions or concerns at [phone].  [facility] ED[employer]hart may be used to communicate with your provider, but this is not intended to be used for emergencies.  BLACK BOX WARNING: Discussed the Food and Drug Administration (FDA) requires that all antidepressants carry a warning that some children, adolescents and young adults may be at increased risk of suicide when taking antidepressants. Anyone taking an antidepressant should be watched closely for worsening depression or unusual behavior especially in the first few weeks after starting an SSRI. Keep in mind, antidepressants are more likely to reduce suicide risk in the long run by improving mood.   SEROTONIN SYNDROME:  Discussed risks of Serotonin syndrome (ie, serotonin toxicity) which is a potentially life-threatening condition associated with increased serotonergic activity in the central nervous system (CNS). It is seen with therapeutic medication use, inadvertent interactions between drugs, and intentional self-poisoning. Serotonin syndrome may involve a spectrum of clinical findings, which often include mental status changes, autonomic hyperactivity, and neuromuscular abnormalities.    BENZODIAZEPINE:  discussion on how benzos work and the need to use them short term due to potential of anxiety getting.  This is a controlled substance with risk for abuse, need to keep in a safe keep place and cannot replace lost scripts.    HARM REDUCTION:  Discussions regarding effects of mood altering substances, alcohol and cannabis, on mood and that approach is harm reduction, will continue to prescribe meds as they work to cut back use.    FIRST GENERATION ANTIPSYCHOTIC/ SECOND GENERATION ANTIPSYCHOTIC USE:  Atypical need for cardiometabolic monitoring with medication- B/P, weight, blood sugar, cholesterol.  Need to monitor for abnormal movements taught  SAFETY:  We all care about your  loved one's safety. To reduce the risk of self-harm, remove access to all:  Firearms, Medicines (both prescribed and over-the-counter), Knives and other sharp objects, Ropes and like materials, and Alcohol  SLEEP HYGIENE: establish a sleep routine, limit screen time 1 hour prior to bed, use bed for sleep only, take sleep/medications on time (including sleepy time tea, trazadone or herbal treatments such as melatonin), aroma therapy, limit caffeine/sugar, yoga, guided imagery, stretch, meditation, limit naps to 20 minutes, make a temperature change in the room, white noise, be mindful of slowing down breathing, take a warm bath/shower, frequently wash sheets, and journaling.   Medlineplus.gov is information for patients.  It is run by the [employer] and it contains information about all disorders, diseases and all medications.      COMMUNITY RESOURCES:    CRISIS NUMBERS: Provided in AVS 2023  National Suicide Prevention Lifeline: 2-831-986-TALK (937-019-3071)  Biz360/resources for a list of additional resources (SOS)            [facility] - [phone]   Urgent Care Adult Mental Hwdmpx-[phone] mobile unit/  crisis line  [facility] -[phone]   COPE  [place] Mobile Team -[phone] (adults)/ [phone] (child)  Poison Control Center - 1-423.433.5199    OR  go to nearest ER  Crisis Text Line for any crisis  send this-   To: 102356   [facility] ([facility]  [phone]  National Suicide Prevention Lifeline: 760.934.6518 (TTY: 259.227.3700). Call anytime for help.  (www.suicidepreventionlifeline.org)  National [employer] Mental Illness (www.ellie.org): 807.454.9942 or 770-311-5723.   Mental Health Association (www.mentalhealth.org): 850.901.7847 or 596-485-3704.  [place] Crisis Text Line: Text [place] to 244598  Suicide LifeLine Chat: suicideMaintenanceNet.org/chat    ADMINISTRATIVE BILLIN min spent  interviewing patient, reviewing referral documents, obtaining and reviewing outside records, communication with other health specialists, and preparing this report on today's date: 5/14/2025  Video/Phone Start Time: 1531  Video/Phone End Time:  1556    The longitudinal plan of care for the diagnosis(es)/condition(s) as documented were addressed during this visit. Due to the added complexity in care, I will continue to support Deanne in the subsequent management and with ongoing continuity of care.       Signed:     Ezekiel Cheng, MSN, APRN, PMHNP-BC     Chart documentation done in part with Dragon Voice Recognition software.  Although reviewed after completion, some word and grammatical errors may remain.  Answers for HPI/ROS submitted by the patient on 9/1/2022  If you checked off any problems, how difficult have these problems made it for you to do your work, take care of things at home, or get along with other people?: Somewhat difficult  PHQ9 TOTAL SCORE: 7  DONELL 7 TOTAL SCORE: 13    Answers for HPI/ROS submitted by the patient on 2/3/2023  If you checked off any problems, how difficult have these problems made it for you to do your work, take care of things at home, or get along with other people?: Very difficult  PHQ9 TOTAL SCORE: 3  DONELL 7 TOTAL SCORE: 6  Answers for HPI/ROS submitted by the patient on 4/14/2023  If you checked off any problems, how difficult have these problems made it for you to do your work, take care of things at home, or get along with other people?: Somewhat difficult  PHQ9 TOTAL SCORE: 5  DONELL 7 TOTAL SCORE: 8    Answers for HPI/ROS submitted by the patient on 5/25/2023  If you checked off any problems, how difficult have these problems made it for you to do your work, take care of things at home, or get along with other people?: Extremely difficult  PHQ9 TOTAL SCORE: 12  DONELL 7 TOTAL SCORE: 9Answers submitted by the patient for this visit:  Patient Health Questionnaire (Submitted on  9/19/2023)  If you checked off any problems, how difficult have these problems made it for you to do your work, take care of things at home, or get along with other people?: Somewhat difficult  PHQ9 TOTAL SCORE: 5  DONELL-7 (Submitted on 9/19/2023)  DONELL 7 TOTAL SCORE: 14    Answers submitted by the patient for this visit:  Patient Health Questionnaire (Submitted on 1/17/2024)  If you checked off any problems, how difficult have these problems made it for you to do your work, take care of things at home, or get along with other people?: Extremely difficult  PHQ9 TOTAL SCORE: 8  DONELL-7 (Submitted on 1/17/2024)  DONELL 7 TOTAL SCORE: 7    Answers submitted by the patient for this visit:  Patient Health Questionnaire (Submitted on 9/16/2024)  If you checked off any problems, how difficult have these problems made it for you to do your work, take care of things at home, or get along with other people?: Very difficult  PHQ9 TOTAL SCORE: 13    Answers submitted by the patient for this visit:  Patient Health Questionnaire (Submitted on 11/26/2024)  If you checked off any problems, how difficult have these problems made it for you to do your work, take care of things at home, or get along with other people?: Very difficult  PHQ9 TOTAL SCORE: 6    Answers submitted by the patient for this visit:  Patient Health Questionnaire (Submitted on 1/21/2025)  If you checked off any problems, how difficult have these problems made it for you to do your work, take care of things at home, or get along with other people?: Somewhat difficult  PHQ9 TOTAL SCORE: 4    Answers submitted by the patient for this visit:  Patient Health Questionnaire (Submitted on 3/31/2025)  If you checked off any problems, how difficult have these problems made it for you to do your work, take care of things at home, or get along with other people?: Not difficult at all  PHQ9 TOTAL SCORE: 8  Patient Health Questionnaire (G7) (Submitted on 3/31/2025)  DONELL 7 TOTAL SCORE:  6    Answers submitted by the patient for this visit:  Patient Health Questionnaire (Submitted on 5/14/2025)  If you checked off any problems, how difficult have these problems made it for you to do your work, take care of things at home, or get along with other people?: Very difficult  PHQ9 TOTAL SCORE: 4  Patient Health Questionnaire (G7) (Submitted on 5/14/2025)  DONELL 7 TOTAL SCORE: 6

## 2025-05-14 NOTE — PROGRESS NOTES
Virtual Visit Details    Type of service:  Video Visit     Video/Phone Start Time: 1531  Video/Phone End Time:  1556  Originating Location (pt. Location): Home    Distant Location (provider location):  Off-site  Platform used for Video Visit: Luna

## 2025-05-14 NOTE — PATIENT INSTRUCTIONS
"Patient Education   The Panel Psychiatry Program  What to Expect  Here's what to expect in the Panel Psychiatry Program.   About the program  You'll be meeting with a psychiatric doctor to check your mental health. A psychiatric doctor helps you deal with troubling thoughts and feelings by giving you medicine. They'll make sure you know the plan for your care. You may see them for a long time. When you're feeling better, they may refer you back to seeing your family doctor.   If you have any questions, we'll be glad to talk to you.  About visits  Be open  At your visits, please talk openly about your problems. It may feel hard, but it's the best way for us to help you.  Cancelling visits  If you can't come to your visit, please call us right away at [phone]. If you don't cancel at least 24 hours (1 full day) before your visit, that's \"late cancellation.\"  Not showing up for your visits  Being very late is the same as not showing up. You'll be a \"no show\" if:  You're more than 15 minutes late for a 30-minute (half hour) visit.  You're more than 30 minutes late for a 60-minute (full hour) visit.  If you cancel late or don't show up 2 times within 6 months, we may end your care.  Getting help between visits  If you need help between visits, you can call us Monday to Friday from 8 a.m. to 4:30 p.m. at [phone].  Emergency care  Call 911 or go to the nearest emergency department if your life or someone else's life is in danger.  Call 988 anytime to reach the national Suicide and Crisis hotline.  Medicine refills  To refill your medicine, call your pharmacy. You can also call [facility]'s [facility] at [phone], Monday to Friday, 8 a.m. to 4:30 p.m. It can take 1 to 3 business days to get a refill.   Forms, letters, and tests  You may have papers to fill out, like FMLA, short-term disability, and workability. We can help you with these forms at your visits, but you must have an " appointment. You may need more than 1 visit for this, to be in an intensive therapy program, or both.  Before we can give you medicine for ADHD, we may refer you to get tested for it or confirm it another way.  We may not be able to give you an emotional support animal letter.  We don't do mental health checks ordered by the court.   We don't do mental health testing, but we can refer you to get tested.   Thank you for choosing us for your care.  For informational purposes only. Not to replace the advice of your health care provider. Copyright   2022 [facility]. All rights reserved. [employer] 781234 - Rev 11/24.   PLAN AND RECOMMENDATIONS:  Continue  Wellbutrin  mg  daily -   Continue Lexapro 20 mg every day  Continue hydroxyzine 25 - 50 mg instead of 3 times daily as needed for anxiety   Continue to take prazosin 5 mg at bedtime  Continue Buspar 20 mg three times daily for anxiety       We have discussed his/her diagnosis, prognosis, differential diagnosis and side effects and benefits of medications.     Patient and I revieweddiagnosis and treatment plan and patient agrees with following recommendations:    1.Patient will take the medications as prescribed. Patient will not stop taking medications or adjust them without consulting with theprovider.  2.Patient will call with any problems between 2 visits.  3.Patient will go to the emergency room if not feeling safe , unable to function in the community, or if suicidal, homicidal or hearing voices orhaving paranoia.  4.Patient will abstain from drugs and alcohol.  5.Patient will not drive if sedated on medications or under influence of any substance. 6.Patient will not mix psychiatric medications with drugs andalcohol.   7.Patient will watch his diet and exercise.  8.Patient will see non psychiatric providers for non psychiatric disorders.

## 2025-05-14 NOTE — NURSING NOTE
Current patient location: [address]   [facility] [address]    Is the patient currently in the state of [place]? YES    Visit mode: VIDEO    If the visit is dropped, the patient can be reconnected by:VIDEO VISIT: Text to cell phone:   Telephone Information:   Mobile [phone]    and VIDEO VISIT: Send to e-mail at: [email or web address]    Will anyone else be joining the visit? NO  (If patient encounters technical issues they should call [phone])    Are changes needed to the allergy or medication list? Pt now takes 20 mg omeprazole at breakfast, and 20mg at supper.    Are refills needed on medications prescribed by this physician? NO    Rooming Documentation:  Questionnaire(s) completed    Reason for visit: RECHECK    Rosalba WATSON

## 2025-05-16 ENCOUNTER — HOSPITAL ENCOUNTER (OUTPATIENT)
Dept: MRI IMAGING | Facility: CLINIC | Age: 65
Discharge: HOME | End: 2025-05-16
Attending: STUDENT IN AN ORGANIZED HEALTH CARE EDUCATION/TRAINING PROGRAM | Admitting: STUDENT IN AN ORGANIZED HEALTH CARE EDUCATION/TRAINING PROGRAM
Payer: MEDICARE

## 2025-05-16 DIAGNOSIS — R14.0 ABDOMINAL DISTENSION: ICD-10-CM

## 2025-05-16 DIAGNOSIS — R11.0 NAUSEA: ICD-10-CM

## 2025-05-16 DIAGNOSIS — Z87.19 HISTORY OF IBS: ICD-10-CM

## 2025-05-16 DIAGNOSIS — R68.81 EARLY SATIETY: ICD-10-CM

## 2025-05-16 DIAGNOSIS — R10.13 DYSPEPSIA: ICD-10-CM

## 2025-05-16 PROCEDURE — 255N000002 HC RX 255 OP 636: Performed by: STUDENT IN AN ORGANIZED HEALTH CARE EDUCATION/TRAINING PROGRAM

## 2025-05-16 PROCEDURE — A9585 GADOBUTROL INJECTION: HCPCS | Performed by: STUDENT IN AN ORGANIZED HEALTH CARE EDUCATION/TRAINING PROGRAM

## 2025-05-16 PROCEDURE — 250N000011 HC RX IP 250 OP 636: Mod: JZ | Performed by: STUDENT IN AN ORGANIZED HEALTH CARE EDUCATION/TRAINING PROGRAM

## 2025-05-16 PROCEDURE — 72197 MRI PELVIS W/O & W/DYE: CPT

## 2025-05-16 RX ORDER — GADOBUTROL 604.72 MG/ML
9.5 INJECTION INTRAVENOUS ONCE
Status: COMPLETED | OUTPATIENT
Start: 2025-05-16 | End: 2025-05-16

## 2025-05-16 RX ADMIN — GADOBUTROL 9.5 ML: 604.72 INJECTION INTRAVENOUS at 10:32

## 2025-05-16 RX ADMIN — GLUCAGON 0.5 MG: 1 INJECTION, POWDER, LYOPHILIZED, FOR SOLUTION INTRAMUSCULAR; INTRAVENOUS at 10:29

## 2025-05-16 RX ADMIN — GLUCAGON 0.5 MG: 1 INJECTION, POWDER, LYOPHILIZED, FOR SOLUTION INTRAMUSCULAR; INTRAVENOUS at 10:17

## 2025-05-21 DIAGNOSIS — K21.9 GASTROESOPHAGEAL REFLUX DISEASE, UNSPECIFIED WHETHER ESOPHAGITIS PRESENT: ICD-10-CM

## 2025-05-22 RX ORDER — OMEPRAZOLE 20 MG/1
20 CAPSULE, DELAYED RELEASE ORAL DAILY
Qty: 90 CAPSULE | Refills: 3 | OUTPATIENT
Start: 2025-05-22

## 2025-05-23 ENCOUNTER — MYC MEDICAL ADVICE (OUTPATIENT)
Dept: GASTROENTEROLOGY | Facility: CLINIC | Age: 65
End: 2025-05-23
Payer: MEDICARE

## 2025-05-23 DIAGNOSIS — K21.9 GASTROESOPHAGEAL REFLUX DISEASE, UNSPECIFIED WHETHER ESOPHAGITIS PRESENT: Primary | ICD-10-CM

## 2025-05-23 DIAGNOSIS — R10.13 DYSPEPSIA: ICD-10-CM

## 2025-05-27 ENCOUNTER — RESULTS FOLLOW-UP (OUTPATIENT)
Dept: GASTROENTEROLOGY | Facility: CLINIC | Age: 65
End: 2025-05-27

## 2025-05-27 DIAGNOSIS — N26.1 ATROPHY OF RIGHT KIDNEY: Primary | ICD-10-CM

## 2025-05-27 DIAGNOSIS — N27.0 SMALL KIDNEY, UNILATERAL: ICD-10-CM

## 2025-05-27 RX ORDER — OMEPRAZOLE 20 MG/1
20 CAPSULE, DELAYED RELEASE ORAL DAILY
Qty: 90 CAPSULE | Refills: 3 | Status: SHIPPED | OUTPATIENT
Start: 2025-05-27

## 2025-05-27 NOTE — PROGRESS NOTES
Renal artery doppler ordered to evaluate for renal artery stenosis due to right kidney atrophy seen on MRE. Will refer to vascular vs. Urology if abnormal.

## 2025-06-04 ENCOUNTER — TRANSFERRED RECORDS (OUTPATIENT)
Dept: HEALTH INFORMATION MANAGEMENT | Facility: CLINIC | Age: 65
End: 2025-06-04
Payer: MEDICARE

## 2025-06-12 ENCOUNTER — OFFICE VISIT (OUTPATIENT)
Dept: PEDIATRICS | Facility: CLINIC | Age: 65
End: 2025-06-12
Payer: MEDICARE

## 2025-06-12 VITALS
RESPIRATION RATE: 18 BRPM | BODY MASS INDEX: 31.42 KG/M2 | DIASTOLIC BLOOD PRESSURE: 64 MMHG | WEIGHT: 212.1 LBS | SYSTOLIC BLOOD PRESSURE: 116 MMHG | HEART RATE: 63 BPM | HEIGHT: 69 IN | TEMPERATURE: 96.8 F | OXYGEN SATURATION: 96 %

## 2025-06-12 DIAGNOSIS — M54.12 CERVICAL RADICULOPATHY: ICD-10-CM

## 2025-06-12 DIAGNOSIS — Z01.818 PREOP GENERAL PHYSICAL EXAM: Primary | ICD-10-CM

## 2025-06-12 DIAGNOSIS — M54.2 NECK PAIN: ICD-10-CM

## 2025-06-12 ASSESSMENT — PAIN SCALES - GENERAL: PAINLEVEL_OUTOF10: MODERATE PAIN (5)

## 2025-06-12 NOTE — PATIENT INSTRUCTIONS
How to Take Your Medication Before Surgery  Preoperative Medication Instructions   Antiplatelet or Anticoagulation Medication Instructions   - We reviewed the medication list and the patient is not on an antiplatelet or anticoagulation medications.    Additional Medication Instructions  We reviewed the medication list and there are no chronic medications that need to be adjusted for this procedure.       Patient Education   Preparing for Your Surgery  For Adults  Getting started  In most cases, a nurse will call to review your health history and instructions. They will give you an arrival time based on your scheduled surgery time. Please be ready to share:  Your doctor's clinic name and phone number  Your medical, surgical, and anesthesia history  A list of allergies and sensitivities  A list of medicines, including herbal treatments and over-the-counter drugs  Whether the patient has a legal guardian (ask how to send us the papers in advance)  Note: You may not receive a call if you were seen at our PAC (Preoperative Assessment Center).  Please tell us if you're pregnant--or if there's any chance you might be pregnant. Some surgeries may injure a fetus (unborn baby), so they require a pregnancy test. Surgeries that are safe for a fetus don't always need a test, and you can choose whether to have one.   Preparing for surgery  Within 10 to 30 days of surgery: Have a pre-op exam (sometimes called an H&P, or History and Physical). This can be done at a clinic or pre-operative center.  If you're having a , you may not need this exam. Talk to your care team.  At your pre-op exam, talk to your care team about all medicines you take. (This includes CBD oil and any drugs, such as THC, marijuana, and other forms of cannabis.) If you need to stop any medicine before surgery, ask when to start taking it again.  This is for your safety. Many medicines and drugs can make you bleed too much during surgery. Some change  how well surgery (anesthesia) drugs work.  Call your insurance company to let them know you're having surgery. (If you don't have insurance, call [phone].)  Call your clinic if there's any change in your health. This includes a scrape or scratch near the surgery site, or any signs of a cold (sore throat, runny nose, cough, rash, fever).  Eating and drinking guidelines  For your safety: Unless your surgeon tells you otherwise, follow the guidelines below.  Eat and drink as normal until 8 hours before you arrive for surgery. After that, no food or milk. You can spit out gum when you arrive.  Drink clear liquids until 2 hours before you arrive. These are liquids you can see through, like water, Gatorade, and Propel Water. They also include plain black coffee and tea (no cream or milk).  No alcohol for 24 hours before you arrive. The night before surgery, stop any drinks that contain THC.  If your care team tells you to take medicine on the morning of surgery, it's okay to take it with a sip of water. No other medicines or drugs are allowed (including CBD oil)--follow your care team's instructions.  If you have questions the day of surgery, call your hospital or surgery center.   Preventing infection  Shower or bathe the night before and the morning of surgery. Follow the instructions your clinic gave you. (If no instructions, use regular soap.)  Don't shave or clip hair near your surgery site. We'll remove the hair if needed.  Don't smoke or vape the morning of surgery. No chewing tobacco for 6 hours before you arrive. A nicotine patch is okay. You may spit out nicotine gum when you arrive.  For some surgeries, the surgeon will tell you to fully quit smoking and nicotine.  We will make every effort to keep you safe from infection. We will:  Clean our hands often with soap and water (or an alcohol-based hand rub).  Clean the skin at your surgery site with a special soap that kills germs.  Give you a special gown to  keep you warm. (Cold raises the risk of infection.)  Wear hair covers, masks, gowns, and gloves during surgery.  Give antibiotic medicine, if prescribed. Not all surgeries need this medicine.  What to bring on the day of surgery  Photo ID and insurance card  Copy of your health care directive, if you have one  Glasses and hearing aids (bring cases)  You can't wear contacts during surgery  Inhaler and eye drops, if you use them (tell us about these when you arrive)  CPAP machine or breathing device, if you use them  A few personal items, if spending the night  If you have . . .  A pacemaker, ICD (cardiac defibrillator), or other implant: Bring the ID card.  An implanted stimulator: Bring the remote control.  A legal guardian: Bring a copy of the certified (court-stamped) guardianship papers.  Please remove any jewelry, including body piercings. Leave jewelry and other valuables at home.  If you're going home the day of surgery  You must have a support person drive you home. They should stay with you overnight, and they may need to help with your self-care.  If you don't have a support person, please tells us as soon as possible. We can help.  After surgery  If it's hard to control your pain or you need more pain medicine, please call your surgeon's office.  Questions?   If you have any questions for your care team, list them here:   ____________________________________________________________________________________________________________________________________________________________________________________________________________________________________________________________  For informational purposes only. Not to replace the advice of your health care provider. Copyright   2003, 2019 [facility]. All rights reserved. Clinically reviewed by Cesar Chavarria MD. [employer] 794137 - REV 02/25.

## 2025-06-12 NOTE — PROGRESS NOTES
Preoperative Evaluation  [facility] [place]  [address]  [address]  [place] [address]  Phone: [phone]  Fax: [phone]  Primary Provider: Seda Keita MD  Pre-op Performing Provider: Deirdre E. Milligan, MD  Jun 12, 2025 6/12/2025   Surgical Information   What procedure is being done? radio frequency oblation   Facility or Hospital where procedure/surgery will be performed: [facility]   Who is doing the procedure / surgery? Dr Nahed Reyes   Date of surgery / procedure: 06/17/2025   Time of surgery / procedure: 1130   Where do you plan to recover after surgery? at home with family     Fax number for surgical facility: [phone]    Assessment & Plan     The proposed surgical procedure is considered INTERMEDIATE risk.    Preop general physical exam  Medically optimized for procedure.    Neck pain  Cervical radiculopathy  Undergoing radiofrequency ablation under propofol sedation on the right. Has had procedure on left in past.            - No identified additional risk factors other than previously addressed    Preoperative Medication Instructions  Antiplatelet or Anticoagulation Medication Instructions   - We reviewed the medication list and the patient is not on an antiplatelet or anticoagulation medications.    Additional Medication Instructions  We reviewed the medication list and there are no chronic medications that need to be adjusted for this procedure.    Recommendation  Approval given to proceed with proposed procedure, without further diagnostic evaluation.        Yair Alicea is a 64 year old, presenting for the following:  Pre-Op Exam          6/12/2025     2:06 PM   Additional Questions   Roomed by Medina   Accompanied by none         6/12/2025     2:06 PM   Patient Reported Additional Medications   Patient reports taking the following new medications none     HPI: Follows with [facility] for  chronic neck pain with radiation into upper extremity, cervical spondylolysis.   Getting radiofrequency ablation         6/12/2025   Pre-Op Questionnaire   Have you ever had a heart attack or stroke? No   Have you ever had surgery on your heart or blood vessels, such as a stent placement, a coronary artery bypass, or surgery on an artery in your head, neck, heart, or legs? No   Do you have chest pain with activity? No   Do you have a history of heart failure? No   Do you currently have a cold, bronchitis or symptoms of other infection? No   Do you have a cough, shortness of breath, or wheezing? No   Do you or anyone in your family have previous history of blood clots? No   Do you or does anyone in your family have a serious bleeding problem such as prolonged bleeding following surgeries or cuts? No   Have you ever had problems with anemia or been told to take iron pills? No   Have you had any abnormal blood loss such as black, tarry or bloody stools, or abnormal vaginal bleeding? No   Have you ever had a blood transfusion? No   Are you willing to have a blood transfusion if it is medically needed before, during, or after your surgery? Yes   Have you or any of your relatives ever had problems with anesthesia? No   Do you have sleep apnea, excessive snoring or daytime drowsiness? (!) YES   Do you have a CPAP machine? Yes   Do you have any artifical heart valves or other implanted medical devices like a pacemaker, defibrillator, or continuous glucose monitor? No   Do you have artificial joints? No   Are you allergic to latex? No       Preoperative Review of    reviewed - controlled substances reflected in medication list.          Patient Active Problem List    Diagnosis Date Noted    Severe recurrent major depressive disorder with psychotic features (H) 03/14/2025     Priority: Medium    Elevated hemoglobin 09/27/2024     Priority: Medium    Nasal congestion 08/05/2024     Priority: Medium    Generalized anxiety  disorder 07/18/2024     Priority: Medium    Somatic dysfunction of pelvic region 01/10/2023     Priority: Medium    Pelvic pain in female 01/10/2023     Priority: Medium    Somatic dysfunction of pelvis region 01/10/2023     Priority: Medium    Urgency of urination 10/17/2022     Priority: Medium     Added automatically from request for surgery [id number]      History of UTI 10/17/2022     Priority: Medium     Added automatically from request for surgery [id number]      Bladder pain 10/17/2022     Priority: Medium     Added automatically from request for surgery [id number]      Urinary urgency 10/17/2022     Priority: Medium     Formatting of this note might be different from the original. Added automatically from request for surgery [id number] Formatting of this note might be different from the original. Formatting of this note might be different from the original. Added automatically from request for surgery [id number]      Osteoarthritis of carpometacarpal (CMC) joint of both thumbs 04/18/2022     Priority: Medium    Idiopathic osteoarthritis 04/18/2022     Priority: Medium    Attention deficit disorder (ADD) in adult 09/05/2019     Priority: Medium     Last Assessment & Plan:    Formatting of this note might be different from the original.   Stable in good control with 20mg daily in AM of adderall - requesting records from neurobehavioral evaluation      Other specified behavioral and emotional disorders with onset usually occurring in childhood and adolescence 09/05/2019     Priority: Medium     Formatting of this note might be different from the original.   Last Assessment & Plan: Formatting of this note might be different from the original. Stable in good control with 20mg daily in AM of adderall - requesting records from neurobehavioral evaluation      ABDOUL (obstructive sleep apnea) 07/19/2019     Priority: Medium     On CPAP, follows with Dr. Merino      Obstructive sleep apnea syndrome 07/19/2019     Priority: Medium      Formatting of this note might be different from the original. ABDOUL Formatting of this note might be different from the original. Formatting of this note might be different from the original. ABDOUL      Cervical high risk HPV (human papillomavirus) test positive 03/19/2019     Priority: Medium     TORRI Exposure, needs annual screening for life   3/19/19 NIL pap, + HR HPV (not 16, 18). Plan: cotest 1 year.    03/29/21 Lost to follow-up for pap tracking  12/14/22 NIL pap, + HR HPV (not 16 or 18). Plan: [place] bef 3/14/23  1/17/23 [place] ECC: benign. Plan 1 year cotest  3/8/24 Lost to follow-up for pap tracking   3/25/25 NIL Pap. Pt declines HPV test. Plan cotest in 1 year.       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 07/23/2018     Priority: Medium    Calculus of left kidney 08/26/2017     Priority: Medium    Calculus of kidney 08/26/2017     Priority: Medium    Primary osteoarthritis of right knee 12/26/2016     Priority: Medium    Peripheral tear of medial meniscus of right knee, unspecified whether old or current tear, initial encounter 12/26/2016     Priority: Medium    Peripheral tear of medial meniscus, current injury, right knee, initial encounter 12/26/2016     Priority: Medium    Osteoarthritis of knee 12/26/2016     Priority: Medium    Cervical radiculopathy 10/28/2016     Priority: Medium     Herniated disc C5      Neck pain 09/29/2016     Priority: Medium    Attention-deficit hyperactivity disorder, predominantly hyperactive type 04/19/2016     Priority: Medium    Obesity 06/21/2015     Priority: Medium    Primary insomnia 06/02/2015     Priority: Medium    Persistent insomnia 06/02/2015     Priority: Medium    Esophageal reflux 04/13/2015     Priority: Medium    Gastroesophageal reflux disease 04/13/2015     Priority: Medium    Stage 3 chronic kidney disease (H) 03/09/2015     Priority: Medium     Formatting of this note might be different from the original. Hx of vesiculoureteral reflux repair as child. Recurrent infxns  Formatting of this note might be different from the original. Formatting of this note might be different from the original. Hx of vesiculoureteral reflux repair as child. Recurrent infxns      Tonic clonic seizures (H) 12/23/2012     Priority: Medium     Was on Keppra but discontinued.  Event was in 2011      Seizure (H) 10/09/2012     Priority: Medium    Hyperlipidemia LDL goal <130 01/30/2012     Priority: Medium    Hyperlipidemia 01/30/2012     Priority: Medium    Major depressive disorder, recurrent episode, moderate (H) 01/18/2012     Priority: Medium    PTSD (post-traumatic stress disorder) 01/18/2012     Priority: Medium    Acquired hypothyroidism 01/18/2012     Priority: Medium    Hypothyroidism 01/18/2012     Priority: Medium    Moderate episode of recurrent major depressive disorder (H) 01/18/2012     Priority: Medium    Posttraumatic stress disorder 01/18/2012     Priority: Medium      Past Medical History:   Diagnosis Date    Arthritis 2012    both knees; hands    Cervical high risk HPV (human papillomavirus) test positive 03/19/2019    see problem list    Depressive disorder     Depressive disorder, not elsewhere classified 08/28/12    [facility] 10/05/12[facility]    Hyperlipidemia LDL goal < 130     mevacor    Hypothyroid     Moderate major depression (H)     abilify, cymbalta, seroquel, and Dr Antonio cowart [place]    Mumps     ABDOUL (obstructive sleep apnea)     PTSD (post-traumatic stress disorder)     Seizure (H) 03/2011    one episode, was on Keppra, EEG negative     Past Surgical History:   Procedure Laterality Date    ABDOMEN SURGERY      BIOPSY  Feb 1991    needle aspiration of thyroid tisse    BLADDER SURGERY      CHOLECYSTECTOMY      COLONOSCOPY  2012    CYSTOSCOPY      CYSTOSCOPY, BIOPSY BLADDER, COMBINED N/A 11/01/2022    Procedure: EXAM UNDER ANESTHESIA, CYSTOSCOPY;  Surgeon: Terrie Melton MD;  Location: [facility] OR    ORTHOPEDIC SURGERY  left knee    renal artery surgery  child     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    TONSILLECTOMY      ureteral reimplantation      ZZC PARTIAL EXCISION THYROID,UNILAT  1991    rt, negative path then, treated with synthroid.     Current Outpatient Medications   Medication Sig Dispense Refill    albuterol (PROAIR HFA/PROVENTIL HFA/VENTOLIN HFA) 108 (90 Base) MCG/ACT inhaler Inhale 1-2 puffs into the lungs every 4 hours as needed for shortness of breath or wheezing. 18 g 2    azelastine (ASTELIN) 0.1 % nasal spray Spray 1 spray into both nostrils 2 times daily 30 mL 1    buPROPion (WELLBUTRIN SR) 200 MG 12 hr tablet Take 1 tablet (200 mg) by mouth daily. 90 tablet 0    busPIRone (BUSPAR) 10 MG tablet TAKE 2 TABLETS (20 MG) BY MOUTH 3 TIMES DAILY 540 tablet 1    cholecalciferol (VITAMIN D3) 5000 UNITS CAPS capsule Take 1 capsule (5,000 Units) by mouth daily Take 1 per day 100 capsule 2    diazepam (VALIUM) 10 MG tablet VAGINAL VALIUM SUPPOSITORY 10MG AT NIGHT AND PRIOR TO THERAPY AS NEEDED 30 tablet 1    dicyclomine (BENTYL) 10 MG capsule Take 1 capsule (10 mg) by mouth 4 times daily as needed (abdominal cramping). 180 capsule 3    escitalopram (LEXAPRO) 20 MG tablet Take 1 tablet (20 mg) by mouth daily. 90 tablet 0    hydrOXYzine HCl (ATARAX) 25 MG tablet TAKE 1 TO 2 TABLETS BY MOUTH 3 TIMES DAILY AS NEEDED FOR ANXIETY. 540 tablet 0    levothyroxine (SYNTHROID/LEVOTHROID) 175 MCG tablet Take 1 tab daily, except 0.5 tab on Tuesday 90 tablet 2    lovastatin (MEVACOR) 20 MG tablet TAKE 1 TABLET (20 MG) BY MOUTH AT BEDTIME DUE FOR OFFICE VISIT PRIOR TO FURTHER REFILL 90 tablet 1    Multiple Vitamin (THERA-TABS) TABS Take 1 tablet by mouth daily.      multivitamin w/minerals (THERA-VIT-M) tablet Take 1 tablet by mouth daily (with lunch)      naloxone (NARCAN) 4 MG/0.1ML nasal spray Spray 1 spray (4 mg) into one nostril alternating nostrils as needed for opioid reversal. every 2-3 minutes until assistance arrives 0.2 mL 3    Omega-3 Fatty Acids (OMEGA 3 PO)  "Take 2 g by mouth 2 times daily. Takes 1 in am and 1 at bedtime      omeprazole (PRILOSEC) 20 MG DR capsule Take 1 capsule (20 mg) by mouth daily. (Patient taking differently: Take 20 mg by mouth 2 times daily.) 90 capsule 3    omeprazole (PRILOSEC) 20 MG DR capsule Take 1 capsule (20 mg) by mouth daily. (Patient taking differently: Take 20 mg by mouth 2 times daily.) 90 capsule 3    omeprazole (PRILOSEC) 40 MG DR capsule TAKE 1 CAPSULE (40 MG) BY MOUTH DAILY DUE FOR CLINIC VISIT PRIOR TO FURTHER REFILL 15 capsule 0    ondansetron (ZOFRAN) 4 MG tablet TAKE 1 TABLET (4MG) BY MOUTH EVERY 8HRS AS NEEDED FOR NAUSEA OR VOMITING      oxyCODONE IR (ROXICODONE) 10 MG tablet as needed      prazosin (MINIPRESS) 5 MG capsule TAKE 1 CAPSULE(5 MG) BY MOUTH AT BEDTIME 90 capsule 1    tiZANidine (ZANAFLEX) 4 MG tablet Take 1-3 tablets (4-12 mg) by mouth 3 times daily as needed for muscle spasms 210 tablet 3    topiramate (TOPAMAX) 25 MG tablet Take 25 mg by mouth 2 times daily      UNABLE TO FIND MEDICATION NAME: medical cannibus         Allergies   Allergen Reactions    Gabapentin Other (See Comments)     Patient states she gets \"horrific nightmares\" with this medication    Hydromorphone Hcl Other (See Comments)     Made her feel like her skin was crawling      \"creepy crawly skin\"    Nsaids Other (See Comments)     Kidney failure    Compazine [Prochlorperazine] Other (See Comments)     \"Makes my skin crawl, I was going nuts.\"        Social History     Tobacco Use    Smoking status: Former     Current packs/day: 0.00     Average packs/day: 0.3 packs/day for 31.1 years (9.3 ttl pk-yrs)     Types: Cigarettes     Start date: 1984     Quit date: 2/17/2015     Years since quitting: 10.3     Passive exposure: Past    Smokeless tobacco: Never    Tobacco comments:     Quit 5 - 10 years ago    Substance Use Topics    Alcohol use: Not Currently       History   Drug Use    Types: Marijuana     Comment: medical marijuana       " "        Objective    /64 (BP Location: Right arm, Patient Position: Sitting, Cuff Size: Adult Regular)   Pulse 63   Temp 96.8  F (36  C) (Temporal)   Resp 18   Ht 1.74 m (5' 8.5\")   Wt 96.2 kg (212 lb 1.6 oz)   LMP 08/08/2016   SpO2 96%   BMI 31.78 kg/m     Estimated body mass index is 31.78 kg/m  as calculated from the following:    Height as of this encounter: 1.74 m (5' 8.5\").    Weight as of this encounter: 96.2 kg (212 lb 1.6 oz).  Physical Exam  GENERAL: alert and no distress  EYES: Eyes grossly normal to inspection, and conjunctivae and sclerae normal  HENT: ear canals and TM's normal, nose and mouth without ulcers or lesions  NECK: no adenopathy, no asymmetry, masses, or scars  RESP: lungs clear to auscultation - no rales, rhonchi or wheezes  CV: regular rate and rhythm, normal S1 S2, no S3 or S4, no murmur, click or rub, no peripheral edema  MS: no gross musculoskeletal defects noted, no edema  SKIN: no suspicious lesions or rashes  NEURO: Normal strength and tone, mentation intact and speech normal  PSYCH: mentation appears normal, affect normal/bright    Recent Labs   Lab Test 03/14/25  1410 12/02/24  1739   HGB  --  16.2*   PLT  --  222    141   POTASSIUM 4.6 4.2   CR 0.97* 1.00*   A1C 5.5  --         Diagnostics  No labs were ordered during this visit.   No EKG required, no history of coronary heart disease, significant arrhythmia, peripheral arterial disease or other structural heart disease.    Revised Cardiac Risk Index (RCRI)  The patient has the following serious cardiovascular risks for perioperative complications:   - No serious cardiac risks = 0 points     RCRI Interpretation: 0 points: Class I (very low risk - 0.4% complication rate)         Signed Electronically by: Deirdre E. Milligan, MD  A copy of this evaluation report is provided to the requesting physician.        "

## 2025-06-18 ENCOUNTER — MYC REFILL (OUTPATIENT)
Dept: PSYCHIATRY | Facility: CLINIC | Age: 65
End: 2025-06-18

## 2025-06-18 ENCOUNTER — PRE VISIT (OUTPATIENT)
Dept: OTOLARYNGOLOGY | Facility: CLINIC | Age: 65
End: 2025-06-18

## 2025-06-18 DIAGNOSIS — F41.1 GAD (GENERALIZED ANXIETY DISORDER): ICD-10-CM

## 2025-06-18 RX ORDER — BUSPIRONE HYDROCHLORIDE 10 MG/1
20 TABLET ORAL 3 TIMES DAILY
Qty: 540 TABLET | Refills: 1 | Status: SHIPPED | OUTPATIENT
Start: 2025-06-18

## 2025-06-18 NOTE — TELEPHONE ENCOUNTER
Date of Last Office Visit: 5/14/2025  Date of Next Office Visit: 7/9/2025  No shows since last visit: No  More than one patient-initiated cancellation (with reschedule) since last seen in clinic? No    []Medication refilled per  Medication Refill in Ambulatory Care  policy.  [x]Scope of Practice: refill request processed by LPN/MA  []Medication unable to be refilled by RN due to criteria not met as indicated below:    []Eligibility: has not had a provider visit within last 6 months   []Supervision: no future appointment; < 7 days before next appointment   []Compliance: no shows; cancellations; lapse in therapy   []Verification: order discrepancy; may need modification...   [] > 30-day supply request   []Advanced refill request: > 7 days before refill date   []Controlled medication   []Medication not included in policy   []Review: new med; med adjusted <= 30 days; safety alert; requires lab monitoring...   []Other:      Medication(s) requested:     -  busPIRone (BUSPAR) 10 MG tablet   Date last ordered: 11/29/2024  Qty: 540  Refills: 1  Appropriate for refill? Provider to review.          Any Controlled Substance(s)? No      Requested medication(s) verified as identical to current order? Yes    Any lapse in adherence to medication(s) greater than 5 days? No      Additional action taken? routed encounter to provider for review.      Last visit treatment plan:   ASSESSMENT AND PLAN    Patient is a 62 year old female with a history of MDD DONELL and PTSD  Presents today for follow up visit. Today, reports doing great as she let the writer aware this is the best she has felt  in a very long time.  Patient continues to believe is the best he has felt in a long time as far as mental health is concerned.  Patient states she feels steady and in the state of complete calmness and content.  Apart from physical condition related to chronic stomach she usually notices  after eating , she is doing fairly well.  She denies both  auditory and visual hallucination. She reported no rk or hypomania.  Return to clinic in 6 weeks for follow-up.     PLAN AND RECOMMENDATIONS:  Continue  Wellbutrin  mg  daily -   Continue Lexapro 20 mg every day  Continue hydroxyzine 25 - 50 mg instead of 3 times daily as needed for anxiety         Continue to take prazosin 5 mg at bedtime  Continue Buspar 20 mg three times daily for anxiety        We have discussed his/her diagnosis, prognosis, differential diagnosis and side effects and benefits of medications.      Patient and I revieweddiagnosis and treatment plan and patient agrees with following recommendations:     1.Patient will take the medications as prescribed. Patient will not stop taking medications or adjust them without consulting with theprovider.  2.Patient will call with any problems between 2 visits.  3.Patient will go to the emergency room if not feeling safe , unable to function in the community, or if suicidal, homicidal or hearing voices orhaving paranoia.  4.Patient will abstain from drugs and alcohol.  5.Patient will not drive if sedated on medications or under influence of any substance. 6.Patient will not mix psychiatric medications with drugs andalcohol.   7.Patient will watch his diet and exercise.  8.Patient will see non psychiatric providers for non psychiatric disorders.    Any medication(s) require lab monitoring? No    Vangie Fritz MA on 6/18/2025 at 1:01 PM

## 2025-06-28 DIAGNOSIS — F33.1 MAJOR DEPRESSIVE DISORDER, RECURRENT EPISODE, MODERATE (H): ICD-10-CM

## 2025-06-30 RX ORDER — BUPROPION HYDROCHLORIDE 200 MG/1
200 TABLET, EXTENDED RELEASE ORAL DAILY
Qty: 90 TABLET | Refills: 0 | OUTPATIENT
Start: 2025-06-30

## 2025-06-30 NOTE — TELEPHONE ENCOUNTER
1) Reviewed 90-day supply request(s) from    [facility] #[id number] - [place], [place] - [address]    2) Any Controlled Substance(s)? No    3) Refill(s) requested for:     - Wellbutrin  mg tab Date last ordered: 5/14/2025 Qty: 90 Refills: 0   refill has been requested too soon    4) Action taken: refill request(s) sent back to pharmacy as DENIED.    Bridget [place] RN on 6/30/2025 at 11:22 AM

## 2025-07-01 ENCOUNTER — TRANSFERRED RECORDS (OUTPATIENT)
Dept: HEALTH INFORMATION MANAGEMENT | Facility: CLINIC | Age: 65
End: 2025-07-01
Payer: MEDICARE

## 2025-07-07 DIAGNOSIS — R30.0 DYSURIA: ICD-10-CM

## 2025-07-07 DIAGNOSIS — M62.89 PELVIC FLOOR DYSFUNCTION: ICD-10-CM

## 2025-07-07 DIAGNOSIS — M79.18 MYALGIA OF PELVIC FLOOR: ICD-10-CM

## 2025-07-07 DIAGNOSIS — R39.89 BLADDER PAIN: ICD-10-CM

## 2025-07-07 DIAGNOSIS — R10.2 SUPRAPUBIC ABDOMINAL PAIN: ICD-10-CM

## 2025-07-07 DIAGNOSIS — N39.8 VOIDING DYSFUNCTION: ICD-10-CM

## 2025-07-07 DIAGNOSIS — N32.81 URGENCY-FREQUENCY SYNDROME: ICD-10-CM

## 2025-07-07 RX ORDER — DIAZEPAM 10 MG/1
TABLET ORAL
Qty: 30 TABLET | Refills: 3 | Status: SHIPPED | OUTPATIENT
Start: 2025-07-07

## 2025-07-09 ENCOUNTER — VIRTUAL VISIT (OUTPATIENT)
Dept: PSYCHIATRY | Facility: CLINIC | Age: 65
End: 2025-07-09
Payer: MEDICARE

## 2025-07-09 DIAGNOSIS — F43.10 PTSD (POST-TRAUMATIC STRESS DISORDER): ICD-10-CM

## 2025-07-09 DIAGNOSIS — F41.1 GAD (GENERALIZED ANXIETY DISORDER): ICD-10-CM

## 2025-07-09 DIAGNOSIS — F33.1 MODERATE EPISODE OF RECURRENT MAJOR DEPRESSIVE DISORDER (H): Primary | ICD-10-CM

## 2025-07-09 DIAGNOSIS — F51.01 PRIMARY INSOMNIA: ICD-10-CM

## 2025-07-09 RX ORDER — RAMELTEON 8 MG/1
8 TABLET ORAL AT BEDTIME
Qty: 30 TABLET | Refills: 1 | Status: SHIPPED | OUTPATIENT
Start: 2025-07-09

## 2025-07-09 RX ORDER — ESCITALOPRAM OXALATE 20 MG/1
20 TABLET ORAL DAILY
Qty: 90 TABLET | Refills: 0 | Status: SHIPPED | OUTPATIENT
Start: 2025-07-09

## 2025-07-09 ASSESSMENT — PAIN SCALES - GENERAL: PAINLEVEL_OUTOF10: MODERATE PAIN (4)

## 2025-07-09 ASSESSMENT — PATIENT HEALTH QUESTIONNAIRE - PHQ9
10. IF YOU CHECKED OFF ANY PROBLEMS, HOW DIFFICULT HAVE THESE PROBLEMS MADE IT FOR YOU TO DO YOUR WORK, TAKE CARE OF THINGS AT HOME, OR GET ALONG WITH OTHER PEOPLE: VERY DIFFICULT
SUM OF ALL RESPONSES TO PHQ QUESTIONS 1-9: 7

## 2025-07-09 NOTE — PROGRESS NOTES
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 with drugs andalcohol.   7.Patient will watch his diet and exercise.  8.Patient will see non psychiatric providers for non psychiatric disorders.      Risk Assessment:     Deanne has notable risk factors for self-harm, including, single status, anxiety and passive SI. However, risk is mitigated by ability to volunteer a safety plan and history of seeking help when needed. Additional steps taken to minimize risk include making medication adjustment, asking patient to call 911 and go to the ER if not able to stay safe at home,  Therefore, based on all available evidence including the factors cited above, Deanne does not appear to be an imminent danger to self or others and does not meet criteria 72 hour hold. However, if patient uses substances or is non-adherent with medication, their risk of decompensation and SI/HI will be elevated. This was discussed with the patient as she verbalized good understanding.   CONSULTS/REFERRALS:   Recommend therapy. Referral placed for [facility].  Call [facility] at [phone] if you do not hear from them soon  Coordinate care with therapist as needed    MEDICAL:   None at this time  Coordinate care with PCP (Crista Elder) as needed  Follow up  with primary care provider as planned or for acute medical concerns.    PSYCHOEDUCATION:  Medication side effects and alternatives reviewed. Health promotion activities recommended and reviewed today. All questions addressed. Education and counseling completed regarding risks and benefits of medications and psychotherapy options.  Consent provided by patient/guardian  Call the psychiatric nurse line with medication questions or concerns at [phone].  [employer]hart may be used to communicate with your provider, but this is not intended to be used for emergencies.  BLACK BOX WARNING: Discussed the Food and Drug Administration (FDA) requires that all antidepressants carry a warning that some children, adolescents and young adults may be at increased risk of suicide when taking antidepressants. Anyone taking an antidepressant should be watched closely for worsening depression or unusual behavior especially in the first few weeks after starting an SSRI. Keep in mind, antidepressants are more likely to reduce suicide risk in the long run by improving mood.   SEROTONIN SYNDROME:  Discussed risks of Serotonin syndrome (ie, serotonin toxicity) which is a potentially life-threatening condition associated with increased serotonergic activity in the central nervous system (CNS). It is seen with therapeutic medication use, inadvertent interactions between drugs, and intentional self-poisoning. Serotonin syndrome may involve a spectrum of clinical findings, which often include mental status changes, autonomic hyperactivity, and neuromuscular abnormalities.    BENZODIAZEPINE:  discussion on how benzos work and the need to use them short term due to potential of anxiety getting.  This is a controlled substance with risk for abuse, need to keep in a safe keep place and cannot replace lost scripts.    HARM REDUCTION:  Discussions regarding effects of mood altering substances, alcohol and cannabis, on mood and that approach is harm  reduction, will continue to prescribe meds as they work to cut back use.    FIRST GENERATION ANTIPSYCHOTIC/ SECOND GENERATION ANTIPSYCHOTIC USE:  Atypical need for cardiometabolic monitoring with medication- B/P, weight, blood sugar, cholesterol.  Need to monitor for abnormal movements taught  SAFETY:  We all care about your loved one's safety. To reduce the risk of self-harm, remove access to all:  Firearms, Medicines (both prescribed and over-the-counter), Knives and other sharp objects, Ropes and like materials, and Alcohol  SLEEP HYGIENE: establish a sleep routine, limit screen time 1 hour prior to bed, use bed for sleep only, take sleep/medications on time (including sleepy time tea, trazadone or herbal treatments such as melatonin), aroma therapy, limit caffeine/sugar, yoga, guided imagery, stretch, meditation, limit naps to 20 minutes, make a temperature change in the room, white noise, be mindful of slowing down breathing, take a warm bath/shower, frequently wash sheets, and journaling.   Medlineplus.gov is information for patients.  It is run by the [employer] of Medicine and it contains information about all disorders, diseases and all medications.      COMMUNITY RESOURCES:    CRISIS NUMBERS: Provided in AVS 6/21/2023  National Suicide Prevention Lifeline: 0-143-654-TALK (327-152-3904)  [employer]/resources for a list of additional resources (SOS)            [facility] - [phone]   Urgent Care Adult Mental [facility]-[phone] mobile unit/ 24/7 crisis line  [facility] -[phone]   COPE 24/7 [place] Mobile Team -[phone] (adults)/ [phone] (child)  Poison Control Center - 1-178.885.3212    OR  go to nearest ER  Crisis Text Line for any crisis 24/7 send this-   To: 654021   [facility] ([facility]  [phone]  National Suicide Prevention Lifeline: 693.823.1645 (TTY: 586.140.7611). Call anytime for help.   (www.suicidepreventionlifeline.org)  National [place] on Mental Illness (www.ellie.org): 236.861.9126 or 922-569-9991.   Mental Health Association (www.mentalhealth.org): 449.436.6080 or 752-642-0552.  [place] Crisis Text Line: Text [place] to 827015  Suicide LifeLine Chat: suicide[employer] Mobile [employer].org/chat    ADMINISTRATIVE BILLIN min spent interviewing patient, reviewing referral documents, obtaining and reviewing outside records, communication with other health specialists, and preparing this report on today's date: 2025  Phone Start Time: 1340  /Phone End Time: 1400    The longitudinal plan of care for the diagnosis(es)/condition(s) as documented were addressed during this visit. Due to the added complexity in care, I will continue to support Deanne in the subsequent management and with ongoing continuity of care.       Signed:     Ezekiel Cheng, MSN, APRN, PMHNP-BC     Chart documentation done in part with Dragon Voice Recognition software.  Although reviewed after completion, some word and grammatical errors may remain.  Answers for HPI/ROS submitted by the patient on 2022  If you checked off any problems, how difficult have these problems made it for you to do your work, take care of things at home, or get along with other people?: Somewhat difficult  PHQ9 TOTAL SCORE: 7  DONELL 7 TOTAL SCORE: 13    Answers for HPI/ROS submitted by the patient on 2/3/2023  If you checked off any problems, how difficult have these problems made it for you to do your work, take care of things at home, or get along with other people?: Very difficult  PHQ9 TOTAL SCORE: 3  DONELL 7 TOTAL SCORE: 6  Answers for HPI/ROS submitted by the patient on 2023  If you checked off any problems, how difficult have these problems made it for you to do your work, take care of things at home, or get along with other people?: Somewhat difficult  PHQ9 TOTAL SCORE: 5  DONELL 7 TOTAL SCORE: 8    Answers for HPI/ROS submitted by the  patient on 5/25/2023  If you checked off any problems, how difficult have these problems made it for you to do your work, take care of things at home, or get along with other people?: Extremely difficult  PHQ9 TOTAL SCORE: 12  DONELL 7 TOTAL SCORE: 9Answers submitted by the patient for this visit:  Patient Health Questionnaire (Submitted on 9/19/2023)  If you checked off any problems, how difficult have these problems made it for you to do your work, take care of things at home, or get along with other people?: Somewhat difficult  PHQ9 TOTAL SCORE: 5  DONELL-7 (Submitted on 9/19/2023)  DONELL 7 TOTAL SCORE: 14    Answers submitted by the patient for this visit:  Patient Health Questionnaire (Submitted on 1/17/2024)  If you checked off any problems, how difficult have these problems made it for you to do your work, take care of things at home, or get along with other people?: Extremely difficult  PHQ9 TOTAL SCORE: 8  DONELL-7 (Submitted on 1/17/2024)  DONELL 7 TOTAL SCORE: 7    Answers submitted by the patient for this visit:  Patient Health Questionnaire (Submitted on 9/16/2024)  If you checked off any problems, how difficult have these problems made it for you to do your work, take care of things at home, or get along with other people?: Very difficult  PHQ9 TOTAL SCORE: 13    Answers submitted by the patient for this visit:  Patient Health Questionnaire (Submitted on 11/26/2024)  If you checked off any problems, how difficult have these problems made it for you to do your work, take care of things at home, or get along with other people?: Very difficult  PHQ9 TOTAL SCORE: 6    Answers submitted by the patient for this visit:  Patient Health Questionnaire (Submitted on 1/21/2025)  If you checked off any problems, how difficult have these problems made it for you to do your work, take care of things at home, or get along with other people?: Somewhat difficult  PHQ9 TOTAL SCORE: 4    Answers submitted by the patient for this  visit:  Patient Health Questionnaire (Submitted on 3/31/2025)  If you checked off any problems, how difficult have these problems made it for you to do your work, take care of things at home, or get along with other people?: Not difficult at all  PHQ9 TOTAL SCORE: 8  Patient Health Questionnaire (G7) (Submitted on 3/31/2025)  DONELL 7 TOTAL SCORE: 6    Answers submitted by the patient for this visit:  Patient Health Questionnaire (Submitted on 5/14/2025)  If you checked off any problems, how difficult have these problems made it for you to do your work, take care of things at home, or get along with other people?: Very difficult  PHQ9 TOTAL SCORE: 4  Patient Health Questionnaire (G7) (Submitted on 5/14/2025)  DONELL 7 TOTAL SCORE: 6    Answers submitted by the patient for this visit:  Patient Health Questionnaire (Submitted on 7/9/2025)  If you checked off any problems, how difficult have these problems made it for you to do your work, take care of things at home, or get along with other people?: Very difficult  PHQ9 TOTAL SCORE: 7

## 2025-07-09 NOTE — PATIENT INSTRUCTIONS
"Patient Education   The [facility] Program  What to Expect  Here's what to expect in the Panel Psychiatry Program.   About the program  You'll be meeting with a psychiatric doctor to check your mental health. A psychiatric doctor helps you deal with troubling thoughts and feelings by giving you medicine. They'll make sure you know the plan for your care. You may see them for a long time. When you're feeling better, they may refer you back to seeing your family doctor.   If you have any questions, we'll be glad to talk to you.  About visits  Be open  At your visits, please talk openly about your problems. It may feel hard, but it's the best way for us to help you.  Cancelling visits  If you can't come to your visit, please call us right away at [phone]. If you don't cancel at least 24 hours (1 full day) before your visit, that's \"late cancellation.\"  Not showing up for your visits  Being very late is the same as not showing up. You'll be a \"no show\" if:  You're more than 15 minutes late for a 30-minute (half hour) visit.  You're more than 30 minutes late for a 60-minute (full hour) visit.  If you cancel late or don't show up 2 times within 6 months, we may end your care.  Getting help between visits  If you need help between visits, you can call us Monday to Friday from 8 a.m. to 4:30 p.m. at [phone].  Emergency care  Call 911 or go to the nearest emergency department if your life or someone else's life is in danger.  Call 988 anytime to reach the national Suicide and Crisis hotline.  Medicine refills  To refill your medicine, call your pharmacy. You can also call [facility] at [phone], Monday to Friday, 8 a.m. to 4:30 p.m. It can take 1 to 3 business days to get a refill.   Forms, letters, and tests  You may have papers to fill out, like FMLA, short-term disability, and workability. We can help you with these forms at your visits, but you must have an " appointment. You may need more than 1 visit for this, to be in an intensive therapy program, or both.  Before we can give you medicine for ADHD, we may refer you to get tested for it or confirm it another way.  We may not be able to give you an emotional support animal letter.  We don't do mental health checks ordered by the court.   We don't do mental health testing, but we can refer you to get tested.   Thank you for choosing us for your care.  For informational purposes only. Not to replace the advice of your health care provider. Copyright   2022 [facility]. All rights reserved. [employer] 218437 - Rev 11/24.   PLAN AND RECOMMENDATIONS:  Continue  Wellbutrin  mg  daily -   Continue Lexapro 20 mg every day  Continue hydroxyzine 25 - 50 mg instead of 3 times daily as needed for anxiety         Continue to take prazosin 5 mg at bedtime  Take Rozerem 8 mg at bedtime  Continue Buspar 20 mg three times daily for anxiety        We have discussed his/her diagnosis, prognosis, differential diagnosis and side effects and benefits of medications.      Patient and I revieweddiagnosis and treatment plan and patient agrees with following recommendations:     1.Patient will take the medications as prescribed. Patient will not stop taking medications or adjust them without consulting with theprovider.  2.Patient will call with any problems between 2 visits.  3.Patient will go to the emergency room if not feeling safe , unable to function in the community, or if suicidal, homicidal or hearing voices orhaving paranoia.  4.Patient will abstain from drugs and alcohol.  5.Patient will not drive if sedated on medications or under influence of any substance. 6.Patient will not mix psychiatric medications with drugs andalcohol.   7.Patient will watch his diet and exercise.  8.Patient will see non psychiatric providers for non psychiatric disorders.

## 2025-07-09 NOTE — NURSING NOTE
Current patient location: [address]   [facility] [address]    Is the patient currently in the state of [place]? YES    Visit mode: CONVERT TO TELEPHONE    If the visit is dropped, the patient can be reconnected by:VIDEO VISIT: Text to cell phone:   Telephone Information:   Mobile [phone]       Will anyone else be joining the visit? NO  (If patient encounters technical issues they should call [phone])    Are changes needed to the allergy or medication list? Pt stated no changes to allergies and Pt stated no med changes    Are refills needed on medications prescribed by this physician? Discuss with provider    Review refill status for meds    Rooming Documentation:  Questionnaire(s) completed    Reason for visit: RECHECK    Ilsa WATSON

## 2025-07-24 ENCOUNTER — OFFICE VISIT (OUTPATIENT)
Dept: OTOLARYNGOLOGY | Facility: CLINIC | Age: 65
End: 2025-07-24
Payer: MEDICARE

## 2025-07-24 DIAGNOSIS — J34.89 NASAL OBSTRUCTION: ICD-10-CM

## 2025-07-24 DIAGNOSIS — J34.829 NASAL VALVE COLLAPSE: ICD-10-CM

## 2025-07-24 DIAGNOSIS — R09.81 NASAL CONGESTION: Primary | ICD-10-CM

## 2025-07-24 ASSESSMENT — PAIN SCALES - GENERAL: PAINLEVEL_OUTOF10: NO PAIN (0)

## 2025-07-24 NOTE — PATIENT INSTRUCTIONS
You were seen in the ENT Clinic today by Dr. Good. If you have any questions or concerns after your appointment, please contact us (see below)       2.   The following recommendations have been made based upon your appointment today:  Flonase (fluticasone) nasal spray: Spray 2 sprays in each nostril every evening.  Astepro (azelastine) nasal spray: Instill 2 sprays in each nostril every evening.  Perform sinus rinses every evening.      3.   Please reach out to us in about 30-40 days and let us know how the treatment plan is working for your symptoms.           How to Contact Us:  Send a [employer] message to your provider. Our team will respond to you via [employer]. Occasionally, we will need to call you to get further information.  For urgent matters (Monday-Friday), call the ENT Clinic: [phone] and speak with a call center team member - they will route your call appropriately.   If you'd like to speak directly with a nurse, please find our contact information below. We do our best to check voicemail frequently throughout the day, and will work to call you back within 1-2 days. For urgent matters, please use the general clinic phone numbers listed above.     Marta PERRY RN  Direct: [phone]  Akua VALERIO LPN  Direct: [phone]    [facility]  Department of Otolaryngology

## 2025-07-24 NOTE — LETTER
7/24/2025       RE: Ilsa Yusuf  [address]  [place] [address]     Dear Colleague,    Thank you for referring your patient, Ilsa Yusuf, to the [facility] [place] at [facility]. Please see a copy of my visit note below.      [facility]  New Patient Visit      Encounter date:   July 24, 2025    Referring Provider:   No referring provider defined for this encounter.    Chief Complaint: Consult     History of Present Illness:   Ilsa Yusuf is a 64 year old female who presents for consultation regarding nasal congestion.    1/30/24- referral/ note w. Goltz, Bennett Ezra, PA-C: Patient was seen in office for ABDOUL, nasal congestion, and nocturia. Regarding nasal congestion, She feels she has a very narrow nasal passages. She uses azelastine regularly. She still feels her nasal airway is narrow by where her glasses would sit. Her nose gets more congested at night, particularly when it is over freezing    7/24/25: Today, the patient reports that she has ABDOUL and has been on cpap since 2016. She is struggling to use her CPAP and has been off of it since October. She endorses nasal congestion. She experiences drainage, but the congestion is most bothersome for her. She has used Azelastine. She states that this medication works well (around 85% of the time). She used Flonase as well, but felt that it did not work very well. She has tried sinus rinses as well. Feels that her nose is very tight internally. Afrin did help improve her breathing in clinic.     Review of systems: A 14-point review of systems has been conducted and was negative for any notable symptoms, except as dictated in the history of present illness.     Medical History:  Past Medical History:   Diagnosis Date     Arthritis 2012    both knees; hands     Cervical high risk HPV (human papillomavirus) test positive 03/19/2019     see problem list     Depressive disorder      Depressive disorder, not elsewhere classified 12    DC 10/05/12-[facility]     Hyperlipidemia LDL goal < 130     mevacor     Hypothyroid      Moderate major depression (H)     abilify, cymbalta, seroquel, and sofya, Dr Antonio Yoon      ABDOUL (obstructive sleep apnea)      PTSD (post-traumatic stress disorder)      Seizure (H) 2011    one episode, was on Keppra, EEG negative        Surgical History:   Past Surgical History:   Procedure Laterality Date     ABDOMEN SURGERY       BIOPSY  1991    needle aspiration of thyroid tisse     BLADDER SURGERY       CHOLECYSTECTOMY       COLONOSCOPY       CYSTOSCOPY       CYSTOSCOPY, BIOPSY BLADDER, COMBINED N/A 2022    Procedure: EXAM UNDER ANESTHESIA, CYSTOSCOPY;  Surgeon: Terrie Melton MD;  Location: [facility] OR     ORTHOPEDIC SURGERY  left knee     renal artery surgery  child    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     TONSILLECTOMY       ureteral reimplantation       ZZC PARTIAL EXCISION THYROID,UNILAT      rt, negative path then, treated with synthroid.        Family History:  Family History   Problem Relation Age of Onset     Alzheimer Disease Mother         total care now     Depression Mother         situational     Anxiety Disorder Mother      C.A.D. Father 58         at 58, heart disease     Mental Illness Father      Coronary Artery Disease Father          at 57 of 6th heart attack     Hyperlipidemia Father      Diverticulitis Father      Diabetes Sister         adult onset     Melanoma Sister      Breast Cancer Sister      Obesity Sister      Thyroid Disease Sister      Obesity Sister      Diabetes Maternal Grandmother         oral anti-diabteic controlled     Cerebrovascular Disease Maternal Grandmother      Anesthesia Reaction No family hx of      Bleeding Disorder No family hx of      Venous thrombosis No family hx of         Social History:   Social  History     Socioeconomic History     Marital status:    Tobacco Use     Smoking status: Former     Current packs/day: 0.00     Average packs/day: 0.3 packs/day for 31.1 years (9.3 ttl pk-yrs)     Types: Cigarettes     Start date: 1984     Quit date: 2/17/2015     Years since quitting: 10.4     Passive exposure: Past     Smokeless tobacco: Never     Tobacco comments:     Quit 5 - 10 years ago    Vaping Use     Vaping status: Never Used   Substance and Sexual Activity     Alcohol use: Not Currently     Drug use: Yes     Types: Marijuana     Comment: medical marijuana     Sexual activity: Not Currently     Partners: Male     Birth control/protection: Sponge, Post-menopausal     Comment: Tomasa-menopausal   Other Topics Concern     Parent/sibling w/ CABG, MI or angioplasty before 65F 55M? 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     Social Drivers of Health     Financial Resource Strain: Low Risk  (3/14/2025)    Financial Resource Strain      Within the past 12 months, have you or your family members you live with been unable to get utilities (heat, electricity) when it was really needed?: No   Food Insecurity: Low Risk  (3/14/2025)    Food Insecurity      Within the past 12 months, did you worry that your food would run out before you got money to buy more?: No      Within the past 12 months, did the food you bought just not last and you didn t have money to get more?: No   Transportation Needs: Low Risk  (3/14/2025)    Transportation Needs      Within the past 12 months, has lack of transportation kept you from medical appointments, getting your medicines, non-medical meetings or appointments, work, or from getting things that you need?: No   Physical Activity: Insufficiently Active (3/14/2025)    Exercise Vital Sign      Days of Exercise per Week: 7 days      Minutes of Exercise per Session: 20 min   Stress: Stress Concern  Present (3/14/2025)    [employer] - Occupational Stress Questionnaire      Feeling of Stress : To some extent   Social Connections: Unknown (3/14/2025)    Social Connection and Isolation Panel [NHANES]      Frequency of Social Gatherings with Friends and Family: Patient declined   Interpersonal Safety: Low Risk  (3/14/2025)    Interpersonal Safety      Do you feel physically and emotionally safe where you currently live?: Yes      Within the past 12 months, have you been hit, slapped, kicked or otherwise physically hurt by someone?: No      Within the past 12 months, have you been humiliated or emotionally abused in other ways by your partner or ex-partner?: No   Housing Stability: High Risk (3/14/2025)    Housing Stability      Do you have housing? : Yes      Are you worried about losing your housing?: Yes        Physical Exam:  Vital signs: [facility] 08/08/2016    General Appearance: No acute distress, appropriate demeanor, conversant  Eyes: moist conjunctivae; EOMI; pupils symmetric; visual acuity grossly intact; no proptosis  Head: normocephalic; overall symmetric appearance without deformity  Face: overall symmetric without deformity  Ears: Normal appearance of external ear; external meatus normal in appearance  Nose: No external deformity  Oral Cavity/oropharynx: Normal appearance of mucosa  Neck: no palpable lymphadenopathy; thyroid without palpable nodules  Lungs: symmetric chest rise; no wheezing  CV: Good distal perfusion; normal hear rate  Extremities: No deformity  Neurologic Exam: Cranial nerves II-XII are grossly intact; no focal deficit    Procedure Note  Procedure performed: Rigid nasal endoscopy  Indication: To evaluate for sinonasal pathology not visualized on routine anterior rhinoscopy  Anesthesia: 4% topical lidocaine with 0.05% oxymetazoline  Description of procedure: After obtaining consent for the procedure from the patient, the sinonasal cavity was sprayed with  topical anesthetic. A rigid 30-degree nasal endoscope was used to first used to visualize the nasal cavity, the inferior and middle turbinates, and the nasopharynx on the left. A second pass was then made to visualize the middle meatus, the superior meatus and sphenoethmoid recess. Finally, the scope was turned 90-degrees and used to visualize the olfactory cleft and frontal outflow tract. A similar approach was used for the contralateral side. They tolerated the procedure well without difficulty.    Lerna-Xander Endoscopic Scoring System  Endoscopic observation Right Left   Polyps in middle meatus (0 = absent, 1 = restricted to middle meatus, 2 = Beyond middle meatus) 0 0   Discharge (0 = absent, 1 = thin and clear, 2 = thick, purulent) 0 0   Edema (0 = absent, 1 = mild-moderate, 2 = moderate-severe) 0 0   Crusting (0 = absent, 1 = mild-moderate, 2 = moderate-severe) 0 0   Scarring (0= absent, 1 = mild-moderate, 2 = moderate-severe) 0 0   Total 0 0     Findings  RT/LT: Bilateral sinonasal passages patent without evidence of infection, polyps, or mass.    Evidence of bilateral turbinate hypertrophy with nasal valve collapse primarily in the  region.     The patient tolerated the procedure well without complication.     Assessment/Medical Decision Making:  Ilsa Yusuf is a 64 year old female who presents for consultation regarding nasal congestion. On exam, she has turbinate hypertrophy and nasal valve collapse. We had a discussion about the possible etiology of her symptoms. We further discussed treatment options including sinus rinses, nasal sprays, breathe-right strips, and surgery.     Plan:  Begin Azelastine & Flonase together at night.   Begin daily sinus rinses at night.   Patient will reach out via Mychart on progress with these measures in 30-40 days and if symptoms are not improved I will refer her to facial plastics for nasal valve collapse/consideration of surgery.     Scribe Disclosure:    By signing my name below, I, María Thomas, attest that this documentation has been prepared under the direction and in the presence of Casey Good MD.  - Electronically Signed: María Guzman 07/24/25     Electronically signed by:    Casey Good MD    [facility]  Rhinology  Endoscopic Skull Base Surgery  [facility]  Department of Otolaryngology - Head & Neck Surgery          Again, thank you for allowing me to participate in the care of your patient.      Sincerely,    Casey Good MD

## 2025-07-24 NOTE — PROGRESS NOTES
[facility]  New Patient Visit      Encounter date:   July 24, 2025    Referring Provider:   No referring provider defined for this encounter.    Chief Complaint: Consult     History of Present Illness:   Ilsa Yusuf is a 64 year old female who presents for consultation regarding nasal congestion.    1/30/24- referral/ note w. Goltz, Bennett Ezra, PA-C: Patient was seen in office for ABDOUL, nasal congestion, and nocturia. Regarding nasal congestion, She feels she has a very narrow nasal passages. She uses azelastine regularly. She still feels her nasal airway is narrow by where her glasses would sit. Her nose gets more congested at night, particularly when it is over freezing    7/24/25: Today, the patient reports that she has ABDOUL and has been on cpap since 2016. She is struggling to use her CPAP and has been off of it since October. She endorses nasal congestion. She experiences drainage, but the congestion is most bothersome for her. She has used Azelastine. She states that this medication works well (around 85% of the time). She used Flonase as well, but felt that it did not work very well. She has tried sinus rinses as well. Feels that her nose is very tight internally. Afrin did help improve her breathing in clinic.     Review of systems: A 14-point review of systems has been conducted and was negative for any notable symptoms, except as dictated in the history of present illness.     Medical History:  Past Medical History:   Diagnosis Date    Arthritis 2012    both knees; hands    Cervical high risk HPV (human papillomavirus) test positive 03/19/2019    see problem list    Depressive disorder     Depressive disorder, not elsewhere classified 08/28/12    DC 10/05/12-[facility]    Hyperlipidemia LDL goal < 130     mevacor    Hypothyroid     Moderate major depression (H)     abilify, cymbalta, seroquel, and Dr Antonio cowart     ABDOUL (obstructive sleep apnea)     PTSD  (post-traumatic stress disorder)     Seizure (H) 2011    one episode, was on Keppra, EEG negative        Surgical History:   Past Surgical History:   Procedure Laterality Date    ABDOMEN SURGERY      BIOPSY  1991    needle aspiration of thyroid tisse    BLADDER SURGERY      CHOLECYSTECTOMY      COLONOSCOPY      CYSTOSCOPY      CYSTOSCOPY, BIOPSY BLADDER, COMBINED N/A 2022    Procedure: EXAM UNDER ANESTHESIA, CYSTOSCOPY;  Surgeon: Terrie Melton MD;  Location: [facility] OR    ORTHOPEDIC SURGERY  left knee    renal artery surgery  child    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    TONSILLECTOMY      ureteral reimplantation      ZZC PARTIAL EXCISION THYROID,UNILAT      rt, negative path then, treated with synthroid.        Family History:  Family History   Problem Relation Age of Onset    Alzheimer Disease Mother         total care now    Depression Mother         situational    Anxiety Disorder Mother     C.A.D. Father 58         at 58, heart disease    Mental Illness Father     Coronary Artery Disease Father          at 57 of 6th heart attack    Hyperlipidemia Father     Diverticulitis Father     Diabetes Sister         adult onset    Melanoma Sister     Breast Cancer Sister     Obesity Sister     Thyroid Disease Sister     Obesity Sister     Diabetes Maternal Grandmother         oral anti-diabteic controlled    Cerebrovascular Disease Maternal Grandmother     Anesthesia Reaction No family hx of     Bleeding Disorder No family hx of     Venous thrombosis No family hx of         Social History:   Social History     Socioeconomic History    Marital status:    Tobacco Use    Smoking status: Former     Current packs/day: 0.00     Average packs/day: 0.3 packs/day for 31.1 years (9.3 ttl pk-yrs)     Types: Cigarettes     Start date:      Quit date: 2015     Years since quitting: 10.4     Passive exposure: Past    Smokeless tobacco: Never    Tobacco comments:     Quit 5 -  10 years ago    Vaping Use    Vaping status: Never Used   Substance and Sexual Activity    Alcohol use: Not Currently    Drug use: Yes     Types: Marijuana     Comment: medical marijuana    Sexual activity: Not Currently     Partners: Male     Birth control/protection: Sponge, Post-menopausal     Comment: Tomasa-menopausal   Other Topics Concern    Parent/sibling w/ CABG, MI or angioplasty before 65F 55M? 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     Social Drivers of Health     Financial Resource Strain: Low Risk  (3/14/2025)    Financial Resource Strain     Within the past 12 months, have you or your family members you live with been unable to get utilities (heat, electricity) when it was really needed?: No   Food Insecurity: Low Risk  (3/14/2025)    Food Insecurity     Within the past 12 months, did you worry that your food would run out before you got money to buy more?: No     Within the past 12 months, did the food you bought just not last and you didn t have money to get more?: No   Transportation Needs: Low Risk  (3/14/2025)    Transportation Needs     Within the past 12 months, has lack of transportation kept you from medical appointments, getting your medicines, non-medical meetings or appointments, work, or from getting things that you need?: No   Physical Activity: Insufficiently Active (3/14/2025)    Exercise Vital Sign     Days of Exercise per Week: 7 days     Minutes of Exercise per Session: 20 min   Stress: Stress Concern Present (3/14/2025)    [employer] [employer] - Occupational Stress Questionnaire     Feeling of Stress : To some extent   Social Connections: Unknown (3/14/2025)    Social Connection and Isolation Panel [NHANES]     Frequency of Social Gatherings with Friends and Family: Patient declined   Interpersonal Safety: Low Risk  (3/14/2025)    Interpersonal Safety     Do you feel  physically and emotionally safe where you currently live?: Yes     Within the past 12 months, have you been hit, slapped, kicked or otherwise physically hurt by someone?: No     Within the past 12 months, have you been humiliated or emotionally abused in other ways by your partner or ex-partner?: No   Housing Stability: High Risk (3/14/2025)    Housing Stability     Do you have housing? : Yes     Are you worried about losing your housing?: Yes        Physical Exam:  Vital signs: [facility] 08/08/2016    General Appearance: No acute distress, appropriate demeanor, conversant  Eyes: moist conjunctivae; EOMI; pupils symmetric; visual acuity grossly intact; no proptosis  Head: normocephalic; overall symmetric appearance without deformity  Face: overall symmetric without deformity  Ears: Normal appearance of external ear; external meatus normal in appearance  Nose: No external deformity  Oral Cavity/oropharynx: Normal appearance of mucosa  Neck: no palpable lymphadenopathy; thyroid without palpable nodules  Lungs: symmetric chest rise; no wheezing  CV: Good distal perfusion; normal hear rate  Extremities: No deformity  Neurologic Exam: Cranial nerves II-XII are grossly intact; no focal deficit    Procedure Note  Procedure performed: Rigid nasal endoscopy  Indication: To evaluate for sinonasal pathology not visualized on routine anterior rhinoscopy  Anesthesia: 4% topical lidocaine with 0.05% oxymetazoline  Description of procedure: After obtaining consent for the procedure from the patient, the sinonasal cavity was sprayed with topical anesthetic. A rigid 30-degree nasal endoscope was used to first used to visualize the nasal cavity, the inferior and middle turbinates, and the nasopharynx on the left. A second pass was then made to visualize the middle meatus, the superior meatus and sphenoethmoid recess. Finally, the scope was turned 90-degrees and used to visualize the olfactory cleft and frontal outflow tract. A similar  approach was used for the contralateral side. They tolerated the procedure well without difficulty.    [place]-Xander Endoscopic Scoring System  Endoscopic observation Right Left   Polyps in middle meatus (0 = absent, 1 = restricted to middle meatus, 2 = Beyond middle meatus) 0 0   Discharge (0 = absent, 1 = thin and clear, 2 = thick, purulent) 0 0   Edema (0 = absent, 1 = mild-moderate, 2 = moderate-severe) 0 0   Crusting (0 = absent, 1 = mild-moderate, 2 = moderate-severe) 0 0   Scarring (0= absent, 1 = mild-moderate, 2 = moderate-severe) 0 0   Total 0 0     Findings  RT/LT: Bilateral sinonasal passages patent without evidence of infection, polyps, or mass.    Evidence of bilateral turbinate hypertrophy with nasal valve collapse primarily in the  region.     The patient tolerated the procedure well without complication.     Assessment/Medical Decision Making:  Ilsa Yusuf is a 64 year old female who presents for consultation regarding nasal congestion. On exam, she has turbinate hypertrophy and nasal valve collapse. We had a discussion about the possible etiology of her symptoms. We further discussed treatment options including sinus rinses, nasal sprays, breathe-right strips, and surgery.     Plan:  Begin Azelastine & Flonase together at night.   Begin daily sinus rinses at night.   Patient will reach out via Mychart on progress with these measures in 30-40 days and if symptoms are not improved I will refer her to facial plastics for nasal valve collapse/consideration of surgery.     Scribe Disclosure:   By signing my name below, I, María Guzman, attest that this documentation has been prepared under the direction and in the presence of Casey Good MD.  - Electronically Signed: María Guzman 07/24/25     Electronically signed by:    Casey Good MD    [facility]  Rhinology  Endoscopic Skull Base Surgery  [facility]  Department of Otolaryngology -  Head & Neck Surgery

## 2025-07-29 ENCOUNTER — OFFICE VISIT (OUTPATIENT)
Dept: URGENT CARE | Facility: URGENT CARE | Age: 65
End: 2025-07-29
Payer: MEDICARE

## 2025-07-29 VITALS
WEIGHT: 212 LBS | HEIGHT: 69 IN | RESPIRATION RATE: 18 BRPM | DIASTOLIC BLOOD PRESSURE: 64 MMHG | HEART RATE: 64 BPM | OXYGEN SATURATION: 96 % | BODY MASS INDEX: 31.4 KG/M2 | SYSTOLIC BLOOD PRESSURE: 98 MMHG | TEMPERATURE: 96.9 F

## 2025-07-29 DIAGNOSIS — S05.01XA ABRASION OF RIGHT CORNEA, INITIAL ENCOUNTER: Primary | ICD-10-CM

## 2025-07-29 PROCEDURE — 3078F DIAST BP <80 MM HG: CPT | Performed by: PHYSICIAN ASSISTANT

## 2025-07-29 PROCEDURE — 1125F AMNT PAIN NOTED PAIN PRSNT: CPT | Performed by: PHYSICIAN ASSISTANT

## 2025-07-29 PROCEDURE — 3074F SYST BP LT 130 MM HG: CPT | Performed by: PHYSICIAN ASSISTANT

## 2025-07-29 PROCEDURE — 99214 OFFICE O/P EST MOD 30 MIN: CPT | Performed by: PHYSICIAN ASSISTANT

## 2025-07-29 RX ORDER — ERYTHROMYCIN 5 MG/G
0.5 OINTMENT OPHTHALMIC 4 TIMES DAILY
Qty: 3.5 G | Refills: 0 | Status: SHIPPED | OUTPATIENT
Start: 2025-07-29 | End: 2025-08-05

## 2025-07-29 ASSESSMENT — PAIN SCALES - GENERAL: PAINLEVEL_OUTOF10: SEVERE PAIN (8)

## 2025-07-29 NOTE — PROGRESS NOTES
Urgent Care Clinic Visit  Rapid rooming was initiated.  Provider was consulted, patient will remain in room and provider will be with patient as soon as possible.  Chief Complaint   Patient presents with    Urgent Care     Woke this morning and thought she got her eye lashes in her right eye, she try to flush it water, and then it feel like its still in there, then she flush it out with her son in law contact lens solution and it feels like its gotten worse                7/29/2025     4:16 PM   Additional Questions   Roomed by Deedee Fritz   Accompanied by self

## 2025-07-29 NOTE — PROGRESS NOTES
"    ASSESSMENT/PLAN:    (S05.01XA) Abrasion of right cornea, initial encounter  (primary encounter diagnosis)  MDM: approximately 3-4 mm in size, shallow, round to oval shaped and  Located at  ~ 11 and 12 O'clock position right eye. History of getting hit in right eye with dog toy last night  before bed. Plan as per below--including advised short interval follow-up with eye MD tomorrow as abrasion is fairly large for office visit eye/corneal abrasion recheck.  Please also see the below discharge summary/plan (which I reviewed with patient verbally today and provided in printed form and in MyChart for home review).  Criteria for urgent/emergent follow-up was also reviewed.    Plan: erythromycin (ROMYCIN) 5 MG/GM ophthalmic         ointment            July 29, 2025 Urgent Care Plan:     Call your eye doctor tomorrow to ask to be rechecked for your corneal abrasion.     Start the Erythromycin eye antibiotic ointment I prescribed today.        2. Please make an appointment to see your eye doctor tomorrow for an eye recheck--as you have a fairly large sized corneal abrasion.     3. Follow-up sooner if you have any sudden severe worsening (such as worsening eye pain, worsening vision, or headache).         This progress note has been dictated, with use of voice recognition software. Any grammatical, typographical, or context errors are unintentional and inherent to use of voice recognition software.  -------------      Chief Complaint   Patient presents with    Urgent Care     Woke this morning and thought she got her eye lashes in her right eye, she try to flush it water, and then it feel like its still in there, then she flush it out with her son in law contact lens solution and it feels like its gotten worse        Ilsa Yusuf presents to  today with c/o right eye irritation and sensation \"is feels like I have something in my eye\". Patient notes onset of related to accidental injury.     HPI: Patient states " "she was playing with her dog last night, just before bed, and the dog threw a toy up in the air that hit her in the right eye.  She did not think much about it and went to bed.  When she woke this morning she had eye right eye pain/irritation and felt like she might have an eyelash in her right eye.  She tried rinsing her eye out with water at home and later used a family member's contact solution, but has not had any improvement in symptoms so presents to urgent care today for evaluation.    Patient does not wear contact lenses.   she wears corrective lenses but has no other history of chronic eye problems by her report.  No history of uveitis, iritis or glaucoma.    Eye(s) Problem -   Onset: this morning   Location: right eye  History:    Trauma: See above HPI  Recall any foreign body that could be in eye: no  Does it feel like you have something in your eye: YES  Do you wear contacts: no     If worn, contact lens type: N/A  Accompanying Signs & Symptoms:   Eye pain: yes                 Photophobia: no  Redness: yes, mild after flushing eyes at home   Drainage: no  Swelling: no  Visual changes no  Fever: no  Nasal congestion: no  Eye pain: no  Anyone at home with similar symptoms: no    Therapies tried: eye flushing with contact lens solution and water   Visual acuity: Patient is unable to do visual acuity testing today-hurts to keep her eye open, but reports she has not noted any sudden loss or change in visual acuity               OBJECTIVE:  BP 98/64 (BP Location: Left arm, Patient Position: Sitting, Cuff Size: Adult Large)   Pulse 64   Temp 96.9  F (36.1  C) (Tympanic)   Resp 18   Ht 1.74 m (5' 8.5\")   Wt 96.2 kg (212 lb)   LMP 08/08/2016   SpO2 96%   BMI 31.77 kg/m        GENERAL:  Very pleasant, comfortable and generally well appearing.  HEENT: PERRLA  Right EYE: Mildly, diffusely, injected sclera. NO FB with eversion of lid. Positive corneal abrasion visualized on flouroscein staining today. The " No abrasion is approximately 3-4 mm in size, shallow, round to oval shaped and  Located at  ~ 11 and 12 O'clock position.  No swelling of the upper or lower lids.  No periorbital redness, swelling, heat, or tenderness.  No eye drainage.  Left  EYE: Unremarkable.   Left TM is normal: no effusions, no erythema, and normal landmarks.  Right TM is normal: no effusions, no erythema, and normal landmarks.  Nasal mucosa is normal.  Neck is supple, FROM with no adenopathy

## 2025-07-29 NOTE — PATIENT INSTRUCTIONS
July 29, 2025 Urgent Care Plan:     Call your eye doctor tomorrow to ask to be rechecked for your corneal abrasion.     Start the Erythromycin eye antibiotic ointment I prescribed today.        2. Please make an appointment to see your eye doctor tomorrow for an eye recheck--as you have a fairly large sized corneal abrasion.     3. Follow-up sooner if you have any sudden severe worsening (such as worsening eye pain, worsening vision, or headache).

## 2025-07-30 DIAGNOSIS — F41.1 GAD (GENERALIZED ANXIETY DISORDER): ICD-10-CM

## 2025-07-30 RX ORDER — HYDROXYZINE HYDROCHLORIDE 25 MG/1
TABLET, FILM COATED ORAL
Qty: 540 TABLET | Refills: 0 | Status: SHIPPED | OUTPATIENT
Start: 2025-07-30

## 2025-07-30 NOTE — TELEPHONE ENCOUNTER
Date of Last Office Visit: 7/9/2025  Date of Next Office Visit: 8/20/2025  No shows since last visit: No  More than one patient-initiated cancellation (with reschedule) since last seen in clinic? No    []Medication refilled per  Medication Refill in Ambulatory Care  policy.  []Scope of Practice: refill request processed by LPN/MA  [x]Medication unable to be refilled by RN due to criteria not met as indicated below:    []Eligibility: has not had a provider visit within last 6 months   []Supervision: no future appointment; < 7 days before next appointment   []Compliance: no shows; cancellations; lapse in therapy   []Verification: order discrepancy; may need modification...   [x] > 30-day supply request   []Advanced refill request: > 7 days before refill date   []Controlled medication   []Medication not included in policy   []Review: new med; med adjusted <= 30 days; safety alert; requires lab monitoring...   []Other:      Medication(s) requested:     -  hydroxyzine 25 mg tab  Date last ordered: 3/28/2025  Qty: 540  Refills: 0  Appropriate for refill? Yes      Any Controlled Substance(s)? No      Requested medication(s) verified as identical to current order? Yes    Any lapse in adherence to medication(s) greater than 5 days? N/A     Additional action taken? routed encounter to provider for review.      Last visit treatment plan:   PLAN AND RECOMMENDATIONS:  Continue  Wellbutrin  mg  daily -   Continue Lexapro 20 mg every day  Continue hydroxyzine 25 - 50 mg instead of 3 times daily as needed for anxiety         Continue to take prazosin 5 mg at bedtime  Take Rozerem 8 mg at bedtime  Continue Buspar 20 mg three times daily for anxiety    Is lab monitoring required? No    Bridget Allred RN on 7/30/2025 at 11:04 AM

## 2025-08-02 ENCOUNTER — OFFICE VISIT (OUTPATIENT)
Dept: OPHTHALMOLOGY | Facility: CLINIC | Age: 65
End: 2025-08-02
Payer: MEDICARE

## 2025-08-02 DIAGNOSIS — H52.203 ASTIGMATISM OF BOTH EYES, UNSPECIFIED TYPE: ICD-10-CM

## 2025-08-02 DIAGNOSIS — H01.01B ULCERATIVE BLEPHARITIS OF UPPER AND LOWER EYELIDS OF BOTH EYES: ICD-10-CM

## 2025-08-02 DIAGNOSIS — H01.01A ULCERATIVE BLEPHARITIS OF UPPER AND LOWER EYELIDS OF BOTH EYES: ICD-10-CM

## 2025-08-02 DIAGNOSIS — S05.01XS CORNEAL ABRASION, RIGHT, SEQUELA: Primary | ICD-10-CM

## 2025-08-02 DIAGNOSIS — H52.13 MYOPIA OF BOTH EYES: ICD-10-CM

## 2025-08-02 PROCEDURE — 99203 OFFICE O/P NEW LOW 30 MIN: CPT | Performed by: STUDENT IN AN ORGANIZED HEALTH CARE EDUCATION/TRAINING PROGRAM

## 2025-08-02 ASSESSMENT — REFRACTION_WEARINGRX
OD_ADD: +3.00
OD_SPHERE: -0.25
OS_SPHERE: -0.50
OD_CYLINDER: +0.25
OD_AXIS: 019
SPECS_TYPE: BIFOCAL
OS_CYLINDER: +1.50
OS_AXIS: 168

## 2025-08-02 ASSESSMENT — EXTERNAL EXAM - RIGHT EYE: OD_EXAM: NORMAL

## 2025-08-02 ASSESSMENT — VISUAL ACUITY
OD_PH_CC: 20/20
OD_CC+: -1
OD_CC: 20/40
OS_CC: 20/30
METHOD: SNELLEN - LINEAR

## 2025-08-02 ASSESSMENT — TONOMETRY
IOP_METHOD: ICARE
OS_IOP_MMHG: 09
OD_IOP_MMHG: 09

## 2025-08-02 ASSESSMENT — EXTERNAL EXAM - LEFT EYE: OS_EXAM: NORMAL

## 2025-08-03 DIAGNOSIS — F51.01 PRIMARY INSOMNIA: ICD-10-CM

## 2025-08-04 ENCOUNTER — TRANSFERRED RECORDS (OUTPATIENT)
Dept: HEALTH INFORMATION MANAGEMENT | Facility: CLINIC | Age: 65
End: 2025-08-04
Payer: MEDICARE

## 2025-08-04 RX ORDER — RAMELTEON 8 MG/1
8 TABLET ORAL AT BEDTIME
Qty: 90 TABLET | Refills: 0 | Status: SHIPPED | OUTPATIENT
Start: 2025-08-04

## 2025-08-26 ENCOUNTER — ANCILLARY PROCEDURE (OUTPATIENT)
Dept: MRI IMAGING | Facility: CLINIC | Age: 65
End: 2025-08-26
Attending: NURSE PRACTITIONER
Payer: MEDICARE

## 2025-08-26 DIAGNOSIS — M47.892 OTHER SPONDYLOSIS, CERVICAL REGION: ICD-10-CM

## 2025-08-26 DIAGNOSIS — M54.16 RADICULOPATHY, LUMBAR REGION: ICD-10-CM

## 2025-08-26 DIAGNOSIS — M54.6 PAIN IN THORACIC SPINE: ICD-10-CM

## 2025-08-26 PROCEDURE — 72146 MRI CHEST SPINE W/O DYE: CPT | Mod: GC | Performed by: RADIOLOGY

## 2025-08-26 PROCEDURE — 72148 MRI LUMBAR SPINE W/O DYE: CPT | Performed by: RADIOLOGY

## 2025-08-26 PROCEDURE — 72141 MRI NECK SPINE W/O DYE: CPT | Mod: GC | Performed by: RADIOLOGY

## 2025-09-02 ENCOUNTER — TRANSFERRED RECORDS (OUTPATIENT)
Dept: HEALTH INFORMATION MANAGEMENT | Facility: CLINIC | Age: 65
End: 2025-09-02
Payer: MEDICARE

## (undated) DEVICE — SOL WATER IRRIG 500ML BOTTLE 2F7113

## (undated) DEVICE — SUCTION MANIFOLD NEPTUNE 2 SYS 4 PORT 0702-020-000

## (undated) DEVICE — ESU GROUND PAD ADULT W/CORD E7507

## (undated) DEVICE — SOL WATER IRRIG 3000ML BAG 2B7117

## (undated) DEVICE — SOL NACL 0.9% IRRIG 500ML BOTTLE 2F7123

## (undated) DEVICE — DRSG TELFA 3X8" 1238

## (undated) DEVICE — PAD CHUX UNDERPAD 30X36" P3036C

## (undated) DEVICE — PACK CYSTO CUSTOM ASC

## (undated) DEVICE — LINEN TOWEL PACK X5 5464

## (undated) DEVICE — NDL 18GA 1.5" 305196

## (undated) DEVICE — GLOVE PROTEXIS W/NEU-THERA 6.5  2D73TE65

## (undated) RX ORDER — BETAMETHASONE SODIUM PHOSPHATE AND BETAMETHASONE ACETATE 3; 3 MG/ML; MG/ML
INJECTION, SUSPENSION INTRA-ARTICULAR; INTRALESIONAL; INTRAMUSCULAR; SOFT TISSUE
Status: DISPENSED
Start: 2022-04-11

## (undated) RX ORDER — OXYCODONE HYDROCHLORIDE 5 MG/1
TABLET ORAL
Status: DISPENSED
Start: 2022-11-01

## (undated) RX ORDER — LIDOCAINE HYDROCHLORIDE 20 MG/ML
INJECTION, SOLUTION EPIDURAL; INFILTRATION; INTRACAUDAL; PERINEURAL
Status: DISPENSED
Start: 2022-11-01

## (undated) RX ORDER — PROPOFOL 10 MG/ML
INJECTION, EMULSION INTRAVENOUS
Status: DISPENSED
Start: 2022-11-01

## (undated) RX ORDER — CEFAZOLIN SODIUM 2 G/50ML
SOLUTION INTRAVENOUS
Status: DISPENSED
Start: 2022-11-01

## (undated) RX ORDER — SULFAMETHOXAZOLE/TRIMETHOPRIM 800-160 MG
TABLET ORAL
Status: DISPENSED
Start: 2023-12-07

## (undated) RX ORDER — FENTANYL CITRATE 50 UG/ML
INJECTION, SOLUTION INTRAMUSCULAR; INTRAVENOUS
Status: DISPENSED
Start: 2022-11-01

## (undated) RX ORDER — DEXAMETHASONE SODIUM PHOSPHATE 4 MG/ML
INJECTION, SOLUTION INTRA-ARTICULAR; INTRALESIONAL; INTRAMUSCULAR; INTRAVENOUS; SOFT TISSUE
Status: DISPENSED
Start: 2022-04-11

## (undated) RX ORDER — ONDANSETRON 2 MG/ML
INJECTION INTRAMUSCULAR; INTRAVENOUS
Status: DISPENSED
Start: 2022-11-01

## (undated) RX ORDER — LIDOCAINE HYDROCHLORIDE 20 MG/ML
JELLY TOPICAL
Status: DISPENSED
Start: 2023-12-07

## (undated) RX ORDER — LIDOCAINE HYDROCHLORIDE 10 MG/ML
INJECTION, SOLUTION EPIDURAL; INFILTRATION; INTRACAUDAL; PERINEURAL
Status: DISPENSED
Start: 2022-04-11

## (undated) RX ORDER — ACETAMINOPHEN 325 MG/1
TABLET ORAL
Status: DISPENSED
Start: 2022-11-01